# Patient Record
Sex: FEMALE | Race: WHITE | NOT HISPANIC OR LATINO | Employment: FULL TIME | URBAN - METROPOLITAN AREA
[De-identification: names, ages, dates, MRNs, and addresses within clinical notes are randomized per-mention and may not be internally consistent; named-entity substitution may affect disease eponyms.]

---

## 2017-02-28 ENCOUNTER — MYC REFILL (OUTPATIENT)
Dept: FAMILY MEDICINE | Facility: CLINIC | Age: 47
End: 2017-02-28

## 2017-02-28 DIAGNOSIS — G47.00 INSOMNIA: ICD-10-CM

## 2017-02-28 RX ORDER — TRAZODONE HYDROCHLORIDE 100 MG/1
200 TABLET ORAL AT BEDTIME
Qty: 180 TABLET | Refills: 1 | Status: SHIPPED | OUTPATIENT
Start: 2017-02-28 | End: 2017-08-14

## 2017-02-28 NOTE — TELEPHONE ENCOUNTER
[facility]  Original authorizing provider: LEDY Seaman CNPan Ezequiel would like a refill of the following medications:  traZODone (DESYREL) 100 MG tablet [LEDY Seaman CNP]    Preferred pharmacy: [address]    Comment:      Medication renewals requested in this message routed to other providers:  gabapentin (NEURONTIN) 300 MG capsule [LEDY Frias CNP]

## 2017-02-28 NOTE — TELEPHONE ENCOUNTER
Prescription approved per [facility] Refill Protocol.    Last filled 7/13/16 180 tabsx2 refills    Last seen in clinic9/16/16    Sapna Bowie RN

## 2017-04-11 ENCOUNTER — MYC REFILL (OUTPATIENT)
Dept: FAMILY MEDICINE | Facility: CLINIC | Age: 47
End: 2017-04-11

## 2017-04-11 DIAGNOSIS — F33.1 MAJOR DEPRESSIVE DISORDER, RECURRENT EPISODE, MODERATE (H): ICD-10-CM

## 2017-04-11 DIAGNOSIS — G25.81 RESTLESS LEG SYNDROME: ICD-10-CM

## 2017-04-11 NOTE — TELEPHONE ENCOUNTER
Gabapentin     Last Written Prescription Date: 2/28/17  Last Fill Quantity: 90,  # refills: 0  Last Office Visit with [facility] or [facility] prescribing provider: 9/16/16    Reviewed  and no concerns.  Last dispensed on 3/1/17    Jennifer Waldrop RN  [facility]

## 2017-04-11 NOTE — TELEPHONE ENCOUNTER
Venlafaxine  Last Written Prescription Date: 1/11/17  Last Fill Quantity: 90, # refills: 0  Last Office Visit with G primary care provider:  9/16/16       Last PHQ-9 score on record=   PHQ-9 SCORE 1/9/2017   Total Score MyChart 5 (Mild depression)   Total Score -         Routing refill request to provider for review/approval because:  PHQ 9 >4    Jennifer Waldrop RN  [facility]

## 2017-04-11 NOTE — TELEPHONE ENCOUNTER
[facility]  Original authorizing provider: LEDY Seaman CNPan Ezequiel would like a refill of the following medications:  venlafaxine (EFFEXOR-XR) 150 MG 24 hr capsule [LEDY Seaman CNP]    Preferred pharmacy: [address]    Comment:      Medication renewals requested in this message routed to other providers:  gabapentin (NEURONTIN) 300 MG capsule [LEDY Frias CNP]

## 2017-04-11 NOTE — TELEPHONE ENCOUNTER
[facility]  Original authorizing provider: LEDY Frias CNPan Ezequiel would like a refill of the following medications:  gabapentin (NEURONTIN) 300 MG capsule [LEDY Frias CNP]    Preferred pharmacy: [address]    Comment:      Medication renewals requested in this message routed to other providers:  venlafaxine (EFFEXOR-XR) 150 MG 24 hr capsule [LEDY Seaman CNP]

## 2017-04-12 RX ORDER — GABAPENTIN 300 MG/1
CAPSULE ORAL
Qty: 90 CAPSULE | Refills: 0 | Status: SHIPPED | OUTPATIENT
Start: 2017-04-12 | End: 2017-05-05

## 2017-04-12 RX ORDER — VENLAFAXINE HYDROCHLORIDE 150 MG/1
150 CAPSULE, EXTENDED RELEASE ORAL DAILY
Qty: 30 CAPSULE | Refills: 0 | Status: SHIPPED | OUTPATIENT
Start: 2017-04-12 | End: 2017-06-28

## 2017-04-25 ENCOUNTER — TELEPHONE (OUTPATIENT)
Dept: FAMILY MEDICINE | Facility: CLINIC | Age: 47
End: 2017-04-25

## 2017-04-25 NOTE — TELEPHONE ENCOUNTER
Panel Management Review      Patient has the following on her problem list: None      Composite cancer screening  Chart review shows that this patient is due/due soon for the following Pap Smear  Summary:    Patient is due/failing the following:   PAP    Action needed:   Patient needs office visit for pap smear and anxiety follow up.    Type of outreach:    Sent makexyzt message. and Sent letter.    Questions for provider review:    None                                                                                                                                    Tyler Silveira MA     Chart routed to none.

## 2017-04-25 NOTE — LETTER
[place] [id number] [place] [id number]  [phone]        April 25, 2017      Sharmin Nieves  [address]  [facility] [address]          Dear Sharmin,    In order to ensure we are providing the best quality care, we have reviewed your chart and see that you are due for:    1. Pap smear  2. Anxiety follow up    Please call the clinic at your earliest convenience to schedule an appointment.  If you have completed these please contact our office via phone or Laiyaoyaohart to update our records.  We would like to know the date (approximately month and year), the result, and ideally where the procedure was performed.    Thank you for trusting us with your health care.      Sincerely,    Care Team for TOMMY Clinton

## 2017-05-05 ENCOUNTER — OFFICE VISIT (OUTPATIENT)
Dept: FAMILY MEDICINE | Facility: CLINIC | Age: 47
End: 2017-05-05
Payer: COMMERCIAL

## 2017-05-05 VITALS
WEIGHT: 210.3 LBS | OXYGEN SATURATION: 96 % | TEMPERATURE: 98.1 F | SYSTOLIC BLOOD PRESSURE: 148 MMHG | DIASTOLIC BLOOD PRESSURE: 98 MMHG | BODY MASS INDEX: 30.11 KG/M2 | HEART RATE: 86 BPM | HEIGHT: 70 IN

## 2017-05-05 DIAGNOSIS — F33.1 MAJOR DEPRESSIVE DISORDER, RECURRENT EPISODE, MODERATE (H): ICD-10-CM

## 2017-05-05 DIAGNOSIS — G25.81 RESTLESS LEG SYNDROME: Primary | ICD-10-CM

## 2017-05-05 PROCEDURE — 99214 OFFICE O/P EST MOD 30 MIN: CPT | Performed by: NURSE PRACTITIONER

## 2017-05-05 RX ORDER — VENLAFAXINE HYDROCHLORIDE 75 MG/1
225 CAPSULE, EXTENDED RELEASE ORAL DAILY
Qty: 270 CAPSULE | Refills: 1 | Status: SHIPPED | OUTPATIENT
Start: 2017-05-05 | End: 2017-12-14

## 2017-05-05 RX ORDER — GABAPENTIN 600 MG/1
600 TABLET ORAL AT BEDTIME
Qty: 90 TABLET | Refills: 3 | Status: SHIPPED | OUTPATIENT
Start: 2017-05-05 | End: 2017-10-04

## 2017-05-05 RX ORDER — GABAPENTIN 300 MG/1
300 CAPSULE ORAL 2 TIMES DAILY PRN
Qty: 90 CAPSULE | Refills: 3 | Status: SHIPPED | OUTPATIENT
Start: 2017-05-05 | End: 2017-10-04

## 2017-05-05 ASSESSMENT — ANXIETY QUESTIONNAIRES
7. FEELING AFRAID AS IF SOMETHING AWFUL MIGHT HAPPEN: SEVERAL DAYS
GAD7 TOTAL SCORE: 8
3. WORRYING TOO MUCH ABOUT DIFFERENT THINGS: SEVERAL DAYS
6. BECOMING EASILY ANNOYED OR IRRITABLE: MORE THAN HALF THE DAYS
5. BEING SO RESTLESS THAT IT IS HARD TO SIT STILL: SEVERAL DAYS
2. NOT BEING ABLE TO STOP OR CONTROL WORRYING: SEVERAL DAYS
1. FEELING NERVOUS, ANXIOUS, OR ON EDGE: SEVERAL DAYS
IF YOU CHECKED OFF ANY PROBLEMS ON THIS QUESTIONNAIRE, HOW DIFFICULT HAVE THESE PROBLEMS MADE IT FOR YOU TO DO YOUR WORK, TAKE CARE OF THINGS AT HOME, OR GET ALONG WITH OTHER PEOPLE: SOMEWHAT DIFFICULT

## 2017-05-05 ASSESSMENT — PATIENT HEALTH QUESTIONNAIRE - PHQ9: 5. POOR APPETITE OR OVEREATING: SEVERAL DAYS

## 2017-05-05 NOTE — MR AVS SNAPSHOT
After Visit Summary   5/5/2017    Sharmin Nieves    MRN: [id number]           Patient Information     Date Of Birth          1970        Visit Information        Provider Department      5/5/2017 10:45 AM Randa Bill APRN CNP [facility]        Today's Diagnoses     Restless leg syndrome    -  1    Major depressive disorder, recurrent episode, moderate (H)          Care Instructions    Keep a journal of your leg symptoms  Increase effexor to 225 mg  Return for physical with pap        Follow-ups after your visit        Who to contact     If you have questions or need follow up information about today's clinic visit or your schedule please contact [facility] directly at [phone].  Normal or non-critical lab and imaging results will be communicated to you by StackBlazehart, letter or phone within 4 business days after the clinic has received the results. If you do not hear from us within 7 days, please contact the clinic through StackBlazehart or phone. If you have a critical or abnormal lab result, we will notify you by phone as soon as possible.  Submit refill requests through PillPack or call your pharmacy and they will forward the refill request to us. Please allow 3 business days for your refill to be completed.          Additional Information About Your Visit        MyChart Information     PillPack gives you secure access to your electronic health record. If you see a primary care provider, you can also send messages to your care team and make appointments. If you have questions, please call your primary care clinic.  If you do not have a primary care provider, please call [phone] and they will assist you.        Care EveryWhere ID     This is your Care EveryWhere ID. This could be used by other organizations to access your [place] medical records  [id number]        Your Vitals Were     Pulse Temperature Height Pulse Oximetry BMI (Body Mass Index)       86 98.1  " F (36.7  C) (Oral) 5' 10\" (1.778 m) 96% 30.17 kg/m2        Blood Pressure from Last 3 Encounters:   05/05/17 (!) 148/98   11/15/16 132/78   09/16/16 129/83    Weight from Last 3 Encounters:   05/05/17 210 lb 4.8 oz (95.4 kg)   11/15/16 201 lb 9.6 oz (91.4 kg)   09/16/16 193 lb 11.2 oz (87.9 kg)              We Performed the Following     DEPRESSION ACTION PLAN (DAP)          Today's Medication Changes          These changes are accurate as of: 5/5/17 11:31 AM.  If you have any questions, ask your nurse or doctor.               These medicines have changed or have updated prescriptions.        Dose/Directions    * gabapentin 600 MG tablet   Commonly known as:  NEURONTIN   This may have changed:  You were already taking a medication with the same name, and this prescription was added. Make sure you understand how and when to take each.   Used for:  Restless leg syndrome   Changed by:  Randa Bill APRN CNP        Dose:  600 mg   Take 1 tablet (600 mg) by mouth At Bedtime   Quantity:  90 tablet   Refills:  3       * gabapentin 300 MG capsule   Commonly known as:  NEURONTIN   This may have changed:    - how much to take  - how to take this  - when to take this  - reasons to take this  - additional instructions   Used for:  Restless leg syndrome   Changed by:  Randa Bill APRN CNP        Dose:  300 mg   Take 1 capsule (300 mg) by mouth 2 times daily as needed   Quantity:  90 capsule   Refills:  3       * venlafaxine 150 MG 24 hr capsule   Commonly known as:  EFFEXOR-XR   This may have changed:  Another medication with the same name was added. Make sure you understand how and when to take each.   Used for:  Major depressive disorder, recurrent episode, moderate (H)   Changed by:  Randa Bill APRN CNP        Dose:  150 mg   Take 1 capsule (150 mg) by mouth daily   Quantity:  30 capsule   Refills:  0       * venlafaxine 75 MG 24 hr capsule   Commonly known as:  EFFEXOR-XR   This may have changed:  You were " already taking a medication with the same name, and this prescription was added. Make sure you understand how and when to take each.   Used for:  Major depressive disorder, recurrent episode, moderate (H)   Changed by:  Randa Bill APRN CNP        Dose:  225 mg   Take 3 capsules (225 mg) by mouth daily   Quantity:  270 capsule   Refills:  1       * Notice:  This list has 4 medication(s) that are the same as other medications prescribed for you. Read the directions carefully, and ask your doctor or other care provider to review them with you.      Stop taking these medicines if you haven't already. Please contact your care team if you have questions.     magnesium citrate solution   Stopped by:  Randa Bill APRN CNP           polyethylene glycol Packet   Commonly known as:  MIRALAX   Stopped by:  Randa Bill APRN CNP           pramipexole 0.125 MG tablet   Commonly known as:  MIRAPEX   Stopped by:  Randa Bill APRN CNP                Where to get your medicines      These medications were sent to [address] [place] [address] [address] [address]    Hours:  TRANSPLANT PHONE NUMBER [phone] Phone:  [phone]     gabapentin 300 MG capsule    gabapentin 600 MG tablet    venlafaxine 75 MG 24 hr capsule                Primary Care Provider Office Phone # Fax #    LEDY Wu [phone]       [facility] [address] [facility] [address]  [facility] [address]        Thank you!     Thank you for choosing [facility]  for your care. Our goal is always to provide you with excellent care. Hearing back from our patients is one way we can continue to improve our services. Please take a few minutes to complete the written survey that you may receive in the mail after your visit with us. Thank you!             Your Updated Medication List - Protect others around you: Learn how to safely  use, store and throw away your medicines at www.disposemymeds.org.          This list is accurate as of: 5/5/17 11:31 AM.  Always use your most recent med list.                   Brand Name Dispense Instructions for use    Daily Multi vitamin  /Minerals Tabs     30    1 TABLET DAILY       diltiazem 2% in PLO cream (FV COMPOUNDED) 2% Gel     60 g    To anal opening three times daily.  Use a pea-sized amount.  Store at room temperature.       * gabapentin 600 MG tablet    NEURONTIN    90 tablet    Take 1 tablet (600 mg) by mouth At Bedtime       * gabapentin 300 MG capsule    NEURONTIN    90 capsule    Take 1 capsule (300 mg) by mouth 2 times daily as needed       IRON SUPPLEMENT PO      Take 27 mg by mouth       MAGNESIUM OXIDE PO      Take 250 mg by mouth       MIRENA (52 MG) 20 MCG/24HR IUD   Generic drug:  levonorgestrel     1 each    one placed in uterus       order for DME     1 Device    Equipment being ordered: walking cast       order for DME     1 Device    Equipment being ordered: women's large post op shoe       traZODone 100 MG tablet    DESYREL    180 tablet    Take 2 tablets (200 mg) by mouth At Bedtime       * venlafaxine 150 MG 24 hr capsule    EFFEXOR-XR    30 capsule    Take 1 capsule (150 mg) by mouth daily       * venlafaxine 75 MG 24 hr capsule    EFFEXOR-XR    270 capsule    Take 3 capsules (225 mg) by mouth daily       * Notice:  This list has 4 medication(s) that are the same as other medications prescribed for you. Read the directions carefully, and ask your doctor or other care provider to review them with you.

## 2017-05-05 NOTE — PROGRESS NOTES
"  SUBJECTIVE:                                                    Sharmin Nieves is a 46 year old female who presents to clinic today for the following health issues:    Restless legs - not so good - gabapentin is not working anymore.  Got compression socks for work.  Does have both nighttime and daytime achy symptoms.  No varicose veins.  Worse with sitting and better with pressure    Depression and Anxiety Follow-Up    Status since last visit: No change.  Would like to feel more rito.  Does not have interest in anything other than going to work and going home    Other associated symptoms:None    Complicating factors:     Significant life event: No     Current substance abuse: None.  Had alcohol in February, but sober since then.  Attending meeting and has good support network.    PHQ-9 SCORE 9/16/2016 1/9/2017 5/5/2017   Total Score - - -   Total Score MyChart - 5 (Mild depression) -   Total Score 9 - 7     DMITRIY-7 SCORE 6/30/2014 10/7/2015 5/5/2017   Total Score 7 - -   Total Score - 1 8        PHQ-9  English      PHQ-9   Any Language     GAD7       Amount of exercise or physical activity: None    Problems taking medications regularly: No    Medication side effects: none    Diet: regular (no restrictions)      Problem list and histories reviewed & adjusted, as indicated.  Additional history: as documented    Reviewed and updated as needed this visit by clinical staff  Tobacco  Allergies  Meds  Med Hx  Surg Hx  Fam Hx  Soc Hx      Reviewed and updated as needed this visit by Provider         ROS:  Constitutional, HEENT, cardiovascular, pulmonary, gi and gu systems are negative, except as otherwise noted.    OBJECTIVE:                                                    BP (!) 148/98  Pulse 86  Temp 98.1  F (36.7  C) (Oral)  Ht 5' 10\" (1.778 m)  Wt 210 lb 4.8 oz (95.4 kg)  SpO2 96%  BMI 30.17 kg/m2  Body mass index is 30.17 kg/(m^2).  GENERAL: healthy, alert and no distress  EYES: Eyes grossly normal to " inspection, PERRL and conjunctivae and sclerae normal  HENT: ear canals and TM's normal, nose and mouth without ulcers or lesions  NECK: no adenopathy, no asymmetry, masses, or scars and thyroid normal to palpation  RESP: lungs clear to auscultation - no rales, rhonchi or wheezes  CV: regular rate and rhythm, normal S1 S2, no S3 or S4, no murmur, click or rub, no peripheral edema and lower extremity peripheral pulses strong  ABDOMEN: soft, nontender, no hepatosplenomegaly, no masses and bowel sounds normal  MS: no gross musculoskeletal defects noted, no edema  SKIN: no suspicious lesions or rashes; no redness or lack of hair growth on lower extremities  NEURO: Normal strength and tone, mentation intact and speech normal; DTRs intact, light touch sensation intact  PSYCH: mentation appears normal, affect normal/bright    Diagnostic Test Results:  none      ASSESSMENT/PLAN:                                                    Depression; recurrent episode-- Moderate   Associated with the following complications:    None   Plan:  Medications:   SNRI  - increase      (G25.81) Restless leg syndrome  (primary encounter diagnosis)  Comment:   Plan: gabapentin (NEURONTIN) 600 MG tablet,         gabapentin (NEURONTIN) 300 MG capsule    Consider non-RLS diagnosis as patient does have non-classic symptoms and has not had success with mirapex and waning success with gabapentin.  Will try increased nighttime dose and follow-up at upcoming annual physical.  Consider timing of symptoms with starting effexor.    (F33.1) Major depressive disorder, recurrent episode, moderate (H)  Comment:   Plan: venlafaxine (EFFEXOR-XR) 75 MG 24 hr capsule        Increase to 225 mg      Patient Instructions   Keep a journal of your leg symptoms  Increase effexor to 225 mg  Return for physical with LEDY Lowery [facility]

## 2017-05-05 NOTE — PROGRESS NOTES
"Chief Complaint   Patient presents with     Depression     Anxiety       Initial BP (!) 148/98  Pulse 86  Temp 98.1  F (36.7  C) (Oral)  Ht 5' 10\" (1.778 m)  Wt 210 lb 4.8 oz (95.4 kg)  SpO2 96%  BMI 30.17 kg/m2 Estimated body mass index is 30.17 kg/(m^2) as calculated from the following:    Height as of this encounter: 5' 10\" (1.778 m).    Weight as of this encounter: 210 lb 4.8 oz (95.4 kg).  Medication Reconciliation: complete     Nimisha Zavala MA       "

## 2017-05-06 ASSESSMENT — ANXIETY QUESTIONNAIRES: GAD7 TOTAL SCORE: 8

## 2017-05-06 ASSESSMENT — PATIENT HEALTH QUESTIONNAIRE - PHQ9: SUM OF ALL RESPONSES TO PHQ QUESTIONS 1-9: 7

## 2017-06-28 ENCOUNTER — OFFICE VISIT (OUTPATIENT)
Dept: INTERNAL MEDICINE | Facility: CLINIC | Age: 47
End: 2017-06-28

## 2017-06-28 VITALS
RESPIRATION RATE: 16 BRPM | HEART RATE: 86 BPM | SYSTOLIC BLOOD PRESSURE: 124 MMHG | DIASTOLIC BLOOD PRESSURE: 83 MMHG | TEMPERATURE: 98.6 F

## 2017-06-28 DIAGNOSIS — H69.92 DYSFUNCTION OF EUSTACHIAN TUBE, LEFT: Primary | ICD-10-CM

## 2017-06-28 RX ORDER — PSEUDOEPHEDRINE HCL 120 MG/1
120 TABLET, FILM COATED, EXTENDED RELEASE ORAL EVERY 12 HOURS
Qty: 28 TABLET | Refills: 0 | Status: SHIPPED | OUTPATIENT
Start: 2017-06-28 | End: 2017-10-04

## 2017-06-28 RX ORDER — FLUTICASONE PROPIONATE 50 MCG
1 SPRAY, SUSPENSION (ML) NASAL DAILY
Qty: 1 BOTTLE | Refills: 1 | Status: SHIPPED | OUTPATIENT
Start: 2017-06-28 | End: 2017-10-04

## 2017-06-28 ASSESSMENT — PAIN SCALES - GENERAL: PAINLEVEL: NO PAIN (0)

## 2017-06-28 NOTE — PROGRESS NOTES
Sharmin Nieves is a 47 year old female who comes in for    CC: L ear plugged  HPI:  Ms. Nieves has had L ear plugging x2 weeks, now is starting to become sensitive/painful. Not plugged with wax. Starting to have vertigo intermittently with position change.  Denies fevers, nasal congestion or drainage, sore throat or cough. No seasonal allergies. Tried benadryl without improvement in symptoms.  Smokes ~0.5 ppd    Other issues discussed today:     Patient Active Problem List   Diagnosis     Herpes simplex virus (HSV) infection     Tobacco use disorder     IUD (intrauterine device) in place     Esophageal reflux     Ovarian cyst     Irregular menstrual cycle     Chronic low back pain     Right hip pain     CARDIOVASCULAR SCREENING; LDL GOAL LESS THAN 160     Moderate major depression (H)     Rectal pain     Health Care Home     Insomnia     Home Health Care     Generalized anxiety disorder     Dry skin     Restless leg syndrome       Current Outpatient Prescriptions   Medication Sig Dispense Refill     pseudoePHEDrine (SUDAFED 12 HOUR) 120 MG 12 hr tablet Take 1 tablet (120 mg) by mouth every 12 hours 28 tablet 0     fluticasone (FLONASE) 50 MCG/ACT spray Spray 1 spray into both nostrils daily 1 Bottle 1     gabapentin (NEURONTIN) 600 MG tablet Take 1 tablet (600 mg) by mouth At Bedtime 90 tablet 3     gabapentin (NEURONTIN) 300 MG capsule Take 1 capsule (300 mg) by mouth 2 times daily as needed 90 capsule 3     venlafaxine (EFFEXOR-XR) 75 MG 24 hr capsule Take 3 capsules (225 mg) by mouth daily 270 capsule 1     traZODone (DESYREL) 100 MG tablet Take 2 tablets (200 mg) by mouth At Bedtime 180 tablet 1     diltiazem 2% in PLO cream, FV COMPOUNDED, 2% GEL To anal opening three times daily.  Use a pea-sized amount.  Store at room temperature. 60 g 0     MAGNESIUM OXIDE PO Take 250 mg by mouth       levonorgestrel (MIRENA) 20 MCG/24HR IUD one placed in uterus 1 each 0     DAILY MULTI VITAMIN/MINERALS OR TABS 1 TABLET  DAILY 30 0     [DISCONTINUED] venlafaxine (EFFEXOR-XR) 150 MG 24 hr capsule Take 1 capsule (150 mg) by mouth daily 30 capsule 0         ALLERGIES: Darvocet [propoxyphene n-apap] and Nsaids    PAST MEDICAL HX:   Past Medical History:   Diagnosis Date     Acne      Anxiety state, unspecified     Anxiety     Chronic low back pain 5/11/2010     Depression     Dr. Gaytan     Diaphragmatic hernia without mention of obstruction or gangrene      Esophageal reflux     with associated esophageal spasm     ETOH abuse      Herpes simplex without mention of complication      Moderate major depression (H) 4/15/2011     Rectal pain 5/16/2011     Right hip pain 5/11/2010     Seasonal affective disorder (H)      Surveillance of previously prescribed intrauterine contraceptive device 10/03/05    mirena IUD       PAST SURGICAL HX:   Past Surgical History:   Procedure Laterality Date     C NONSPECIFIC PROCEDURE  1992    CRYO SURGERY for abnl paps     C STOMACH SURGERY PROCEDURE UNLISTED       SURGICAL HISTORY OF -   4/04    Lapr Nissen fundoplication     TONSILLECTOMY & ADENOIDECTOMY  1985       IMMUNIZATION HX:   Immunization History   Administered Date(s) Administered     DTAP (<7y) 10/05/2012     Influenza (IIV3) 11/13/2003, 11/20/2007, 11/19/2008, 11/18/2010, 10/01/2013     Mantoux 01/28/2008     TD (ADULT, 7+) 10/01/2002, 10/05/2012     Tdap (Adacel,Boostrix) 10/05/2012       SOCIAL HX:   Social History     Social History Narrative    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        Social Documentation:        Balanced Diet: YES    Calcium intake: more than 2 per day    Caffeine: 6 cups per day    Exercise:  type of activity 0    Sunscreen: Yes    Seatbelts:  Yes    Self Breast Exam:  No     Self Testicular Exam: No - n/a    Physical/Emotional/Sexual Abuse: No     Do you feel safe in your environment? Yes        Cholesterol screen up to date: No - Pt not fasting today.    CHOL      167   11/06/2002    HDL       42   11/06/2002    LDL        99   11/06/2002    TRIG      131   11/06/2002    CHOLHDLRATIO        4   11/06/2002        Eye Exam up to date: Yes    Dental Exam up to date: Yes    Pap smear up to date: Yes    Mammogram up to date: Does Not Apply    Dexa Scan up to date: Does Not Apply    Colonoscopy up to date: Does Not Apply    Immunizations up to date: Yes-Td 2002    Glucose screen if over 40:  No - n/a           ROS:   CONSTITUTIONAL: no fatigue, no unexpected change in weight  EYES: no acute vision problems or changes  ENT:see HPI  RESP: no significant cough, no shortness of breath  CV: no chest pain, no palpitations, no new or worsening peripheral edema    OBJECTIVE:  /83  Pulse 86  Temp 98.6  F (37  C)  Resp 16   Wt Readings from Last 1 Encounters:   05/05/17 95.4 kg (210 lb 4.8 oz)     Constitutional: no distress, comfortable, pleasant, well-groomed  Eyes: anicteric, conjunctiva pink, normal extra-ocular movements   Ears, Nose and Throat: tympanic membranes pearly gray and mildly distended with serous fluid, positive light reflex, EACs clear bilaterally, nose clear and free of lesions, throat clear, mucosa pink and moist.   Neck: supple with full range of motion, no thyromegaly, no lymphadenopathy  Cardiovascular: regular rate and rhythm, normal S1 and S2, no murmurs, rubs or gallops  Respiratory: clear to auscultation with good air movement bilaterally, faint expiratory wheeze in RLL, no crackles, non-labored  Psychological: appropriate mood, demonstrates intact judgment and logical thought process      ASSESSMENT/PLAN:    1. Dysfunction of eustachian tube, left  Recommended Sudafed to help promote drainage of eustachian tubes. May also try Flonase nasal spray. Stay well-hydrated. Reviewed Epley maneuver/half-somersault if vertigo worsens. Encouraged smoking cessation.  - pseudoePHEDrine (SUDAFED 12 HOUR) 120 MG 12 hr tablet; Take 1 tablet (120 mg) by mouth every 12 hours  Dispense: 28 tablet; Refill: 0  - fluticasone  (FLONASE) 50 MCG/ACT spray; Spray 1 spray into both nostrils daily  Dispense: 1 Bottle; Refill: 1    FOLLOW UP: If not improving or if worsening, or as needed for any changes or concerns  LEDY Lyons, CNP

## 2017-06-28 NOTE — MR AVS SNAPSHOT
After Visit Summary   6/28/2017    Sharmin Nieves    MRN: [id number]           Patient Information     Date Of Birth          1970        Visit Information        Provider Department      6/28/2017 7:00 AM Rhea Arboleda APRN [facility]        Today's Diagnoses     Dysfunction of eustachian tube, left    -  1      Care Instructions    Primary Care Center Medication Refill Request Information:  * Please contact your pharmacy regarding ANY request for medication refills.  ** [facility] Prescription Fax = [phone]  * Please allow 3 business days for routine medication refills.  * Please allow 5 business days for controlled substance medication refills.     Primary Care Center Test Result notification information:  *You will be notified with in 7-10 days of your appointment day regarding the results of your test.  If you are on MyChart you will be notified as soon as the provider has reviewed the results and signed off on them.    Primary Care Center [phone]     If your vertigo worsens, try this half-somersault maneuver:            Follow-ups after your visit        Who to contact     Please call your clinic at [phone] to:    Ask questions about your health    Make or cancel appointments    Discuss your medicines    Learn about your test results    Speak to your doctor   If you have compliments or concerns about an experience at your clinic, or if you wish to file a complaint, please contact [facility] Patient Relations at [phone] or email us at [email or web address]         Additional Information About Your Visit        MyChart Information     [employer] gives you secure access to your electronic health record. If you see a primary care provider, you can also send messages to your care team and make appointments. If you have questions, please call your primary care clinic.  If you do not have a primary care provider, please  call [phone] and they will assist you.      [employer] is an electronic gateway that provides easy, online access to your medical records. With [employer], you can request a clinic appointment, read your test results, renew a prescription or communicate with your care team.     To access your existing account, please contact your [facility] or call [phone] for assistance.        Care EveryWhere ID     This is your Care EveryWhere ID. This could be used by other organizations to access your [facility] medical records  [id number]        Your Vitals Were     Pulse Temperature Respirations             86 98.6  F (37  C) 16          Blood Pressure from Last 3 Encounters:   06/28/17 124/83   05/05/17 (!) 148/98   11/15/16 132/78    Weight from Last 3 Encounters:   05/05/17 95.4 kg (210 lb 4.8 oz)   11/15/16 91.4 kg (201 lb 9.6 oz)   09/16/16 87.9 kg (193 lb 11.2 oz)              Today, you had the following     No orders found for display         Today's Medication Changes          These changes are accurate as of: 6/28/17  7:09 AM.  If you have any questions, ask your nurse or doctor.               Start taking these medicines.        Dose/Directions    fluticasone 50 MCG/ACT spray   Commonly known as:  FLONASE   Used for:  Dysfunction of eustachian tube, left   Started by:  Rhea Arboleda APRN CNP        Dose:  1 spray   Spray 1 spray into both nostrils daily   Quantity:  1 Bottle   Refills:  1       pseudoePHEDrine 120 MG 12 hr tablet   Commonly known as:  SUDAFED 12 HOUR   Used for:  Dysfunction of eustachian tube, left   Started by:  Rhea Arboleda APRN CNP        Dose:  120 mg   Take 1 tablet (120 mg) by mouth every 12 hours   Quantity:  28 tablet   Refills:  0         These medicines have changed or have updated prescriptions.        Dose/Directions    venlafaxine 75 MG 24 hr capsule   Commonly known as:  EFFEXOR-XR   This may have changed:  Another medication with  the same name was removed. Continue taking this medication, and follow the directions you see here.   Used for:  Major depressive disorder, recurrent episode, moderate (H)   Changed by:  Randa Bill APRN CNP        Dose:  225 mg   Take 3 capsules (225 mg) by mouth daily   Quantity:  270 capsule   Refills:  1         Stop taking these medicines if you haven't already. Please contact your care team if you have questions.     IRON SUPPLEMENT PO   Stopped by:  Rhea Arboleda APRN CNP           order for DME   Stopped by:  Rhea Arboleda APRN CNP           order for DME   Stopped by:  Rhea Arboleda APRN CNP                Where to get your medicines      These medications were sent to [facility] [address] [facility] [address]  [address] [facility] [address] [place] [id number]    Hours:  TRANSPLANT PHONE NUMBER [phone] Phone:  [phone]     fluticasone 50 MCG/ACT spray         Some of these will need a paper prescription and others can be bought over the counter.  Ask your nurse if you have questions.     Bring a paper prescription for each of these medications     pseudoePHEDrine 120 MG 12 hr tablet                Primary Care Provider Office Phone # Fax #    LEDY Wu [phone]       [facility] [address] [facility] [address]  [facility] [address]        Equal Access to Services     JOCELYNE GOMEZ AH: Hadii geovanny rhodes hadasho Sosoleali, waaxda luqadaha, qaybta kaalmada adeegyada, gabe rainey . So [facility] [phone].    ATENCIÓN: Si habla español, tiene a mitchell disposición servicios gratuitos de asistencia lingüística. Llame al [phone].    We comply with applicable federal civil rights laws and [place] laws. We do not discriminate on the basis of race, color, national origin, age, disability sex, sexual orientation or gender identity.            Thank you!     Thank you for choosing [facility]  for your care. Our goal is always to provide you with excellent care. Hearing back from our patients is one way we can continue to improve our services. Please take a few minutes to complete the written survey that you may receive in the mail after your visit with us. Thank you!             Your Updated Medication List - Protect others around you: Learn how to safely use, store and throw away your medicines at www.disposemymeds.org.          This list is accurate as of: 6/28/17  7:09 AM.  Always use your most recent med list.                   Brand Name Dispense Instructions for use Diagnosis    Daily Multi vitamin  /Minerals Tabs     30    1 TABLET DAILY        diltiazem 2% in PLO cream (FV COMPOUNDED) 2% Gel     60 g    To anal opening three times daily.  Use a pea-sized amount.  Store at room temperature.    Anal pain, Anal fissure       fluticasone 50 MCG/ACT spray    FLONASE    1 Bottle    Spray 1 spray into both nostrils daily    Dysfunction of eustachian tube, left       * gabapentin 600 MG tablet    NEURONTIN    90 tablet    Take 1 tablet (600 mg) by mouth At Bedtime    Restless leg syndrome       * gabapentin 300 MG capsule    NEURONTIN    90 capsule    Take 1 capsule (300 mg) by mouth 2 times daily as needed    Restless leg syndrome       MAGNESIUM OXIDE PO      Take 250 mg by mouth        MIRENA (52 MG) 20 MCG/24HR IUD   Generic drug:  levonorgestrel     1 each    one placed in uterus        pseudoePHEDrine 120 MG 12 hr tablet    SUDAFED 12 HOUR    28 tablet    Take 1 tablet (120 mg) by mouth every 12 hours    Dysfunction of eustachian tube, left       traZODone 100 MG tablet    DESYREL    180 tablet    Take 2 tablets (200 mg) by mouth At Bedtime    Insomnia       venlafaxine 75 MG 24 hr capsule    EFFEXOR-XR    270 capsule    Take 3 capsules (225 mg) by mouth daily    Major depressive disorder, recurrent episode, moderate (H)       * Notice:  This list has 2 medication(s) that are  the same as other medications prescribed for you. Read the directions carefully, and ask your doctor or other care provider to review them with you.

## 2017-06-28 NOTE — PATIENT INSTRUCTIONS
[facility] Medication Refill Request Information:  * Please contact your pharmacy regarding ANY request for medication refills.  ** [facility] Prescription Fax = [phone]  * Please allow 3 business days for routine medication refills.  * Please allow 5 business days for controlled substance medication refills.     [facility] Test Result notification information:  *You will be notified with in 7-10 days of your appointment day regarding the results of your test.  If you are on MyChart you will be notified as soon as the provider has reviewed the results and signed off on them.    [facility] [phone]     If your vertigo worsens, try this half-somersault maneuver:

## 2017-06-28 NOTE — NURSING NOTE
Chief Complaint   Patient presents with     Ear Problem     Patient states her left ear has been plugged x 2 weeks     NAMRTAA REINOSO at 6:51 AM on 6/28/2017.

## 2017-07-15 ENCOUNTER — HEALTH MAINTENANCE LETTER (OUTPATIENT)
Age: 47
End: 2017-07-15

## 2017-08-14 ENCOUNTER — MYC REFILL (OUTPATIENT)
Dept: FAMILY MEDICINE | Facility: CLINIC | Age: 47
End: 2017-08-14

## 2017-08-14 DIAGNOSIS — G47.00 INSOMNIA: ICD-10-CM

## 2017-08-15 RX ORDER — TRAZODONE HYDROCHLORIDE 100 MG/1
200 TABLET ORAL AT BEDTIME
Qty: 180 TABLET | Refills: 1 | Status: SHIPPED | OUTPATIENT
Start: 2017-08-15 | End: 2018-01-24

## 2017-08-15 NOTE — TELEPHONE ENCOUNTER
Refill request for     TraZODone (DESYREL) 100 MG tablet     Called pharmacy, no refills on file.     Prescription approved per [facility] Refill Protocol.      Anna Jackson RN  [facility]

## 2017-10-04 ENCOUNTER — OFFICE VISIT (OUTPATIENT)
Dept: PODIATRY | Facility: CLINIC | Age: 47
End: 2017-10-04
Payer: COMMERCIAL

## 2017-10-04 ENCOUNTER — OFFICE VISIT (OUTPATIENT)
Dept: FAMILY MEDICINE | Facility: CLINIC | Age: 47
End: 2017-10-04
Payer: COMMERCIAL

## 2017-10-04 ENCOUNTER — OFFICE VISIT (OUTPATIENT)
Dept: PHARMACY | Facility: CLINIC | Age: 47
End: 2017-10-04
Payer: COMMERCIAL

## 2017-10-04 ENCOUNTER — TELEPHONE (OUTPATIENT)
Dept: FAMILY MEDICINE | Facility: CLINIC | Age: 47
End: 2017-10-04

## 2017-10-04 ENCOUNTER — RADIANT APPOINTMENT (OUTPATIENT)
Dept: GENERAL RADIOLOGY | Facility: CLINIC | Age: 47
End: 2017-10-04
Attending: PODIATRIST
Payer: COMMERCIAL

## 2017-10-04 VITALS
SYSTOLIC BLOOD PRESSURE: 146 MMHG | WEIGHT: 220.6 LBS | TEMPERATURE: 96.9 F | OXYGEN SATURATION: 99 % | HEART RATE: 76 BPM | DIASTOLIC BLOOD PRESSURE: 84 MMHG | BODY MASS INDEX: 31.65 KG/M2

## 2017-10-04 VITALS
HEIGHT: 70 IN | DIASTOLIC BLOOD PRESSURE: 82 MMHG | HEART RATE: 82 BPM | SYSTOLIC BLOOD PRESSURE: 143 MMHG | BODY MASS INDEX: 31.47 KG/M2 | WEIGHT: 219.8 LBS

## 2017-10-04 DIAGNOSIS — F32.1 MODERATE MAJOR DEPRESSION (H): ICD-10-CM

## 2017-10-04 DIAGNOSIS — M79.604 BILATERAL LEG PAIN: ICD-10-CM

## 2017-10-04 DIAGNOSIS — R52 PAIN: ICD-10-CM

## 2017-10-04 DIAGNOSIS — M72.2 PLANTAR FASCIITIS: ICD-10-CM

## 2017-10-04 DIAGNOSIS — G47.00 INSOMNIA, UNSPECIFIED TYPE: ICD-10-CM

## 2017-10-04 DIAGNOSIS — Z00.01 ENCOUNTER FOR ROUTINE ADULT MEDICAL EXAM WITH ABNORMAL FINDINGS: Primary | ICD-10-CM

## 2017-10-04 DIAGNOSIS — Z12.4 SCREENING FOR CERVICAL CANCER: ICD-10-CM

## 2017-10-04 DIAGNOSIS — E63.9 NUTRITIONAL DEFICIENCY: ICD-10-CM

## 2017-10-04 DIAGNOSIS — K62.89 ANAL PAIN: ICD-10-CM

## 2017-10-04 DIAGNOSIS — F17.200 TOBACCO USE DISORDER: ICD-10-CM

## 2017-10-04 DIAGNOSIS — K60.2 ANAL FISSURE: ICD-10-CM

## 2017-10-04 DIAGNOSIS — M79.605 BILATERAL LEG PAIN: ICD-10-CM

## 2017-10-04 DIAGNOSIS — R52 PAIN: Primary | ICD-10-CM

## 2017-10-04 DIAGNOSIS — G25.81 RESTLESS LEG SYNDROME: ICD-10-CM

## 2017-10-04 DIAGNOSIS — M20.5X1 HALLUX LIMITUS OF RIGHT FOOT: Primary | ICD-10-CM

## 2017-10-04 DIAGNOSIS — Z97.5 IUD (INTRAUTERINE DEVICE) IN PLACE: ICD-10-CM

## 2017-10-04 LAB
BASOPHILS # BLD AUTO: 0 10E9/L (ref 0–0.2)
BASOPHILS NFR BLD AUTO: 0.4 %
DIFFERENTIAL METHOD BLD: ABNORMAL
EOSINOPHIL # BLD AUTO: 0.3 10E9/L (ref 0–0.7)
EOSINOPHIL NFR BLD AUTO: 3.8 %
ERYTHROCYTE [DISTWIDTH] IN BLOOD BY AUTOMATED COUNT: 12.5 % (ref 10–15)
HCT VFR BLD AUTO: 40.3 % (ref 35–47)
HGB BLD-MCNC: 13.8 G/DL (ref 11.7–15.7)
LYMPHOCYTES # BLD AUTO: 2.4 10E9/L (ref 0.8–5.3)
LYMPHOCYTES NFR BLD AUTO: 30 %
MCH RBC QN AUTO: 34.4 PG (ref 26.5–33)
MCHC RBC AUTO-ENTMCNC: 34.2 G/DL (ref 31.5–36.5)
MCV RBC AUTO: 101 FL (ref 78–100)
MONOCYTES # BLD AUTO: 0.5 10E9/L (ref 0–1.3)
MONOCYTES NFR BLD AUTO: 6.5 %
NEUTROPHILS # BLD AUTO: 4.7 10E9/L (ref 1.6–8.3)
NEUTROPHILS NFR BLD AUTO: 59.3 %
PLATELET # BLD AUTO: 244 10E9/L (ref 150–450)
RBC # BLD AUTO: 4.01 10E12/L (ref 3.8–5.2)
WBC # BLD AUTO: 8 10E9/L (ref 4–11)

## 2017-10-04 PROCEDURE — G0145 SCR C/V CYTO,THINLAYER,RESCR: HCPCS | Performed by: NURSE PRACTITIONER

## 2017-10-04 PROCEDURE — 36415 COLL VENOUS BLD VENIPUNCTURE: CPT | Performed by: NURSE PRACTITIONER

## 2017-10-04 PROCEDURE — 99214 OFFICE O/P EST MOD 30 MIN: CPT | Performed by: PODIATRIST

## 2017-10-04 PROCEDURE — 83550 IRON BINDING TEST: CPT | Performed by: NURSE PRACTITIONER

## 2017-10-04 PROCEDURE — 99607 MTMS BY PHARM ADDL 15 MIN: CPT | Performed by: PHARMACIST

## 2017-10-04 PROCEDURE — 99396 PREV VISIT EST AGE 40-64: CPT | Performed by: NURSE PRACTITIONER

## 2017-10-04 PROCEDURE — 99214 OFFICE O/P EST MOD 30 MIN: CPT | Mod: 25 | Performed by: NURSE PRACTITIONER

## 2017-10-04 PROCEDURE — 83540 ASSAY OF IRON: CPT | Performed by: NURSE PRACTITIONER

## 2017-10-04 PROCEDURE — 85025 COMPLETE CBC W/AUTO DIFF WBC: CPT | Performed by: NURSE PRACTITIONER

## 2017-10-04 PROCEDURE — 87624 HPV HI-RISK TYP POOLED RSLT: CPT | Performed by: NURSE PRACTITIONER

## 2017-10-04 PROCEDURE — 73630 X-RAY EXAM OF FOOT: CPT | Mod: RT

## 2017-10-04 PROCEDURE — 99605 MTMS BY PHARM NP 15 MIN: CPT | Performed by: PHARMACIST

## 2017-10-04 RX ORDER — VARENICLINE TARTRATE 1 MG/1
1 TABLET, FILM COATED ORAL 2 TIMES DAILY
Qty: 56 TABLET | Refills: 2 | Status: SHIPPED | OUTPATIENT
Start: 2017-10-04 | End: 2018-11-28

## 2017-10-04 RX ORDER — CELECOXIB 100 MG/1
100 CAPSULE ORAL 2 TIMES DAILY PRN
Qty: 60 CAPSULE | Refills: 1 | Status: SHIPPED | OUTPATIENT
Start: 2017-10-04 | End: 2018-01-14

## 2017-10-04 RX ORDER — GABAPENTIN 600 MG/1
600 TABLET ORAL AT BEDTIME
Qty: 90 TABLET | Refills: 3 | Status: SHIPPED | OUTPATIENT
Start: 2017-10-04 | End: 2018-11-30

## 2017-10-04 ASSESSMENT — PATIENT HEALTH QUESTIONNAIRE - PHQ9
10. IF YOU CHECKED OFF ANY PROBLEMS, HOW DIFFICULT HAVE THESE PROBLEMS MADE IT FOR YOU TO DO YOUR WORK, TAKE CARE OF THINGS AT HOME, OR GET ALONG WITH OTHER PEOPLE: SOMEWHAT DIFFICULT
SUM OF ALL RESPONSES TO PHQ QUESTIONS 1-9: 8
SUM OF ALL RESPONSES TO PHQ QUESTIONS 1-9: 8

## 2017-10-04 NOTE — TELEPHONE ENCOUNTER
Reason for call:  Other   Patient called regarding (reason for call): prescription  Additional comments: Nguyen at the [facility] called with a question regarding the rx for Celebrex that was received. She stated that in the patient's chart she is listed as having an allergy to Nsaids, but it doesn't say what, if any, reaction is had or any documentation from the appointment today about whether or not she could take this medication or if it was discussed at all. She stated that they have never filled that prescription for her before.       Phone number to reach patient:  Other phone number:  [phone]    Best Time:  Any - If Nguyen is not available it is okay to speak with any pharmacist that is available.    Can we leave a detailed message on this number?  Not Applicable

## 2017-10-04 NOTE — LETTER
10/4/2017         RE: Sharmin Nieves  [address]  [place] [address]        Dear Colleague,    Thank you for referring your patient, Sharmin Nieves, to the [facility]. Please see a copy of my visit note below.    Weight management plan: Patient was referred to their PCP to discuss a diet and exercise plan.     KHOA Urena MA October 4, 2017 7:08 AM      PATIENT HISTORY:  Sharmin Nieves is a 47 year old female who presents to clinic for b/l foot pain.  B/l arch pain worse after rest.  Also with pain at R 1st MPJ with dorsiflexion.  6-8 month duration.  She has tried massage, different shoes, tylenol.  Not much help.  3-8/10 pain.      Review of Systems:  Patient denies fever, chills, rash, wound, numbness, weakness, heart burn, blood in stool, chest pain with activity, calf pain when walking, shortness of breath with activity, chronic cough, easy bleeding/bruising, swelling of ankles, excessive thirst, fatigue, depression, anxiety.  Patient admits to stiffness, limping.     PAST MEDICAL HISTORY:   Past Medical History:   Diagnosis Date     Acne      Anxiety state, unspecified     Anxiety     Chronic low back pain 5/11/2010     Depression     Dr. Gaytan     Diaphragmatic hernia without mention of obstruction or gangrene      Esophageal reflux     with associated esophageal spasm     ETOH abuse      Herpes simplex without mention of complication      Moderate major depression (H) 4/15/2011     Rectal pain 5/16/2011     Right hip pain 5/11/2010     Seasonal affective disorder (H)      Surveillance of previously prescribed intrauterine contraceptive device 10/03/05    mirena IUD        PAST SURGICAL HISTORY:   Past Surgical History:   Procedure Laterality Date     C NONSPECIFIC PROCEDURE  1992    CRYO SURGERY for abnl paps     C STOMACH SURGERY PROCEDURE UNLISTED       SURGICAL HISTORY OF -   4/04    Lapr Nissen fundoplication     TONSILLECTOMY & ADENOIDECTOMY  1985         MEDICATIONS:   Current Outpatient Prescriptions:      traZODone (DESYREL) 100 MG tablet, Take 2 tablets (200 mg) by mouth At Bedtime, Disp: 180 tablet, Rfl: 1     pseudoePHEDrine (SUDAFED 12 HOUR) 120 MG 12 hr tablet, Take 1 tablet (120 mg) by mouth every 12 hours, Disp: 28 tablet, Rfl: 0     fluticasone (FLONASE) 50 MCG/ACT spray, Spray 1 spray into both nostrils daily, Disp: 1 Bottle, Rfl: 1     gabapentin (NEURONTIN) 600 MG tablet, Take 1 tablet (600 mg) by mouth At Bedtime, Disp: 90 tablet, Rfl: 3     gabapentin (NEURONTIN) 300 MG capsule, Take 1 capsule (300 mg) by mouth 2 times daily as needed, Disp: 90 capsule, Rfl: 3     venlafaxine (EFFEXOR-XR) 75 MG 24 hr capsule, Take 3 capsules (225 mg) by mouth daily, Disp: 270 capsule, Rfl: 1     diltiazem 2% in PLO cream, FV COMPOUNDED, 2% GEL, To anal opening three times daily.  Use a pea-sized amount.  Store at room temperature., Disp: 60 g, Rfl: 0     MAGNESIUM OXIDE PO, Take 250 mg by mouth, Disp: , Rfl:      levonorgestrel (MIRENA) 20 MCG/24HR IUD, one placed in uterus, Disp: 1 each, Rfl: 0     DAILY MULTI VITAMIN/MINERALS OR TABS, 1 TABLET DAILY, Disp: 30, Rfl: 0     ALLERGIES:    Allergies   Allergen Reactions     Darvocet [Propoxyphene N-Apap] Nausea and Vomiting     Nsaids         SOCIAL HISTORY:   Social History     Social History     Marital status:      Spouse name: Esteban     Number of children: 3     Years of education: 13     Occupational History     Medical Assistant 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        Social Documentation:        Balanced Diet: YES    Calcium intake: more than 2 per day    Caffeine: 6 cups per day    Exercise:  type of activity 0    Sunscreen: Yes    Seatbelts:  Yes    Self Breast Exam:  No     Self Testicular Exam: No - n/a    Physical/Emotional/Sexual Abuse: No     Do you feel safe in your environment? Yes        Cholesterol screen up to date: No - Pt not fasting today.    CHOL      167   11/06/2002    HDL       42   11/06/2002    LDL       99   11/06/2002    TRIG      131   11/06/2002    CHOLHDLRATIO        4   11/06/2002        Eye Exam up to date: Yes    Dental Exam up to date: Yes    Pap smear up to date: Yes    Mammogram up to date: Does Not Apply    Dexa Scan up to date: Does Not Apply    Colonoscopy up to date: Does Not Apply    Immunizations up to date: Yes-Td 2002    Glucose screen if over 40:  No - n/a            FAMILY HISTORY:   Family History   Problem Relation Age of Onset     C.A.D. Maternal Grandmother      Hypertension Maternal Grandmother      Alcohol/Drug Maternal Grandmother      Depression Maternal Grandmother      CEREBROVASCULAR DISEASE Paternal Grandfather      Alcohol/Drug Paternal Grandfather      Breast Cancer Paternal Grandmother      Depression Paternal Grandmother      Alcohol/Drug Father      Depression Father      GASTROINTESTINAL DISEASE Father      GERD     Alcohol/Drug Maternal Grandfather      Depression Maternal Grandfather      Depression Mother      Obesity Mother      Anxiety Disorder Mother      DIABETES Mother      Breast Cancer Maternal Aunt      Alcohol/Drug Sister      Alcohol/Drug Brother      Alcohol/Drug Brother      Alcohol/Drug Brother      Depression Sister      Depression Brother      Depression Brother      Depression Brother      GASTROINTESTINAL DISEASE Brother      Hiatal hernia     Cancer - colorectal No family hx of      Prostate Cancer No family hx of         EXAM:Vitals: BP  "143/82 (BP Location: Left arm, Patient Position: Chair, Cuff Size: Adult Large)  Pulse 82  Ht 5' 10\" (1.778 m)  Wt 219 lb 12.8 oz (99.7 kg)  BMI 31.54 kg/m2  BMI= Body mass index is 31.54 kg/(m^2).    General appearance: Patient is alert and fully cooperative with history & exam.  No sign of distress is noted during the visit.     Psychiatric: Affect is pleasant & appropriate.  Patient appears motivated to improve health.     Respiratory: Breathing is regular & unlabored while sitting.     HEENT: Hearing is intact to spoken word.  Speech is clear.  No gross evidence of visual impairment that would impact ambulation.     Dermatologic: Skin is intact to both feet without significant lesions, rash or abrasion.  No paronychia or evidence of soft tissue infection is noted.     Vascular: DP & PT pulses are intact & regular bilaterally.  No significant edema or varicosities noted.  CFT and skin temperature are normal to both lower extremities.     Neurologic: Lower extremity sensation is intact to light touch.  No evidence of weakness or contracture in the lower extremities.  No evidence of neuropathy.     Musculoskeletal:  Pain to palpation of the b/l plantar fascial medial bands.  No heel pain.  R 1st MPJ ROM limited with palpable bone spurring, pain at end dorsiflexion. Patient is ambulatory without assistive device or brace.  No gross ankle deformity noted.  No foot or ankle joint effusion is noted.    XRs of right foot reviewed with pt.  1st MPJ spurring with narrowed joint space.     ASSESSMENT:   B/l plantar fasciitis  R hallux limitus     PLAN:  Reviewed patient's chart in epic.  Discussed condition and treatment options including pros and cons.    The potential causes and nature of plantar fasciitis were discussed with the patient.  We reviewed the natural history/prognosis of the condition and risks if left untreated.       We discussed possible causes of the condition as it relates to the patients specific " situation.      Conservative treatment options were reviewed:  appropriate shoes, avoidance of barefoot walking, inserts/orthoses, stretching, ice, massage, immobilization and NSAIDs.    Surgical and non-surgical treatment options for hallux limitus were discussed.  This includes my philosophy about different procedures including cheilectomy, osteotomy and fusion.  I explained how fusion is for late stage treatment of advanced arthritis.  There is no certainty that the non-fusion procedures will improve joint pain that is caused by arthritis.  I explained that hallux limitus is oftentimes surgically treated in a staged manner.  This means that additional surgery may be necessary over time.  Non-operative treatments like stiff soles, orthotics, injection and NSAIDs were discussed.  Shoes that provide extra room for the enlarged joint should be helpful.  I would anticipate somewhat short term benefit from joint injection.    Pt will try different shoes, icing, stretching, superfeet.    Handouts given.  F/u prn.         Mo Edgar DPM, FACFAS      Again, thank you for allowing me to participate in the care of your patient.        Sincerely,        Mo Edgar DPM

## 2017-10-04 NOTE — PATIENT INSTRUCTIONS
"Google \"plantar fasciitis taping\" video  Book - \"Every Woman's Guide to Foot Pain Relief\" by Rosalinda Rivero - available at the library    Preventive Health Recommendations  Female Ages 40 to 49    Yearly exam:     See your health care provider every year in order to  1. Review health changes.   2. Discuss preventive care.    3. Review your medicines if your doctor prescribed any.      Get a Pap test every three years (unless you have an abnormal result and your provider advises testing more often).      If you get Pap tests with HPV test, you only need to test every 5 years, unless you have an abnormal result. You do not need a Pap test if your uterus was removed (hysterectomy) and you have not had cancer.      You should be tested each year for STDs (sexually transmitted diseases), if you're at risk.       Ask your doctor if you should have a mammogram.      Have a colonoscopy (test for colon cancer) if someone in your family has had colon cancer or polyps before age 50.       Have a cholesterol test every 5 years.       Have a diabetes test (fasting glucose) after age 45. If you are at risk for diabetes, you should have this test every 3 years.    Shots: Get a flu shot each year. Get a tetanus shot every 10 years.     Nutrition:     Eat at least 5 servings of fruits and vegetables each day.    Eat whole-grain bread, whole-wheat pasta and brown rice instead of white grains and rice.    Talk to your provider about Calcium and Vitamin D.     Lifestyle    Exercise at least 150 minutes a week (an average of 30 minutes a day, 5 days a week). This will help you control your weight and prevent disease.    Limit alcohol to one drink per day.    No smoking.     Wear sunscreen to prevent skin cancer.    See your dentist every six months for an exam and cleaning.  "

## 2017-10-04 NOTE — TELEPHONE ENCOUNTER
I am no aware of the previous allergies or allergist recommendation.  Did patient ever try Celebrex after the allergist gave the ok on it? Where was that allergist?  I don't see the records for an allergist - I do for rheumatology.  Just want to see if I can read records that make that recommendation.  Thanks!  VANESSA Bill, TOMMY

## 2017-10-04 NOTE — PATIENT INSTRUCTIONS
PLANTAR FASCIITIS  Plantar fasciitis is often referred to as heel spurs or heel pain. Plantar fasciitis is a very common problem that affects people of all foot shapes, age, weight and activity level. Pain may be in the arch or on the weight-bearing surface of the heel. The pain may come on without injury or identifiable cause. Pain is generally present when first getting out of bed in the morning or up from a seated break.     CAUSES  The plantar fascia is a dense fibrous band of tissue that stretches across the bottom surface of the foot. The fascia helps support the foot muscles and arch. Plantar fasciitis is thought to be caused by mechanical strain or overload. Frequent walking without shoes or wearing unsupportive shoes is thought to cause structural overload and ultimately inflammation of the plantar fascia. Some people have heel spurs that can be seen on x-ray. The heel spur is actually a minor component of plantar fascitis and is largely ignored.       SELF TREATMENT   The easiest solution is to stop walking around your home without shoes. Plantar fasciitis is largely a shoe problem. Shoes are either not being worn often enough or your current shoes are inadequate for your weight, foot structure or activity level. The majority of shoes on the market today are not sufficient to resist development of plantar fasciitis or to promote healing. Assume that your current shoes are inadequate and will need to be replaced. Even high quality shoes wear out with 6 months to one year of frequent use. Weight loss is another option. Losing ten pounds in the next two months may be enough to resolve the problem. Ice applied to the area of pain two to three times per day for ten minutes each session can be very helpful. Warm foot soaks in epsom salts can also relieve pain. This should continue until the problem resolves. Achilles tendon stretching is essential. Stretch multiple times daily to promote healing and to prevent  recurrence in the future. Over all stretching of the body is helpful as well such as the calves, thighs and lower back. Normally when one area of the body is tight, other areas are too. Gentle Yoga can be good for this.     Over the counter topical anti inflammatories can be helpful such as biofreeze, bengay, salon pas, ect...  Oral ibuprofen or aleve is recommended as well to try to calm down inflammation.     Night splints can be helpful to gradually stretch the foot at night as a lot of pain is when you get up in the morning. Taking a towel or thera band and stretching the foot back multiple times before you get ou of bed can be beneficial as well.     MEDICAL TREATMENT  Medical treatments often include custom arch supports, cortisone injections, physical therapy, splints to be worn in bed, prescription medications and surgery. The home treatments listed above will be necessary regardless of these advanced medical treatments. Surgery is rarely needed but is very helpful in selected cases.     PROGNOSIS  Plantar fasciitis can last from one day to a lifetime. Some people get intermittent fascitis that is very short-lived. Others suffer daily for years. Excessive body weight, frequent bare foot walking, long hours on the feet, inadequate shoes, predisposing foot structures and excessive activity such as running are all potential issues that lead to chronic and/or recurring plantar fascitis. Having plantar fasciitis means that you are forever prone to this problem and will require modification of some of the above factors. Most people seek treatment within one to four months. Healing usually requires a similar one to four month time frame. Healing time is relative to the amount of effort spent treating the problem.   Plantar fasciitis is highly recurrent. Risk factors often continue, including return to bare foot walking, inadequate shoes, excessive body weight, excessive activities, etc. Your life style and foot  structure may predispose you to recurrent plantar fasciitis. A daily prevention regimen can be very helpful. Ongoing use of shoe inserts, careful attention to appropriate shoes, daily Achilles stretching, etc. may prevent recurrence. Prompt attention at the earliest warning signs of heel pain can resolve the problem in as short as a few days.     EXERCISES  Stair Exercise: Step on the stairs with the ball of your foot and hold your position for at least 15 seconds, then slowly step down with the heels of your foot. You can do this daily and as often as you want.   Picking the Towel: Sit comfortably and then pick the towel up with your toes. You can use any object other than a towel as long as the material can be soft and you can pick it up with your toes.  Rolling the Bottle: Use a small ball or frozen water bottle and then roll it around with your foot.   Flex the Toes: Sit comfortably and then flex your toes by pointing it towards the floor or towards your body. This will relax and flex your foot and exercise your plantar fascia, the calf, and the Achilles tendon. The inability of the foot to stretch often causes the bunching up of the plantar fascia area leading to the pain.  Calf/Achilles Stretching: Lay on you back and raise one foot, then point your toes towards the floor. See photo below:               Hold each stretch for 10 seconds. Stretch 10 times per set, three sets per day. Morning, afternoon and evening. If your heel pain is very severe in the morning, consider doing the first set of stretches before you get out of bed.      OVER THE COUNTER INSERT RECOMMENDATIONS  SuperFeet   Sofsole Fit Spenco   Power Step   Walk-Fit Arch Cradles     Most of these can be found at your local [employer], sporting [employer], or online.  **A good high quality over the counter insert should cost around $40-$50      [facility] LOCATIONS  [address]  [phone]   [address]  [address]  [phone] [place]  [address]  [phone]   [place]  [address]  [phone]   [place]  [address]  [phone] [place]  [address] [place] [address] [place]  [phone]   [place]  [address]  [phone]   [place]  [address]  [phone] [place]  [address]  [phone]           DEGENERATIVE ARTHRITIS OF THE BIG TOE JOINT   (hallux limitus/hallux rigidus)   Arthritis of the joint at the base of the big toe (metatarsophalangeal joint) has several causes. Usually it results from repetitive trauma to the joint, secondary to abnormal foot mechanics. Often it is hereditary. However, a one-time traumatic event can lead to arthritis. The condition doesn't improve with time, and even with treatment, can worsen. The cartilage wears out, joint surfaces are no longer smooth, bone rubs on bone, inflammation occurs with pain, and eventually bone spurs and loose fragments might develop.   The joint is often painful with activity, worse with flimsy shoes or walking barefoot, and it slowly progresses over time. A person might notice the toe \"locking up\" with walking. There often is an obvious, and irritating, bony bump on top of the foot. Shoes might be uncomfortable. In some people the pain is so bothersome that recreational activities sometimes even normal daily activities are difficult to perform.   The pain from this arthritis is likely a combination of joint jamming, cartilage loss and inflammation, and irritation from shoes rubbing on the bump. Sometimes other parts of the foot, leg, or back hurt from altering one's walk to compensate for the painful jOint.     Ways to help a person live with the discomfort include wearing a good, supportive shoe with a rigid, rocker-type bottom. An example is a hiking boot. A rigid sole minimizes bending of the joint, and therefore, joint motion and pain. Shoes with a high toe box allow for less rubbing on the " bump. Avoiding barefoot walking, sandals, flip-flops and slippers usually helps.   Sometimes an insert or orthotic provides symptom relief. This might make shoe fit more difficult. Pads over the bump and occasionally injections into the joint provide relief.   Surgery for this condition is aimed towards alleviating pain. It does not cure the arthritis nor does it guarantee better joint motion. Depending on the condition of the metatarsophalangeal joint, there are several surgiqal options:    1.  Cutting off the bony bump(s) and cleaning the joint    2.  Loosening the joint up by making cuts in the first metatarsal bone or the big toe bone and removing a small section of bone.    3.  Repositioning bone to minimize jamming of the joint.    4.  In severe cases, the joint is fused. By fusing the joint, it will never bend again. This resolves the pain, because it's the movement of a worn out jOint that causes pain. Oftentimes the operation involves a combination of these procedures and. requires the use of screws, pins, and/or a small surgical plate.     Healing after surgery requires about six weeks of protection. This allows the bone to heal. Maximum recovery takes about one year. The scar tissue and joint structures require this amount of time to finish the healing process. Expect stiffness, swelling and numbness during that time frame.   Surgery for arthritis of the metatarsophalangeal joint does involve side effects. Some side effects are predictable and others are less common but do occur. A scar will be visible and could be irritated by shoes. The shoe may rub on the screw or internal pin requiring surgical removal of these fixation devices. The screw and pin would likely be left in place for a full year. The first toe may remain stiff after surgery. The amount of stiffness is variable. Most people never regain normal motion of the first toe. This is due to scar tissue inherent to any surgery, in addition to the  cumUlative effects of arthritis. Sometimes the big toe drifts to one side or the other. Joint fusion is one option to correct an unstable, drifting toe. This procedure straightens the toe, however, no motion remains.   All surgical procedures involve risk of infection, numbness, pain, delayed bone healing, osteotomy (bone cut) dislocation, blood clots, continued foot pain, etc. Arthritic joint surgery is quite complex and should not be taken lightly.    Any skin incision can lead to infection. Deep infection might involve the bone and thus repeat surgery and six weeks of IV antibiotics. Scar tissue can cause nerve pain or numbness. his is generally temporary but can be permanent. We do not have treatments that cure nerve problems. Second toe pain could be related to altered mechanics and pressure transferred to the second toe. Delayed bone healing would lengthen the healing time. Some bones simply do not heal. This requires repeat surgery, electronic bone stimulation and/or extended protection. Smokers have an approximate 20% chance of poor bone healing. This is double that of a non-smoker. The bone cut may displace. This may need to be repaired with a second operation. Displacement can cause joint malalignment. Immobility after surgery can cause a blood clot in the legs and lungs. This could result in death.   Foot pain is complex. Most feet hurt for more than one reason. Operating on the arthritic   big toe joint will not necessarily create a pain free foot. Appropriate shoes, healthy body weight, avoidance of bare foot walking and moderation of activity will always be   necessary to enjoy foot comfort. Arthritis is incurable even with surgery.     Surgery for this type of arthritis is nevertheless quite successful. Most surgical patients are pleased with their foot following surgery. Many of the issues described above can be controlled by taking proper care of your foot during the healing process.   Cosmetic bump  surgery is discouraged for the reasons listed above. A bump and joint that is comfortable when wearing appropriate shoes should simply be treated with appropriate shoes.   Your surgeon would be happy to fully describe any of the above issues. You should pursue a full understanding of the operation, recovery process and any potential problems that could develop.         Body Mass Index (BMI)  Many things can cause foot and ankle problems. Foot structure, activity level, foot mechanics and injuries are common causes of pain.  One very important issue that often goes unmentioned is body weight. Extra weight can cause increased stress on muscles, ligaments, bones and tendons. Sometimes just a few extra pounds is all it takes to put one over her/his threshold. Without reducing that stress, it can be difficult to alleviate pain.   Some people are uncomfortable addressing this issue, but we feel it is important for you to think about it. As Foot [facility] Ankle specialists, our job is addressing the lower extremity problem and possible causes.   Regarding extra body weight, we encourage patients to discuss diet and weight management plans with their primary care doctors. It is this team approach that gives you the best opportunity for pain relief and getting you back on your feet.

## 2017-10-04 NOTE — PROGRESS NOTES
PATIENT HISTORY:  Sharmin Nieves is a 47 year old female who presents to clinic for b/l foot pain.  B/l arch pain worse after rest.  Also with pain at R 1st MPJ with dorsiflexion.  6-8 month duration.  She has tried massage, different shoes, tylenol.  Not much help.  3-8/10 pain.      Review of Systems:  Patient denies fever, chills, rash, wound, numbness, weakness, heart burn, blood in stool, chest pain with activity, calf pain when walking, shortness of breath with activity, chronic cough, easy bleeding/bruising, swelling of ankles, excessive thirst, fatigue, depression, anxiety.  Patient admits to stiffness, limping.     PAST MEDICAL HISTORY:   Past Medical History:   Diagnosis Date     Acne      Anxiety state, unspecified     Anxiety     Chronic low back pain 5/11/2010     Depression     Dr. Gaytan     Diaphragmatic hernia without mention of obstruction or gangrene      Esophageal reflux     with associated esophageal spasm     ETOH abuse      Herpes simplex without mention of complication      Moderate major depression (H) 4/15/2011     Rectal pain 5/16/2011     Right hip pain 5/11/2010     Seasonal affective disorder (H)      Surveillance of previously prescribed intrauterine contraceptive device 10/03/05    mirena IUD        PAST SURGICAL HISTORY:   Past Surgical History:   Procedure Laterality Date     C NONSPECIFIC PROCEDURE  1992    CRYO SURGERY for abnl paps     C STOMACH SURGERY PROCEDURE UNLISTED       SURGICAL HISTORY OF -   4/04    Lapr Nissen fundoplication     TONSILLECTOMY & ADENOIDECTOMY  1985        MEDICATIONS:   Current Outpatient Prescriptions:      traZODone (DESYREL) 100 MG tablet, Take 2 tablets (200 mg) by mouth At Bedtime, Disp: 180 tablet, Rfl: 1     pseudoePHEDrine (SUDAFED 12 HOUR) 120 MG 12 hr tablet, Take 1 tablet (120 mg) by mouth every 12 hours, Disp: 28 tablet, Rfl: 0     fluticasone (FLONASE) 50 MCG/ACT spray, Spray 1 spray into both nostrils daily, Disp: 1 Bottle, Rfl: 1      gabapentin (NEURONTIN) 600 MG tablet, Take 1 tablet (600 mg) by mouth At Bedtime, Disp: 90 tablet, Rfl: 3     gabapentin (NEURONTIN) 300 MG capsule, Take 1 capsule (300 mg) by mouth 2 times daily as needed, Disp: 90 capsule, Rfl: 3     venlafaxine (EFFEXOR-XR) 75 MG 24 hr capsule, Take 3 capsules (225 mg) by mouth daily, Disp: 270 capsule, Rfl: 1     diltiazem 2% in PLO cream, FV COMPOUNDED, 2% GEL, To anal opening three times daily.  Use a pea-sized amount.  Store at room temperature., Disp: 60 g, Rfl: 0     MAGNESIUM OXIDE PO, Take 250 mg by mouth, Disp: , Rfl:      levonorgestrel (MIRENA) 20 MCG/24HR IUD, one placed in uterus, Disp: 1 each, Rfl: 0     DAILY MULTI VITAMIN/MINERALS OR TABS, 1 TABLET DAILY, Disp: 30, Rfl: 0     ALLERGIES:    Allergies   Allergen Reactions     Darvocet [Propoxyphene N-Apap] Nausea and Vomiting     Nsaids         SOCIAL HISTORY:   Social History     Social History     Marital status:      Spouse name: Esteban     Number of children: 3     Years of education: 13     Occupational History     Medical Assistant [employer]     Social History Main Topics     Smoking status: Current Every Day Smoker     Packs/day: 1.00     Years: 15.00     Types: Cigarettes     Smokeless tobacco: Never Used     Alcohol use Yes      Comment: addict in recovery,strted drinking past 10 days Rum  1/3 of big Bottle     Drug use: No      Comment: addict in recovery     Sexual activity: Yes     Partners: Male     Birth control/ protection: IUD     Other Topics Concern     Not on file     Social History Narrative    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        Social Documentation:        Balanced Diet: YES    Calcium intake: more than 2 per day    Caffeine: 6 cups per day    Exercise:  type of activity 0    Sunscreen: Yes    Seatbelts:  Yes    Self Breast Exam:  No     Self Testicular Exam: No - n/a    Physical/Emotional/Sexual Abuse: No     Do you feel safe in your environment? Yes        Cholesterol  "screen up to date: No - Pt not fasting today.    CHOL      167   11/06/2002    HDL       42   11/06/2002    LDL       99   11/06/2002    TRIG      131   11/06/2002    CHOLHDLRATIO        4   11/06/2002        Eye Exam up to date: Yes    Dental Exam up to date: Yes    Pap smear up to date: Yes    Mammogram up to date: Does Not Apply    Dexa Scan up to date: Does Not Apply    Colonoscopy up to date: Does Not Apply    Immunizations up to date: Yes-Td 2002    Glucose screen if over 40:  No - n/a            FAMILY HISTORY:   Family History   Problem Relation Age of Onset     C.A.D. Maternal Grandmother      Hypertension Maternal Grandmother      Alcohol/Drug Maternal Grandmother      Depression Maternal Grandmother      CEREBROVASCULAR DISEASE Paternal Grandfather      Alcohol/Drug Paternal Grandfather      Breast Cancer Paternal Grandmother      Depression Paternal Grandmother      Alcohol/Drug Father      Depression Father      GASTROINTESTINAL DISEASE Father      GERD     Alcohol/Drug Maternal Grandfather      Depression Maternal Grandfather      Depression Mother      Obesity Mother      Anxiety Disorder Mother      DIABETES Mother      Breast Cancer Maternal Aunt      Alcohol/Drug Sister      Alcohol/Drug Brother      Alcohol/Drug Brother      Alcohol/Drug Brother      Depression Sister      Depression Brother      Depression Brother      Depression Brother      GASTROINTESTINAL DISEASE Brother      Hiatal hernia     Cancer - colorectal No family hx of      Prostate Cancer No family hx of         EXAM:Vitals: /82 (BP Location: Left arm, Patient Position: Chair, Cuff Size: Adult Large)  Pulse 82  Ht 5' 10\" (1.778 m)  Wt 219 lb 12.8 oz (99.7 kg)  BMI 31.54 kg/m2  BMI= Body mass index is 31.54 kg/(m^2).    General appearance: Patient is alert and fully cooperative with history & exam.  No sign of distress is noted during the visit.     Psychiatric: Affect is pleasant & appropriate.  Patient appears motivated " to improve health.     Respiratory: Breathing is regular & unlabored while sitting.     HEENT: Hearing is intact to spoken word.  Speech is clear.  No gross evidence of visual impairment that would impact ambulation.     Dermatologic: Skin is intact to both feet without significant lesions, rash or abrasion.  No paronychia or evidence of soft tissue infection is noted.     Vascular: DP & PT pulses are intact & regular bilaterally.  No significant edema or varicosities noted.  CFT and skin temperature are normal to both lower extremities.     Neurologic: Lower extremity sensation is intact to light touch.  No evidence of weakness or contracture in the lower extremities.  No evidence of neuropathy.     Musculoskeletal:  Pain to palpation of the b/l plantar fascial medial bands.  No heel pain.  R 1st MPJ ROM limited with palpable bone spurring, pain at end dorsiflexion. Patient is ambulatory without assistive device or brace.  No gross ankle deformity noted.  No foot or ankle joint effusion is noted.    XRs of right foot reviewed with pt.  1st MPJ spurring with narrowed joint space.     ASSESSMENT:   B/l plantar fasciitis  R hallux limitus     PLAN:  Reviewed patient's chart in epic.  Discussed condition and treatment options including pros and cons.    The potential causes and nature of plantar fasciitis were discussed with the patient.  We reviewed the natural history/prognosis of the condition and risks if left untreated.       We discussed possible causes of the condition as it relates to the patients specific situation.      Conservative treatment options were reviewed:  appropriate shoes, avoidance of barefoot walking, inserts/orthoses, stretching, ice, massage, immobilization and NSAIDs.    Surgical and non-surgical treatment options for hallux limitus were discussed.  This includes my philosophy about different procedures including cheilectomy, osteotomy and fusion.  I explained how fusion is for late stage  treatment of advanced arthritis.  There is no certainty that the non-fusion procedures will improve joint pain that is caused by arthritis.  I explained that hallux limitus is oftentimes surgically treated in a staged manner.  This means that additional surgery may be necessary over time.  Non-operative treatments like stiff soles, orthotics, injection and NSAIDs were discussed.  Shoes that provide extra room for the enlarged joint should be helpful.  I would anticipate somewhat short term benefit from joint injection.    Pt will try different shoes, icing, stretching, superfeet.    Handouts given.  F/u prn.         Mo Edgar DPM, FACFAS

## 2017-10-04 NOTE — NURSING NOTE
"Chief Complaint   Patient presents with     Foot Pain     B/L foot pain in arches     Toe Pain     L 1st toe joint pain        Initial /82 (BP Location: Left arm, Patient Position: Chair, Cuff Size: Adult Large)  Pulse 82  Ht 5' 10\" (1.778 m)  Wt 219 lb 12.8 oz (99.7 kg)  BMI 31.54 kg/m2 Estimated body mass index is 31.54 kg/(m^2) as calculated from the following:    Height as of this encounter: 5' 10\" (1.778 m).    Weight as of this encounter: 219 lb 12.8 oz (99.7 kg).  Medication Reconciliation: kris Urena MA October 4, 2017 7:08 AM  "

## 2017-10-04 NOTE — TELEPHONE ENCOUNTER
Writer gave a return call to patient to discuss allergy    Routing to provider - Landy - please review and advise as appropriate  1. Medication allergy clarification:  celecoxib (CELEBREX) 100 MG capsule    2. Patient states had allergic response to NSAIDS - Ibuprofen & Naproxen - symptoms of swelling in hands/feet, hives     3. States she saw an allergist and was advised celebrex would be an OK alternative    4. Are you OK with continuing with prescription at this time?  Do you want to add any of these details to patients allergy description?    Thank you,  Yamilex Villegas RN

## 2017-10-04 NOTE — MR AVS SNAPSHOT
After Visit Summary   10/4/2017    Sharmin Nieves    MRN: [id number]           Patient Information     Date Of Birth          1970        Visit Information        Provider Department      10/4/2017 12:00 PM Kristie Gutiérrez [facility]        Today's Diagnoses     Pain    -  1    Moderate major depression (H)        Insomnia, unspecified type        Nutritional deficiency           Follow-ups after your visit        Who to contact     If you have questions or need follow up information about today's clinic visit or your schedule please contact [facility] directly at [phone].  Normal or non-critical lab and imaging results will be communicated to you by [employer], letter or phone within 4 business days after the clinic has received the results. If you do not hear from us within 7 days, please contact the clinic through [employer] or phone. If you have a critical or abnormal lab result, we will notify you by phone as soon as possible.  Submit refill requests through [employer] or call your pharmacy and they will forward the refill request to us. Please allow 3 business days for your refill to be completed.          Additional Information About Your Visit        MyChart Information     [employer] gives you secure access to your electronic health record. If you see a primary care provider, you can also send messages to your care team and make appointments. If you have questions, please call your primary care clinic.  If you do not have a primary care provider, please call [phone] and they will assist you.        Care EveryWhere ID     This is your Care EveryWhere ID. This could be used by other organizations to access your [place] medical records  [id number]         Blood Pressure from Last 3 Encounters:   10/04/17 146/84   10/04/17 143/82   06/28/17 124/83    Weight from Last 3 Encounters:   10/04/17 220 lb 9.6 oz (100.1 kg)    10/04/17 219 lb 12.8 oz (99.7 kg)   05/05/17 210 lb 4.8 oz (95.4 kg)                 Today's Medication Changes          These changes are accurate as of: 10/4/17 11:59 PM.  If you have any questions, ask your nurse or doctor.               Start taking these medicines.        Dose/Directions    celecoxib 100 MG capsule   Commonly known as:  celeBREX   Used for:  Pain   Started by:  Kristie Gutiérrez        Dose:  100 mg   Take 1 capsule (100 mg) by mouth 2 times daily as needed for moderate pain   Quantity:  60 capsule   Refills:  1       * varenicline 0.5 MG X 11 & 1 MG X 42 tablet   Commonly known as:  CHANTIX STARTING MONTH PAK   Used for:  Tobacco use disorder   Started by:  Randa Bill APRN CNP        Take 0.5 mg tab daily for 3 days, then 0.5 mg tab twice daily for 4 days, then 1 mg twice daily.   Quantity:  53 tablet   Refills:  0       * varenicline 1 MG tablet   Commonly known as:  CHANTIX   Used for:  Tobacco use disorder   Started by:  Randa Bill APRN CNP        Dose:  1 mg   Take 1 tablet (1 mg) by mouth 2 times daily   Quantity:  56 tablet   Refills:  2       * Notice:  This list has 2 medication(s) that are the same as other medications prescribed for you. Read the directions carefully, and ask your doctor or other care provider to review them with you.         Where to get your medicines      These medications were sent to [address] [address]    Hours:  TRANSPLANT PHONE NUMBER [phone] Phone:  [phone]     celecoxib 100 MG capsule    gabapentin 600 MG tablet    varenicline 0.5 MG X 11 & 1 MG X 42 tablet    varenicline 1 MG tablet         Some of these will need a paper prescription and others can be bought over the counter.  Ask your nurse if you have questions.     Bring a paper prescription for each of these medications     diltiazem 2% in PLO cream (FV  COMPOUNDED) 2% Gel                Primary Care Provider Office Phone # Fax #    Randa Bill, [facility] [phone]       [facility] [address] [facility] [address]  [facility] [address]        Equal Access to Services     JOCELYNE GOMEZ : Hadii geovanny ku hadcherylo Soomaali, waaxda luqadaha, qaybta kaalmada adeegyada, waxay idiin hayaan adedaniel khkerash lashelli brown. So [facility] [phone].    ATENCIÓN: Si habla español, tiene a mitchell disposición servicios gratuitos de asistencia lingüística. Barberame al [phone].    We comply with applicable federal civil rights laws and [place] laws. We do not discriminate on the basis of race, color, national origin, age, disability, sex, sexual orientation, or gender identity.            Thank you!     Thank you for choosing [facility]  for your care. Our goal is always to provide you with excellent care. Hearing back from our patients is one way we can continue to improve our services. Please take a few minutes to complete the written survey that you may receive in the mail after your visit with us. Thank you!             Your Updated Medication List - Protect others around you: Learn how to safely use, store and throw away your medicines at www.disposemymeds.org.          This list is accurate as of: 10/4/17 11:59 PM.  Always use your most recent med list.                   Brand Name Dispense Instructions for use Diagnosis    celecoxib 100 MG capsule    celeBREX    60 capsule    Take 1 capsule (100 mg) by mouth 2 times daily as needed for moderate pain    Pain       Daily Multi vitamin  /Minerals Tabs     30    1 TABLET DAILY        diltiazem 2% in PLO cream (FV COMPOUNDED) 2% Gel     60 g    To anal opening three times daily.  Use a pea-sized amount.  Store at room temperature.    Anal pain, Anal fissure       gabapentin 600 MG tablet    NEURONTIN    90 tablet    Take 1 tablet (600 mg) by mouth At Bedtime    Restless leg syndrome       MAGNESIUM  OXIDE PO      Take 250 mg by mouth        MIRENA (52 MG) 20 MCG/24HR IUD   Generic drug:  levonorgestrel     1 each    one placed in uterus        traZODone 100 MG tablet    DESYREL    180 tablet    Take 2 tablets (200 mg) by mouth At Bedtime    Insomnia       * varenicline 0.5 MG X 11 & 1 MG X 42 tablet    CHANTIX STARTING MONTH REESE    53 tablet    Take 0.5 mg tab daily for 3 days, then 0.5 mg tab twice daily for 4 days, then 1 mg twice daily.    Tobacco use disorder       * varenicline 1 MG tablet    CHANTIX    56 tablet    Take 1 tablet (1 mg) by mouth 2 times daily    Tobacco use disorder       venlafaxine 75 MG 24 hr capsule    EFFEXOR-XR    270 capsule    Take 3 capsules (225 mg) by mouth daily    Major depressive disorder, recurrent episode, moderate (H)       * Notice:  This list has 2 medication(s) that are the same as other medications prescribed for you. Read the directions carefully, and ask your doctor or other care provider to review them with you.

## 2017-10-04 NOTE — NURSING NOTE
"Chief Complaint   Patient presents with     Physical       Initial /84  Pulse 76  Temp 96.9  F (36.1  C) (Oral)  Wt 220 lb 9.6 oz (100.1 kg)  SpO2 99%  BMI 31.65 kg/m2 Estimated body mass index is 31.65 kg/(m^2) as calculated from the following:    Height as of an earlier encounter on 10/4/17: 5' 10\" (1.778 m).    Weight as of this encounter: 220 lb 9.6 oz (100.1 kg).  Medication Reconciliation: complete     Tyler Silveira MA      "

## 2017-10-04 NOTE — PROGRESS NOTES
Weight management plan: Patient was referred to their PCP to discuss a diet and exercise plan.     KHOA Urena MA October 4, 2017 7:08 AM

## 2017-10-04 NOTE — MR AVS SNAPSHOT
After Visit Summary   10/4/2017    Sharmin Nieves    MRN: [id number]           Patient Information     Date Of Birth          1970        Visit Information        Provider Department      10/4/2017 7:00 AM Mo Edgar DPM [facility]        Today's Diagnoses     Hallux limitus of right foot    -  1    Plantar fasciitis          Care Instructions    PLANTAR FASCIITIS  Plantar fasciitis is often referred to as heel spurs or heel pain. Plantar fasciitis is a very common problem that affects people of all foot shapes, age, weight and activity level. Pain may be in the arch or on the weight-bearing surface of the heel. The pain may come on without injury or identifiable cause. Pain is generally present when first getting out of bed in the morning or up from a seated break.     CAUSES  The plantar fascia is a dense fibrous band of tissue that stretches across the bottom surface of the foot. The fascia helps support the foot muscles and arch. Plantar fasciitis is thought to be caused by mechanical strain or overload. Frequent walking without shoes or wearing unsupportive shoes is thought to cause structural overload and ultimately inflammation of the plantar fascia. Some people have heel spurs that can be seen on x-ray. The heel spur is actually a minor component of plantar fascitis and is largely ignored.       SELF TREATMENT   The easiest solution is to stop walking around your home without shoes. Plantar fasciitis is largely a shoe problem. Shoes are either not being worn often enough or your current shoes are inadequate for your weight, foot structure or activity level. The majority of shoes on the market today are not sufficient to resist development of plantar fasciitis or to promote healing. Assume that your current shoes are inadequate and will need to be replaced. Even high quality shoes wear out with 6 months to one year of frequent use. Weight loss is another option.  Losing ten pounds in the next two months may be enough to resolve the problem. Ice applied to the area of pain two to three times per day for ten minutes each session can be very helpful. Warm foot soaks in epsom salts can also relieve pain. This should continue until the problem resolves. Achilles tendon stretching is essential. Stretch multiple times daily to promote healing and to prevent recurrence in the future. Over all stretching of the body is helpful as well such as the calves, thighs and lower back. Normally when one area of the body is tight, other areas are too. Gentle Yoga can be good for this.     Over the counter topical anti inflammatories can be helpful such as biofreeze, bengay, salon pas, ect...  Oral ibuprofen or aleve is recommended as well to try to calm down inflammation.     Night splints can be helpful to gradually stretch the foot at night as a lot of pain is when you get up in the morning. Taking a towel or thera band and stretching the foot back multiple times before you get ou of bed can be beneficial as well.     MEDICAL TREATMENT  Medical treatments often include custom arch supports, cortisone injections, physical therapy, splints to be worn in bed, prescription medications and surgery. The home treatments listed above will be necessary regardless of these advanced medical treatments. Surgery is rarely needed but is very helpful in selected cases.     PROGNOSIS  Plantar fasciitis can last from one day to a lifetime. Some people get intermittent fascitis that is very short-lived. Others suffer daily for years. Excessive body weight, frequent bare foot walking, long hours on the feet, inadequate shoes, predisposing foot structures and excessive activity such as running are all potential issues that lead to chronic and/or recurring plantar fascitis. Having plantar fasciitis means that you are forever prone to this problem and will require modification of some of the above factors. Most  people seek treatment within one to four months. Healing usually requires a similar one to four month time frame. Healing time is relative to the amount of effort spent treating the problem.   Plantar fasciitis is highly recurrent. Risk factors often continue, including return to bare foot walking, inadequate shoes, excessive body weight, excessive activities, etc. Your life style and foot structure may predispose you to recurrent plantar fasciitis. A daily prevention regimen can be very helpful. Ongoing use of shoe inserts, careful attention to appropriate shoes, daily Achilles stretching, etc. may prevent recurrence. Prompt attention at the earliest warning signs of heel pain can resolve the problem in as short as a few days.     EXERCISES  Stair Exercise: Step on the stairs with the ball of your foot and hold your position for at least 15 seconds, then slowly step down with the heels of your foot. You can do this daily and as often as you want.   Picking the Towel: Sit comfortably and then pick the towel up with your toes. You can use any object other than a towel as long as the material can be soft and you can pick it up with your toes.  Rolling the Bottle: Use a small ball or frozen water bottle and then roll it around with your foot.   Flex the Toes: Sit comfortably and then flex your toes by pointing it towards the floor or towards your body. This will relax and flex your foot and exercise your plantar fascia, the calf, and the Achilles tendon. The inability of the foot to stretch often causes the bunching up of the plantar fascia area leading to the pain.  Calf/Achilles Stretching: Lay on you back and raise one foot, then point your toes towards the floor. See photo below:               Hold each stretch for 10 seconds. Stretch 10 times per set, three sets per day. Morning, afternoon and evening. If your heel pain is very severe in the morning, consider doing the first set of stretches before you get out of  "bed.      OVER THE COUNTER INSERT RECOMMENDATIONS  SuperFeet   Sofsole Fit Spenco   Power Step   Walk-Fit Arch Cradles     Most of these can be found at your local [employer], sporting [employer] stores, or online.  **A good high quality over the counter insert should cost around $40-$50      [employer] LOCATIONS  [place]  [address] [facility]  [phone]   [address]  [phone] [place]  [address] [facility]  [phone]   [place]  [address]  [phone]   [place]  [address]  [phone] [place]  [address] [place] [address] [place]  [phone]   [place]  [address]  [phone]   [place]  [address] [place] [address]  [phone] [place]  [address]  [phone]           DEGENERATIVE ARTHRITIS OF THE BIG TOE JOINT   (hallux limitus/hallux rigidus)   Arthritis of the joint at the base of the big toe (metatarsophalangeal joint) has several causes. Usually it results from repetitive trauma to the joint, secondary to abnormal foot mechanics. Often it is hereditary. However, a one-time traumatic event can lead to arthritis. The condition doesn't improve with time, and even with treatment, can worsen. The cartilage wears out, joint surfaces are no longer smooth, bone rubs on bone, inflammation occurs with pain, and eventually bone spurs and loose fragments might develop.   The joint is often painful with activity, worse with flimsy shoes or walking barefoot, and it slowly progresses over time. A person might notice the toe \"locking up\" with walking. There often is an obvious, and irritating, bony bump on top of the foot. Shoes might be uncomfortable. In some people the pain is so bothersome that recreational activities sometimes even normal daily activities are difficult to perform.   The pain from this arthritis is likely a combination of joint jamming, cartilage loss and inflammation, and irritation from shoes rubbing on the bump. Sometimes " other parts of the foot, leg, or back hurt from altering one's walk to compensate for the painful jOint.     Ways to help a person live with the discomfort include wearing a good, supportive shoe with a rigid, rocker-type bottom. An example is a hiking boot. A rigid sole minimizes bending of the joint, and therefore, joint motion and pain. Shoes with a high toe box allow for less rubbing on the bump. Avoiding barefoot walking, sandals, flip-flops and slippers usually helps.   Sometimes an insert or orthotic provides symptom relief. This might make shoe fit more difficult. Pads over the bump and occasionally injections into the joint provide relief.   Surgery for this condition is aimed towards alleviating pain. It does not cure the arthritis nor does it guarantee better joint motion. Depending on the condition of the metatarsophalangeal joint, there are several surgiqal options:    1.  Cutting off the bony bump(s) and cleaning the joint    2.  Loosening the joint up by making cuts in the first metatarsal bone or the big toe bone and removing a small section of bone.    3.  Repositioning bone to minimize jamming of the joint.    4.  In severe cases, the joint is fused. By fusing the joint, it will never bend again. This resolves the pain, because it's the movement of a worn out jOint that causes pain. Oftentimes the operation involves a combination of these procedures and. requires the use of screws, pins, and/or a small surgical plate.     Healing after surgery requires about six weeks of protection. This allows the bone to heal. Maximum recovery takes about one year. The scar tissue and joint structures require this amount of time to finish the healing process. Expect stiffness, swelling and numbness during that time frame.   Surgery for arthritis of the metatarsophalangeal joint does involve side effects. Some side effects are predictable and others are less common but do occur. A scar will be visible and could be  irritated by shoes. The shoe may rub on the screw or internal pin requiring surgical removal of these fixation devices. The screw and pin would likely be left in place for a full year. The first toe may remain stiff after surgery. The amount of stiffness is variable. Most people never regain normal motion of the first toe. This is due to scar tissue inherent to any surgery, in addition to the cumUlative effects of arthritis. Sometimes the big toe drifts to one side or the other. Joint fusion is one option to correct an unstable, drifting toe. This procedure straightens the toe, however, no motion remains.   All surgical procedures involve risk of infection, numbness, pain, delayed bone healing, osteotomy (bone cut) dislocation, blood clots, continued foot pain, etc. Arthritic joint surgery is quite complex and should not be taken lightly.    Any skin incision can lead to infection. Deep infection might involve the bone and thus repeat surgery and six weeks of IV antibiotics. Scar tissue can cause nerve pain or numbness. his is generally temporary but can be permanent. We do not have treatments that cure nerve problems. Second toe pain could be related to altered mechanics and pressure transferred to the second toe. Delayed bone healing would lengthen the healing time. Some bones simply do not heal. This requires repeat surgery, electronic bone stimulation and/or extended protection. Smokers have an approximate 20% chance of poor bone healing. This is double that of a non-smoker. The bone cut may displace. This may need to be repaired with a second operation. Displacement can cause joint malalignment. Immobility after surgery can cause a blood clot in the legs and lungs. This could result in death.   Foot pain is complex. Most feet hurt for more than one reason. Operating on the arthritic   big toe joint will not necessarily create a pain free foot. Appropriate shoes, healthy body weight, avoidance of bare foot  walking and moderation of activity will always be   necessary to enjoy foot comfort. Arthritis is incurable even with surgery.     Surgery for this type of arthritis is nevertheless quite successful. Most surgical patients are pleased with their foot following surgery. Many of the issues described above can be controlled by taking proper care of your foot during the healing process.   Cosmetic bump surgery is discouraged for the reasons listed above. A bump and joint that is comfortable when wearing appropriate shoes should simply be treated with appropriate shoes.   Your surgeon would be happy to fully describe any of the above issues. You should pursue a full understanding of the operation, recovery process and any potential problems that could develop.         Body Mass Index (BMI)  Many things can cause foot and ankle problems. Foot structure, activity level, foot mechanics and injuries are common causes of pain.  One very important issue that often goes unmentioned is body weight. Extra weight can cause increased stress on muscles, ligaments, bones and tendons. Sometimes just a few extra pounds is all it takes to put one over her/his threshold. Without reducing that stress, it can be difficult to alleviate pain.   Some people are uncomfortable addressing this issue, but we feel it is important for you to think about it. As Foot & Ankle specialists, our job is addressing the lower extremity problem and possible causes.   Regarding extra body weight, we encourage patients to discuss diet and weight management plans with their primary care doctors. It is this team approach that gives you the best opportunity for pain relief and getting you back on your feet.             Follow-ups after your visit        Follow-up notes from your care team     Return if symptoms worsen or fail to improve.      Your next 10 appointments already scheduled     Oct 04, 2017  9:15 AM KANG Lo Physical Adult with Randa Bill,  "APRN CNP   [facility]    [address] [place] [id number] [phone]              Who to contact     If you have questions or need follow up information about today's clinic visit or your schedule please contact [facility] directly at [phone].  Normal or non-critical lab and imaging results will be communicated to you by MyChart, letter or phone within 4 business days after the clinic has received the results. If you do not hear from us within 7 days, please contact the clinic through MyChart or phone. If you have a critical or abnormal lab result, we will notify you by phone as soon as possible.  Submit refill requests through [employer] or call your pharmacy and they will forward the refill request to us. Please allow 3 business days for your refill to be completed.          Additional Information About Your Visit        [employer] Information     [employer] gives you secure access to your electronic health record. If you see a primary care provider, you can also send messages to your care team and make appointments. If you have questions, please call your primary care clinic.  If you do not have a primary care provider, please call [phone] and they will assist you.        Care EveryWhere ID     This is your Care EveryWhere ID. This could be used by other organizations to access your [place] medical records  [id number]        Your Vitals Were     Pulse Height BMI (Body Mass Index)             82 5' 10\" (1.778 m) 31.54 kg/m2          Blood Pressure from Last 3 Encounters:   10/04/17 143/82   06/28/17 124/83   05/05/17 (!) 148/98    Weight from Last 3 Encounters:   10/04/17 219 lb 12.8 oz (99.7 kg)   05/05/17 210 lb 4.8 oz (95.4 kg)   11/15/16 201 lb 9.6 oz (91.4 kg)              We Performed the Following     XR Foot Right G/E 3 Views        Primary Care Provider Office Phone # Fax #    LEDY Wu CNP " [phone]       [facility] [address]  [facility] [address]        Equal Access to Services     JOCELYNE GOMEZ : Hadchanell geovanny rhodes annel Sojohn, waseanda luqadaha, qasherlyta kajenniferda patricia, gabe milan laTimothysamy brown. So [facility] [phone].    ATENCIÓN: Si habla español, tiene a mitchell disposición servicios gratuitos de asistencia lingüística. Llame al [phone].    We comply with applicable federal civil rights laws and [place] laws. We do not discriminate on the basis of race, color, national origin, age, disability, sex, sexual orientation, or gender identity.            Thank you!     Thank you for choosing [facility]  for your care. Our goal is always to provide you with excellent care. Hearing back from our patients is one way we can continue to improve our services. Please take a few minutes to complete the written survey that you may receive in the mail after your visit with us. Thank you!             Your Updated Medication List - Protect others around you: Learn how to safely use, store and throw away your medicines at www.disposemymeds.org.          This list is accurate as of: 10/4/17  7:36 AM.  Always use your most recent med list.                   Brand Name Dispense Instructions for use Diagnosis    Daily Multi vitamin  /Minerals Tabs     30    1 TABLET DAILY        diltiazem 2% in PLO cream (FV COMPOUNDED) 2% Gel     60 g    To anal opening three times daily.  Use a pea-sized amount.  Store at room temperature.    Anal pain, Anal fissure       fluticasone 50 MCG/ACT spray    FLONASE    1 Bottle    Spray 1 spray into both nostrils daily    Dysfunction of Eustachian tube, left       * gabapentin 600 MG tablet    NEURONTIN    90 tablet    Take 1 tablet (600 mg) by mouth At Bedtime    Restless leg syndrome       * gabapentin 300 MG capsule    NEURONTIN    90 capsule    Take 1 capsule (300 mg) by mouth 2 times daily as needed    Restless leg  syndrome       MAGNESIUM OXIDE PO      Take 250 mg by mouth        MIRENA (52 MG) 20 MCG/24HR IUD   Generic drug:  levonorgestrel     1 each    one placed in uterus        pseudoePHEDrine 120 MG 12 hr tablet    SUDAFED 12 HOUR    28 tablet    Take 1 tablet (120 mg) by mouth every 12 hours    Dysfunction of Eustachian tube, left       traZODone 100 MG tablet    DESYREL    180 tablet    Take 2 tablets (200 mg) by mouth At Bedtime    Insomnia       venlafaxine 75 MG 24 hr capsule    EFFEXOR-XR    270 capsule    Take 3 capsules (225 mg) by mouth daily    Major depressive disorder, recurrent episode, moderate (H)       * Notice:  This list has 2 medication(s) that are the same as other medications prescribed for you. Read the directions carefully, and ask your doctor or other care provider to review them with you.

## 2017-10-04 NOTE — PROGRESS NOTES
SUBJECTIVE/OBJECTIVE:                           Sharmin Nieves is a 47 year old female coming in for an initial visit for Medication Therapy Management.  She was referred to me from Mariya Haynes.     Chief Complaint: Constant leg pain that has worsened lately.    Allergies/ADRs: Reviewed in Epic  Tobacco: 0-1 pack per day - is interested in quittingTobacco Cessation Action Plan: Pt prescribed Chantix today at PCP appointment, will start that when she picks it up.   Alcohol: history of alcohol dependence and currently 4-6 beverages / week  PMH: Reviewed in Epic    Medication Adherence: no issues reported    Leg Pain: Currently taking gabapentin 600mg TID. Doesn't think this has helped at all. Also taking magnesium oxide and iron which also don't help. Has also tried all the OTC creams: icy hot, blue emu, etc.  Goes all the way up her legs, is at her thigh now. Feels achy and tingly. Feels slightly better with compression socks. Pt is wondering today if she could have a prescription for Celebrex. She has had an allergic reaction to ibuprofen in the past, however has tried CABRERA-2 inhibitors in the past and has not had a reaction.     Depression/Insomnia:  Current medications include: Venlafaxine XR 225mg once daily and trazodone 200mg nightly. Has been on many SSRI/SNRIs in the past, has also been on Wellbutrin. Pt reports that depression symptoms are unchanged.Depression sxs were unchanged with almost all of the medications. Is sleeping fine.   PHQ-9 SCORE 5/5/2017 10/4/2017 10/4/2017   Total Score - - -   Total Score MyChart - - 8 (Mild depression)   Total Score 7 8 9   Some encounter information is confidential and restricted. Go to Review Flowsheets activity to see all data.     Supplements: Currently taking a daily MVI and vitamins for leg pain as above. Denies side effects.     Current labs include:  BP Readings from Last 3 Encounters:   10/04/17 146/84   10/04/17 143/82   06/28/17 124/83     Today's Vitals: There  were no vitals taken for this visit.  Lab Results   Component Value Date    A1C 4.6 09/03/2015   .  Lab Results   Component Value Date    CHOL 154 09/03/2015     Lab Results   Component Value Date    TRIG 242 09/03/2015     Lab Results   Component Value Date    HDL 62 09/03/2015     Lab Results   Component Value Date    LDL 44 09/03/2015       Liver Function Studies -   Recent Labs   Lab Test  09/16/16   1034   PROTTOTAL  6.2*   ALBUMIN  3.7   BILITOTAL  0.3   ALKPHOS  44   AST  13   ALT  20       Lab Results   Component Value Date    UCRR 229 07/20/2010       Last Basic Metabolic Panel:  Lab Results   Component Value Date     09/16/2016      Lab Results   Component Value Date    POTASSIUM 4.2 09/16/2016     Lab Results   Component Value Date    CHLORIDE 109 09/16/2016     Lab Results   Component Value Date    BUN 15 09/16/2016     Lab Results   Component Value Date    CR 0.65 09/16/2016     GFR Estimate   Date Value Ref Range Status   09/16/2016 >90  Non  GFR Calc   >60 mL/min/1.7m2 Final   09/03/2015 >90  Non  GFR Calc   >60 mL/min/1.7m2 Final   01/13/2014 89 >60 mL/min/1.7m2 Final     TSH   Date Value Ref Range Status   09/03/2015 0.93 0.40 - 4.00 mU/L Final     Most Recent Immunizations   Administered Date(s) Administered     DTAP (<7y) 10/05/2012     Influenza (IIV3) 10/01/2013     Mantoux 01/28/2008     TD (ADULT, 7+) 10/05/2012     Tdap (Adacel,Boostrix) 10/05/2012       ASSESSMENT:                             Current medications were reviewed today.     Medication Adherence: no issues identified    Leg Pain: Needs improvement. Unclear where this leg pain originates from. Can check CBC and TIBC today to make sure that the issue doesn't stem from issues with low iron. Due to alcohol use history, could be an issue with depleted folic acid and thiamine, though usually pain is only when there is actively abnormal drinking. Would be reasonable to trial a month of folic  acid/thiamine to see if sxs improve. As pt has tried CABRERA-2 inhibitors in the past without issue, would be reasonable to try Celebrex for pain relief in a controlled environment (pt should be in a place where someone can call 911 in the event of an emergency).     Depression/Insomnia:  Needs improvement. Could consider doing a Genesight test to see if pt is an ultra rapid metabolizer of medications, as she has not noticed any change in depression symptoms with any of the medications that she has tried.    Supplements: Stable. Addition of supplements as above.     PLAN:                            1. Genesight test today.   2. Pt to start folic acid 400-600mcg daily and thiamine 100mg daily.   3. CBC and TIBC labs today.   4. Start celebrex 100mg BID, pt aware of risks with allergy to ibuprofen and has taken other CABRERA-2 inhibitors in the past.     I spent 60 minutes with this patient today. All changes were made via collaborative practice agreement with Randa Bill. A copy of the visit note was provided to the patient's primary care provider.    Will follow up when Genesight returns.    The patient was given a summary of these recommendations as an after visit summary.     Kristie Gutiérrez, PharmD  Medication Therapy Management Pharmacist  Pager: [phone]

## 2017-10-04 NOTE — PROGRESS NOTES
SUBJECTIVE:   CC: Sharmin Nieves is an 47 year old woman who presents for preventive health visit.   Answers for HPI/ROS submitted by the patient on 10/4/2017   Annual Exam:  Getting at least 3 servings of Calcium per day:: Yes  Bi-annual eye exam:: Yes  Dental care twice a year:: NO  Sleep apnea or symptoms of sleep apnea:: None  Diet:: Regular (no restrictions)  Frequency of exercise:: 2-3 days/week  Taking medications regularly:: Yes  Medication side effects:: None  Additional concerns today:: No  PHQ-2 Score: 3  Duration of exercise:: 15-30 minutes  If you checked off any problems, how difficult have these problems made it for you to do your work, take care of things at home, or get along with other people?: Somewhat difficult  PHQ9 TOTAL SCORE: 8      Today's PHQ-2 Score:   PHQ-2 ( 1999 [employer]) 10/4/2017 10/4/2017   Q1: Little interest or pleasure in doing things 2 2   Q2: Feeling down, depressed or hopeless 2 1   PHQ-2 Score 4 3   Q1: Little interest or pleasure in doing things More than half the days -   Q2: Feeling down, depressed or hopeless Several days -   PHQ-2 Score 3 -         Abuse: Current or Past(Physical, Sexual or Emotional)- No  Do you feel safe in your environment - Yes  Social History   Substance Use Topics     Smoking status: Current Every Day Smoker     Packs/day: 1.00     Years: 15.00     Types: Cigarettes     Smokeless tobacco: Never Used     Alcohol use Yes      Comment: 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    Reviewed orders with patient.  Reviewed health maintenance and updated orders accordingly - Yes    Patient under age 50, mutual decision reflected in health maintenance.        Pertinent mammograms are reviewed under the imaging tab.  History of abnormal Pap smear: NO - age 30- 65 PAP every 3 years recommended    Reviewed and updated as needed this visit by clinical  "staffTobacco  Allergies  Meds  Med Hx  Surg Hx  Fam Hx  Soc Hx        Reviewed and updated as needed this visit by Provider        Past Medical History:   Diagnosis Date     Acne      Anxiety state, unspecified     Anxiety     Chronic low back pain 5/11/2010     Depression     Dr. Gaytan     Diaphragmatic hernia without mention of obstruction or gangrene      Esophageal reflux     with associated esophageal spasm     ETOH abuse      Herpes simplex without mention of complication      Moderate major depression (H) 4/15/2011     Rectal pain 5/16/2011     Right hip pain 5/11/2010     Seasonal affective disorder (H)      Surveillance of previously prescribed intrauterine contraceptive device 10/03/05    mirena IUD      Past Surgical History:   Procedure Laterality Date     C NONSPECIFIC PROCEDURE  1992    CRYO SURGERY for abnl paps     C STOMACH SURGERY PROCEDURE UNLISTED       SURGICAL HISTORY OF -   4/04    Lapr Nissen fundoplication     TONSILLECTOMY & ADENOIDECTOMY  1985     Saw podiatry this AM - diagnosed bone spurs on R big toes and plantar fasciitis.  Will likely need surgery for bone spur    No super depressed, but also not super happy. \"Blah all the time.\" We had most recently increased effexor to max dose, but she is not sure it is helpful.  Pain is causing significant mental distress, but not planning to hurt self. Continues to be sober.    Pain in the legs is not getting better and is starting to really affect her life.  Taking gabapentin three times a day (experimenting from 600 mg QHS) and trying creams - nothing has been helpful.  Gabapentin efficacy lasted 1-2 months.    Pain now extending to upper thighs and is throughout the day rather than only at night.  No leg redness, swelling or rash.  Feet are cold and less hair growth.  Pain has been there over one year.  Compression socks and pressure don't make a big difference.  Active at work and walking 20 minutes two times a day to bus.  " Continues to smoke, but would be interested in Chantix and feels ready to quit.    ROS:  C: NEGATIVE for fever, chills; weight gain  I: NEGATIVE for worrisome rashes, moles or lesions  E: NEGATIVE for vision changes or irritation  ENT: NEGATIVE for ear, mouth and throat problems  R: NEGATIVE for significant cough or SOB  B: NEGATIVE for masses, tenderness or discharge  CV: NEGATIVE for chest pain, palpitations or peripheral edema  GI: NEGATIVE for nausea, abdominal pain, heartburn, or change in bowel habits  : NEGATIVE for unusual urinary or vaginal symptoms. Periods are absent 2/2 Mirena IUD inserted last year.  Not sexually active.  M: NEGATIVE for significant arthralgias or myalgia other than as noted above  N: NEGATIVE for weakness, dizziness  E: NEGATIVE for temperature intolerance, skin/hair changes  H: NEGATIVE for bleeding problems  P: NEGATIVE for changes in mood or affect other than as noted above    OBJECTIVE:   /84  Pulse 76  Temp 96.9  F (36.1  C) (Oral)  Wt 220 lb 9.6 oz (100.1 kg)  SpO2 99%  BMI 31.65 kg/m2  EXAM:  GENERAL: healthy, alert and no distress  EYES: Eyes grossly normal to inspection, PERRL and conjunctivae and sclerae normal  HENT: ear canals and TM's normal, nose and mouth without ulcers or lesions  NECK: no adenopathy, no asymmetry, masses, or scars and thyroid normal to palpation  RESP: lungs clear to auscultation - no rales, rhonchi or wheezes  BREAST: normal without masses, tenderness or nipple discharge and no palpable axillary masses or adenopathy  CV: regular rate and rhythm, normal S1 S2, no S3 or S4, no murmur, click or rub, no peripheral edema and peripheral pulses strong  ABDOMEN: soft, nontender, no hepatosplenomegaly, no masses and bowel sounds normal   (female): normal female external genitalia, normal urethral meatus, vaginal mucosa pink, moist, well rugated, and normal cervix/adnexa/uterus without masses or discharge  MS: no gross musculoskeletal defects  noted, no edema  SKIN: no suspicious lesions or rashes  NEURO: Normal strength and tone, mentation intact and speech normal  PSYCH: mentation appears normal, affect tearful    ASSESSMENT/PLAN:   (Z00.01) Encounter for routine adult medical exam with abnormal findings  (primary encounter diagnosis)  Comment:   Plan:     (Z12.4) Screening for cervical cancer  Comment:   Plan: Pap imaged thin layer screen with HPV -         recommended age 30 - 65, HPV High Risk Types         DNA Cervical            (K62.89) Anal pain  Comment:   Plan: diltiazem 2% in PLO cream, FV COMPOUNDED, 2%         GEL   Refilled - originally rx by colorectal.    (K60.2) Anal fissure  Comment:   Plan: diltiazem 2% in PLO cream, FV COMPOUNDED, 2%         GEL            (Z97.5) IUD (intrauterine device) in place  Comment:   Plan: Noted an observed on exam    (F32.1) Moderate major depression (H)  Comment:   Plan: MTM referral - consider leg pain as effexor side effect as well as consider other options as effexor hasn't been helpful.  Patient disclosed SI on PHQ-9, but denies in history and reports that pain is the distressing factor.  Discussed plan via [facility] with pharmacist and we will pursue [employer] testing for depression alternative.    (F17.200) Tobacco use disorder  Comment:   Plan: varenicline (CHANTIX STARTING MONTH REESE) 0.5 MG        X 11 & 1 MG X 42 tablet, varenicline (CHANTIX)         1 MG tablet   Close follow-up - discussed black box warnings, particularly SI. However, needs to quit with BP elevated and potential that leg pain is vascular    (M79.604,  M79.605) Bilateral leg pain  Comment:   Plan: MTM referral for effexor eval/change as well as possible alternatives for neuralgia - Lyrica?  Also consider arterial doppler if no change with medication changes    (G25.81) Restless leg syndrome  Comment:   Plan: gabapentin (NEURONTIN) 600 MG tablet        Renewed at current dose      COUNSELING:   Reviewed preventive health counseling, as  "reflected in patient instructions     reports that she has been smoking Cigarettes.  She has a 15.00 pack-year smoking history. She has never used smokeless tobacco.  Tobacco Cessation Action Plan: Pharmacotherapies : Chantix  Estimated body mass index is 31.65 kg/(m^2) as calculated from the following:    Height as of an earlier encounter on 10/4/17: 5' 10\" (1.778 m).    Weight as of this encounter: 220 lb 9.6 oz (100.1 kg).   Weight management plan: Discussed healthy diet and exercise guidelines and patient will follow up in 12 months in clinic to re-evaluate.    Counseling Resources:  ATP IV Guidelines  Pooled Cohorts Equation Calculator  Breast Cancer Risk Calculator  FRAX Risk Assessment  ICSI Preventive Guidelines  Dietary Guidelines for Americans, 2010  USDA's MyPlate  ASA Prophylaxis  Lung CA Screening    LEDY Seaman [facility]  "

## 2017-10-04 NOTE — MR AVS SNAPSHOT
"              After Visit Summary   10/4/2017    Sharmin Nieves    MRN: [id number]           Patient Information     Date Of Birth          1970        Visit Information        Provider Department      10/4/2017 9:15 AM Randa Bill APRN [facility]        Today's Diagnoses     Encounter for routine adult medical exam with abnormal findings    -  1    Routine general medical examination at a health care facility        Screening for cervical cancer        Anal pain        Anal fissure        IUD (intrauterine device) in place        Moderate major depression (H)        Tobacco use disorder        Bilateral leg pain        Restless leg syndrome          Care Instructions    Google \"plantar fasciitis taping\" video  Book - \"Every Woman's Guide to Foot Pain Relief\" by Rosalinda Rivero - available at the library    Preventive Health Recommendations  Female Ages 40 to 49    Yearly exam:     See your health care provider every year in order to  1. Review health changes.   2. Discuss preventive care.    3. Review your medicines if your doctor prescribed any.      Get a Pap test every three years (unless you have an abnormal result and your provider advises testing more often).      If you get Pap tests with HPV test, you only need to test every 5 years, unless you have an abnormal result. You do not need a Pap test if your uterus was removed (hysterectomy) and you have not had cancer.      You should be tested each year for STDs (sexually transmitted diseases), if you're at risk.       Ask your doctor if you should have a mammogram.      Have a colonoscopy (test for colon cancer) if someone in your family has had colon cancer or polyps before age 50.       Have a cholesterol test every 5 years.       Have a diabetes test (fasting glucose) after age 45. If you are at risk for diabetes, you should have this test every 3 years.    Shots: Get a flu shot each year. Get a tetanus shot every 10 years. "     Nutrition:     Eat at least 5 servings of fruits and vegetables each day.    Eat whole-grain bread, whole-wheat pasta and brown rice instead of white grains and rice.    Talk to your provider about Calcium and Vitamin D.     Lifestyle    Exercise at least 150 minutes a week (an average of 30 minutes a day, 5 days a week). This will help you control your weight and prevent disease.    Limit alcohol to one drink per day.    No smoking.     Wear sunscreen to prevent skin cancer.    See your dentist every six months for an exam and cleaning.          Follow-ups after your visit        Who to contact     If you have questions or need follow up information about today's clinic visit or your schedule please contact [facility] directly at [phone].  Normal or non-critical lab and imaging results will be communicated to you by [employer], letter or phone within 4 business days after the clinic has received the results. If you do not hear from us within 7 days, please contact the clinic through [employer] or phone. If you have a critical or abnormal lab result, we will notify you by phone as soon as possible.  Submit refill requests through [employer] or call your pharmacy and they will forward the refill request to us. Please allow 3 business days for your refill to be completed.          Additional Information About Your Visit        [employer] Information     [employer] gives you secure access to your electronic health record. If you see a primary care provider, you can also send messages to your care team and make appointments. If you have questions, please call your primary care clinic.  If you do not have a primary care provider, please call [phone] and they will assist you.        Care EveryWhere ID     This is your Care EveryWhere ID. This could be used by other organizations to access your [place] medical records  [id number]        Your Vitals Were     Pulse Temperature Pulse Oximetry BMI (Body Mass  Index)          76 96.9  F (36.1  C) (Oral) 99% 31.65 kg/m2         Blood Pressure from Last 3 Encounters:   10/04/17 146/84   10/04/17 143/82   06/28/17 124/83    Weight from Last 3 Encounters:   10/04/17 220 lb 9.6 oz (100.1 kg)   10/04/17 219 lb 12.8 oz (99.7 kg)   05/05/17 210 lb 4.8 oz (95.4 kg)              We Performed the Following     HPV High Risk Types DNA Cervical     Pap imaged thin layer screen with HPV - recommended age 30 - 65          Today's Medication Changes          These changes are accurate as of: 10/4/17 10:02 AM.  If you have any questions, ask your nurse or doctor.               Start taking these medicines.        Dose/Directions    * varenicline 0.5 MG X 11 & 1 MG X 42 tablet   Commonly known as:  CHANTIX STARTING MONTH PAK   Used for:  Tobacco use disorder   Started by:  Randa Bill APRN CNP        Take 0.5 mg tab daily for 3 days, then 0.5 mg tab twice daily for 4 days, then 1 mg twice daily.   Quantity:  53 tablet   Refills:  0       * varenicline 1 MG tablet   Commonly known as:  CHANTIX   Used for:  Tobacco use disorder   Started by:  Randa Bill APRN CNP        Dose:  1 mg   Take 1 tablet (1 mg) by mouth 2 times daily   Quantity:  56 tablet   Refills:  2       * Notice:  This list has 2 medication(s) that are the same as other medications prescribed for you. Read the directions carefully, and ask your doctor or other care provider to review them with you.         Where to get your medicines      These medications were sent to [address] [address]    Hours:  TRANSPLANT PHONE NUMBER [phone] Phone:  [phone]     gabapentin 600 MG tablet    varenicline 0.5 MG X 11 & 1 MG X 42 tablet    varenicline 1 MG tablet         Some of these will need a paper prescription and others can be bought over the counter.  Ask your nurse if you have questions.     Bring a  paper prescription for each of these medications     diltiazem 2% in PLO cream (FV COMPOUNDED) 2% Gel                Primary Care Provider Office Phone # Fax #    LEDY Wu [facility] [phone]       [facility] [address] [facility] [address]  [facility] [address]        Equal Access to Services     JOCELYNE GOMEZ : Hadii aad ku hadasho Soomaali, waaxda luqadaha, qaybta kaalmada adeegyada, waxay idiin hayaan adeeg kharash laanishan . So [facility] [phone].    ATENCIÓN: Si habla español, tiene a mitchell disposición servicios gratuitos de asistencia lingüística. Catracho al [phone].    We comply with applicable federal civil rights laws and [place] laws. We do not discriminate on the basis of race, color, national origin, age, disability, sex, sexual orientation, or gender identity.            Thank you!     Thank you for choosing [facility]  for your care. Our goal is always to provide you with excellent care. Hearing back from our patients is one way we can continue to improve our services. Please take a few minutes to complete the written survey that you may receive in the mail after your visit with us. Thank you!             Your Updated Medication List - Protect others around you: Learn how to safely use, store and throw away your medicines at www.disposemymeds.org.          This list is accurate as of: 10/4/17 10:02 AM.  Always use your most recent med list.                   Brand Name Dispense Instructions for use Diagnosis    Daily Multi vitamin  /Minerals Tabs     30    1 TABLET DAILY        diltiazem 2% in PLO cream (FV COMPOUNDED) 2% Gel     60 g    To anal opening three times daily.  Use a pea-sized amount.  Store at room temperature.    Anal pain, Anal fissure       gabapentin 600 MG tablet    NEURONTIN    90 tablet    Take 1 tablet (600 mg) by mouth At Bedtime    Restless leg syndrome       MAGNESIUM OXIDE PO      Take 250 mg by mouth        MIRENA (52 MG) 20 MCG/24HR IUD    Generic drug:  levonorgestrel     1 each    one placed in uterus        traZODone 100 MG tablet    DESYREL    180 tablet    Take 2 tablets (200 mg) by mouth At Bedtime    Insomnia       * varenicline 0.5 MG X 11 & 1 MG X 42 tablet    CHANTIX STARTING MONTH REESE    53 tablet    Take 0.5 mg tab daily for 3 days, then 0.5 mg tab twice daily for 4 days, then 1 mg twice daily.    Tobacco use disorder       * varenicline 1 MG tablet    CHANTIX    56 tablet    Take 1 tablet (1 mg) by mouth 2 times daily    Tobacco use disorder       venlafaxine 75 MG 24 hr capsule    EFFEXOR-XR    270 capsule    Take 3 capsules (225 mg) by mouth daily    Major depressive disorder, recurrent episode, moderate (H)       * Notice:  This list has 2 medication(s) that are the same as other medications prescribed for you. Read the directions carefully, and ask your doctor or other care provider to review them with you.

## 2017-10-05 LAB
IRON SATN MFR SERPL: 32 % (ref 15–46)
IRON SERPL-MCNC: 104 UG/DL (ref 35–180)
TIBC SERPL-MCNC: 320 UG/DL (ref 240–430)

## 2017-10-05 NOTE — TELEPHONE ENCOUNTER
Patient response via a [employer] message - please see alternate mychart encounter for future responses from patient    Yamilex Villegas RN

## 2017-10-05 NOTE — TELEPHONE ENCOUNTER
Writer called patient left non detailed message requesting return call to clinic and ask to speak with nurse    Yamilex Villegas RN

## 2017-10-06 ENCOUNTER — TELEPHONE (OUTPATIENT)
Dept: PHARMACY | Facility: OTHER | Age: 47
End: 2017-10-06

## 2017-10-06 NOTE — TELEPHONE ENCOUNTER
MTM referral from: [place] clinic visit (referral by provider)    MTM referral outreach attempt #1 on October 6, 2017 at 12:14 PM      Outcome: Left Message    Misa Price MTM Coordinator

## 2017-10-09 LAB
COPATH REPORT: NORMAL
PAP: NORMAL

## 2017-10-11 LAB
FINAL DIAGNOSIS: NORMAL
HPV HR 12 DNA CVX QL NAA+PROBE: NEGATIVE
HPV16 DNA SPEC QL NAA+PROBE: NEGATIVE
HPV18 DNA SPEC QL NAA+PROBE: NEGATIVE
SPECIMEN DESCRIPTION: NORMAL

## 2017-10-11 NOTE — PROGRESS NOTES
Sharmin,    No anemia, but iron is on the low end of normal.  You could certainly try taking some iron to see if that helps symptoms, but I would limit it to once a day.  If you have any questions, please feel free to contact the clinic.    TOMMY Clinton

## 2017-11-16 ENCOUNTER — MYC MEDICAL ADVICE (OUTPATIENT)
Dept: FAMILY MEDICINE | Facility: CLINIC | Age: 47
End: 2017-11-16

## 2017-11-16 DIAGNOSIS — R11.2 DRUG-INDUCED NAUSEA AND VOMITING: Primary | ICD-10-CM

## 2017-11-16 DIAGNOSIS — T50.905A DRUG-INDUCED NAUSEA AND VOMITING: Primary | ICD-10-CM

## 2017-11-16 RX ORDER — ONDANSETRON 4 MG/1
4 TABLET, FILM COATED ORAL EVERY 12 HOURS PRN
Qty: 30 TABLET | Refills: 1 | Status: SHIPPED | OUTPATIENT
Start: 2017-11-16 | End: 2018-11-29

## 2017-11-21 ENCOUNTER — MYC REFILL (OUTPATIENT)
Dept: FAMILY MEDICINE | Facility: CLINIC | Age: 47
End: 2017-11-21

## 2017-11-21 DIAGNOSIS — F17.200 TOBACCO USE DISORDER: ICD-10-CM

## 2017-11-21 NOTE — TELEPHONE ENCOUNTER
Message from [employer]:  Original authorizing provider: LEDY Seaman CNP would like a refill of the following medications:  varenicline (CHANTIX STARTING MONTH REESE) 0.5 MG X 11 & 1 MG X 42 tablet [LEDY Seaman CNP]    Preferred pharmacy: [facility] [place], [place] - [phone]    Comment:  Hi Mariya- Chantix is working well! Can I please get a refill? I'll use the [facility] on [address]. Thanks!! Kendy

## 2017-11-21 NOTE — TELEPHONE ENCOUNTER
Chantix   Last Written Prescription Date: 10/4/2017  Last Fill Quantity: 53 tabs # refills: 0  Last Office Visit with [facility], [facility] or [facility] prescribing provider: 10/4/2017       BP Readings from Last 3 Encounters:   10/04/17 146/84   10/04/17 143/82   06/28/17 124/83     Adjusted sig. Please review from previous. Sign if you agree. Pharmacy loaded.    Thanks! Marie Watkins RN

## 2017-12-06 ENCOUNTER — OFFICE VISIT (OUTPATIENT)
Dept: PODIATRY | Facility: CLINIC | Age: 47
End: 2017-12-06
Payer: COMMERCIAL

## 2017-12-06 VITALS
SYSTOLIC BLOOD PRESSURE: 150 MMHG | HEIGHT: 70 IN | DIASTOLIC BLOOD PRESSURE: 102 MMHG | TEMPERATURE: 99 F | HEART RATE: 72 BPM

## 2017-12-06 DIAGNOSIS — M20.5X1 HALLUX LIMITUS OF RIGHT FOOT: Primary | ICD-10-CM

## 2017-12-06 PROCEDURE — 99214 OFFICE O/P EST MOD 30 MIN: CPT | Performed by: PODIATRIST

## 2017-12-06 NOTE — LETTER
"    12/6/2017         RE: Sharmin Nieves  [address]  [place] [address]        Dear Colleague,    Thank you for referring your patient, Sharmin Nieves, to the [facility]. Please see a copy of my visit note below.    Weight management plan: Patient was referred to their PCP to discuss a diet and exercise plan.      PATIENT HISTORY:  Sharmin Nieves is a 47 year old female who presents to clinic for recheck of right 1st MPJ pain.  Present for 6-9 months.  Walk fit inserts have helped her plantar fasciitis pain.  Pt reports right dorsal joint pain with walking.  0-9/10.  She has tried different shoes.  Not helping.  Wondering about surgery.  Denies fevers, chills, injury.  She limps at times.  Smoker.  Works as a medical asst.  Nondiabetic. Denies related family hx.     EXAM:Vitals: BP (!) 150/102 (Cuff Size: Adult Large)  Pulse 72  Temp 99  F (37.2  C) (Oral)  Ht 5' 10\" (1.778 m)  BMI= There is no height or weight on file to calculate BMI.    General appearance: Patient is alert and fully cooperative with history & exam.  No sign of distress is noted during the visit.     Dermatologic: Skin is intact to right foot without significant lesions, rash or abrasion.  No paronychia or evidence of soft tissue infection is noted.      Vascular: DP & PT pulses are intact & regular  on the right. No significant edema or varicosities noted.  CFT and skin temperature are normal.      Neurologic: Lower extremity sensation is intact to light touch.  No evidence of weakness or contracture in the lower extremities.  No evidence of neuropathy.      Musculoskeletal:  R 1st MPJ ROM limited with palpable bone spurring, pain at end dorsiflexion. Patient is ambulatory without assistive device or brace.  No gross ankle deformity noted.  No foot or ankle joint effusion is noted.     XRs of right foot reviewed with pt.  1st MPJ spurring with narrowed joint space.      ASSESSMENT:   R hallux limitus      PLAN:  " Reviewed patient's chart in epic.  Discussed condition and treatment options including pros and cons.     Surgical and non-surgical treatment options for hallux limitus were discussed.  This includes my philosophy about different procedures including cheilectomy and fusion.  I explained how fusion is for late stage treatment of advanced arthritis.  There is no certainty that the non-fusion procedures will improve joint pain that is caused by arthritis.  I explained that hallux limitus is oftentimes surgically treated in a staged manner.  This means that additional surgery may be necessary over time.  Non-operative treatments like stiff soles, orthotics, injection and NSAIDs were discussed.  Shoes that provide extra room for the enlarged joint should be helpful.  I would anticipate somewhat short term benefit from joint injection.    Patient is aware that surgery is elective, can be avoided if desired.  The recovery process was discussed including impact to work, walking, shoes and daily activities.  I would anticipate up to 12 months for maximum recovery after surgery. Likely surgical procedures based on exam and xray findings include right foot 1st MPJ cheilectomy.    Pt considering surgery before the end of the year.  She will f/u prior.     Mo Edgar DPM, FACFAS      Consent:  R foot cheilectomy    Procedure(s):  1.  R foot cheilectomy    Diagnosis:  R hallux limitus    Equipment:  Sagittal saw, 138 blade    Position:  supine    Tourniquet:   ankle    Mini-C:  yes    Anesthesia:  MAC    Allergies:    Allergies   Allergen Reactions     Darvocet [Propoxyphene N-Apap] Nausea and Vomiting     Nsaids      NSAIDS - Ibuprofen & Naproxen - symptoms of swelling in hands/feet, hives        Antibiotics:  Ancef    Procedure time:  75m    Dispo:  home    Whelan catheter:  n/a    Crutch/walker training and fitting:  n/a     OR Clot prevention:  SCDs    Surgery location:  [facility]  preferred)              Again, thank you for allowing me to participate in the care of your patient.        Sincerely,        Mo Edgar DPM

## 2017-12-06 NOTE — MR AVS SNAPSHOT
"              After Visit Summary   12/6/2017    Sharmin Nieves    MRN: [id number]           Patient Information     Date Of Birth          1970        Visit Information        Provider Department      12/6/2017 8:45 AM Mo Edgar DPM [facility]        Today's Diagnoses     Hallux limitus of right foot    -  1      Care Instructions    Surgical planning.  If you have decided to have surgery, follow these few steps to get the procedure scheduled and to have the proper paperwork filled out.  If you are unsure about surgery, or would like to sit down and further discuss your issue and treatment options, please make a clinic appointment with Dr Edgar.    1.  Pick the date that you would like to have surgery.  Keep in mind that you will likely need at least 2 weeks off after the procedure for proper rest and healing.    2.  Call the surgery scheduling line at [phone] to get the procedure scheduled.    3.  Make an appointment to see Dr Edgar within 1 week of the date of surgery for your pre-operative consult.  When making the appointment, say \"I need to make a 40 minute surgical consult with Dr Edgar\".  It is recommended that you bring a spouse, family member or friend with you.  There will be lots of information presented.  It can be overwhelming, and it is better to have someone there to help sort out the details.    4.  Make an appointment to see your Primary Care Physician within 4 weeks of the date of surgery for your \"Pre-operative History and Physical\".  This is done to make sure you are medically healthy to undergo surgery.    If you have any post-operative questions regarding your procedure, call our triage RNs at [phone].        DEGENERATIVE ARTHRITIS OF THE BIG TOE JOINT (hallux limitus/hallux rigidus)   Arthritis of the joint at the base of the big toe (metatarsophalangeal joint) has several causes. Usually it results from repetitive trauma to the joint, " "secondary to abnormal foot mechanics. Often it is hereditary. However, a one-time traumatic event can lead to arthritis. The condition can worsen with time. The cartilage wears out, joint surfaces are no longer smooth, bone rubs on bone, inflammation occurs with pain, and eventually bone spurs and loose fragments might develop.   The joint is often painful with activity, worse with flimsy shoes or walking barefoot, and it slowly progresses over time. A person might notice the toe \"locking up\" with walking. There often is an obvious, and irritating, bony bump on top of the foot. Shoes might be uncomfortable. In some people the pain is so bothersome that recreational activities sometimes even normal daily activities are difficult to perform.   The pain from this arthritis is likely a combination of joint jamming, cartilage loss and inflammation, and irritation from shoes rubbing on the bump. Sometimes other parts of the foot, leg, or back hurt from altering one's walk to compensate for the painful joint.     Ways to help a person live with the discomfort include wearing a good, supportive shoe with a rigid, rocker-type bottom. An example is a hiking boot. A rigid sole minimizes bending of the joint, and therefore, joint motion and pain. Shoes with a high toe box allow for less rubbing on the bump. Avoiding barefoot walking, sandals, flip-flops and slippers usually helps.     Sometimes an insert or orthotic provides symptom relief. This might make shoe fit more difficult. Pads over the bump and occasionally injections into the joint provide relief.     Surgery for this condition is aimed towards alleviating pain. It does not cure the arthritis nor does it guarantee better joint motion. Depending on the condition of the metatarsophalangeal joint, there are several surgical options:    1.  Cutting off the bony bump(s) and cleaning the joint    2.  Loosening the joint up by making cuts in the first metatarsal bone or the " big toe bone and removing a small section of bone.    3.  Repositioning bone to minimize jamming of the joint.    4.  In severe cases, the joint is fused. By fusing the joint, it will never bend again. This resolves the pain, because it's the movement of a worn out joint that causes pain. Oftentimes the operation involves a combination of these procedures and requires the use of screws, pins, and/or a small surgical plate.     Healing after surgery requires about six weeks of protection. This allows the bone to heal. Maximum recovery takes about one year. The scar tissue and joint structures require this amount of time to finish the healing process. Expect stiffness, swelling and numbness during that time frame.   Surgery for arthritis of the metatarsophalangeal joint does involve side effects. Some side effects are predictable and others are less common but do occur. A scar will be visible and could be irritated by shoes. The shoe may rub on the screw or internal pin requiring surgical removal of these fixation devices. The screw and pin would likely be left in place for a full year. The first toe may remain stiff after surgery. The amount of stiffness is variable. Most people never regain normal motion of the first toe. This is due to scar tissue inherent to any surgery, in addition to the cumulative effects of arthritis. Sometimes the big toe drifts to one side or the other. Joint fusion is one option to correct an unstable, drifting toe.   All surgical procedures involve risk of infection, numbness, pain, delayed bone healing, osteotomy (bone cut) dislocation, blood clots, continued foot pain, etc. Arthritic joint surgery is quite complex and should not be taken lightly.    Any skin incision can lead to infection. Deep infection might involve the bone and thus repeat surgery and six weeks of IV antibiotics. Scar tissue can cause nerve pain or numbness. This is generally temporary but can be permanent. We do not  have treatments that cure nerve problems. Second toe pain could be related to altered mechanics and pressure transferred to the second toe. Delayed bone healing would lengthen the healing time. Some bones simply do not heal. This requires repeat surgery, electronic bone stimulation and/or extended protection. Smokers have an approximate 20% chance of poor bone healing. This is double that of a non-smoker. The bone cut may displace. This may need to be repaired with a second operation. Displacement can cause joint malalignment. Immobility after surgery can cause a blood clot in the legs and lungs. This could result in death.   Foot pain is complex. Most feet hurt for more than one reason. Operating on the arthritic   big toe joint will not necessarily create a pain free foot. Appropriate shoes, healthy body weight, avoidance of bare foot walking and moderation of activity will always be necessary to enjoy foot comfort. Arthritis is incurable even with surgery.     Surgery for this type of arthritis is nevertheless quite successful. Most surgical patients are pleased with their foot following surgery. Many of the issues described above can be controlled by taking proper care of your foot during the healing process.   Cosmetic bump surgery is discouraged for the reasons listed above. A bump and joint that is comfortable when wearing appropriate shoes should simply be treated with appropriate shoes.   Your surgeon would be happy to fully describe any of the above issues. You should pursue a full understanding of the operation, recovery process and any potential problems that could develop.                 Body Mass Index (BMI)  Many things can cause foot and ankle problems. Foot structure, activity level, foot mechanics and injuries are common causes of pain.  One very important issue that often goes unmentioned is body weight. Extra weight can cause increased stress on muscles, ligaments, bones and tendons. Sometimes  just a few extra pounds is all it takes to put one over her/his threshold. Without reducing that stress, it can be difficult to alleviate pain.   Some people are uncomfortable addressing this issue, but we feel it is important for you to think about it. As Foot & Ankle specialists, our job is addressing the lower extremity problem and possible causes.   Regarding extra body weight, we encourage patients to discuss diet and weight management plans with their primary care doctors. It is this team approach that gives you the best opportunity for pain relief and getting you back on your feet.     SMOKING CESSATION    What's in cigarette smoke? - Cigarette smoke contains over 4,000 chemicals. Nicotine is one of the main ingredients which is an insecticide/herbicide. It is poisonous to our nervous system, increases blood clotting risk, and decreases the body's defenses to fight off infection. Another chemical is Carbon Monoxide is an asphyxiating gas that permanently binds to blood cells and blocks the transport of oxygen. This leads to tissue death and decreases your metabolism. Tar is a chemical that coats your lungs and trachea which impairs new oxygen coming in and carbon dioxide getting out of your body.   How does smoking impact surgery? - Smoking is particularly hazardous with regards to surgery. Surgery puts stress on the body and a smoker's body is already under strain from these chemicals. Putting the two together, especially for an elective surgery, could be a recipe for disaster. Smoking before and after surgery increases your risk of heart problems, slow wound healing, delayed bone healing, blood clots, wound infection and anesthesia complications.   What are the benefits of quitting? - In 20 minutes your blood pressure will drop back down to normal. In 8 hours the carbon monoxide (a toxic gas) levels in your blood stream will drop by half, and oxygen levels will return to normal. In 48 hours your chance of  having a heart attack will have decreased. All nicotine will have left your body. Your sense of taste and smell will return to a normal level. In 72 hours your bronchial tubes will relax, and your energy levels will increase. In 2 weeks your circulation will increase, and it will continue to improve for the next 10 weeks.    Recommendations for elective surgery - Ideally, patients should quit smoking 8 weeks before and at least 2 weeks after elective surgery in order to avoid complications. Simply cutting back on the amount of cigarettes smoked per day does not offer any benefit or decrease the risk of poor wound healing, heart problems, and infection. Smokers should also start taking Vitamin C and B for two weeks before surgery and two weeks after surgery.    Ways to Stop Smokin. Nicotine patches, lozenges, or gum  2. Support Groups  3. Medications (see below)    List of Medications:  1. Varenicline Tartrate (CHANTIX)   2. Bupropion HCL (WELLBUTRIN, ZYBAN) - note: make sure Wellbutrin is for smoking cessation and not other issues   3. Nicotine Patch (NICODERM)   4. Nicotine Inhaler (NICOTROL)   5. Nicotine Gum Nicotine Polacrilex   6. Nicotine Lozenge: Nicotine Polacrilex (COMMIT)   * [place] offers a smoking support group as well!  Please visit: [email or web address]  If you are interested in these, ask about getting a prescription or talk to your primary care doctor about what may be the best way for you to quit.                 Follow-ups after your visit        Follow-up notes from your care team     Return if symptoms worsen or fail to improve.      Who to contact     If you have questions or need follow up information about today's clinic visit or your schedule please contact [facility] directly at [phone].  Normal or non-critical lab and imaging results will be communicated to you by MyChart, letter or phone within 4 business days after the clinic has received  "the results. If you do not hear from us within 7 days, please contact the clinic through [facility] or phone. If you have a critical or abnormal lab result, we will notify you by phone as soon as possible.  Submit refill requests through [employer] or call your pharmacy and they will forward the refill request to us. Please allow 3 business days for your refill to be completed.          Additional Information About Your Visit        [facility] Information     [employer] gives you secure access to your electronic health record. If you see a primary care provider, you can also send messages to your care team and make appointments. If you have questions, please call your primary care clinic.  If you do not have a primary care provider, please call [phone] and they will assist you.        Care EveryWhere ID     This is your Care EveryWhere ID. This could be used by other organizations to access your [facility] medical records  [id number]        Your Vitals Were     Pulse Temperature Height             72 99  F (37.2  C) (Oral) 5' 10\" (1.778 m)          Blood Pressure from Last 3 Encounters:   12/06/17 (!) 150/102   10/04/17 146/84   10/04/17 143/82    Weight from Last 3 Encounters:   10/04/17 220 lb 9.6 oz (100.1 kg)   10/04/17 219 lb 12.8 oz (99.7 kg)   05/05/17 210 lb 4.8 oz (95.4 kg)              Today, you had the following     No orders found for display       Primary Care Provider Office Phone # Fax #    Randa Bill, LEDY [facility] [phone]       [facility] [address] [facility] [address]  [facility] [address]        Equal Access to Services     KARTHIK GOMEZ : Hadii geovanny Santana, marvin nagel, qagabe swift. So [facility] [phone].    ATENCIÓN: Si habla español, tiene a mitchell disposición servicios gratuitos de asistencia lingüística. Catracho al [phone].    We comply with applicable federal civil rights laws and [place] laws. We do not " discriminate on the basis of race, color, national origin, age, disability, sex, sexual orientation, or gender identity.            Thank you!     Thank you for choosing [facility]  for your care. Our goal is always to provide you with excellent care. Hearing back from our patients is one way we can continue to improve our services. Please take a few minutes to complete the written survey that you may receive in the mail after your visit with us. Thank you!             Your Updated Medication List - Protect others around you: Learn how to safely use, store and throw away your medicines at www.disposemymeds.org.          This list is accurate as of: 12/6/17  9:14 AM.  Always use your most recent med list.                   Brand Name Dispense Instructions for use Diagnosis    celecoxib 100 MG capsule    celeBREX    60 capsule    Take 1 capsule (100 mg) by mouth 2 times daily as needed for moderate pain    Pain       Daily Multi vitamin  /Minerals Tabs     30    1 TABLET DAILY        diltiazem 2% in PLO cream (FV COMPOUNDED) 2% Gel     60 g    To anal opening three times daily.  Use a pea-sized amount.  Store at room temperature.    Anal pain, Anal fissure       gabapentin 600 MG tablet    NEURONTIN    90 tablet    Take 1 tablet (600 mg) by mouth At Bedtime    Restless leg syndrome       MAGNESIUM OXIDE PO      Take 250 mg by mouth        MIRENA (52 MG) 20 MCG/24HR IUD   Generic drug:  levonorgestrel     1 each    one placed in uterus        ondansetron 4 MG tablet    ZOFRAN    30 tablet    Take 1 tablet (4 mg) by mouth every 12 hours as needed for nausea    Drug-induced nausea and vomiting       traZODone 100 MG tablet    DESYREL    180 tablet    Take 2 tablets (200 mg) by mouth At Bedtime    Insomnia       * varenicline 1 MG tablet    CHANTIX    56 tablet    Take 1 tablet (1 mg) by mouth 2 times daily    Tobacco use disorder       * varenicline 0.5 MG X 11 & 1 MG X 42 tablet    CHANTIX STARTING  MONTH REESE    53 tablet    1 mg twice daily.    Tobacco use disorder       venlafaxine 75 MG 24 hr capsule    EFFEXOR-XR    270 capsule    Take 3 capsules (225 mg) by mouth daily    Major depressive disorder, recurrent episode, moderate (H)       * Notice:  This list has 2 medication(s) that are the same as other medications prescribed for you. Read the directions carefully, and ask your doctor or other care provider to review them with you.

## 2017-12-06 NOTE — PATIENT INSTRUCTIONS
"Surgical planning.  If you have decided to have surgery, follow these few steps to get the procedure scheduled and to have the proper paperwork filled out.  If you are unsure about surgery, or would like to sit down and further discuss your issue and treatment options, please make a clinic appointment with Dr Edgar.    1.  Pick the date that you would like to have surgery.  Keep in mind that you will likely need at least 2 weeks off after the procedure for proper rest and healing.    2.  Call the surgery scheduling line at [phone] to get the procedure scheduled.    3.  Make an appointment to see Dr Edgar within 1 week of the date of surgery for your pre-operative consult.  When making the appointment, say \"I need to make a 40 minute surgical consult with Dr Edgar\".  It is recommended that you bring a spouse, family member or friend with you.  There will be lots of information presented.  It can be overwhelming, and it is better to have someone there to help sort out the details.    4.  Make an appointment to see your Primary Care Physician within 4 weeks of the date of surgery for your \"Pre-operative History and Physical\".  This is done to make sure you are medically healthy to undergo surgery.    If you have any post-operative questions regarding your procedure, call our triage RNs at [phone].        DEGENERATIVE ARTHRITIS OF THE BIG TOE JOINT (hallux limitus/hallux rigidus)   Arthritis of the joint at the base of the big toe (metatarsophalangeal joint) has several causes. Usually it results from repetitive trauma to the joint, secondary to abnormal foot mechanics. Often it is hereditary. However, a one-time traumatic event can lead to arthritis. The condition can worsen with time. The cartilage wears out, joint surfaces are no longer smooth, bone rubs on bone, inflammation occurs with pain, and eventually bone spurs and loose fragments might develop.   The joint is often painful with activity, " "worse with flimsy shoes or walking barefoot, and it slowly progresses over time. A person might notice the toe \"locking up\" with walking. There often is an obvious, and irritating, bony bump on top of the foot. Shoes might be uncomfortable. In some people the pain is so bothersome that recreational activities sometimes even normal daily activities are difficult to perform.   The pain from this arthritis is likely a combination of joint jamming, cartilage loss and inflammation, and irritation from shoes rubbing on the bump. Sometimes other parts of the foot, leg, or back hurt from altering one's walk to compensate for the painful joint.     Ways to help a person live with the discomfort include wearing a good, supportive shoe with a rigid, rocker-type bottom. An example is a hiking boot. A rigid sole minimizes bending of the joint, and therefore, joint motion and pain. Shoes with a high toe box allow for less rubbing on the bump. Avoiding barefoot walking, sandals, flip-flops and slippers usually helps.     Sometimes an insert or orthotic provides symptom relief. This might make shoe fit more difficult. Pads over the bump and occasionally injections into the joint provide relief.     Surgery for this condition is aimed towards alleviating pain. It does not cure the arthritis nor does it guarantee better joint motion. Depending on the condition of the metatarsophalangeal joint, there are several surgical options:    1.  Cutting off the bony bump(s) and cleaning the joint    2.  Loosening the joint up by making cuts in the first metatarsal bone or the big toe bone and removing a small section of bone.    3.  Repositioning bone to minimize jamming of the joint.    4.  In severe cases, the joint is fused. By fusing the joint, it will never bend again. This resolves the pain, because it's the movement of a worn out joint that causes pain. Oftentimes the operation involves a combination of these procedures and requires the " use of screws, pins, and/or a small surgical plate.     Healing after surgery requires about six weeks of protection. This allows the bone to heal. Maximum recovery takes about one year. The scar tissue and joint structures require this amount of time to finish the healing process. Expect stiffness, swelling and numbness during that time frame.   Surgery for arthritis of the metatarsophalangeal joint does involve side effects. Some side effects are predictable and others are less common but do occur. A scar will be visible and could be irritated by shoes. The shoe may rub on the screw or internal pin requiring surgical removal of these fixation devices. The screw and pin would likely be left in place for a full year. The first toe may remain stiff after surgery. The amount of stiffness is variable. Most people never regain normal motion of the first toe. This is due to scar tissue inherent to any surgery, in addition to the cumulative effects of arthritis. Sometimes the big toe drifts to one side or the other. Joint fusion is one option to correct an unstable, drifting toe.   All surgical procedures involve risk of infection, numbness, pain, delayed bone healing, osteotomy (bone cut) dislocation, blood clots, continued foot pain, etc. Arthritic joint surgery is quite complex and should not be taken lightly.    Any skin incision can lead to infection. Deep infection might involve the bone and thus repeat surgery and six weeks of IV antibiotics. Scar tissue can cause nerve pain or numbness. This is generally temporary but can be permanent. We do not have treatments that cure nerve problems. Second toe pain could be related to altered mechanics and pressure transferred to the second toe. Delayed bone healing would lengthen the healing time. Some bones simply do not heal. This requires repeat surgery, electronic bone stimulation and/or extended protection. Smokers have an approximate 20% chance of poor bone healing.  This is double that of a non-smoker. The bone cut may displace. This may need to be repaired with a second operation. Displacement can cause joint malalignment. Immobility after surgery can cause a blood clot in the legs and lungs. This could result in death.   Foot pain is complex. Most feet hurt for more than one reason. Operating on the arthritic   big toe joint will not necessarily create a pain free foot. Appropriate shoes, healthy body weight, avoidance of bare foot walking and moderation of activity will always be necessary to enjoy foot comfort. Arthritis is incurable even with surgery.     Surgery for this type of arthritis is nevertheless quite successful. Most surgical patients are pleased with their foot following surgery. Many of the issues described above can be controlled by taking proper care of your foot during the healing process.   Cosmetic bump surgery is discouraged for the reasons listed above. A bump and joint that is comfortable when wearing appropriate shoes should simply be treated with appropriate shoes.   Your surgeon would be happy to fully describe any of the above issues. You should pursue a full understanding of the operation, recovery process and any potential problems that could develop.                 Body Mass Index (BMI)  Many things can cause foot and ankle problems. Foot structure, activity level, foot mechanics and injuries are common causes of pain.  One very important issue that often goes unmentioned is body weight. Extra weight can cause increased stress on muscles, ligaments, bones and tendons. Sometimes just a few extra pounds is all it takes to put one over her/his threshold. Without reducing that stress, it can be difficult to alleviate pain.   Some people are uncomfortable addressing this issue, but we feel it is important for you to think about it. As Foot & Ankle specialists, our job is addressing the lower extremity problem and possible causes.   Regarding extra  body weight, we encourage patients to discuss diet and weight management plans with their primary care doctors. It is this team approach that gives you the best opportunity for pain relief and getting you back on your feet.     SMOKING CESSATION    What's in cigarette smoke? - Cigarette smoke contains over 4,000 chemicals. Nicotine is one of the main ingredients which is an insecticide/herbicide. It is poisonous to our nervous system, increases blood clotting risk, and decreases the body's defenses to fight off infection. Another chemical is Carbon Monoxide is an asphyxiating gas that permanently binds to blood cells and blocks the transport of oxygen. This leads to tissue death and decreases your metabolism. Tar is a chemical that coats your lungs and trachea which impairs new oxygen coming in and carbon dioxide getting out of your body.   How does smoking impact surgery? - Smoking is particularly hazardous with regards to surgery. Surgery puts stress on the body and a smoker's body is already under strain from these chemicals. Putting the two together, especially for an elective surgery, could be a recipe for disaster. Smoking before and after surgery increases your risk of heart problems, slow wound healing, delayed bone healing, blood clots, wound infection and anesthesia complications.   What are the benefits of quitting? - In 20 minutes your blood pressure will drop back down to normal. In 8 hours the carbon monoxide (a toxic gas) levels in your blood stream will drop by half, and oxygen levels will return to normal. In 48 hours your chance of having a heart attack will have decreased. All nicotine will have left your body. Your sense of taste and smell will return to a normal level. In 72 hours your bronchial tubes will relax, and your energy levels will increase. In 2 weeks your circulation will increase, and it will continue to improve for the next 10 weeks.    Recommendations for elective surgery -  Ideally, patients should quit smoking 8 weeks before and at least 2 weeks after elective surgery in order to avoid complications. Simply cutting back on the amount of cigarettes smoked per day does not offer any benefit or decrease the risk of poor wound healing, heart problems, and infection. Smokers should also start taking Vitamin C and B for two weeks before surgery and two weeks after surgery.    Ways to Stop Smokin. Nicotine patches, lozenges, or gum  2. Support Groups  3. Medications (see below)    List of Medications:  1. Varenicline Tartrate (CHANTIX)   2. Bupropion HCL (WELLBUTRIN, ZYBAN)   note: make sure Wellbutrin is for smoking cessation and not other issues   3. Nicotine Patch (NICODERM)   4. Nicotine Inhaler (NICOTROL)   5. Nicotine Gum Nicotine Polacrilex   6. Nicotine Lozenge: Nicotine Polacrilex (COMMIT)   * [facility] offers a smoking support group as well!  Please visit: [email or web address]  If you are interested in these, ask about getting a prescription or talk to your primary care doctor about what may be the best way for you to quit.

## 2017-12-06 NOTE — PROGRESS NOTES
"PATIENT HISTORY:  Sharmin Nieves is a 47 year old female who presents to clinic for recheck of right 1st MPJ pain.  Present for 6-9 months.  Walk fit inserts have helped her plantar fasciitis pain.  Pt reports right dorsal joint pain with walking.  0-9/10.  She has tried different shoes.  Not helping.  Wondering about surgery.  Denies fevers, chills, injury.  She limps at times.  Smoker.  Works as a medical asst.  Nondiabetic. Denies related family hx.     EXAM:Vitals: BP (!) 150/102 (Cuff Size: Adult Large)  Pulse 72  Temp 99  F (37.2  C) (Oral)  Ht 5' 10\" (1.778 m)  BMI= There is no height or weight on file to calculate BMI.    General appearance: Patient is alert and fully cooperative with history & exam.  No sign of distress is noted during the visit.     Dermatologic: Skin is intact to right foot without significant lesions, rash or abrasion.  No paronychia or evidence of soft tissue infection is noted.      Vascular: DP & PT pulses are intact & regular on the right. No significant edema or varicosities noted.  CFT and skin temperature are normal.      Neurologic: Lower extremity sensation is intact to light touch.  No evidence of weakness or contracture in the lower extremities.  No evidence of neuropathy.      Musculoskeletal:  R 1st MPJ ROM limited with palpable bone spurring, pain at end dorsiflexion. Patient is ambulatory without assistive device or brace.  No gross ankle deformity noted.  No foot or ankle joint effusion is noted.     XRs of right foot reviewed with pt.  1st MPJ spurring with narrowed joint space.      ASSESSMENT:   R hallux limitus      PLAN:  Reviewed patient's chart in epic.  Discussed condition and treatment options including pros and cons.     Surgical and non-surgical treatment options for hallux limitus were discussed.  This includes my philosophy about different procedures including cheilectomy and fusion.  I explained how fusion is for late stage treatment of advanced arthritis. "  There is no certainty that the non-fusion procedures will improve joint pain that is caused by arthritis.  I explained that hallux limitus is oftentimes surgically treated in a staged manner.  This means that additional surgery may be necessary over time.  Non-operative treatments like stiff soles, orthotics, injection and NSAIDs were discussed.  Shoes that provide extra room for the enlarged joint should be helpful.  I would anticipate somewhat short term benefit from joint injection.    Patient is aware that surgery is elective, can be avoided if desired.  The recovery process was discussed including impact to work, walking, shoes and daily activities.  I would anticipate up to 12 months for maximum recovery after surgery. Likely surgical procedures based on exam and xray findings include right foot 1st MPJ cheilectomy.    Pt considering surgery before the end of the year.  She will f/u prior.     Mo Edgar DPM, FACFAS      Consent:  R foot cheilectomy    Procedure(s):  1.  R foot cheilectomy    Diagnosis:  R hallux limitus    Equipment:  Sagittal saw, 138 blade    Position:  supine    Tourniquet:   ankle    Mini-C:  yes    Anesthesia:  MAC    Allergies:    Allergies   Allergen Reactions     Darvocet [Propoxyphene N-Apap] Nausea and Vomiting     Nsaids      NSAIDS - Ibuprofen & Naproxen - symptoms of swelling in hands/feet, hives        Antibiotics:  Ancef    Procedure time:  75m    Dispo:  home    Whelan catheter:  n/a    Crutch/walker training and fitting:  n/a     OR Clot prevention:  SCDs    Surgery location:  [facility] or [facility] ([facility] preferred)

## 2017-12-12 ENCOUNTER — MYC MEDICAL ADVICE (OUTPATIENT)
Dept: PODIATRY | Facility: CLINIC | Age: 47
End: 2017-12-12

## 2017-12-13 ENCOUNTER — TELEPHONE (OUTPATIENT)
Dept: PODIATRY | Facility: CLINIC | Age: 47
End: 2017-12-13

## 2017-12-13 NOTE — TELEPHONE ENCOUNTER
Scheduled surgery for Right foot cheilectomy on 12/21/2018 with  @ [facility] @ 1:00.  Surgery education packet provided to patient.

## 2017-12-14 ENCOUNTER — MYC REFILL (OUTPATIENT)
Dept: FAMILY MEDICINE | Facility: CLINIC | Age: 47
End: 2017-12-14

## 2017-12-14 DIAGNOSIS — F33.1 MAJOR DEPRESSIVE DISORDER, RECURRENT EPISODE, MODERATE (H): ICD-10-CM

## 2017-12-15 NOTE — TELEPHONE ENCOUNTER
[employer] message sent to patient.     Anna Jackson, BSN RN  [facility]

## 2017-12-18 ENCOUNTER — OFFICE VISIT (OUTPATIENT)
Dept: INTERNAL MEDICINE | Facility: CLINIC | Age: 47
End: 2017-12-18
Payer: COMMERCIAL

## 2017-12-18 VITALS
DIASTOLIC BLOOD PRESSURE: 86 MMHG | BODY MASS INDEX: 32.87 KG/M2 | RESPIRATION RATE: 16 BRPM | SYSTOLIC BLOOD PRESSURE: 138 MMHG | WEIGHT: 229.1 LBS

## 2017-12-18 DIAGNOSIS — K21.9 GASTROESOPHAGEAL REFLUX DISEASE WITHOUT ESOPHAGITIS: ICD-10-CM

## 2017-12-18 DIAGNOSIS — F41.1 GENERALIZED ANXIETY DISORDER: ICD-10-CM

## 2017-12-18 DIAGNOSIS — F17.200 TOBACCO USE DISORDER: Primary | ICD-10-CM

## 2017-12-18 DIAGNOSIS — F32.1 MODERATE MAJOR DEPRESSION (H): ICD-10-CM

## 2017-12-18 ASSESSMENT — PAIN SCALES - GENERAL: PAINLEVEL: MILD PAIN (3)

## 2017-12-18 NOTE — NURSING NOTE
Chief Complaint   Patient presents with     Pre-Op Exam     Dr. Edgar on 12/21/2017 for a Right Foot Cheilectomy      NAMRATA REINOSO at 6:57 AM on 12/18/2017.

## 2017-12-18 NOTE — PROGRESS NOTES
HPI  47-year-old presents today for preoperative medical evaluation prior to removal of bone spur from her right great toe.  This is been symptomatic and aggravated by walking and weightbearing.  Nevertheless she still walks 30 minutes a day to the bus and back.  Her health is otherwise been good.  She has no exertional chest pain dyspnea dizziness or other complaints.  She can climb stairs walk a mile without difficulty.  She has had previous surgery and no history of anesthetic or bleeding complications.  There is no family history of anesthetic or bleeding complications.  She is smoking half a pack per day or less she previously had done well with Chantix and was encouraged to resume this and again stop smoking.  She has not been monitoring her blood pressure at all and she has not had any past history of high blood pressure.  Her diet is reasonable with several servings of fruits and vegetables daily.  She has had no problems on her current medications and has stopped the Celebrex prior to the planned surgery.    Past Medical History:   Diagnosis Date     Acne      Anxiety state, unspecified     Anxiety     Chronic low back pain 5/11/2010     Depression     Dr. Gaytan     Diaphragmatic hernia without mention of obstruction or gangrene      Esophageal reflux     with associated esophageal spasm     ETOH abuse      Herpes simplex without mention of complication      Moderate major depression (H) 4/15/2011     Rectal pain 5/16/2011     Right hip pain 5/11/2010     Seasonal affective disorder (H)      Surveillance of previously prescribed intrauterine contraceptive device 10/03/05    mirena IUD     Past Surgical History:   Procedure Laterality Date     C NONSPECIFIC PROCEDURE  1992    CRYO SURGERY for abnl paps     C STOMACH SURGERY PROCEDURE UNLISTED       SURGICAL HISTORY OF -   4/04    Lapr Nissen fundoplication     TONSILLECTOMY & ADENOIDECTOMY  1985     Family History   Problem Relation Age of Onset      MIKEL Maternal Grandmother      Hypertension Maternal Grandmother      Alcohol/Drug Maternal Grandmother      Depression Maternal Grandmother      CEREBROVASCULAR DISEASE Paternal Grandfather      Alcohol/Drug Paternal Grandfather      Breast Cancer Paternal Grandmother      Depression Paternal Grandmother      Alcohol/Drug Father      Depression Father      GASTROINTESTINAL DISEASE Father      GERD     Alcohol/Drug Maternal Grandfather      Depression Maternal Grandfather      Depression Mother      Obesity Mother      Anxiety Disorder Mother      DIABETES Mother      Breast Cancer Maternal Aunt      Alcohol/Drug Sister      Alcohol/Drug Brother      Alcohol/Drug Brother      Alcohol/Drug Brother      Depression Sister      Depression Brother      Depression Brother      Depression Brother      GASTROINTESTINAL DISEASE Brother      Hiatal hernia     Cancer - colorectal No family hx of      Prostate Cancer No family hx of      Social History     Social History     Marital status:      Spouse name: Esteban     Number of children: 3     Years of education: 13     Occupational History     Medical Assistant [employer]     Social History Main Topics     Smoking status: Current Every Day Smoker     Packs/day: 1.00     Years: 15.00     Types: Cigarettes     Smokeless tobacco: Never Used     Alcohol use Yes      Comment: 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        Social Documentation:        Balanced Diet: YES    Calcium intake: more than 2 per day    Caffeine: 6 cups per day    Exercise:  type of activity 0    Sunscreen: Yes    Seatbelts:  Yes    Self Breast Exam:  No     Self Testicular Exam: No - n/a    Physical/Emotional/Sexual Abuse: No      Do you feel safe in your environment? Yes        Cholesterol screen up to date: No - Pt not fasting today.    CHOL      167   11/06/2002    HDL       42   11/06/2002    LDL       99   11/06/2002    TRIG      131   11/06/2002    CHOLHDLRATIO        4   11/06/2002        Eye Exam up to date: Yes    Dental Exam up to date: Yes    Pap smear up to date: Yes    Mammogram up to date: Does Not Apply    Dexa Scan up to date: Does Not Apply    Colonoscopy up to date: Does Not Apply    Immunizations up to date: Yes-Td 2002    Glucose screen if over 40:  No - n/a         Answers for HPI/ROS submitted by the patient on 12/16/2017   General Symptoms: No  Skin Symptoms: No  HENT Symptoms: No  EYE SYMPTOMS: No  HEART SYMPTOMS: No  LUNG SYMPTOMS: No  INTESTINAL SYMPTOMS: No  URINARY SYMPTOMS: No  GYNECOLOGIC SYMPTOMS: No  BREAST SYMPTOMS: No  SKELETAL SYMPTOMS: No  BLOOD SYMPTOMS: No  NERVOUS SYSTEM SYMPTOMS: No  MENTAL HEALTH SYMPTOMS: No    Exam:  /86  Resp 16  Wt 103.9 kg (229 lb 1.6 oz)  BMI 32.87 kg/m2  229 lbs 1.6 oz  Physical Exam   The patient is alert, oriented with a clear sensorium.   Skin shows no lesions or rashes and good turgor.   Head is normocephalic and atraumatic.   Eyes show PERRLA with benign optic fundi.   Ears show normal TMs bilaterally.   Mouth shows clear oral mucosa.   Neck shows no nodes, thyromegaly or bruits.   Back is non tender.  Lungs are clear to percussion and auscultation.   Heart shows normal S1 and S2 without murmur or gallop.   Abdomen is obese, soft and nontender without masses or organomegaly.   Extremities show no edema and no evidence of active synovitis.   Neurologic examination shows cranial nerves II-XII intact. Motor shows 5/5 strength. Reflexes are symmetric.     Recent labs reviewed and show no need to recheck or repeat CBC    ASSESSMENT  1 symptomatic bone spur right great toe  2 borderline hypertension  3 history of depression and anxiety in remission  4 GERD status post  Nissen fundoplication  5 smoker    Plan  Patient is stable from a medical and a cardiovascular standpoint does not require any additional imaging or investigation prior to her planned low risk surgery.  She would be low risk for cardiovascular complications related to this procedure.  She was encouraged to resume the Chantix and quit smoking.  We did discuss nonpharmacologic blood pressure control measures including salt restriction increasing the plant content of the diet and exercise.  She will monitor her blood pressure several times a month.  Goal is to have <130/80.  She will follow-up if the blood pressure remains elevated.  She will hold her medications the morning of surgery and take them normally the day before.  She is already off the Celebrex.    This note was completed using Dragon voice recognition software.  Although reviewed after completion, some word and grammatical errors may occur.    Esteban Banda MD  General Internal Medicine  [facility]  [phone]

## 2017-12-18 NOTE — PATIENT INSTRUCTIONS
[facility]: [phone]     [facility] Medication Refill Request Information:  * Please contact your pharmacy regarding ANY request for medication refills.  ** [facility] Fax = [phone]  * Please allow 3 business days for routine medication refills.  * Please allow 5 business days for controlled substance medication refills.     [facility] Test Result notification information:  *You will be notified with in 7-10 days of your appointment day regarding the results of your test.  If you are on MyChart you will be notified as soon as the provider has reviewed the results and signed off on them.

## 2017-12-18 NOTE — MR AVS SNAPSHOT
After Visit Summary   12/18/2017    Sharmin Nieves    MRN: [id number]           Patient Information     Date Of Birth          1970        Visit Information        Provider Department      12/18/2017 7:00 AM Esteban Banda MD [facility]        Today's Diagnoses     Tobacco use disorder    -  1    Gastroesophageal reflux disease without esophagitis        Moderate major depression (H)        Generalized anxiety disorder          Care Instructions    Primary Care Center: [phone]     Primary Care Center Medication Refill Request Information:  * Please contact your pharmacy regarding ANY request for medication refills.  ** [facility] Prescription Fax = [phone]  * Please allow 3 business days for routine medication refills.  * Please allow 5 business days for controlled substance medication refills.     Primary Care Center Test Result notification information:  *You will be notified with in 7-10 days of your appointment day regarding the results of your test.  If you are on MyChart you will be notified as soon as the provider has reviewed the results and signed off on them.            Follow-ups after your visit        Your next 10 appointments already scheduled     Dec 20, 2017  7:00 AM CST   MyChart Podiatry Return with Mo Edgar DPM   [facility]    [address] [place] [id number] [phone]            Dec 21, 2017   Procedure with Mo Edgar DPM   [facility] (--)    [address]  [facility] [address]   [phone]            Jan 03, 2018  8:15 AM CST   Return Visit with Mo Edgar DPM   [facility]    [address] [place] [id number] [phone]              Who to contact     Please call your clinic at [phone] to:    Ask questions about your health    Make  or cancel appointments    Discuss your medicines    Learn about your test results    Speak to your doctor   If you have compliments or concerns about an experience at your clinic, or if you wish to file a complaint, please contact [facility] Patient Relations at [phone] or email us at [email or web address]         Additional Information About Your Visit        Njinihart Information     cdream networkt gives you secure access to your electronic health record. If you see a primary care provider, you can also send messages to your care team and make appointments. If you have questions, please call your primary care clinic.  If you do not have a primary care provider, please call [phone] and they will assist you.      [employer] is an electronic gateway that provides easy, online access to your medical records. With [employer], you can request a clinic appointment, read your test results, renew a prescription or communicate with your care team.     To access your existing account, please contact your [facility] or call [phone] for assistance.        Care EveryWhere ID     This is your Care EveryWhere ID. This could be used by other organizations to access your [place] medical records  [id number]        Your Vitals Were     Respirations BMI (Body Mass Index)                16 32.87 kg/m2           Blood Pressure from Last 3 Encounters:   12/18/17 138/86   12/06/17 (!) 150/102   10/04/17 146/84    Weight from Last 3 Encounters:   12/18/17 103.9 kg (229 lb 1.6 oz)   10/04/17 100.1 kg (220 lb 9.6 oz)   10/04/17 99.7 kg (219 lb 12.8 oz)              Today, you had the following     No orders found for display         Today's Medication Changes          These changes are accurate as of: 12/18/17  7:31 AM.  If you have any questions, ask your nurse or doctor.               These medicines have changed or have updated prescriptions.        Dose/Directions     varenicline 1 MG tablet   Commonly known as:  CHANTIX   This may have changed:  Another medication with the same name was removed. Continue taking this medication, and follow the directions you see here.   Used for:  Tobacco use disorder   Changed by:  Randa Bill APRN CNP        Dose:  1 mg   Take 1 tablet (1 mg) by mouth 2 times daily   Quantity:  56 tablet   Refills:  2                Primary Care Provider Office Phone # Fax #    LEDY Wu [phone]       [facility] [address] [facility] [address]  [facility] [address]        Equal Access to Services     [facility]: Hadii geovanny ku hadasho Soomaali, waaxda luqadaha, qaybta kaalmada adedanielyada, gabe rainey . So [facility] [phone].    ATENCIÓN: Si habla español, tiene a mitchell disposición servicios gratuitos de asistencia lingüística. [facility] [phone].    We comply with applicable federal civil rights laws and [place] laws. We do not discriminate on the basis of race, color, national origin, age, disability, sex, sexual orientation, or gender identity.            Thank you!     Thank you for choosing [facility]  for your care. Our goal is always to provide you with excellent care. Hearing back from our patients is one way we can continue to improve our services. Please take a few minutes to complete the written survey that you may receive in the mail after your visit with us. Thank you!             Your Updated Medication List - Protect others around you: Learn how to safely use, store and throw away your medicines at www.disposemymeds.org.          This list is accurate as of: 12/18/17  7:31 AM.  Always use your most recent med list.                   Brand Name Dispense Instructions for use Diagnosis    celecoxib 100 MG capsule    celeBREX    60 capsule    Take 1 capsule (100 mg) by mouth 2 times daily as needed for moderate pain    Pain       Daily Multi vitamin  /Minerals Tabs      30    1 TABLET DAILY        diltiazem 2% in PLO cream (FV COMPOUNDED) 2% Gel     60 g    To anal opening three times daily.  Use a pea-sized amount.  Store at room temperature.    Anal pain, Anal fissure       gabapentin 600 MG tablet    NEURONTIN    90 tablet    Take 1 tablet (600 mg) by mouth At Bedtime    Restless leg syndrome       MAGNESIUM OXIDE PO      Take 250 mg by mouth        MIRENA (52 MG) 20 MCG/24HR IUD   Generic drug:  levonorgestrel     1 each    one placed in uterus        ondansetron 4 MG tablet    ZOFRAN    30 tablet    Take 1 tablet (4 mg) by mouth every 12 hours as needed for nausea    Drug-induced nausea and vomiting       traZODone 100 MG tablet    DESYREL    180 tablet    Take 2 tablets (200 mg) by mouth At Bedtime    Insomnia       varenicline 1 MG tablet    CHANTIX    56 tablet    Take 1 tablet (1 mg) by mouth 2 times daily    Tobacco use disorder       venlafaxine 75 MG 24 hr capsule    EFFEXOR-XR    270 capsule    Take 3 capsules (225 mg) by mouth daily    Major depressive disorder, recurrent episode, moderate (H)

## 2017-12-18 NOTE — TELEPHONE ENCOUNTER
Routing to provider - Bill - please review and advise as appropriate  1. Patient declines Genesight Testing    Thank you,  Yamilex Villegas RN

## 2017-12-19 RX ORDER — VENLAFAXINE HYDROCHLORIDE 75 MG/1
225 CAPSULE, EXTENDED RELEASE ORAL DAILY
Qty: 270 CAPSULE | Refills: 1 | Status: SHIPPED | OUTPATIENT
Start: 2017-12-19 | End: 2018-03-12

## 2017-12-19 NOTE — H&P (VIEW-ONLY)
HPI  47-year-old presents today for preoperative medical evaluation prior to removal of bone spur from her right great toe.  This is been symptomatic and aggravated by walking and weightbearing.  Nevertheless she still walks 30 minutes a day to the bus and back.  Her health is otherwise been good.  She has no exertional chest pain dyspnea dizziness or other complaints.  She can climb stairs walk a mile without difficulty.  She has had previous surgery and no history of anesthetic or bleeding complications.  There is no family history of anesthetic or bleeding complications.  She is smoking half a pack per day or less she previously had done well with Chantix and was encouraged to resume this and again stop smoking.  She has not been monitoring her blood pressure at all and she has not had any past history of high blood pressure.  Her diet is reasonable with several servings of fruits and vegetables daily.  She has had no problems on her current medications and has stopped the Celebrex prior to the planned surgery.    Past Medical History:   Diagnosis Date     Acne      Anxiety state, unspecified     Anxiety     Chronic low back pain 5/11/2010     Depression     Dr. Gaytan     Diaphragmatic hernia without mention of obstruction or gangrene      Esophageal reflux     with associated esophageal spasm     ETOH abuse      Herpes simplex without mention of complication      Moderate major depression (H) 4/15/2011     Rectal pain 5/16/2011     Right hip pain 5/11/2010     Seasonal affective disorder (H)      Surveillance of previously prescribed intrauterine contraceptive device 10/03/05    mirena IUD     Past Surgical History:   Procedure Laterality Date     C NONSPECIFIC PROCEDURE  1992    CRYO SURGERY for abnl paps     C STOMACH SURGERY PROCEDURE UNLISTED       SURGICAL HISTORY OF -   4/04    Lapr Nissen fundoplication     TONSILLECTOMY & ADENOIDECTOMY  1985     Family History   Problem Relation Age of Onset      MIKEL Maternal Grandmother      Hypertension Maternal Grandmother      Alcohol/Drug Maternal Grandmother      Depression Maternal Grandmother      CEREBROVASCULAR DISEASE Paternal Grandfather      Alcohol/Drug Paternal Grandfather      Breast Cancer Paternal Grandmother      Depression Paternal Grandmother      Alcohol/Drug Father      Depression Father      GASTROINTESTINAL DISEASE Father      GERD     Alcohol/Drug Maternal Grandfather      Depression Maternal Grandfather      Depression Mother      Obesity Mother      Anxiety Disorder Mother      DIABETES Mother      Breast Cancer Maternal Aunt      Alcohol/Drug Sister      Alcohol/Drug Brother      Alcohol/Drug Brother      Alcohol/Drug Brother      Depression Sister      Depression Brother      Depression Brother      Depression Brother      GASTROINTESTINAL DISEASE Brother      Hiatal hernia     Cancer - colorectal No family hx of      Prostate Cancer No family hx of      Social History     Social History     Marital status:      Spouse name: Esteban     Number of children: 3     Years of education: 13     Occupational History     Medical Assistant 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        Social Documentation:        Balanced Diet: YES    Calcium intake: more than 2 per day    Caffeine: 6 cups per day    Exercise:  type of activity 0    Sunscreen: Yes    Seatbelts:  Yes    Self Breast Exam:  No     Self Testicular Exam: No - n/a    Physical/Emotional/Sexual Abuse: No      Do you feel safe in your environment? Yes        Cholesterol screen up to date: No - Pt not fasting today.    CHOL      167   11/06/2002    HDL       42   11/06/2002    LDL       99   11/06/2002    TRIG      131   11/06/2002    CHOLHDLRATIO        4   11/06/2002        Eye Exam up to date: Yes    Dental Exam up to date: Yes    Pap smear up to date: Yes    Mammogram up to date: Does Not Apply    Dexa Scan up to date: Does Not Apply    Colonoscopy up to date: Does Not Apply    Immunizations up to date: Yes-Td 2002    Glucose screen if over 40:  No - n/a         Answers for HPI/ROS submitted by the patient on 12/16/2017   General Symptoms: No  Skin Symptoms: No  HENT Symptoms: No  EYE SYMPTOMS: No  HEART SYMPTOMS: No  LUNG SYMPTOMS: No  INTESTINAL SYMPTOMS: No  URINARY SYMPTOMS: No  GYNECOLOGIC SYMPTOMS: No  BREAST SYMPTOMS: No  SKELETAL SYMPTOMS: No  BLOOD SYMPTOMS: No  NERVOUS SYSTEM SYMPTOMS: No  MENTAL HEALTH SYMPTOMS: No    Exam:  /86  Resp 16  Wt 103.9 kg (229 lb 1.6 oz)  BMI 32.87 kg/m2  229 lbs 1.6 oz  Physical Exam   The patient is alert, oriented with a clear sensorium.   Skin shows no lesions or rashes and good turgor.   Head is normocephalic and atraumatic.   Eyes show PERRLA with benign optic fundi.   Ears show normal TMs bilaterally.   Mouth shows clear oral mucosa.   Neck shows no nodes, thyromegaly or bruits.   Back is non tender.  Lungs are clear to percussion and auscultation.   Heart shows normal S1 and S2 without murmur or gallop.   Abdomen is obese, soft and nontender without masses or organomegaly.   Extremities show no edema and no evidence of active synovitis.   Neurologic examination shows cranial nerves II-XII intact. Motor shows 5/5 strength. Reflexes are symmetric.     Recent labs reviewed and show no need to recheck or repeat CBC    ASSESSMENT  1 symptomatic bone spur right great toe  2 borderline hypertension  3 history of depression and anxiety in remission  4 GERD status post  Nissen fundoplication  5 smoker    Plan  Patient is stable from a medical and a cardiovascular standpoint does not require any additional imaging or investigation prior to her planned low risk surgery.  She would be low risk for cardiovascular complications related to this procedure.  She was encouraged to resume the Chantix and quit smoking.  We did discuss nonpharmacologic blood pressure control measures including salt restriction increasing the plant content of the diet and exercise.  She will monitor her blood pressure several times a month.  Goal is to have <130/80.  She will follow-up if the blood pressure remains elevated.  She will hold her medications the morning of surgery and take them normally the day before.  She is already off the Celebrex.    This note was completed using Dragon voice recognition software.  Although reviewed after completion, some word and grammatical errors may occur.    Esteban Banda MD  General Internal Medicine  Primary Care Center  [phone]

## 2017-12-19 NOTE — TELEPHONE ENCOUNTER
Prescription approved per [facility] Refill Protocol.    Anna Jackson, BSN RN  [facility]

## 2017-12-20 ENCOUNTER — OFFICE VISIT (OUTPATIENT)
Dept: PODIATRY | Facility: CLINIC | Age: 47
End: 2017-12-20
Payer: COMMERCIAL

## 2017-12-20 VITALS
BODY MASS INDEX: 32.8 KG/M2 | SYSTOLIC BLOOD PRESSURE: 127 MMHG | HEIGHT: 70 IN | WEIGHT: 229.1 LBS | DIASTOLIC BLOOD PRESSURE: 76 MMHG

## 2017-12-20 DIAGNOSIS — M20.5X1 HALLUX LIMITUS OF RIGHT FOOT: Primary | ICD-10-CM

## 2017-12-20 PROCEDURE — 99214 OFFICE O/P EST MOD 30 MIN: CPT | Performed by: PODIATRIST

## 2017-12-20 NOTE — PROGRESS NOTES
"PATIENT HISTORY:  Sharmin Nieves is a 47 year old female who presents to clinic for right hallux limitus, surgery discussion.  She is scheduled for 12/21 for cheilectomy.  Pain present for 6-9 months.  Pt reports right dorsal 1st MP joint pain with walking.  0-9/10.  She has tried different shoes.  Not helping.  Denies fevers, chills, injury.  She limps at times.  Smoker.  Works as a medical asst.  Nondiabetic. Denies related family hx.     EXAM:Vitals: /76 (BP Location: Right arm, Patient Position: Chair, Cuff Size: Adult Large)  Ht 5' 10\" (1.778 m)  Wt 229 lb 1.6 oz (103.9 kg)  BMI 32.87 kg/m2  BMI= Body mass index is 32.87 kg/(m^2).    General appearance: Patient is alert and fully cooperative with history & exam.  No sign of distress is noted during the visit.     Dermatologic: Skin is intact to right foot without significant lesions, rash or abrasion.  No paronychia or evidence of soft tissue infection is noted.      Vascular: DP & PT pulses are intact & regular on the right. No significant edema or varicosities noted.  CFT and skin temperature are normal.      Neurologic: Lower extremity sensation is intact to light touch.  No evidence of weakness or contracture in the lower extremities.  No evidence of neuropathy.      Musculoskeletal:  R 1st MPJ ROM limited with palpable bone spurring, pain at end dorsiflexion. Patient is ambulatory without assistive device or brace.  No gross ankle deformity noted.  No foot or ankle joint effusion is noted.     XRs of right foot reviewed with pt.  1st MPJ spurring with narrowed joint space.      ASSESSMENT:   R hallux limitus      PLAN:  Reviewed patient's chart in epic.  Discussed condition and treatment options including pros and cons.     Surgical and non-surgical treatment options for hallux limitus were discussed.  This includes my philosophy about different procedures including cheilectomy and fusion.  I explained how fusion is for late stage treatment of " advanced arthritis.  There is no certainty that the non-fusion procedures will improve joint pain that is caused by arthritis.  I explained that hallux limitus is oftentimes surgically treated in a staged manner.  This means that additional surgery may be necessary over time.  Non-operative treatments like stiff soles, orthotics, injection and NSAIDs were discussed.  Pt has NSAID allergy but can tolerate celebrex.  Shoes that provide extra room for the enlarged joint should be helpful.  I would anticipate somewhat short term benefit from joint injection.    Risks and potential complications of treatment were discussed including side effects from joint injection, limited benefit from orthotics due to the enlarged joint and the progressive nature of this condition.  Risks from surgery include but are not limited to ongoing pain, permanent joint stiffness, need for repeat operations, infection, non-union, numbness, neuritis, blood clot, hardware complications, wound healing problems.      Patient is aware that surgery is elective, can be avoided if desired.  The recovery process was discussed including impact to work, walking, shoes and daily activities.  I would anticipate up to 12 months for maximum recovery after surgery. Likely surgical procedures based on exam and xray findings include right foot 1st MPJ cheilectomy.    Reviewed the following:    Procedure:  Right 1st MPJ cheilectomy  Pain medication:  Norco - discussed pt's past dependency on pain medication.  Pt states this was after a stomach surgery.  This was about 10 years ago.  Pt comfortable with taking narcotic pain medication post operatively, and understands this should be for a limited period.  She understands risks of dependency.  Encouraged transition to extra strength tylenol when able.  May also consider celebrex as an alternative.  Will plan to limit to 1 refill.  Also discussed plan with PCP.  DVT prophylaxis:  ROM  Ambulation: WBAT in post op  shoe  Follow up:  10-12 days post op       Mo Edgar, ARIANA, FACFAS

## 2017-12-20 NOTE — PROGRESS NOTES
Weight management plan: Patient was referred to their PCP to discuss a diet and exercise plan.     KHOA Urena MA December 20, 2017 7:03 AM

## 2017-12-20 NOTE — MR AVS SNAPSHOT
After Visit Summary   12/20/2017    Sharmin Nieves    MRN: [id number]           Patient Information     Date Of Birth          1970        Visit Information        Provider Department      12/20/2017 7:00 AM Mo Vaughn DPM [facility]        Today's Diagnoses     Hallux limitus of right foot    -  1      Care Instructions        DR. VAUGHN'S CLINIC LOCATIONS     MONDAY  [place] TUESDAY  [place]   [address] [facility]   [address]   [place] [address] [place] [address]   [phone]  [phone]  [phone]       WEDNESDAY  [place] SCHEDULE SURGERY: [phone]   [address] [place] [address] APPOINTMENTS: [phone]   [place] [address] BILLING QUESTIONS: [phone]   [phone]       SURGERY INFORMATION    Thank you for choosing [facility]. If you have any questions or concerns, please call the clinic. The following information will help guide you before and after your surgery.    1 to 3 weeks before surgery:    1.  See your Family Doctor or Primary Care Doctor for a History and Physical. If you do not, we may need to change the date of your surgery.   2.  Please see presurgical medications below regarding which medications need to be stopped before surgery and when.    If you are having Same Day Surgery:  1.  You will need a family member or friend to drive you home. If you do not have one the surgery will be cancelled/rescheduled.   2.  You will need a responsible adult to stay with you that night after the surgery. We will ask this person to listen to some instructions before you leave the hospital.    What to do the day before surgery:  1.  DO NOT EAT OR DRINK ANYTHING AFTER MIDNIGHT THE NIGHT BEFORE YOUR SURGERY.  IF YOU DO SO, YOUR SURGERY WILL BE CANCELLED.  2.  Do not drink alcohol or smoke.  3.  Do not take over the counter drugs.  4.  Some people need to have  blood tests at the hospital. If you need blood tests, you will be told in advance.  5.  Take medications as directed by your doctor. You may take these with a small sip of water.  6.  Do not chew gum, chew tobacco, or suck on hard candy the day of surgery.  7.  Bring your insurance cards, a list of your medicines and co-pays you might need.  Leave jewelry and other valuables at home.  8.  If you received papers at your doctor's office, bring these with you to the surgery.    Plan on arriving 1 hour prior to your surgery time. A nurse from the Surgery department should call you 1-2 days prior to the procedure to confirm final details, including arrival time and location.        PRE-SURGICAL MEDICATIONS    Certain prescription, over-the-counter, and herbal medications interfere with healing after an operation. The main concern relates to medications that increase bleeding at the surgical site. Excess blood under the skin results in poor wound healing, excess pain, increased scarring, and a higher risk for infection. Some medications slow the healing process of bone. Medications can also interfere with the anesthesia drugs that keep you asleep during an operation. It is important to ensure that these medications are out of your system prior to the operation. The list below details primary medications that are of concern.  Pay special attention to how long to avoid these medications before your operation. Please note that this list is not complete. You should ask your surgeon or pharmacist if you are uncertain. Any herbal supplement not listed should be discontinued at least one week prior to surgery.    Aspirin: Hold for one week prior to surgery and restart the day after surgery. This over-the-counter medication promotes bleeding.    Motrin/Ibuprofen/Aleve/Advil/NSAIDs:  Stop one week prior to surgery. These medications affect bleeding and may delay the healing of bone. Avoid taking these medications for six weeks  after bone surgeries. Other procedures may allow you to restart one day after surgery.    Coumadin / Plavix:  Your primary care provider will manage Coumadin in relation to surgery.  Coumadin may result in excessive bleeding and may be adjusted before and after surgery.    Enbrel:  Stop two weeks prior to surgery and restart two weeks after surgery.  This medication can effect soft tissue healing and increases the risk of infection.    Remicade:  Stop 8-12 weeks before surgery and restart two weeks after surgery.  This medication can affect soft tissue healing and increases the risk of infection.    Humira:  Stop 4 weeks before surgery and restart two weeks after surgery.  This medication can affect soft tissue healing and increases the risk of infection.    Methotrexate:  Stop one dose prior to surgery.  This medication will be restarted when the wound appears to be healing well.  Please ask your surgeon about restarting this medication when you are being seen in the office for wound checks.    Kava:  Stop at least one day prior to surgery and may restart one day after surgery.  Kava may increase the sedative effect of anesthetics that are given during the operation.  Kava can also increase bleeding at the surgical site.    Ephedra:  Stop at least one day prior to surgery and may restart one day after surgery.  Ephedra may increase the risk of heart attack and stroke.  This medication can also increase bleeding at the surgical site.    Jesus's Wort:  Stop at least five days before surgery and may restart one day after surgery.  Jesus's wort may diminish the effects of several drugs that are given during surgery.    Ginseng:  Stop at least one week prior to surgery and may restart one day after surgery.  Ginseng lowers blood sugar and may increase bleeding at the surgical site.    Ginkgo:  Stop 36 hours before surgery and may restart one day after surgery.  Ginkgo may increase bleeding at the surgical  site.    Garlic:  Stop at least one week prior to surgery and may restart one day after.  Garlic may increase bleeding at the surgery site.    Valerian:  Perform a slow and steady decrease in your daily dose over a period of 2-3 weeks before surgery to decrease the chance of withdrawal symptoms.  Valerian may increase the sedative effect of anesthetics given during the operation.    Echinacea:  There is no data on stopping echinacea prior to surgery.  This medication though can be associated with allergic reactions and suppression of your immune system.    Vitamin E, Omega-3, Flax, Fish Oil, Glucosamine and Chondroitin:  Stop 2 weeks prior to surgery and may restart one day after.  This herbal medication can increase risk of bleeding at the surgical site.       WASHING YOUR SKIN BEFORE SURGERY    It s important to prepare your skin for surgery to reduce the risk of surgical wound infection. Please take a shower the night before and the morning of your surgery.    Follow these steps during your showers:    The night before your surgery:  Shower as you normally do with liquid antiseptic bath soap or a NEW bar of soap.  Wash from your hip to your toes on the surgical leg.  Gently lather the area where you will have surgery for five minutes.  You may wash your hair with your usual shampoo.  Rinse off all soap.  Dry with a newly laundered towel.  Wash your surgical foot/ankle again with Hibiclens if provided.  Wear newly laundered night clothes and sleep in newly laundered bedding.    The morning of surgery:  Take another shower following the steps above, including the use of the Hibiclens.  Do not apply lotions, deodorant, creams, makeup, hair products, or nail polish.  Wear newly washed clothing.        SURGICAL DRESSING    Your surgical dressing is a sterile dressing and should be left in place until removed by medical staff. Keep the dressing dry by covering it with a plastic bag for showers, taking baths with the  surgical foot out of the tub, or by sponge bathing. The preference would be tub or sponge bathing.  Do not attempt a shower until at least 3 days post op.  Some bleeding on the dressing is not unusual. If you notice active or excessive bleeding, abnormal drainage, a foul odor, or a temperature over 100 degrees by mouth, call the clinic. Do not change the dressing by yourself.  If the dressing becomes wet or dirty, please call the clinic as you may need a new sterile dressing applied. You may start getting the foot wet after the stitches are removed.   Do not wear regular shoes with a surgical bandage and/or external pins in your foot. Wear loose fitting clothing that easily will slip over the bandage and/or pins. Do not cover your surgical foot with blankets as they may damage the dressing/pins. Also, remember that pets and small children are not aware of your surgery. Please keep them away from the bandage/pins.   If your surgeon places external pins in your foot, you must keep the foot dry until the pins are removed at 6-8 weeks after the surgery. Pins should be covered with a dressing for protection. You should examine the pins and your skin often. Check for any spreading redness or yellow drainage from the pin areas. Do not apply ointment around the pins. Do not push a loose pin back into your foot. Please call the clinic if the pin is spinning or moving in and out. If the pins are bumped or loosened they may need to be removed early. This may affect your surgical outcome.   Please call the clinic if you feel there is a problem with your pins and/or surgical bandage.      TIPS FOR SUCCESSFUL HEALING  To reiterate some previous information, how you care for the surgical site is critically important to achieve a successful result after surgery. Avoidance of injury, infection, excess swelling, scar tissue and stiffness are highly dependent on the care you provide over the next six weeks. Please do not hesitate to  call if you have questions or concerns.   Your foot requires significant rest and elevation. Sitting for long hours with your foot elevated, however, will create its own problems. Expect muscle aches, back pain, cramps, etc. Optimal posture, lumbar support, back exercises, ice and heat may all help with your new aches and pains. Do not apply a heating pad to your foot or leg as this can cause increase swelling and pain. Rather use ice on those areas.   Pain medications cause drowsiness. You may frequently sleep during the day and then have trouble sleeping at night. Over the counter sleep aids might be more effective than narcotic pain medication to achieve a reasonable night's sleep.  Pain medications and inactivity lead to constipation. Limiting use of narcotics will help minimize this problem. The pain medications will not completely alleviate your pain. The purpose of pain medication is to take the edge off and help you get through the first few days. You can substitute Extra Strength Tylenol if pain is mild. Please note that narcotic pain pills usually contain acetaminophen (the active ingredient in Tylenol) so be careful to avoid the maximum dose of acetaminophen. You should take measures to avoid constipation by drinking plenty of water, eating lots of fruits and vegetables and taking the recommended dose of Metamucil or a similar fiber supplement. These measures should be continued for as long as you require narcotic pain medications and are inactive.     Showering is a major challenge. Your incision requires about three days to become sealed from water. Your bandage should not get wet and should not be removed. Do not attempt showering for the first three days. A sponge bath is preferred. You may attempt to shower on the fourth day after the operation. Your foot should be covered with a bag, tape and rubber bands. Double bagging is preferred. Standing in the shower with a bag on your foot is quite  hazardous. A portable shower stool would be ideal. The bandage will need to be changed in the office if it becomes moistened. A moist bandage will not dry on its own. A moist dressing may lead to infection. Pins need continued protection. These pins are strong but do not resist the forces of bumping. Pay attention to the position and direction of the pins. Any change in pin alignment or positioning should prompt a call to the clinic. A loose pin will need to be removed. Never push a pin back into your foot.  Pins should be kept dry without ointment or moisture applied. The pins should be kept covered at all times. Protection from your clothing and blankets is required.   Stiffness will develop after any operation due to scarring. The scar tissue begins to form immediately after the surgery. Inactivity can cause excess stiffness and may lead to blood clots in your legs. Frequent range of motion exercises will help decrease stiffness, blood clots, scar tissue and adhesions. Please call if you are unsure about these recommendations.   Healing is slow but is an important step in your recovery. You are in control of the final result. Please use this time wisely. Please do not hesitate to call if you have questions, concerns or comments.      POTENTIAL COMPLICATIONS OF FOOT & ANKLE SURGERY    Undergoing a surgical procedure involves a certain amount of risk.  Risk of complications vary depending on the complexity of the surgery and how you take care of the surgical area during the healing process.  Complications can range from minor infection to death.  Some complications are temporary while others will be permanent.  Your surgeon weighs the risk of complications vs the potential benefit of undergoing surgery.  You need to consider your tolerance for unexpected problems as you decide whether to undergo surgery.    Foot and ankle surgery involves cutting skin, bone, ligaments, blood vessels and joints.  These structures  heal well but not without consequence.  Any break to the skin can lead to infection.  A deep infection involves bone or joint which can be devastating.  Deep infection can lead to amputation or could spread to other parts of your body.  Most infections are minor and easily treated with oral antibiotics.  Infections are often times from bacteria already present on your skin.  Proper care of the surgical site is an essential component of avoiding infection.  Do not get the bandage wet and take proper care of external pins to avoid these problems.     Joint stiffness is inherent to any foot or ankle surgery.  Joint surgery is a major component of reconstructive foot and ankle procedures.  The ligaments and tissues around the joint are cut, then later repaired.  Scar tissue limits joint mobility.  This can be permanent but generally improves over the course of one year.    Surgery involves dissection around nerves.  Visible nerves are moved out of the way while very small nerves are cut.  Scar tissue develops around nerves and can lead to nerve pain, numbness, or neuromas.  Nerve symptoms can be permanent.  This can lead to numbness or sometimes hypersensitivity to touch and problems wearing shoes.    Bones do not always heal after surgery.  Poor healing after a bone cut or joint fusion can lead to an extended period of casting or repeat surgery.  Electronic bone stimulators are sometimes used to stimulate poor healing of bone.  Nonunion is when joint fusion does not take.  This can occur as often as 10% of the time.  Smoking doubles your risk of poor bone healing to 20%.    Bone grafting is sometimes necessary during the original or subsequent surgery.  Bone is sometimes taken from other parts of your body or freeze dried bone from a bone bank or synthetic bone material might be used.    A scar is always present after foot and ankle surgery.  The scar will be visible and could be sensitive.   Some people develop  excessive scarring, which cannot be controlled by the surgeon.  Scars can be unsightly and can restrict joint mobility.    Blood clots can develop in the calf after surgery.  Foot and ankle surgery is a predisposing factor for blood clots.  The blood clot could break and travel to your lung, leading to a pulmonary embolism.  This condition can lead to death. Early warning signs could include calf swelling and pain, chest pain or shortness of breath.  This is an emergency that requires immediate attention by a medical doctor!  Clotting can occur without warning or symptoms.  Range of motion exercises, if allowed after your surgery, can help prevent clots.  High risk patients may need anticoagulation medication.    Surgery will not necessarily create a pain-free foot.  Even normal feet hurt.  Crooked toes, bunions, neuromas, flat feet and arthritis should all be considered permanent conditions.  Ankle pain commonly requires multiple surgeries over a lifetime.  Do not assume that having surgery will permanently fix your condition.  You may need permanent alteration in shoes and activities to accommodate your foot and ankle problem.    Careful attention to post-operative recommendations will dramatically reduce your risk of complications.  Proper dressing, wound care, elevation and rest will be essential to get the wound healed and minimize scarring.  Strict attention to activity restrictions, such as non-weight bearing, or partial weight bearing is essential.  Internal fixation devices may not resist the stress of walking.  Some select surgeries allow the patient to walk, however this should be very minimal.    Despite these concerns, foot and ankle surgery leads to a high level of patient satisfaction.  Your surgeon would not recommend surgery if he/she did not expect your foot to improve.  Talk to your surgeon about any of the above issues.    If you have questions or concerns after surgery, please call:  "Podiatry/Foot & Ankle Surgery Triage Team at the [facility] at [phone] (option 2 > option 3 for triage).   If it is after 4:30PM you can reach a [facility] Nurse Advisor at [phone].   They can then page the podiatrist on call, if needed. There is a [facility] podiatrist on call 24/7.      DEGENERATIVE ARTHRITIS OF THE BIG TOE JOINT (hallux limitus/hallux rigidus)   Arthritis of the joint at the base of the big toe (metatarsophalangeal joint) has several causes. Usually it results from repetitive trauma to the joint, secondary to abnormal foot mechanics. Often it is hereditary. However, a one-time traumatic event can lead to arthritis. The condition can worsen with time. The cartilage wears out, joint surfaces are no longer smooth, bone rubs on bone, inflammation occurs with pain, and eventually bone spurs and loose fragments might develop.   The joint is often painful with activity, worse with flimsy shoes or walking barefoot, and it slowly progresses over time. A person might notice the toe \"locking up\" with walking. There often is an obvious, and irritating, bony bump on top of the foot. Shoes might be uncomfortable. In some people the pain is so bothersome that recreational activities sometimes even normal daily activities are difficult to perform.   The pain from this arthritis is likely a combination of joint jamming, cartilage loss and inflammation, and irritation from shoes rubbing on the bump. Sometimes other parts of the foot, leg, or back hurt from altering one's walk to compensate for the painful joint.     Ways to help a person live with the discomfort include wearing a good, supportive shoe with a rigid, rocker-type bottom. An example is a hiking boot. A rigid sole minimizes bending of the joint, and therefore, joint motion and pain. Shoes with a high toe box allow for less rubbing on the bump. Avoiding barefoot walking, sandals, flip-flops and slippers usually helps. "     Sometimes an insert or orthotic provides symptom relief. This might make shoe fit more difficult. Pads over the bump and occasionally injections into the joint provide relief.     Surgery for this condition is aimed towards alleviating pain. It does not cure the arthritis nor does it guarantee better joint motion. Depending on the condition of the metatarsophalangeal joint, there are several surgical options:    1.  Cutting off the bony bump(s) and cleaning the joint    2.  Loosening the joint up by making cuts in the first metatarsal bone or the big toe bone and removing a small section of bone.    3.  Repositioning bone to minimize jamming of the joint.    4.  In severe cases, the joint is fused. By fusing the joint, it will never bend again. This resolves the pain, because it's the movement of a worn out joint that causes pain. Oftentimes the operation involves a combination of these procedures and requires the use of screws, pins, and/or a small surgical plate.     Healing after surgery requires about six weeks of protection. This allows the bone to heal. Maximum recovery takes about one year. The scar tissue and joint structures require this amount of time to finish the healing process. Expect stiffness, swelling and numbness during that time frame.   Surgery for arthritis of the metatarsophalangeal joint does involve side effects. Some side effects are predictable and others are less common but do occur. A scar will be visible and could be irritated by shoes. The shoe may rub on the screw or internal pin requiring surgical removal of these fixation devices. The screw and pin would likely be left in place for a full year. The first toe may remain stiff after surgery. The amount of stiffness is variable. Most people never regain normal motion of the first toe. This is due to scar tissue inherent to any surgery, in addition to the cumulative effects of arthritis. Sometimes the big toe drifts to one side or  the other. Joint fusion is one option to correct an unstable, drifting toe.   All surgical procedures involve risk of infection, numbness, pain, delayed bone healing, osteotomy (bone cut) dislocation, blood clots, continued foot pain, etc. Arthritic joint surgery is quite complex and should not be taken lightly.    Any skin incision can lead to infection. Deep infection might involve the bone and thus repeat surgery and six weeks of IV antibiotics. Scar tissue can cause nerve pain or numbness. This is generally temporary but can be permanent. We do not have treatments that cure nerve problems. Second toe pain could be related to altered mechanics and pressure transferred to the second toe. Delayed bone healing would lengthen the healing time. Some bones simply do not heal. This requires repeat surgery, electronic bone stimulation and/or extended protection. Smokers have an approximate 20% chance of poor bone healing. This is double that of a non-smoker. The bone cut may displace. This may need to be repaired with a second operation. Displacement can cause joint malalignment. Immobility after surgery can cause a blood clot in the legs and lungs. This could result in death.   Foot pain is complex. Most feet hurt for more than one reason. Operating on the arthritic   big toe joint will not necessarily create a pain free foot. Appropriate shoes, healthy body weight, avoidance of bare foot walking and moderation of activity will always be necessary to enjoy foot comfort. Arthritis is incurable even with surgery.     Surgery for this type of arthritis is nevertheless quite successful. Most surgical patients are pleased with their foot following surgery. Many of the issues described above can be controlled by taking proper care of your foot during the healing process.   Cosmetic bump surgery is discouraged for the reasons listed above. A bump and joint that is comfortable when wearing appropriate shoes should simply be  treated with appropriate shoes.   Your surgeon would be happy to fully describe any of the above issues. You should pursue a full understanding of the operation, recovery process and any potential problems that could develop.                 Follow-ups after your visit        Your next 10 appointments already scheduled     Dec 21, 2017   Procedure with Mo Edgar DPM   [facility] (--)    [address], [address]  [place] [address]   [phone]            Jan 03, 2018  8:15 AM CST   [facility]    [address] [facility] [place] [id number] [phone]              Who to contact     If you have questions or need follow up information about today's clinic visit or your schedule please contact [facility] directly at [phone].  Normal or non-critical lab and imaging results will be communicated to you by MyChart, letter or phone within 4 business days after the clinic has received the results. If you do not hear from us within 7 days, please contact the clinic through [employer] or phone. If you have a critical or abnormal lab result, we will notify you by phone as soon as possible.  Submit refill requests through [employer] or call your pharmacy and they will forward the refill request to us. Please allow 3 business days for your refill to be completed.          Additional Information About Your Visit        [employer] Information     [employer] gives you secure access to your electronic health record. If you see a primary care provider, you can also send messages to your care team and make appointments. If you have questions, please call your primary care clinic.  If you do not have a primary care provider, please call [phone] and they will assist you.        Care EveryWhere ID     This is your Care EveryWhere ID. This could be used by other organizations to  "access your [place] medical records  [id number]        Your Vitals Were     Height BMI (Body Mass Index)                5' 10\" (1.778 m) 32.87 kg/m2           Blood Pressure from Last 3 Encounters:   12/20/17 127/76   12/18/17 138/86   12/06/17 (!) 150/102    Weight from Last 3 Encounters:   12/20/17 229 lb 1.6 oz (103.9 kg)   12/18/17 229 lb 1.6 oz (103.9 kg)   10/04/17 220 lb 9.6 oz (100.1 kg)              Today, you had the following     No orders found for display       Primary Care Provider Office Phone # Fax #    Randa JOSEPH Landy, LEDY [facility] [phone]       [facility] [address] [facility] [address]  [facility] [address]        Equal Access to Services     JOCELYNE GOMEZ : Hadii geovanny ku hadasho Soomaali, waaxda luqadaha, qaybta kaalmada adeegyada, gabe grimes haysamy rainey . So [facility] [phone].    ATENCIÓN: Si habla español, tiene a mitchell disposición servicios gratuitos de asistencia lingüística. Catracho al [phone].    We comply with applicable federal civil rights laws and [place] laws. We do not discriminate on the basis of race, color, national origin, age, disability, sex, sexual orientation, or gender identity.            Thank you!     Thank you for choosing [facility]  for your care. Our goal is always to provide you with excellent care. Hearing back from our patients is one way we can continue to improve our services. Please take a few minutes to complete the written survey that you may receive in the mail after your visit with us. Thank you!             Your Updated Medication List - Protect others around you: Learn how to safely use, store and throw away your medicines at www.disposemymeds.org.          This list is accurate as of: 12/20/17  7:18 AM.  Always use your most recent med list.                   Brand Name Dispense Instructions for use Diagnosis    celecoxib 100 MG capsule    celeBREX    60 capsule    Take 1 capsule (100 mg) by mouth 2 times " daily as needed for moderate pain    Pain       Daily Multi vitamin  /Minerals Tabs     30    1 TABLET DAILY        diltiazem 2% in PLO cream (FV COMPOUNDED) 2% Gel     60 g    To anal opening three times daily.  Use a pea-sized amount.  Store at room temperature.    Anal pain, Anal fissure       gabapentin 600 MG tablet    NEURONTIN    90 tablet    Take 1 tablet (600 mg) by mouth At Bedtime    Restless leg syndrome       MAGNESIUM OXIDE PO      Take 250 mg by mouth        MIRENA (52 MG) 20 MCG/24HR IUD   Generic drug:  levonorgestrel     1 each    one placed in uterus        ondansetron 4 MG tablet    ZOFRAN    30 tablet    Take 1 tablet (4 mg) by mouth every 12 hours as needed for nausea    Drug-induced nausea and vomiting       traZODone 100 MG tablet    DESYREL    180 tablet    Take 2 tablets (200 mg) by mouth At Bedtime    Insomnia       varenicline 1 MG tablet    CHANTIX    56 tablet    Take 1 tablet (1 mg) by mouth 2 times daily    Tobacco use disorder       venlafaxine 75 MG 24 hr capsule    EFFEXOR-XR    270 capsule    Take 3 capsules (225 mg) by mouth daily    Major depressive disorder, recurrent episode, moderate (H)

## 2017-12-20 NOTE — PROGRESS NOTES
Pre-operative Questionnaire:    1) Do you smoke? Yes    2) Have you ever had a blood clot in your legs or lungs? No    3) Any family history of blood clots or bleeding disorders? No    4) Do you have an allergy or intolerance to any pain medication? Nsaids    5) Do you have a history of any dependency on pain medications? Yes, 2008    6) Does another doctor treat you for chronic pain or prescribe pain medication? No    7) Do you have help at home (family/friends) to assist after surgery? yes    8) Have you ever had complications after surgery? Yes     9) Do you have problems with balance? no    10) Any allergy to metal or jewelry? no    11) Any allergy to suture material? No    12) How much time off from work are you allowed or planning for? Hoping to go back on 1/3    13) Are you on oral birth control or hormone therapy? No- has IUD     KHOA Urena MA December 20, 2017 7:03 AM

## 2017-12-20 NOTE — NURSING NOTE
"Chief Complaint   Patient presents with     Consult     Surg consult DOS: 12/21/17 R foot cheilectomy        Initial /76 (BP Location: Right arm, Patient Position: Chair, Cuff Size: Adult Large)  Ht 5' 10\" (1.778 m)  Wt 229 lb 1.6 oz (103.9 kg)  BMI 32.87 kg/m2 Estimated body mass index is 32.87 kg/(m^2) as calculated from the following:    Height as of this encounter: 5' 10\" (1.778 m).    Weight as of this encounter: 229 lb 1.6 oz (103.9 kg).  Medication Reconciliation: kris Urena MA December 20, 2017 7:00 AM  "

## 2017-12-20 NOTE — PATIENT INSTRUCTIONS
DR[facility] LOCATIONS     MONDAY  [place] TUESDAY  [place]   [address] [facility]   [address]   [place] [address] [place] [id number]   [phone]  [phone]  [phone]       WEDNESDAY  [place] SCHEDULE SURGERY: [phone]   [address] APPOINTMENTS: [phone]   [place] [address] BILLING QUESTIONS: [phone]   [phone]       SURGERY INFORMATION    Thank you for choosing [facility]. If you have any questions or concerns, please call the clinic. The following information will help guide you before and after your surgery.    1 to 3 weeks before surgery:    1.  See your Family Doctor or Primary Care Doctor for a History and Physical. If you do not, we may need to change the date of your surgery.   2.  Please see presurgical medications below regarding which medications need to be stopped before surgery and when.    If you are having Same Day Surgery:  1.  You will need a family member or friend to drive you home. If you do not have one the surgery will be cancelled/rescheduled.   2.  You will need a responsible adult to stay with you that night after the surgery. We will ask this person to listen to some instructions before you leave the hospital.    What to do the day before surgery:  1.  DO NOT EAT OR DRINK ANYTHING AFTER MIDNIGHT THE NIGHT BEFORE YOUR SURGERY.  IF YOU DO SO, YOUR SURGERY WILL BE CANCELLED.  2.  Do not drink alcohol or smoke.  3.  Do not take over the counter drugs.  4.  Some people need to have blood tests at the hospital. If you need blood tests, you will be told in advance.  5.  Take medications as directed by your doctor. You may take these with a small sip of water.  6.  Do not chew gum, chew tobacco, or suck on hard candy the day of surgery.  7.  Bring your insurance cards, a list of your medicines and co-pays you might need.  Leave jewelry and other valuables  at home.  8.  If you received papers at your doctor's office, bring these with you to the surgery.    Plan on arriving 1 hour prior to your surgery time. A nurse from the Surgery department should call you 1-2 days prior to the procedure to confirm final details, including arrival time and location.        PRE-SURGICAL MEDICATIONS    Certain prescription, over-the-counter, and herbal medications interfere with healing after an operation. The main concern relates to medications that increase bleeding at the surgical site. Excess blood under the skin results in poor wound healing, excess pain, increased scarring, and a higher risk for infection. Some medications slow the healing process of bone. Medications can also interfere with the anesthesia drugs that keep you asleep during an operation. It is important to ensure that these medications are out of your system prior to the operation. The list below details primary medications that are of concern.  Pay special attention to how long to avoid these medications before your operation. Please note that this list is not complete. You should ask your surgeon or pharmacist if you are uncertain. Any herbal supplement not listed should be discontinued at least one week prior to surgery.    Aspirin: Hold for one week prior to surgery and restart the day after surgery. This over-the-counter medication promotes bleeding.    Motrin/Ibuprofen/Aleve/Advil/NSAIDs:  Stop one week prior to surgery. These medications affect bleeding and may delay the healing of bone. Avoid taking these medications for six weeks after bone surgeries. Other procedures may allow you to restart one day after surgery.    Coumadin / Plavix:  Your primary care provider will manage Coumadin in relation to surgery.  Coumadin may result in excessive bleeding and may be adjusted before and after surgery.    Enbrel:  Stop two weeks prior to surgery and restart two weeks after surgery.  This medication can effect  soft tissue healing and increases the risk of infection.    Remicade:  Stop 8-12 weeks before surgery and restart two weeks after surgery.  This medication can affect soft tissue healing and increases the risk of infection.    Humira:  Stop 4 weeks before surgery and restart two weeks after surgery.  This medication can affect soft tissue healing and increases the risk of infection.    Methotrexate:  Stop one dose prior to surgery.  This medication will be restarted when the wound appears to be healing well.  Please ask your surgeon about restarting this medication when you are being seen in the office for wound checks.    Kava:  Stop at least one day prior to surgery and may restart one day after surgery.  Kava may increase the sedative effect of anesthetics that are given during the operation.  Kava can also increase bleeding at the surgical site.    Ephedra:  Stop at least one day prior to surgery and may restart one day after surgery.  Ephedra may increase the risk of heart attack and stroke.  This medication can also increase bleeding at the surgical site.    Jesus's Wort:  Stop at least five days before surgery and may restart one day after surgery.  [place]'s wort may diminish the effects of several drugs that are given during surgery.    Ginseng:  Stop at least one week prior to surgery and may restart one day after surgery.  Ginseng lowers blood sugar and may increase bleeding at the surgical site.    Ginkgo:  Stop 36 hours before surgery and may restart one day after surgery.  Ginkgo may increase bleeding at the surgical site.    Garlic:  Stop at least one week prior to surgery and may restart one day after.  Garlic may increase bleeding at the surgery site.    Valerian:  Perform a slow and steady decrease in your daily dose over a period of 2-3 weeks before surgery to decrease the chance of withdrawal symptoms.  Valerian may increase the sedative effect of anesthetics given during the  operation.    Echinacea:  There is no data on stopping echinacea prior to surgery.  This medication though can be associated with allergic reactions and suppression of your immune system.    Vitamin E, Omega-3, Flax, Fish Oil, Glucosamine and Chondroitin:  Stop 2 weeks prior to surgery and may restart one day after.  This herbal medication can increase risk of bleeding at the surgical site.       WASHING YOUR SKIN BEFORE SURGERY    It s important to prepare your skin for surgery to reduce the risk of surgical wound infection. Please take a shower the night before and the morning of your surgery.    Follow these steps during your showers:    The night before your surgery:  Shower as you normally do with liquid antiseptic bath soap or a NEW bar of soap.  Wash from your hip to your toes on the surgical leg.  Gently lather the area where you will have surgery for five minutes.  You may wash your hair with your usual shampoo.  Rinse off all soap.  Dry with a newly laundered towel.  Wash your surgical foot/ankle again with Hibiclens if provided.  Wear newly laundered night clothes and sleep in newly laundered bedding.    The morning of surgery:  Take another shower following the steps above, including the use of the Hibiclens.  Do not apply lotions, deodorant, creams, makeup, hair products, or nail polish.  Wear newly washed clothing.        SURGICAL DRESSING    Your surgical dressing is a sterile dressing and should be left in place until removed by medical staff. Keep the dressing dry by covering it with a plastic bag for showers, taking baths with the surgical foot out of the tub, or by sponge bathing. The preference would be tub or sponge bathing.  Do not attempt a shower until at least 3 days post op.  Some bleeding on the dressing is not unusual. If you notice active or excessive bleeding, abnormal drainage, a foul odor, or a temperature over 100 degrees by mouth, call the clinic. Do not change the dressing by  yourself.  If the dressing becomes wet or dirty, please call the clinic as you may need a new sterile dressing applied. You may start getting the foot wet after the stitches are removed.   Do not wear regular shoes with a surgical bandage and/or external pins in your foot. Wear loose fitting clothing that easily will slip over the bandage and/or pins. Do not cover your surgical foot with blankets as they may damage the dressing/pins. Also, remember that pets and small children are not aware of your surgery. Please keep them away from the bandage/pins.   If your surgeon places external pins in your foot, you must keep the foot dry until the pins are removed at 6-8 weeks after the surgery. Pins should be covered with a dressing for protection. You should examine the pins and your skin often. Check for any spreading redness or yellow drainage from the pin areas. Do not apply ointment around the pins. Do not push a loose pin back into your foot. Please call the clinic if the pin is spinning or moving in and out. If the pins are bumped or loosened they may need to be removed early. This may affect your surgical outcome.   Please call the clinic if you feel there is a problem with your pins and/or surgical bandage.      TIPS FOR SUCCESSFUL HEALING  To reiterate some previous information, how you care for the surgical site is critically important to achieve a successful result after surgery. Avoidance of injury, infection, excess swelling, scar tissue and stiffness are highly dependent on the care you provide over the next six weeks. Please do not hesitate to call if you have questions or concerns.   Your foot requires significant rest and elevation. Sitting for long hours with your foot elevated, however, will create its own problems. Expect muscle aches, back pain, cramps, etc. Optimal posture, lumbar support, back exercises, ice and heat may all help with your new aches and pains. Do not apply a heating pad to your foot  or leg as this can cause increase swelling and pain. Rather use ice on those areas.   Pain medications cause drowsiness. You may frequently sleep during the day and then have trouble sleeping at night. Over the counter sleep aids might be more effective than narcotic pain medication to achieve a reasonable night's sleep.  Pain medications and inactivity lead to constipation. Limiting use of narcotics will help minimize this problem. The pain medications will not completely alleviate your pain. The purpose of pain medication is to take the edge off and help you get through the first few days. You can substitute Extra Strength Tylenol if pain is mild. Please note that narcotic pain pills usually contain acetaminophen (the active ingredient in Tylenol) so be careful to avoid the maximum dose of acetaminophen. You should take measures to avoid constipation by drinking plenty of water, eating lots of fruits and vegetables and taking the recommended dose of Metamucil or a similar fiber supplement. These measures should be continued for as long as you require narcotic pain medications and are inactive.     Showering is a major challenge. Your incision requires about three days to become sealed from water. Your bandage should not get wet and should not be removed. Do not attempt showering for the first three days. A sponge bath is preferred. You may attempt to shower on the fourth day after the operation. Your foot should be covered with a bag, tape and rubber bands. Double bagging is preferred. Standing in the shower with a bag on your foot is quite hazardous. A portable shower stool would be ideal. The bandage will need to be changed in the office if it becomes moistened. A moist bandage will not dry on its own. A moist dressing may lead to infection. Pins need continued protection. These pins are strong but do not resist the forces of bumping. Pay attention to the position and direction of the pins. Any change in pin  alignment or positioning should prompt a call to the clinic. A loose pin will need to be removed. Never push a pin back into your foot.  Pins should be kept dry without ointment or moisture applied. The pins should be kept covered at all times. Protection from your clothing and blankets is required.   Stiffness will develop after any operation due to scarring. The scar tissue begins to form immediately after the surgery. Inactivity can cause excess stiffness and may lead to blood clots in your legs. Frequent range of motion exercises will help decrease stiffness, blood clots, scar tissue and adhesions. Please call if you are unsure about these recommendations.   Healing is slow but is an important step in your recovery. You are in control of the final result. Please use this time wisely. Please do not hesitate to call if you have questions, concerns or comments.      POTENTIAL COMPLICATIONS OF FOOT & ANKLE SURGERY    Undergoing a surgical procedure involves a certain amount of risk.  Risk of complications vary depending on the complexity of the surgery and how you take care of the surgical area during the healing process.  Complications can range from minor infection to death.  Some complications are temporary while others will be permanent.  Your surgeon weighs the risk of complications vs the potential benefit of undergoing surgery.  You need to consider your tolerance for unexpected problems as you decide whether to undergo surgery.    Foot and ankle surgery involves cutting skin, bone, ligaments, blood vessels and joints.  These structures heal well but not without consequence.  Any break to the skin can lead to infection.  A deep infection involves bone or joint which can be devastating.  Deep infection can lead to amputation or could spread to other parts of your body.  Most infections are minor and easily treated with oral antibiotics.  Infections are often times from bacteria already present on your skin.   Proper care of the surgical site is an essential component of avoiding infection.  Do not get the bandage wet and take proper care of external pins to avoid these problems.     Joint stiffness is inherent to any foot or ankle surgery.  Joint surgery is a major component of reconstructive foot and ankle procedures.  The ligaments and tissues around the joint are cut, then later repaired.  Scar tissue limits joint mobility.  This can be permanent but generally improves over the course of one year.    Surgery involves dissection around nerves.  Visible nerves are moved out of the way while very small nerves are cut.  Scar tissue develops around nerves and can lead to nerve pain, numbness, or neuromas.  Nerve symptoms can be permanent.  This can lead to numbness or sometimes hypersensitivity to touch and problems wearing shoes.    Bones do not always heal after surgery.  Poor healing after a bone cut or joint fusion can lead to an extended period of casting or repeat surgery.  Electronic bone stimulators are sometimes used to stimulate poor healing of bone.  Nonunion is when joint fusion does not take.  This can occur as often as 10% of the time.  Smoking doubles your risk of poor bone healing to 20%.    Bone grafting is sometimes necessary during the original or subsequent surgery.  Bone is sometimes taken from other parts of your body or freeze dried bone from a bone bank or synthetic bone material might be used.    A scar is always present after foot and ankle surgery.  The scar will be visible and could be sensitive.   Some people develop excessive scarring, which cannot be controlled by the surgeon.  Scars can be unsightly and can restrict joint mobility.    Blood clots can develop in the calf after surgery.  Foot and ankle surgery is a predisposing factor for blood clots.  The blood clot could break and travel to your lung, leading to a pulmonary embolism.  This condition can lead to death. Early warning signs  could include calf swelling and pain, chest pain or shortness of breath.  This is an emergency that requires immediate attention by a medical doctor!  Clotting can occur without warning or symptoms.  Range of motion exercises, if allowed after your surgery, can help prevent clots.  High risk patients may need anticoagulation medication.    Surgery will not necessarily create a pain-free foot.  Even normal feet hurt.  Crooked toes, bunions, neuromas, flat feet and arthritis should all be considered permanent conditions.  Ankle pain commonly requires multiple surgeries over a lifetime.  Do not assume that having surgery will permanently fix your condition.  You may need permanent alteration in shoes and activities to accommodate your foot and ankle problem.    Careful attention to post-operative recommendations will dramatically reduce your risk of complications.  Proper dressing, wound care, elevation and rest will be essential to get the wound healed and minimize scarring.  Strict attention to activity restrictions, such as non-weight bearing, or partial weight bearing is essential.  Internal fixation devices may not resist the stress of walking.  Some select surgeries allow the patient to walk, however this should be very minimal.    Despite these concerns, foot and ankle surgery leads to a high level of patient satisfaction.  Your surgeon would not recommend surgery if he/she did not expect your foot to improve.  Talk to your surgeon about any of the above issues.    If you have questions or concerns after surgery, please call: Podiatry/Foot & Ankle Surgery Triage Team at the [facility] at [phone] (option 2 > option 3 for triage).   If it is after 4:30PM you can reach a [facility] Nurse Advisor at [phone].   They can then page the podiatrist on call, if needed. There is a [facility] podiatrist on call 24/7.      DEGENERATIVE ARTHRITIS OF THE BIG TOE JOINT (hallux limitus/hallux  "rigidus)   Arthritis of the joint at the base of the big toe (metatarsophalangeal joint) has several causes. Usually it results from repetitive trauma to the joint, secondary to abnormal foot mechanics. Often it is hereditary. However, a one-time traumatic event can lead to arthritis. The condition can worsen with time. The cartilage wears out, joint surfaces are no longer smooth, bone rubs on bone, inflammation occurs with pain, and eventually bone spurs and loose fragments might develop.   The joint is often painful with activity, worse with flimsy shoes or walking barefoot, and it slowly progresses over time. A person might notice the toe \"locking up\" with walking. There often is an obvious, and irritating, bony bump on top of the foot. Shoes might be uncomfortable. In some people the pain is so bothersome that recreational activities sometimes even normal daily activities are difficult to perform.   The pain from this arthritis is likely a combination of joint jamming, cartilage loss and inflammation, and irritation from shoes rubbing on the bump. Sometimes other parts of the foot, leg, or back hurt from altering one's walk to compensate for the painful joint.     Ways to help a person live with the discomfort include wearing a good, supportive shoe with a rigid, rocker-type bottom. An example is a hiking boot. A rigid sole minimizes bending of the joint, and therefore, joint motion and pain. Shoes with a high toe box allow for less rubbing on the bump. Avoiding barefoot walking, sandals, flip-flops and slippers usually helps.     Sometimes an insert or orthotic provides symptom relief. This might make shoe fit more difficult. Pads over the bump and occasionally injections into the joint provide relief.     Surgery for this condition is aimed towards alleviating pain. It does not cure the arthritis nor does it guarantee better joint motion. Depending on the condition of the metatarsophalangeal joint, there are " several surgical options:    1.  Cutting off the bony bump(s) and cleaning the joint    2.  Loosening the joint up by making cuts in the first metatarsal bone or the big toe bone and removing a small section of bone.    3.  Repositioning bone to minimize jamming of the joint.    4.  In severe cases, the joint is fused. By fusing the joint, it will never bend again. This resolves the pain, because it's the movement of a worn out joint that causes pain. Oftentimes the operation involves a combination of these procedures and requires the use of screws, pins, and/or a small surgical plate.     Healing after surgery requires about six weeks of protection. This allows the bone to heal. Maximum recovery takes about one year. The scar tissue and joint structures require this amount of time to finish the healing process. Expect stiffness, swelling and numbness during that time frame.   Surgery for arthritis of the metatarsophalangeal joint does involve side effects. Some side effects are predictable and others are less common but do occur. A scar will be visible and could be irritated by shoes. The shoe may rub on the screw or internal pin requiring surgical removal of these fixation devices. The screw and pin would likely be left in place for a full year. The first toe may remain stiff after surgery. The amount of stiffness is variable. Most people never regain normal motion of the first toe. This is due to scar tissue inherent to any surgery, in addition to the cumulative effects of arthritis. Sometimes the big toe drifts to one side or the other. Joint fusion is one option to correct an unstable, drifting toe.   All surgical procedures involve risk of infection, numbness, pain, delayed bone healing, osteotomy (bone cut) dislocation, blood clots, continued foot pain, etc. Arthritic joint surgery is quite complex and should not be taken lightly.    Any skin incision can lead to infection. Deep infection might involve the  bone and thus repeat surgery and six weeks of IV antibiotics. Scar tissue can cause nerve pain or numbness. This is generally temporary but can be permanent. We do not have treatments that cure nerve problems. Second toe pain could be related to altered mechanics and pressure transferred to the second toe. Delayed bone healing would lengthen the healing time. Some bones simply do not heal. This requires repeat surgery, electronic bone stimulation and/or extended protection. Smokers have an approximate 20% chance of poor bone healing. This is double that of a non-smoker. The bone cut may displace. This may need to be repaired with a second operation. Displacement can cause joint malalignment. Immobility after surgery can cause a blood clot in the legs and lungs. This could result in death.   Foot pain is complex. Most feet hurt for more than one reason. Operating on the arthritic   big toe joint will not necessarily create a pain free foot. Appropriate shoes, healthy body weight, avoidance of bare foot walking and moderation of activity will always be necessary to enjoy foot comfort. Arthritis is incurable even with surgery.     Surgery for this type of arthritis is nevertheless quite successful. Most surgical patients are pleased with their foot following surgery. Many of the issues described above can be controlled by taking proper care of your foot during the healing process.   Cosmetic bump surgery is discouraged for the reasons listed above. A bump and joint that is comfortable when wearing appropriate shoes should simply be treated with appropriate shoes.   Your surgeon would be happy to fully describe any of the above issues. You should pursue a full understanding of the operation, recovery process and any potential problems that could develop.

## 2017-12-20 NOTE — LETTER
"    12/20/2017         RE: Sharmin Nieves  [address]  [place] [address]        Dear Colleague,    Thank you for referring your patient, Sharmin Nieves, to the [facility]. Please see a copy of my visit note below.    Pre-operative Questionnaire:    1) Do you smoke? Yes    2) Have you ever had a blood clot in your legs or lungs? No    3) Any family history of blood clots or bleeding disorders? No    4) Do you have an allergy or intolerance to any pain medication? Nsaids    5) Do you have a history of any dependency on pain medications? Yes, 2008    6) Does another doctor treat you for chronic pain or prescribe pain medication? No    7) Do you have help at home (family/friends) to assist after surgery? yes    8) Have you ever had complications after surgery? Yes     9) Do you have problems with balance? no    10) Any allergy to metal or jewelry? no    11) Any allergy to suture material? No    12) How much time off from work are you allowed or planning for? Hoping to go back on 1/3    13) Are you on oral birth control or hormone therapy? No- has IUD     KHOA Urena MA December 20, 2017 7:03 AM        PATIENT HISTORY:  Sharmin Nieves is a 47 year old female who presents to clinic for right hallux limitus, surgery discussion.  She is scheduled for 12/21 for cheilectomy.  Pain present for 6-9 months.  Pt reports right dorsal 1st MP joint pain with walking.  0-9/10.  She has tried different shoes.  Not helping.  Denies fevers, chills, injury.  She limps at times.  Smoker.  Works as a medical asst.  Nondiabetic. Denies related family hx.     EXAM:Vitals: /76 (BP Location: Right arm, Patient Position: Chair, Cuff Size: Adult Large)  Ht 5' 10\" (1.778 m)  Wt 229 lb 1.6 oz (103.9 kg)  BMI 32.87 kg/m2  BMI= Body mass index is 32.87 kg/(m^2).    General appearance: Patient is alert and fully cooperative with history & exam.  No sign of distress is noted during the visit.     Dermatologic: " Skin is intact to right foot without significant lesions, rash or abrasion.  No paronychia or evidence of soft tissue infection is noted.      Vascular: DP & PT pulses are intact & regular on the right. No significant edema or varicosities noted.  CFT and skin temperature are normal.      Neurologic: Lower extremity sensation is intact to light touch.  No evidence of weakness or contracture in the lower extremities.  No evidence of neuropathy.      Musculoskeletal:  R 1st MPJ ROM limited with palpable bone spurring, pain at end dorsiflexion. Patient is ambulatory without assistive device or brace.  No gross ankle deformity noted.  No foot or ankle joint effusion is noted.     XRs of right foot reviewed with pt.  1st MPJ spurring with narrowed joint space.      ASSESSMENT:   R hallux limitus      PLAN:  Reviewed patient's chart in epic.  Discussed condition and treatment options including pros and cons.     Surgical and non-surgical treatment options for hallux limitus were discussed.  This includes my philosophy about different procedures including cheilectomy and fusion.  I explained how fusion is for late stage treatment of advanced arthritis.  There is no certainty that the non-fusion procedures will improve joint pain that is caused by arthritis.  I explained that hallux limitus is oftentimes surgically treated in a staged manner.  This means that additional surgery may be necessary over time.  Non-operative treatments like stiff soles, orthotics, injection and NSAIDs were discussed.   Pt has NSAID allergy but can tolerate celebrex.  Shoes that provide extra room for the enlarged joint should be helpful.  I would anticipate somewhat short term benefit from joint injection.    Risks and potential complications of treatment were discussed including side effects from joint injection, limited benefit from orthotics due to the enlarged joint and the progressive nature of this condition.  Risks from surgery include  but are not limited to ongoing pain, permanent joint stiffness, need for repeat operations, infection, non-union, numbness, neuritis, blood clot, hardware complications, wound healing problems.      Patient is aware that surgery is elective, can be avoided if desired.  The recovery process was discussed including impact to work, walking, shoes and daily activities.  I would anticipate up to 12 months for maximum recovery after surgery. Likely surgical procedures based on exam and xray findings include right foot 1st MPJ cheilectomy.    Reviewed the following:    Procedure:  Right 1st MPJ cheilectomy  Pain medication:  Norco - discussed pt's past dependency on pain medication.  This was after a stomach surgery.  This was about 10 years ago.  Pt comfortable with taking narcotic pain medication post operatively, and understands this should be for a limited period.  She understands risks of dependency.  Encouraged transition to extra strength tylenol when able.  May also consider celebrex as an alternative.  DVT prophylaxis:  ROM  Ambulation: WBAT in post op shoe  Follow up:  10-12 days post op       Mo Edgar DPM, FACFAS        Weight management plan: Patient was referred to their PCP to discuss a diet and exercise plan.     KHOA Urena MA December 20, 2017 7:03 AM      Again, thank you for allowing me to participate in the care of your patient.        Sincerely,        Mo Edgar DPM

## 2017-12-21 ENCOUNTER — HOSPITAL ENCOUNTER (OUTPATIENT)
Facility: CLINIC | Age: 47
Discharge: HOME | End: 2017-12-21
Attending: PODIATRIST | Admitting: PODIATRIST
Payer: COMMERCIAL

## 2017-12-21 ENCOUNTER — APPOINTMENT (OUTPATIENT)
Dept: GENERAL RADIOLOGY | Facility: CLINIC | Age: 47
End: 2017-12-21
Attending: PODIATRIST
Payer: COMMERCIAL

## 2017-12-21 ENCOUNTER — ANESTHESIA (OUTPATIENT)
Dept: SURGERY | Facility: CLINIC | Age: 47
End: 2017-12-21
Payer: COMMERCIAL

## 2017-12-21 ENCOUNTER — ANESTHESIA EVENT (OUTPATIENT)
Dept: SURGERY | Facility: CLINIC | Age: 47
End: 2017-12-21
Payer: COMMERCIAL

## 2017-12-21 VITALS
TEMPERATURE: 96.4 F | SYSTOLIC BLOOD PRESSURE: 141 MMHG | DIASTOLIC BLOOD PRESSURE: 87 MMHG | BODY MASS INDEX: 31.77 KG/M2 | WEIGHT: 221.9 LBS | RESPIRATION RATE: 16 BRPM | HEIGHT: 70 IN | OXYGEN SATURATION: 97 %

## 2017-12-21 DIAGNOSIS — Z98.890 POST-OPERATIVE STATE: Primary | ICD-10-CM

## 2017-12-21 LAB — HCG SERPL QL: NEGATIVE

## 2017-12-21 PROCEDURE — 37000008 ZZH ANESTHESIA TECHNICAL FEE, 1ST 30 MIN: Performed by: PODIATRIST

## 2017-12-21 PROCEDURE — 27110028 ZZH OR GENERAL SUPPLY NON-STERILE: Performed by: PODIATRIST

## 2017-12-21 PROCEDURE — 71000027 ZZH RECOVERY PHASE 2 EACH 15 MINS: Performed by: PODIATRIST

## 2017-12-21 PROCEDURE — 84703 CHORIONIC GONADOTROPIN ASSAY: CPT | Performed by: ANESTHESIOLOGY

## 2017-12-21 PROCEDURE — 71000012 ZZH RECOVERY PHASE 1 LEVEL 1 FIRST HR: Performed by: PODIATRIST

## 2017-12-21 PROCEDURE — 37000009 ZZH ANESTHESIA TECHNICAL FEE, EACH ADDTL 15 MIN: Performed by: PODIATRIST

## 2017-12-21 PROCEDURE — 25000128 H RX IP 250 OP 636: Performed by: NURSE ANESTHETIST, CERTIFIED REGISTERED

## 2017-12-21 PROCEDURE — 25000125 ZZHC RX 250: Performed by: NURSE ANESTHETIST, CERTIFIED REGISTERED

## 2017-12-21 PROCEDURE — 27210995 ZZH RX 272: Performed by: PODIATRIST

## 2017-12-21 PROCEDURE — 25000132 ZZH RX MED GY IP 250 OP 250 PS 637: Performed by: PODIATRIST

## 2017-12-21 PROCEDURE — 36415 COLL VENOUS BLD VENIPUNCTURE: CPT | Performed by: ANESTHESIOLOGY

## 2017-12-21 PROCEDURE — 40000170 ZZH STATISTIC PRE-PROCEDURE ASSESSMENT II: Performed by: PODIATRIST

## 2017-12-21 PROCEDURE — 28289 CORRJ HALUX RIGDUS W/O IMPLT: CPT | Mod: T5 | Performed by: PODIATRIST

## 2017-12-21 PROCEDURE — 25000125 ZZHC RX 250: Performed by: PODIATRIST

## 2017-12-21 PROCEDURE — 40000277 XR SURGERY CARM FLUORO LESS THAN 5 MIN W STILLS

## 2017-12-21 PROCEDURE — 36000058 ZZH SURGERY LEVEL 3 EA 15 ADDTL MIN: Performed by: PODIATRIST

## 2017-12-21 PROCEDURE — 36000056 ZZH SURGERY LEVEL 3 1ST 30 MIN: Performed by: PODIATRIST

## 2017-12-21 PROCEDURE — 27210794 ZZH OR GENERAL SUPPLY STERILE: Performed by: PODIATRIST

## 2017-12-21 PROCEDURE — 25000128 H RX IP 250 OP 636: Performed by: ANESTHESIOLOGY

## 2017-12-21 PROCEDURE — 71000013 ZZH RECOVERY PHASE 1 LEVEL 1 EA ADDTL HR: Performed by: PODIATRIST

## 2017-12-21 PROCEDURE — 25000128 H RX IP 250 OP 636: Performed by: PODIATRIST

## 2017-12-21 RX ORDER — FENTANYL CITRATE 50 UG/ML
25-100 INJECTION, SOLUTION INTRAMUSCULAR; INTRAVENOUS
Status: COMPLETED | OUTPATIENT
Start: 2017-12-21 | End: 2017-12-21

## 2017-12-21 RX ORDER — ONDANSETRON 4 MG/1
4 TABLET, ORALLY DISINTEGRATING ORAL EVERY 30 MIN PRN
Status: DISCONTINUED | OUTPATIENT
Start: 2017-12-21 | End: 2017-12-21 | Stop reason: HOSPADM

## 2017-12-21 RX ORDER — BUPIVACAINE HYDROCHLORIDE 5 MG/ML
INJECTION, SOLUTION PERINEURAL PRN
Status: DISCONTINUED | OUTPATIENT
Start: 2017-12-21 | End: 2017-12-21 | Stop reason: HOSPADM

## 2017-12-21 RX ORDER — PROPOFOL 10 MG/ML
INJECTION, EMULSION INTRAVENOUS CONTINUOUS PRN
Status: DISCONTINUED | OUTPATIENT
Start: 2017-12-21 | End: 2017-12-21

## 2017-12-21 RX ORDER — HYDROMORPHONE HYDROCHLORIDE 1 MG/ML
.3-.5 INJECTION, SOLUTION INTRAMUSCULAR; INTRAVENOUS; SUBCUTANEOUS EVERY 10 MIN PRN
Status: DISCONTINUED | OUTPATIENT
Start: 2017-12-21 | End: 2017-12-21 | Stop reason: HOSPADM

## 2017-12-21 RX ORDER — ACETAMINOPHEN 650 MG/1
650 SUPPOSITORY RECTAL EVERY 4 HOURS PRN
Status: DISCONTINUED | OUTPATIENT
Start: 2017-12-21 | End: 2017-12-21 | Stop reason: HOSPADM

## 2017-12-21 RX ORDER — KETOROLAC TROMETHAMINE 30 MG/ML
30 INJECTION, SOLUTION INTRAMUSCULAR; INTRAVENOUS EVERY 6 HOURS PRN
Status: DISCONTINUED | OUTPATIENT
Start: 2017-12-21 | End: 2017-12-21 | Stop reason: HOSPADM

## 2017-12-21 RX ORDER — HYDROCODONE BITARTRATE AND ACETAMINOPHEN 5; 325 MG/1; MG/1
1 TABLET ORAL
Status: COMPLETED | OUTPATIENT
Start: 2017-12-21 | End: 2017-12-21

## 2017-12-21 RX ORDER — PROPOFOL 10 MG/ML
INJECTION, EMULSION INTRAVENOUS PRN
Status: DISCONTINUED | OUTPATIENT
Start: 2017-12-21 | End: 2017-12-21

## 2017-12-21 RX ORDER — NALOXONE HYDROCHLORIDE 0.4 MG/ML
.1-.4 INJECTION, SOLUTION INTRAMUSCULAR; INTRAVENOUS; SUBCUTANEOUS
Status: DISCONTINUED | OUTPATIENT
Start: 2017-12-21 | End: 2017-12-21 | Stop reason: HOSPADM

## 2017-12-21 RX ORDER — FENTANYL CITRATE 50 UG/ML
25-50 INJECTION, SOLUTION INTRAMUSCULAR; INTRAVENOUS
Status: DISCONTINUED | OUTPATIENT
Start: 2017-12-21 | End: 2017-12-21 | Stop reason: HOSPADM

## 2017-12-21 RX ORDER — ONDANSETRON 2 MG/ML
4 INJECTION INTRAMUSCULAR; INTRAVENOUS EVERY 30 MIN PRN
Status: DISCONTINUED | OUTPATIENT
Start: 2017-12-21 | End: 2017-12-21 | Stop reason: HOSPADM

## 2017-12-21 RX ORDER — SODIUM CHLORIDE, SODIUM LACTATE, POTASSIUM CHLORIDE, CALCIUM CHLORIDE 600; 310; 30; 20 MG/100ML; MG/100ML; MG/100ML; MG/100ML
INJECTION, SOLUTION INTRAVENOUS CONTINUOUS PRN
Status: DISCONTINUED | OUTPATIENT
Start: 2017-12-21 | End: 2017-12-21

## 2017-12-21 RX ORDER — DIAZEPAM 10 MG/2ML
2.5 INJECTION, SOLUTION INTRAMUSCULAR; INTRAVENOUS
Status: DISCONTINUED | OUTPATIENT
Start: 2017-12-21 | End: 2017-12-21 | Stop reason: HOSPADM

## 2017-12-21 RX ORDER — SODIUM CHLORIDE, SODIUM LACTATE, POTASSIUM CHLORIDE, CALCIUM CHLORIDE 600; 310; 30; 20 MG/100ML; MG/100ML; MG/100ML; MG/100ML
INJECTION, SOLUTION INTRAVENOUS CONTINUOUS
Status: DISCONTINUED | OUTPATIENT
Start: 2017-12-21 | End: 2017-12-21 | Stop reason: HOSPADM

## 2017-12-21 RX ORDER — MAGNESIUM HYDROXIDE 1200 MG/15ML
LIQUID ORAL PRN
Status: DISCONTINUED | OUTPATIENT
Start: 2017-12-21 | End: 2017-12-21 | Stop reason: HOSPADM

## 2017-12-21 RX ORDER — HYDROXYZINE HYDROCHLORIDE 25 MG/1
25 TABLET, FILM COATED ORAL EVERY 6 HOURS PRN
Qty: 40 TABLET | Refills: 0 | Status: SHIPPED | OUTPATIENT
Start: 2017-12-21 | End: 2018-11-29

## 2017-12-21 RX ORDER — CEFAZOLIN SODIUM 2 G/100ML
2 INJECTION, SOLUTION INTRAVENOUS
Status: COMPLETED | OUTPATIENT
Start: 2017-12-21 | End: 2017-12-21

## 2017-12-21 RX ORDER — ONDANSETRON 2 MG/ML
INJECTION INTRAMUSCULAR; INTRAVENOUS PRN
Status: DISCONTINUED | OUTPATIENT
Start: 2017-12-21 | End: 2017-12-21

## 2017-12-21 RX ORDER — HYDROCODONE BITARTRATE AND ACETAMINOPHEN 5; 325 MG/1; MG/1
1-2 TABLET ORAL EVERY 6 HOURS PRN
Qty: 40 TABLET | Refills: 0 | Status: SHIPPED | OUTPATIENT
Start: 2017-12-21 | End: 2017-12-27

## 2017-12-21 RX ORDER — MEPERIDINE HYDROCHLORIDE 25 MG/ML
12.5 INJECTION INTRAMUSCULAR; INTRAVENOUS; SUBCUTANEOUS
Status: DISCONTINUED | OUTPATIENT
Start: 2017-12-21 | End: 2017-12-21 | Stop reason: HOSPADM

## 2017-12-21 RX ADMIN — SODIUM CHLORIDE, POTASSIUM CHLORIDE, SODIUM LACTATE AND CALCIUM CHLORIDE: 600; 310; 30; 20 INJECTION, SOLUTION INTRAVENOUS at 13:45

## 2017-12-21 RX ADMIN — FENTANYL CITRATE 25 MCG: 50 INJECTION, SOLUTION INTRAMUSCULAR; INTRAVENOUS at 14:15

## 2017-12-21 RX ADMIN — PROPOFOL 100 MCG/KG/MIN: 10 INJECTION, EMULSION INTRAVENOUS at 13:46

## 2017-12-21 RX ADMIN — DEXMEDETOMIDINE HYDROCHLORIDE 8 MCG: 100 INJECTION, SOLUTION INTRAVENOUS at 13:59

## 2017-12-21 RX ADMIN — PROPOFOL 50 MG: 10 INJECTION, EMULSION INTRAVENOUS at 13:50

## 2017-12-21 RX ADMIN — DEXMEDETOMIDINE HYDROCHLORIDE 4 MCG: 100 INJECTION, SOLUTION INTRAVENOUS at 14:30

## 2017-12-21 RX ADMIN — CEFAZOLIN SODIUM 2 G: 2 INJECTION, SOLUTION INTRAVENOUS at 13:46

## 2017-12-21 RX ADMIN — MIDAZOLAM 2 MG: 1 INJECTION INTRAMUSCULAR; INTRAVENOUS at 13:45

## 2017-12-21 RX ADMIN — Medication 0.5 MG: at 15:29

## 2017-12-21 RX ADMIN — DEXMEDETOMIDINE HYDROCHLORIDE 4 MCG: 100 INJECTION, SOLUTION INTRAVENOUS at 13:45

## 2017-12-21 RX ADMIN — Medication 0.5 MG: at 15:00

## 2017-12-21 RX ADMIN — HYDROCODONE BITARTRATE AND ACETAMINOPHEN 1 TABLET: 5; 325 TABLET ORAL at 16:10

## 2017-12-21 RX ADMIN — Medication 0.5 MG: at 15:48

## 2017-12-21 RX ADMIN — DEXMEDETOMIDINE HYDROCHLORIDE 8 MCG: 100 INJECTION, SOLUTION INTRAVENOUS at 14:11

## 2017-12-21 RX ADMIN — FENTANYL CITRATE 25 MCG: 50 INJECTION, SOLUTION INTRAMUSCULAR; INTRAVENOUS at 14:43

## 2017-12-21 RX ADMIN — DEXMEDETOMIDINE HYDROCHLORIDE 4 MCG: 100 INJECTION, SOLUTION INTRAVENOUS at 14:22

## 2017-12-21 RX ADMIN — DEXMEDETOMIDINE HYDROCHLORIDE 4 MCG: 100 INJECTION, SOLUTION INTRAVENOUS at 13:51

## 2017-12-21 RX ADMIN — PROPOFOL 50 MG: 10 INJECTION, EMULSION INTRAVENOUS at 13:47

## 2017-12-21 RX ADMIN — DEXMEDETOMIDINE HYDROCHLORIDE 4 MCG: 100 INJECTION, SOLUTION INTRAVENOUS at 13:56

## 2017-12-21 RX ADMIN — FENTANYL CITRATE 25 MCG: 50 INJECTION, SOLUTION INTRAMUSCULAR; INTRAVENOUS at 14:03

## 2017-12-21 RX ADMIN — MIDAZOLAM 2 MG: 1 INJECTION INTRAMUSCULAR; INTRAVENOUS at 13:59

## 2017-12-21 RX ADMIN — DEXMEDETOMIDINE HYDROCHLORIDE 4 MCG: 100 INJECTION, SOLUTION INTRAVENOUS at 14:40

## 2017-12-21 RX ADMIN — ONDANSETRON 4 MG: 2 INJECTION INTRAMUSCULAR; INTRAVENOUS at 14:30

## 2017-12-21 RX ADMIN — FENTANYL CITRATE 25 MCG: 50 INJECTION, SOLUTION INTRAMUSCULAR; INTRAVENOUS at 14:13

## 2017-12-21 ASSESSMENT — LIFESTYLE VARIABLES: TOBACCO_USE: 1

## 2017-12-21 NOTE — IP AVS SNAPSHOT
[facility] PreOP/Phase II    [address]    [place] [address]    Phone:  [phone]                                       After Visit Summary   12/21/2017    Sharmin Nieves    MRN: [id number]           After Visit Summary Signature Page     I have received my discharge instructions, and my questions have been answered. I have discussed any challenges I see with this plan with the nurse or doctor.    ..........................................................................................................................................  Patient/Patient Representative Signature      ..........................................................................................................................................  Patient Representative Print Name and Relationship to Patient    ..................................................               ................................................  Date                                            Time    ..........................................................................................................................................  Reviewed by Signature/Title    ...................................................              ..............................................  Date                                                            Time

## 2017-12-21 NOTE — ANESTHESIA CARE TRANSFER NOTE
Patient: Sharmin Nieves    Procedure(s):  RIGHT FOOT CHEILECTOMY - Wound Class: I-Clean    Diagnosis: RIGHT HALLUX LIMITUS  Diagnosis Additional Information: No value filed.    Anesthesia Type:   MAC     Note:  Airway :Face Mask  Patient transferred to:PACU  Comments: To pacu bed 3. Vss. Denies pain. Report given to RN assuming care of pt.      Vitals: (Last set prior to Anesthesia Care Transfer)    CRNA VITALS  12/21/2017 1419 - 12/21/2017 1456      12/21/2017             Ht Rate: 75    Resp Rate (set): 10                Electronically Signed By: LEDY March CRNA  December 21, 2017  2:56 PM

## 2017-12-21 NOTE — OR NURSING
PNDS met, po per I&O sheet. Pt dressed, up in recliner and transported to Phase 2. Darco shoe to right foot.

## 2017-12-21 NOTE — BRIEF OP NOTE
[facility] Brief Operative Note    Pre-operative diagnosis: RIGHT HALLUX LIMITUS   Post-operative diagnosis * No post-op diagnosis entered *  Same   Procedure: Procedure(s):  RIGHT FOOT CHEILECTOMY - Wound Class: I-Clean   Surgeon(s): Surgeon(s) and Role:     * Mo Edgar DPM - Primary   Estimated blood loss: 5mL    Specimens: * No specimens in log *   Findings: See op note

## 2017-12-21 NOTE — ANESTHESIA PREPROCEDURE EVALUATION
Anesthesia Evaluation     . Pt has had prior anesthetic.     No history of anesthetic complications          ROS/MED HX    ENT/Pulmonary:     (+)tobacco use, Current use , . .   (-) sleep apnea   Neurologic:       Cardiovascular:         METS/Exercise Tolerance:     Hematologic:         Musculoskeletal:         GI/Hepatic:     (+) GERD Asymptomatic on medication, Other GI/Hepatic diaphragmatic hernia      Renal/Genitourinary:         Endo:     (+) Obesity (BMI 33), .      Psychiatric:     (+) psychiatric history anxiety and depression      Infectious Disease:         Malignancy:         Other: Comment: EtOH abuse history, she says she has never had withdrawals and no drinks in 4 days                      Physical Exam  Normal systems: cardiovascular, pulmonary and dental    Airway   Mallampati: II  TM distance: >3 FB  Neck ROM: full    Dental     Cardiovascular       Pulmonary                     Anesthesia Plan      History & Physical Review  History and physical reviewed and following examination; no interval change.    ASA Status:  2 .    NPO Status:  > 8 hours    Plan for MAC Reason for MAC:  Deep or markedly invasive procedure (G8)  PONV prophylaxis:  Ondansetron (or other 5HT-3)       Postoperative Care  Postoperative pain management:  IV analgesics.      Consents  Anesthetic plan, risks, benefits and alternatives discussed with:  Patient..                          .

## 2017-12-21 NOTE — DISCHARGE INSTRUCTIONS
Same Day Surgery Discharge Instructions for  Sedation and General Anesthesia       It's not unusual to feel dizzy, light-headed or faint for up to 24 hours after surgery or while taking pain medication.  If you have these symptoms: sit for a few minutes before standing and have someone assist you when you get up to walk or use the bathroom.      You should rest and relax for the next 24 hours. We recommend you make arrangements to have an adult stay with you for at least 24 hours after your discharge.  Avoid hazardous and strenuous activity.      DO NOT DRIVE any vehicle or operate mechanical equipment for 24 hours following the end of your surgery.  Even though you may feel normal, your reactions may be affected by the medication you have received.      Do not drink alcoholic beverages for 24 hours following surgery.       Slowly progress to your regular diet as you feel able. It's not unusual to feel nauseated and/or vomit after receiving anesthesia.  If you develop these symptoms, drink clear liquids (apple juice, ginger ale, broth, 7-up, etc. ) until you feel better.  If your nausea and vomiting persists for 24 hours, please notify your surgeon.        All narcotic pain medications, along with inactivity and anesthesia, can cause constipation. Drinking plenty of liquids and increasing fiber intake will help.      For any questions of a medical nature, call your surgeon.      Do not make important decisions for 24 hours.      If you had general anesthesia, you may have a sore throat for a couple of days related to the breathing tube used during surgery.  You may use Cepacol lozenges to help with this discomfort.  If it worsens or if you develop a fever, contact your surgeon.         If you feel your pain is not well managed with the pain medications prescribed by your surgeon, please contact your surgeon's office to let them know so they can address your concerns.       Crutch Instructions      Because of your  surgery you will need crutches.  Make sure you tell your healthcare provider if crutches do not seem to work for you.  Using Crutches   Crutches are the most commonly used device for injuries to the lower part of the body because they allow the most mobility. All walking assistance devices require strength in your upper body but crutches require more coordination. If you are unable to use crutches then a cane or walker may be better.   Remember these rules when using crutches:   Always look straight ahead when you walk. Do not look down.   Hold the top padded part of the crutches into your chest near your armpits. Grasp the padded hand  and always support your weight with your arms and hands.   Do not lean on the crutches with your armpit as this could cause nerve damage.  Standing Up  Make sure you are in a stable chair. Move forward to the edge of the chair so that your  good  foot is flat on the floor. Put both crutches together and hold the handgrips in one hand. Stretch the injured leg out straight and then use the crutches with one hand and the chair armrest with the other hand to push yourself into a standing position onto your  good  leg. Once you are standing, wait until you are steady before placing a crutch in each hand. Until you become good at standing, have someone assist you in case you lose your balance. When standing still, lean slightly forward with the crutches ahead of you and about 3 feet apart. Unless you are told otherwise, you should keep weight off your injured leg.  Walking  To begin crutch walking, balance yourself on your  good  leg. Move both crutches forward about the length of one step and place them firmly on the floor in front of you about 3 feet apart. Support your weight on the padded hand rests while leaning forward. Press the top of the crutches against your chest wall and not into your armpits. Be sure to hold the injured leg up off of the floor. When ready, swing the  "\"good\" leg forward about one step length past the crutches while supporting your weight on the padded hand . Be careful not to go too far. Transfer your weight onto the \"good\" leg then bring both crutches forward about the length of one step. Repeating this motion will allow you to move fairly quickly without the use of your injured leg. Keep practicing until you become good at crutch walking.  Sitting Down  Make sure the chair you are about to sit on is stable. Walk in front of the chair such that you are facing away from it. Move back a little at a time until the back of your  good  leg is nearly touching the seat. Keeping your weight on the  good  leg, move both crutches to one hand holding onto the handgrips. Lean forward, bend your  good  knee, and move your injured leg out forward. Sit down slowly while using your free hand to reach for the chair s armrest for support. Place the crutches where you can easily get them. Never pivot, even on your  good  leg. This could cause you to fall or injure your  good  leg.  Navigating Stairs, Curbs & Door Steps  Only attempt this once you are very comfortable with regular crutch walking and only when someone is there to steady you should you begin to lose your balance. Hold on to a  railing with one hand and both crutches in the other hand or have someone carry the loose crutch for you. It does not matter which side the handrail is on. If there is no handrail, you can use both crutches. Using only crutches is the same as the railing method but maintaining your balance can be difficult. The crutch-only method is not recommended until you have become very good at crutch walking. Be sure to only take one step at a time. A simple rule to remember for crutches when navigating stairs or curbs: up with the  good  leg and down with the  bad .  Going Up Stairs or Curbs  Walk close to the first step with the crutches slightly behind you. Push down on the handrail and " "the crutch and step up with the \"good\" leg. You may have to make a short hop to do this if you are not allowed to place any weight on the injured leg. Lean forward and bring the injured leg and the crutch up beside the \"good\" leg. Repeat this until you have climbed up all of the steps. Remember, the \"good\" leg goes up first and the crutches move with the injured leg. The person helping you should stand behind and to your side that is away from the railing.  Going Down Stairs or Curbs  Walk to the edge of the first step. While standing and balancing on the  good  leg, place both crutches in one hand and then down on the step below. Be sure to support your weight by leaning on the crutches and handrail. Bring the injured leg forward, then the \"good\" leg down to the same step as the crutches. Remember crutches go down first with the injured leg. The person helping you should  front and to your side that is away from the railing.  Sitting Method for Navigating Stairs  A safer way to get up and down stairs is to sit down. To go up steps, sit down on the second or third step. Push with your  good  leg below while pushing up with both arms on the step above to move your bottom to the next step. When you get to the top of the stairs you may need to use the  scooting  method described later to get back up. To go down steps you first need to lower yourself to the floor near the top of the steps. Once seated, support yourself with your  good  leg on the second to next step below, and push with both arms on the step where you are sitting to move your bottom down to the next step. When you reach the bottom, pull yourself up to standing position using your crutches. It is helpful if someone can move your crutches and assist you in getting up and down. With practice it may be possible to manage on your own.  If You Start To Fall  If you start to fall and cannot get your balance, throw the crutches away from you. Try to " "fall onto your  good  side away from your injury and then break your fall with your arms. If uninjured, you can usually get back up by moving into a sitting position and scooting to a chair. Push with your arms and hands on the chair seat while pushing with the \"good\" leg to get up into the chair. You should not attempt to get back up if you are having significant pain. Call for assistance or dial 911 for an ambulance if necessary.  Safety Tips for Crutch Walking   At first you may want to wear a training belt (or a strong regular belt) so others can assist you.   Do not use crutches if you feel dizzy or drowsy.   Be careful on slick or wet surfaces like a kitchen or bathroom floor, snow, ice, or rainy conditions.   Temporarily remove pets, throw rugs or other items from your home that might trip you.   Do not hop around while holding onto furniture.   Wear sneakers or rubber soled shoes that will not easily slip or come off.   Be careful on ramps or slopes as they can be harder to walk up or down   Do not remove any parts from your crutches especially the padding or rubber traction footings. Replace padding or footings that become damaged immediately.   It is important to use your crutches correctly. If you feel any numbness or tingling in your arms, you are probably using the crutches incorrectly.  Helpful Hints   A bedside toilet or toilet riser may be helpful.   Always allow for extra time to get around. Children in school should be given permission to leave class early to avoid crowds when changing classes.   Elevate the injured leg when sitting.   Use a backpack to carry books or waist pouch to carry other items so that both hands are free.   Call your healthcare provider if you have any questions or concerns.            **If you have questions or concerns about your procedure, call   Dr. Edgar [phone]**      "

## 2017-12-21 NOTE — IP AVS SNAPSHOT
MRN:[id number]                      After Visit Summary   12/21/2017    Sharmin Nieves    MRN: [id number]           Thank you!     Thank you for choosing [facility] for your care. Our goal is always to provide you with excellent care. Hearing back from our patients is one way we can continue to improve our services. Please take a few minutes to complete the written survey that you may receive in the mail after you visit with us. Thank you!        Patient Information     Date Of Birth          1970        About your hospital stay     You were admitted on:  December 21, 2017 You last received care in the:  [facility] PreOP/Phase II    You were discharged on:  December 21, 2017       Who to Call     For medical emergencies, please call 911.  For non-urgent questions about your medical care, please call your primary care provider or clinic, [phone]  For questions related to your surgery, please call your surgery clinic        Attending Provider     Provider Specialty    Mo Edgar DPM Podiatry       Primary Care Provider Office Phone # Fax #    Randa JOSEPH LEDY [facility] [phone]      After Care Instructions     Discharge Instructions       Post-Operative Discharge Instructions  Mo Edgar DPM    Proper care after surgery is an important part of your surgical program.  In order to properly heal and have the best results,  it is very important to follow these instructions.    Go right home and keep your foot elevated (up) on the way     Use pillows to keep your feet 6 inches higher than the level of your hips    Cover an ice bag with a towel and put this behind your ankle/knee for 20 minutes out of every hour that you are awake for the first 3 days after surgery     Keep your bandages or your cast clean and dry.  DO NOT remove the bandages or look at your surgical wound.  A small amount of blood on the bandage is normal.    Wear your post op shoe or boot  if given.    Cover your bandage with a plastic bag and hang your leg outside the tub while you take a bath.  AVOID SHOWERS.  DO NOT GET THE BANDAGE OR THE CAST WET.    Take all medication as the doctor has ordered.  If you have an upset stomach, headache, rash, or other reactions that are not normal, stop taking and CALL YOUR DOCTOR!  You may call the clinic or the  [facility] at [phone] (toll-free)    You should get plenty of rest with your foot elevated.  Drink plenty of fluids and eat a regular  healthy diet.  Eating your meals faithfully with help prevent nausea from medications.    Do not drink alcohol while taking pain medicine and stop smoking.  Smoking will increase your chance of having problems like infection or problems with your bone and incision healing.               Walking instructions: Minimal weight bearing as tolerated in post op shoe    Follow up in the clinic at your scheduled appointment day and time    CALL THE OFFICE AT ONCE IF:  The bandages becomes soaked through with blood  The medicine does not help your pain or you have a reaction to the medicine  You bump or hurt the surgery site  You have a fever  You get your dressing or your cast wet  You develop calf pain, redness, swelling, or shortness of breath (these are signs of a possible blood clot)            Discharge Instructions       Perform leg raises, knee bends, ankle range of motion exercises 4-5 times/day.            Discharge Instructions       Follow up at the [facility] 1/3/2018 8:15 AM    If you have questions or concerns after surgery, please call: Podiatry/Foot & Ankle Surgery Triage Team at the [facility] at [phone] (option 2 > option 3 for triage).   If it is after 4:30PM you can reach a [facility] Nurse Advisor at [phone].   They can then page the podiatrist on call, if needed. There is a [facility] podiatrist on call 24/7.            Elevate affected extremity       23  of 24 hours/day.            Ice to affected area       Ice pack to affected area PRN (as needed).            No dressing change       until follow up clinic appointment.            Weight bearing status - As tolerated       In post op shoe                  Your next 10 appointments already scheduled     Jan 03, 2018  8:15 AM CST   [facility]    [address] [place] [id number] [phone]              Further instructions from your care team       Same Day Surgery Discharge Instructions for  Sedation and General Anesthesia       It's not unusual to feel dizzy, light-headed or faint for up to 24 hours after surgery or while taking pain medication.  If you have these symptoms: sit for a few minutes before standing and have someone assist you when you get up to walk or use the bathroom.      You should rest and relax for the next 24 hours. We recommend you make arrangements to have an adult stay with you for at least 24 hours after your discharge.  Avoid hazardous and strenuous activity.      DO NOT DRIVE any vehicle or operate mechanical equipment for 24 hours following the end of your surgery.  Even though you may feel normal, your reactions may be affected by the medication you have received.      Do not drink alcoholic beverages for 24 hours following surgery.       Slowly progress to your regular diet as you feel able. It's not unusual to feel nauseated and/or vomit after receiving anesthesia.  If you develop these symptoms, drink clear liquids (apple juice, ginger ale, broth, 7-up, etc. ) until you feel better.  If your nausea and vomiting persists for 24 hours, please notify your surgeon.        All narcotic pain medications, along with inactivity and anesthesia, can cause constipation. Drinking plenty of liquids and increasing fiber intake will help.      For any questions of a medical nature, call  your surgeon.      Do not make important decisions for 24 hours.      If you had general anesthesia, you may have a sore throat for a couple of days related to the breathing tube used during surgery.  You may use Cepacol lozenges to help with this discomfort.  If it worsens or if you develop a fever, contact your surgeon.         If you feel your pain is not well managed with the pain medications prescribed by your surgeon, please contact your surgeon's office to let them know so they can address your concerns.       Crutch Instructions      Because of your surgery you will need crutches.  Make sure you tell your healthcare provider if crutches do not seem to work for you.  Using Crutches   Crutches are the most commonly used device for injuries to the lower part of the body because they allow the most mobility. All walking assistance devices require strength in your upper body but crutches require more coordination. If you are unable to use crutches then a cane or walker may be better.   Remember these rules when using crutches:   Always look straight ahead when you walk. Do not look down.   Hold the top padded part of the crutches into your chest near your armpits. Grasp the padded hand  and always support your weight with your arms and hands.   Do not lean on the crutches with your armpit as this could cause nerve damage.  Standing Up  Make sure you are in a stable chair. Move forward to the edge of the chair so that your  good  foot is flat on the floor. Put both crutches together and hold the handgrips in one hand. Stretch the injured leg out straight and then use the crutches with one hand and the chair armrest with the other hand to push yourself into a standing position onto your  good  leg. Once you are standing, wait until you are steady before placing a crutch in each hand. Until you become good at standing, have someone assist you in case you lose your balance. When standing still, lean slightly  "forward with the crutches ahead of you and about 3 feet apart. Unless you are told otherwise, you should keep weight off your injured leg.  Walking  To begin crutch walking, balance yourself on your  good  leg. Move both crutches forward about the length of one step and place them firmly on the floor in front of you about 3 feet apart. Support your weight on the padded hand rests while leaning forward. Press the top of the crutches against your chest wall and not into your armpits. Be sure to hold the injured leg up off of the floor. When ready, swing the \"good\" leg forward about one step length past the crutches while supporting your weight on the padded hand . Be careful not to go too far. Transfer your weight onto the \"good\" leg then bring both crutches forward about the length of one step. Repeating this motion will allow you to move fairly quickly without the use of your injured leg. Keep practicing until you become good at crutch walking.  Sitting Down  Make sure the chair you are about to sit on is stable. Walk in front of the chair such that you are facing away from it. Move back a little at a time until the back of your  good  leg is nearly touching the seat. Keeping your weight on the  good  leg, move both crutches to one hand holding onto the handgrips. Lean forward, bend your  good  knee, and move your injured leg out forward. Sit down slowly while using your free hand to reach for the chair s armrest for support. Place the crutches where you can easily get them. Never pivot, even on your  good  leg. This could cause you to fall or injure your  good  leg.  Navigating Stairs, Curbs & Door Steps  Only attempt this once you are very comfortable with regular crutch walking and only when someone is there to steady you should you begin to lose your balance. Hold on to a  railing with one hand and both crutches in the other hand or have someone carry the loose crutch for you. It does not matter " "which side the handrail is on. If there is no handrail, you can use both crutches. Using only crutches is the same as the railing method but maintaining your balance can be difficult. The crutch-only method is not recommended until you have become very good at crutch walking. Be sure to only take one step at a time. A simple rule to remember for crutches when navigating stairs or curbs: up with the  good  leg and down with the  bad .  Going Up Stairs or Curbs  Walk close to the first step with the crutches slightly behind you. Push down on the handrail and the crutch and step up with the \"good\" leg. You may have to make a short hop to do this if you are not allowed to place any weight on the injured leg. Lean forward and bring the injured leg and the crutch up beside the \"good\" leg. Repeat this until you have climbed up all of the steps. Remember, the \"good\" leg goes up first and the crutches move with the injured leg. The person helping you should stand behind and to your side that is away from the railing.  Going Down Stairs or Curbs  Walk to the edge of the first step. While standing and balancing on the  good  leg, place both crutches in one hand and then down on the step below. Be sure to support your weight by leaning on the crutches and handrail. Bring the injured leg forward, then the \"good\" leg down to the same step as the crutches. Remember crutches go down first with the injured leg. The person helping you should  front and to your side that is away from the railing.  Sitting Method for Navigating Stairs  A safer way to get up and down stairs is to sit down. To go up steps, sit down on the second or third step. Push with your  good  leg below while pushing up with both arms on the step above to move your bottom to the next step. When you get to the top of the stairs you may need to use the  scooting  method described later to get back up. To go down steps you first need to lower yourself to the " "floor near the top of the steps. Once seated, support yourself with your  good  leg on the second to next step below, and push with both arms on the step where you are sitting to move your bottom down to the next step. When you reach the bottom, pull yourself up to standing position using your crutches. It is helpful if someone can move your crutches and assist you in getting up and down. With practice it may be possible to manage on your own.  If You Start To Fall  If you start to fall and cannot get your balance, throw the crutches away from you. Try to fall onto your  good  side away from your injury and then break your fall with your arms. If uninjured, you can usually get back up by moving into a sitting position and scooting to a chair. Push with your arms and hands on the chair seat while pushing with the \"good\" leg to get up into the chair. You should not attempt to get back up if you are having significant pain. Call for assistance or dial 911 for an ambulance if necessary.  Safety Tips for Crutch Walking   At first you may want to wear a training belt (or a strong regular belt) so others can assist you.   Do not use crutches if you feel dizzy or drowsy.   Be careful on slick or wet surfaces like a kitchen or bathroom floor, snow, ice, or rainy conditions.   Temporarily remove pets, throw rugs or other items from your home that might trip you.   Do not hop around while holding onto furniture.   Wear sneakers or rubber soled shoes that will not easily slip or come off.   Be careful on ramps or slopes as they can be harder to walk up or down   Do not remove any parts from your crutches especially the padding or rubber traction footings. Replace padding or footings that become damaged immediately.   It is important to use your crutches correctly. If you feel any numbness or tingling in your arms, you are probably using the crutches incorrectly.  Helpful Hints   A bedside toilet or toilet riser may be helpful. " "  Always allow for extra time to get around. Children in school should be given permission to leave class early to avoid crowds when changing classes.   Elevate the injured leg when sitting.   Use a backpack to carry books or waist pouch to carry other items so that both hands are free.   Call your healthcare provider if you have any questions or concerns.            **If you have questions or concerns about your procedure, call   Dr. Edgar [phone]**        Pending Results     Date and Time Order Name Status Description    12/21/2017 1521 XR Surgery HENRY Fluoro L/T 5 Min w Stills In process             Admission Information     Date & Time Provider Department Dept. Phone    12/21/2017 Mo Edgar, DPM [facility] PreOP/Phase [phone]      Your Vitals Were     Blood Pressure Temperature Respirations Height Weight Pulse Oximetry    128/75 96.4  F (35.8  C) 19 1.778 m (5' 10\") 100.7 kg (221 lb 14.4 oz) 96%    BMI (Body Mass Index)                   31.84 kg/m2           Strut Information     Strut gives you secure access to your electronic health record. If you see a primary care provider, you can also send messages to your care team and make appointments. If you have questions, please call your primary care clinic.  If you do not have a primary care provider, please call [phone] and they will assist you.        Care EveryWhere ID     This is your Care EveryWhere ID. This could be used by other organizations to access your [facility] medical records  [id number]        Equal Access to Services     JOCELYNE GOMEZ : Hadii geovanny up Sojohn, waaxda luqadaha, qaybta kaalmada patricia, gabe brown. So [facility] [phone].    ATENCIÓN: Si habla español, tiene a mitchell disposición servicios gratuitos de asistencia lingüística. Llame al [phone].    We comply with applicable federal civil rights laws and [place] laws. We do not discriminate on the basis " of race, color, national origin, age, disability, sex, sexual orientation, or gender identity.               Review of your medicines      START taking        Dose / Directions    HYDROcodone-acetaminophen 5-325 MG per tablet   Commonly known as:  NORCO   Used for:  Post-operative state   Notes to Patient:  One tab at 4:10pm        Dose:  1-2 tablet   Take 1-2 tablets by mouth every 6 hours as needed for other (Moderate to Severe Pain)   Quantity:  40 tablet   Refills:  0       hydrOXYzine 25 MG tablet   Commonly known as:  ATARAX   Used for:  Post-operative state        Dose:  25 mg   Take 1 tablet (25 mg) by mouth every 6 hours as needed for itching (and nausea)   Quantity:  40 tablet   Refills:  0         CONTINUE these medicines which have NOT CHANGED        Dose / Directions    celecoxib 100 MG capsule   Commonly known as:  celeBREX   Used for:  Pain        Dose:  100 mg   Take 1 capsule (100 mg) by mouth 2 times daily as needed for moderate pain   Quantity:  60 capsule   Refills:  1       Daily Multi vitamin  /Minerals Tabs        1 TABLET DAILY   Quantity:  30   Refills:  0       diltiazem 2% in PLO cream (FV COMPOUNDED) 2% Gel   Used for:  Anal pain, Anal fissure        To anal opening three times daily.  Use a pea-sized amount.  Store at room temperature.   Quantity:  60 g   Refills:  0       gabapentin 600 MG tablet   Commonly known as:  NEURONTIN   Used for:  Restless leg syndrome        Dose:  600 mg   Take 1 tablet (600 mg) by mouth At Bedtime   Quantity:  90 tablet   Refills:  3       MAGNESIUM OXIDE PO        Dose:  250 mg   Take 250 mg by mouth   Refills:  0       MIRENA (52 MG) 20 MCG/24HR IUD   Generic drug:  levonorgestrel        one placed in uterus   Quantity:  1 each   Refills:  0       ondansetron 4 MG tablet   Commonly known as:  ZOFRAN   Used for:  Drug-induced nausea and vomiting        Dose:  4 mg   Take 1 tablet (4 mg) by mouth every 12 hours as needed for nausea   Quantity:  30 tablet    Refills:  1       traZODone 100 MG tablet   Commonly known as:  DESYREL   Used for:  Insomnia        Dose:  200 mg   Take 2 tablets (200 mg) by mouth At Bedtime   Quantity:  180 tablet   Refills:  1       varenicline 1 MG tablet   Commonly known as:  CHANTIX   Used for:  Tobacco use disorder        Dose:  1 mg   Take 1 tablet (1 mg) by mouth 2 times daily   Quantity:  56 tablet   Refills:  2       venlafaxine 75 MG 24 hr capsule   Commonly known as:  EFFEXOR-XR   Used for:  Major depressive disorder, recurrent episode, moderate (H)        Dose:  225 mg   Take 3 capsules (225 mg) by mouth daily   Quantity:  270 capsule   Refills:  1            Where to get your medicines      These medications were sent to [facility] - [address]  [address], [place] [address]     Phone:  [phone]     hydrOXYzine 25 MG tablet         Some of these will need a paper prescription and others can be bought over the counter. Ask your nurse if you have questions.     Bring a paper prescription for each of these medications     HYDROcodone-acetaminophen 5-325 MG per tablet                Protect others around you: Learn how to safely use, store and throw away your medicines at www.disposemymeds.org.             Medication List: This is a list of all your medications and when to take them. Check marks below indicate your daily home schedule. Keep this list as a reference.      Medications           Morning Afternoon Evening Bedtime As Needed    celecoxib 100 MG capsule   Commonly known as:  celeBREX   Take 1 capsule (100 mg) by mouth 2 times daily as needed for moderate pain                                Daily Multi vitamin  /Minerals Tabs   1 TABLET DAILY                                diltiazem 2% in PLO cream (FV COMPOUNDED) 2% Gel   To anal opening three times daily.  Use a pea-sized amount.  Store at room temperature.                                gabapentin 600 MG tablet    Commonly known as:  NEURONTIN   Take 1 tablet (600 mg) by mouth At Bedtime                                HYDROcodone-acetaminophen 5-325 MG per tablet   Commonly known as:  NORCO   Take 1-2 tablets by mouth every 6 hours as needed for other (Moderate to Severe Pain)   Last time this was given:  1 tablet on 12/21/2017  4:10 PM   Notes to Patient:  One tab at 4:10pm                                hydrOXYzine 25 MG tablet   Commonly known as:  ATARAX   Take 1 tablet (25 mg) by mouth every 6 hours as needed for itching (and nausea)                                MAGNESIUM OXIDE PO   Take 250 mg by mouth                                MIRENA (52 MG) 20 MCG/24HR IUD   one placed in uterus   Generic drug:  levonorgestrel                                ondansetron 4 MG tablet   Commonly known as:  ZOFRAN   Take 1 tablet (4 mg) by mouth every 12 hours as needed for nausea                                traZODone 100 MG tablet   Commonly known as:  DESYREL   Take 2 tablets (200 mg) by mouth At Bedtime                                varenicline 1 MG tablet   Commonly known as:  CHANTIX   Take 1 tablet (1 mg) by mouth 2 times daily                                venlafaxine 75 MG 24 hr capsule   Commonly known as:  EFFEXOR-XR   Take 3 capsules (225 mg) by mouth daily

## 2017-12-21 NOTE — ANESTHESIA POSTPROCEDURE EVALUATION
Patient: Sharmin Nieves    Procedure(s):  RIGHT FOOT CHEILECTOMY - Wound Class: I-Clean    Diagnosis:RIGHT HALLUX LIMITUS  Diagnosis Additional Information: No value filed.    Anesthesia Type:  MAC    Note:  Anesthesia Post Evaluation    Patient location during evaluation: PACU  Patient participation: Able to fully participate in evaluation  Level of consciousness: awake  Pain management: adequate  Airway patency: patent  Cardiovascular status: acceptable  Respiratory status: acceptable  Hydration status: acceptable  PONV: controlled     Anesthetic complications: None          Last vitals:  Vitals:    12/21/17 1515 12/21/17 1530 12/21/17 1545   BP: 124/79 124/75 121/76   Resp: 12 16 21   Temp: 35.9  C (96.6  F) 35.9  C (96.6  F) 35.8  C (96.4  F)   SpO2: 96% 95% 97%         Electronically Signed By: Donn De Santiago MD  December 21, 2017  4:09 PM

## 2017-12-22 NOTE — OP NOTE
DATE OF PROCEDURE:  12/21/2017      STAFF SURGEON:  Mo Edgar DPM      PREOPERATIVE DIAGNOSIS:  Right foot hallux limitus.      POSTOPERATIVE DIAGNOSIS:  Right foot hallux limitus.      PROCEDURE:  Right first metatarsophalangeal joint cheilectomy.      ANESTHESIA:  MAC with local block.      HEMOSTASIS:  Pneumatic ankle tourniquet and electrocautery.      ESTIMATED BLOOD LOSS:  1  mL.      INDICATIONS:  Sharmin Nieves is a 47-year-old female who presented to my clinic regarding chronic right first metatarsophalangeal joint pain.  She feels she has exhausted conservative measures and would like surgical intervention.  X-ray examination reveals degenerative changes of the first metatarsophalangeal joint with dorsal joint spurring.  Cheilectomy was discussed as a reasonable surgical option and the patient agreed.  The patient understands arthritis may continue to progress in time and she may need a fusion in the future.  Risks versus benefits were discussed at length prior risks include but are not limited to infection, delayed wound healing, numbness, neuritis, pain, continuing arthritis, need for future surgery including fusion, blood clot.      DESCRIPTION OF PROCEDURE:  All questions were answered to the patient's satisfaction.  Consent form was signed and placed in chart.  The patient was brought into the operating room by the anesthesia team and placed supine on the table.  After IV sedation was given, a local block was obtained around the right forefoot.  Foot was prepped and draped in the usual sterile fashion.  Foot was exsanguinated and the pneumatic ankle tourniquet, which was well padded, was elevated.      Attention was directed to the dorsal aspect of the right first metatarsophalangeal joint, where a longitudinal incision was made.  This incision was deepened bluntly and sharply with care taken to retract and protect all vital neurovascular structures.  Once the level of the first MPJ was  reached, it was sharply incised.  A small loose bone fragment was noted within the joint and this was removed.  The joint was inspected at this time.  Some loss of articular cartilage near the dorsal aspect of the first metatarsal head.  Dorsal spurring was noted on the first metatarsal head and the base of the proximal phalanx.  Utilizing a sagittal saw, the spurring on the first metatarsal head was resected.  Spurring on the phalanx was resected with a rongeur.  The spur resection on the metatarsal effectively removed any areas devoid of cartilage.  Good range of motion was noted in the joint post-resection.  X-ray examination was brought in to confirm proper removal and everything appeared satisfactory.      The area was copiously lavaged with sterile saline.  Deep structures were reapproximated using absorbable sutures, skin was closed using nonabsorbable suture.  Sterile dressing was applied.  Tourniquet was released and a hyperemic response was noted to the right foot.      The patient was awakened from anesthesia and returned to the PACU with vital signs stable and vascular status intact.  She tolerated the procedure and anesthesia well.      PLAN:  She will follow up in clinic just under 2 weeks postop.         PADILLA VAUGHN DPM             D: 2017 15:06   T: 2017 01:41   MT: SHANON#[id number]      Name:     NAYA FERREIRA   MRN:      [id number]        Account:        [id number]   :      1970           Procedure Date: 2017      Document: [id number]

## 2018-01-03 ENCOUNTER — OFFICE VISIT (OUTPATIENT)
Dept: PODIATRY | Facility: CLINIC | Age: 48
End: 2018-01-03
Payer: COMMERCIAL

## 2018-01-03 VITALS
HEIGHT: 70 IN | WEIGHT: 221.9 LBS | BODY MASS INDEX: 31.77 KG/M2 | SYSTOLIC BLOOD PRESSURE: 138 MMHG | DIASTOLIC BLOOD PRESSURE: 88 MMHG

## 2018-01-03 DIAGNOSIS — Z98.890 POST-OPERATIVE STATE: Primary | ICD-10-CM

## 2018-01-03 PROCEDURE — 99024 POSTOP FOLLOW-UP VISIT: CPT | Performed by: PODIATRIST

## 2018-01-03 NOTE — LETTER
1/3/2018         RE: Sharmin Nieves  [address]  [place] [address]        Dear Colleague,    Thank you for referring your patient, Sharmin Nieves, to the [facility]. Please see a copy of my visit note below.    Weight management plan: Patient was referred to their PCP to discuss a diet and exercise plan.     KHOA Urena MA January 3, 2018 7:05 AM      SUBJECTIVE:  Sharmin is here today for initial follow up of her right foot cheilectomy. Pain is rated as 1/10.  She changed the dressing a couple times because it became loose.  No other concerns..  Review of Systems: Denies post op injury, fevers, calf pain, chest pain, shortness of breath.  OBJECTIVE:  Exam: No apparent distress, relaxed comfortable. Vitals are reviewed from the nursing note.   Vascular: Pulses are palpable on the right. There is adequate capillary fill time to the digits.  Neuro: Gross touch is intact to the digits. There is minimal to no appreciable numbness around the surgical incision per the patient.  Dermatologic examination: The surgical incision is well-healed and fine. Sutures are intact and are removed without complication. There is no surrounding redness, drainage, nor other signs of infection. There is normal postoperative edema and ecchymosis.  MS: No gross deformity.  Radiographs:  n/a  ASSESSMENT:   1. 13 days s/p right foot cheilectomy  PLAN:   Sutures were removed today. Steristrips applied.  The patient can get their foot wet starting tomorrow. They should wait to submerge the foot for another week. They should transition to regular shoes as tolerated.  Wear boot primarily for the next week however. They can continue with their Ace wrap for edema control. They should ice and elevate as needed. They will follow up as needed.  Mo Edgar DPM, FACFAS        Again, thank you for allowing me to participate in the care of your patient.        Sincerely,        Mo Edgar DPM

## 2018-01-03 NOTE — MR AVS SNAPSHOT
After Visit Summary   1/3/2018    Sharmin Nieves    MRN: [id number]           Patient Information     Date Of Birth          1970        Visit Information        Provider Department      1/3/2018 7:15 AM Mo Edgar DPM [facility]        Care Instructions    You can get your foot/ankle wet starting tomorrow.   No soaking of the foot/ankle for a week.  Continue icing, elevation, ACE wrap as needed.  Continue in the boot/shoe as instructed in clinic.  Transition to regular supportive shoe as tolerated.    SMOKING CESSATION  What's in cigarette smoke? - Cigarette smoke contains over 4,000 chemicals. Nicotine is one of the main ingredients which is an insecticide/herbicide. It is poisonous to our nervous system, increases blood clotting risk, and decreases the body's defenses to fight off infection. Another chemical is Carbon Monoxide is an asphyxiating gas that permanently binds to blood cells and blocks the transport of oxygen. This leads to tissue death and decreases your metabolism. Tar is a chemical that coats your lungs and trachea which impairs new oxygen coming in and carbon dioxide getting out of your body.   How does smoking impact surgery? - Smoking is particularly hazardous with regards to surgery. Surgery puts stress on the body and a smoker's body is already under strain from these chemicals. Putting the two together, especially for an elective surgery, could be a recipe for disaster. Smoking before and after surgery increases your risk of heart problems, slow wound healing, delayed bone healing, blood clots, wound infection and anesthesia complications.   What are the benefits of quitting? - In 20 minutes your blood pressure will drop back down to normal. In 8 hours the carbon monoxide (a toxic gas) levels in your blood stream will drop by half, and oxygen levels will return to normal. In 48 hours your chance of having a heart attack will have decreased. All  nicotine will have left your body. Your sense of taste and smell will return to a normal level. In 72 hours your bronchial tubes will relax, and your energy levels will increase. In 2 weeks your circulation will increase, and it will continue to improve for the next 10 weeks.    Recommendations for elective surgery - Ideally, patients should quit smoking 8 weeks before and at least 2 weeks after elective surgery in order to avoid complications. Simply cutting back on the amount of cigarettes smoked per day does not offer any benefit or decrease the risk of poor wound healing, heart problems, and infection. Smokers should also start taking Vitamin C and B for two weeks before surgery and two weeks after surgery.    Ways to Stop Smokin. Nicotine patches, lozenges, or gum  2. Support Groups  3. Medications (see below)    List of Medications:  1. Varenicline Tartrate (CHANTIX)   2. Bupropion HCL (WELLBUTRIN, ZYBAN) - note: make sure Wellbutrin is for smoking cessation and not other issues   3. Nicotine Patch (NICODERM)   4. Nicotine Inhaler (NICOTROL)   5. Nicotine Gum Nicotine Polacrilex   6. Nicotine Lozenge: Nicotine Polacrilex (COMMIT)   * [place] offers a smoking support group as well!  Please visit: [email or web address]  If you are interested in these, ask about getting a prescription or talk to your primary care doctor about what may be the best way for you to quit.     \          Follow-ups after your visit        Follow-up notes from your care team     Return if symptoms worsen or fail to improve.      Who to contact     If you have questions or need follow up information about today's clinic visit or your schedule please contact [facility] directly at [phone].  Normal or non-critical lab and imaging results will be communicated to you by MyChart, letter or phone within 4 business days after the clinic has received the results. If you do not hear from us within 7  "days, please contact the clinic through [employer] or phone. If you have a critical or abnormal lab result, we will notify you by phone as soon as possible.  Submit refill requests through [employer] or call your pharmacy and they will forward the refill request to us. Please allow 3 business days for your refill to be completed.          Additional Information About Your Visit        [employer] Information     [employer] gives you secure access to your electronic health record. If you see a primary care provider, you can also send messages to your care team and make appointments. If you have questions, please call your primary care clinic.  If you do not have a primary care provider, please call [phone] and they will assist you.        Care EveryWhere ID     This is your Care EveryWhere ID. This could be used by other organizations to access your [facility] medical records  [id number]        Your Vitals Were     Height BMI (Body Mass Index)                5' 10\" (1.778 m) 31.84 kg/m2           Blood Pressure from Last 3 Encounters:   01/03/18 138/88   12/21/17 141/87   12/20/17 127/76    Weight from Last 3 Encounters:   01/03/18 221 lb 14.4 oz (100.7 kg)   12/21/17 221 lb 14.4 oz (100.7 kg)   12/20/17 229 lb 1.6 oz (103.9 kg)              Today, you had the following     No orders found for display       Primary Care Provider Office Phone # Fax #    Randa JOSEPH LEDY [facility] [phone]       [facility] [address] [facility] [address]  [facility] [address]        Equal Access to Services     JOCELYNE GOMEZ : Hadii aad ku hadasho Soomaali, waaxda luqadaha, qaybta kaalmada adeegyada, gabe rainey . So [facility] [phone].    ATENCIÓN: Si habla español, tiene a mitchell disposición servicios gratuitos de asistencia lingüística. Llame al [phone].    We comply with applicable federal civil rights laws and [place] laws. We do not discriminate on the basis of race, color, national origin, age, " disability, sex, sexual orientation, or gender identity.            Thank you!     Thank you for choosing [facility]  for your care. Our goal is always to provide you with excellent care. Hearing back from our patients is one way we can continue to improve our services. Please take a few minutes to complete the written survey that you may receive in the mail after your visit with us. Thank you!             Your Updated Medication List - Protect others around you: Learn how to safely use, store and throw away your medicines at www.disposemymeds.org.          This list is accurate as of: 1/3/18  7:22 AM.  Always use your most recent med list.                   Brand Name Dispense Instructions for use Diagnosis    celecoxib 100 MG capsule    celeBREX    60 capsule    Take 1 capsule (100 mg) by mouth 2 times daily as needed for moderate pain    Pain       Daily Multi vitamin  /Minerals Tabs     30    1 TABLET DAILY        diltiazem 2% in PLO cream (FV COMPOUNDED) 2% Gel     60 g    To anal opening three times daily.  Use a pea-sized amount.  Store at room temperature.    Anal pain, Anal fissure       gabapentin 600 MG tablet    NEURONTIN    90 tablet    Take 1 tablet (600 mg) by mouth At Bedtime    Restless leg syndrome       HYDROcodone-acetaminophen 5-325 MG per tablet    NORCO    20 tablet    Take 1-2 tablets by mouth every 8 hours as needed for other (Moderate to Severe Pain)    Post-operative state       hydrOXYzine 25 MG tablet    ATARAX    40 tablet    Take 1 tablet (25 mg) by mouth every 6 hours as needed for itching (and nausea)    Post-operative state       MAGNESIUM OXIDE PO      Take 250 mg by mouth        MIRENA (52 MG) 20 MCG/24HR IUD   Generic drug:  levonorgestrel     1 each    one placed in uterus        ondansetron 4 MG tablet    ZOFRAN    30 tablet    Take 1 tablet (4 mg) by mouth every 12 hours as needed for nausea    Drug-induced nausea and vomiting       traZODone 100 MG tablet     DESYREL    180 tablet    Take 2 tablets (200 mg) by mouth At Bedtime    Insomnia       varenicline 1 MG tablet    CHANTIX    56 tablet    Take 1 tablet (1 mg) by mouth 2 times daily    Tobacco use disorder       venlafaxine 75 MG 24 hr capsule    EFFEXOR-XR    270 capsule    Take 3 capsules (225 mg) by mouth daily    Major depressive disorder, recurrent episode, moderate (H)

## 2018-01-03 NOTE — PROGRESS NOTES
SUBJECTIVE:  Sharmin is here today for initial follow up of her right foot cheilectomy. Pain is rated as 1/10.  She changed the dressing a couple times because it became loose.  No other concerns..  Review of Systems: Denies post op injury, fevers, calf pain, chest pain, shortness of breath.  OBJECTIVE:  Exam: No apparent distress, relaxed comfortable. Vitals are reviewed from the nursing note.   Vascular: Pulses are palpable on the right. There is adequate capillary fill time to the digits.  Neuro: Gross touch is intact to the digits. There is minimal to no appreciable numbness around the surgical incision per the patient.  Dermatologic examination: The surgical incision is well-healed and fine. Sutures are intact and are removed without complication. There is no surrounding redness, drainage, nor other signs of infection. There is normal postoperative edema and ecchymosis.  MS: No gross deformity.  Radiographs:  n/a  ASSESSMENT:   1. 13 days s/p right foot cheilectomy  PLAN:   Sutures were removed today. Steristrips applied.  The patient can get their foot wet starting tomorrow. They should wait to submerge the foot for another week. They should transition to regular shoes as tolerated.  Wear boot primarily for the next week however. They can continue with their Ace wrap for edema control. They should ice and elevate as needed. They will follow up as needed.  Mo Edgar DPM, FACFAS

## 2018-01-03 NOTE — PATIENT INSTRUCTIONS
You can get your foot/ankle wet starting tomorrow.   No soaking of the foot/ankle for a week.  Continue icing, elevation, ACE wrap as needed.  Continue in the boot/shoe as instructed in clinic.  Transition to regular supportive shoe as tolerated.    SMOKING CESSATION  What's in cigarette smoke? - Cigarette smoke contains over 4,000 chemicals. Nicotine is one of the main ingredients which is an insecticide/herbicide. It is poisonous to our nervous system, increases blood clotting risk, and decreases the body's defenses to fight off infection. Another chemical is Carbon Monoxide is an asphyxiating gas that permanently binds to blood cells and blocks the transport of oxygen. This leads to tissue death and decreases your metabolism. Tar is a chemical that coats your lungs and trachea which impairs new oxygen coming in and carbon dioxide getting out of your body.   How does smoking impact surgery? - Smoking is particularly hazardous with regards to surgery. Surgery puts stress on the body and a smoker's body is already under strain from these chemicals. Putting the two together, especially for an elective surgery, could be a recipe for disaster. Smoking before and after surgery increases your risk of heart problems, slow wound healing, delayed bone healing, blood clots, wound infection and anesthesia complications.   What are the benefits of quitting? - In 20 minutes your blood pressure will drop back down to normal. In 8 hours the carbon monoxide (a toxic gas) levels in your blood stream will drop by half, and oxygen levels will return to normal. In 48 hours your chance of having a heart attack will have decreased. All nicotine will have left your body. Your sense of taste and smell will return to a normal level. In 72 hours your bronchial tubes will relax, and your energy levels will increase. In 2 weeks your circulation will increase, and it will continue to improve for the next 10 weeks.    Recommendations for  elective surgery - Ideally, patients should quit smoking 8 weeks before and at least 2 weeks after elective surgery in order to avoid complications. Simply cutting back on the amount of cigarettes smoked per day does not offer any benefit or decrease the risk of poor wound healing, heart problems, and infection. Smokers should also start taking Vitamin C and B for two weeks before surgery and two weeks after surgery.    Ways to Stop Smokin. Nicotine patches, lozenges, or gum  2. Support Groups  3. Medications (see below)    List of Medications:  1. Varenicline Tartrate (CHANTIX)   2. Bupropion HCL (WELLBUTRIN, ZYBAN)   note: make sure Wellbutrin is for smoking cessation and not other issues   3. Nicotine Patch (NICODERM)   4. Nicotine Inhaler (NICOTROL)   5. Nicotine Gum Nicotine Polacrilex   6. Nicotine Lozenge: Nicotine Polacrilex (COMMIT)   * [place] offers a smoking support group as well!  Please visit: [email or web address]  If you are interested in these, ask about getting a prescription or talk to your primary care doctor about what may be the best way for you to quit.     \

## 2018-01-03 NOTE — NURSING NOTE
"Chief Complaint   Patient presents with     Surgical Followup     PO DOS: 12/21/17 R foot cheilectomy        Initial /88 (BP Location: Left arm, Patient Position: Chair, Cuff Size: Adult Large)  Ht 5' 10\" (1.778 m)  Wt 221 lb 14.4 oz (100.7 kg)  BMI 31.84 kg/m2 Estimated body mass index is 31.84 kg/(m^2) as calculated from the following:    Height as of this encounter: 5' 10\" (1.778 m).    Weight as of this encounter: 221 lb 14.4 oz (100.7 kg).  Medication Reconciliation: kris Urena MA January 3, 2018 7:04 AM  "

## 2018-01-03 NOTE — PROGRESS NOTES
Weight management plan: Patient was referred to their PCP to discuss a diet and exercise plan.     KHOA Urena MA January 3, 2018 7:05 AM

## 2018-01-14 ENCOUNTER — MYC REFILL (OUTPATIENT)
Dept: FAMILY MEDICINE | Facility: CLINIC | Age: 48
End: 2018-01-14

## 2018-01-14 DIAGNOSIS — R52 PAIN: ICD-10-CM

## 2018-01-15 NOTE — TELEPHONE ENCOUNTER
"Requested Prescriptions   Pending Prescriptions Disp Refills     celecoxib (CELEBREX) 100 MG capsule  Last Written Prescription Date:  10/4/17  Last Fill Quantity: 60,  # refills: 1   Last Office Visit with [facility], ИВАН or [facility] prescribing provider:  10/4/17   Future Office Visit:      60 capsule 1     Sig: Take 1 capsule (100 mg) by mouth 2 times daily as needed for moderate pain    NSAID Medications Failed    1/15/2018  8:45 AM       Failed - Normal ALT on file in past 12 months    Recent Labs   Lab Test  09/16/16   1034   ALT  20            Failed - Normal AST on file in past 12 months    Recent Labs   Lab Test  09/16/16   1034   AST  13            Failed - Normal serum creatinine on file in past 12 months    Recent Labs   Lab Test  09/16/16   1034   CR  0.65            Failed - No positive pregnancy test in past 12 months       Passed - Blood pressure under 140/90    BP Readings from Last 3 Encounters:   01/03/18 138/88   12/21/17 141/87   12/20/17 127/76                Passed - Recent or future visit with authorizing provider's specialty    Patient had office visit in the last year or has a visit in the next 30 days with authorizing provider.  See \"Patient Info\" tab in inbasket, or \"Choose Columns\" in Meds & Orders section of the refill encounter.              Passed - Patient is age 6-64 years       Passed - Normal CBC on file in past 12 months    Recent Labs   Lab Test  10/04/17   1249   WBC  8.0   RBC  4.01   HGB  13.8   HCT  40.3   PLT  244            Passed - No active pregnancy on record          "

## 2018-01-15 NOTE — TELEPHONE ENCOUNTER
Message from [employer]:  Original authorizing provider: LEDY Seaman CNP would like a refill of the following medications:  celecoxib (CELEBREX) 100 MG capsule [LEDY Seaman CNP]    Preferred pharmacy: [address]    Comment:

## 2018-01-16 NOTE — TELEPHONE ENCOUNTER
Mariya--    Patient is requesting refill of Celebrex 100 mg capsules.    Anna Jackson RN  [facility]

## 2018-01-17 RX ORDER — CELECOXIB 100 MG/1
100 CAPSULE ORAL 2 TIMES DAILY PRN
Qty: 60 CAPSULE | Refills: 1 | Status: SHIPPED | OUTPATIENT
Start: 2018-01-17 | End: 2018-04-02

## 2018-01-24 ENCOUNTER — MYC REFILL (OUTPATIENT)
Dept: FAMILY MEDICINE | Facility: CLINIC | Age: 48
End: 2018-01-24

## 2018-01-24 DIAGNOSIS — G47.00 INSOMNIA: ICD-10-CM

## 2018-01-25 NOTE — TELEPHONE ENCOUNTER
Message from MyChart:  Original authorizing provider: LEDY Seaman CNP would like a refill of the following medications:  traZODone (DESYREL) 100 MG tablet [LEDY Seaman CNP]    Preferred pharmacy: [address]    Comment:

## 2018-01-25 NOTE — TELEPHONE ENCOUNTER
"Requested Prescriptions   Pending Prescriptions Disp Refills     traZODone (DESYREL) 100 MG tablet 180 tablet 1    Last Written Prescription Date:  8/15/17  Last Fill Quantity: 180,  # refills: 1   Last Office Visit with G, P or [facility] prescribing provider:  10/4/17   Future Office Visit:      Sig: Take 2 tablets (200 mg) by mouth At Bedtime    Serotonin Modulators Failed    1/24/2018  8:04 PM       Failed - No positive pregnancy test in past 12 months       Passed - Recent or future visit with authorizing provider's specialty    Patient had office visit in the last year or has a visit in the next 30 days with authorizing provider.  See \"Patient Info\" tab in inbasket, or \"Choose Columns\" in Meds & Orders section of the refill encounter.            Passed - Patient is age 18 or older       Passed - No active pregnancy on record          "

## 2018-01-26 RX ORDER — TRAZODONE HYDROCHLORIDE 100 MG/1
200 TABLET ORAL AT BEDTIME
Qty: 180 TABLET | Refills: 1 | Status: SHIPPED | OUTPATIENT
Start: 2018-01-26 | End: 2018-07-03

## 2018-01-26 NOTE — TELEPHONE ENCOUNTER
Prescription approved per [facility] [place] Refill Protocol.    Anna Jackson, BSN RN  [facility]

## 2018-03-12 DIAGNOSIS — F33.1 MAJOR DEPRESSIVE DISORDER, RECURRENT EPISODE, MODERATE (H): ICD-10-CM

## 2018-03-12 NOTE — TELEPHONE ENCOUNTER
"Requested Prescriptions   Pending Prescriptions Disp Refills     venlafaxine (EFFEXOR-XR) 75 MG 24 hr capsule 270 capsule 1    Last Written Prescription Date:  12/19/17  Last Fill Quantity: 270,  # refills: 1   Last office visit: 10/4/2017 with prescribing provider:  10/4/17   Future Office Visit:     Sig: Take 3 capsules (225 mg) by mouth daily    Serotonin-Norepinephrine Reuptake Inhibitors  Failed    3/12/2018  2:08 PM       Failed - PHQ-9 score of less than 5 in past 6 months    The PHQ-9 criteria is meant to fail. It requires a PHQ-9 score review         Failed - Normal serum creatinine on file in past 12 months    Recent Labs   Lab Test  09/16/16   1034   CR  0.65            Passed - Blood pressure under 140/90 in past 12 months    BP Readings from Last 3 Encounters:   01/03/18 138/88   12/21/17 141/87   12/20/17 127/76                Passed - Patient is age 18 or older       Passed - No active pregnancy on record       Passed - No positive pregnancy test in past 12 months       Passed - Recent (6 mo) or future (30 days) visit within the authorizing provider's specialty    Patient had office visit in the last 6 months or has a visit in the next 30 days with authorizing provider or within the authorizing provider's specialty.  See \"Patient Info\" tab in inbasket, or \"Choose Columns\" in Meds & Orders section of the refill encounter.              "

## 2018-03-13 ENCOUNTER — TELEPHONE (OUTPATIENT)
Dept: FAMILY MEDICINE | Facility: CLINIC | Age: 48
End: 2018-03-13

## 2018-03-13 NOTE — TELEPHONE ENCOUNTER
Mariya,    Patient is requesting a refill of venlafaxine (effexor-XR) 75 mg 24 hour tablets.     Routing to provider because failed protocol-- last CR over a year ago.    Creatinine   Date Value Ref Range Status   09/16/2016 0.65 0.52 - 1.04 mg/dL Final     Please review/sign or advise.     Yu Malone RN

## 2018-03-14 RX ORDER — VENLAFAXINE HYDROCHLORIDE 75 MG/1
225 CAPSULE, EXTENDED RELEASE ORAL DAILY
Qty: 270 CAPSULE | Refills: 1 | Status: SHIPPED | OUTPATIENT
Start: 2018-03-14 | End: 2018-09-08

## 2018-03-14 NOTE — TELEPHONE ENCOUNTER
Left VM for pt to return call to clinic    Sapna Bowie RN   [facility]

## 2018-03-15 NOTE — TELEPHONE ENCOUNTER
PHQ-9 SCORE 10/4/2017 10/4/2017 3/14/2018   Total Score - - -   Total Score MyChart - 8 (Mild depression) 4 (Minimal depression)   Total Score 8 9 4   Some encounter information is confidential and restricted. Go to Review Flowsheets activity to see all data.     Yu Malone RN

## 2018-04-10 DIAGNOSIS — R52 PAIN: ICD-10-CM

## 2018-04-10 DIAGNOSIS — Z51.81 ENCOUNTER FOR THERAPEUTIC DRUG MONITORING: ICD-10-CM

## 2018-04-10 LAB
ALBUMIN SERPL-MCNC: 4.2 G/DL (ref 3.4–5)
ALP SERPL-CCNC: 60 U/L (ref 40–150)
ALT SERPL W P-5'-P-CCNC: 16 U/L (ref 0–50)
ANION GAP SERPL CALCULATED.3IONS-SCNC: 7 MMOL/L (ref 3–14)
AST SERPL W P-5'-P-CCNC: 13 U/L (ref 0–45)
BILIRUB SERPL-MCNC: 0.5 MG/DL (ref 0.2–1.3)
BUN SERPL-MCNC: 13 MG/DL (ref 7–30)
CALCIUM SERPL-MCNC: 9 MG/DL (ref 8.5–10.1)
CHLORIDE SERPL-SCNC: 103 MMOL/L (ref 94–109)
CO2 SERPL-SCNC: 25 MMOL/L (ref 20–32)
CREAT SERPL-MCNC: 0.79 MG/DL (ref 0.52–1.04)
GFR SERPL CREATININE-BSD FRML MDRD: 78 ML/MIN/1.7M2
GLUCOSE SERPL-MCNC: 122 MG/DL (ref 70–99)
POTASSIUM SERPL-SCNC: 4.2 MMOL/L (ref 3.4–5.3)
PROT SERPL-MCNC: 7.5 G/DL (ref 6.8–8.8)
SODIUM SERPL-SCNC: 136 MMOL/L (ref 133–144)

## 2018-05-16 NOTE — PROGRESS NOTES
Sharmin,    Your labs were all normal.  If you have any questions, please feel free to contact the clinic.    TOMMY Clinton

## 2018-07-03 DIAGNOSIS — G47.00 INSOMNIA: ICD-10-CM

## 2018-07-03 RX ORDER — TRAZODONE HYDROCHLORIDE 100 MG/1
200 TABLET ORAL AT BEDTIME
Qty: 180 TABLET | Refills: 0 | Status: SHIPPED | OUTPATIENT
Start: 2018-07-03 | End: 2018-10-01

## 2018-07-03 NOTE — TELEPHONE ENCOUNTER
Prescription approved per [facility] Refill Protocol.    Sapna Bowie RN   [facility]

## 2018-07-03 NOTE — TELEPHONE ENCOUNTER
"Requested Prescriptions   Pending Prescriptions Disp Refills     traZODone (DESYREL) 100 MG tablet 180 tablet 1    Last Written Prescription Date:  1/26/18  Last Fill Quantity: 180,  # refills: 1   Last office visit: 10/4/2017 with prescribing provider:  10/4/17   Future Office Visit:     Sig: Take 2 tablets (200 mg) by mouth At Bedtime    Serotonin Modulators Passed    7/3/2018 10:46 AM       Passed - Recent (12 mo) or future (30 days) visit within the authorizing provider's specialty    Patient had office visit in the last 12 months or has a visit in the next 30 days with authorizing provider or within the authorizing provider's specialty.  See \"Patient Info\" tab in inbasket, or \"Choose Columns\" in Meds & Orders section of the refill encounter.           Passed - Patient is age 18 or older       Passed - No active pregnancy on record       Passed - No positive pregnancy test in past 12 months          "

## 2018-07-22 ENCOUNTER — MYC REFILL (OUTPATIENT)
Dept: FAMILY MEDICINE | Facility: CLINIC | Age: 48
End: 2018-07-22

## 2018-07-22 DIAGNOSIS — R52 PAIN: ICD-10-CM

## 2018-07-22 DIAGNOSIS — Z51.81 ENCOUNTER FOR THERAPEUTIC DRUG MONITORING: ICD-10-CM

## 2018-07-24 RX ORDER — CELECOXIB 100 MG/1
100 CAPSULE ORAL 2 TIMES DAILY PRN
Qty: 60 CAPSULE | Refills: 1 | Status: SHIPPED | OUTPATIENT
Start: 2018-07-24 | End: 2018-10-25

## 2018-07-24 NOTE — TELEPHONE ENCOUNTER
Requested Prescriptions   Pending Prescriptions Disp Refills     celecoxib (CELEBREX) 100 MG capsule 60 capsule 1     Sig: Take 1 capsule (100 mg) by mouth 2 times daily as needed for moderate pain    There is no refill protocol information for this order   Last Written Prescription Date:  4/4/18  Last Fill Quantity: 60,  # refills: 1   Last office visit: 10/4/2017 with prescribing provider:     Future Office Visit:

## 2018-07-24 NOTE — TELEPHONE ENCOUNTER
Message from efectivoxhart:  Original authorizing provider: LEDY Seaman CNP would like a refill of the following medications:  celecoxib (CELEBREX) 100 MG capsule [LEDY Seaman CNP]    Preferred pharmacy: [address]    Comment:

## 2018-08-01 ENCOUNTER — RADIANT APPOINTMENT (OUTPATIENT)
Dept: GENERAL RADIOLOGY | Facility: CLINIC | Age: 48
End: 2018-08-01
Attending: PODIATRIST
Payer: COMMERCIAL

## 2018-08-01 ENCOUNTER — OFFICE VISIT (OUTPATIENT)
Dept: PODIATRY | Facility: CLINIC | Age: 48
End: 2018-08-01
Payer: COMMERCIAL

## 2018-08-01 VITALS
HEIGHT: 70 IN | DIASTOLIC BLOOD PRESSURE: 98 MMHG | BODY MASS INDEX: 30.06 KG/M2 | WEIGHT: 210 LBS | SYSTOLIC BLOOD PRESSURE: 146 MMHG

## 2018-08-01 DIAGNOSIS — M20.5X1 HALLUX LIMITUS OF RIGHT FOOT: Primary | ICD-10-CM

## 2018-08-01 PROCEDURE — 99214 OFFICE O/P EST MOD 30 MIN: CPT | Performed by: PODIATRIST

## 2018-08-01 PROCEDURE — 73630 X-RAY EXAM OF FOOT: CPT | Mod: RT

## 2018-08-01 RX ORDER — METHYLPREDNISOLONE 4 MG
TABLET, DOSE PACK ORAL
Qty: 21 TABLET | Refills: 0 | Status: SHIPPED | OUTPATIENT
Start: 2018-08-01 | End: 2018-11-29

## 2018-08-01 ASSESSMENT — PAIN SCALES - GENERAL: PAINLEVEL: MODERATE PAIN (4)

## 2018-08-01 NOTE — PATIENT INSTRUCTIONS
"  Thank you for choosing [facility]!    Follow up as needed     DR. VAUGHN'S CLINIC LOCATIONS     MONDAY  [place] TUESDAY & FRIDAY AM  [place]   [address]   [address]   [place] [address] [place] [address]   [phone]  [phone]  [phone]       [facility]NESDAY  [facility] [phone]   [address] [place] [address] APPOINTMENTS: [phone]   [place]ao [address] BILLING QUESTIONS: [phone]   [phone]     \  DEGENERATIVE ARTHRITIS OF THE BIG TOE JOINT (hallux limitus/hallux rigidus)   Arthritis of the joint at the base of the big toe (metatarsophalangeal joint) has several causes. Usually it results from repetitive trauma to the joint, secondary to abnormal foot mechanics. Often it is hereditary. However, a one-time traumatic event can lead to arthritis. The condition can worsen with time. The cartilage wears out, joint surfaces are no longer smooth, bone rubs on bone, inflammation occurs with pain, and eventually bone spurs and loose fragments might develop.   The joint is often painful with activity, worse with flimsy shoes or walking barefoot, and it slowly progresses over time. A person might notice the toe \"locking up\" with walking. There often is an obvious, and irritating, bony bump on top of the foot. Shoes might be uncomfortable. In some people the pain is so bothersome that recreational activities sometimes even normal daily activities are difficult to perform.   The pain from this arthritis is likely a combination of joint jamming, cartilage loss and inflammation, and irritation from shoes rubbing on the bump. Sometimes other parts of the foot, leg, or back hurt from altering one's walk to compensate for the painful joint.     Ways to help a person live with the discomfort include wearing a good, supportive shoe with a rigid, rocker-type bottom. An example is a hiking boot. A " rigid sole minimizes bending of the joint, and therefore, joint motion and pain. Shoes with a high toe box allow for less rubbing on the bump. Avoiding barefoot walking, sandals, flip-flops and slippers usually helps.     Sometimes an insert or orthotic provides symptom relief. This might make shoe fit more difficult. Pads over the bump and occasionally injections into the joint provide relief.     Surgery for this condition is aimed towards alleviating pain. It does not cure the arthritis nor does it guarantee better joint motion. Depending on the condition of the metatarsophalangeal joint, there are several surgical options:    1.  Cutting off the bony bump(s) and cleaning the joint    2.  Loosening the joint up by making cuts in the first metatarsal bone or the big toe bone and removing a small section of bone.    3.  Repositioning bone to minimize jamming of the joint.    4.  In severe cases, the joint is fused. By fusing the joint, it will never bend again. This resolves the pain, because it's the movement of a worn out joint that causes pain. Oftentimes the operation involves a combination of these procedures and requires the use of screws, pins, and/or a small surgical plate.     Healing after surgery requires about six weeks of protection. This allows the bone to heal. Maximum recovery takes about one year. The scar tissue and joint structures require this amount of time to finish the healing process. Expect stiffness, swelling and numbness during that time frame.   Surgery for arthritis of the metatarsophalangeal joint does involve side effects. Some side effects are predictable and others are less common but do occur. A scar will be visible and could be irritated by shoes. The shoe may rub on the screw or internal pin requiring surgical removal of these fixation devices. The screw and pin would likely be left in place for a full year. The first toe may remain stiff after surgery. The amount of stiffness is  variable. Most people never regain normal motion of the first toe. This is due to scar tissue inherent to any surgery, in addition to the cumulative effects of arthritis. Sometimes the big toe drifts to one side or the other. Joint fusion is one option to correct an unstable, drifting toe.   All surgical procedures involve risk of infection, numbness, pain, delayed bone healing, osteotomy (bone cut) dislocation, blood clots, continued foot pain, etc. Arthritic joint surgery is quite complex and should not be taken lightly.    Any skin incision can lead to infection. Deep infection might involve the bone and thus repeat surgery and six weeks of IV antibiotics. Scar tissue can cause nerve pain or numbness. This is generally temporary but can be permanent. We do not have treatments that cure nerve problems. Second toe pain could be related to altered mechanics and pressure transferred to the second toe. Delayed bone healing would lengthen the healing time. Some bones simply do not heal. This requires repeat surgery, electronic bone stimulation and/or extended protection. Smokers have an approximate 20% chance of poor bone healing. This is double that of a non-smoker. The bone cut may displace. This may need to be repaired with a second operation. Displacement can cause joint malalignment. Immobility after surgery can cause a blood clot in the legs and lungs. This could result in death.   Foot pain is complex. Most feet hurt for more than one reason. Operating on the arthritic   big toe joint will not necessarily create a pain free foot. Appropriate shoes, healthy body weight, avoidance of bare foot walking and moderation of activity will always be necessary to enjoy foot comfort. Arthritis is incurable even with surgery.     Surgery for this type of arthritis is nevertheless quite successful. Most surgical patients are pleased with their foot following surgery. Many of the issues described above can be controlled by  taking proper care of your foot during the healing process.   Cosmetic bump surgery is discouraged for the reasons listed above. A bump and joint that is comfortable when wearing appropriate shoes should simply be treated with appropriate shoes.   Your surgeon would be happy to fully describe any of the above issues. You should pursue a full understanding of the operation, recovery process and any potential problems that could develop.         Patient to follow up with Primary Care provider regarding elevated blood pressure.      SMOKING CESSATION  What's in cigarette smoke? - Cigarette smoke contains over 4,000 chemicals. Nicotine is one of the main ingredients which is an insecticide/herbicide. It is poisonous to our nervous system, increases blood clotting risk, and decreases the body's defenses to fight off infection. Another chemical is Carbon Monoxide is an asphyxiating gas that permanently binds to blood cells and blocks the transport of oxygen. This leads to tissue death and decreases your metabolism. Tar is a chemical that coats your lungs and trachea which impairs new oxygen coming in and carbon dioxide getting out of your body.   How does smoking impact surgery? - Smoking is particularly hazardous with regards to surgery. Surgery puts stress on the body and a smoker's body is already under strain from these chemicals. Putting the two together, especially for an elective surgery, could be a recipe for disaster. Smoking before and after surgery increases your risk of heart problems, slow wound healing, delayed bone healing, blood clots, wound infection and anesthesia complications.   What are the benefits of quitting? - In 20 minutes your blood pressure will drop back down to normal. In 8 hours the carbon monoxide (a toxic gas) levels in your blood stream will drop by half, and oxygen levels will return to normal. In 48 hours your chance of having a heart attack will have decreased. All nicotine will have  left your body. Your sense of taste and smell will return to a normal level. In 72 hours your bronchial tubes will relax, and your energy levels will increase. In 2 weeks your circulation will increase, and it will continue to improve for the next 10 weeks.    Recommendations for elective surgery - Ideally, patients should quit smoking 8 weeks before and at least 2 weeks after elective surgery in order to avoid complications. Simply cutting back on the amount of cigarettes smoked per day does not offer any benefit or decrease the risk of poor wound healing, heart problems, and infection. Smokers should also start taking Vitamin C and B for two weeks before surgery and two weeks after surgery.    Ways to Stop Smokin. Nicotine patches, lozenges, or gum  2. Support Groups  3. Medications (see below)    List of Medications:  1. Varenicline Tartrate (CHANTIX)   2. Bupropion HCL (WELLBUTRIN, ZYBAN)   note: make sure Wellbutrin is for smoking cessation and not other issues   3. Nicotine Patch (NICODERM)   4. Nicotine Inhaler (NICOTROL)   5. Nicotine Gum Nicotine Polacrilex   6. Nicotine Lozenge: Nicotine Polacrilex (COMMIT)   * [place] offers a smoking support group as well!  Please visit: [email or web address]  If you are interested in these, ask about getting a prescription or talk to your primary care doctor about what may be the best way for you to quit.           Body Mass Index (BMI)  Many things can cause foot and ankle problems. Foot structure, activity level, foot mechanics and injuries are common causes of pain.  One very important issue that often goes unmentioned is body weight. Extra weight can cause increased stress on muscles, ligaments, bones and tendons. Sometimes just a few extra pounds is all it takes to put one over her/his threshold. Without reducing that stress, it can be difficult to alleviate pain.   Some people are uncomfortable addressing this issue, but we feel it is  important for you to think about it. As Foot & Ankle specialists, our job is addressing the lower extremity problem and possible causes.   Regarding extra body weight, we encourage patients to discuss diet and weight management plans with their primary care doctors. It is this team approach that gives you the best opportunity for pain relief and getting you back on your feet.       Patient to follow up with Primary Care provider regarding elevated blood pressure.

## 2018-08-01 NOTE — PROGRESS NOTES
"PATIENT HISTORY:  Sharmin Nieves is a 48 year old female who presents to clinic for right 1st MPJ pain, increasing in the past month.  Pt had cheilectomy 12/21/17.  4-8/10 pain.  Worse with activity, better with rest.  No injury, fevers.  No numbness.  Smoker.  Works at [employer].  Denies related family hx.     EXAM:Vitals: BP (!) 146/98  Ht 5' 10\" (1.778 m)  Wt 210 lb (95.3 kg)  BMI 30.13 kg/m2  BMI= Body mass index is 30.13 kg/(m^2).    General appearance: Patient is alert and fully cooperative with history & exam.  No sign of distress is noted during the visit.     Dermatologic: Skin is intact to R foot.  No paronychia or evidence of soft tissue infection is noted.     Vascular: DP & PT pulses are intact & regular on the R.  Mild R 1st MPJ edema.  CFT and skin temperature are normal.     Neurologic: Lower extremity sensation is intact to light touch.  No evidence of weakness or contracture in the lower extremities.  No evidence of neuropathy.     Musculoskeletal: mild limitation of ROM at 1st MPJ on the right.  Patient is ambulatory without assistive device or brace.  No gross ankle deformity noted.  No foot or ankle joint effusion is noted.    XRs of R foot reviewed with pt.  Some mild reformation of dorsal 1st met spurring.  Small calcification over dorsal joint.     ASSESSMENT: R hallux limitus     PLAN:  Reviewed patient's chart in epic.  Discussed condition and treatment options including pros and cons.    Surgical and non-surgical treatment options for hallux limitus were discussed.  This is a progressive condition.  There is no certainty that the non-fusion procedures will improve joint pain that is caused by arthritis.  I explained that hallux limitus is oftentimes surgically treated in a staged manner.  This means that additional surgery may be necessary over time.  Non-operative treatments like stiff soles, orthotics were discussed.  Shoes that provide extra room for the enlarged joint should be " helpful.  I would anticipate somewhat short term benefit from joint injection.    Fusion may be needed in the future.  Advised smoking cessation prior.    Pt is wearing poorly supportive flexible shoes.  Advised stiffer soled shoes, superfeet inserts.     Medrol prescribed.    Pt agrees with plan.  F/u prn.    Mo Edgar DPM, FACFAS    Weight management plan: Patient was referred to their PCP to discuss a diet and exercise plan.     Patient to follow up with Primary Care provider regarding elevated blood pressure.

## 2018-08-01 NOTE — MR AVS SNAPSHOT
"              After Visit Summary   8/1/2018    Sharmin Nieves    MRN: [id number]           Patient Information     Date Of Birth          1970        Visit Information        Provider Department      8/1/2018 7:00 AM Mo Vaughn DPM [facility]        Today's Diagnoses     Hallux limitus of right foot    -  1      Care Instructions      Thank you for choosing [facility]!    Follow up as needed     DR. VAUGHN'S CLINIC LOCATIONS     MONDAY  [place] TUESDAY & FRIDAY AM  [place]   [address] [facility]   [address]   [place] [address] [place] [address]   [phone]  [phone]  [phone]       WEDNESDAY  [place] SCHEDULE SURGERY: [phone]   [address] [place] [address] APPOINTMENTS: [phone]   [place] [address] BILLING QUESTIONS: [phone]   [phone]     \  DEGENERATIVE ARTHRITIS OF THE BIG TOE JOINT (hallux limitus/hallux rigidus)   Arthritis of the joint at the base of the big toe (metatarsophalangeal joint) has several causes. Usually it results from repetitive trauma to the joint, secondary to abnormal foot mechanics. Often it is hereditary. However, a one-time traumatic event can lead to arthritis. The condition can worsen with time. The cartilage wears out, joint surfaces are no longer smooth, bone rubs on bone, inflammation occurs with pain, and eventually bone spurs and loose fragments might develop.   The joint is often painful with activity, worse with flimsy shoes or walking barefoot, and it slowly progresses over time. A person might notice the toe \"locking up\" with walking. There often is an obvious, and irritating, bony bump on top of the foot. Shoes might be uncomfortable. In some people the pain is so bothersome that recreational activities sometimes even normal daily activities are difficult to perform.   The pain from this arthritis is likely a combination of " joint jamming, cartilage loss and inflammation, and irritation from shoes rubbing on the bump. Sometimes other parts of the foot, leg, or back hurt from altering one's walk to compensate for the painful joint.     Ways to help a person live with the discomfort include wearing a good, supportive shoe with a rigid, rocker-type bottom. An example is a hiking boot. A rigid sole minimizes bending of the joint, and therefore, joint motion and pain. Shoes with a high toe box allow for less rubbing on the bump. Avoiding barefoot walking, sandals, flip-flops and slippers usually helps.     Sometimes an insert or orthotic provides symptom relief. This might make shoe fit more difficult. Pads over the bump and occasionally injections into the joint provide relief.     Surgery for this condition is aimed towards alleviating pain. It does not cure the arthritis nor does it guarantee better joint motion. Depending on the condition of the metatarsophalangeal joint, there are several surgical options:    1.  Cutting off the bony bump(s) and cleaning the joint    2.  Loosening the joint up by making cuts in the first metatarsal bone or the big toe bone and removing a small section of bone.    3.  Repositioning bone to minimize jamming of the joint.    4.  In severe cases, the joint is fused. By fusing the joint, it will never bend again. This resolves the pain, because it's the movement of a worn out joint that causes pain. Oftentimes the operation involves a combination of these procedures and requires the use of screws, pins, and/or a small surgical plate.     Healing after surgery requires about six weeks of protection. This allows the bone to heal. Maximum recovery takes about one year. The scar tissue and joint structures require this amount of time to finish the healing process. Expect stiffness, swelling and numbness during that time frame.   Surgery for arthritis of the metatarsophalangeal joint does involve side effects.  Some side effects are predictable and others are less common but do occur. A scar will be visible and could be irritated by shoes. The shoe may rub on the screw or internal pin requiring surgical removal of these fixation devices. The screw and pin would likely be left in place for a full year. The first toe may remain stiff after surgery. The amount of stiffness is variable. Most people never regain normal motion of the first toe. This is due to scar tissue inherent to any surgery, in addition to the cumulative effects of arthritis. Sometimes the big toe drifts to one side or the other. Joint fusion is one option to correct an unstable, drifting toe.   All surgical procedures involve risk of infection, numbness, pain, delayed bone healing, osteotomy (bone cut) dislocation, blood clots, continued foot pain, etc. Arthritic joint surgery is quite complex and should not be taken lightly.    Any skin incision can lead to infection. Deep infection might involve the bone and thus repeat surgery and six weeks of IV antibiotics. Scar tissue can cause nerve pain or numbness. This is generally temporary but can be permanent. We do not have treatments that cure nerve problems. Second toe pain could be related to altered mechanics and pressure transferred to the second toe. Delayed bone healing would lengthen the healing time. Some bones simply do not heal. This requires repeat surgery, electronic bone stimulation and/or extended protection. Smokers have an approximate 20% chance of poor bone healing. This is double that of a non-smoker. The bone cut may displace. This may need to be repaired with a second operation. Displacement can cause joint malalignment. Immobility after surgery can cause a blood clot in the legs and lungs. This could result in death.   Foot pain is complex. Most feet hurt for more than one reason. Operating on the arthritic   big toe joint will not necessarily create a pain free foot. Appropriate shoes,  healthy body weight, avoidance of bare foot walking and moderation of activity will always be necessary to enjoy foot comfort. Arthritis is incurable even with surgery.     Surgery for this type of arthritis is nevertheless quite successful. Most surgical patients are pleased with their foot following surgery. Many of the issues described above can be controlled by taking proper care of your foot during the healing process.   Cosmetic bump surgery is discouraged for the reasons listed above. A bump and joint that is comfortable when wearing appropriate shoes should simply be treated with appropriate shoes.   Your surgeon would be happy to fully describe any of the above issues. You should pursue a full understanding of the operation, recovery process and any potential problems that could develop.         Patient to follow up with Primary Care provider regarding elevated blood pressure.      SMOKING CESSATION  What's in cigarette smoke? - Cigarette smoke contains over 4,000 chemicals. Nicotine is one of the main ingredients which is an insecticide/herbicide. It is poisonous to our nervous system, increases blood clotting risk, and decreases the body's defenses to fight off infection. Another chemical is Carbon Monoxide is an asphyxiating gas that permanently binds to blood cells and blocks the transport of oxygen. This leads to tissue death and decreases your metabolism. Tar is a chemical that coats your lungs and trachea which impairs new oxygen coming in and carbon dioxide getting out of your body.   How does smoking impact surgery? - Smoking is particularly hazardous with regards to surgery. Surgery puts stress on the body and a smoker's body is already under strain from these chemicals. Putting the two together, especially for an elective surgery, could be a recipe for disaster. Smoking before and after surgery increases your risk of heart problems, slow wound healing, delayed bone healing, blood clots, wound  infection and anesthesia complications.   What are the benefits of quitting? - In 20 minutes your blood pressure will drop back down to normal. In 8 hours the carbon monoxide (a toxic gas) levels in your blood stream will drop by half, and oxygen levels will return to normal. In 48 hours your chance of having a heart attack will have decreased. All nicotine will have left your body. Your sense of taste and smell will return to a normal level. In 72 hours your bronchial tubes will relax, and your energy levels will increase. In 2 weeks your circulation will increase, and it will continue to improve for the next 10 weeks.    Recommendations for elective surgery - Ideally, patients should quit smoking 8 weeks before and at least 2 weeks after elective surgery in order to avoid complications. Simply cutting back on the amount of cigarettes smoked per day does not offer any benefit or decrease the risk of poor wound healing, heart problems, and infection. Smokers should also start taking Vitamin C and B for two weeks before surgery and two weeks after surgery.    Ways to Stop Smokin. Nicotine patches, lozenges, or gum  2. Support Groups  3. Medications (see below)    List of Medications:  1. Varenicline Tartrate (CHANTIX)   2. Bupropion HCL (WELLBUTRIN, ZYBAN) - note: make sure Wellbutrin is for smoking cessation and not other issues   3. Nicotine Patch (NICODERM)   4. Nicotine Inhaler (NICOTROL)   5. Nicotine Gum Nicotine Polacrilex   6. Nicotine Lozenge: Nicotine Polacrilex (COMMIT)   * [facility] offers a smoking support group as well!  Please visit: [email or web address]  If you are interested in these, ask about getting a prescription or talk to your primary care doctor about what may be the best way for you to quit.           Body Mass Index (BMI)  Many things can cause foot and ankle problems. Foot structure, activity level, foot mechanics and injuries are common causes of pain.  One very  important issue that often goes unmentioned is body weight. Extra weight can cause increased stress on muscles, ligaments, bones and tendons. Sometimes just a few extra pounds is all it takes to put one over her/his threshold. Without reducing that stress, it can be difficult to alleviate pain.   Some people are uncomfortable addressing this issue, but we feel it is important for you to think about it. As Foot & Ankle specialists, our job is addressing the lower extremity problem and possible causes.   Regarding extra body weight, we encourage patients to discuss diet and weight management plans with their primary care doctors. It is this team approach that gives you the best opportunity for pain relief and getting you back on your feet.       Patient to follow up with Primary Care provider regarding elevated blood pressure.            Follow-ups after your visit        Follow-up notes from your care team     Return if symptoms worsen or fail to improve.      Who to contact     If you have questions or need follow up information about today's clinic visit or your schedule please contact [facility] directly at [phone].  Normal or non-critical lab and imaging results will be communicated to you by Admaximhart, letter or phone within 4 business days after the clinic has received the results. If you do not hear from us within 7 days, please contact the clinic through Trackyt or phone. If you have a critical or abnormal lab result, we will notify you by phone as soon as possible.  Submit refill requests through [employer] or call your pharmacy and they will forward the refill request to us. Please allow 3 business days for your refill to be completed.          Additional Information About Your Visit        AdmaximharChideo Information     [employer] gives you secure access to your electronic health record. If you see a primary care provider, you can also send messages to your care team and make appointments. If you  "have questions, please call your primary care clinic.  If you do not have a primary care provider, please call [phone] and they will assist you.        Care EveryWhere ID     This is your Care EveryWhere ID. This could be used by other organizations to access your [facility] medical records  [id number]        Your Vitals Were     Height BMI (Body Mass Index)                5' 10\" (1.778 m) 30.13 kg/m2           Blood Pressure from Last 3 Encounters:   08/01/18 (!) 146/98   01/03/18 138/88   12/21/17 141/87    Weight from Last 3 Encounters:   08/01/18 210 lb (95.3 kg)   01/03/18 221 lb 14.4 oz (100.7 kg)   12/21/17 221 lb 14.4 oz (100.7 kg)              We Performed the Following     XR Foot Right G/E 3 Views          Today's Medication Changes          These changes are accurate as of 8/1/18  7:34 AM.  If you have any questions, ask your nurse or doctor.               Start taking these medicines.        Dose/Directions    methylPREDNISolone 4 MG tablet   Commonly known as:  MEDROL DOSEPAK   Used for:  Hallux limitus of right foot   Started by:  Mo Edgar DPM        Follow package instructions   Quantity:  21 tablet   Refills:  0            Where to get your medicines      These medications were sent to [facility] [address]  [address] [place] [address]    Hours:  TRANSPLANT PHONE NUMBER [phone] Phone:  [phone]     methylPREDNISolone 4 MG tablet                Primary Care Provider Office Phone # Fax #    Randa Bill, APRN [facility] [phone]       [address]  [facility] [address]        Equal Access to Services     JOCELYNE GOMEZ AH: Osmin Santana, marvin nagel, mikki gomez, gabe brown. [facility] [phone].    ATENCIÓN: Si habla español, tiene a mitchell disposición servicios gratuitos de asistencia lingüística. Llame al " [phone].    We comply with applicable federal civil rights laws and [place] laws. We do not discriminate on the basis of race, color, national origin, age, disability, sex, sexual orientation, or gender identity.            Thank you!     Thank you for choosing [facility]  for your care. Our goal is always to provide you with excellent care. Hearing back from our patients is one way we can continue to improve our services. Please take a few minutes to complete the written survey that you may receive in the mail after your visit with us. Thank you!             Your Updated Medication List - Protect others around you: Learn how to safely use, store and throw away your medicines at www.disposemymeds.org.          This list is accurate as of 8/1/18  7:34 AM.  Always use your most recent med list.                   Brand Name Dispense Instructions for use Diagnosis    celecoxib 100 MG capsule    celeBREX    60 capsule    Take 1 capsule (100 mg) by mouth 2 times daily as needed for moderate pain    Pain, Encounter for therapeutic drug monitoring       Daily Multi vitamin  /Minerals Tabs     30    1 TABLET DAILY        diltiazem 2% in PLO cream (FV COMPOUNDED) 2% Gel     60 g    To anal opening three times daily.  Use a pea-sized amount.  Store at room temperature.    Anal pain, Anal fissure       gabapentin 600 MG tablet    NEURONTIN    90 tablet    Take 1 tablet (600 mg) by mouth At Bedtime    Restless leg syndrome       HYDROcodone-acetaminophen 5-325 MG per tablet    NORCO    20 tablet    Take 1-2 tablets by mouth every 8 hours as needed for other (Moderate to Severe Pain)    Post-operative state       hydrOXYzine 25 MG tablet    ATARAX    40 tablet    Take 1 tablet (25 mg) by mouth every 6 hours as needed for itching (and nausea)    Post-operative state       MAGNESIUM OXIDE PO      Take 250 mg by mouth        methylPREDNISolone 4 MG tablet    MEDROL DOSEPAK    21 tablet    Follow package  instructions    Hallux limitus of right foot       MIRENA (52 MG) 20 MCG/24HR IUD   Generic drug:  levonorgestrel     1 each    one placed in uterus        ondansetron 4 MG tablet    ZOFRAN    30 tablet    Take 1 tablet (4 mg) by mouth every 12 hours as needed for nausea    Drug-induced nausea and vomiting       traZODone 100 MG tablet    DESYREL    180 tablet    Take 2 tablets (200 mg) by mouth At Bedtime    Insomnia       varenicline 1 MG tablet    CHANTIX    56 tablet    Take 1 tablet (1 mg) by mouth 2 times daily    Tobacco use disorder       venlafaxine 75 MG 24 hr capsule    EFFEXOR-XR    270 capsule    Take 3 capsules (225 mg) by mouth daily    Major depressive disorder, recurrent episode, moderate (H)

## 2018-08-01 NOTE — LETTER
"    8/1/2018         RE: Sharmin Nieves  [address]  [place] [address]        Dear Colleague,    Thank you for referring your patient, Sharmin Nieves, to the [facility]. Please see a copy of my visit note below.    PATIENT HISTORY:  Sharmin Nieves is a 48 year old female who presents to clinic for right 1st MPJ pain, increasing in the past month.  Pt had cheilectomy 12/21/17.  4-8/10 pain.  Worse with activity, better with rest.  No injury, fevers.  No numbness.  Smoker.  Works at [employer]  Denies related family hx.     EXAM:Vitals: BP (!) 146/98  Ht 5' 10\" (1.778 m)  Wt 210 lb (95.3 kg)  BMI 30.13 kg/m2  BMI= Body mass index is 30.13 kg/(m^2).    General appearance: Patient is alert and fully cooperative with history & exam.  No sign of distress is noted during the visit.     Dermatologic: Skin is intact to R foot.  No paronychia or evidence of soft tissue infection is noted.     Vascular: DP & PT pulses are intact & regular on the R.  Mild R 1st MPJ edema.  CFT and skin temperature are normal.     Neurologic: Lower extremity sensation is intact to light touch.  No evidence of weakness or contracture in the lower extremities.  No evidence of neuropathy.     Musculoskeletal: mild limitation of ROM at 1st MPJ on the right.  Patient is ambulatory without assistive device or brace.  No gross ankle deformity noted.  No foot or ankle joint effusion is noted.    XRs of R foot reviewed with pt.  Some mild reformation of dorsal 1st met spurring.  Small calcification over dorsal joint.     ASSESSMENT: R hallux limitus     PLAN:  Reviewed patient's chart in epic.  Discussed condition and treatment options including pros and cons.    Surgical and non-surgical treatment options for hallux limitus were discussed.  This is a progressive condition.  There is no certainty that the non-fusion procedures will improve joint pain that is caused by arthritis.  I explained that hallux limitus is " oftentimes surgically treated in a staged manner.  This means that additional surgery may be necessary over time.  Non-operative treatments like stiff soles, orthotics were discussed.  Shoes that provide extra room for the enlarged joint should be helpful.  I would anticipate somewhat short term benefit from joint injection.    Fusion may be needed in the future.  Advised smoking cessation prior.    Pt is wearing poorly supportive flexible shoes.  Advised stiffer soled shoes, superfeet inserts.     Medrol prescribed.    Pt agrees with plan.  F/u prn.    Mo Edgar DPM, FACFAS    Weight management plan: Patient was referred to their PCP to discuss a diet and exercise plan.     Patient to follow up with Primary Care provider regarding elevated blood pressure.          Again, thank you for allowing me to participate in the care of your patient.        Sincerely,        Mo Edgar DPM

## 2018-08-06 ENCOUNTER — MYC MEDICAL ADVICE (OUTPATIENT)
Dept: PODIATRY | Facility: CLINIC | Age: 48
End: 2018-08-06

## 2018-08-14 ENCOUNTER — OFFICE VISIT (OUTPATIENT)
Dept: INTERNAL MEDICINE | Facility: CLINIC | Age: 48
End: 2018-08-14
Payer: COMMERCIAL

## 2018-08-14 ENCOUNTER — TELEPHONE (OUTPATIENT)
Dept: INTERNAL MEDICINE | Facility: CLINIC | Age: 48
End: 2018-08-14

## 2018-08-14 VITALS
OXYGEN SATURATION: 97 % | DIASTOLIC BLOOD PRESSURE: 73 MMHG | SYSTOLIC BLOOD PRESSURE: 158 MMHG | RESPIRATION RATE: 20 BRPM | TEMPERATURE: 98.7 F | HEART RATE: 108 BPM

## 2018-08-14 DIAGNOSIS — R05.9 COUGH: ICD-10-CM

## 2018-08-14 DIAGNOSIS — Z71.6 ENCOUNTER FOR SMOKING CESSATION COUNSELING: ICD-10-CM

## 2018-08-14 DIAGNOSIS — J20.9 ACUTE BRONCHITIS, UNSPECIFIED ORGANISM: ICD-10-CM

## 2018-08-14 DIAGNOSIS — J20.9 ACUTE BRONCHITIS, UNSPECIFIED ORGANISM: Primary | ICD-10-CM

## 2018-08-14 RX ORDER — MONTELUKAST SODIUM 10 MG/1
10 TABLET ORAL AT BEDTIME
Qty: 30 TABLET | Refills: 0 | Status: SHIPPED | OUTPATIENT
Start: 2018-08-14 | End: 2018-11-29

## 2018-08-14 RX ORDER — ALBUTEROL SULFATE 90 UG/1
2 AEROSOL, METERED RESPIRATORY (INHALATION) EVERY 6 HOURS PRN
Qty: 1 INHALER | Refills: 0 | Status: SHIPPED | OUTPATIENT
Start: 2018-08-14 | End: 2018-08-14

## 2018-08-14 RX ORDER — MONTELUKAST SODIUM 10 MG/1
10 TABLET ORAL AT BEDTIME
Qty: 30 TABLET | Refills: 0 | Status: SHIPPED | OUTPATIENT
Start: 2018-08-14 | End: 2018-08-14

## 2018-08-14 RX ORDER — BENZONATATE 200 MG/1
200 CAPSULE ORAL 3 TIMES DAILY PRN
Qty: 21 CAPSULE | Refills: 0 | Status: SHIPPED | OUTPATIENT
Start: 2018-08-14 | End: 2018-08-14

## 2018-08-14 RX ORDER — ALBUTEROL SULFATE 90 UG/1
2 AEROSOL, METERED RESPIRATORY (INHALATION) EVERY 6 HOURS PRN
Qty: 1 INHALER | Refills: 0 | Status: SHIPPED | OUTPATIENT
Start: 2018-08-14 | End: 2018-11-29

## 2018-08-14 RX ORDER — BENZONATATE 200 MG/1
200 CAPSULE ORAL 3 TIMES DAILY PRN
Qty: 21 CAPSULE | Refills: 0 | Status: SHIPPED | OUTPATIENT
Start: 2018-08-14 | End: 2018-11-29

## 2018-08-14 ASSESSMENT — PAIN SCALES - GENERAL: PAINLEVEL: MILD PAIN (2)

## 2018-08-14 NOTE — PROGRESS NOTES
PRIMARY CARE CENTER       SUBJECTIVE:  Sharmin Nieves is a 48 year old female with a PMHx of anxiety, depression, ongoing tobacco use and GERD who comes in for evaluation of a cough for the past 4 days. She awoke last Thursday with coughing that progressively worsened over the next couple of days although it has improved over the last 48 hours. Cough is occasionally productive although mostly dry. She is unsure of the sputum color. She is most uncomfortable at night due to the coughing and would like something to help her sleep. Apart from cough she denies fevers, chills or sweats. She denies sore throat or rhinorrhea. She has been unable to smoke much over the past couple of days (~2 cigarettes per day) and would like some nicotine replacement as well. She was smoking ~1/2 ppd prior to recent illness. Had been using chantix to try to stop smoking but stopped due to side effects. She plans on restarting as she would like to have surgery on her foot but surgeon would like her to stop smoking before surgery.     Medications and allergies reviewed by me today.     ROS:   Constitutional, HEENT, cardiovascular, pulmonary, gi and gu systems are negative, except as otherwise noted.    OBJECTIVE:    /73  Pulse 108  Temp 98.7  F (37.1  C) (Oral)  Resp 20  SpO2 97%  Breastfeeding? No   Wt Readings from Last 1 Encounters:   08/01/18 95.3 kg (210 lb)       GENERAL APPEARANCE: healthy, alert, mildly uncomfortable but non toxic appearing      EYES: EOMI, PERRL. No scleral icterus or erythema.      HENT: ear canals and TM's normal, nose mildly erythematous without rhinorrhea. Oropharynx without erythema or exudate.      NECK: no adenopathy,     RESP: Lung sounds course bilaterally with occasional inspiratory wheezing. No rales. No signs of focal consolidation.      CV: borderline tachycardic,  rhythm normal, S1 S2, no S3 or S4 and no murmur     ABDOMEN:  soft, nontender, no HSM or masses and bowel  sounds normal     MS: extremities normal- no gross deformities noted     SKIN: no suspicious lesions or rashes     PSYCH: mentation appears normal. and affect normal/bright     LYMPHATICS: No cervical adenopathy     ASSESSMENT/PLAN:   Sharmin Nieves is a 48 year old female with a PMHx of anxiety, depression, ongoing tobacco use and GERD who comes in for evaluation of a cough for the past 4 days    Sharmin was seen today for cough.    Diagnoses and all orders for this visit:    Acute bronchitis, unspecified organism  No history of fevers/chills, minimal sputum production and no signs of focal consolidation on lung exam. History is most consistent with a viral URI and acute bronchitis. She has been improving over the last 2 days but would like treatment for cough to help with sleep. No indication for antibiotics, chest x-ray or further infectious work-up at this time. Will prescribe albuterol inhaler, montelukast and Tessalon perles for cough. She would also like nicotine replacement gum for cravings. We discussed continuing to cut down on tobacco use given she has been unable to smoke much since last Thursday. She has no PFTs in our system. I asked her to return to clinic in 5-7 days if her symptoms do not improve or sooner if she develops fever or worsening cough.   -     albuterol (PROAIR HFA/PROVENTIL HFA/VENTOLIN HFA) 108 (90 Base) MCG/ACT inhaler; Inhale 2 puffs into the lungs every 6 hours as needed for shortness of breath / dyspnea or wheezing  -     nicotine polacrilex (NICORETTE) 2 MG gum; Place 1 each (2 mg) inside cheek as needed for smoking cessation    Cough  -     montelukast (SINGULAIR) 10 MG tablet; Take 1 tablet (10 mg) by mouth At Bedtime  -     benzonatate (TESSALON) 200 MG capsule; Take 1 capsule (200 mg) by mouth 3 times daily as needed for cough    Encounter for smoking cessation counseling  -     nicotine polacrilex (NICORETTE) 2 MG gum; Place 1 each (2 mg) inside cheek as needed for smoking  cessation      Pt should return to clinic for f/u with me in 5-7 days if symptoms do not improve.     Jonathan Trujillo MD  Aug 14, 2018    Pt was discussed with Dr. Banda.   While the patient was in clinic, I reviewed the pertinent medical history and results.  I discussed the current findings on physical examination, as well as the patient s diagnosis and treatment plan with the resident and agree with the information as documented with the following exceptions: none.  Esteban Banda

## 2018-08-14 NOTE — PATIENT INSTRUCTIONS
Primary Care Center Phone Number [phone]  Primary Care Center Medication Refill Request Information:  * Please contact your pharmacy regarding ANY request for medication refills.  ** [facility] Prescription Fax = [phone]  * Please allow 3 business days for routine medication refills.  * Please allow 5 business days for controlled substance medication refills.     Primary Care Center Test Result notification information:  *You will be notified with in 7-10 days of your appointment day regarding the results of your test.  If you are on MyChart you will be notified as soon as the provider has reviewed the results and signed off on them.                 [facility]         Internal Medicine Resident                   Continuity Clinic    Who We Are    Resident Continuity Clinic is a part of the [facility] Primary Care [facility].  Resident physicians see patients independently and establish a relationship with them over the course of their three-year residency program.  As with the Primary Care Clinic, our Resident Continuity Clinic models a group practice.  If your doctor is not available, you will be able to see another resident physician.  At the end of a resident s training, patients will be transitioned to a new resident physician for ongoing care.     We treat patients with a wide array of medical needs from routine physicals, to acute illnesses, to diabetes and blood pressure management, to complex medical illness.  What is a Resident Physician?    Resident physicians hold medical degrees and are doctors. They are training to become specialists in Internal Medicine. They work under the supervision of board-certified faculty physicians.  Expectations for Your Care    We strive to provide accessible, quality care at all times.    In order to provide this care, it is best to see your primary care resident doctor consistently rather switching between providers.  In the event you do see another physician, you  should schedule a follow-up visit with your usual primary care doctor.    If you are transitioning your care from another clinic, it is helpful to have your records available for your doctor to review.    We do not prescribe controlled substances, such as ADD medications or narcotic pain medications, on your first visit.  We will review your health records and concerns prior to devising a treatment plan with you in order to provide the best care.      Clinic Services     Extended clinic hours; patient  to help navigate your visit;  parking; laboratory and imaging services with evening and weekend hours    Multiple medical and surgical specialties in one building    Complementary services, including Nutrition, Integrative Medicine, Pharmacy consultations, Mental and Behavioral Health, Sports Medicine and Physical Therapy    Thank You    We would like to thank you for choosing the [facility] Internal Medicine Resident Continuity Clinic for your primary care. You are making a priceless contribution to the training of the next generation of health care practitioners.     Contact us at [phone] for appointments or questions.    Resident Clinic Hours are Tuesdays and Thursdays, 7:30am-5:00pm    Residents  Samira Goodman MD   (Female )   Gabriela Ramos MD   (Female)   Oj Paniagua MD  (Male)   Mo Lewis MD  (Male)   Anish Queen MD   (Female)   Jonathan Trujillo MD  (Male)    Ari Shipman MD  (Male)   Donn Healy MD  (Male)   Mo Lynn MD (Male)   Chidi Vines MD  (Male)   Sophie Jacobs MD (Female)    Alexia King MD (Female)   Stephen Bright MD  (Male)   Sosa Gonzalez MD(Female)   Suly Díaz MD  (Female)    Supervising Physicians   MD Jenny Eduardo MD Briar Duffy, MD James Langland, MD Mary Logeais, MD Tanya Melnik, MD Charles Moldow, MD Heather Thompson Buum, MD Kathleen Watson, MD

## 2018-08-14 NOTE — MR AVS SNAPSHOT
After Visit Summary   8/14/2018    Sharmin Nieves    MRN: [id number]           Patient Information     Date Of Birth          1970        Visit Information        Provider Department      8/14/2018 12:45 PM Jonathan Trujillo MD [facility]        Today's Diagnoses     Acute bronchitis, unspecified organism    -  1    Cough        Encounter for smoking cessation counseling          Care Instructions    Primary Care Center Phone Number [phone]  Primary Care Center Medication Refill Request Information:  * Please contact your pharmacy regarding ANY request for medication refills.  ** [facility] Prescription Fax = [phone]  * Please allow 3 business days for routine medication refills.  * Please allow 5 business days for controlled substance medication refills.     Primary Care Center Test Result notification information:  *You will be notified with in 7-10 days of your appointment day regarding the results of your test.  If you are on MyChart you will be notified as soon as the provider has reviewed the results and signed off on them.                 [facility]         Internal Medicine Resident                   Continuity Clinic    Who We Are    Resident Continuity Clinic is a part of the [facility] Primary Care Clinic.  Resident physicians see patients independently and establish a relationship with them over the course of their three-year residency program.  As with the Primary Care Clinic, our Resident Continuity Clinic models a group practice.  If your doctor is not available, you will be able to see another resident physician.  At the end of a resident s training, patients will be transitioned to a new resident physician for ongoing care.     We treat patients with a wide array of medical needs from routine physicals, to acute illnesses, to diabetes and blood pressure management, to complex medical illness.  What is a Resident Physician?    Resident  physicians hold medical degrees and are doctors. They are training to become specialists in Internal Medicine. They work under the supervision of board-certified faculty physicians.  Expectations for Your Care    We strive to provide accessible, quality care at all times.    In order to provide this care, it is best to see your primary care resident doctor consistently rather switching between providers.  In the event you do see another physician, you should schedule a follow-up visit with your usual primary care doctor.    If you are transitioning your care from another clinic, it is helpful to have your records available for your doctor to review.    We do not prescribe controlled substances, such as ADD medications or narcotic pain medications, on your first visit.  We will review your health records and concerns prior to devising a treatment plan with you in order to provide the best care.      Clinic Services     Extended clinic hours; patient  to help navigate your visit;  parking; laboratory and imaging services with evening and weekend hours    Multiple medical and surgical specialties in one building    Complementary services, including Nutrition, Integrative Medicine, Pharmacy consultations, Mental and Behavioral Health, Sports Medicine and Physical Therapy    Thank You    We would like to thank you for choosing the [facility] Internal Medicine Resident Continuity Clinic for your primary care. You are making a priceless contribution to the training of the next generation of health care practitioners.     Contact us at [phone] for appointments or questions.    Resident Clinic Hours are Tuesdays and Thursdays, 7:30am-5:00pm    Residents  Samira Goodman MD   (Female )   Gabriela Ramos MD   (Female)   Oj Paniagua MD  (Male)   Mo Lewis MD  (Male)   Anish Queen MD   (Female)   Jonathan Trujillo MD  (Male)    Ari Shipman MD  (Male)   Donn Healy,  MD  (Male)   Mo Lynn MD (Male)   Chidi Vines MD  (Male)   Sophie Jacobs MD (Female)    Alexia King MD (Female)   Stephen Bright MD  (Male)   Sosa Gonzalez MD(Female)   Suly Díaz MD  (Female)    Supervising Physicians   Naz Mir, MD Jenny Edgar, MD Kee Corea, MD Esteban Banda, MD Roseann Kwon, MD Jojo Flores, MD Анна Alexander, MD Marisol Asif MD                    Follow-ups after your visit        Follow-up notes from your care team     Return if symptoms worsen or fail to improve.      Who to contact     Please call your clinic at [phone] to:    Ask questions about your health    Make or cancel appointments    Discuss your medicines    Learn about your test results    Speak to your doctor            Additional Information About Your Visit        [employer] Information     [employer] gives you secure access to your electronic health record. If you see a primary care provider, you can also send messages to your care team and make appointments. If you have questions, please call your primary care clinic.  If you do not have a primary care provider, please call [phone] and they will assist you.      [employer] is an electronic gateway that provides easy, online access to your medical records. With [employer], you can request a clinic appointment, read your test results, renew a prescription or communicate with your care team.     To access your existing account, please contact your [facility] or call [phone] for assistance.        Care EveryWhere ID     This is your Care EveryWhere ID. This could be used by other organizations to access your [place] medical records  [id number]        Your Vitals Were     Pulse Temperature Respirations Pulse Oximetry Breastfeeding?       108 98.7  F (37.1  C) (Oral) 20 97% No        Blood Pressure from Last 3 Encounters:   08/14/18 158/73    08/01/18 (!) 146/98   01/03/18 138/88    Weight from Last 3 Encounters:   08/01/18 95.3 kg (210 lb)   01/03/18 100.7 kg (221 lb 14.4 oz)   12/21/17 100.7 kg (221 lb 14.4 oz)              Today, you had the following     No orders found for display         Today's Medication Changes          These changes are accurate as of 8/14/18 11:59 PM.  If you have any questions, ask your nurse or doctor.               Start taking these medicines.        Dose/Directions    albuterol 108 (90 Base) MCG/ACT inhaler   Commonly known as:  PROAIR HFA/PROVENTIL HFA/VENTOLIN HFA   Used for:  Acute bronchitis, unspecified organism   Started by:  Ana Jasso RN        Dose:  2 puff   Inhale 2 puffs into the lungs every 6 hours as needed for shortness of breath / dyspnea or wheezing   Quantity:  1 Inhaler   Refills:  0       benzonatate 200 MG capsule   Commonly known as:  TESSALON   Used for:  Cough   Started by:  Ana Jasso RN        Dose:  200 mg   Take 1 capsule (200 mg) by mouth 3 times daily as needed for cough   Quantity:  21 capsule   Refills:  0       montelukast 10 MG tablet   Commonly known as:  SINGULAIR   Used for:  Cough   Started by:  Ana Jasso RN        Dose:  10 mg   Take 1 tablet (10 mg) by mouth At Bedtime   Quantity:  30 tablet   Refills:  0       nicotine polacrilex 2 MG gum   Commonly known as:  NICORETTE   Used for:  Encounter for smoking cessation counseling, Acute bronchitis, unspecified organism   Started by:  Ana Jasso RN        Dose:  2 mg   Place 1 each (2 mg) inside cheek as needed for smoking cessation   Quantity:  30 tablet   Refills:  1            Where to get your medicines      These medications were sent to [facility], [address], [facility] [address]     Phone:  [phone]     albuterol 108 (90 Base) MCG/ACT inhaler    benzonatate 200 MG capsule    montelukast 10 MG tablet    nicotine  polacrilex 2 MG gum                Primary Care Provider Office Phone # Fax #    Randa Bill, APRN [facility] [phone]       [address] [address]        Equal Access to Services     JOCELYNE GOMEZ : Hadchanell rhodes hadcherylo Soomaali, waaxda luqadaha, qaybta kaalmada adeegyada, gabe jayashreein hayaan elissadaniel milan brigid . So [facility] [phone].    ATENCIÓN: Si habla español, tiene a mitchell disposición servicios gratuitos de asistencia lingüística. Llame al [phone].    We comply with applicable federal civil rights laws and [place] laws. We do not discriminate on the basis of race, color, national origin, age, disability, sex, sexual orientation, or gender identity.            Thank you!     Thank you for choosing [facility]  for your care. Our goal is always to provide you with excellent care. Hearing back from our patients is one way we can continue to improve our services. Please take a few minutes to complete the written survey that you may receive in the mail after your visit with us. Thank you!             Your Updated Medication List - Protect others around you: Learn how to safely use, store and throw away your medicines at www.disposemymeds.org.          This list is accurate as of 8/14/18 11:59 PM.  Always use your most recent med list.                   Brand Name Dispense Instructions for use Diagnosis    albuterol 108 (90 Base) MCG/ACT inhaler    PROAIR HFA/PROVENTIL HFA/VENTOLIN HFA    1 Inhaler    Inhale 2 puffs into the lungs every 6 hours as needed for shortness of breath / dyspnea or wheezing    Acute bronchitis, unspecified organism       benzonatate 200 MG capsule    TESSALON    21 capsule    Take 1 capsule (200 mg) by mouth 3 times daily as needed for cough    Cough       celecoxib 100 MG capsule    celeBREX    60 capsule    Take 1 capsule (100 mg) by mouth 2 times daily as needed for moderate pain    Pain, Encounter for therapeutic drug monitoring        Daily Multi vitamin  /Minerals Tabs     30    1 TABLET DAILY        diltiazem 2% in PLO cream (FV COMPOUNDED) 2% Gel     60 g    To anal opening three times daily.  Use a pea-sized amount.  Store at room temperature.    Anal pain, Anal fissure       gabapentin 600 MG tablet    NEURONTIN    90 tablet    Take 1 tablet (600 mg) by mouth At Bedtime    Restless leg syndrome       HYDROcodone-acetaminophen 5-325 MG per tablet    NORCO    20 tablet    Take 1-2 tablets by mouth every 8 hours as needed for other (Moderate to Severe Pain)    Post-operative state       hydrOXYzine 25 MG tablet    ATARAX    40 tablet    Take 1 tablet (25 mg) by mouth every 6 hours as needed for itching (and nausea)    Post-operative state       MAGNESIUM OXIDE PO      Take 250 mg by mouth        methylPREDNISolone 4 MG tablet    MEDROL DOSEPAK    21 tablet    Follow package instructions    Hallux limitus of right foot       MIRENA (52 MG) 20 MCG/24HR IUD   Generic drug:  levonorgestrel     1 each    one placed in uterus        montelukast 10 MG tablet    SINGULAIR    30 tablet    Take 1 tablet (10 mg) by mouth At Bedtime    Cough       nicotine polacrilex 2 MG gum    NICORETTE    30 tablet    Place 1 each (2 mg) inside cheek as needed for smoking cessation    Encounter for smoking cessation counseling, Acute bronchitis, unspecified organism       ondansetron 4 MG tablet    ZOFRAN    30 tablet    Take 1 tablet (4 mg) by mouth every 12 hours as needed for nausea    Drug-induced nausea and vomiting       traZODone 100 MG tablet    DESYREL    180 tablet    Take 2 tablets (200 mg) by mouth At Bedtime    Insomnia       varenicline 1 MG tablet    CHANTIX    56 tablet    Take 1 tablet (1 mg) by mouth 2 times daily    Tobacco use disorder       venlafaxine 75 MG 24 hr capsule    EFFEXOR-XR    270 capsule    Take 3 capsules (225 mg) by mouth daily    Major depressive disorder, recurrent episode, moderate (H)

## 2018-08-14 NOTE — NURSING NOTE
Chief Complaint   Patient presents with     Cough     pt is here to discuss cough since Thursday 8/9       Samira Lazaro CMA at 12:33 PM on 8/14/2018

## 2018-09-08 ENCOUNTER — MYC REFILL (OUTPATIENT)
Dept: FAMILY MEDICINE | Facility: CLINIC | Age: 48
End: 2018-09-08

## 2018-09-08 DIAGNOSIS — F33.1 MAJOR DEPRESSIVE DISORDER, RECURRENT EPISODE, MODERATE (H): ICD-10-CM

## 2018-09-10 NOTE — TELEPHONE ENCOUNTER
"Requested Prescriptions   Pending Prescriptions Disp Refills     venlafaxine (EFFEXOR-XR) 75 MG 24 hr capsule 270 capsule 1    Last Written Prescription Date:  03/14/2018  Last Fill Quantity: 270,  # refills: 1   Last office visit: 10/4/2017 with prescribing provider:   10/04/2017  Future Office Visit:   Next 5 appointments (look out 90 days)     Sep 21, 2018  8:30 AM CDT   Wally Betancourt with LEDY Wu CNP   [facility] ([facility])    [address] [place] [address] [phone]                  Sig: Take 3 capsules (225 mg) by mouth daily    Serotonin-Norepinephrine Reuptake Inhibitors  Failed    9/10/2018  9:11 AM       Failed - Blood pressure under 140/90 in past 12 months    BP Readings from Last 3 Encounters:   08/14/18 158/73   08/01/18 (!) 146/98   01/03/18 138/88                Failed - PHQ-9 score of less than 5 in past 6 months    Please review last PHQ-9 score.          Passed - Patient is age 18 or older       Passed - No active pregnancy on record       Passed - Normal serum creatinine on file in past 12 months    Recent Labs   Lab Test  04/10/18   0919   CR  0.79            Passed - No positive pregnancy test in past 12 months       Passed - Recent (6 mo) or future (30 days) visit within the authorizing provider's specialty    Patient had office visit in the last 6 months or has a visit in the next 30 days with authorizing provider or within the authorizing provider's specialty.  See \"Patient Info\" tab in inbasket, or \"Choose Columns\" in Meds & Orders section of the refill encounter.              "

## 2018-09-10 NOTE — TELEPHONE ENCOUNTER
Message from MyChart:  Original authorizing provider: LEDY Seaman CNP would like a refill of the following medications:  venlafaxine (EFFEXOR-XR) 75 MG 24 hr capsule [LEDY Seaman CNP]    Preferred pharmacy: [address] 5-447    Comment:

## 2018-09-11 RX ORDER — VENLAFAXINE HYDROCHLORIDE 75 MG/1
225 CAPSULE, EXTENDED RELEASE ORAL DAILY
Qty: 90 CAPSULE | Refills: 0 | Status: SHIPPED | OUTPATIENT
Start: 2018-09-11 | End: 2018-10-10

## 2018-09-11 NOTE — TELEPHONE ENCOUNTER
PHQ-9 complete + Question 9 - suicidal ideation - previously advised these thoughts had stopped    PHQ-9 ([employer]) 10/4/2017 3/14/2018 9/11/2018   9.  Suicidal or self-harm thoughts Several days Not at all Several days     PHQ-9 score:    PHQ-9 SCORE 9/11/2018   Total Score -   Total Score MyChart 9 (Mild depression)   Total Score 9   Some encounter information is confidential and restricted. Go to Review Flowsheets activity to see all data.       RN Triage - patient should be screened for SI and safety - should follow up in office visit     Writer called patient left non detailed message requesting return call to clinic and ask to speak with nurse - ask for a return call today 9/11/2018 and encouraged office visit follow up    Sent mychart as well

## 2018-09-11 NOTE — TELEPHONE ENCOUNTER
PHQ-9 SCORE 10/4/2017 10/4/2017 3/14/2018   Total Score - - -   Total Score MyChart - 8 (Mild depression) 4 (Minimal depression)   Total Score 8 9 4   Some encounter information is confidential and restricted. Go to Review Flowsheets activity to see all data.     Routing refill request to provider for review/approval because:  BP out of range     Sent PHQ-9 via [employer]    Routing to [facility] MA - please call patient to update PHQ-9

## 2018-09-12 NOTE — TELEPHONE ENCOUNTER
Routing to provider - [facility] - please review mychart and advise as appropriate

## 2018-09-12 NOTE — TELEPHONE ENCOUNTER
Looks like patient is scheduled for 9/21.  I have appts this week still if she can come this week.  TOMMY Clinton

## 2018-09-20 ENCOUNTER — MYC MEDICAL ADVICE (OUTPATIENT)
Dept: FAMILY MEDICINE | Facility: CLINIC | Age: 48
End: 2018-09-20

## 2018-10-01 DIAGNOSIS — G47.00 INSOMNIA: Primary | ICD-10-CM

## 2018-10-01 NOTE — TELEPHONE ENCOUNTER
"Requested Prescriptions   Pending Prescriptions Disp Refills     traZODone (DESYREL) 100 MG tablet 180 tablet 0    Last Written Prescription Date:  07/03/2018  Last Fill Quantity: 180,  # refills: 0   Last office visit: 10/4/2017 with prescribing provider:  10/04/2017   Future Office Visit:   Sig: Take 2 tablets (200 mg) by mouth At Bedtime    Serotonin Modulators Passed    10/1/2018  2:04 PM       Passed - Recent (12 mo) or future (30 days) visit within the authorizing provider's specialty    Patient had office visit in the last 12 months or has a visit in the next 30 days with authorizing provider or within the authorizing provider's specialty.  See \"Patient Info\" tab in inbasket, or \"Choose Columns\" in Meds & Orders section of the refill encounter.           Passed - Patient is age 18 or older       Passed - No active pregnancy on record       Passed - No positive pregnancy test in past 12 months        "

## 2018-10-03 RX ORDER — TRAZODONE HYDROCHLORIDE 100 MG/1
200 TABLET ORAL AT BEDTIME
Qty: 180 TABLET | Refills: 0 | Status: SHIPPED | OUTPATIENT
Start: 2018-10-03 | End: 2018-11-30

## 2018-10-03 NOTE — TELEPHONE ENCOUNTER
Medication Refill Request    Medication(s) requested:  traZODone (DESYREL) 100 MG tablet    Last Office Visit: 10/4/18  Next Office Visit: none scheduled at this time   Labs: up to date     Rx approved and refilled per [facility] refill protocol.    Nimisha Araya RN  10/03/18  3:19 PM

## 2018-10-10 ENCOUNTER — MYC MEDICAL ADVICE (OUTPATIENT)
Dept: FAMILY MEDICINE | Facility: CLINIC | Age: 48
End: 2018-10-10

## 2018-10-10 DIAGNOSIS — F33.1 MAJOR DEPRESSIVE DISORDER, RECURRENT EPISODE, MODERATE (H): ICD-10-CM

## 2018-10-10 RX ORDER — VENLAFAXINE HYDROCHLORIDE 75 MG/1
225 CAPSULE, EXTENDED RELEASE ORAL DAILY
Qty: 90 CAPSULE | Refills: 2 | Status: SHIPPED | OUTPATIENT
Start: 2018-10-10 | End: 2019-01-18

## 2018-10-10 NOTE — TELEPHONE ENCOUNTER
Mariya,    Please see [employer] message.    Pt saw you on 09/21/18 for anxiety OV. Last rx sent 09/11 for 30 day supply.    Cued pharmacy/med if okay.    Routing because last PHQ-9 on 09/11/18 was 9.    Yu Malone RN  [facility]

## 2018-10-12 ENCOUNTER — MYC MEDICAL ADVICE (OUTPATIENT)
Dept: PODIATRY | Facility: CLINIC | Age: 48
End: 2018-10-12

## 2018-10-25 ENCOUNTER — MYC REFILL (OUTPATIENT)
Dept: FAMILY MEDICINE | Facility: CLINIC | Age: 48
End: 2018-10-25

## 2018-10-25 DIAGNOSIS — Z51.81 ENCOUNTER FOR THERAPEUTIC DRUG MONITORING: ICD-10-CM

## 2018-10-25 DIAGNOSIS — R52 PAIN: ICD-10-CM

## 2018-10-26 RX ORDER — CELECOXIB 100 MG/1
100 CAPSULE ORAL 2 TIMES DAILY PRN
Qty: 60 CAPSULE | Refills: 0 | Status: SHIPPED | OUTPATIENT
Start: 2018-10-26 | End: 2018-11-30

## 2018-10-26 NOTE — TELEPHONE ENCOUNTER
"Requested Prescriptions   Pending Prescriptions Disp Refills     celecoxib (CELEBREX) 100 MG capsule 60 capsule 1    Last Written Prescription Date:  7/24/18  Last Fill Quantity: 60,  # refills: 1   Last office visit: 10/4/2017 with prescribing provider:  9/21/18   Future Office Visit:   Sig: Take 1 capsule (100 mg) by mouth 2 times daily as needed for moderate pain    NSAID Medications Failed    10/26/2018  9:08 AM       Failed - Blood pressure under 140/90 in past 12 months    BP Readings from Last 3 Encounters:   08/14/18 158/73   08/01/18 (!) 146/98   01/03/18 138/88                Failed - Recent (12 mo) or future (30 days) visit within the authorizing provider's specialty    Patient had office visit in the last 12 months or has a visit in the next 30 days with authorizing provider or within the authorizing provider's specialty.  See \"Patient Info\" tab in inbasket, or \"Choose Columns\" in Meds & Orders section of the refill encounter.             Failed - Normal CBC on file in past 12 months    Recent Labs   Lab Test  10/04/17   1249   WBC  8.0   RBC  4.01   HGB  13.8   HCT  40.3   PLT  244                Passed - Normal ALT on file in past 12 months    Recent Labs   Lab Test  04/10/18   0919   ALT  16            Passed - Normal AST on file in past 12 months    Recent Labs   Lab Test  04/10/18   0919   AST  13            Passed - Patient is age 6-64 years       Passed - No active pregnancy on record       Passed - Normal serum creatinine on file in past 12 months    Recent Labs   Lab Test  04/10/18   0919   CR  0.79            Passed - No positive pregnancy test in past 12 months        "

## 2018-10-26 NOTE — TELEPHONE ENCOUNTER
LOV: 10/4/2017    BP Readings from Last 3 Encounters:   08/14/18 158/73   08/01/18 (!) 146/98   01/03/18 138/88     Last CBC: 10/4/2017    Routing refill request to provider for review/approval because:  Labs not current:   CBC  OFFICE VISIT     ALLERGY TO NSAIDS    Thanks! Marie Watkins RN

## 2018-10-26 NOTE — TELEPHONE ENCOUNTER
Message from [employer]:  Original authorizing provider: LEDY Seaman CNP would like a refill of the following medications:  celecoxib (CELEBREX) 100 MG capsule [LEDY Seaman CNP]    Preferred pharmacy: [address] 2[address]    Comment:

## 2018-11-27 NOTE — TELEPHONE ENCOUNTER
FUTURE VISIT INFORMATION      FUTURE VISIT INFORMATION:    Date: 11/28/18    Time: 0700    Location: [facility]TH[facility]  REFERRAL INFORMATION:    Referring provider:  N/A    Referring providers clinic:  N/A    Reason for visit/diagnosis  R FOOT PAIN     RECORDS REQUESTED FROM:       Clinic name Comments Records Status Imaging Status                                         RECORDS IN CHART

## 2018-11-28 ENCOUNTER — OFFICE VISIT (OUTPATIENT)
Dept: ORTHOPEDICS | Facility: CLINIC | Age: 48
End: 2018-11-28
Payer: COMMERCIAL

## 2018-11-28 ENCOUNTER — PRE VISIT (OUTPATIENT)
Dept: ORTHOPEDICS | Facility: CLINIC | Age: 48
End: 2018-11-28

## 2018-11-28 DIAGNOSIS — M79.674 GREAT TOE PAIN, RIGHT: ICD-10-CM

## 2018-11-28 DIAGNOSIS — M20.5X1 HALLUX LIMITUS OF RIGHT FOOT: Primary | ICD-10-CM

## 2018-11-28 NOTE — LETTER
11/28/2018       RE: Sharmin Nieves  [address]  [place] [address]     Dear Colleague,    Thank you for referring your patient, Sharmin Nieves, to the [facility]. Please see a copy of my visit note below.    Chief Complaint:   Chief Complaint   Patient presents with     Consult     Pain, right foot. Patient would like a 2nd opinion. Patient had surgery on her right oot about a year ago and is now being advised to do another surgery.        Subjective: Sharmin is a 48 year old female who presents to the clinic today for evaluation of right first MPJ pain. Here for a second opinion. Her surgeon is Dr. Edgar whom she addressed these same concerns with on 8/1/18. He discussed both surgical and non-surgical treatment options at that time. Xrays were performed, see results below.    Onset:  Years, but has gradually worsened post right 1st MPJ cheilectomy on 12/21/17  Location: Right first MPJ. Recently, new area of pain at plantar and medial 1st met head  Quality:  Aching, crunching, throbbing, numbness, shooting  Course:  Begins at joint and can radiate distally to end of toe.   Aggravating/alleviating factors: Worsens with activity like standing or walking. Cannot perform desired level of exercise without pain. 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 Rest helps. OTC shoe insoles do improve the pain. Wearing flexible tennis shoes most of the time.     ROS: Admits small wounds forming over surgical incision site intermittently. Admits edema to R first MPJ. Denies rashes, redness, toenail concerns, left foot pain, arch or ankle pain.    Past Medical History:   Diagnosis Date     Acne      Anxiety state, unspecified     Anxiety     Chronic low back pain 5/11/2010     Depression     Dr. Gaytan     Diaphragmatic hernia without mention of obstruction or gangrene      Esophageal reflux     with associated esophageal spasm     ETOH abuse       Herpes simplex without mention of complication      Moderate major depression (H) 4/15/2011     Rectal pain 5/16/2011     Right hip pain 5/11/2010     Seasonal affective disorder (H)      Surveillance of previously prescribed intrauterine contraceptive device 10/03/05    mirena IUD     Past Surgical History:   Procedure Laterality Date     C NONSPECIFIC PROCEDURE  1992    CRYO SURGERY for abnl paps     C STOMACH SURGERY PROCEDURE UNLISTED      Nissen     CHEILECTOMY Right 12/21/2017    Procedure: CHEILECTOMY;  RIGHT FOOT CHEILECTOMY;  Surgeon: Mo Edgar DPM;  Location: [facility] OR     SURGICAL HISTORY OF -   4/04    Lapr Nissen fundoplication     TONSILLECTOMY & ADENOIDECTOMY  1985     Family History   Problem Relation Age of Onset     C.A.D. Maternal Grandmother      Hypertension Maternal Grandmother      Alcohol/Drug Maternal Grandmother      Depression Maternal Grandmother      Cerebrovascular Disease Paternal Grandfather      Alcohol/Drug Paternal Grandfather      Breast Cancer Paternal Grandmother      Depression Paternal Grandmother      Alcohol/Drug Father      Depression Father      GASTROINTESTINAL DISEASE Father      GERD     Alcohol/Drug Maternal Grandfather      Depression Maternal Grandfather      Depression Mother      Obesity Mother      Anxiety Disorder Mother      Diabetes Mother      Breast Cancer Maternal Aunt      Alcohol/Drug Sister      Alcohol/Drug Brother      Alcohol/Drug Brother      Alcohol/Drug Brother      Depression Sister      Depression Brother      Depression Brother      Depression Brother      GASTROINTESTINAL DISEASE Brother      Hiatal hernia     Cancer - colorectal No family hx of      Prostate Cancer No family hx of      Social History   Substance Use Topics     Smoking status: Current Every Day Smoker     Packs/day: 0.75     Years: 15.00     Types: Cigarettes     Smokeless tobacco: Never Used     Alcohol use Yes      Comment: addict in recovery,strted drinking past 10  days Rum  1/3 of big Bottle     Allergies   Allergen Reactions     Darvocet [Propoxyphene N-Apap] Nausea and Vomiting     Nsaids      NSAIDS - Ibuprofen & Naproxen - symptoms of swelling in hands/feet, hives      Current Outpatient Rx   Medication Sig Dispense Refill     albuterol (PROAIR HFA/PROVENTIL HFA/VENTOLIN HFA) 108 (90 Base) MCG/ACT inhaler Inhale 2 puffs into the lungs every 6 hours as needed for shortness of breath / dyspnea or wheezing 1 Inhaler 0     benzonatate (TESSALON) 200 MG capsule Take 1 capsule (200 mg) by mouth 3 times daily as needed for cough 21 capsule 0     celecoxib (CELEBREX) 100 MG capsule Take 1 capsule (100 mg) by mouth 2 times daily as needed for moderate pain 60 capsule 0     DAILY MULTI VITAMIN/MINERALS OR TABS 1 TABLET DAILY 30 0     diltiazem 2% in PLO cream, FV COMPOUNDED, 2% GEL To anal opening three times daily.  Use a pea-sized amount.  Store at room temperature. 60 g 0     gabapentin (NEURONTIN) 600 MG tablet Take 1 tablet (600 mg) by mouth At Bedtime 90 tablet 3     levonorgestrel (MIRENA) 20 MCG/24HR IUD one placed in uterus 1 each 0     MAGNESIUM OXIDE PO Take 250 mg by mouth       montelukast (SINGULAIR) 10 MG tablet Take 1 tablet (10 mg) by mouth At Bedtime 30 tablet 0     traZODone (DESYREL) 100 MG tablet Take 2 tablets (200 mg) by mouth At Bedtime 180 tablet 0     venlafaxine (EFFEXOR-XR) 75 MG 24 hr capsule Take 3 capsules (225 mg) by mouth daily 90 capsule 2     HYDROcodone-acetaminophen (NORCO) 5-325 MG per tablet Take 1-2 tablets by mouth every 8 hours as needed for other (Moderate to Severe Pain) (Patient not taking: Reported on 11/28/2018) 20 tablet 0     hydrOXYzine (ATARAX) 25 MG tablet Take 1 tablet (25 mg) by mouth every 6 hours as needed for itching (and nausea) (Patient not taking: Reported on 11/28/2018) 40 tablet 0     methylPREDNISolone (MEDROL DOSEPAK) 4 MG tablet Follow package instructions (Patient not taking: Reported on 11/28/2018) 21 tablet 0      nicotine polacrilex (NICORETTE) 2 MG gum Place 1 each (2 mg) inside cheek as needed for smoking cessation (Patient not taking: Reported on 11/28/2018) 30 tablet 1     ondansetron (ZOFRAN) 4 MG tablet Take 1 tablet (4 mg) by mouth every 12 hours as needed for nausea (Patient not taking: Reported on 11/28/2018) 30 tablet 1       Objective:   There were no vitals taken for this visit.    General: Patient is well groomed, appears stated age, is alert, cooperative and in no acute distress.  Vascular: DP and PT pulses are 2/4 bilaterally. LE capillary refill time is <3 seconds. Normal pedal hair. No LE edema, though there is mild edema to right first MPJ.  MSK: No pain with active or passive ROM of the ankle, MTJ, or 1st ray bilaterally. Pain with active and passive ROM of the right hallux, plantar flexion produces the most significant pain. There is also tenderness to palpation of right plantar first met head and over sesamoids. Unable to distract the right first MPJ. Dorsiflexion of right hallux limited to approximately 45 degrees with pain.  Neurologic: Gross sensation intact to bilateral LE. Admits sensation of numbness with palpation of all regions of right hallux.  Skin: LE skin color, texture and turgor are normal. Post-operative scar present to dorsal right foot overlying first ray. Most proximal portion has breakdown of the epithelium with scant serous drainage. No erythema or calor. No purulent drainage or odor. Toenails are normal bilaterally.      Previous Laboratory Studies:   Lab Results   Component Value Date    A1C 4.6 09/03/2015     Assessment:   - Hallux limitus right first MPJ causing pain  - S/p right foot cheilectomy 12/21/17    Plan:   - Pt seen and evaluated. Diagnosis and treatment options were discussed, including conservative and surgical options for hallux limitus causing pain. At this time, we will proceed with conservative treatment options. My hope is that we can decrease inflammation and  pain to a level that is tolerable so that surgery can be avoided. Would recommend surgical correction only if we have an extended period of time with no relief and a level of pain that severely restricts activity.  - Molded for custom orthotics with rigid an's extension to Right orthotic.   - Ordered steroid injection into right first MPJ under ultrasound guidance.   - Pt to return to clinic in 2 months, send message or call with concerns.       Again, thank you for allowing me to participate in the care of your patient.      Sincerely,    Krista Rodgers PA-C

## 2018-11-28 NOTE — NURSING NOTE
Reason For Visit:   Chief Complaint   Patient presents with     Consult     Pain, right foot. Patient would like a 2nd opinion. Patient had surgery on her right oot about a year ago and is now being advised to do another surgery.        Pain Assessment  Patient Currently in Pain: Yes  Primary Pain Location: Foot  Pain Orientation: Right  Pain Descriptors: Throbbing  Aggravating Factors:  (Pressure, end of the day)              Current Outpatient Prescriptions   Medication Sig Dispense Refill     albuterol (PROAIR HFA/PROVENTIL HFA/VENTOLIN HFA) 108 (90 Base) MCG/ACT inhaler Inhale 2 puffs into the lungs every 6 hours as needed for shortness of breath / dyspnea or wheezing 1 Inhaler 0     benzonatate (TESSALON) 200 MG capsule Take 1 capsule (200 mg) by mouth 3 times daily as needed for cough 21 capsule 0     celecoxib (CELEBREX) 100 MG capsule Take 1 capsule (100 mg) by mouth 2 times daily as needed for moderate pain 60 capsule 0     DAILY MULTI VITAMIN/MINERALS OR TABS 1 TABLET DAILY 30 0     diltiazem 2% in PLO cream, FV COMPOUNDED, 2% GEL To anal opening three times daily.  Use a pea-sized amount.  Store at room temperature. 60 g 0     gabapentin (NEURONTIN) 600 MG tablet Take 1 tablet (600 mg) by mouth At Bedtime 90 tablet 3     levonorgestrel (MIRENA) 20 MCG/24HR IUD one placed in uterus 1 each 0     MAGNESIUM OXIDE PO Take 250 mg by mouth       montelukast (SINGULAIR) 10 MG tablet Take 1 tablet (10 mg) by mouth At Bedtime 30 tablet 0     traZODone (DESYREL) 100 MG tablet Take 2 tablets (200 mg) by mouth At Bedtime 180 tablet 0     venlafaxine (EFFEXOR-XR) 75 MG 24 hr capsule Take 3 capsules (225 mg) by mouth daily 90 capsule 2     HYDROcodone-acetaminophen (NORCO) 5-325 MG per tablet Take 1-2 tablets by mouth every 8 hours as needed for other (Moderate to Severe Pain) (Patient not taking: Reported on 11/28/2018) 20 tablet 0     hydrOXYzine (ATARAX) 25 MG tablet Take 1 tablet (25 mg) by mouth every 6 hours as  needed for itching (and nausea) (Patient not taking: Reported on 11/28/2018) 40 tablet 0     methylPREDNISolone (MEDROL DOSEPAK) 4 MG tablet Follow package instructions (Patient not taking: Reported on 11/28/2018) 21 tablet 0     nicotine polacrilex (NICORETTE) 2 MG gum Place 1 each (2 mg) inside cheek as needed for smoking cessation (Patient not taking: Reported on 11/28/2018) 30 tablet 1     ondansetron (ZOFRAN) 4 MG tablet Take 1 tablet (4 mg) by mouth every 12 hours as needed for nausea (Patient not taking: Reported on 11/28/2018) 30 tablet 1          Allergies   Allergen Reactions     Darvocet [Propoxyphene N-Apap] Nausea and Vomiting     Nsaids      NSAIDS - Ibuprofen & Naproxen - symptoms of swelling in hands/feet, hives

## 2018-11-28 NOTE — PROGRESS NOTES
Chief Complaint:   Chief Complaint   Patient presents with     Consult     Pain, right foot. Patient would like a 2nd opinion. Patient had surgery on her right oot about a year ago and is now being advised to do another surgery.        Subjective: Sharmin is a 48 year old female who presents to the clinic today for evaluation of right first MPJ pain. Here for a second opinion. Her surgeon is Dr. Edgar whom she addressed these same concerns with on 8/1/18. He discussed both surgical and non-surgical treatment options at that time. Xrays were performed, see results below.    Onset:  Years, but has gradually worsened post right 1st MPJ cheilectomy on 12/21/17  Location: Right first MPJ. Recently, new area of pain at plantar and medial 1st met head  Quality:  Aching, crunching, throbbing, numbness, shooting  Course:  Begins at joint and can radiate distally to end of toe.   Aggravating/alleviating factors: Worsens with activity like standing or walking. Cannot perform desired level of exercise without pain. [place] [facility] [place] lot. Rest helps. OTC shoe insoles do improve the pain. Wearing flexible tennis shoes most of the time.     ROS: Admits small wounds forming over surgical incision site intermittently. Admits edema to R first MPJ. Denies rashes, redness, toenail concerns, left foot pain, arch or ankle pain.    Past Medical History:   Diagnosis Date     Acne      Anxiety state, unspecified     Anxiety     Chronic low back pain 5/11/2010     Depression     Dr. Gaytan     Diaphragmatic hernia without mention of obstruction or gangrene      Esophageal reflux     with associated esophageal spasm     ETOH abuse      Herpes simplex without mention of complication      Moderate major depression (H) 4/15/2011     Rectal pain 5/16/2011     Right hip pain 5/11/2010     Seasonal affective disorder (H)      Surveillance of previously prescribed intrauterine contraceptive device 10/03/05    mirena IUD     Past  Surgical History:   Procedure Laterality Date     C NONSPECIFIC PROCEDURE  1992    CRYO SURGERY for abnl paps     C STOMACH SURGERY PROCEDURE UNLISTED      Nissen     CHEILECTOMY Right 12/21/2017    Procedure: CHEILECTOMY;  RIGHT FOOT CHEILECTOMY;  Surgeon: Mo Edgar DPM;  Location: [facility] OR     SURGICAL HISTORY OF -   4/04    Lapr Nissen fundoplication     TONSILLECTOMY & ADENOIDECTOMY  1985     Family History   Problem Relation Age of Onset     C.A.D. Maternal Grandmother      Hypertension Maternal Grandmother      Alcohol/Drug Maternal Grandmother      Depression Maternal Grandmother      Cerebrovascular Disease Paternal Grandfather      Alcohol/Drug Paternal Grandfather      Breast Cancer Paternal Grandmother      Depression Paternal Grandmother      Alcohol/Drug Father      Depression Father      GASTROINTESTINAL DISEASE Father      GERD     Alcohol/Drug Maternal Grandfather      Depression Maternal Grandfather      Depression Mother      Obesity Mother      Anxiety Disorder Mother      Diabetes Mother      Breast Cancer Maternal Aunt      Alcohol/Drug Sister      Alcohol/Drug Brother      Alcohol/Drug Brother      Alcohol/Drug Brother      Depression Sister      Depression Brother      Depression Brother      Depression Brother      GASTROINTESTINAL DISEASE Brother      Hiatal hernia     Cancer - colorectal No family hx of      Prostate Cancer No family hx of      Social History   Substance Use Topics     Smoking status: Current Every Day Smoker     Packs/day: 0.75     Years: 15.00     Types: Cigarettes     Smokeless tobacco: Never Used     Alcohol use Yes      Comment: addict in recovery,strted drinking past 10 days Rum  1/3 of big Bottle     Allergies   Allergen Reactions     Darvocet [Propoxyphene N-Apap] Nausea and Vomiting     Nsaids      NSAIDS - Ibuprofen & Naproxen - symptoms of swelling in hands/feet, hives      Current Outpatient Rx   Medication Sig Dispense Refill     albuterol (PROAIR  HFA/PROVENTIL HFA/VENTOLIN HFA) 108 (90 Base) MCG/ACT inhaler Inhale 2 puffs into the lungs every 6 hours as needed for shortness of breath / dyspnea or wheezing 1 Inhaler 0     benzonatate (TESSALON) 200 MG capsule Take 1 capsule (200 mg) by mouth 3 times daily as needed for cough 21 capsule 0     celecoxib (CELEBREX) 100 MG capsule Take 1 capsule (100 mg) by mouth 2 times daily as needed for moderate pain 60 capsule 0     DAILY MULTI VITAMIN/MINERALS OR TABS 1 TABLET DAILY 30 0     diltiazem 2% in PLO cream, FV COMPOUNDED, 2% GEL To anal opening three times daily.  Use a pea-sized amount.  Store at room temperature. 60 g 0     gabapentin (NEURONTIN) 600 MG tablet Take 1 tablet (600 mg) by mouth At Bedtime 90 tablet 3     levonorgestrel (MIRENA) 20 MCG/24HR IUD one placed in uterus 1 each 0     MAGNESIUM OXIDE PO Take 250 mg by mouth       montelukast (SINGULAIR) 10 MG tablet Take 1 tablet (10 mg) by mouth At Bedtime 30 tablet 0     traZODone (DESYREL) 100 MG tablet Take 2 tablets (200 mg) by mouth At Bedtime 180 tablet 0     venlafaxine (EFFEXOR-XR) 75 MG 24 hr capsule Take 3 capsules (225 mg) by mouth daily 90 capsule 2     HYDROcodone-acetaminophen (NORCO) 5-325 MG per tablet Take 1-2 tablets by mouth every 8 hours as needed for other (Moderate to Severe Pain) (Patient not taking: Reported on 11/28/2018) 20 tablet 0     hydrOXYzine (ATARAX) 25 MG tablet Take 1 tablet (25 mg) by mouth every 6 hours as needed for itching (and nausea) (Patient not taking: Reported on 11/28/2018) 40 tablet 0     methylPREDNISolone (MEDROL DOSEPAK) 4 MG tablet Follow package instructions (Patient not taking: Reported on 11/28/2018) 21 tablet 0     nicotine polacrilex (NICORETTE) 2 MG gum Place 1 each (2 mg) inside cheek as needed for smoking cessation (Patient not taking: Reported on 11/28/2018) 30 tablet 1     ondansetron (ZOFRAN) 4 MG tablet Take 1 tablet (4 mg) by mouth every 12 hours as needed for nausea (Patient not taking:  Reported on 11/28/2018) 30 tablet 1       Objective:   There were no vitals taken for this visit.    General: Patient is well groomed, appears stated age, is alert, cooperative and in no acute distress.  Vascular: DP and PT pulses are 2/4 bilaterally. LE capillary refill time is <3 seconds. Normal pedal hair. No LE edema, though there is mild edema to right first MPJ.  MSK: No pain with active or passive ROM of the ankle, MTJ, or 1st ray bilaterally. Pain with active and passive ROM of the right hallux, plantar flexion produces the most significant pain. There is also tenderness to palpation of right plantar first met head and over sesamoids. Unable to distract the right first MPJ. Dorsiflexion of right hallux limited to approximately 45 degrees with pain.  Neurologic: Gross sensation intact to bilateral LE. Admits sensation of numbness with palpation of all regions of right hallux.  Skin: LE skin color, texture and turgor are normal. Post-operative scar present to dorsal right foot overlying first ray. Most proximal portion has breakdown of the epithelium with scant serous drainage. No erythema or calor. No purulent drainage or odor. Toenails are normal bilaterally.      Previous Laboratory Studies:   Lab Results   Component Value Date    A1C 4.6 09/03/2015     Assessment:   - Hallux limitus right first MPJ causing pain  - S/p right foot cheilectomy 12/21/17    Plan:   - Pt seen and evaluated. Diagnosis and treatment options were discussed, including conservative and surgical options for hallux limitus causing pain. At this time, we will proceed with conservative treatment options. My hope is that we can decrease inflammation and pain to a level that is tolerable so that surgery can be avoided. Would recommend surgical correction only if we have an extended period of time with no relief and a level of pain that severely restricts activity.  - Molded for custom orthotics with rigid an's extension to Right  orthotic.   - Ordered steroid injection into right first MPJ under ultrasound guidance.   - Pt to return to clinic in 2 months, send message or call with concerns.

## 2018-11-28 NOTE — MR AVS SNAPSHOT
After Visit Summary   11/28/2018    Sharmin Nieves    MRN: [id number]           Patient Information     Date Of Birth          1970        Visit Information        Provider Department      11/28/2018 7:00 AM Krista Rodgers PA-C [facility]        Today's Diagnoses     Hallux limitus of right foot    -  1    Great toe pain, right           Follow-ups after your visit        Additional Services     ORTHOTICS REFERRAL       **This referral order prints off in the [facility]  (Orthotics & Prosthetics) Central Scheduling Office**    The [facility] Central Scheduling Staff will contact the patient to schedule appointments.     Central Scheduling Contact Information: [phone] ([place])    Orthotics: Bilateral Shoe/s    Please be aware that coverage of these services is subject to the terms and limitations of your health insurance plan.  Call member services at your health plan with any benefit or coverage questions.      Please bring the following to your appointment:    >>   Any x-rays, CTs or MRIs which have been performed.  Contact the facility where they were done to arrange for  prior to your scheduled appointment.    >>   List of current medications   >>   This referral request   >>   Any documents/labs given to you for this referral                  Your next 10 appointments already scheduled     Nov 30, 2018  8:00 AM CST   Wally Betancourt with LEDY Wu CNP   [facility] ([facility])    [address] [place] [address] [phone]            Jan 08, 2019  8:00 AM CST   XR JOINT INJECTION ASPIRATION SMALL RT with UCXR3,  IMAGING NURSE, JUNG MSK RAD   [facility] ([facility])    [address]  1st Floor  [facility] [address] [phone]           How do I prepare for my exam? (Food and drink instructions) Stop drinking 1 hour  before the exam.  How do I prepare for my exam? (Other instructions) You may take your medicines as usual, except for blood thinners (Coumadin, Plavix, Ticlid, Persantine, Aggrenox, Pletal, Effient, Brilliant). Talk to your doctor if you take these.  What should I wear: Wear comfortable clothes.  How long does the exam take: The entire exam takes about one hour. If you are having a wrist exam, you may have two shots up to two hours apart. You may leave the hospital between the shots.If your doctor has ordered a CT or MRI scan of this joint, this will take another 30 to 45 minutes.  What should I bring: Please bring a list of your current medicines to your exam. Include vitamins, minerals and over-the-counter medicines.  Do I need a :  No  is needed.  What do I need to tell my doctor: Tell your doctor if: * You have ever had an allergic reaction to X-ray dye (contrast fluid). * If there s any chance you are pregnant.  What should I do after the exam: Try to rest for the next 12 hours or so. You can go back to normal activities the day after your exam.  What is this test: An arthrogram is an X-ray exam of a joint. We will take a set of X-ray pictures. We will then inject dye (contrast fluid) into the area around the joint. After that, we will take another set of X-ray pictures. The dye helps your doctor see the joint better on the X-rays. With a joint injection, we will also inject a steroid into your joint after we inject the dye.  Who should I call with questions: If you have any questions, please call the Imaging Department where you will have your exam. Directions, parking instructions, and other information is available on our website, [email or web address].              Future tests that were ordered for you today     Open Future Orders        Priority Expected Expires Ordered    XR Joint Injection Small Right Routine 11/28/2018 11/28/2019 11/28/2018    US Guided Needle Placement Routine  11/28/2019  11/28/2018            Who to contact     Please call your clinic at [phone] to:    Ask questions about your health    Make or cancel appointments    Discuss your medicines    Learn about your test results    Speak to your doctor            Additional Information About Your Visit        [email or web address] Information     [email or web address] gives you secure access to your electronic health record. If you see a primary care provider, you can also send messages to your care team and make appointments. If you have questions, please call your primary care clinic.  If you do not have a primary care provider, please call [phone] and they will assist you.      [email or web address] is an electronic gateway that provides easy, online access to your medical records. With [email or web address], you can request a clinic appointment, read your test results, renew a prescription or communicate with your care team.     To access your existing account, please contact your [facility] or call [phone] for assistance.        Care EveryWhere ID     This is your Care EveryWhere ID. This could be used by other organizations to access your [place] medical records  [id number]         Blood Pressure from Last 3 Encounters:   08/14/18 158/73   08/01/18 (!) 146/98   01/03/18 138/88    Weight from Last 3 Encounters:   08/01/18 210 lb   01/03/18 221 lb 14.4 oz   12/21/17 221 lb 14.4 oz              We Performed the Following     ORTHOTICS REFERRAL        Primary Care Provider Office Phone # Fax #    LEDY Wu [facility] [phone]       [address]  [facility] [address]        Equal Access to Services     JOCELYNE GOMEZ AH: Hadii aad ku hadasho Soomaali, waaxda luqadaha, qaybta kaalmada adeegyada, gabe pederson'samy . So [facility] [phone].    ATENCIÓN: Si habla español, tiene a mitchell disposición servicios gratuitos de asistencia lingüística. Llame al [phone].    We comply with applicable federal  civil rights laws and [place] laws. We do not discriminate on the basis of race, color, national origin, age, disability, sex, sexual orientation, or gender identity.            Thank you!     Thank you for choosing [facility]  for your care. Our goal is always to provide you with excellent care. Hearing back from our patients is one way we can continue to improve our services. Please take a few minutes to complete the written survey that you may receive in the mail after your visit with us. Thank you!             Your Updated Medication List - Protect others around you: Learn how to safely use, store and throw away your medicines at www.disposemymeds.org.          This list is accurate as of 11/28/18 11:59 PM.  Always use your most recent med list.                   Brand Name Dispense Instructions for use Diagnosis    albuterol 108 (90 Base) MCG/ACT inhaler    PROAIR HFA/PROVENTIL HFA/VENTOLIN HFA    1 Inhaler    Inhale 2 puffs into the lungs every 6 hours as needed for shortness of breath / dyspnea or wheezing    Acute bronchitis, unspecified organism       benzonatate 200 MG capsule    TESSALON    21 capsule    Take 1 capsule (200 mg) by mouth 3 times daily as needed for cough    Cough       celecoxib 100 MG capsule    celeBREX    60 capsule    Take 1 capsule (100 mg) by mouth 2 times daily as needed for moderate pain    Pain, Encounter for therapeutic drug monitoring       Daily Multi vitamin  /Minerals Tabs     30    1 TABLET DAILY        diltiazem 2% in PLO cream (FV COMPOUNDED) 2% Gel     60 g    To anal opening three times daily.  Use a pea-sized amount.  Store at room temperature.    Anal pain, Anal fissure       gabapentin 600 MG tablet    NEURONTIN    90 tablet    Take 1 tablet (600 mg) by mouth At Bedtime    Restless leg syndrome       HYDROcodone-acetaminophen 5-325 MG tablet    NORCO    20 tablet    Take 1-2 tablets by mouth every 8 hours as needed for other (Moderate to Severe Pain)     Post-operative state       hydrOXYzine 25 MG tablet    ATARAX    40 tablet    Take 1 tablet (25 mg) by mouth every 6 hours as needed for itching (and nausea)    Post-operative state       MAGNESIUM OXIDE PO      Take 250 mg by mouth        methylPREDNISolone 4 MG tablet therapy pack    MEDROL DOSEPAK    21 tablet    Follow package instructions    Hallux limitus of right foot       MIRENA (52 MG) 20 MCG/24HR IUD   Generic drug:  levonorgestrel     1 each    one placed in uterus        montelukast 10 MG tablet    SINGULAIR    30 tablet    Take 1 tablet (10 mg) by mouth At Bedtime    Cough       nicotine 2 MG gum    NICORETTE    30 tablet    Place 1 each (2 mg) inside cheek as needed for smoking cessation    Encounter for smoking cessation counseling, Acute bronchitis, unspecified organism       ondansetron 4 MG tablet    ZOFRAN    30 tablet    Take 1 tablet (4 mg) by mouth every 12 hours as needed for nausea    Drug-induced nausea and vomiting       traZODone 100 MG tablet    DESYREL    180 tablet    Take 2 tablets (200 mg) by mouth At Bedtime    Insomnia       venlafaxine 75 MG 24 hr capsule    EFFEXOR-XR    90 capsule    Take 3 capsules (225 mg) by mouth daily    Major depressive disorder, recurrent episode, moderate (H)

## 2018-11-29 NOTE — PROGRESS NOTES
"  SUBJECTIVE:   Sharmin Nieves is a 48 year old female who presents to clinic today for the following health issues:    Depression and Anxiety Follow-Up    Status since last visit: No change    Other associated symptoms:None    Complicating factors:     Significant life event: No     Current substance abuse: None    Patient reports that she is she is not feeling happy, she is also worried about her son. \" He is 19 years old sit at home all day and only play vide games; if I try to have a conversation with him it ends up in a big argument. He has depression and other health issues and non compliant with his medications. \"  Appetite  is good, sleep  6 hours per day. Does not exercise stated that she has a busy  schedule and has responsibilities at home. Finds it hard balance life and all the  Challenges. \" I also have a work training in [place] and afraid of flighting and need some medication. \"    Patient stated that her heart is racing and feels like their is a slight pause and palpitations; this is going on over six months .She is monitoring her blood pressure at work and SBP is in the 150's on several occassions. Denies headaches, blurred vision or other vision changes, no dizziness, or lightheadedness, no edema.          PHQ 10/4/2017 3/14/2018 9/11/2018   PHQ-9 Total Score 9 4 9   Q9: Suicide Ideation Several days Not at all Several days     DMITRIY-7 SCORE 6/30/2014 10/7/2015 5/5/2017   Total Score 7 - -   Total Score - 1 8     In the past two weeks have you had thoughts of suicide or self-harm?  No.    Do you have concerns about your personal safety or the safety of others?   No  PHQ-9  English  PHQ-9   Any Language  DMITRIY-7  Suicide Assessment Five-step Evaluation and Treatment (SAFE-T)    Chronic Pain Follow-Up - celebrex and gabapentin       Type / Location of Pain: both legs, right foot   Analgesia/pain control:       Recent changes:  same      Overall control: Inadequate pain control  Activity level/function:  "     Daily activities:  Can do most things most days, with some rest    Work:  Able to work full time without limitations  Adverse effects:  No  Adherance    Taking medication as directed?  Yes    Participating in other treatments: no   Risk Factors:    Sleep:  Fair    Mood/anxiety:  Same     Recent family or social stressors:  none noted    Other aggravating factors: prolonged standing  PHQ-9 SCORE 10/4/2017 3/14/2018 9/11/2018   PHQ-9 Total Score - - -   PHQ-9 Total Score MyChart 8 (Mild depression) 4 (Minimal depression) 9 (Mild depression)   PHQ-9 Total Score 9 4 9     DMITRIY-7 SCORE 6/30/2014 10/7/2015 5/5/2017   Total Score 7 - -   Total Score - 1 8     Encounter-Level CSA:     There are no encounter-level csa.          Amount of exercise or physical activity: None    Problems taking medications regularly: No    Medication side effects: none    Diet: regular (no restrictions)    6 mo irregular heartbeat 1-2 times a day, lasting 5 min  Palpated by co worker  Provoked sometimes by getting up  Can be relieved by a deep cough      Problem list and histories reviewed & adjusted, as indicated.  Additional history: as documented    Reviewed and updated as needed this visit by clinical staff       Reviewed and updated as needed this visit by Provider    ROS:  Constitutional, HEENT, cardiovascular, pulmonary, GI, , musculoskeletal, neuro, skin, endocrine and psych systems are negative, except as otherwise noted.    OBJECTIVE:     BP (!) 154/92 (BP Location: Left arm, Patient Position: Sitting, Cuff Size: Adult Large)  Pulse 98  Temp 97.9  F (36.6  C) (Oral)  Wt 206 lb 12.8 oz (93.8 kg)  SpO2 98%  BMI 29.67 kg/m2  Body mass index is 29.67 kg/(m^2).  GENERAL: healthy, alert and no distress  EYES: Eyes grossly normal to inspection, PERRL and conjunctivae and sclerae normal  HENT: ear canals and TM's normal, nose and mouth without ulcers or lesions  NECK: no adenopathy, no asymmetry, masses, or scars and thyroid normal  to palpation  RESP: lungs clear to auscultation - no rales, rhonchi or wheezes  BREAST: normal without masses, tenderness or nipple discharge and no palpable axillary masses or adenopathy  CV: regular rate and rhythm, normal S1 S2, no S3 or S4, no murmur, click or rub, no peripheral edema and peripheral pulses strong  ABDOMEN: soft, nontender, no hepatosplenomegaly, no masses and bowel sounds normal  MS: no gross musculoskeletal defects noted, no edema  SKIN: no suspicious lesions or rashes  NEURO: Normal strength and tone, mentation intact and speech normal  PSYCH: mentation appears anxious and sad, affect flat      ASSESSMENT/PLAN:       (F32.1) Moderate major depression (H)  (primary encounter diagnosis)  Comment: Patient may benefit form seen a therapist   Plan: MENTAL HEALTH REFERRAL  - Adult; Outpatient         Treatment; Individual/Couples/Family/Group         Therapy/Health Psychology; [facility]: [facility] [phone]; We will         contact you to schedule the appointment or         please call with any questions           (F41.1) Generalized anxiety disorder  Plan: MENTAL HEALTH REFERRAL  - Adult; Outpatient         Treatment; Individual/Couples/Family/Group         Therapy/Health Psychology; [facility]: [facility] [phone]; We will         contact you to schedule the appointment or         please call with any questions, TSH with free         T4 reflex          (I10) Benign essential hypertension  Comment: Based on BP readings today and patient monitoring of blood pressure will start medication therapy.   Plan: start lisinopril (PRINIVIL/ZESTRIL) 10 MG tablet,         Comprehensive metabolic panel, Albumin Random         Urine Quantitative with Creat Ratio, Lipid         panel reflex to direct LDL Fasting, MED THERAPY        MANAGE REFERRAL, TSH with free T4 reflex           (G25.81) Restless leg syndrome  Plan: gabapentin (NEURONTIN) 600 MG tablet           (F51.01) Primary insomnia  Plan: traZODone (DESYREL) 100 MG tablet         (R52) Pain  Plan: celecoxib (CELEBREX) 100 MG capsule        (Z51.81) Encounter for therapeutic drug monitor  Plan: celecoxib (CELEBREX) 100 MG capsule        (I49.9) Irregular heart beat  Plan: EKG 12-lead complete w/read - Clinics, TSH with        free T4 reflex, Zio Patch Holter            (F41.8) Situational anxiety  Plan: LORazepam (ATIVAN) 1 MG tablet             Patient Instructions   Start taking medication for hypertension  Return in one month for recheck  Can check at work to monitor response    Consider therapy  One option might be [facility] Ortiz in [place]. [phone]  Takes [facility]          Present and preformed physical exam with student nurse-family practice. The patient was evaluated and managed, by Joanne Gaytan RN, student NP   RN, SNP in collaboration with LEDY Olivo, ALEJO supervising clinical preceptor.    Documentation written by LEDY Olivo CNP    [facility]

## 2018-11-30 ENCOUNTER — OFFICE VISIT (OUTPATIENT)
Dept: FAMILY MEDICINE | Facility: CLINIC | Age: 48
End: 2018-11-30
Payer: COMMERCIAL

## 2018-11-30 VITALS
TEMPERATURE: 97.9 F | BODY MASS INDEX: 29.67 KG/M2 | SYSTOLIC BLOOD PRESSURE: 154 MMHG | HEART RATE: 98 BPM | DIASTOLIC BLOOD PRESSURE: 92 MMHG | OXYGEN SATURATION: 98 % | WEIGHT: 206.8 LBS

## 2018-11-30 DIAGNOSIS — F41.8 SITUATIONAL ANXIETY: ICD-10-CM

## 2018-11-30 DIAGNOSIS — F51.01 PRIMARY INSOMNIA: ICD-10-CM

## 2018-11-30 DIAGNOSIS — F32.1 MODERATE MAJOR DEPRESSION (H): Primary | ICD-10-CM

## 2018-11-30 DIAGNOSIS — F41.1 GENERALIZED ANXIETY DISORDER: ICD-10-CM

## 2018-11-30 DIAGNOSIS — Z51.81 ENCOUNTER FOR THERAPEUTIC DRUG MONITORING: ICD-10-CM

## 2018-11-30 DIAGNOSIS — I10 BENIGN ESSENTIAL HYPERTENSION: ICD-10-CM

## 2018-11-30 DIAGNOSIS — R52 PAIN: ICD-10-CM

## 2018-11-30 DIAGNOSIS — I49.9 IRREGULAR HEART BEAT: ICD-10-CM

## 2018-11-30 DIAGNOSIS — G25.81 RESTLESS LEG SYNDROME: ICD-10-CM

## 2018-11-30 LAB
ALBUMIN SERPL-MCNC: 4.1 G/DL (ref 3.4–5)
ALP SERPL-CCNC: 54 U/L (ref 40–150)
ALT SERPL W P-5'-P-CCNC: 18 U/L (ref 0–50)
ANION GAP SERPL CALCULATED.3IONS-SCNC: 6 MMOL/L (ref 3–14)
AST SERPL W P-5'-P-CCNC: 24 U/L (ref 0–45)
BILIRUB SERPL-MCNC: 0.4 MG/DL (ref 0.2–1.3)
BUN SERPL-MCNC: 10 MG/DL (ref 7–30)
CALCIUM SERPL-MCNC: 8.5 MG/DL (ref 8.5–10.1)
CHLORIDE SERPL-SCNC: 102 MMOL/L (ref 94–109)
CHOLEST SERPL-MCNC: 164 MG/DL
CO2 SERPL-SCNC: 27 MMOL/L (ref 20–32)
CREAT SERPL-MCNC: 0.73 MG/DL (ref 0.52–1.04)
CREAT UR-MCNC: 74 MG/DL
GFR SERPL CREATININE-BSD FRML MDRD: 85 ML/MIN/1.7M2
GLUCOSE SERPL-MCNC: 92 MG/DL (ref 70–99)
HDLC SERPL-MCNC: 70 MG/DL
LDLC SERPL CALC-MCNC: 57 MG/DL
MICROALBUMIN UR-MCNC: <5 MG/L
MICROALBUMIN/CREAT UR: NORMAL MG/G CR (ref 0–25)
NONHDLC SERPL-MCNC: 94 MG/DL
POTASSIUM SERPL-SCNC: 4 MMOL/L (ref 3.4–5.3)
PROT SERPL-MCNC: 7.2 G/DL (ref 6.8–8.8)
SODIUM SERPL-SCNC: 135 MMOL/L (ref 133–144)
TRIGL SERPL-MCNC: 185 MG/DL
TSH SERPL DL<=0.005 MIU/L-ACNC: 0.96 MU/L (ref 0.4–4)

## 2018-11-30 PROCEDURE — 80061 LIPID PANEL: CPT | Performed by: NURSE PRACTITIONER

## 2018-11-30 PROCEDURE — 93000 ELECTROCARDIOGRAM COMPLETE: CPT | Performed by: NURSE PRACTITIONER

## 2018-11-30 PROCEDURE — 84443 ASSAY THYROID STIM HORMONE: CPT | Performed by: NURSE PRACTITIONER

## 2018-11-30 PROCEDURE — 36415 COLL VENOUS BLD VENIPUNCTURE: CPT | Performed by: NURSE PRACTITIONER

## 2018-11-30 PROCEDURE — 99214 OFFICE O/P EST MOD 30 MIN: CPT | Performed by: NURSE PRACTITIONER

## 2018-11-30 PROCEDURE — 82043 UR ALBUMIN QUANTITATIVE: CPT | Performed by: NURSE PRACTITIONER

## 2018-11-30 PROCEDURE — 80053 COMPREHEN METABOLIC PANEL: CPT | Performed by: NURSE PRACTITIONER

## 2018-11-30 RX ORDER — LORAZEPAM 1 MG/1
0.5-1 TABLET ORAL EVERY 8 HOURS PRN
Qty: 4 TABLET | Refills: 0 | Status: SHIPPED | OUTPATIENT
Start: 2018-11-30 | End: 2019-08-16

## 2018-11-30 RX ORDER — TRAZODONE HYDROCHLORIDE 100 MG/1
200 TABLET ORAL AT BEDTIME
Qty: 180 TABLET | Refills: 3 | Status: SHIPPED | OUTPATIENT
Start: 2018-11-30 | End: 2019-12-05

## 2018-11-30 RX ORDER — GABAPENTIN 600 MG/1
600 TABLET ORAL AT BEDTIME
Qty: 90 TABLET | Refills: 3 | Status: SHIPPED | OUTPATIENT
Start: 2018-11-30 | End: 2019-04-19

## 2018-11-30 RX ORDER — LISINOPRIL 10 MG/1
10 TABLET ORAL DAILY
Qty: 90 TABLET | Refills: 0 | Status: SHIPPED | OUTPATIENT
Start: 2018-11-30 | End: 2019-02-22

## 2018-11-30 RX ORDER — CELECOXIB 100 MG/1
100 CAPSULE ORAL 2 TIMES DAILY PRN
Qty: 60 CAPSULE | Refills: 0 | Status: SHIPPED | OUTPATIENT
Start: 2018-11-30 | End: 2019-01-04

## 2018-11-30 ASSESSMENT — ANXIETY QUESTIONNAIRES
3. WORRYING TOO MUCH ABOUT DIFFERENT THINGS: SEVERAL DAYS
6. BECOMING EASILY ANNOYED OR IRRITABLE: SEVERAL DAYS
2. NOT BEING ABLE TO STOP OR CONTROL WORRYING: SEVERAL DAYS
1. FEELING NERVOUS, ANXIOUS, OR ON EDGE: SEVERAL DAYS
GAD7 TOTAL SCORE: 6
7. FEELING AFRAID AS IF SOMETHING AWFUL MIGHT HAPPEN: SEVERAL DAYS
5. BEING SO RESTLESS THAT IT IS HARD TO SIT STILL: NOT AT ALL

## 2018-11-30 ASSESSMENT — PATIENT HEALTH QUESTIONNAIRE - PHQ9
SUM OF ALL RESPONSES TO PHQ QUESTIONS 1-9: 6
5. POOR APPETITE OR OVEREATING: SEVERAL DAYS

## 2018-11-30 NOTE — PATIENT INSTRUCTIONS
Start taking medication for hypertension  Return in one month for recheck  Can check at work to monitor response    Consider therapy  One option might be [facility] Ortiz in [place]. [phone]  Takes [facility]

## 2018-11-30 NOTE — MR AVS SNAPSHOT
After Visit Summary   11/30/2018    Sharmin Nieves    MRN: [id number]           Patient Information     Date Of Birth          1970        Visit Information        Provider Department      11/30/2018 8:00 AM Randa Bill APRN [facility]        Today's Diagnoses     Moderate major depression (H)    -  1    Generalized anxiety disorder        Benign essential hypertension        Restless leg syndrome        Primary insomnia        Pain        Encounter for therapeutic drug monitoring        Irregular heart beat        Situational anxiety          Care Instructions    Start taking medication for hypertension  Return in one month for recheck  Can check at work to monitor response    Consider therapy  One option might be Dimas Ortiz in [place]. [phone]  Takes [facility]              Follow-ups after your visit        Additional Services     MED THERAPY MANAGE REFERRAL       Your provider has referred you to: **[facility] Scheduling (numerous locations) [phone]   [email or web address]  G: [facility] [phone]   [email or web address]    Reason for Referral: new diagnosis of hypertension - consideration of medications that could be causing or contributing for hypertension    The [facility] department will contact you to schedule an appointment.  You may also schedule the appointment by calling [phone].  For [facility] [place] patients, please call [phone] to confirm/schedule your appointment on the next business day.    This service is designed to help you get the most from your medications.  A specially trained Pharmacist will work closely with you and your providers to solve any questions, concerns, issues or problems related to your medications.    Please bring  all of your prescription and non-prescription medications (such as vitamins, over-the-counter medications, and herbals) or a detailed medication list to your appointment.    If you have a glucose meter or other home monitoring information, please also bring this to your appointment (i.e. blood glucose log, blood pressure log, pain log, etc.).            MENTAL HEALTH REFERRAL  - Adult; Outpatient Treatment; Individual/Couples/Family/Group Therapy/Health Psychology; [facility] [phone]; We will contact you to schedule the appointment or please call with any questions       All scheduling is subject to the client's specific insurance plan & benefits, provider/location availability, and provider clinical specialities.  Please arrive 15 minutes early for your first appointment and bring your completed paperwork.    Please be aware that coverage of these services is subject to the terms and limitations of your health insurance plan.  Call member services at your health plan with any benefit or coverage questions.                            Follow-up notes from your care team     Return in about 1 month (around 12/30/2018) for BP Recheck.      Your next 10 appointments already scheduled     Dec 11, 2018 10:00 AM CST   XR JOINT INJECTION ASPIRATION SMALL RT with UCXR3,  IMAGING NURSE,  [facility]   [facility] ([facility])    [address] [address] [phone]           How do I prepare for my exam? (Food and drink instructions) Stop drinking 1 hour before the exam.  How do I prepare for my exam? (Other instructions) You may take your medicines as usual, except for blood thinners (Coumadin, Plavix, Ticlid, Persantine, Aggrenox, Pletal, Effient, Brilliant). Talk to your doctor if you take these.  What should I wear: Wear comfortable clothes.  How long does the exam take: The entire exam takes about one hour. If you  are having a wrist exam, you may have two shots up to two hours apart. You may leave the hospital between the shots.If your doctor has ordered a CT or MRI scan of this joint, this will take another 30 to 45 minutes.  What should I bring: Please bring a list of your current medicines to your exam. Include vitamins, minerals and over-the-counter medicines.  Do I need a :  No  is needed.  What do I need to tell my doctor: Tell your doctor if: * You have ever had an allergic reaction to X-ray dye (contrast fluid). * If there s any chance you are pregnant.  What should I do after the exam: Try to rest for the next 12 hours or so. You can go back to normal activities the day after your exam.  What is this test: An arthrogram is an X-ray exam of a joint. We will take a set of X-ray pictures. We will then inject dye (contrast fluid) into the area around the joint. After that, we will take another set of X-ray pictures. The dye helps your doctor see the joint better on the X-rays. With a joint injection, we will also inject a steroid into your joint after we inject the dye.  Who should I call with questions: If you have any questions, please call the Imaging Department where you will have your exam. Directions, parking instructions, and other information is available on our website, [email or web address].              Future tests that were ordered for you today     Open Future Orders        Priority Expected Expires Ordered    Zio Patch Holter Routine  1/14/2019 11/30/2018            Who to contact     If you have questions or need follow up information about today's clinic visit or your schedule please contact [facility] directly at [phone].  Normal or non-critical lab and imaging results will be communicated to you by MyChart, letter or phone within 4 business days after the clinic has received the results. If you do not hear from us within 7 days, please contact the clinic through [employer]  or phone. If you have a critical or abnormal lab result, we will notify you by phone as soon as possible.  Submit refill requests through [employer] or call your pharmacy and they will forward the refill request to us. Please allow 3 business days for your refill to be completed.          Additional Information About Your Visit        [employer] Information     [employer] gives you secure access to your electronic health record. If you see a primary care provider, you can also send messages to your care team and make appointments. If you have questions, please call your primary care clinic.  If you do not have a primary care provider, please call [phone] and they will assist you.        Care EveryWhere ID     This is your Care EveryWhere ID. This could be used by other organizations to access your [facility] medical records  [id number]        Your Vitals Were     Pulse Temperature Pulse Oximetry BMI (Body Mass Index)          98 97.9  F (36.6  C) (Oral) 98% 29.67 kg/m2         Blood Pressure from Last 3 Encounters:   11/30/18 (!) 154/92   08/14/18 158/73   08/01/18 (!) 146/98    Weight from Last 3 Encounters:   11/30/18 206 lb 12.8 oz (93.8 kg)   08/01/18 210 lb (95.3 kg)   01/03/18 221 lb 14.4 oz (100.7 kg)              We Performed the Following     Albumin Random Urine Quantitative with Creat Ratio     Comprehensive metabolic panel     EKG 12-lead complete w/read - Clinics     Lipid panel reflex to direct LDL Fasting     MED THERAPY MANAGE REFERRAL     MENTAL HEALTH REFERRAL  - Adult; Outpatient Treatment; Individual/Couples/Family/Group Therapy/Health Psychology; G: [facility] [phone]; We will contact you to schedule the appointment or please call with any questions     TSH with free T4 reflex          Today's Medication Changes          These changes are accurate as of 11/30/18  8:56 AM.  If you have any questions, ask your nurse or doctor.               Start taking these medicines.         Dose/Directions    lisinopril 10 MG tablet   Commonly known as:  PRINIVIL/ZESTRIL   Used for:  Benign essential hypertension   Started by:  Randa Bill APRN CNP        Dose:  10 mg   Take 1 tablet (10 mg) by mouth daily Return when refills are due   Quantity:  90 tablet   Refills:  0       LORazepam 1 MG tablet   Commonly known as:  ATIVAN   Used for:  Situational anxiety   Started by:  Randa Bill APRN CNP        Dose:  0.5-1 mg   Take 0.5-1 tablets (0.5-1 mg) by mouth every 8 hours as needed for anxiety Take 30 minutes prior to departure.  Do not operate a vehicle after taking this medication   Quantity:  4 tablet   Refills:  0         Stop taking these medicines if you haven't already. Please contact your care team if you have questions.     albuterol 108 (90 Base) MCG/ACT inhaler   Commonly known as:  PROAIR HFA/PROVENTIL HFA/VENTOLIN HFA   Stopped by:  Randa Bill APRN CNP           benzonatate 200 MG capsule   Commonly known as:  TESSALON   Stopped by:  Randa Bill APRN CNP           HYDROcodone-acetaminophen 5-325 MG tablet   Commonly known as:  NORCO   Stopped by:  Randa Bill APRN CNP           hydrOXYzine 25 MG tablet   Commonly known as:  ATARAX   Stopped by:  Randa Bill APRN CNP           methylPREDNISolone 4 MG tablet therapy pack   Commonly known as:  MEDROL DOSEPAK   Stopped by:  Randa Bill APRN CNP           montelukast 10 MG tablet   Commonly known as:  SINGULAIR   Stopped by:  Randa Bill APRN CNP           nicotine 2 MG gum   Commonly known as:  NICORETTE   Stopped by:  Randa Bill APRN CNP           ondansetron 4 MG tablet   Commonly known as:  ZOFRAN   Stopped by:  Randa Bill APRN CNP                Where to get your medicines      These medications were sent to [facility] [place], [place] - [address] [facility]-Pending sale to [facility]  [address] [facility] [address] [address]    Hours:  TRANSPLANT PHONE NUMBER [phone]  Phone:  [phone]     celecoxib 100 MG capsule    gabapentin 600 MG tablet    lisinopril 10 MG tablet    traZODone 100 MG tablet         Some of these will need a paper prescription and others can be bought over the counter.  Ask your nurse if you have questions.     Bring a paper prescription for each of these medications     LORazepam 1 MG tablet                Primary Care Provider Office Phone # Fax #    Randa Bill, APRN [facility] [phone]       [address] [facility] [[email or web address]] [address]  [facility] [address]        Equal Access to Services     [facility] Hospital[facility]US[facility] : Hadii aad ku hadasho Soomaali, waaxda luqadaha, qaybta kaalmada adeegyada, waxfernando grimes haysamy rainey . So [facility] [place] [phone].    ATENCIÓN: Si habla español, tiene a mitchell disposición servicios gratuitos de asistencia lingüística. [facility] [phone].    We comply with applicable federal civil rights laws and [place] laws. We do not discriminate on the basis of race, color, national origin, age, disability, sex, sexual orientation, or gender identity.            Thank you!     Thank you for choosing [facility]  for your care. Our goal is always to provide you with excellent care. Hearing back from our patients is one way we can continue to improve our services. Please take a few minutes to complete the written survey that you may receive in the mail after your visit with us. Thank you!             Your Updated Medication List - Protect others around you: Learn how to safely use, store and throw away your medicines at www.disposemymeds.org.          This list is accurate as of 11/30/18  8:56 AM.  Always use your most recent med list.                   Brand Name Dispense Instructions for use Diagnosis    celecoxib 100 MG capsule    celeBREX    60 capsule    Take 1 capsule (100 mg) by mouth 2 times daily as needed for moderate pain    Pain, Encounter for therapeutic drug monitoring       Daily Multi vitamin  /Minerals Tabs     30     1 TABLET DAILY        diltiazem 2% in PLO cream (FV COMPOUNDED) 2% Gel     60 g    To anal opening three times daily.  Use a pea-sized amount.  Store at room temperature.    Anal pain, Anal fissure       gabapentin 600 MG tablet    NEURONTIN    90 tablet    Take 1 tablet (600 mg) by mouth At Bedtime    Restless leg syndrome       lisinopril 10 MG tablet    PRINIVIL/ZESTRIL    90 tablet    Take 1 tablet (10 mg) by mouth daily Return when refills are due    Benign essential hypertension       LORazepam 1 MG tablet    ATIVAN    4 tablet    Take 0.5-1 tablets (0.5-1 mg) by mouth every 8 hours as needed for anxiety Take 30 minutes prior to departure.  Do not operate a vehicle after taking this medication    Situational anxiety       MAGNESIUM OXIDE PO      Take 250 mg by mouth        MIRENA (52 MG) 20 MCG/24HR IUD   Generic drug:  levonorgestrel     1 each    one placed in uterus        traZODone 100 MG tablet    DESYREL    180 tablet    Take 2 tablets (200 mg) by mouth At Bedtime    Primary insomnia       venlafaxine 75 MG 24 hr capsule    EFFEXOR-XR    90 capsule    Take 3 capsules (225 mg) by mouth daily    Major depressive disorder, recurrent episode, moderate (H)

## 2018-12-01 ASSESSMENT — ANXIETY QUESTIONNAIRES: GAD7 TOTAL SCORE: 6

## 2018-12-03 ENCOUNTER — TELEPHONE (OUTPATIENT)
Dept: PHARMACY | Facility: CLINIC | Age: 48
End: 2018-12-03

## 2018-12-03 NOTE — TELEPHONE ENCOUNTER
MTM referral from: [facility] visit (referral by provider)    MTM referral outreach attempt #2 on December 3, 2018 at 4:29 PM      Outcome: Patient not reachable after several attempts, will route to MTM Pharmacist/Provider as an FYI. Thank you for the referral.    Misa Price, MTM Coordinator      MTM Pharmacist at clinic where patient receives primary care-yes  Referred by a specialist-no, MTM at speciality clinic-NA  Patient covered for MTM-yes    Blocked MTM provider schedule-no

## 2018-12-05 NOTE — PROGRESS NOTES
Sharmin,    Labs look great.  The pharmacist  sent me a note saying that she couldn't reach you.  Would you be able to call to schedule with Pat Moraes?  If you have any questions, please feel free to contact the clinic.    TOMMY lCinton

## 2018-12-11 ENCOUNTER — ANCILLARY PROCEDURE (OUTPATIENT)
Dept: GENERAL RADIOLOGY | Facility: CLINIC | Age: 48
End: 2018-12-11
Attending: PHYSICIAN ASSISTANT
Payer: COMMERCIAL

## 2018-12-11 DIAGNOSIS — M20.5X1 HALLUX LIMITUS OF RIGHT FOOT: ICD-10-CM

## 2018-12-11 DIAGNOSIS — M79.674 GREAT TOE PAIN, RIGHT: ICD-10-CM

## 2018-12-11 RX ORDER — IOPAMIDOL 408 MG/ML
20 INJECTION, SOLUTION INTRATHECAL ONCE
Status: COMPLETED | OUTPATIENT
Start: 2018-12-11 | End: 2018-12-11

## 2018-12-11 RX ORDER — TRIAMCINOLONE ACETONIDE 40 MG/ML
40 INJECTION, SUSPENSION INTRA-ARTICULAR; INTRAMUSCULAR ONCE
Status: COMPLETED | OUTPATIENT
Start: 2018-12-11 | End: 2018-12-11

## 2018-12-11 RX ORDER — LIDOCAINE HYDROCHLORIDE 10 MG/ML
30 INJECTION, SOLUTION EPIDURAL; INFILTRATION; INTRACAUDAL; PERINEURAL ONCE
Status: COMPLETED | OUTPATIENT
Start: 2018-12-11 | End: 2018-12-11

## 2018-12-11 RX ORDER — BUPIVACAINE HYDROCHLORIDE 2.5 MG/ML
10 INJECTION, SOLUTION EPIDURAL; INFILTRATION; INTRACAUDAL ONCE
Status: COMPLETED | OUTPATIENT
Start: 2018-12-11 | End: 2018-12-11

## 2018-12-11 RX ADMIN — LIDOCAINE HYDROCHLORIDE 5 ML: 10 INJECTION, SOLUTION EPIDURAL; INFILTRATION; INTRACAUDAL; PERINEURAL at 10:17

## 2018-12-11 RX ADMIN — BUPIVACAINE HYDROCHLORIDE 10 MG: 2.5 INJECTION, SOLUTION EPIDURAL; INFILTRATION; INTRACAUDAL at 10:17

## 2018-12-11 RX ADMIN — TRIAMCINOLONE ACETONIDE 40 MG: 40 INJECTION, SUSPENSION INTRA-ARTICULAR; INTRAMUSCULAR at 10:17

## 2018-12-11 RX ADMIN — IOPAMIDOL 10 ML: 408 INJECTION, SOLUTION INTRATHECAL at 10:17

## 2018-12-19 ENCOUNTER — MYC MEDICAL ADVICE (OUTPATIENT)
Dept: FAMILY MEDICINE | Facility: CLINIC | Age: 48
End: 2018-12-19

## 2018-12-19 DIAGNOSIS — T88.7XXA MEDICATION SIDE EFFECTS: ICD-10-CM

## 2018-12-19 DIAGNOSIS — I10 BENIGN ESSENTIAL HYPERTENSION: Primary | ICD-10-CM

## 2018-12-19 RX ORDER — LOSARTAN POTASSIUM 50 MG/1
50 TABLET ORAL DAILY
Qty: 90 TABLET | Refills: 0 | Status: SHIPPED | OUTPATIENT
Start: 2018-12-19 | End: 2019-02-22

## 2018-12-19 NOTE — TELEPHONE ENCOUNTER
Mariya,    Please see patient's MyChart message regarding lisinopril    Pharmacy cued    Please advise    Thank You!  Jasmin Menon, RN  Triage Nurse

## 2019-01-02 ENCOUNTER — OFFICE VISIT (OUTPATIENT)
Dept: ORTHOPEDICS | Facility: CLINIC | Age: 49
End: 2019-01-02
Payer: COMMERCIAL

## 2019-01-02 VITALS — OXYGEN SATURATION: 98 % | WEIGHT: 206 LBS | BODY MASS INDEX: 29.49 KG/M2 | HEIGHT: 70 IN

## 2019-01-02 DIAGNOSIS — M25.521 RIGHT ELBOW PAIN: Primary | ICD-10-CM

## 2019-01-02 ASSESSMENT — MIFFLIN-ST. JEOR: SCORE: 1654.66

## 2019-01-02 NOTE — PROGRESS NOTES
SPORTS & ORTHOPEDIC WALK-IN VISIT 1/2/2019    Primary Care Physician: Dr. Bill  12/29  slipped on ice and landed on R side, striking back of elbow. Was seen at [facility]. XR taken. Placed in sugar tong splint, given sling , and Norco. Has been slighltly uncomfortable in splint. Reports to finishing off script of Norco. Mostly TTP on olecranon, but does have some mild TTP on radial head. Denies pain with pronation and supination.    Reason for visit:     What part of your body is injured / painful?  right elbow    What caused the injury /pain? Fall    How long ago did your injury occur or pain begin? several days ago    What are your most bothersome symptoms? Pain    How would you characterize your symptom?  throbing    What makes your symptoms better? Rest and Wrap or brace    What makes your symptoms worse? Movement    Have you been previously seen for this problem? Yes,     Medical History:    Any recent changes to your medical history? No    Any new medication prescribed since last visit? No    Have you had surgery on this body part before? No    Social History:    Occupation: [employer]     Handedness: Right    Exercise: 2-3 days/week    Review of Systems:    Do you have fever, chills, weight loss? No    Do you have any vision problems? No    Do you have any chest pain or edema? No    Do you have any shortness of breath or wheezing?  No    Do you have stomach problems? No    Do you have any numbness or focal weakness? Yes, R hand     Do you have diabetes? No    Do you have problems with bleeding or clotting? No    Do you have an rashes or other skin lesions? No

## 2019-01-02 NOTE — LETTER
2019       RE: Sharmin Nieves  [address]  [place] [address]     Dear Colleague,    Thank you for referring your patient, Sharmin Nieves, to the [facility] SPORTS AND ORTHOPAEDIC WALK IN CLINIC at [facility]. Please see a copy of my visit note below.    [facility]  Orthopedics  DimitrisKarrie Montesinos, DO  2019     Name: Sharmin Nieves  MRN: [id number]  Age: 48 year old  : 1970  Referring provider: No ref. provider found     Chief Complaint: Pain of the Right Elbow     Date of Injury: 18    History of Present Illness:   Sharmin Nieves is a 48 year old, right handed female who presents today for evaluation of right arm pain sustained on 18 after slipping on the ice and falling on the backside of her right elbow. She was initally seen in Urgent Care on 18, at which time, x-rays revealed questionable subtle lucency on radial head and she was placed in a sugar tong sling. Today, she endorses throbbing pain localized on the posterior aspect of the right elbow. Swelling overlying olecranon, bruising. Pain is alleviated with rest and when wearing a wrap or brace. Pain is exacerbated with range of motion. The splint that she is currently wearing feels uncomfortable to her.    Review of Systems:   A 10-point review of systems was obtained and is negative except for as noted in the HPI.     Medications:      celecoxib (CELEBREX) 100 MG capsule, Take 1 capsule (100 mg) by mouth 2 times daily as needed for moderate pain, Disp: 60 capsule, Rfl: 0     DAILY MULTI VITAMIN/MINERALS OR TABS, 1 TABLET DAILY, Disp: 30, Rfl: 0     diltiazem 2% in PLO cream, FV COMPOUNDED, 2% GEL, To anal opening three times daily.  Use a pea-sized amount.  Store at room temperature., Disp: 60 g, Rfl: 0     gabapentin (NEURONTIN) 600 MG tablet, Take 1 tablet (600 mg) by mouth At Bedtime, Disp: 90 tablet, Rfl: 3     levonorgestrel (MIRENA) 20 MCG/24HR IUD, one placed in uterus, Disp:  1 each, Rfl: 0     levonorgestrel (MIRENA) 20 MCG/24HR IUD, 1 each (20 mcg) by Intrauterine route once for 1 dose, Disp: 1 each, Rfl: 0     lisinopril (PRINIVIL/ZESTRIL) 10 MG tablet, Take 1 tablet (10 mg) by mouth daily Return when refills are due, Disp: 90 tablet, Rfl: 0     LORazepam (ATIVAN) 1 MG tablet, Take 0.5-1 tablets (0.5-1 mg) by mouth every 8 hours as needed for anxiety Take 30 minutes prior to departure.  Do not operate a vehicle after taking this medication, Disp: 4 tablet, Rfl: 0     losartan (COZAAR) 50 MG tablet, Take 1 tablet (50 mg) by mouth daily, Disp: 90 tablet, Rfl: 0     MAGNESIUM OXIDE PO, Take 250 mg by mouth, Disp: , Rfl:      traZODone (DESYREL) 100 MG tablet, Take 2 tablets (200 mg) by mouth At Bedtime, Disp: 180 tablet, Rfl: 3     venlafaxine (EFFEXOR-XR) 75 MG 24 hr capsule, Take 3 capsules (225 mg) by mouth daily, Disp: 90 capsule, Rfl: 2    Allergies:  Darvocet  Nsaids    Past Medical History:  Acne  Anxiety state, unspecified  Benign essential hypertension  Chronic low back pain  Depression  Diaphragmatic hernia without mention of obstruction or gangrene  Esophageal reflux  ETOH abuse  Herpes simplex without mention of complication  Moderate major depression (H)  Rectal pain  Right hip pain  Seasonal affective disorder (H)  Surveillance of previously prescribed intrauterine contraceptive device    Past Surgical History:  C Nonspecific Procedure  C Stomach Surgery Procedure Unlisted  Cheilectomy  Surgical Hisotry of Lapr Nissen fundoplication  Tonsillectomy & Adenoidectomy    Social History:  Works as medical assistant at [employer]. She admits to tobacco use and admits to alcohol use.     Family History:  Positive: C.A.D (maternal grandmother), Hypertension, Alcohol/Drug (maternal grandmother, paternal grandfather, father, sister, brothers), Depression (maternal grandmother, paternal grandmother, father, maternal grandfather, mother, sister, brother), Cerebrovascular  "Disease (paternal grandfather), Breast Cancer (paternal grandmother), Gastrointestinal Disease (father, brother), Obesity (mother), Anxiety Disorder (mother), Diabetes (mother), Breast Cancer (maternal aunt),     Physical Examination:  Height 1.778 m (5' 10\"), weight 93.4 kg (206 lb), not currently breastfeeding.     General  - normal appearance, in no obvious distress  CV  - normal radial pulse  Pulm  - normal respiratory pattern, non-labored  Musculoskeletal - right elbow  - inspection: moderate swelling of the right olecranon, ecchymosis at the distal aspect of humerus posteriorly extending into the olecranon region.    - palpation: tender to palpation at the olecranon and minimal tenderness at the radial capitellar joint.  - ROM:  145 deg flexion   30 deg extension   90 deg pronation   90 deg supination  - strength: 5/5  strength, 5/5 flexion, extension, pronation, supination  - special tests:  (-) varus  (-) valgus  (-) Tinel's  Neuro  - no sensory or motor deficit, grossly normal coordination, normal muscle tone  Skin  - no ecchymosis, erythema, warmth, or induration, no obvious rash  Psych  - interactive, appropriate, normal mood and affect    Imaging:   Radiographs of the right elbow - 3 views (12/29/18)  No displaced fracture is identified. There is an elbow joint effusion and on one view there is a subtle lucency in the radial head. Findings are suspicious for a nondisplaced radial head fracture and follow-up radiograph in 10-14 days may be helpful if clinically indicated. No other potential fractures are identified. No dislocation.    Assessment:   48 year old, right handed female present with right elbow pain and swelling after fall directly onto her right elbow roughly 4 days ago. Initial radiographs concerned for possible nondisplaced radial head fracture however upon independent review I do not see any concerning findings. We will treat as nondisplaced radial head fracture however, and repeat " x-rays in one week.    Plan:   - Sling for comfort and emphasized early range of motion especially starting after one week of date of injury as allowable.   - No loading to elbow, heavy lifting, restrictions discussed at length  - Tubigrip sleeve for elbow swelling.  - Ice, Celebrex, and rest.   - Follow up in one week for repeat x-rays    Santiago MOHR DO, have reviewed the above note and agree with the scribe's notation as written.     Scribe Disclosure:   IHelio, am serving as a scribe to document services personally performed by Santiago Montesinos DO at this visit, based upon the provider's statements to me. All documentation has been reviewed by the aforementioned provider prior to being entered into the official medical record.            SPORTS & ORTHOPEDIC WALK-IN VISIT 1/2/2019    Primary Care Physician: Dr. Bill  12/29  slipped on ice and landed on R side, striking back of elbow. Was seen at [facility]. XR taken. Placed in sugar tong splint, given sling , and Norco. Has been slighltly uncomfortable in splint. Reports to finishing off script of Norco. Mostly TTP on olecranon, but does have some mild TTP on radial head. Denies pain with pronation and supination.    Reason for visit:     What part of your body is injured / painful?  right elbow    What caused the injury /pain? Fall    How long ago did your injury occur or pain begin? several days ago    What are your most bothersome symptoms? Pain    How would you characterize your symptom?  throbing    What makes your symptoms better? Rest and Wrap or brace    What makes your symptoms worse? Movement    Have you been previously seen for this problem? Yes,     Medical History:    Any recent changes to your medical history? No    Any new medication prescribed since last visit? No    Have you had surgery on this body part before? 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 No    Do you have any vision problems? No    Do you have any chest pain or edema? No    Do you have any shortness of breath or wheezing?  No    Do you have stomach problems? No    Do you have any numbness or focal weakness? Yes, R hand     Do you have diabetes? No    Do you have problems with bleeding or clotting? No    Do you have an rashes or other skin lesions? No         Again, thank you for allowing me to participate in the care of your patient.      Sincerely,    Santiago Mnotesinos, DO

## 2019-01-02 NOTE — PROGRESS NOTES
[facility]  Orthopedics  Santiago Montesinos,   2019     Name: Sharmin Nieves  MRN: [id number]  Age: 48 year old  : 1970  Referring provider: No ref. provider found     Chief Complaint: Pain of the Right Elbow     Date of Injury: 18    History of Present Illness:   Sharmin Nieves is a 48 year old, right handed female who presents today for evaluation of right arm pain sustained on 18 after slipping on the ice and falling on the backside of her right elbow. She was initally seen in Urgent Care on 18, at which time, x-rays revealed questionable subtle lucency on radial head and she was placed in a sugar tong sling. Today, she endorses throbbing pain localized on the posterior aspect of the right elbow. Swelling overlying olecranon, bruising. Pain is alleviated with rest and when wearing a wrap or brace. Pain is exacerbated with range of motion. The splint that she is currently wearing feels uncomfortable to her.    Review of Systems:   A 10-point review of systems was obtained and is negative except for as noted in the HPI.     Medications:      celecoxib (CELEBREX) 100 MG capsule, Take 1 capsule (100 mg) by mouth 2 times daily as needed for moderate pain, Disp: 60 capsule, Rfl: 0     DAILY MULTI VITAMIN/MINERALS OR TABS, 1 TABLET DAILY, Disp: 30, Rfl: 0     diltiazem 2% in PLO cream, FV COMPOUNDED, 2% GEL, To anal opening three times daily.  Use a pea-sized amount.  Store at room temperature., Disp: 60 g, Rfl: 0     gabapentin (NEURONTIN) 600 MG tablet, Take 1 tablet (600 mg) by mouth At Bedtime, Disp: 90 tablet, Rfl: 3     levonorgestrel (MIRENA) 20 MCG/24HR IUD, one placed in uterus, Disp: 1 each, Rfl: 0     levonorgestrel (MIRENA) 20 MCG/24HR IUD, 1 each (20 mcg) by Intrauterine route once for 1 dose, Disp: 1 each, Rfl: 0     lisinopril (PRINIVIL/ZESTRIL) 10 MG tablet, Take 1 tablet (10 mg) by mouth daily Return when refills are due, Disp: 90 tablet, Rfl: 0     LORazepam (ATIVAN) 1 MG  "tablet, Take 0.5-1 tablets (0.5-1 mg) by mouth every 8 hours as needed for anxiety Take 30 minutes prior to departure.  Do not operate a vehicle after taking this medication, Disp: 4 tablet, Rfl: 0     losartan (COZAAR) 50 MG tablet, Take 1 tablet (50 mg) by mouth daily, Disp: 90 tablet, Rfl: 0     MAGNESIUM OXIDE PO, Take 250 mg by mouth, Disp: , Rfl:      traZODone (DESYREL) 100 MG tablet, Take 2 tablets (200 mg) by mouth At Bedtime, Disp: 180 tablet, Rfl: 3     venlafaxine (EFFEXOR-XR) 75 MG 24 hr capsule, Take 3 capsules (225 mg) by mouth daily, Disp: 90 capsule, Rfl: 2    Allergies:  Darvocet  Nsaids    Past Medical History:  Acne  Anxiety state, unspecified  Benign essential hypertension  Chronic low back pain  Depression  Diaphragmatic hernia without mention of obstruction or gangrene  Esophageal reflux  ETOH abuse  Herpes simplex without mention of complication  Moderate major depression (H)  Rectal pain  Right hip pain  Seasonal affective disorder (H)  Surveillance of previously prescribed intrauterine contraceptive device    Past Surgical History:  C Nonspecific Procedure  C Stomach Surgery Procedure Unlisted  Cheilectomy  Surgical Hisotry of Lapr Nissen fundoplication  Tonsillectomy & Adenoidectomy    Social History:  Works as medical assistant at [employer]. She admits to tobacco use and admits to alcohol use.     Family History:  Positive: C.A.D (maternal grandmother), Hypertension, Alcohol/Drug (maternal grandmother, paternal grandfather, father, sister, brothers), Depression (maternal grandmother, paternal grandmother, father, maternal grandfather, mother, sister, brother), Cerebrovascular Disease (paternal grandfather), Breast Cancer (paternal grandmother), Gastrointestinal Disease (father, brother), Obesity (mother), Anxiety Disorder (mother), Diabetes (mother), Breast Cancer (maternal aunt),     Physical Examination:  Height 1.778 m (5' 10\"), weight 93.4 kg (206 lb), not currently " breastfeeding.     General  - normal appearance, in no obvious distress  CV  - normal radial pulse  Pulm  - normal respiratory pattern, non-labored  Musculoskeletal - right elbow  - inspection: moderate swelling of the right olecranon, ecchymosis at the distal aspect of humerus posteriorly extending into the olecranon region.    - palpation: tender to palpation at the olecranon and minimal tenderness at the radial capitellar joint.  - ROM:  145 deg flexion   30 deg extension   90 deg pronation   90 deg supination  - strength: 5/5  strength, 5/5 flexion, extension, pronation, supination  - special tests:  (-) varus  (-) valgus  (-) Tinel's  Neuro  - no sensory or motor deficit, grossly normal coordination, normal muscle tone  Skin  - no ecchymosis, erythema, warmth, or induration, no obvious rash  Psych  - interactive, appropriate, normal mood and affect    Imaging:   Radiographs of the right elbow - 3 views (12/29/18)  No displaced fracture is identified. There is an elbow joint effusion and on one view there is a subtle lucency in the radial head. Findings are suspicious for a nondisplaced radial head fracture and follow-up radiograph in 10-14 days may be helpful if clinically indicated. No other potential fractures are identified. No dislocation.    Assessment:   48 year old, right handed female present with right elbow pain and swelling after fall directly onto her right elbow roughly 4 days ago. Initial radiographs concerned for possible nondisplaced radial head fracture however upon independent review I do not see any concerning findings. We will treat as nondisplaced radial head fracture however, and repeat x-rays in one week.    Plan:   - Sling for comfort and emphasized early range of motion especially starting after one week of date of injury as allowable.   - No loading to elbow, heavy lifting, restrictions discussed at length  - Tubigrip sleeve for elbow swelling.  - Ice, Celebrex, and rest.   -  Follow up in one week for repeat x-rays    I, Santiago Montesinos DO, have reviewed the above note and agree with the scribe's notation as written.     Scribe Disclosure:   I, Helio Roberto, am serving as a scribe to document services personally performed by Santiago Montesinos DO at this visit, based upon the provider's statements to me. All documentation has been reviewed by the aforementioned provider prior to being entered into the official medical record.

## 2019-01-04 DIAGNOSIS — Z51.81 ENCOUNTER FOR THERAPEUTIC DRUG MONITORING: ICD-10-CM

## 2019-01-04 DIAGNOSIS — R52 PAIN: ICD-10-CM

## 2019-01-04 RX ORDER — CELECOXIB 100 MG/1
100 CAPSULE ORAL 2 TIMES DAILY PRN
Qty: 60 CAPSULE | Refills: 0 | Status: SHIPPED | OUTPATIENT
Start: 2019-01-04 | End: 2019-02-12

## 2019-01-04 NOTE — LETTER
[address] [address]  Phone: [phone]  Fax: [phone]      01/09/19    Sharmin Nieves  [address]  [place] [facility] [address]      Sharmin,      We are reaching out to you regarding a recent medication refill request that we received for celecoxib (CELEBREX) 100 MG capsule. Your provider, Mariya Bill, requested that we contact you with a message that she sent in a refill for you on 01/04/19, but she does need you to come into the clinic so we can have a recent normal blood pressure documented in your medical record. She stated that this can be a nurse only appointment. Please give our clinic a call to schedule at [phone]        Sincerely,      [facility]

## 2019-01-04 NOTE — TELEPHONE ENCOUNTER
Mariya,     Please review/sign or advise for refill of Celebrex 100 mg capsule.     Routing because BP at LOV 11/30/18 was elevated.     Called patient. LVM to call clinic. Per chart review, pt to f/u in 1 month after LOV for HTN.     Yu Malone RN  [facility]

## 2019-01-04 NOTE — TELEPHONE ENCOUNTER
"Requested Prescriptions   Pending Prescriptions Disp Refills     celecoxib (CELEBREX) 100 MG capsule 60 capsule 0    Last Written Prescription Date:  11/30/2018  Last Fill Quantity: 60,  # refills: 0   Last office visit: 11/30/2018 with prescribing provider:  11/30/2018   Future Office Visit:   Sig: Take 1 capsule (100 mg) by mouth 2 times daily as needed for moderate pain    NSAID Medications Failed - 1/4/2019  9:18 AM       Failed - Blood pressure under 140/90 in past 12 months    BP Readings from Last 3 Encounters:   11/30/18 (!) 154/92   08/14/18 158/73   08/01/18 (!) 146/98                Failed - Normal CBC on file in past 12 months    Recent Labs   Lab Test 10/04/17  1249   WBC 8.0   RBC 4.01   HGB 13.8   HCT 40.3                   Passed - Normal ALT on file in past 12 months    Recent Labs   Lab Test 11/30/18  0918   ALT 18            Passed - Normal AST on file in past 12 months    Recent Labs   Lab Test 11/30/18 0918   AST 24            Passed - Recent (12 mo) or future (30 days) visit within the authorizing provider's specialty    Patient had office visit in the last 12 months or has a visit in the next 30 days with authorizing provider or within the authorizing provider's specialty.  See \"Patient Info\" tab in inbasket, or \"Choose Columns\" in Meds & Orders section of the refill encounter.             Passed - Patient is age 6-64 years       Passed - No active pregnancy on record       Passed - Normal serum creatinine on file in past 12 months    Recent Labs   Lab Test 11/30/18 0918   CR 0.73            Passed - No positive pregnancy test in past 12 months        "

## 2019-01-04 NOTE — TELEPHONE ENCOUNTER
I am ok with the refill, but I do need Sharmin to come in to have a documented normal blood pressure.  It can be a MA visit.  TOMMY Clinton

## 2019-01-09 DIAGNOSIS — M25.521 RIGHT ELBOW PAIN: Primary | ICD-10-CM

## 2019-01-09 NOTE — TELEPHONE ENCOUNTER
Called patient. LVM to call clinic. Letter mailed to patient with PCP message below.    Yu Malone RN  [facility]

## 2019-01-17 DIAGNOSIS — F33.1 MAJOR DEPRESSIVE DISORDER, RECURRENT EPISODE, MODERATE (H): ICD-10-CM

## 2019-01-17 NOTE — TELEPHONE ENCOUNTER
"Requested Prescriptions   Pending Prescriptions Disp Refills     venlafaxine (EFFEXOR-XR) 75 MG 24 hr capsule [Pharmacy Med Name: VENLAFAXINE HCL ER 75MG CP24] 90 capsule 0    Last Written Prescription Date:  10/10/2018  Last Fill Quantity: 90,  # refills: 2   Last office visit: 11/30/2018 with prescribing provider:  11/30/2018   Future Office Visit:   Sig: TAKE THREE CAPSULES BY MOUTH EVERY DAY    Serotonin-Norepinephrine Reuptake Inhibitors  Failed - 1/17/2019 11:29 AM       Failed - Blood pressure under 140/90 in past 12 months    BP Readings from Last 3 Encounters:   11/30/18 (!) 154/92   08/14/18 158/73   08/01/18 (!) 146/98                Failed - PHQ-9 score of less than 5 in past 6 months    Please review last PHQ-9 score.          Passed - Medication is active on med list       Passed - Patient is age 18 or older       Passed - No active pregnancy on record       Passed - Normal serum creatinine on file in past 12 months    Recent Labs   Lab Test 11/30/18  0918   CR 0.73            Passed - No positive pregnancy test in past 12 months       Passed - Recent (6 mo) or future (30 days) visit within the authorizing provider's specialty    Patient had office visit in the last 6 months or has a visit in the next 30 days with authorizing provider or within the authorizing provider's specialty.  See \"Patient Info\" tab in inbasket, or \"Choose Columns\" in Meds & Orders section of the refill encounter.            "

## 2019-01-18 RX ORDER — VENLAFAXINE HYDROCHLORIDE 75 MG/1
CAPSULE, EXTENDED RELEASE ORAL
Qty: 90 CAPSULE | Refills: 0 | Status: SHIPPED | OUTPATIENT
Start: 2019-01-18 | End: 2019-02-22

## 2019-01-18 NOTE — TELEPHONE ENCOUNTER
Patient had to switch blood pressure medications mid December and she is due to return to the clinic for BP check.  Please help her get scheduled.  Refilling 30 days effexor.  TOMMY Clinton

## 2019-01-18 NOTE — TELEPHONE ENCOUNTER
Mariya    Please review/sign or advise for refill request of: venlafaxine (EFFEXOR-XR) 75 MG 24 hr capsule     Routing refill request to provider for review/approval because:  Blood pressure uncontrolled, last PHQ-9: 6    Thank You!  Jasmin Menon, RN  Triage Nurse

## 2019-02-13 ENCOUNTER — MYC MEDICAL ADVICE (OUTPATIENT)
Dept: FAMILY MEDICINE | Facility: CLINIC | Age: 49
End: 2019-02-13

## 2019-02-22 ENCOUNTER — OFFICE VISIT (OUTPATIENT)
Dept: FAMILY MEDICINE | Facility: CLINIC | Age: 49
End: 2019-02-22
Payer: COMMERCIAL

## 2019-02-22 VITALS
OXYGEN SATURATION: 98 % | BODY MASS INDEX: 27.98 KG/M2 | DIASTOLIC BLOOD PRESSURE: 88 MMHG | WEIGHT: 195 LBS | SYSTOLIC BLOOD PRESSURE: 136 MMHG | TEMPERATURE: 98.4 F | HEART RATE: 97 BPM

## 2019-02-22 DIAGNOSIS — R10.2 PELVIC PAIN IN FEMALE: ICD-10-CM

## 2019-02-22 DIAGNOSIS — Z51.81 ENCOUNTER FOR THERAPEUTIC DRUG MONITORING: ICD-10-CM

## 2019-02-22 DIAGNOSIS — I10 BENIGN ESSENTIAL HYPERTENSION: Primary | ICD-10-CM

## 2019-02-22 DIAGNOSIS — R52 PAIN: ICD-10-CM

## 2019-02-22 DIAGNOSIS — G25.81 RESTLESS LEG SYNDROME: ICD-10-CM

## 2019-02-22 DIAGNOSIS — F10.90 HEAVY ALCOHOL CONSUMPTION: ICD-10-CM

## 2019-02-22 DIAGNOSIS — F32.1 MODERATE MAJOR DEPRESSION (H): ICD-10-CM

## 2019-02-22 LAB
ERYTHROCYTE [DISTWIDTH] IN BLOOD BY AUTOMATED COUNT: 12.9 % (ref 10–15)
HCT VFR BLD AUTO: 40.6 % (ref 35–47)
HGB BLD-MCNC: 14 G/DL (ref 11.7–15.7)
MCH RBC QN AUTO: 34.2 PG (ref 26.5–33)
MCHC RBC AUTO-ENTMCNC: 34.5 G/DL (ref 31.5–36.5)
MCV RBC AUTO: 99 FL (ref 78–100)
PLATELET # BLD AUTO: 253 10E9/L (ref 150–450)
RBC # BLD AUTO: 4.09 10E12/L (ref 3.8–5.2)
WBC # BLD AUTO: 5.7 10E9/L (ref 4–11)

## 2019-02-22 PROCEDURE — 85027 COMPLETE CBC AUTOMATED: CPT | Performed by: NURSE PRACTITIONER

## 2019-02-22 PROCEDURE — 36415 COLL VENOUS BLD VENIPUNCTURE: CPT | Performed by: NURSE PRACTITIONER

## 2019-02-22 PROCEDURE — 99214 OFFICE O/P EST MOD 30 MIN: CPT | Performed by: NURSE PRACTITIONER

## 2019-02-22 RX ORDER — CELECOXIB 100 MG/1
100 CAPSULE ORAL 2 TIMES DAILY PRN
Qty: 180 CAPSULE | Refills: 3 | Status: SHIPPED | OUTPATIENT
Start: 2019-02-22 | End: 2020-05-15

## 2019-02-22 RX ORDER — LOSARTAN POTASSIUM 100 MG/1
100 TABLET ORAL DAILY
Qty: 90 TABLET | Refills: 1 | Status: SHIPPED | OUTPATIENT
Start: 2019-02-22 | End: 2019-08-16

## 2019-02-22 NOTE — PATIENT INSTRUCTIONS
Schedule pelvic ultrasound    Increase losartan - looking for blood pressures around 120/80    Get form or letter info to me for  pet

## 2019-02-22 NOTE — PROGRESS NOTES
"  SUBJECTIVE:   Sharmin Nieves is a 48 year old female who presents to clinic today for the following health issues:      Hypertension Follow-up      Outpatient blood pressures are being checked at work.  Results are around 140/90 to 136/86  .    Low Salt Diet: not monitoring salt    Lisinopril had caused cough.  Tolerating losartan without problem    Smoking - not ready to quit right now    Depression Followup    Status since last visit: Stable     See PHQ-9 for current symptoms.  Other associated symptoms: None    Complicating factors:   Significant life event:  Yes-  Oldest son having problems and moved out to live with father, younger two go to father's on the weekend, lonely on the weekend and she is wondering about getting a  pet    Current substance abuse:  None  Anxiety or Panic symptoms:  No    PHQ 3/14/2018 9/11/2018 11/30/2018   PHQ-9 Total Score 4 9 6   Q9: Suicide Ideation Not at all Several days Not at all     In the past two weeks have you had thoughts of suicide or self-harm?  No.    Do you have concerns about your personal safety or the safety of others?   No  PHQ-9  English  PHQ-9   Any Language  Suicide Assessment Five-step Evaluation and Treatment (SAFE-T)    Amount of exercise or physical activity: None    Problems taking medications regularly: No    Medication side effects: none    Diet: regular (no restrictions)    \"Weird pelvic pain\"  Mirena IUD inserted 2015   Experiencing new heavy aching pain  No menstrual period  Can feel strings.  No sharp.    Celebrex - is the medication that helps the most  Gabapentin hasn't helped RLS much or pain much - not taking    Alcohol - endorses heavy drinking when her son moved out recently and regularly heavy on the weekends, less but still some on weekdays.  Takes bus to work.  Would potentially be interested in medication to curb cravings.    Problem list and histories reviewed & adjusted, as indicated.  Additional history: as " documented    Reviewed and updated as needed this visit by clinical staff  Tobacco  Allergies  Meds  Med Hx  Surg Hx  Fam Hx  Soc Hx      Reviewed and updated as needed this visit by Provider         ROS:  Constitutional, HEENT, cardiovascular, pulmonary, gi and gu systems are negative, except as otherwise noted.    OBJECTIVE:     /88   Pulse 97   Temp 98.4  F (36.9  C) (Oral)   Wt 88.5 kg (195 lb)   SpO2 98%   BMI 27.98 kg/m    Body mass index is 27.98 kg/m .  GENERAL: healthy, alert and no distress  EYES: Eyes grossly normal to inspection, PERRL and conjunctivae and sclerae normal  HENT: ear canals and TM's normal, nose and mouth without ulcers or lesions  NECK: no adenopathy, no asymmetry, masses, or scars and thyroid normal to palpation  RESP: lungs clear to auscultation - no rales, rhonchi or wheezes  CV: regular rate and rhythm, normal S1 S2, no S3 or S4, no murmur, click or rub, no peripheral edema and peripheral pulses strong  ABDOMEN: soft, nontender, no hepatosplenomegaly, no masses and bowel sounds normal  MS: no gross musculoskeletal defects noted, no edema  SKIN: no suspicious lesions or rashes  NEURO: Normal strength and tone, mentation intact and speech normal  PSYCH: mentation appears normal, affect normal/bright, sad when talking about kids    Diagnostic Test Results:  Results for orders placed or performed in visit on 02/22/19 (from the past 24 hour(s))   CBC with platelets   Result Value Ref Range    WBC 5.7 4.0 - 11.0 10e9/L    RBC Count 4.09 3.8 - 5.2 10e12/L    Hemoglobin 14.0 11.7 - 15.7 g/dL    Hematocrit 40.6 35.0 - 47.0 %    MCV 99 78 - 100 fl    MCH 34.2 (H) 26.5 - 33.0 pg    MCHC 34.5 31.5 - 36.5 g/dL    RDW 12.9 10.0 - 15.0 %    Platelet Count 253 150 - 450 10e9/L       ASSESSMENT/PLAN:     Hypertension; under 140/90, but could be improved   Associated with the following complications:    Smoking, alcohol use   Plan:  Medications:     ACE/ARB - increase    Depression;  recurrent episode-- Moderate   Associated with the following complications:    None   Plan:  No changes in the patient's current treatment plan      Tobacco Cessation:   reports that she has been smoking cigarettes.  She has a 11.25 pack-year smoking history. she has never used smokeless tobacco.  Tobacco Cessation Action Plan: Information offered: Patient not interested at this time  Would be interested in Chantix possibly in future    (I10) Benign essential hypertension  (primary encounter diagnosis)  Comment:   Plan: losartan (COZAAR) 100 MG tablet        Increase to 100 mg, discussed more aggressive management with <130/85 goal.  Continue to check at work.  Discussed BP would be lower if smoking cessation and no alcohol.    (G25.81) Restless leg syndrome  Comment:   Plan: Not taking gabapentin as it was not helpful    (F32.1) Moderate major depression (H)  Comment:   Plan: Stable, but lonely and would benefit from  pet    (R10.2) Pelvic pain in female  Comment:   Plan: US Pelvic Complete w Transvaginal            (R52) Pain  Comment:   Plan: celecoxib (CELEBREX) 100 MG capsule        CBC today    (Z51.81) Encounter for therapeutic drug monitoring  Comment:   Plan: celecoxib (CELEBREX) 100 MG capsule, CBC with         platelets            (Z78.9) Heavy alcohol consumption  Comment:   Plan: Would be willing to write for naltrexone.  Last LFTs in Nov 2018 normal.  Discussed two week sobriety before starting the medication    See Patient Instructions    LEDY Seaman [facility]

## 2019-03-21 ENCOUNTER — DOCUMENTATION ONLY (OUTPATIENT)
Dept: CARE COORDINATION | Facility: CLINIC | Age: 49
End: 2019-03-21

## 2019-04-11 ENCOUNTER — TRANSFERRED RECORDS (OUTPATIENT)
Dept: HEALTH INFORMATION MANAGEMENT | Facility: CLINIC | Age: 49
End: 2019-04-11

## 2019-04-16 ENCOUNTER — MYC MEDICAL ADVICE (OUTPATIENT)
Dept: FAMILY MEDICINE | Facility: CLINIC | Age: 49
End: 2019-04-16

## 2019-04-17 NOTE — TELEPHONE ENCOUNTER
Mariya,  Please see patient's MyChart reply.    Writer scheduled patient for 04/19/2019 at 3:15    Please advise    Thank You!  Jasmin Menon, FADIA  Triage Nurse

## 2019-04-17 NOTE — TELEPHONE ENCOUNTER
Mariya,  Please see patient's MyChart reply. Writer notes provider has 3:15 acute opening    Please advise    Jasmin Menon RN  Triage Nurse

## 2019-04-19 ENCOUNTER — OFFICE VISIT (OUTPATIENT)
Dept: FAMILY MEDICINE | Facility: CLINIC | Age: 49
End: 2019-04-19
Payer: COMMERCIAL

## 2019-04-19 VITALS
HEIGHT: 70 IN | OXYGEN SATURATION: 97 % | WEIGHT: 191 LBS | HEART RATE: 108 BPM | BODY MASS INDEX: 27.35 KG/M2 | DIASTOLIC BLOOD PRESSURE: 78 MMHG | SYSTOLIC BLOOD PRESSURE: 130 MMHG | TEMPERATURE: 98.3 F

## 2019-04-19 DIAGNOSIS — F32.1 MODERATE MAJOR DEPRESSION (H): ICD-10-CM

## 2019-04-19 DIAGNOSIS — F41.1 GENERALIZED ANXIETY DISORDER: ICD-10-CM

## 2019-04-19 DIAGNOSIS — F10.10 ALCOHOL ABUSE: Primary | ICD-10-CM

## 2019-04-19 LAB
ALBUMIN SERPL-MCNC: 4.1 G/DL (ref 3.4–5)
ALP SERPL-CCNC: 56 U/L (ref 40–150)
ALT SERPL W P-5'-P-CCNC: 25 U/L (ref 0–50)
AST SERPL W P-5'-P-CCNC: 19 U/L (ref 0–45)
BILIRUB DIRECT SERPL-MCNC: <0.1 MG/DL (ref 0–0.2)
BILIRUB SERPL-MCNC: 0.2 MG/DL (ref 0.2–1.3)
PROT SERPL-MCNC: 7.4 G/DL (ref 6.8–8.8)

## 2019-04-19 PROCEDURE — 36415 COLL VENOUS BLD VENIPUNCTURE: CPT | Performed by: NURSE PRACTITIONER

## 2019-04-19 PROCEDURE — 80076 HEPATIC FUNCTION PANEL: CPT | Performed by: NURSE PRACTITIONER

## 2019-04-19 PROCEDURE — 99214 OFFICE O/P EST MOD 30 MIN: CPT | Performed by: NURSE PRACTITIONER

## 2019-04-19 RX ORDER — NALTREXONE HYDROCHLORIDE 50 MG/1
50 TABLET, FILM COATED ORAL DAILY
Qty: 90 TABLET | Refills: 0 | Status: SHIPPED | OUTPATIENT
Start: 2019-04-19 | End: 2019-08-16

## 2019-04-19 RX ORDER — DISULFIRAM 250 MG/1
250 TABLET ORAL DAILY
Qty: 30 TABLET | Refills: 0 | Status: SHIPPED | OUTPATIENT
Start: 2019-04-19 | End: 2019-08-16

## 2019-04-19 ASSESSMENT — MIFFLIN-ST. JEOR: SCORE: 1586.62

## 2019-04-19 NOTE — PATIENT INSTRUCTIONS
Start antabuse today  LFTs today- await normal results to start the naltrexone  Return in three months or sooner (e-visit) if you want to increase    Let me know if blood pressures are less than 110/60 OR if are having dizziness or lightness, especially with position changes

## 2019-04-19 NOTE — PROGRESS NOTES
SUBJECTIVE:   Sharmin Nieves is a 48 year old female who presents to clinic today for the following   health issues:    Alcohol abuse      Duration: last Thursday - no alcohol since then    Description (location/character/radiation): was admitted last Thursday to [facility] for detox. Very stressful situation and went a little over board. Feeling much better today.     Intensity:  moderate, severe    Accompanying signs and symptoms: no shaky, just sweating     History (similar episodes/previous evaluation): Yes, in the past     Precipitating or alleviating factors: None    Therapies tried and outcome: detox center in the hospital     Appt at [facility] on Monday and then another person at another [facility] location the following Monday    Hasn't taken any medication to curb alcohol cravings previously    Staretd B complex after getting thiamine IP       Depression and Anxiety Follow-Up    Status since last visit: No change    Other associated symptoms:None    Complicating factors:     Significant life event: Yes-       Current substance abuse: Alcohol but went to detox and is feeling much better today.     PHQ 3/14/2018 9/11/2018 11/30/2018   PHQ-9 Total Score 4 9 6   Q9: Thoughts of better off dead/self-harm past 2 weeks Not at all Several days Not at all     DMITRIY-7 SCORE 10/7/2015 5/5/2017 11/30/2018   Total Score - - -   Total Score 1 8 6     In the past two weeks have you had thoughts of suicide or self-harm?  No.    Do you have concerns about your personal safety or the safety of others?   No  PHQ-9  English  PHQ-9   Any Language  DMITRIY-7  Suicide Assessment Five-step Evaluation and Treatment (SAFE-T)    Additional history: as documented    Reviewed  and updated as needed this visit by clinical staff  Tobacco  Allergies  Meds  Med Hx  Surg Hx  Fam Hx  Soc Hx        Reviewed and updated as needed this visit by Provider         ROS:  Constitutional, HEENT, cardiovascular, pulmonary, gi and gu systems are negative,  "except as otherwise noted.    OBJECTIVE:     /78   Pulse 108   Temp 98.3  F (36.8  C) (Oral)   Ht 1.778 m (5' 10\")   Wt 86.6 kg (191 lb)   SpO2 97%   BMI 27.41 kg/m    Body mass index is 27.41 kg/m .  GENERAL: healthy, alert and no distress  NECK: no adenopathy, no asymmetry, masses, or scars and thyroid normal to palpation  RESP: lungs clear to auscultation - no rales, rhonchi or wheezes  CV: regular rate and rhythm, normal S1 S2, no S3 or S4, no murmur, click or rub, no peripheral edema and peripheral pulses strong  ABDOMEN: soft, nontender, no hepatosplenomegaly, no masses and bowel sounds normal  PSYCH: mentation appears normal, affect normal/bright    Diagnostic Test Results:  Results for orders placed or performed in visit on 04/19/19   Hepatic panel   Result Value Ref Range    Bilirubin Direct <0.1 0.0 - 0.2 mg/dL    Bilirubin Total 0.2 0.2 - 1.3 mg/dL    Albumin 4.1 3.4 - 5.0 g/dL    Protein Total 7.4 6.8 - 8.8 g/dL    Alkaline Phosphatase 56 40 - 150 U/L    ALT 25 0 - 50 U/L    AST 19 0 - 45 U/L         ASSESSMENT/PLAN:       (F10.10) Alcohol abuse  (primary encounter diagnosis)  Comment:   Plan: Hepatic panel, disulfiram (ANTABUSE) 250 MG         tablet, naltrexone (DEPADE/REVIA) 50 MG tablet            (F32.1) Moderate major depression (H)  Comment:   Plan: stable    (F41.1) Generalized anxiety disorder  Comment:   Plan: stable    Patient Instructions   Start antabuse today  LFTs today- await normal results to start the naltrexone  Return in three months or sooner (e-visit) if you want to increase    Let me know if blood pressures are less than 110/60 OR if are having dizziness or lightness, especially with position changes      LEDY Seaman [facility]        "

## 2019-04-24 NOTE — RESULT ENCOUNTER NOTE
Sharmin,    Your labs were all normal.  If you didn't know this yet and haven't started the naltrexone, please feel free to start now.  Can you let me know?  If you have any questions, please feel free to contact the clinic.    TOMMY Clinton

## 2019-05-15 ENCOUNTER — MYC REFILL (OUTPATIENT)
Dept: FAMILY MEDICINE | Facility: CLINIC | Age: 49
End: 2019-05-15

## 2019-05-15 DIAGNOSIS — F33.1 MAJOR DEPRESSIVE DISORDER, RECURRENT EPISODE, MODERATE (H): ICD-10-CM

## 2019-05-15 RX ORDER — VENLAFAXINE HYDROCHLORIDE 75 MG/1
225 CAPSULE, EXTENDED RELEASE ORAL DAILY
Qty: 90 CAPSULE | Refills: 0 | Status: SHIPPED | OUTPATIENT
Start: 2019-05-15 | End: 2019-06-18

## 2019-05-15 NOTE — TELEPHONE ENCOUNTER
"Requested Prescriptions   Pending Prescriptions Disp Refills     venlafaxine (EFFEXOR-XR) 75 MG 24 hr capsule 90 capsule 2     Sig: Take 3 capsules (225 mg) by mouth daily       Serotonin-Norepinephrine Reuptake Inhibitors  Failed - 5/15/2019  5:09 AM        Failed - PHQ-9 score of less than 5 in past 6 months     Please review last PHQ-9 score.     PHQ-9 SCORE 3/14/2018 9/11/2018 11/30/2018   PHQ-9 Total Score - - -   PHQ-9 Total Score MyChart 4 (Minimal depression) 9 (Mild depression) -   PHQ-9 Total Score 4 9 6   Some encounter information is confidential and restricted. Go to Review Flowsheets activity to see all data.               Passed - Blood pressure under 140/90 in past 12 months     BP Readings from Last 3 Encounters:   04/19/19 130/78   02/22/19 136/88   11/30/18 (!) 154/92                 Passed - Medication is active on med list        Passed - Patient is age 18 or older        Passed - No active pregnancy on record        Passed - Normal serum creatinine on file in past 12 months     Recent Labs   Lab Test 11/30/18  0918   CR 0.73             Passed - No positive pregnancy test in past 12 months        Passed - Recent (6 mo) or future (30 days) visit within the authorizing provider's specialty     Patient had office visit in the last 6 months or has a visit in the next 30 days with authorizing provider or within the authorizing provider's specialty.  See \"Patient Info\" tab in inbasket, or \"Choose Columns\" in Meds & Orders section of the refill encounter.            Medication is being filled for 1 time refill only due to:  needs PHQ9 update     Signed Prescriptions:                        Disp   Refills    venlafaxine (EFFEXOR-XR) 75 MG 24 hr capsu*90 cap*0        Sig: Take 3 capsules (225 mg) by mouth daily  Authorizing Provider: TRISTAN HYLTON  Ordering User: SUNG PALUMBO    Request was via [employer] so sent [employer] message regarding need for assessment update - sent questionnaire      Sung " Bakari LOAIZA

## 2019-05-16 ENCOUNTER — MYC MEDICAL ADVICE (OUTPATIENT)
Dept: FAMILY MEDICINE | Facility: CLINIC | Age: 49
End: 2019-05-16

## 2019-06-17 ENCOUNTER — MYC REFILL (OUTPATIENT)
Dept: FAMILY MEDICINE | Facility: CLINIC | Age: 49
End: 2019-06-17

## 2019-06-17 DIAGNOSIS — F33.1 MAJOR DEPRESSIVE DISORDER, RECURRENT EPISODE, MODERATE (H): ICD-10-CM

## 2019-06-17 RX ORDER — VENLAFAXINE HYDROCHLORIDE 75 MG/1
225 CAPSULE, EXTENDED RELEASE ORAL DAILY
Qty: 90 CAPSULE | Refills: 0 | OUTPATIENT
Start: 2019-06-17

## 2019-06-17 NOTE — TELEPHONE ENCOUNTER
"Requested Prescriptions   Pending Prescriptions Disp Refills     venlafaxine (EFFEXOR-XR) 75 MG 24 hr capsule 90 capsule 0     Sig: Take 3 capsules (225 mg) by mouth daily       Serotonin-Norepinephrine Reuptake Inhibitors  Failed - 6/17/2019  9:20 AM        Failed - PHQ-9 score of less than 5 in past 6 months     Please review last PHQ-9 score.           Passed - Blood pressure under 140/90 in past 12 months     BP Readings from Last 3 Encounters:   04/19/19 130/78   02/22/19 136/88   11/30/18 (!) 154/92                 Passed - Medication is active on med list        Passed - Patient is age 18 or older        Passed - No active pregnancy on record        Passed - Normal serum creatinine on file in past 12 months     Recent Labs   Lab Test 11/30/18  0918   CR 0.73             Passed - No positive pregnancy test in past 12 months        Passed - Recent (6 mo) or future (30 days) visit within the authorizing provider's specialty     Patient had office visit in the last 6 months or has a visit in the next 30 days with authorizing provider or within the authorizing provider's specialty.  See \"Patient Info\" tab in inbasket, or \"Choose Columns\" in Meds & Orders section of the refill encounter.              "

## 2019-06-17 NOTE — TELEPHONE ENCOUNTER
Writer notes Rx sent 05/15/2019 #90/0 refills.    Medication refused with note to pharmacy    Jasmin Menon, RN  Triage Nurse

## 2019-06-18 ENCOUNTER — MYC REFILL (OUTPATIENT)
Dept: FAMILY MEDICINE | Facility: CLINIC | Age: 49
End: 2019-06-18

## 2019-06-18 DIAGNOSIS — F33.1 MAJOR DEPRESSIVE DISORDER, RECURRENT EPISODE, MODERATE (H): ICD-10-CM

## 2019-06-18 RX ORDER — VENLAFAXINE HYDROCHLORIDE 75 MG/1
225 CAPSULE, EXTENDED RELEASE ORAL DAILY
Qty: 90 CAPSULE | Refills: 0 | Status: SHIPPED | OUTPATIENT
Start: 2019-06-18 | End: 2019-07-16

## 2019-06-18 NOTE — TELEPHONE ENCOUNTER
"Requested Prescriptions   Pending Prescriptions Disp Refills     venlafaxine (EFFEXOR-XR) 75 MG 24 hr capsule 90 capsule 0     Sig: Take 3 capsules (225 mg) by mouth daily       Serotonin-Norepinephrine Reuptake Inhibitors  Failed - 6/18/2019  8:50 AM        Failed - PHQ-9 score of less than 5 in past 6 months     Please review last PHQ-9 score.           Passed - Blood pressure under 140/90 in past 12 months     BP Readings from Last 3 Encounters:   04/19/19 130/78   02/22/19 136/88   11/30/18 (!) 154/92                 Passed - Medication is active on med list        Passed - Patient is age 18 or older        Passed - No active pregnancy on record        Passed - Normal serum creatinine on file in past 12 months     Recent Labs   Lab Test 11/30/18  0918   CR 0.73             Passed - No positive pregnancy test in past 12 months        Passed - Recent (6 mo) or future (30 days) visit within the authorizing provider's specialty     Patient had office visit in the last 6 months or has a visit in the next 30 days with authorizing provider or within the authorizing provider's specialty.  See \"Patient Info\" tab in inbasket, or \"Choose Columns\" in Meds & Orders section of the refill encounter.            "

## 2019-06-18 NOTE — TELEPHONE ENCOUNTER
Prescription approved per [facility] Refill Protocol.  LOV: 04/19/2019  (F32.1) Moderate major depression (H)  Comment:   Plan: stable     (F41.1) Generalized anxiety disorder  Comment:   Plan: stable     MyChart message sent to patient informing her she is due for follow up in July    Thank You!  Jasmin Menon, RN  Triage Nurse

## 2019-07-16 ENCOUNTER — MYC REFILL (OUTPATIENT)
Dept: FAMILY MEDICINE | Facility: CLINIC | Age: 49
End: 2019-07-16

## 2019-07-16 DIAGNOSIS — F33.1 MAJOR DEPRESSIVE DISORDER, RECURRENT EPISODE, MODERATE (H): ICD-10-CM

## 2019-07-16 RX ORDER — VENLAFAXINE HYDROCHLORIDE 75 MG/1
225 CAPSULE, EXTENDED RELEASE ORAL DAILY
Qty: 90 CAPSULE | Refills: 0 | Status: SHIPPED | OUTPATIENT
Start: 2019-07-16 | End: 2019-08-16

## 2019-07-16 NOTE — TELEPHONE ENCOUNTER
"Requested Prescriptions   Pending Prescriptions Disp Refills     venlafaxine (EFFEXOR-XR) 75 MG 24 hr capsule 90 capsule 0     Sig: Take 3 capsules (225 mg) by mouth daily       Serotonin-Norepinephrine Reuptake Inhibitors  Failed - 7/16/2019  9:10 AM        Failed - PHQ-9 score of less than 5 in past 6 months     Please review last PHQ-9 score.     PHQ-9 SCORE 9/11/2018 11/30/2018 5/15/2019   PHQ-9 Total Score - - -   PHQ-9 Total Score MyChart 9 (Mild depression) - 6 (Mild depression)   PHQ-9 Total Score 9 6 6   Some encounter information is confidential and restricted. Go to Review Flowsheets activity to see all data.               Passed - Blood pressure under 140/90 in past 12 months     BP Readings from Last 3 Encounters:   04/19/19 130/78   02/22/19 136/88   11/30/18 (!) 154/92                 Passed - Medication is active on med list        Passed - Patient is age 18 or older        Passed - No active pregnancy on record        Passed - Normal serum creatinine on file in past 12 months     Recent Labs   Lab Test 11/30/18  0918   CR 0.73             Passed - No positive pregnancy test in past 12 months        Passed - Recent (6 mo) or future (30 days) visit within the authorizing provider's specialty     Patient had office visit in the last 6 months or has a visit in the next 30 days with authorizing provider or within the authorizing provider's specialty.  See \"Patient Info\" tab in inbasket, or \"Choose Columns\" in Meds & Orders section of the refill encounter.            Medication is being filled for 1 time refill only due to:  Patient needs to be seen because per note review due for f/u office visit and PHQ9>5 requires office visit.     Signed Prescriptions:                        Disp   Refills    venlafaxine (EFFEXOR-XR) 75 MG 24 hr capsu*90 cap*0        Sig: Take 3 capsules (225 mg) by mouth daily  Authorizing Provider: TRISTAN HYLTON  Ordering User: SUNG PALUMBO      Sent justina with refill " alex Villegas RN

## 2019-08-09 ENCOUNTER — ANCILLARY PROCEDURE (OUTPATIENT)
Dept: GENERAL RADIOLOGY | Facility: CLINIC | Age: 49
End: 2019-08-09
Attending: FAMILY MEDICINE
Payer: COMMERCIAL

## 2019-08-09 ENCOUNTER — OFFICE VISIT (OUTPATIENT)
Dept: ORTHOPEDICS | Facility: CLINIC | Age: 49
End: 2019-08-09
Payer: COMMERCIAL

## 2019-08-09 VITALS — WEIGHT: 191 LBS | HEIGHT: 70 IN | RESPIRATION RATE: 16 BRPM | BODY MASS INDEX: 27.35 KG/M2

## 2019-08-09 DIAGNOSIS — S99.922A INJURY OF TOE ON LEFT FOOT, INITIAL ENCOUNTER: ICD-10-CM

## 2019-08-09 DIAGNOSIS — S99.922A INJURY OF TOE ON LEFT FOOT, INITIAL ENCOUNTER: Primary | ICD-10-CM

## 2019-08-09 ASSESSMENT — MIFFLIN-ST. JEOR: SCORE: 1581.62

## 2019-08-09 NOTE — LETTER
"8/9/2019       RE: Sharmin Quirozirez  [address]  [place] [address]     Dear Colleague,    Thank you for referring your patient, hSarmin Nieves, to the [facility] W[facility] CLIN[facility] at [facility]. Please see a copy of my visit note below.          SPORTS & ORTHOPEDIC WALK-IN VISIT 8/9/2019    Primary Care Physician: Dr. Bill     Reason for visit:     What part of your body is injured / painful?  left ring toe     What caused the injury /pain? Stumbled over cat and kicked chair last night.    How long ago did your injury occur or pain begin? yesterday    What are your most bothersome symptoms? Pain and Swelling, bruising     How would you characterize your symptom?  Aching, constant     What makes your symptoms better? Ice and Ibuprofen and celebrex- didn't help     What makes your symptoms worse? Walking    Have you been previously seen for this problem? No    Medical History:    Any recent changes to your medical history? No    Any new medication prescribed since last visit? No    Have you had surgery on this body part before? 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 No    Do you have any vision problems? No    Do you have any chest pain or edema? No    Do you have any shortness of breath or wheezing?  No    Do you have stomach problems? No    Do you have any numbness or focal weakness? No    Do you have diabetes? No    Do you have problems with bleeding or clotting? No    Do you have an rashes or other skin lesions? No       O/  Resp 16   Ht 1.778 m (5' 10\")   Wt 86.6 kg (191 lb)   BMI 27.41 kg/m     Appears well and in no distress  LEFT foot with bruising and swelling over the 4th toe, distal to the MTP  Skin intact  Can walk with only slight discomfort if she avoid the 4th toe    X-rays, toe: Deformity of proximal " phalanx from past fracture noted. No new fractures seen.     A/ LEFT 4th toe contusion with history of proximal phalanx fracture    P/  Await official radiology interpretation  Rodriguez taped the 4th to the 3rd toe  Advised about shoe wear, e.g. Clogs that may be more comfortable.  Return if not improving    --Arturo Haile MD

## 2019-08-09 NOTE — PROGRESS NOTES
"      SPORTS & ORTHOPEDIC WALK-IN VISIT 8/9/2019    Primary Care Physician: Dr. Bill     Reason for visit:     What part of your body is injured / painful?  left ring toe     What caused the injury /pain? Stumbled over cat and kicked chair last night.    How long ago did your injury occur or pain begin? yesterday    What are your most bothersome symptoms? Pain and Swelling, bruising     How would you characterize your symptom?  Aching, constant     What makes your symptoms better? Ice and Ibuprofen and celebrex- didn't help     What makes your symptoms worse? Walking    Have you been previously seen for this problem? No    Medical History:    Any recent changes to your medical history? No    Any new medication prescribed since last visit? No    Have you had surgery on this body part before? 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 No    Do you have any vision problems? No    Do you have any chest pain or edema? No    Do you have any shortness of breath or wheezing?  No    Do you have stomach problems? No    Do you have any numbness or focal weakness? No    Do you have diabetes? No    Do you have problems with bleeding or clotting? No    Do you have an rashes or other skin lesions? No       O/  Resp 16   Ht 1.778 m (5' 10\")   Wt 86.6 kg (191 lb)   BMI 27.41 kg/m    Appears well and in no distress  LEFT foot with bruising and swelling over the 4th toe, distal to the MTP  Skin intact  Can walk with only slight discomfort if she avoid the 4th toe    X-rays, toe: Deformity of proximal phalanx from past fracture noted. No new fractures seen.     A/ LEFT 4th toe contusion with history of proximal phalanx fracture    P/  Await official radiology interpretation  Rodriguez taped the 4th to the 3rd toe  Advised about shoe wear, e.g. Clogs that may be more comfortable.  Return if not " improving    --Arturo Haile MD

## 2019-08-16 ENCOUNTER — OFFICE VISIT (OUTPATIENT)
Dept: FAMILY MEDICINE | Facility: CLINIC | Age: 49
End: 2019-08-16
Payer: COMMERCIAL

## 2019-08-16 VITALS
HEART RATE: 90 BPM | SYSTOLIC BLOOD PRESSURE: 132 MMHG | OXYGEN SATURATION: 97 % | DIASTOLIC BLOOD PRESSURE: 82 MMHG | TEMPERATURE: 98.1 F | BODY MASS INDEX: 28.22 KG/M2 | WEIGHT: 196.7 LBS

## 2019-08-16 DIAGNOSIS — F33.1 MAJOR DEPRESSIVE DISORDER, RECURRENT EPISODE, MODERATE (H): ICD-10-CM

## 2019-08-16 DIAGNOSIS — F10.10 ALCOHOL ABUSE: ICD-10-CM

## 2019-08-16 DIAGNOSIS — I10 BENIGN ESSENTIAL HYPERTENSION: ICD-10-CM

## 2019-08-16 DIAGNOSIS — R52 PAIN: ICD-10-CM

## 2019-08-16 DIAGNOSIS — F51.01 PRIMARY INSOMNIA: ICD-10-CM

## 2019-08-16 DIAGNOSIS — Z51.81 ENCOUNTER FOR THERAPEUTIC DRUG MONITORING: ICD-10-CM

## 2019-08-16 PROCEDURE — 99214 OFFICE O/P EST MOD 30 MIN: CPT | Performed by: NURSE PRACTITIONER

## 2019-08-16 RX ORDER — NALTREXONE HYDROCHLORIDE 50 MG/1
50 TABLET, FILM COATED ORAL DAILY
Qty: 90 TABLET | Refills: 0 | Status: CANCELLED | OUTPATIENT
Start: 2019-08-16

## 2019-08-16 RX ORDER — VENLAFAXINE HYDROCHLORIDE 75 MG/1
150 CAPSULE, EXTENDED RELEASE ORAL DAILY
Qty: 180 CAPSULE | Refills: 0 | Status: SHIPPED | OUTPATIENT
Start: 2019-08-16 | End: 2019-12-19

## 2019-08-16 RX ORDER — CELECOXIB 100 MG/1
100 CAPSULE ORAL 2 TIMES DAILY PRN
Qty: 180 CAPSULE | Refills: 3 | Status: CANCELLED | OUTPATIENT
Start: 2019-08-16

## 2019-08-16 RX ORDER — DISULFIRAM 250 MG/1
250 TABLET ORAL DAILY
Qty: 30 TABLET | Refills: 0 | Status: CANCELLED | OUTPATIENT
Start: 2019-08-16

## 2019-08-16 RX ORDER — LOSARTAN POTASSIUM 100 MG/1
100 TABLET ORAL DAILY
Qty: 90 TABLET | Refills: 3 | Status: SHIPPED | OUTPATIENT
Start: 2019-08-16 | End: 2020-08-25

## 2019-08-16 RX ORDER — TRAZODONE HYDROCHLORIDE 100 MG/1
200 TABLET ORAL AT BEDTIME
Qty: 180 TABLET | Refills: 3 | Status: CANCELLED | OUTPATIENT
Start: 2019-08-16

## 2019-08-16 NOTE — PATIENT INSTRUCTIONS
Effexor taper - I'd recommend two weeks at least at each dose.  If you start to have withdraw effects let me know.  Once you get to going lower than 75 mg, we'll need to do 37.5 mg XR so let me know when you want those    Come fasting to physical in 4 months

## 2019-08-16 NOTE — PROGRESS NOTES
Subjective     Sharmin Nieves is a 49 year old female who presents to clinic today for the following health issues:    HPI     Naltrexone didn't help with cravings  Didn't take antabuse  Drinking same amount.  Feels it is under control - not getting drunk.  Doesn't like talking bacydney tit.    Daughter is 14 years and son is 17 year old.  Son has a job as a host.  Daughter goes between dad and her home.  Older son living with his father which is less stressful.  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    Planning to go see podiatry - walking differently due to toe pain which is now causing knee pain and hip pain.  Wondering if 100 mg BID is maximum celebrex dose.    IUD 2015    MMR booster in 2014    Hypertension Follow-up      Do you check your blood pressure regularly outside of the clinic? No     Are you following a low salt diet? No    Are your blood pressures ever more than 140 on the top number (systolic) OR more   than 90 on the bottom number (diastolic), for example 140/90? Yes     Depression and Anxiety Follow-Up    How are you doing with your depression since your last visit? No change    How are you doing with your anxiety since your last visit?  No change    Tapering off effexor - has been on for 15 years and wants to see what she feels like off the medication.  Currently taking two capsules a day for the past two weeks and not having withdraw symptoms    Are you having other symptoms that might be associated with depression or anxiety? No    Have you had a significant life event? No     Do you have any concerns with your use of alcohol or other drugs? No    Social History     Tobacco Use     Smoking status: Current Every Day Smoker     Packs/day: 0.75     Years: 15.00     Pack years: 11.25     Types: Cigarettes     Smokeless tobacco: Never Used   Substance Use Topics     Alcohol use: Yes     Comment: addict in recovery,strted drinking past 10 days Rum  1/3 of big Bottle     Drug use:  No     Comment: addict in recovery     PHQ 9/11/2018 11/30/2018 5/15/2019   PHQ-9 Total Score 9 6 6   Q9: Thoughts of better off dead/self-harm past 2 weeks Several days Not at all Not at all     DMITRIY-7 SCORE 10/7/2015 5/5/2017 11/30/2018   Total Score - - -   Total Score 1 8 6       Suicide Assessment Five-step Evaluation and Treatment (SAFE-T)      How many servings of fruits and vegetables do you eat daily?  2-3    On average, how many sweetened beverages do you drink each day (soda, juice, sweet tea, etc)?   1    How many days per week do you miss taking your medication? 0      Reviewed and updated as needed this visit by Provider         Review of Systems   ROS COMP: Constitutional, HEENT, cardiovascular, pulmonary, gi and gu systems are negative, except as otherwise noted.      Objective    /82   Pulse 90   Temp 98.1  F (36.7  C) (Oral)   Wt 89.2 kg (196 lb 11.2 oz)   SpO2 97%   BMI 28.22 kg/m    Body mass index is 28.22 kg/m .  Physical Exam   GENERAL: healthy, alert and no distress  EYES: Eyes grossly normal to inspection, PERRL and conjunctivae and sclerae normal  HENT: ear canals and TM's normal, nose and mouth without ulcers or lesions  NECK: no adenopathy, no asymmetry, masses, or scars and thyroid normal to palpation  RESP: lungs clear to auscultation - no rales, rhonchi or wheezes  CV: regular rate and rhythm, normal S1 S2, no S3 or S4, no murmur, click or rub, no peripheral edema and peripheral pulses strong  ABDOMEN: soft, nontender, no hepatosplenomegaly, no masses and bowel sounds normal  MS: no gross musculoskeletal defects noted, no edema  SKIN: no suspicious lesions or rashes  NEURO: Normal strength and tone, mentation intact and speech normal  PSYCH: mentation appears normal, affect normal/bright    Diagnostic Test Results:  Labs reviewed in Epic  none         Assessment & Plan     (R52) Pain  Comment:   Plan: Can try additional celebrex dose, but may not have analgesic effect beyond  100 mg BID    (Z51.81) Encounter for therapeutic drug monitoring  Comment:   Plan: No labs needed today - CBC due Jan 2020    (F10.10) Alcohol abuse  Comment:   Plan: No needs currently.  Drinking and naltrexone didn't help with cravings.  Not getting drunk and drinking not threatening work or relationships right now.    (I10) Benign essential hypertension  Comment:   Plan: losartan (COZAAR) 100 MG tablet        Continue.  Take out of clinic pressure - would rather see pressures near 120/80    (F33.1) Major depressive disorder, recurrent episode, moderate (H)  Comment:   Plan: venlafaxine (EFFEXOR-XR) 75 MG 24 hr capsule        Discussed taper.  Reasonable to try to taper off.  Discussed two month timeline to evaluate if worse mental health symptoms after off medication    (F51.01) Primary insomnia  Comment:   Plan: No refills needed today           Patient Instructions   Effexor taper - I'd recommend two weeks at least at each dose.  If you start to have withdraw effects let me know.  Once you get to going lower than 75 mg, we'll need to do 37.5 mg XR so let me know when you want those    Come fasting to physical in 4 months      Return in about 6 months (around 2/16/2020) for BP Recheck, Physical Exam.  4-6 months    LEDY Seaman [facility]

## 2019-08-21 ENCOUNTER — MYC MEDICAL ADVICE (OUTPATIENT)
Dept: FAMILY MEDICINE | Facility: CLINIC | Age: 49
End: 2019-08-21

## 2019-08-21 DIAGNOSIS — F33.1 MAJOR DEPRESSIVE DISORDER, RECURRENT EPISODE, MODERATE (H): Primary | ICD-10-CM

## 2019-08-21 RX ORDER — VENLAFAXINE HYDROCHLORIDE 37.5 MG/1
37.5 CAPSULE, EXTENDED RELEASE ORAL DAILY
Qty: 90 CAPSULE | Refills: 0 | Status: SHIPPED | OUTPATIENT
Start: 2019-08-21 | End: 2020-06-17

## 2019-08-21 RX ORDER — VENLAFAXINE HYDROCHLORIDE 75 MG/1
75 CAPSULE, EXTENDED RELEASE ORAL DAILY
Status: CANCELLED | OUTPATIENT
Start: 2019-08-21

## 2019-08-21 NOTE — TELEPHONE ENCOUNTER
Mariya,     Please see mychart message from patient. Please advise.     Yu Malone RN  [facility]

## 2019-11-03 ENCOUNTER — HEALTH MAINTENANCE LETTER (OUTPATIENT)
Age: 49
End: 2019-11-03

## 2019-11-18 ENCOUNTER — DOCUMENTATION ONLY (OUTPATIENT)
Dept: CARE COORDINATION | Facility: CLINIC | Age: 49
End: 2019-11-18

## 2019-12-05 ENCOUNTER — MYC REFILL (OUTPATIENT)
Dept: FAMILY MEDICINE | Facility: CLINIC | Age: 49
End: 2019-12-05

## 2019-12-05 DIAGNOSIS — F51.01 PRIMARY INSOMNIA: ICD-10-CM

## 2019-12-06 RX ORDER — TRAZODONE HYDROCHLORIDE 100 MG/1
200 TABLET ORAL AT BEDTIME
Qty: 180 TABLET | Refills: 3 | Status: SHIPPED | OUTPATIENT
Start: 2019-12-06 | End: 2020-11-18

## 2019-12-06 NOTE — TELEPHONE ENCOUNTER
"Requested Prescriptions   Pending Prescriptions Disp Refills     traZODone (DESYREL) 100 MG tablet 180 tablet 3     Sig: Take 2 tablets (200 mg) by mouth At Bedtime   Last Written Prescription Date:  11/30/18  Last Fill Quantity: 180,  # refills: 3   Last office visit: No previous visit found with prescribing provider:     Future Office Visit:        Serotonin Modulators Passed - 12/5/2019  8:41 PM        Passed - Recent (12 mo) or future (30 days) visit within the authorizing provider's specialty     Patient has had an office visit with the authorizing provider or a provider within the authorizing providers department within the previous 12 mos or has a future within next 30 days. See \"Patient Info\" tab in inbasket, or \"Choose Columns\" in Meds & Orders section of the refill encounter.              Passed - Medication is active on med list        Passed - Patient is age 18 or older        Passed - No active pregnancy on record        Passed - No positive pregnancy test in past 12 months        Prescription approved per [facility] Refill Protocol.  Lauren [facility] RN on 12/6/2019 at 8:50 AM    "

## 2019-12-10 ENCOUNTER — TELEPHONE (OUTPATIENT)
Dept: FAMILY MEDICINE | Facility: CLINIC | Age: 49
End: 2019-12-10

## 2019-12-10 NOTE — TELEPHONE ENCOUNTER
Filled out basic information and placed onto [employer] desk to complete, sign, date, and fax back to [phone].  Tyler Silveira MA

## 2019-12-10 NOTE — TELEPHONE ENCOUNTER
Reason for Call:  Form, our goal is to have forms completed with 72 hours, however, some forms may require a visit or additional information.    Type of letter, form or note:  FMLA    Who is the form from?: Patient    Where did the form come from: form was faxed in    What clinic location was the form placed at?: [facility]    Where the form was placed: Given to MA/RN    What number is listed as a contact on the form?:        Additional comments:     Call taken on 12/10/2019 at 1:13 PM by Arvin Vail

## 2019-12-11 ENCOUNTER — MYC MEDICAL ADVICE (OUTPATIENT)
Dept: FAMILY MEDICINE | Facility: CLINIC | Age: 49
End: 2019-12-11

## 2019-12-12 NOTE — TELEPHONE ENCOUNTER
Started to complete and we have no previous FMLA on file.  What condition is the FMLA for?  Can you ask patient the questions on the form and I can sign when I return next week?  TOMMY Clinton

## 2019-12-12 NOTE — TELEPHONE ENCOUNTER
Left vm for patient to return call to clinic. I have FMLA forms on my desk handed to me from Mariya. Need to get more information/ask patient questions on paperwork as to why she is requesting to be off work and for how long.    Angélica Carreon RN   [facility]

## 2019-12-13 NOTE — TELEPHONE ENCOUNTER
SOHAIL Meraz. I put paperwork at your desk.    Spoke with patient. She has appointment scheduled on 12/19 with Mariya to go over [facility] paperwork.    Angélica Carreon RN   [facility]
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
## 2019-12-19 ENCOUNTER — OFFICE VISIT (OUTPATIENT)
Dept: FAMILY MEDICINE | Facility: CLINIC | Age: 49
End: 2019-12-19
Payer: COMMERCIAL

## 2019-12-19 VITALS
RESPIRATION RATE: 14 BRPM | TEMPERATURE: 98.4 F | DIASTOLIC BLOOD PRESSURE: 86 MMHG | HEART RATE: 93 BPM | SYSTOLIC BLOOD PRESSURE: 122 MMHG | OXYGEN SATURATION: 99 %

## 2019-12-19 DIAGNOSIS — F41.1 GENERALIZED ANXIETY DISORDER: Primary | ICD-10-CM

## 2019-12-19 DIAGNOSIS — F32.1 MODERATE MAJOR DEPRESSION (H): ICD-10-CM

## 2019-12-19 PROCEDURE — 99213 OFFICE O/P EST LOW 20 MIN: CPT | Performed by: NURSE PRACTITIONER

## 2019-12-19 ASSESSMENT — PATIENT HEALTH QUESTIONNAIRE - PHQ9: SUM OF ALL RESPONSES TO PHQ QUESTIONS 1-9: 16

## 2019-12-19 NOTE — PATIENT INSTRUCTIONS
IUD due to be change July 2020    If things are still high stress when it comes time to discontinue effexor, consider extending for another month  Plan to come back between 2-3 months after completely discontinuing    Look into therapy options with new preferred one insurance  Bolivar Silveira is near here      [facility] - [phone]  Aarti Brown or Patience Oh at [place];   Funmi Galaviz, Josh Bess or Sruthi Ellington at [place];   Ihsan Shaw [facility]      Outside of [facility]  Latesha Stahl - grief  Jessica Sharpbryanna - trauma, EMDR  [phone]    [facility] for Health and Wellness  Nela Coffey (?taking new patients?)  [phone]    [facility] in [place] - birth trauma, body work  [phone]    Francoise Ortiz [facility] and [place] on [address]  Take Preferred One  Typically a wait to get in  [phone]    Yessica Gipson  [phone]  [address]   [place], [place], [address]    Neal Reddy  [facility]    Marychuy Peterson  [facility]    Tyler Sherwood - LGBT  [place] [facility] on [address] in [place]    Kenna Spence  [address]  [place] [address]  Phone: [phone]  [email or web address]

## 2020-01-20 ENCOUNTER — PRE VISIT (OUTPATIENT)
Dept: ORTHOPEDICS | Facility: CLINIC | Age: 50
End: 2020-01-20

## 2020-01-20 ENCOUNTER — ANCILLARY PROCEDURE (OUTPATIENT)
Dept: GENERAL RADIOLOGY | Facility: CLINIC | Age: 50
End: 2020-01-20
Attending: PODIATRIST
Payer: COMMERCIAL

## 2020-01-20 ENCOUNTER — OFFICE VISIT (OUTPATIENT)
Dept: ORTHOPEDICS | Facility: CLINIC | Age: 50
End: 2020-01-20
Payer: COMMERCIAL

## 2020-01-20 DIAGNOSIS — M79.674 GREAT TOE PAIN, RIGHT: ICD-10-CM

## 2020-01-20 DIAGNOSIS — M20.5X1 HALLUX LIMITUS OF RIGHT FOOT: Primary | ICD-10-CM

## 2020-01-20 DIAGNOSIS — M20.5X1 HALLUX LIMITUS OF RIGHT FOOT: ICD-10-CM

## 2020-01-20 RX ORDER — METHYLPREDNISOLONE 4 MG
TABLET, DOSE PACK ORAL
Qty: 21 TABLET | Refills: 0 | Status: SHIPPED | OUTPATIENT
Start: 2020-01-20 | End: 2020-06-17

## 2020-01-20 NOTE — LETTER
1/20/2020       RE: Sharmin Nieves  [address]  [place] [address]     Dear Colleague,    Thank you for referring your patient, Sharmin Nieves, to the [facility] at [facility]. Please see a copy of my visit note below.    Chief Complaint   Patient presents with     Consult     Pain, right foot. Patient stated that she has seen Krista in the past. Patient stated that she has a history of surgery, Right Foot Cheilectomy - Right with Dr. Mo Edgar             Allergies   Allergen Reactions     Darvocet [Propoxyphene N-Apap] Nausea and Vomiting     Lisinopril Cough     cough     Nsaids      NSAIDS - Ibuprofen & Naproxen - symptoms of swelling in hands/feet, hives          Subjective: Sharmin is a 49 year old female who presents to the clinic today for a follow up of right hallux limitus. Saw Krista Rodgers for 2nd opinion and was molded for orthotics and had a steroid injection. This lasted for a couple of months. Relates that she does have a pair of orthotics that she uses. These are not that helpful. Relates that she wears Nike shoes in the am and switches to clogs in the afternoon for pain control. She does take Celebrex.      Objective  Data Unavailable Data Unavailable Data Unavailable Data Unavailable Data Unavailable 0 lbs 0 oz  Decreased ROM to the right 1st MTPJ with pain in dorsiflexion. Pain noted with distraction of the joint. Edema noted about the right 1st MTPJ. Cicatrix to the right 1st MTPJ. MMT 5/5 without pain.     right foot xrays indicated in 3 weightbearing views.    degenerative changes noted tot he 1st MTPJ that have progressed since the last radiographs. Some bony exostoses noted to the dorsal and lateral 1st MTPJ. Decrease in the joint space of the joint, especially laterally.      Assessment: Right hallux limitus. She has tried multiple conservative treatments and continues to have pain.     Plan:   - Pt seen and  evaluated  - XRs taken and discussed with her.   - Recommend that she speak to Dr. Guillen about arthrodesis.  - Rx for Medrol dose pack if needed.   - Pt to return to clinic PRN.      Again, thank you for allowing me to participate in the care of your patient.      Sincerely,    Brian Gibson DPM

## 2020-01-20 NOTE — NURSING NOTE
Reason For Visit:   Chief Complaint   Patient presents with     Consult     Pain, right foot. Patient stated that she has seen Krista in the past. Patient stated that she has a history of surgery, Right Foot Cheilectomy - Right with Dr. Mo Edgar        Pain Assessment  Patient Currently in Pain: Yes  Primary Pain Location: Foot(Right )  Pain Descriptors: Constant  Aggravating Factors: Walking, Standing        Allergies   Allergen Reactions     Darvocet [Propoxyphene N-Apap] Nausea and Vomiting     Lisinopril Cough     cough     Nsaids      NSAIDS - Ibuprofen & Naproxen - symptoms of swelling in hands/feet, hives            Janee Pennington LPN

## 2020-01-20 NOTE — TELEPHONE ENCOUNTER
RECORDS RECEIVED FROM: Right foot pain. saw Krista Rodgers in the past   DATE RECEIVED: Jan 20, 2020   NOTES STATUS DETAILS   OFFICE NOTE from referring provider Internal  Krista Rodgers PA-C           01/20/20   8:18 AM   Pre-visit complete  Rachana Freeman, CMA

## 2020-05-13 DIAGNOSIS — Z51.81 ENCOUNTER FOR THERAPEUTIC DRUG MONITORING: ICD-10-CM

## 2020-05-13 DIAGNOSIS — R52 PAIN: ICD-10-CM

## 2020-05-13 NOTE — TELEPHONE ENCOUNTER
"Requested Prescriptions   Pending Prescriptions Disp Refills     celecoxib (CELEBREX) 100 MG capsule 180 capsule 3     Sig: Take 1 capsule (100 mg) by mouth 2 times daily as needed for moderate pain       NSAID Medications Failed - 5/13/2020  9:51 AM        Failed - Normal ALT on file in past 12 months     Recent Labs   Lab Test 04/19/19  1606   ALT 25             Failed - Normal AST on file in past 12 months     Recent Labs   Lab Test 04/19/19  1606   AST 19             Failed - Normal CBC on file in past 12 months     Recent Labs   Lab Test 02/22/19  0905   WBC 5.7   RBC 4.09   HGB 14.0   HCT 40.6                    Failed - Normal serum creatinine on file in past 12 months     Recent Labs   Lab Test 11/30/18  0918   CR 0.73       Ok to refill medication if creatinine is low          Passed - Blood pressure under 140/90 in past 12 months     BP Readings from Last 3 Encounters:   12/19/19 122/86   09/27/19 (!) 164/94   08/16/19 132/82                 Passed - Recent (12 mo) or future (30 days) visit within the authorizing provider's specialty     Patient has had an office visit with the authorizing provider or a provider within the authorizing providers department within the previous 12 mos or has a future within next 30 days. See \"Patient Info\" tab in inbasket, or \"Choose Columns\" in Meds & Orders section of the refill encounter.              Passed - Patient is age 6-64 years        Passed - Medication is active on med list        Passed - No active pregnancy on record        Passed - No positive pregnancy test in past 12 months           Last Written Prescription Date:  02/22/2019  Last Fill Quantity: 180,  # refills: 3   Last office visit: 12/19/2019  with prescribing provider:  Mariya Bill   Future Office Visit:            "

## 2020-05-15 RX ORDER — CELECOXIB 100 MG/1
100 CAPSULE ORAL 2 TIMES DAILY PRN
Qty: 180 CAPSULE | Refills: 0 | Status: SHIPPED | OUTPATIENT
Start: 2020-05-15 | End: 2020-08-25

## 2020-06-17 ENCOUNTER — VIRTUAL VISIT (OUTPATIENT)
Dept: FAMILY MEDICINE | Facility: CLINIC | Age: 50
End: 2020-06-17
Payer: COMMERCIAL

## 2020-06-17 DIAGNOSIS — Z51.81 ENCOUNTER FOR THERAPEUTIC DRUG MONITORING: ICD-10-CM

## 2020-06-17 DIAGNOSIS — F41.1 GENERALIZED ANXIETY DISORDER: Primary | ICD-10-CM

## 2020-06-17 DIAGNOSIS — M25.551 RIGHT HIP PAIN: ICD-10-CM

## 2020-06-17 DIAGNOSIS — M54.50 CHRONIC BILATERAL LOW BACK PAIN WITHOUT SCIATICA: ICD-10-CM

## 2020-06-17 DIAGNOSIS — F32.1 MODERATE MAJOR DEPRESSION (H): ICD-10-CM

## 2020-06-17 DIAGNOSIS — Z71.6 ENCOUNTER FOR SMOKING CESSATION COUNSELING: ICD-10-CM

## 2020-06-17 DIAGNOSIS — G89.29 CHRONIC BILATERAL LOW BACK PAIN WITHOUT SCIATICA: ICD-10-CM

## 2020-06-17 DIAGNOSIS — I10 BENIGN ESSENTIAL HYPERTENSION: ICD-10-CM

## 2020-06-17 PROCEDURE — 99214 OFFICE O/P EST MOD 30 MIN: CPT | Mod: 95 | Performed by: NURSE PRACTITIONER

## 2020-06-17 ASSESSMENT — PATIENT HEALTH QUESTIONNAIRE - PHQ9
SUM OF ALL RESPONSES TO PHQ QUESTIONS 1-9: 19
5. POOR APPETITE OR OVEREATING: NEARLY EVERY DAY

## 2020-06-17 ASSESSMENT — ANXIETY QUESTIONNAIRES
7. FEELING AFRAID AS IF SOMETHING AWFUL MIGHT HAPPEN: NOT AT ALL
6. BECOMING EASILY ANNOYED OR IRRITABLE: NEARLY EVERY DAY
1. FEELING NERVOUS, ANXIOUS, OR ON EDGE: MORE THAN HALF THE DAYS
3. WORRYING TOO MUCH ABOUT DIFFERENT THINGS: MORE THAN HALF THE DAYS
5. BEING SO RESTLESS THAT IT IS HARD TO SIT STILL: NEARLY EVERY DAY
2. NOT BEING ABLE TO STOP OR CONTROL WORRYING: MORE THAN HALF THE DAYS
GAD7 TOTAL SCORE: 15

## 2020-06-17 NOTE — PATIENT INSTRUCTIONS
Ask midwifery group if they are doing a seven or five year schedule for Mirena change-out    Work with Francia Paris or Pat Moraes - pharmacists at the psychiatry clinic  In particular, wondering about a mood stabilizer    Have blood pressure taken and entered into your chart at work

## 2020-06-17 NOTE — PROGRESS NOTES
"Sharmin Nieves is a 49 year old female who is being evaluated via a billable video visit.      The patient has been notified of following:     \"This video visit will be conducted via a call between you and your physician/provider. We have found that certain health care needs can be provided without the need for an in-person physical exam.  This service lets us provide the care you need with a video conversation.  If a prescription is necessary we can send it directly to your pharmacy.  If lab work is needed we can place an order for that and you can then stop by our lab to have the test done at a later time.    Video visits are billed at different rates depending on your insurance coverage.  Please reach out to your insurance provider with any questions.    If during the course of the call the physician/provider feels a video visit is not appropriate, you will not be charged for this service.\"    Patient has given verbal consent for Video visit? Yes    Will anyone else be joining your video visit? No     Will enter through [employer].     Subjective     Sharmin Nieves is a 49 year old female who presents today via video visit for the following health issues:    [phone]      HPI  Depression and Anxiety Follow-Up    How are you doing with your depression since your last visit? Worsened     How are you doing with your anxiety since your last visit?  Worsened     Are you having other symptoms that might be associated with depression or anxiety? Having a hard time concentrating.     Have you had a significant life event? No     Do you have any concerns with your use of alcohol or other drugs? No    Stopped effexor around Jan 2020.  Felt no difference going off the effexor - \"felt like regular self\" but now really feeling depressed, anxious, unfocused, eating a lot and gained 20 lbs.  Unsure if it entirely situational.  Anxiety feels more impactful - can't stop pacing.  Has moments of being sad.  But does feel both " "anxiety and depression are significant.  Having increased irritability and anger.  Had episode of impulse buying with her stimulus check.  Feels \"one minute high and another minute low.\" No risky behavior.  Denies nightmares, flashbacks, etc.    Working from home - doing pre-visits and video check-ins.  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  Has days in the clinic and days at home.  Has been getting rides to work from friends.  Son, 18 years, graduated this year and daughter is 15 years.  Son plans to join the army - she is ok with this - was a medic in the army.    Mirena due this month (?)  Drinking \"my usual and \"nothing overboard\" - \"only thing I can use to unwind\"  Thinks that if her mood was better, she would drink less.    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 Go to Review [employer]she[employer] activity to see all data.     DMITRIY-7 SCORE 6/30/2014 5/5/2017 11/30/2018   Total Score 7 - -   Total Score - 8 6   Some encounter information is confidential and restricted. Go to Review [employer]sheets activity to see all data.     Last PHQ-9 6/17/2020   1.  Little interest or pleasure in doing things 2   2.  Feeling down, depressed, or hopeless 3   3.  Trouble falling or staying asleep, or sleeping too much 3   4.  Feeling tired or having little energy 3   5.  Poor appetite or overeating 3   6.  Feeling bad about yourself 0   7.  Trouble concentrating 3   8.  Moving slowly or " "restless 2   Q9: Thoughts of better off dead/self-harm past 2 weeks 0   PHQ-9 Total Score 19   Difficulty at work, home, or with people -   Some encounter information is confidential and restricted. Go to Review Flowsheets activity to see all data.       Suicide Assessment Five-step Evaluation and Treatment (SAFE-T)      How many servings of fruits and vegetables do you eat daily?  2-3    On average, how many sweetened beverages do you drink each day (Examples: soda, juice, sweet tea, etc.  Do NOT count diet or artificially sweetened beverages)?   0    How many days per week do you exercise enough to make your heart beat faster? 3 or less    How many minutes a day do you exercise enough to make your heart beat faster? 10 - 19  How many days per week do you miss taking your medication? 3    What makes it hard for you to take your medications?  was out of refills for BP medcation is picking this up soon, stopped the effexor.     I have reviewed and updated the patient's Past Medical History, Social History, Family History and Medication List    Video Start Time: 4:12 PM    Reviewed and updated as needed this visit by Provider       Review of Systems   Constitutional, HEENT, cardiovascular, pulmonary, gi and gu systems are negative, except as otherwise noted.  BP Readings from Last 6 Encounters:   12/19/19 122/86   09/27/19 (!) 164/94   08/16/19 132/82   04/19/19 130/78   02/22/19 136/88   11/30/18 (!) 154/92             Objective    There were no vitals taken for this visit.  Estimated body mass index is 28.22 kg/m  as calculated from the following:    Height as of 8/9/19: 1.778 m (5' 10\").    Weight as of 8/16/19: 89.2 kg (196 lb 11.2 oz).  Physical Exam     GENERAL: Healthy, alert and no distress  EYES: Eyes grossly normal to inspection.  No discharge or erythema, or obvious scleral/conjunctival abnormalities.  RESP: No audible wheeze, cough, or visible cyanosis.  No visible retractions or increased work of " breathing.    SKIN: Visible skin clear. No significant rash, abnormal pigmentation or lesions.  NEURO: Cranial nerves grossly intact.  Mentation and speech appropriate for age.  PSYCH: Mentation appears normal, affect normal/bright, judgement and insight intact, normal speech and appearance well-groomed.      Diagnostic Test Results:  Labs reviewed in Epic  none         Assessment & Plan     (F41.1) Generalized anxiety disorder  (primary encounter diagnosis)  Comment:   Plan: MED THERAPY MANAGE REFERRAL, **TSH with free T4        reflex FUTURE anytime            (F32.1) Moderate major depression (H)  Comment:   Plan: MED THERAPY MANAGE REFERRAL, **TSH with free T4        reflex FUTURE anytime            (M54.5,  G89.29) Chronic bilateral low back pain without sciatica  Comment:   Plan: **CBC with platelets FUTURE anytime            (M25.551) Right hip pain  Comment:   Plan: **CBC with platelets FUTURE anytime            (I10) Benign essential hypertension  Comment:   Plan: Albumin Random Urine Quantitative with Creat         Ratio, **Comprehensive metabolic panel FUTURE         anytime, Lipid panel reflex to direct LDL         Fasting            (Z51.81) Encounter for therapeutic drug monitoring  Comment:   Plan: **CBC with platelets FUTURE anytime               Tobacco Cessation:   reports that she has been smoking cigarettes. She has a 11.25 pack-year smoking history. She has never used smokeless tobacco.  Tobacco Cessation Action Plan: Pharmacotherapies : other Nicotine replacement        Patient Instructions   Ask midwifery group if they are doing a seven or five year schedule for Mirena change-out    Work with Francia Paris or Pat Moraes - pharmacists at the psychiatry clinic  In particular, wondering about a mood stabilizer      Return in about 2 months (around 8/17/2020).    LEDY Seaman [facility]      Video-Visit Details    Type of service:  Video Visit    Video End  Time:4:42 PM    Originating Location (pt. Location): Home    [place]  [facility] [place] used for Video Visit: Charlie    No follow-ups on file.       LEDY Seaman CNP

## 2020-06-18 ASSESSMENT — ANXIETY QUESTIONNAIRES: GAD7 TOTAL SCORE: 15

## 2020-06-23 ENCOUNTER — ALLIED HEALTH/NURSE VISIT (OUTPATIENT)
Dept: PHARMACY | Facility: CLINIC | Age: 50
End: 2020-06-23
Payer: COMMERCIAL

## 2020-06-23 DIAGNOSIS — F32.9 MDD (MAJOR DEPRESSIVE DISORDER): Primary | ICD-10-CM

## 2020-06-23 DIAGNOSIS — I10 HTN (HYPERTENSION): ICD-10-CM

## 2020-06-23 DIAGNOSIS — R52 PAIN: ICD-10-CM

## 2020-06-23 DIAGNOSIS — Z30.9 CONTRACEPTIVE MANAGEMENT: ICD-10-CM

## 2020-06-23 DIAGNOSIS — F41.1 GAD (GENERALIZED ANXIETY DISORDER): ICD-10-CM

## 2020-06-23 PROCEDURE — 99607 MTMS BY PHARM ADDL 15 MIN: CPT | Performed by: PHARMACIST

## 2020-06-23 PROCEDURE — 99605 MTMS BY PHARM NP 15 MIN: CPT | Performed by: PHARMACIST

## 2020-06-23 RX ORDER — NICOTINE POLACRILEX 4 MG/1
20 GUM, CHEWING ORAL DAILY
COMMUNITY
End: 2020-12-01

## 2020-06-23 NOTE — PROGRESS NOTES
MTM ENCOUNTER  SUBJECTIVE/OBJECTIVE:                           Sharmin Nieves is a 49 year old female called for an initial visit. She was referred to me from PCP - LEDY Medrano CNP.      Patient consented to a telehealth visit: yes  Telemedicine Visit Details  Type of service:  Telephone visit  Start Time: 11:05 AM  End Time: 11:50 AM  Originating Location (pt. Location): Home  Distant Location (provider location):  [facility]  Mode of Communication:  Telephone    Reason for Referral: extensive medication trials for depression and anxiety - considering mood stabilizer like lamictal    Allergies/ADRs: Reviewed in EHR  Tobacco:  reports that she has been smoking cigarettes. She has a 11.25 pack-year smoking history. She has never used smokeless tobacco.Tobacco Cessation Action Plan: Pharmacotherapies : has nicotine gum  Alcohol:Does have h/o  alcohol use disorder, prescribed Antabuse in the past    PMH: Reviewed in EHR    Medication Adherence/Access: no issues reported    MDD, DMITRIY:   Current therapy:   Ativan 1mg PRN anxiety - uses only before dental procedures and flying  Trazodone 200mg HS for sleep    Sharmin is a 49-year-old female seen by primary care provider, LEDY Medrano CNP for depression and anxiety management.  She was last seen by Mariya on 6/17/2020 at which time Sharmin reported worsening depression and anxiety. She reports symptoms including irritability, mood lability, difficulty with focus and concentration, increased appetite (reported 20 pound weight gain), and one episode of impulse buying with her stimulus check. Today she reports her sleep and energy is relatively unchanged. She continues to use trazodone. Energy level varies from day to day. She denies h/o maggie/hypomania symptoms. Mariya iBll mentioned possibly starting Lamictal for mood liability, irritability.     Sharmin is not currently on antidepressant therapy.  She was most recently on Effexor  until Feburary 2020 when she discontinued it due to limited perceived benefit.  She does feel like depression and anxiety worsened in March 2020.  However, several situational stressors occurred during this time including: COVID-19 and working from home.  Sharmin reports she has tried many antidepressants with limited benefit and is therefore interested in trying a different medication at this time.  She would like to start Lamictal, but would be open to retrying an antidepressant in the future if Lamictal is not fully effective.      PHQ 5/15/2019 12/19/2019 6/17/2020   PHQ-9 Total Score 6 16 19   Q9: Thoughts of better off dead/self-harm past 2 weeks Not at all Not at all Not at all     Past psychiatric history:   Sharmin reports depression and anxiety first appeared in 2002 while she was pregnant with her son.  She did see a private practice psychiatrist 10+ years ago.  Past psychiatric diagnoses include MDD and DMITRIY.    Past medication trials:   Zoloft 50mg (2004) - not effective  Wellbutrin SR 200mg BID (2041-9460 and 2708-9430) - not effective  Celexa 50mg (2005 and 6283-5121) - not effective  Cymbalta 120mg daily (3628-3486 ) - not effective  Effexor 225mg daily (7977-0438 and 6880-4767) - somewhat helpful  Risperidone 3mg HS (3699-3159)  Seroquel 100mg HS (2008)  propanol PRN anxiety      Hypertension: Current medications include losartan 100mg daily.  Patient reports no current medication side effects.    BP Readings from Last 3 Encounters:   12/19/19 122/86   09/27/19 (!) 164/94   08/16/19 132/82         Back Pain:   Current therapy:   Celebrex 100mg BID PRN (taking BID consistently). She finds this at least somewhat helpful for pain. No side effects.     Contraception:   Current therapy: Mirena IUD      Today's Vitals: There were no vitals taken for this visit. -Phone visit      ASSESSMENT:                              Medication Adherence: good    MDD, DMITRIY: Needs improvement. Worsening depression and anxiety  since discontinuing Effexor. Symptoms likely also effected by situational stressors. Pt has noted limited benefit from antidepressants in the past and would like to pursue Lamictal therapy. Provided education on indication, efficacy and side effects of Lamictal. Discussed that Lamictal is typically used as adjuvant therapy as opposed to monotherapy for depression and pt may require addition of antidepressant therapy in the future. Pt expressed understanding and would like to start with Lamictal monotherapy.    Hypertension: Stable. Patient is meeting BP goal of < 140/90mmHg.     Back Pain: Stable    Contraception: Stable. No interaction with Mirena IUD and Lamictal.       PLAN:                            1. Message sent to PCP for approval to start Lamictal. If approved would start at 25mg daily x2 weeks, 50mg daily x2 weeks then increase to 100mg daily.   6/26/20 Addendum: Mariya Bill agreed with starting Lamictal. Writer sent Rx to [facility] pharmacy in [place] and sent "[employer] message to pt.     2. Counseled on side effects including risk of rash/SJS and importance of daily med compliance and following titration schedule.     I spent 45 minutes with this patient today. I offer these suggestions for consideration by Mariya Bill. A copy of the visit note was provided to the patient's referring provider.    Pt will follow up with PCP in 4-6 weeks. MTM follow-up to be scheduled at future date pending symptoms/tolerability.    A summary of these recommendations is available via "[employer].     Francia Paris, PharmD, BCPP  Medication Therapy Management Pharmacist  [facility]

## 2020-06-23 NOTE — Clinical Note
Dae Meraz-  Thanks for referring pt. She would like to start Lamictal - ok if I send in the prescription? We discussed she will likely need to restart an antidepressant in the future as well.     Thanks,     Francia

## 2020-06-23 NOTE — PATIENT INSTRUCTIONS
Recommendations from today's MTM visit:                                                      1. Message sent to PCP for approval to start Lamictal. If approved would start at 25mg daily x2 weeks, 50mg daily x2 weeks then increase to 100mg daily.   2. Reviewed Lamictal side effects including risk of rash/SJS and importance of daily med compliance and following titration schedule.       It was great to speak with you today.  I value your experience and would be very thankful for your time with providing feedback on our clinic survey. You may receive a survey via email or text message in the next few days.         To schedule another MTM appointment, please call the clinic directly or you may call the MTM scheduling line at [phone] or toll-free at [phone].     My Clinical Pharmacist's contact information:                                                      It was a pleasure talking with you today!  Please feel free to contact me with any questions or concerns you have.      Francia Paris, PharmD, BCPP  Medication Therapy Management Pharmacist  [facility]

## 2020-06-25 ENCOUNTER — MYC MEDICAL ADVICE (OUTPATIENT)
Dept: FAMILY MEDICINE | Facility: CLINIC | Age: 50
End: 2020-06-25

## 2020-06-25 NOTE — TELEPHONE ENCOUNTER
Mariya,    Please see my chart message from patient. Looks like Francia said she sent you a message about patient starting Lamictal.    Thanks  Angélica Carreon RN   [facility] [place]

## 2020-06-26 RX ORDER — LAMOTRIGINE 25 MG/1
TABLET ORAL
Qty: 60 TABLET | Refills: 0 | Status: SHIPPED | OUTPATIENT
Start: 2020-06-26 | End: 2020-08-25

## 2020-06-26 NOTE — TELEPHONE ENCOUNTER
Received approval from Mariya Bill to start Lamictal today. Rx sent to [facility] in [place] and replied to pt via [employer].     Francia Paris, PharmD, BCPP  Medication Therapy Management Pharmacist  [facility]

## 2020-07-08 ENCOUNTER — VIRTUAL VISIT (OUTPATIENT)
Dept: FAMILY MEDICINE | Facility: CLINIC | Age: 50
End: 2020-07-08
Payer: COMMERCIAL

## 2020-07-08 ENCOUNTER — MYC MEDICAL ADVICE (OUTPATIENT)
Dept: FAMILY MEDICINE | Facility: CLINIC | Age: 50
End: 2020-07-08

## 2020-07-08 DIAGNOSIS — F41.1 GAD (GENERALIZED ANXIETY DISORDER): Primary | ICD-10-CM

## 2020-07-08 DIAGNOSIS — F41.8 SITUATIONAL ANXIETY: ICD-10-CM

## 2020-07-08 PROCEDURE — 99214 OFFICE O/P EST MOD 30 MIN: CPT | Performed by: NURSE PRACTITIONER

## 2020-07-08 RX ORDER — VENLAFAXINE HYDROCHLORIDE 75 MG/1
TABLET, EXTENDED RELEASE ORAL
Qty: 60 TABLET | Refills: 1 | Status: SHIPPED | OUTPATIENT
Start: 2020-07-08 | End: 2020-08-25

## 2020-07-08 RX ORDER — LORAZEPAM 1 MG/1
.5-1 TABLET ORAL EVERY 6 HOURS PRN
Qty: 5 TABLET | Refills: 0 | Status: SHIPPED | OUTPATIENT
Start: 2020-07-08 | End: 2020-07-28

## 2020-07-08 NOTE — PROGRESS NOTES
"Subjective     Sharmin Nieves is a 50 year old female who presents to clinic today for the following health issues:    HPI   Anxiety Follow-Up    How are you doing with your anxiety since your last visit? Worsened ; felt worried that she would not be able to go into work today due to panic symptoms. Took a hydroxyzine but it made her feel really tired.     Are you having other symptoms that might be associated with anxiety? No    Have you had a significant life event? No     Are you feeling depressed? Yes     Do you have any concerns with your use of alcohol or other drugs? No    Social History     Tobacco Use     Smoking status: Current Every Day Smoker     Packs/day: 0.75     Years: 15.00     Pack years: 11.25     Types: Cigarettes     Smokeless tobacco: Never Used   Substance Use Topics     Alcohol use: Yes     Comment: addict in recovery, current drinking     Drug use: No     Comment: addict in recovery     DMITRIY-7 SCORE 5/5/2017 11/30/2018 6/17/2020   Total Score - - -   Total Score 8 6 15   Some encounter information is confidential and restricted. Go to Review [employer] activity to see all data.     PHQ 5/15/2019 12/19/2019 6/17/2020   PHQ-9 Total Score 6 16 19   Q9: Thoughts of better off dead/self-harm past 2 weeks Not at all Not at all Not at all   Some encounter information is confidential and restricted. Go to Review [employer] activity to see all data.           How many servings of fruits and vegetables do you eat daily?  4 or more    On average, how many sweetened beverages do you drink each day (Examples: soda, juice, sweet tea, etc.  Do NOT count diet or artificially sweetened beverages)?   1    How many days per week do you exercise enough to make your heart beat faster? 0        How many minutes a day do you exercise enough to make your heart beat faster? 0    How many days per week do you miss taking your medication? 0    Has been drinking \"the usual\" amount, doesn't impair her ability to go to " work right now, but doesn't feel good about her drinking.     Patient Active Problem List   Diagnosis     Herpes simplex virus (HSV) infection     Tobacco use disorder     IUD (intrauterine device) in place     Esophageal reflux     Ovarian cyst     Irregular menstrual cycle     Chronic low back pain     Right hip pain     CARDIOVASCULAR SCREENING; LDL GOAL LESS THAN 160     Moderate major depression (H)     Rectal pain     Health Care Home     Insomnia     Home Health Care     Generalized anxiety disorder     Dry skin     Restless leg syndrome     Benign essential hypertension     Situational anxiety     Irregular heart beat     Encounter for therapeutic drug monitoring     Alcohol abuse     Past Surgical History:   Procedure Laterality Date     C NONSPECIFIC PROCEDURE  1992    CRYO SURGERY for abnl paps     C STOMACH SURGERY PROCEDURE UNLISTED      Nissen     CHEILECTOMY Right 12/21/2017    Procedure: CHEILECTOMY;  RIGHT FOOT CHEILECTOMY;  Surgeon: Mo Edgar DPM;  Location:  OR     SURGICAL HISTORY OF -   4/04    Lapr Nissen fundoplication     TONSILLECTOMY & ADENOIDECTOMY  1985       Social History     Tobacco Use     Smoking status: Current Every Day Smoker     Packs/day: 0.75     Years: 15.00     Pack years: 11.25     Types: Cigarettes     Smokeless tobacco: Never Used   Substance Use Topics     Alcohol use: Yes     Comment: 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 Maternal Grandmother      Hypertension Maternal Grandmother      Alcohol/Drug Maternal Grandmother      Depression Maternal Grandmother      Cerebrovascular Disease Paternal Grandfather      Alcohol/Drug Paternal Grandfather      Breast Cancer Paternal Grandmother      Depression Paternal Grandmother      Alcohol/Drug Father      Depression Father      Gastrointestinal Disease Father         GERD     Alcohol/Drug Maternal Grandfather      Depression Maternal Grandfather       "Depression Mother      Obesity Mother      Anxiety Disorder Mother      Diabetes Mother      Breast Cancer Maternal Aunt      Alcohol/Drug Sister      Alcohol/Drug Brother      Alcohol/Drug Brother      Alcohol/Drug Brother      Depression Sister      Depression Brother      Depression Brother      Depression Brother      Gastrointestinal Disease Brother         Hiatal hernia     Cancer - colorectal No family hx of      Prostate Cancer No family hx of            -------------------------------------  Reviewed and updated as needed this visit by Provider         Review of Systems   Constitutional, HEENT, cardiovascular, pulmonary, gi and gu systems are negative, except as otherwise noted.      Objective    There were no vitals taken for this visit.  There is no height or weight on file to calculate BMI.  Physical Exam   GENERAL: healthy, alert and no distress  RESP: normal respiratory effor  PSYCH: mentation appears normal, affect normal/bright    Diagnostic Test Results:  Labs reviewed in Epic  No results found for this or any previous visit (from the past 24 hour(s)).        Assessment & Plan       ICD-10-CM    1. DMITRIY (generalized anxiety disorder)  F41.1 venlafaxine (EFFEXOR-ER) 75 MG 24 hr tablet   2. Situational anxiety  F41.8 LORazepam (ATIVAN) 1 MG tablet      Follow up with Mariya in 2 weeks to check in- discussed alcohol use briefly Restart Effexor today  BMI:   Estimated body mass index is 28.22 kg/m  as calculated from the following:    Height as of 8/9/19: 1.778 m (5' 10\").    Weight as of 8/16/19: 89.2 kg (196 lb 11.2 oz).           See Patient Instructions    No follow-ups on file.    LEDY Frias [facility]      "

## 2020-07-08 NOTE — TELEPHONE ENCOUNTER
Spoke with patient and she is safe and no thoughts of self harm, but has been having increased panic attacks and would like to discuss going back on effexor and possibly staying on lamictal or adding another antidepressant. Mariya does not have any openings until next Tuesday for virtual visit, so scheduled patient appt with Maci Allen today at 5:45.    Angélica Carreon RN   [facility]

## 2020-07-28 ENCOUNTER — VIRTUAL VISIT (OUTPATIENT)
Dept: FAMILY MEDICINE | Facility: CLINIC | Age: 50
End: 2020-07-28
Payer: COMMERCIAL

## 2020-07-28 DIAGNOSIS — F41.1 GENERALIZED ANXIETY DISORDER: Primary | ICD-10-CM

## 2020-07-28 DIAGNOSIS — F41.8 SITUATIONAL ANXIETY: ICD-10-CM

## 2020-07-28 PROCEDURE — 99214 OFFICE O/P EST MOD 30 MIN: CPT | Mod: 95 | Performed by: NURSE PRACTITIONER

## 2020-07-28 RX ORDER — LAMOTRIGINE 100 MG/1
100 TABLET ORAL DAILY
Qty: 30 TABLET | Refills: 0 | Status: SHIPPED | OUTPATIENT
Start: 2020-07-28 | End: 2020-08-25

## 2020-07-28 RX ORDER — PROPRANOLOL HYDROCHLORIDE 20 MG/1
20 TABLET ORAL 3 TIMES DAILY PRN
Qty: 90 TABLET | Refills: 0 | Status: SHIPPED | OUTPATIENT
Start: 2020-07-28 | End: 2020-12-06

## 2020-07-28 RX ORDER — LORAZEPAM 1 MG/1
1 TABLET ORAL EVERY 6 HOURS PRN
Qty: 5 TABLET | Refills: 0 | Status: SHIPPED | OUTPATIENT
Start: 2020-07-28 | End: 2020-09-15

## 2020-07-28 NOTE — PROGRESS NOTES
"Sharmin Nieves is a 50 year old female who is being evaluated via a billable telephone visit.      The patient has been notified of following:     \"This telephone visit will be conducted via a call between you and your physician/provider. We have found that certain health care needs can be provided without the need for a physical exam.  This service lets us provide the care you need with a short phone conversation.  If a prescription is necessary we can send it directly to your pharmacy.  If lab work is needed we can place an order for that and you can then stop by our lab to have the test done at a later time.    Telephone visits are billed at different rates depending on your insurance coverage. During this emergency period, for some insurers they may be billed the same as an in-person visit.  Please reach out to your insurance provider with any questions.    If during the course of the call the physician/provider feels a telephone visit is not appropriate, you will not be charged for this service.\"    Patient has given verbal consent for Telephone visit?  Yes    What phone number would you like to be contacted at? [phone]    How would you like to obtain your AVS? Bharathhart    Subjective     Sharmin Nieves is a 50 year old female who presents via phone visit today for the following health issues:    HPI    Anxiety Follow-Up    Virtual visit with Maci Allen 7/8/20 for significant worsening of anxiety and panic attacks- restarted effexor xr 75 mg and a small amount of xanax.  Has used about one xanax per week    Still taking lamictal and at 50 mg - slight improvement    How are you doing with your anxiety since your last visit? Improved - slightly    Are you having other symptoms that might be associated with anxiety? Yes:  panic attacks first thing in the morning - feels like she cannot breathe, pacing    Have you had a significant life event? OTHER: pandemic     Are you feeling depressed? No    Do you have any " concerns with your use of alcohol or other drugs? 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 0    I have reviewed and updated the patient's Past Medical History, Social History, Family History and Medication List      Reviewed and updated as needed this visit by Provider  Tobacco  Allergies  Med Hx  Surg Hx  Fam Hx  Soc Hx        Review of Systems   Constitutional, HEENT, cardiovascular, pulmonary, gi and gu systems are negative, except as otherwise noted.       Objective   Reported vitals:  There were no vitals taken for this visit.   healthy, alert and no distress  PSYCH: Alert and oriented times 3; coherent speech, normal   rate and volume, able to articulate logical thoughts, able   to abstract reason, no tangential thoughts, no hallucinations   or delusions  Her affect is anxious  RESP: No cough, no audible wheezing, able to talk in full sentences  Remainder of exam unable to be completed due to telephone visits    Diagnostic Test Results:  Labs reviewed in Epic  none         Assessment/Plan:    1. Situational anxiety  Try propranolol first and reserve ativan   - LORazepam (ATIVAN) 1 MG tablet; Take 1 tablet (1 mg) by mouth every 6 hours as needed for anxiety  Dispense: 5 tablet; Refill: 0  - propranolol (INDERAL) 20 MG tablet; Take 1 tablet (20 mg) by mouth 3 times daily as  needed (anxiety)  Dispense: 90 tablet; Refill: 0    2. Generalized anxiety disorder  Increase lamitcal to 100 mg (has been two weeks at 50 mg)  Follow-up with MTM in two weeks  - lamoTRIgine (LAMICTAL) 100 MG tablet; Take 1 tablet (100 mg) by mouth daily  Dispense: 30 tablet; Refill: 0    Return in about 2 weeks (around 8/11/2020) for mental health check visit with Pharmacist Francia.    End:  8:08 PM   Start:  7:51 PM     Phone call duration:  17 minutes    LEDY Seaman CNP

## 2020-07-29 NOTE — PATIENT INSTRUCTIONS
Try propranolol first and reserve ativan for once a week average    Increase lamitcal to 100 mg (has been two weeks at 50 mg)    Follow-up with Francia in two weeks

## 2020-08-12 DIAGNOSIS — F41.1 GAD (GENERALIZED ANXIETY DISORDER): ICD-10-CM

## 2020-08-12 DIAGNOSIS — F32.9 MDD (MAJOR DEPRESSIVE DISORDER): ICD-10-CM

## 2020-08-12 RX ORDER — LAMOTRIGINE 25 MG/1
TABLET ORAL
Qty: 60 TABLET | Refills: 0 | Status: CANCELLED | OUTPATIENT
Start: 2020-08-12

## 2020-08-17 NOTE — TELEPHONE ENCOUNTER
Routing refill request to provider for review/approval because:    Per LOV Plan on 7/28/20:    2. Generalized anxiety disorder  Increase lamitcal to 100 mg (has been two weeks at 50 mg)  Follow-up with MTM in two weeks  - lamoTRIgine (LAMICTAL) 100 MG tablet; Take 1 tablet (100 mg) by mouth daily  Dispense: 30 tablet; Refill: 0     Return in about 2 weeks (around 8/11/2020) for mental health check visit with Pharmacist Francia.      Angélica Carreon, FADIA   [facility]

## 2020-08-18 DIAGNOSIS — Z51.81 ENCOUNTER FOR THERAPEUTIC DRUG MONITORING: ICD-10-CM

## 2020-08-18 DIAGNOSIS — R52 PAIN: ICD-10-CM

## 2020-08-18 RX ORDER — CELECOXIB 100 MG/1
100 CAPSULE ORAL 2 TIMES DAILY PRN
Qty: 180 CAPSULE | Refills: 0 | Status: CANCELLED | OUTPATIENT
Start: 2020-08-18

## 2020-08-19 DIAGNOSIS — Z53.9 ERRONEOUS ENCOUNTER--DISREGARD: Primary | ICD-10-CM

## 2020-08-24 NOTE — TELEPHONE ENCOUNTER
"Requested Prescriptions   Pending Prescriptions Disp Refills     celecoxib (CELEBREX) 100 MG capsule 180 capsule 0     Sig: Take 1 capsule (100 mg) by mouth 2 times daily as needed for moderate pain       NSAID Medications Failed - 8/21/2020  4:23 PM        Failed - Normal ALT on file in past 12 months     Recent Labs   Lab Test 04/19/19  1606   ALT 25             Failed - Normal AST on file in past 12 months     Recent Labs   Lab Test 04/19/19  1606   AST 19             Failed - Normal CBC on file in past 12 months     Recent Labs   Lab Test 02/22/19  0905   WBC 5.7   RBC 4.09   HGB 14.0   HCT 40.6                    Failed - Normal serum creatinine on file in past 12 months     Recent Labs   Lab Test 11/30/18  0918   CR 0.73       Ok to refill medication if creatinine is low          Passed - Blood pressure under 140/90 in past 12 months     BP Readings from Last 3 Encounters:   12/19/19 122/86   09/27/19 (!) 164/94   08/16/19 132/82                 Passed - Recent (12 mo) or future (30 days) visit within the authorizing provider's specialty     Patient has had an office visit with the authorizing provider or a provider within the authorizing providers department within the previous 12 mos or has a future within next 30 days. See \"Patient Info\" tab in inbasket, or \"Choose Columns\" in Meds & Orders section of the refill encounter.              Passed - Patient is age 6-64 years        Passed - Medication is active on med list        Passed - No active pregnancy on record        Passed - No positive pregnancy test in past 12 months         Routing refill request to provider for review/approval because:  Labs not current:  ALT, AST, CRE, CBC  Labs already ordered as future.  Angélica Carreon RN   [facility]       "

## 2020-08-25 ENCOUNTER — VIRTUAL VISIT (OUTPATIENT)
Dept: FAMILY MEDICINE | Facility: CLINIC | Age: 50
End: 2020-08-25
Payer: COMMERCIAL

## 2020-08-25 DIAGNOSIS — F41.1 GENERALIZED ANXIETY DISORDER: ICD-10-CM

## 2020-08-25 DIAGNOSIS — R52 PAIN: ICD-10-CM

## 2020-08-25 DIAGNOSIS — K22.4 ESOPHAGEAL SPASM: Primary | ICD-10-CM

## 2020-08-25 DIAGNOSIS — I10 BENIGN ESSENTIAL HYPERTENSION: ICD-10-CM

## 2020-08-25 DIAGNOSIS — Z51.81 ENCOUNTER FOR THERAPEUTIC DRUG MONITORING: ICD-10-CM

## 2020-08-25 PROCEDURE — 99214 OFFICE O/P EST MOD 30 MIN: CPT | Mod: TEL | Performed by: NURSE PRACTITIONER

## 2020-08-25 RX ORDER — LAMOTRIGINE 100 MG/1
100 TABLET ORAL DAILY
Qty: 90 TABLET | Refills: 1 | Status: SHIPPED | OUTPATIENT
Start: 2020-08-25 | End: 2021-02-19

## 2020-08-25 RX ORDER — OMEPRAZOLE 40 MG/1
40 CAPSULE, DELAYED RELEASE ORAL DAILY
Qty: 60 CAPSULE | Refills: 0 | Status: SHIPPED | OUTPATIENT
Start: 2020-08-25 | End: 2020-10-27

## 2020-08-25 RX ORDER — VENLAFAXINE HYDROCHLORIDE 150 MG/1
150 CAPSULE, EXTENDED RELEASE ORAL DAILY
Qty: 90 CAPSULE | Refills: 1 | Status: SHIPPED | OUTPATIENT
Start: 2020-08-25 | End: 2021-02-19

## 2020-08-25 RX ORDER — CELECOXIB 100 MG/1
100 CAPSULE ORAL 2 TIMES DAILY PRN
Qty: 180 CAPSULE | Refills: 0 | Status: SHIPPED | OUTPATIENT
Start: 2020-08-25 | End: 2021-10-13

## 2020-08-25 RX ORDER — LOSARTAN POTASSIUM 100 MG/1
100 TABLET ORAL DAILY
Qty: 90 TABLET | Refills: 3 | Status: SHIPPED | OUTPATIENT
Start: 2020-08-25 | End: 2020-12-01

## 2020-08-25 NOTE — PROGRESS NOTES
"Sharmin Nieves is a 50 year old female who is being evaluated via a billable telephone visit.      The patient has been notified of following:     \"This telephone visit will be conducted via a call between you and your physician/provider. We have found that certain health care needs can be provided without the need for a physical exam.  This service lets us provide the care you need with a short phone conversation.  If a prescription is necessary we can send it directly to your pharmacy.  If lab work is needed we can place an order for that and you can then stop by our lab to have the test done at a later time.    Telephone visits are billed at different rates depending on your insurance coverage. During this emergency period, for some insurers they may be billed the same as an in-person visit.  Please reach out to your insurance provider with any questions.    If during the course of the call the physician/provider feels a telephone visit is not appropriate, you will not be charged for this service.\"    Patient has given verbal consent for Telephone visit?  Yes    What phone number would you like to be contacted at? [phone]    How would you like to obtain your AVS? MyChart  End:  5:02 PM   Start:  4:47 PM     Phone call duration: 15 minutes    LEDY Seaman CNP    Sharmin Nieves is a 50 year old female who presents today via video visit for the following health issues:    HPI    Anxiety Follow-Up    How are you doing with your anxiety since your last visit? Improved     Are you having other symptoms that might be associated with anxiety? No    Have you had a significant life event? No     Are you feeling depressed? Improving better effexor     Do you have any concerns with your use of alcohol or other drugs? No  Last change was increasing Lamictal to 100 mg and Effexor 150 mg  Has not needed an ativan more than once since last visit.  Tried propranolol one time, but hasn't needed it otherwise so not much " of a trial  Things are improving greatly.  Not having overwhelming panic attacks  No side effects and no rashes    Hypertension  Hasn't checked outside of the clinic  Will take at the clinic tomorrow and send via tabulatet    Foot pain - planning to have joint fused, taking celebrex for foot pain while waiting for opening in podiatry    Esophageal spasms - taking omeprazole 20 mg daily, hx of nissin fundlopication and dx esophageal spasm.  Omeprazole usually takes care of this.    Still smoking and drinking habits haven't changed.  Not having concerns.     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 Go to Review FlowsXipLink activity to see all data.     PHQ 5/15/2019 12/19/2019 6/17/2020   PHQ-9 Total Score 6 16 19   Q9: Thoughts of better off dead/self-harm past 2 weeks Not at all Not at all Not at all   Some encounter information is confidential and restricted. Go to Review FlowsXipLink activity to see all data.         How many servings of fruits and vegetables do you eat daily?  2-3    On average, how many sweetened beverages do you drink each day (Examples: soda, juice, sweet tea, etc.  Do NOT count diet or artificially sweetened beverages)?   2    How many days per week do you exercise enough to make your heart beat faster? 0    How many days per week do you miss taking your medication? 0    I have reviewed and updated the patient's Past Medical History, Social History, Family History and Medication List      Review of Systems   Constitutional, HEENT, cardiovascular, pulmonary, gi and  gu systems are negative, except as otherwise noted.      Objective       Vitals:  No vitals were obtained today due to virtual visit.    Physical Exam     GENERAL: Healthy, alert and no distress  EYES: Eyes grossly normal to inspection.  No discharge or erythema, or obvious scleral/conjunctival abnormalities.  RESP: No audible wheeze, cough, or visible cyanosis.  No visible retractions or increased work of breathing.    SKIN: Visible skin clear. No significant rash, abnormal pigmentation or lesions.  NEURO: Cranial nerves grossly intact.  Mentation and speech appropriate for age.  PSYCH: Mentation appears normal, affect normal/bright, judgement and insight intact, normal speech and appearance well-groomed.      Pending diagnostics        Assessment & Plan     (K22.4) Esophageal spasm  (primary encounter diagnosis)  Comment:   Plan: omeprazole (PRILOSEC) 40 MG DR capsule        Increase dose for two months - if no better or if return of symptoms when decreasing, then see GI    (F41.1) Generalized anxiety disorder  Comment:   Plan: lamoTRIgine (LAMICTAL) 100 MG tablet,         venlafaxine (EFFEXOR-XR) 150 MG 24 hr capsule        Improved, no known med side effects    (I10) Benign essential hypertension  Comment:   Plan: losartan (COZAAR) 100 MG tablet        Needs a clinic check, particularly with increased effexor dose - can do at work    (R52) Pain  Comment:   Plan: celecoxib (CELEBREX) 100 MG capsule        Refills - awaiting foot surgery    (Z51.81) Encounter for therapeutic drug monitoring  Comment:   Plan: celecoxib (CELEBREX) 100 MG capsule                   Patient Instructions   Blood pressure and labs when you at work      Return in about 1 week (around 9/1/2020) for 6 month check in - BP and mental health.    LEDY Seaman [facility]

## 2020-09-14 ENCOUNTER — MYC MEDICAL ADVICE (OUTPATIENT)
Dept: FAMILY MEDICINE | Facility: CLINIC | Age: 50
End: 2020-09-14

## 2020-09-14 DIAGNOSIS — F41.8 SITUATIONAL ANXIETY: ICD-10-CM

## 2020-09-15 DIAGNOSIS — Z13.6 CARDIOVASCULAR SCREENING; LDL GOAL LESS THAN 160: Primary | ICD-10-CM

## 2020-09-15 DIAGNOSIS — I10 BENIGN ESSENTIAL HYPERTENSION: ICD-10-CM

## 2020-09-15 RX ORDER — LORAZEPAM 1 MG/1
1 TABLET ORAL EVERY 6 HOURS PRN
Qty: 5 TABLET | Refills: 0 | Status: SHIPPED | OUTPATIENT
Start: 2020-09-15 | End: 2021-09-10

## 2020-09-15 NOTE — TELEPHONE ENCOUNTER
Mariya    See pt MyChart message  Pt had a v visit on 8/25/2020    Med cued    Sapna Bowie RN   [facility]

## 2020-09-16 RX ORDER — LOSARTAN POTASSIUM 100 MG/1
100 TABLET ORAL DAILY
Qty: 90 TABLET | Refills: 0 | Status: SHIPPED | OUTPATIENT
Start: 2020-09-16 | End: 2021-02-19

## 2020-09-16 NOTE — TELEPHONE ENCOUNTER
Routing refill request to provider for review/approval because:  Labs not current:  Creatinine, potassium  Labs already ordered    Angélica Carreon RN   [facility]

## 2020-10-07 DIAGNOSIS — M20.5X1 HALLUX LIMITUS OF RIGHT FOOT: Primary | ICD-10-CM

## 2020-10-27 ENCOUNTER — MYC REFILL (OUTPATIENT)
Dept: FAMILY MEDICINE | Facility: CLINIC | Age: 50
End: 2020-10-27

## 2020-10-27 DIAGNOSIS — K22.4 ESOPHAGEAL SPASM: ICD-10-CM

## 2020-10-28 RX ORDER — OMEPRAZOLE 40 MG/1
40 CAPSULE, DELAYED RELEASE ORAL DAILY
Qty: 60 CAPSULE | Refills: 0 | Status: SHIPPED | OUTPATIENT
Start: 2020-10-28 | End: 2021-02-19

## 2020-10-28 NOTE — TELEPHONE ENCOUNTER
"Requested Prescriptions   Pending Prescriptions Disp Refills     omeprazole (PRILOSEC) 40 MG DR capsule 60 capsule 0     Sig: Take 1 capsule (40 mg) by mouth daily       PPI Protocol Passed - 10/27/2020 10:08 AM        Passed - Not on Clopidogrel (unless Pantoprazole ordered)        Passed - No diagnosis of osteoporosis on record        Passed - Recent (12 mo) or future (30 days) visit within the authorizing provider's specialty     Patient has had an office visit with the authorizing provider or a provider within the authorizing providers department within the previous 12 mos or has a future within next 30 days. See \"Patient Info\" tab in inbasket, or \"Choose Columns\" in Meds & Orders section of the refill encounter.              Passed - Medication is active on med list        Passed - Patient is age 18 or older        Passed - No active pregnacy on record        Passed - No positive pregnancy test in past 12 months           Last office visit 8/25    (K22.4) Esophageal spasm  (primary encounter diagnosis)  Comment:   Plan: omeprazole (PRILOSEC) 40 MG DR capsule        Increase dose for two months - if no better or if return of symptoms when decreasing, then see GI      Routing refill request to provider for review/approval because:  Do you want to advise additional refills - clarified via mychart - encouraged in person office visit for f/u on HTN        "

## 2020-10-28 NOTE — TELEPHONE ENCOUNTER
DIAGNOSIS: Hallux limitus of right foot [M20.5X1]  Great toe pain, right. Dr. Gibson referring ok per Francia   APPOINTMENT DATE: 11.10.20   NOTES STATUS DETAILS   OFFICE NOTE from referring provider Internal 1.20.20 [facility]   OFFICE NOTE from other specialist Internal 11.28.18 [facility]  8.1.18 JAKE Edgar [facility]  1.3.18 M [facility]  More in Epic if needed   DISCHARGE SUMMARY from hospital N/A    DISCHARGE REPORT from the ER N/A    OPERATIVE REPORT Internal 12.21.17 JAKE Edgar [facility]   MEDICATION LIST Internal    EMG (for Spine) N/A    IMPLANT RECORD/STICKER N/A    LABS     CBC/DIFF Internal 10.4.17   CULTURES N/A    INJECTIONS DONE IN RADIOLOGY Internal 12.11.18    MRI N/A    CT SCAN N/A    XRAYS (IMAGES & REPORTS) Internal 1.20.20 R foot  8.1.18 R foot  10.4.   TUMOR     PATHOLOGY  Slides & report N/A

## 2020-11-10 ENCOUNTER — OFFICE VISIT (OUTPATIENT)
Dept: ORTHOPEDICS | Facility: CLINIC | Age: 50
End: 2020-11-10
Attending: PODIATRIST
Payer: COMMERCIAL

## 2020-11-10 ENCOUNTER — ANCILLARY PROCEDURE (OUTPATIENT)
Dept: GENERAL RADIOLOGY | Facility: CLINIC | Age: 50
End: 2020-11-10
Attending: ORTHOPAEDIC SURGERY
Payer: COMMERCIAL

## 2020-11-10 ENCOUNTER — PRE VISIT (OUTPATIENT)
Dept: ORTHOPEDICS | Facility: CLINIC | Age: 50
End: 2020-11-10

## 2020-11-10 VITALS — BODY MASS INDEX: 31.87 KG/M2 | WEIGHT: 215.2 LBS | HEIGHT: 69 IN

## 2020-11-10 DIAGNOSIS — M20.5X1 HALLUX LIMITUS OF RIGHT FOOT: ICD-10-CM

## 2020-11-10 DIAGNOSIS — M25.571 PAIN IN JOINT, ANKLE AND FOOT, RIGHT: Primary | ICD-10-CM

## 2020-11-10 PROCEDURE — 99203 OFFICE O/P NEW LOW 30 MIN: CPT | Performed by: ORTHOPAEDIC SURGERY

## 2020-11-10 PROCEDURE — 73630 X-RAY EXAM OF FOOT: CPT | Mod: RT | Performed by: RADIOLOGY

## 2020-11-10 ASSESSMENT — MIFFLIN-ST. JEOR: SCORE: 1672.01

## 2020-11-10 NOTE — NURSING NOTE
"Reason For Visit:   Chief Complaint   Patient presents with     Consult     Hallux limitis right        Ht 1.755 m (5' 9.09\")   Wt 97.6 kg (215 lb 3.2 oz)   BMI 31.69 kg/m      Pain Assessment  Patient Currently in Pain: Yes  0-10 Pain Scale: 5  Primary Pain Location: Foot    Francia Thornton ATC    "

## 2020-11-10 NOTE — LETTER
11/10/2020         RE: Sharmin Nieves  [address]  [place] [address]        Dear Colleague,    Thank you for referring your patient, Sharmin Nieves, to the [facility] [place]. Please see a copy of my visit note below.    CHIEF COMPLAINT:  Right foot pain.      HISTORY OF PRESENT ILLNESS:  Ms. Nieves is a 50-year-old female who presents today for evaluation of her right foot.  The patient reports to have a long history of pain and discomfort for a long period of time.  The patient reports to have had a cheilectomy approximately 2-3 years ago, and since then, she reports to have had problems constantly.  Reports to have pain on a daily basis.  Reports to struggle on a daily basis as well.  She denies to have any regular physical activities secondary to pain along the right foot.      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      She has tried some injections in the past without any significant success as well as the use of some insert.        Presents today for discussion of treatment options.      PAST MEDICAL HISTORY:  Significant for alcohol abuse, anxiety, depression, gastric reflux, tobacco, among others.      DRUG ALLERGIES:  Multiple and reviewed.      CURRENT MEDICATIONS:  Please refer to encounter form.      PHYSICAL EXAMINATION:  On today's visit, she presents as a pleasant female in no apparent distress with a height of 5 feet 9 inches and a weight of 215 pounds.  Denies to have any constitutional symptoms.      On today's visit, she presents with full range of motion of the right ankle, hindfoot and midfoot joints.  CMS intact.  Skin intact.  Presents with a first MTP joint with a gross motion of approximately 20 degrees of combined plantar and dorsiflexion.  There is some diffuse swelling.  There is some pain dorsally.  Alignment is excellent.      Otherwise, forefoot exam is unremarkable.      IMAGING:   Plain x-rays of the right foot were reviewed today which were significant for showing bone-on-bone changes for the first MTP joint without any residual cartilage.  Alignment is excellent.  Presented with good constitution of her bones as well.  There are no acute findings.      ASSESSMENT:  Right first MTP joint arthritis.      PLAN:  I discussed with the patient that at this point I recommend her to undergo a right first MTP joint arthrodesis.  I discussed with her the most likely postoperative course and complications which include but are not limited to infection, bleeding, nerve damage, residual pain, nonunion and stiffness.      In the meantime, she has no activity restrictions.  All questions were answered.  The patient will follow up accordingly.  We will schedule surgery at the best of her convenience, hopefully before the lockdown for the second surge of the pandemic.      On today's visit, she was prescribed Voltaren gel to control the symptoms until the surgical date.      TT 30 minutes, CT 20 minutes.       Conor Guillen MD

## 2020-11-10 NOTE — PROGRESS NOTES
CHIEF COMPLAINT:  Right foot pain.      HISTORY OF PRESENT ILLNESS:  Ms. Nieves is a 50-year-old female who presents today for evaluation of her right foot.  The patient reports to have a long history of pain and discomfort for a long period of time.  The patient reports to have had a cheilectomy approximately 2-3 years ago, and since then, she reports to have had problems constantly.  Reports to have pain on a daily basis.  Reports to struggle on a daily basis as well.  She denies to have any regular physical activities secondary to pain along the right foot.      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      She has tried some injections in the past without any significant success as well as the use of some insert.        Presents today for discussion of treatment options.      PAST MEDICAL HISTORY:  Significant for alcohol abuse, anxiety, depression, gastric reflux, tobacco, among others.      DRUG ALLERGIES:  Multiple and reviewed.      CURRENT MEDICATIONS:  Please refer to encounter form.      PHYSICAL EXAMINATION:  On today's visit, she presents as a pleasant female in no apparent distress with a height of 5 feet 9 inches and a weight of 215 pounds.  Denies to have any constitutional symptoms.      On today's visit, she presents with full range of motion of the right ankle, hindfoot and midfoot joints.  CMS intact.  Skin intact.  Presents with a first MTP joint with a gross motion of approximately 20 degrees of combined plantar and dorsiflexion.  There is some diffuse swelling.  There is some pain dorsally.  Alignment is excellent.      Otherwise, forefoot exam is unremarkable.      IMAGING:  Plain x-rays of the right foot were reviewed today which were significant for showing bone-on-bone changes for the first MTP joint without any residual cartilage.  Alignment is excellent.  Presented with good constitution of her bones as well.  There are no acute findings.       ASSESSMENT:  Right first MTP joint arthritis.      PLAN:  I discussed with the patient that at this point I recommend her to undergo a right first MTP joint arthrodesis.  I discussed with her the most likely postoperative course and complications which include but are not limited to infection, bleeding, nerve damage, residual pain, nonunion and stiffness.      In the meantime, she has no activity restrictions.  All questions were answered.  The patient will follow up accordingly.  We will schedule surgery at the best of her convenience, hopefully before the lockdown for the second surge of the pandemic.      On today's visit, she was prescribed Voltaren gel to control the symptoms until the surgical date.      TT 30 minutes, CT 20 minutes.

## 2020-11-10 NOTE — NURSING NOTE
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 No  Comments:      Patient demonstrates understanding of the following:  Reason for the appointment, diagnosis and treatment plan: Yes  Knowledge of proper use of medications and conditions for which they are ordered (with special attention to potential side effects or drug interactions): Yes  Which situations necessitate calling provider and whom to contact: Yes       Teaching Concerns Addressed:   Comments:      Proper use and care of ortho boot (medical equip, care aids, etc.): Yes  Nutritional needs and diet plan: Yes  Pain management techniques: Yes  Wound Care: Yes  How and/when to access community resources: NA     Instructional Materials Used/Given: preop pkt, TRIA pkt, antiseptic soap     Time spent with patient: 15 minutes.

## 2020-11-16 ENCOUNTER — HEALTH MAINTENANCE LETTER (OUTPATIENT)
Age: 50
End: 2020-11-16

## 2020-11-18 ENCOUNTER — MYC REFILL (OUTPATIENT)
Dept: FAMILY MEDICINE | Facility: CLINIC | Age: 50
End: 2020-11-18

## 2020-11-18 ENCOUNTER — ALLIED HEALTH/NURSE VISIT (OUTPATIENT)
Dept: INTERNAL MEDICINE | Facility: CLINIC | Age: 50
End: 2020-11-18
Payer: COMMERCIAL

## 2020-11-18 ENCOUNTER — MYC MEDICAL ADVICE (OUTPATIENT)
Dept: FAMILY MEDICINE | Facility: CLINIC | Age: 50
End: 2020-11-18

## 2020-11-18 VITALS — HEART RATE: 73 BPM | SYSTOLIC BLOOD PRESSURE: 143 MMHG | DIASTOLIC BLOOD PRESSURE: 91 MMHG

## 2020-11-18 DIAGNOSIS — F51.01 PRIMARY INSOMNIA: ICD-10-CM

## 2020-11-18 DIAGNOSIS — I10 BENIGN ESSENTIAL HYPERTENSION: Primary | ICD-10-CM

## 2020-11-18 PROCEDURE — 99207 PR NO CHARGE NURSE ONLY: CPT

## 2020-11-18 NOTE — PROGRESS NOTES
Chief Complaint   Patient presents with     Blood Pressure Check       Sharmin Nieves comes into clinic today at the request of Randa Bill CNP for AHA 3 BP readings on patient's right upper arm with a regular adult size cuff.    1st BP: 134/91  2nd BP: 147/94  3rd BP: 146/87    AVERAGE AHA 3 BP readin/91  Pulse: 73 bpm    This service provided today was under the direct supervision of Santiago Cabello MD, who was available if needed.       Jarek Barksdale CMA at 10:19 AM on 2020

## 2020-11-19 RX ORDER — TRAZODONE HYDROCHLORIDE 100 MG/1
200 TABLET ORAL AT BEDTIME
Qty: 180 TABLET | Refills: 3 | Status: SHIPPED | OUTPATIENT
Start: 2020-11-19 | End: 2021-10-05

## 2020-11-19 NOTE — TELEPHONE ENCOUNTER
"Requested Prescriptions   Pending Prescriptions Disp Refills     traZODone (DESYREL) 100 MG tablet 180 tablet 3     Sig: Take 2 tablets (200 mg) by mouth At Bedtime       Serotonin Modulators Passed - 11/18/2020  7:16 PM        Passed - Recent (12 mo) or future (30 days) visit within the authorizing provider's specialty     Patient has had an office visit with the authorizing provider or a provider within the authorizing providers department within the previous 12 mos or has a future within next 30 days. See \"Patient Info\" tab in inbasket, or \"Choose Columns\" in Meds & Orders section of the refill encounter.              Passed - Medication is active on med list        Passed - Patient is age 18 or older        Passed - No active pregnancy on record        Passed - No positive pregnancy test in past 12 months         Prescription approved per [facility] Refill Protocol.    Angélica Carreon RN   [facility]   "

## 2020-11-20 ENCOUNTER — VIRTUAL VISIT (OUTPATIENT)
Dept: FAMILY MEDICINE | Facility: CLINIC | Age: 50
End: 2020-11-20
Payer: COMMERCIAL

## 2020-11-20 DIAGNOSIS — I10 BENIGN ESSENTIAL HYPERTENSION: Primary | ICD-10-CM

## 2020-11-20 DIAGNOSIS — F32.1 MODERATE MAJOR DEPRESSION (H): ICD-10-CM

## 2020-11-20 DIAGNOSIS — F41.1 GENERALIZED ANXIETY DISORDER: ICD-10-CM

## 2020-11-20 PROCEDURE — 99214 OFFICE O/P EST MOD 30 MIN: CPT | Mod: 95 | Performed by: NURSE PRACTITIONER

## 2020-11-20 NOTE — PATIENT INSTRUCTIONS
Stop celebrex and recheck blood pressure in two weeks  Work with pharmacist to review entire medication list  If we need to, I would add nifedipine or amlodipine    Get PPSV23 (pneumococcal shot) - should be able to get when you do your blood pressure    Another option is a community testing site through [place] [facility] - [place]://[place]/for[place]/if-sick/testing-locations/community-testing.jsp.  They have both nasal swab and saliva testing which have similar accuracy (commercial rapid tests are only about 50% accurate and a waste of your time and money).  You can schedule these tests and you can also walk up AND order a saliva test to be delivered to your home.  My experience with this saliva testing walk up was that the line appeared very, very long, but took only about 30 minutes.  You will register at [email or web address] and can do this ahead of time or in line.  They report results in 24-48 hours (our were 18 hours).    The at-home test arrives at your home about 1 day after you request it at [email or web address]://[email or web address] and is generally resulted 24-48 hours after you drop it in the mail.

## 2020-11-20 NOTE — PROGRESS NOTES
"Sharmin Nieves is a 50 year old female who is being evaluated via a billable video visit.      The patient has been notified of following:     \"This video visit will be conducted via a call between you and your physician/provider. We have found that certain health care needs can be provided without the need for an in-person physical exam.  This service lets us provide the care you need with a video conversation.  If a prescription is necessary we can send it directly to your pharmacy.  If lab work is needed we can place an order for that and you can then stop by our lab to have the test done at a later time.    Video visits are billed at different rates depending on your insurance coverage.  Please reach out to your insurance provider with any questions.    If during the course of the call the physician/provider feels a video visit is not appropriate, you will not be charged for this service.\"    Patient has given verbal consent for Video visit? Yes  How would you like to obtain your AVS? MyChart  If you are dropped from the video visit, the video invite should be resent to: Text to cell phone: [phone]  Will anyone else be joining your video visit? No    Subjective     Sharmin Nieves is a 50 year old female who presents today via video visit for the following health issues:    HPI     Hypertension Follow-up      Do you check your blood pressure regularly outside of the clinic? Yes     Are you following a low salt diet? No    Are your blood pressures ever more than 140 on the top number (systolic) OR more   than 90 on the bottom number (diastolic), for example 140/90? Yes    Taking celebrex everyday.  Takes for back, but also mostly her foot.  Podiatry recommended surgery and voltaren gel which she has been using and seems to help.  Work has been too busy to be able to have time off for surgery and healing.      Lisinopril caused cough    Was doing ok with mental health.  Oldest son needed to move back in " which has been stressful, but is temporary.  Otherwise stress associated with covid.    BP Readings from Last 6 Encounters:   11/18/20 (!) 143/91   12/19/19 122/86   09/27/19 (!) 164/94   08/16/19 132/82   04/19/19 130/78   02/22/19 136/88         How many servings of fruits and vegetables do you eat daily?  2-3    On average, how many sweetened beverages do you drink each day (Examples: soda, juice, sweet tea, etc.  Do NOT count diet or artificially sweetened beverages)?   1    How many days per week do you exercise enough to make your heart beat faster? 3 or less    How many minutes a day do you exercise enough to make your heart beat faster? 9 or less    How many days per week do you miss taking your medication? 0    Video Start Time: 1:37 PM    Flu shot at work Oct 2020    I have reviewed and updated the patient's Past Medical History, Social History, Family History and Medication List    Review of Systems   Constitutional, HEENT, cardiovascular, pulmonary, gi and gu systems are negative, except as otherwise noted.      Objective           Vitals:  No vitals were obtained today due to virtual visit.    Physical Exam     GENERAL: Healthy, alert and no distress  EYES: Eyes grossly normal to inspection.  No discharge or erythema, or obvious scleral/conjunctival abnormalities.  RESP: No audible wheeze, cough, or visible cyanosis.  No visible retractions or increased work of breathing.    SKIN: Visible skin clear. No significant rash, abnormal pigmentation or lesions.  NEURO: Cranial nerves grossly intact.  Mentation and speech appropriate for age.  PSYCH: Mentation appears normal, affect normal/bright, judgement and insight intact, normal speech and appearance well-groomed.            Assessment & Plan     (I10) Benign essential hypertension  (primary encounter diagnosis)  Comment:   Plan: MED THERAPY MANAGE REFERRAL        Concern for possibility that celebrex is exacerbating blood pressure and is a risk factor for  thrombotic event.  Blood pressures not controlled, but not wildly high either.  I would like to trial off the celebrex first rather than add another HTN medication.  Referred to MTM also to evaluate entire med list for any causes.  Patient feels ok about discontinuing and using voltaren for her foot.  Surgery not likely anytime soon.  Recheck BPs in two weeks and let me know results.  Also get PPSV 23 at the clinic BP check.  Stop smoking as able.    (F41.1) Generalized anxiety disorder  Comment:   Plan: Increased stress, but stable    (F32.1) Moderate major depression (H)  Comment:   Plan:             Patient Instructions   Stop celebrex and recheck blood pressure in two weeks  Work with pharmacist to review entire medication list  If we need to, I would add nifedipine or amlodipine    Get PPSV23 (pneumococcal shot)    Another option is a community testing site through [facility] - [place]://[place]/if-[place]ick/testing-locations/community-testing.jsp.  They have both nasal swab and saliva testing which have similar accuracy (commercial rapid tests are only about 50% accurate and a waste of your time and money).  You can schedule these tests and you can also walk up AND order a saliva test to be delivered to your home.  My experience with this saliva testing walk up was that the line appeared very, very long, but took only about 30 minutes.  You will register at [email or web address] and can do this ahead of time or in line.  They report results in 24-48 hours (our were 18 hours).    The at-home test arrives at your home about 1 day after you request it at https://learn.[employer]/jbzng-wq-xeezzbzjj/ and is generally resulted 24-48 hours after you drop it in the mail.          Return in about 2 weeks (around 12/4/2020) for BP Recheck.    LEDY Seaman [facility] [place]      Video-Visit Details    Type of service:  Video  Visit    Video End Time:1:55 PM    Originating Location (pt. Location): Home    Distant Location (provider location):  [facility] [place]     Platform used for Video Visit: Charlie

## 2020-12-01 ENCOUNTER — VIRTUAL VISIT (OUTPATIENT)
Dept: PHARMACY | Facility: CLINIC | Age: 50
End: 2020-12-01
Payer: COMMERCIAL

## 2020-12-01 DIAGNOSIS — G89.29 CHRONIC BILATERAL LOW BACK PAIN WITHOUT SCIATICA: ICD-10-CM

## 2020-12-01 DIAGNOSIS — F41.8 SITUATIONAL ANXIETY: ICD-10-CM

## 2020-12-01 DIAGNOSIS — I10 BENIGN ESSENTIAL HYPERTENSION: Primary | ICD-10-CM

## 2020-12-01 DIAGNOSIS — F33.9 EPISODE OF RECURRENT MAJOR DEPRESSIVE DISORDER, UNSPECIFIED DEPRESSION EPISODE SEVERITY (H): ICD-10-CM

## 2020-12-01 DIAGNOSIS — F17.200 TOBACCO USE DISORDER: ICD-10-CM

## 2020-12-01 DIAGNOSIS — M54.50 CHRONIC BILATERAL LOW BACK PAIN WITHOUT SCIATICA: ICD-10-CM

## 2020-12-01 PROCEDURE — 99607 MTMS BY PHARM ADDL 15 MIN: CPT | Mod: TEL | Performed by: PHARMACIST

## 2020-12-01 PROCEDURE — 99606 MTMS BY PHARM EST 15 MIN: CPT | Mod: TEL | Performed by: PHARMACIST

## 2020-12-01 NOTE — Clinical Note
FYI- I put the information about nsaids and BP in my assessment, not much concrete about celecoxib and risk is pretty low but she's doing great without it for now.

## 2020-12-01 NOTE — PROGRESS NOTES
"Sharmin Nieves is a 50 year old female who is being evaluated via a billable telephone visit.      The patient has been notified of following:     \"This telephone visit will be conducted via a call between you and your physician/provider. We have found that certain health care needs can be provided without the need for a physical exam.  This service lets us provide the care you need with a short phone conversation.  If a prescription is necessary we can send it directly to your pharmacy.  If lab work is needed we can place an order for that and you can then stop by our lab to have the test done at a later time.    Telephone visits are billed at different rates depending on your insurance coverage. During this emergency period, for some insurers they may be billed the same as an in-person visit.  Please reach out to your insurance provider with any questions.    If during the course of the call the physician/provider feels a telephone visit is not appropriate, you will not be charged for this service.\"    Patient has given verbal consent for Telephone visit?  Yes    What phone number would you like to be contacted at? [phone]     How would you like to obtain your AVS? Danielt    MT ENCOUNTER  SUBJECTIVE/OBJECTIVE:                           Sharmin Nieves is a 50 year old female called for an initial visit. She was referred to me from Randa Bill.  She had met with psychiatry MTM 6/2020.    Reason for visit: hypertension.    Allergies/ADRs: Reviewed in chart  Tobacco: She reports that she has been smoking cigarettes. She has a 11.25 pack-year smoking history. She has never used smokeless tobacco. 1/2 ppd.   Tobacco Cessation Action Plan:   Information offered: Patient not interested at this time   Alcohol: 10 or more beverages /week - 3 drinks per day.  Caffeine: 1 cups/day of coffee  Activity: used to walk daily for exercise, not lately.  Past Medical History: Reviewed in chart    Medication " "Adherence/Access: no issues reported  Patient uses pill box(es).    Back and foot pain: currently taking PRN acetaminophen and using diclofenac gel on her foot. Holding celecoxib since visit with PCP on 11/20.  Foot pain has been well controlled with diclofenac.  Back pain has been manageable, doesn't feel a need to restart scheduled celecoxib at this time.     Hypertension: Current medications include losartan 100mg daily.  Patient has blood pressure monitored by clinic near work at [facility], last BP entered was from this clinic.  Patient reports no current medication side effects.    BP Readings from Last 3 Encounters:   11/18/20 (!) 143/91   12/19/19 122/86   09/27/19 (!) 164/94     Depression/anxiety:  Current medications include: Venlafaxine 150mg once daily and lamotrigine 100mg daily, trazodone 200mg daily.  She has propranolol 20mg TID PRN and lorazepam 1mg PRN but rarely uses either one.   Pt reports that depression symptoms continue to be problematic. Current depression symptoms include: lack on interest, lack of motivation, isolating. \"no pleasure in life\". Panic attacks every 1-2 weeks, which has diminished since restarting antidepressants. Patient reports the following stressors: Covid-19 pandemic and working from home. Therapies tried and response: see MTM note dated 6/23/20.  PHQ-9 SCORE 5/15/2019 12/19/2019 6/17/2020   PHQ-9 Total Score - - -   PHQ-9 Total Score MyChart 6 (Mild depression) - -   PHQ-9 Total Score 6 16 19   Some encounter information is confidential and restricted. Go to Review Flowsheets activity to see all data.   No current therapist.    Smoking Cessation:  How much does patient smoke:  1/2 ppd  Tried quitting in the past: Yes.    What worked/didn t work in the past: NRT, has nicotine gum. Tried varenicline which was helpful but made her nauseous.    Not interested in quitting smoking at this time.      Today's Vitals: There were no vitals taken for this visit.      ASSESSMENT:          "                   Medication Adherence: No issues identified    Back and foot pain: stable off celecoxib. Will continue acetaminophen and topical diclofenac.   Use of PRN celecoxib or scheduled doses 200mg/day likely has a very small or no effect on her blood pressure control.   NSAIDs generally implicated in worsening hypertension alone or by reducing efficacy of ACE-inhibitor are ibuprofen, indomethacin and piroxicam.   Studies have shown an unclear association between elevated blood pressure and use of CABRERA-2 inhibitors.  Note NSAIDs are not thought to inhibit the effect of CCB, which could be explored as an alternative or added antihypertensive for her.     Hypertension: Patient is not meeting blood pressure goal of < 140/90mmHg. Patient would benefit from the following changes - start home blood pressure monitoring.    Depression/anxiety: She may benefit from increasing frequency of propranolol, will trial x2 weeks. Possible additional benefit from further increasing dose of lamotrigine, no dose change today and will monitor. She did not find additional benefit from pushing dose of venlafaxine up to higher doses for norepinephine effect, also concern for increasing BP at higher doses - will hold at current dose.    Smoking cessation: discussed options, recommend Chantix at lower dose to reduce nausea when she is ready to try quitting again.       PLAN:                            1. Purchase otc BP cuff and check readings at home daily x2 weeks  2. Schedule propranolol 20mg daily in PM x7 days, then increase to twice a day if ineffective  3. Consider reduction in alcohol to 1 drink/day or less    I spent 30 minutes with this patient today. All changes were made via collaborative practice agreement with Randa Bill. A copy of the visit note was provided to the patient's primary care provider.    Will follow up in 2-4 weeks via [employer].    The patient was sent via [employer] a summary of these recommendations.      Jaky Nelson, PharmD, [facility]     Patient consented to a telehealth visit: yes  Telemedicine Visit Details  Type of service:  Telephone visit  Start Time: 3:08 PM  End Time: 3:35  Originating Location (patient location): Home  Distant Location (provider location):  [facility] PRIMARY [facility]ARE [facility]  Mode of Communication:  Telephone

## 2020-12-03 NOTE — PATIENT INSTRUCTIONS
Recommendations from today's MTM visit:                                                    MTM (medication therapy management) is a service provided by a clinical pharmacist designed to help you get the most of out of your medicines.   Today we reviewed what your medicines are for, how to know if they are working, that your medicines are safe and how to make your medicine regimen as easy as possible.     1. It would be very helpful to see how your blood pressure are running at home - please purchase a blood pressure cuff and check your readings at home for a couple weeks. I'd love to take a look at your readings to see how well the losartan is working.     2. For anxiety, try taking propranolol on a more regular basis. Take once a day at night for a week, then if you don't notice anything or it could work better, add a dose in the morning and take 20mg in the morning and 20mg at night.    3. I took a look at studies about celecoxib and blood pressure. I think it's great if you can take it less frequently, but using celecoxib at the dose you have is not likely to contribute to higher blood pressure. I can however increase the risk of your kidneys becoming upset from taking losartan and celecoxib together. I'd like you to try continuing to use acetaminophen and diclofenac gel for pain management, using celecoxib sparingly for pain flares.     4. Recommendations for alcohol for women are no more than 1 drink per day, consider cutting back on alcohol.     It was great to speak with you today.  I value your experience and would be very thankful for your time with providing feedback on our clinic survey. You may receive a survey via email or text message in the next few days.     Next MTM visit: I will reach out in 2 week on MyChart to check on your mood and blood pressures.    To schedule another MTM appointment, please call the clinic directly or you may call the MTM scheduling line at [phone] or toll-free at  [phone].     My Clinical Pharmacist's contact information:                                                      It was a pleasure talking with you today!  Please feel free to contact me with any questions or concerns you have.      Jaky Nelson, PharmD, [facility]   Medication Management Pharmacist   [facility]  [phone]

## 2020-12-04 DIAGNOSIS — F41.8 SITUATIONAL ANXIETY: ICD-10-CM

## 2020-12-06 RX ORDER — PROPRANOLOL HYDROCHLORIDE 20 MG/1
20 TABLET ORAL 3 TIMES DAILY PRN
Qty: 90 TABLET | Refills: 0 | Status: SHIPPED | OUTPATIENT
Start: 2020-12-06 | End: 2021-01-13

## 2020-12-06 NOTE — TELEPHONE ENCOUNTER
"Requested Prescriptions   Pending Prescriptions Disp Refills     propranolol (INDERAL) 20 MG tablet 90 tablet 0     Sig: Take 1 tablet (20 mg) by mouth 3 times daily as needed (anxiety)       Beta-Blockers Protocol Failed - 12/6/2020  1:48 PM        Failed - Blood pressure under 140/90 in past 12 months     BP Readings from Last 3 Encounters:   11/18/20 (!) 143/91   12/19/19 122/86   09/27/19 (!) 164/94                 Passed - Patient is age 6 or older        Passed - Recent (12 mo) or future (30 days) visit within the authorizing provider's specialty     Patient has had an office visit with the authorizing provider or a provider within the authorizing providers department within the previous 12 mos or has a future within next 30 days. See \"Patient Info\" tab in inbasket, or \"Choose Columns\" in Meds & Orders section of the refill encounter.              Passed - Medication is active on med list           Routing refill request to provider for review/approval because:  BP        "

## 2020-12-09 ENCOUNTER — ALLIED HEALTH/NURSE VISIT (OUTPATIENT)
Dept: INTERNAL MEDICINE | Facility: CLINIC | Age: 50
End: 2020-12-09
Payer: COMMERCIAL

## 2020-12-09 VITALS — SYSTOLIC BLOOD PRESSURE: 142 MMHG | DIASTOLIC BLOOD PRESSURE: 92 MMHG

## 2020-12-09 DIAGNOSIS — Z23 NEED FOR VACCINATION: Primary | ICD-10-CM

## 2020-12-09 PROCEDURE — 90732 PPSV23 VACC 2 YRS+ SUBQ/IM: CPT

## 2020-12-09 PROCEDURE — 99207 PR NO CHARGE INJECTABLE MED/DRUG: CPT

## 2020-12-09 PROCEDURE — 90471 IMMUNIZATION ADMIN: CPT

## 2020-12-09 NOTE — PROGRESS NOTES
Sharmin Nieves comes into clinic today at the request of Dr. Bill Ordering Provider for Med Injection only pneumo 23 vaccine . And BP check.    The triple AHA protocol was used with readings of 143/90, 137/92, 146/94, and an average of 142/92.    This service provided today was under the supervising provider of the day Dr. Cabello, who was available if needed.    Preethi Reeder, EMT

## 2020-12-09 NOTE — NURSING NOTE
Chief Complaint   Patient presents with     Imm/Inj     Pt here for Pneumo 23 and BP check per PCP Dr. Bill     Sharmin Nieves comes into clinic today at the request of Dr. Bill and Dr. Cabello. Ordering Provider for Pneumo 23 vaccination and BP check    Pt received Pneumo 23 vaccination and tolerated well.      This service provided today was under the supervising provider of the day Dr. Cabello, who was available if needed.    Kimberly H. Nissen

## 2020-12-11 ENCOUNTER — E-VISIT (OUTPATIENT)
Dept: PEDIATRICS | Facility: CLINIC | Age: 50
End: 2020-12-11
Payer: COMMERCIAL

## 2020-12-11 DIAGNOSIS — Z20.822 SUSPECTED COVID-19 VIRUS INFECTION: Primary | ICD-10-CM

## 2020-12-11 PROCEDURE — 99421 OL DIG E/M SVC 5-10 MIN: CPT | Performed by: PHYSICIAN ASSISTANT

## 2020-12-11 NOTE — PATIENT INSTRUCTIONS
Dear Sharmin Nieves,    Your symptoms show that you may have coronavirus (COVID-19). This illness can cause fever, cough and trouble breathing. Many people get a mild case and get better on their own. Some people can get very sick.    Will I be tested for COVID-19?  We would like to test you for Covid-19 virus. I have placed orders for this test.     For all employees or close contacts (except [employer] and [employer] - see below), go to your [facility] home page and scroll down to the section that says  You have an appointment that needs to be scheduled  and click the large green button that says  Schedule Now  and follow the steps to find the next available opening.     If you are unable to complete these steps or if you cannot find any available times, please call [phone] to schedule employee testing.       [employer] employees or close contacts, please call [phone].   [place] ([employer]) employees or close contacts call [phone].    When it's time for your COVID test:  Stay at least 6 feet away from others. (If someone will drive you to your test, stay in the backseat, as far away from the  as you can.)  Cover your mouth and nose with a mask, tissue or washcloth.  Go straight to the testing site. Don't make any stops on the way there or back.    Starting now:     Do not go to work.   o If you receive a negative COVID-19 test result and were NOT exposed to someone with a known positive COVID-19 test, you can return to work once you're free of fever for 24 hours without fever-reducing medication and your symptoms are improving or resolved.  o If you receive a positive COVID-19 test result, you must be cleared by Employee Occupational Health and Safety to return to work.   o If you were exposed to someone who has tested positive for COVID-19, you can return to work 14 days after your last contact with the positive individual, provided you do not have symptoms at all during that time. In some cases,  "your manager may ask you to come back sooner than 14 days.     During this time, don't leave the house except for testing or medical care.  o Stay in your own room, even for meals. Use your own bathroom if you can.  o Stay away from others in your home. No hugging, kissing or shaking hands. No visitors.  o Don't go to work, school or anywhere else.    Clean \"high touch\" surfaces often (doorknobs, counters, handles, etc.). Use a household cleaning spray or wipes. You'll find a full list of  on the EPA website: www.epa.gov/pesticide-registration/list-n-disinfectants-use-against-sars-cov-2.    Cover your mouth and nose with a mask, tissue or washcloth to avoid spreading germs.    Wash your hands and face often. Use soap and water.    People in these groups are at risk for severe illness due to COVID-19:  o People 65 years and older  o People who live in a nursing home or long-term care facility  o People with chronic disease (lung, heart, cancer, diabetes, kidney, liver, immunologic)  o People who have a weakened immune system, including those who:  - Are in cancer treatment  - Take medicine that weakens the immune system, such as corticosteroids  - Had a bone marrow or organ transplant  - Have an immune deficiency  - Have poorly controlled HIV or AIDS  - Are obese (body mass index of 40 or higher)  - Smoke regularly      Caregivers should wear gloves while washing dishes, handling laundry and cleaning bedrooms and bathrooms.    Use caution when washing and drying laundry: Don't shake dirty laundry, and use the warmest water setting that you can.    For more tips, go to www.cdc.gov/coronavirus/2019-ncov/downloads/10Things.pdf.    Sign up for [employer]works. We know it's scary to hear that you might have COVID-19. We want to track your symptoms to make sure you're okay over the next 2 weeks. Please look for an email from [employer]works--this is a free, online program that we'll use to keep in touch. To sign up, " follow the link in the email you will receive. Learn more at [email or web address]    How can I take care of myself?    Get lots of rest. Drink extra fluids (unless a doctor has told you not to)    Take Tylenol (acetaminophen) for fever or pain. If you have liver or kidney problems, ask your family doctor if it's okay to take Tylenol.  Adults can take either:    650 mg (two 325 mg pills) every 4 to 6 hours, or     1,000 mg (two 500 mg pills) every 8 hours as needed.    Note: Don't take more than 3,000 mg in one day. Acetaminophen is found in many medicines (both prescribed and over-the-counter medicines). Read all labels to be sure you don't take too much.  For children, check the Tylenol bottle for the right dose. The dose is based on the child's age or weight.    If you have other health problems (like cancer, heart failure, an organ transplant or severe kidney disease): Call your specialty clinic if you don't feel better in the next 2 days.    Know when to call 911. Emergency warning signs include:  Trouble breathing or shortness of breath  Pain or pressure in the chest that doesn't go away  Feeling confused like you haven't felt before, or not being able to wake up  Bluish-colored lips or face    Where can I get more information?    [facility] - About COVID-19: [email or web address]  CDC - What to Do If You're Sick: www.cdc.gov/coronavirus/2019-ncov/about/steps-when-sick.html

## 2020-12-12 DIAGNOSIS — Z20.822 SUSPECTED COVID-19 VIRUS INFECTION: ICD-10-CM

## 2020-12-12 PROCEDURE — U0003 INFECTIOUS AGENT DETECTION BY NUCLEIC ACID (DNA OR RNA); SEVERE ACUTE RESPIRATORY SYNDROME CORONAVIRUS 2 (SARS-COV-2) (CORONAVIRUS DISEASE [COVID-19]), AMPLIFIED PROBE TECHNIQUE, MAKING USE OF HIGH THROUGHPUT TECHNOLOGIES AS DESCRIBED BY CMS-2020-01-R: HCPCS | Performed by: PHYSICIAN ASSISTANT

## 2020-12-13 LAB
SARS-COV-2 RNA SPEC QL NAA+PROBE: NOT DETECTED
SPECIMEN SOURCE: NORMAL

## 2020-12-14 ENCOUNTER — E-VISIT (OUTPATIENT)
Dept: URGENT CARE | Facility: URGENT CARE | Age: 50
End: 2020-12-14
Payer: COMMERCIAL

## 2020-12-14 DIAGNOSIS — J02.9 SORE THROAT: ICD-10-CM

## 2020-12-14 DIAGNOSIS — Z20.822 SUSPECTED COVID-19 VIRUS INFECTION: ICD-10-CM

## 2020-12-14 PROCEDURE — 99421 OL DIG E/M SVC 5-10 MIN: CPT | Performed by: PHYSICIAN ASSISTANT

## 2020-12-14 NOTE — PATIENT INSTRUCTIONS
Dear Sharmin Nieves,    Your symptoms show that you may have coronavirus (COVID-19). This illness can cause fever, cough and trouble breathing. Many people get a mild case and get better on their own. Some people can get very sick.    Because you also reported sore throat I would like to also test you for Strep Throat to determine if we need to treat you for that as well.    What should I do?  We would like to test you for Covid-19 virus and Strep Throat. I have placed orders for these tests.     For all employees or close contacts (except [employer] and [employer] - see below), go to your [employer] home page and scroll down to the section that says  You have an appointment that needs to be scheduled  and click the large green button that says  Schedule Now  and follow the steps to find the next available opening. PLEASE Mention when asked what you are scheduling for that you need BOTH Covid and Strep tests.   If you are unable to complete these steps or if you cannot find any available times, please call [phone] to schedule employee testing.       [employer] employees or close contacts, please call [phone].   [place] ([employer]) employees or close contacts call [phone].      When it's time for your COVID and Strep test:  Stay at least 6 feet away from others. (If someone will drive you to your test, stay in the backseat, as far away from the  as you can.)  Cover your mouth and nose with a mask, tissue or washcloth.  Go straight to the testing site. Don't make any stops on the way there or back.    Starting now:     Do not go to work.   o If you receive a negative COVID-19 test result and were NOT exposed to someone with a known positive COVID-19 test, you can return to work once you're free of fever for 24 hours without fever-reducing medication and your symptoms are improving or resolved.  o If you receive a positive COVID-19 test result, you must be cleared by Employee Occupational Health and  "Safety to return to work.   o If you were exposed to someone who has tested positive for COVID-19, you can return to work 14 days after your last contact with the positive individual, provided you do not have symptoms at all during that time. In some cases, your manager may ask you to come back sooner than 14 days.     During this time, don't leave the house except for testing or medical care.  o Stay in your own room, even for meals. Use your own bathroom if you can.  o Stay away from others in your home. No hugging, kissing or shaking hands. No visitors.  o Don't go to work, school or anywhere else.    Clean \"high touch\" surfaces often (doorknobs, counters, handles, etc.). Use a household cleaning spray or wipes. You'll find a full list of  on the EPA website: www.epa.gov/pesticide-registration/list-n-disinfectants-use-against-sars-cov-2.    Cover your mouth and nose with a mask, tissue or washcloth to avoid spreading germs.    Wash your hands and face often. Use soap and water.    People in these groups are at risk for severe illness due to COVID-19:  o People 65 years and older  o People who live in a nursing home or long-term care facility  o People with chronic disease (lung, heart, cancer, diabetes, kidney, liver, immunologic)  o People who have a weakened immune system, including those who:  - Are in cancer treatment  - Take medicine that weakens the immune system, such as corticosteroids  - Had a bone marrow or organ transplant  - Have an immune deficiency  - Have poorly controlled HIV or AIDS  - Are obese (body mass index of 40 or higher)  - Smoke regularly      Caregivers should wear gloves while washing dishes, handling laundry and cleaning bedrooms and bathrooms.    Use caution when washing and drying laundry: Don't shake dirty laundry, and use the warmest water setting that you can.    For more tips, go to www.cdc.gov/coronavirus/2019-ncov/downloads/10Things.pdf.    Sign up for [employer]. We " know it's scary to hear that you might have COVID-19. We want to track your symptoms to make sure you're okay over the next 2 weeks. Please look for an email from [employer]--this is a free, online program that we'll use to keep in touch. To sign up, follow the link in the email you will receive. Learn more at [email or web address]    How can I take care of myself?    Get lots of rest. Drink extra fluids (unless a doctor has told you not to)    Take Tylenol (acetaminophen) for fever or pain. If you have liver or kidney problems, ask your family doctor if it's okay to take Tylenol.  Adults can take either:    650 mg (two 325 mg pills) every 4 to 6 hours, or     1,000 mg (two 500 mg pills) every 8 hours as needed.    Note: Don't take more than 3,000 mg in one day. Acetaminophen is found in many medicines (both prescribed and over-the-counter medicines). Read all labels to be sure you don't take too much.  For children, check the Tylenol bottle for the right dose. The dose is based on the child's age or weight.    If you have other health problems (like cancer, heart failure, an organ transplant or severe kidney disease): Call your specialty clinic if you don't feel better in the next 2 days.    Know when to call 911. Emergency warning signs include:  Trouble breathing or shortness of breath  Pain or pressure in the chest that doesn't go away  Feeling confused like you haven't felt before, or not being able to wake up  Bluish-colored lips or face  Where can I get more information?    [facility] - About COVID-19: [email or web address]    CDC - What to Do If You're Sick: www.cdc.gov/coronavirus/2019-ncov/about/steps-when-sick.html

## 2020-12-15 DIAGNOSIS — Z20.822 SUSPECTED COVID-19 VIRUS INFECTION: ICD-10-CM

## 2020-12-15 LAB
DEPRECATED S PYO AG THROAT QL EIA: NEGATIVE
SARS-COV-2 RNA SPEC QL NAA+PROBE: NORMAL
SPECIMEN SOURCE: NORMAL
STREP GROUP A PCR: NOT DETECTED

## 2020-12-15 PROCEDURE — 87651 STREP A DNA AMP PROBE: CPT | Performed by: PHYSICIAN ASSISTANT

## 2020-12-15 PROCEDURE — U0003 INFECTIOUS AGENT DETECTION BY NUCLEIC ACID (DNA OR RNA); SEVERE ACUTE RESPIRATORY SYNDROME CORONAVIRUS 2 (SARS-COV-2) (CORONAVIRUS DISEASE [COVID-19]), AMPLIFIED PROBE TECHNIQUE, MAKING USE OF HIGH THROUGHPUT TECHNOLOGIES AS DESCRIBED BY CMS-2020-01-R: HCPCS | Performed by: PHYSICIAN ASSISTANT

## 2020-12-15 PROCEDURE — 99N1174 PR STATISTIC STREP A RAPID: Performed by: PHYSICIAN ASSISTANT

## 2020-12-16 LAB
LABORATORY COMMENT REPORT: NORMAL
SARS-COV-2 RNA SPEC QL NAA+PROBE: NEGATIVE
SPECIMEN SOURCE: NORMAL

## 2020-12-17 ENCOUNTER — MYC MEDICAL ADVICE (OUTPATIENT)
Dept: FAMILY MEDICINE | Facility: CLINIC | Age: 50
End: 2020-12-17

## 2020-12-17 DIAGNOSIS — Z20.822 SUSPECTED COVID-19 VIRUS INFECTION: Primary | ICD-10-CM

## 2020-12-17 NOTE — TELEPHONE ENCOUNTER
Mariya    See pt [facility] message    Lab cued    Sapna Bowie, RN   [facility]

## 2021-01-04 DIAGNOSIS — Z20.822 SUSPECTED COVID-19 VIRUS INFECTION: ICD-10-CM

## 2021-01-04 PROCEDURE — 36415 COLL VENOUS BLD VENIPUNCTURE: CPT | Performed by: PATHOLOGY

## 2021-01-04 PROCEDURE — 86769 SARS-COV-2 COVID-19 ANTIBODY: CPT | Performed by: PATHOLOGY

## 2021-01-05 LAB
COVID-19 SPIKE RBD ABY TITER: NORMAL
COVID-19 SPIKE RBD ABY: NEGATIVE

## 2021-01-11 DIAGNOSIS — F41.8 SITUATIONAL ANXIETY: ICD-10-CM

## 2021-01-13 RX ORDER — PROPRANOLOL HYDROCHLORIDE 20 MG/1
TABLET ORAL
Qty: 90 TABLET | Refills: 0 | Status: SHIPPED | OUTPATIENT
Start: 2021-01-13 | End: 2021-02-19

## 2021-01-13 NOTE — TELEPHONE ENCOUNTER
"Requested Prescriptions   Pending Prescriptions Disp Refills     propranolol (INDERAL) 20 MG tablet [Pharmacy Med Name: PROPRANOLOL 20MG TABLETS] 90 tablet 0     Sig: TAKE 1 TABLET(20 MG) BY MOUTH THREE TIMES DAILY AS NEEDED FOR ANXIETY       Beta-Blockers Protocol Failed - 1/11/2021  3:52 AM        Failed - Blood pressure under 140/90 in past 12 months     BP Readings from Last 3 Encounters:   12/09/20 (!) 142/92   11/18/20 (!) 143/91   12/19/19 122/86                 Passed - Patient is age 6 or older        Passed - Recent (12 mo) or future (30 days) visit within the authorizing provider's specialty     Patient has had an office visit with the authorizing provider or a provider within the authorizing providers department within the previous 12 mos or has a future within next 30 days. See \"Patient Info\" tab in inbasket, or \"Choose Columns\" in Meds & Orders section of the refill encounter.              Passed - Medication is active on med list           Routing refill request to provider for review/approval because:  BP        "

## 2021-02-07 ENCOUNTER — HEALTH MAINTENANCE LETTER (OUTPATIENT)
Age: 51
End: 2021-02-07

## 2021-02-16 ENCOUNTER — TRANSFERRED RECORDS (OUTPATIENT)
Dept: HEALTH INFORMATION MANAGEMENT | Facility: CLINIC | Age: 51
End: 2021-02-16

## 2021-02-16 LAB
CREAT SERPL-MCNC: 0.76 MG/DL (ref 0.57–1.11)
GFR SERPL CREATININE-BSD FRML MDRD: >60 ML/MIN/1.73M2
GLUCOSE SERPL-MCNC: 98 MG/DL (ref 65–100)
POTASSIUM SERPL-SCNC: 3.3 MMOL/L (ref 3.5–5)

## 2021-02-19 ENCOUNTER — TELEPHONE (OUTPATIENT)
Dept: ADDICTION MEDICINE | Facility: CLINIC | Age: 51
End: 2021-02-19

## 2021-02-19 ENCOUNTER — VIRTUAL VISIT (OUTPATIENT)
Dept: FAMILY MEDICINE | Facility: CLINIC | Age: 51
End: 2021-02-19
Payer: COMMERCIAL

## 2021-02-19 ENCOUNTER — MYC MEDICAL ADVICE (OUTPATIENT)
Dept: FAMILY MEDICINE | Facility: CLINIC | Age: 51
End: 2021-02-19

## 2021-02-19 DIAGNOSIS — K22.4 ESOPHAGEAL SPASM: ICD-10-CM

## 2021-02-19 DIAGNOSIS — F41.1 GENERALIZED ANXIETY DISORDER: ICD-10-CM

## 2021-02-19 DIAGNOSIS — F10.288 ALCOHOL DEPENDENCE WITH OTHER ALCOHOL-INDUCED DISORDER (H): Primary | ICD-10-CM

## 2021-02-19 DIAGNOSIS — F32.1 MODERATE MAJOR DEPRESSION (H): ICD-10-CM

## 2021-02-19 DIAGNOSIS — F41.8 SITUATIONAL ANXIETY: ICD-10-CM

## 2021-02-19 DIAGNOSIS — I10 BENIGN ESSENTIAL HYPERTENSION: ICD-10-CM

## 2021-02-19 PROCEDURE — 99495 TRANSJ CARE MGMT MOD F2F 14D: CPT | Mod: GT | Performed by: NURSE PRACTITIONER

## 2021-02-19 RX ORDER — FOLIC ACID 1 MG/1
1 TABLET ORAL DAILY
Qty: 90 TABLET | Refills: 1 | Status: SHIPPED | OUTPATIENT
Start: 2021-02-19 | End: 2022-02-02

## 2021-02-19 RX ORDER — LOSARTAN POTASSIUM 100 MG/1
100 TABLET ORAL DAILY
Qty: 90 TABLET | Refills: 1 | Status: SHIPPED | OUTPATIENT
Start: 2021-02-19 | End: 2021-08-20

## 2021-02-19 RX ORDER — LAMOTRIGINE 100 MG/1
100 TABLET ORAL DAILY
Qty: 90 TABLET | Refills: 1 | Status: SHIPPED | OUTPATIENT
Start: 2021-02-19 | End: 2021-04-23

## 2021-02-19 RX ORDER — VENLAFAXINE HYDROCHLORIDE 150 MG/1
150 CAPSULE, EXTENDED RELEASE ORAL DAILY
Qty: 90 CAPSULE | Refills: 1 | Status: SHIPPED | OUTPATIENT
Start: 2021-02-19 | End: 2021-09-23

## 2021-02-19 RX ORDER — LANOLIN ALCOHOL/MO/W.PET/CERES
100 CREAM (GRAM) TOPICAL DAILY
Qty: 90 TABLET | Refills: 1 | Status: SHIPPED | OUTPATIENT
Start: 2021-02-19 | End: 2021-09-10

## 2021-02-19 RX ORDER — PROPRANOLOL HYDROCHLORIDE 20 MG/1
20 TABLET ORAL 3 TIMES DAILY
Qty: 270 TABLET | Refills: 1 | Status: SHIPPED | OUTPATIENT
Start: 2021-02-19 | End: 2021-08-20

## 2021-02-19 RX ORDER — OMEPRAZOLE 40 MG/1
40 CAPSULE, DELAYED RELEASE ORAL DAILY
Qty: 90 CAPSULE | Refills: 1 | Status: SHIPPED | OUTPATIENT
Start: 2021-02-19 | End: 2021-09-03

## 2021-02-19 NOTE — TELEPHONE ENCOUNTER
Terri with Mariya [place] for appointment today    See [employer] message to pt    Sapna Bowie RN   [facility]

## 2021-02-19 NOTE — PROGRESS NOTES
Sharmin is a 50 year old who is being evaluated via a billable video visit.      How would you like to obtain your AVS? MyChart  If the video visit is dropped, the invitation should be resent by: Text to cell phone: [phone]  Will anyone else be joining your video visit? No    Video Start Time: 4:23 PM    Assessment & Plan     Alcohol dependence with other alcohol-induced disorder (H)  - MENTAL HEALTH REFERRAL  - Adult; Addiction Medicine Provider; Addiction Medicine Evaluation & Treatment; Addiction Medicine Consultation, Evaluation & Treatment [phone]; Alcohol; Medication Assisted Treatment: Alcohol; We will contact you t...  - folic acid (FOLVITE) 1 MG tablet; Take 1 tablet (1 mg) by mouth daily  - thiamine (B-1) 100 MG tablet; Take 1 tablet (100 mg) by mouth daily    Benign essential hypertension  - losartan (COZAAR) 100 MG tablet; Take 1 tablet (100 mg) by mouth daily  - Home Blood Pressure Monitor Order for DME - ONLY FOR DME    Moderate major depression (H)  Worsened stress    Generalized anxiety disorder  Worsened stress  - venlafaxine (EFFEXOR-XR) 150 MG 24 hr capsule; Take 1 capsule (150 mg) by mouth daily  - lamoTRIgine (LAMICTAL) 100 MG tablet; Take 1 tablet (100 mg) by mouth daily    Situational anxiety  - ok to take up to three times a day  - concern about ativan currently in early sobriety  - propranolol (INDERAL) 20 MG tablet; Take 1 tablet (20 mg) by mouth 3 times daily    Esophageal spasm  - omeprazole (PRILOSEC) 40 MG DR capsule; Take 1 capsule (40 mg) by mouth daily      33 minutes spent on the date of the encounter doing chart review, history and exam, documentation and further activities as noted above      Patient Instructions   Schedule with addiction medicine  Can use the antabuse - wash hands instead of hand   Continue B1 and folic acid      Return in about 1 week (around 2/26/2021) for addiction med.    LEDY Seaman [facility]  MAGNOLIA Finney is a 50 year old who presents for the following health issues  accompanied by her self:    [facility] [place]       Hospital Follow-up Visit:    Hospital/Nursing Home/IP Rehab Facility: [facility]  Date of Admission: 2/16/21  Date of Discharge: 2/18/21  Reason(s) for Admission: alcohol abuse      Was your hospitalization related to COVID-19? No   Problems taking medications regularly:  None  Medication changes since discharge: Yes-Added Folic acid and B1 vitamin but pt has not started them yet  Problems adhering to non-medication therapy:  None    Summary of hospitalization:  CareEverywhere information obtained and reviewed  Diagnostic Tests/Treatments reviewed.  Follow up needed: treatment or specialist  Other Healthcare Providers Involved in Patient s Care:         Specialist appointment - addiction medicine and treatment program  Update since discharge: stable. Post Discharge Medication Reconciliation: discharge medications reconciled and changed, per note/orders. Started folic acid 1 mb and Vit B1 100 mg daily  Plan of care communicated with patient          Last weekend daughter had suicide attempt and that is when patient started drinking more and knew she needed help so went to the hospital.  Had two drinks last night (four shots rum) and felt really guilty.  Had bad withdrawal in the hospital and feels like she is having some now - hot and cold alternating, muscles feel tight.  Would like antabuse - has some already.    Isn't currently seeing a therapist.  Propranolol - taking daily in morning.  Doesn't seem to be helping as much anymore.    Got both covid shots - 2nd was about 10 days ago    Review of Systems   Constitutional, HEENT, cardiovascular, pulmonary, gi and gu systems are negative, except as otherwise noted.      Objective           Vitals:  No vitals were obtained today due to virtual visit.    Physical Exam   GENERAL: Healthy, alert and no distress  EYES: Eyes grossly normal to  inspection.  No discharge or erythema, or obvious scleral/conjunctival abnormalities.  RESP: No audible wheeze, cough, or visible cyanosis.  No visible retractions or increased work of breathing.    SKIN: Visible skin clear. No significant rash, abnormal pigmentation or lesions.  NEURO: Cranial nerves grossly intact.  Mentation and speech appropriate for age.  PSYCH: Mentation appears normal, affect normal/bright, judgement and insight intact, normal speech and appearance well-groomed.      Video-Visit Details    Type of service:  Video Visit    Video End Time:4:46 PM      Originating Location (pt. Location): Home    Distant Location (provider location):  [facility]     Platform used for Video Visit: FanM[employer]

## 2021-02-19 NOTE — Clinical Note
Are there any medications that are good for alcohol withdraw symptoms post detox?  She is feeling hot and cold flashes and muscle spasms.  Any ideas would be great.  Thank you!

## 2021-02-19 NOTE — Clinical Note
Not sure which code to use - a transitional care or the time stamp of 33 min.  And not sure if I have the accurate transitional care.  Thanks!  TOMMY Clinton

## 2021-02-19 NOTE — PATIENT INSTRUCTIONS
Schedule with addiction medicine  Can use the antabuse - wash hands instead of hand   Continue B1 and folic acid

## 2021-02-19 NOTE — TELEPHONE ENCOUNTER
"Please schedule appointment for patient with   Addiction Medicine Provider     For alcohol use disorder    Appt type: patient preference      Our scheduling staff will offer the following information:   Please invite our patient to call [place]'s assessment line - [phone]. They can ask for a \"substance use assessment\" or \"rule 25\". This will allow them to discuss possible psychosocial treatment options including individual therapy or any group options including outpatient, intensive outpatient, or residential (aka inpatient) treatment.     Sterling Simons MD    "

## 2021-02-22 ENCOUNTER — TELEPHONE (OUTPATIENT)
Dept: FAMILY MEDICINE | Facility: CLINIC | Age: 51
End: 2021-02-22

## 2021-02-22 NOTE — TELEPHONE ENCOUNTER
Left vm for pt to return call to clinic, also sent a [employer] message to the pt  See provider message    Sapna Bowie RN   [facility]

## 2021-02-22 NOTE — TELEPHONE ENCOUNTER
Please call Sharmin.  I wasn't able to speak with Dr. Kristyn stevens Friday, but I have heard back from him.  When Sharmin and I spoke on Friday she was experiencing withdraw effects.  If she still is, he made some recommendations.  But it may be moot since that was a couple days ago.  How has she been feeling over Saturday and Sunday?  Was she able to take the antabuse?  Has she been able to not drink?  I can make a plan to check in with her if she wants on Tor W evening or Thur-Fri day.  TOMMY Clinton

## 2021-02-23 ENCOUNTER — PATIENT OUTREACH (OUTPATIENT)
Dept: CARE COORDINATION | Facility: CLINIC | Age: 51
End: 2021-02-23

## 2021-02-23 NOTE — PROGRESS NOTES
Clinical Product Navigator RN reviewed chart; patient on payer product coverage.  Review results: Not met any any referral criteria at this time.  Will monitor for future needs    Aspen Akins RN/Clinical Product Navigator

## 2021-04-14 ENCOUNTER — MYC MEDICAL ADVICE (OUTPATIENT)
Dept: FAMILY MEDICINE | Facility: CLINIC | Age: 51
End: 2021-04-14

## 2021-04-14 NOTE — TELEPHONE ENCOUNTER
Mariya    See pt MyChart message    Do you want her to schedule tomorrow you have an oper clinic time    Sapna Bowie RN   [facility]

## 2021-04-15 ENCOUNTER — VIRTUAL VISIT (OUTPATIENT)
Dept: FAMILY MEDICINE | Facility: CLINIC | Age: 51
End: 2021-04-15
Payer: COMMERCIAL

## 2021-04-15 DIAGNOSIS — F17.200 TOBACCO USE DISORDER: ICD-10-CM

## 2021-04-15 DIAGNOSIS — M79.674 PAIN OF RIGHT GREAT TOE: Primary | ICD-10-CM

## 2021-04-15 PROCEDURE — 99214 OFFICE O/P EST MOD 30 MIN: CPT | Mod: 95 | Performed by: NURSE PRACTITIONER

## 2021-04-15 RX ORDER — HYDROCODONE BITARTRATE AND ACETAMINOPHEN 5; 325 MG/1; MG/1
1 TABLET ORAL EVERY 6 HOURS PRN
Qty: 10 TABLET | Refills: 0 | Status: SHIPPED | OUTPATIENT
Start: 2021-04-15 | End: 2021-07-27

## 2021-04-15 NOTE — PROGRESS NOTES
Sharmin is a 50 year old who is being evaluated via a billable telephone visit.      What phone number would you like to be contacted at? [phone]   How would you like to obtain your AVS? MyChart    Assessment & Plan     Pain of right great toe  - strongly encourage decrease in drinking, especially if taking norco and also ahead of surgery.  Patient will think about addiction medicine clinic services  - Uric acid; Future  - HYDROcodone-acetaminophen (NORCO) 5-325 MG tablet; Take 1 tablet by mouth every 6 hours as needed for severe pain    Tobacco use disorder  - varenicline (CHANTIX REESE) 0.5 MG X 11 & 1 MG X 42 tablet; Take 0.5 mg tab daily for 3 days, THEN 0.5 mg tab twice daily for 4 days, THEN 1 mg twice daily.        Return in about 1 day (around 4/16/2021) for mychart update tomorrow.    LEDY Seaman CNP  [facility]    Subjective   Sharmin is a 50 year old who presents for the following health issues  accompanied by her self:    HPI     Musculoskeletal problem/pain-Foot pain-Right  Onset/Duration: Ongoing over 1-2 years -will be scheduling surgery to fuse great toe on right foot; abrupt worsening Monday  Description  Location: foot - right  Joint Swelling: YES - typical for when pain is worse  Redness: YES - typical  Pain: YES 10 /10 with putting weight on foot but sitting its a 4-5/10  Warmth: no  Intensity:  moderate, severe  Progression of Symptoms:  worsening and constant- also has a crunching noise when walking on great toe  Accompanying signs and symptoms:   Fevers/chills: no  Numbness/tingling/weakness: no  History  Trauma to the area: no  Recent illness:  no  Previous similar problem: YES- Prior to first surgery- Had a bone spur shaved off of great toe   Previous evaluation:  YES- Ortho- and will be setting up surgery   Precipitating or alleviating factors:  Aggravating factors include: walking and climbing stairs  Therapies tried and outcome: rest/inactivity and Has  walking boot, is taking Celebrex with little relief, voltaren worked well at the start, but not anymore, gabapentin hasn't helped in the past  Appt with surgeon Tuesday  Would like to have chantix to stop smoking before and after surgery    Has been drinking about a pint a day in the evening   Afraid of disulfram due to alcohol based hand cleanser at work    Review of Systems         Objective           Vitals:  No vitals were obtained today due to virtual visit.    Physical Exam   healthy, alert and no distress  PSYCH: Alert and oriented times 3; coherent speech, normal   rate and volume, able to articulate logical thoughts, able   to abstract reason, no tangential thoughts, no hallucinations   or delusions  Her affect is normal and pleasant  RESP: No cough, no audible wheezing, able to talk in full sentences  Remainder of exam unable to be completed due to telephone visits    Labs pending        Phone call duration: 12 minutes first call  Second call:  11 minutes

## 2021-04-16 ENCOUNTER — MYC MEDICAL ADVICE (OUTPATIENT)
Dept: FAMILY MEDICINE | Facility: CLINIC | Age: 51
End: 2021-04-16

## 2021-04-18 ENCOUNTER — TRANSFERRED RECORDS (OUTPATIENT)
Dept: HEALTH INFORMATION MANAGEMENT | Facility: CLINIC | Age: 51
End: 2021-04-18

## 2021-04-19 ENCOUNTER — MYC MEDICAL ADVICE (OUTPATIENT)
Dept: FAMILY MEDICINE | Facility: CLINIC | Age: 51
End: 2021-04-19

## 2021-04-19 ENCOUNTER — TRANSFERRED RECORDS (OUTPATIENT)
Dept: HEALTH INFORMATION MANAGEMENT | Facility: CLINIC | Age: 51
End: 2021-04-19

## 2021-04-19 NOTE — TELEPHONE ENCOUNTER
Mariya,    See patients my chart message    Thanks  Angélica Carreon, RN   [facility]

## 2021-04-20 ENCOUNTER — MEDICAL CORRESPONDENCE (OUTPATIENT)
Dept: HEALTH INFORMATION MANAGEMENT | Facility: CLINIC | Age: 51
End: 2021-04-20

## 2021-04-20 ENCOUNTER — MYC MEDICAL ADVICE (OUTPATIENT)
Dept: FAMILY MEDICINE | Facility: CLINIC | Age: 51
End: 2021-04-20

## 2021-04-20 NOTE — TELEPHONE ENCOUNTER
Patient has a current referral to addiction medicine.  I routed a message to Dr. Simons last night and he has a 4:20 opening on Friday that he can use for patient.  I do not know who schedules this, but please help get this coordinated.  Patient is not aware of that appt availability.  If she is out of detox and needs physical, addiction or mental health support before Friday, please find out what I can help support and let me know.  VANESSA Bill, TOMMY

## 2021-04-20 NOTE — TELEPHONE ENCOUNTER
Routing to provider - Landy - please review and advise as appropriate    See herbhart - patient reporting inpatient status due to alcohol use - requesting addiction medicine referral

## 2021-04-20 NOTE — TELEPHONE ENCOUNTER
Left msg for Pt to call back to schedule appt with Dr. iSmons.   See notes below from KENNETH Elizondo RN   [facility]

## 2021-04-21 ENCOUNTER — MYC MEDICAL ADVICE (OUTPATIENT)
Dept: FAMILY MEDICINE | Facility: CLINIC | Age: 51
End: 2021-04-21

## 2021-04-21 DIAGNOSIS — F10.10 ALCOHOL ABUSE: ICD-10-CM

## 2021-04-21 DIAGNOSIS — F10.21 ALCOHOL DEPENDENCE IN EARLY FULL REMISSION (H): ICD-10-CM

## 2021-04-21 DIAGNOSIS — F41.1 GENERALIZED ANXIETY DISORDER: Primary | ICD-10-CM

## 2021-04-21 NOTE — TELEPHONE ENCOUNTER
Reception,    Please call patient to schedule with Dr. Simons on Friday. See notes below.    Angélica Carreon, RN   [facility]   Randa Bill APRN CNP  [facility] All Nurse Pool Yesterday (10:47 AM)     Please see Dr. Simons's message and contact patient to get her on his schedule for Friday 420 appt.  If she is out of detox and wants support between now and Friday, please ask what type of support would be most helpful....pbysical symptoms, cravings,  mental health, etc   Thank you,   Mariya    Message text       Sterling Simons MD Simmons, Keri M, APRN CNP Yesterday (7:51 AM)     I could see her Friday, especially since this is more of a focused appt to work on acute needs before surgery AND she is already working with detox.     I will block my last spot of the day at 4:20p. It would be best to have her in person, but I would be willing to see her virtually if she isn't able to make it.    Message text       Randa Bill APRN CNP Hubbell, Alexander, MD 2 days ago     Dae Doyle - what kind of access do you have this week?  I really want to get this patient in with you or [facility] to transition from detox to longer term sobriety support, but I know you guys are short with Naomi leaving and Bhavya out.  She also needs surgery relatively soon.  I'm a little hit or miss online for the next two days, but I will be on Thurs and Fri.  Thanks so much!  TOMMY Clinton    Routing comment

## 2021-04-21 NOTE — TELEPHONE ENCOUNTER
Mariya    See pt MyChart message, you have nothing available on your schedule this week    Sapna Bowie RN   [facility]

## 2021-04-21 NOTE — TELEPHONE ENCOUNTER
Future Appointments   Date Time Provider Department Center   4/22/2021  1:00 PM Sandeep Pereira, [facility]   4/29/2021  9:20 AM Sterling Simons MD [facility]

## 2021-04-22 ENCOUNTER — HOSPITAL ENCOUNTER (OUTPATIENT)
Dept: BEHAVIORAL HEALTH | Facility: CLINIC | Age: 51
Discharge: HOME | End: 2021-04-22
Attending: FAMILY MEDICINE | Admitting: FAMILY MEDICINE
Payer: COMMERCIAL

## 2021-04-22 VITALS — WEIGHT: 210 LBS | BODY MASS INDEX: 30.06 KG/M2 | HEIGHT: 70 IN

## 2021-04-22 PROCEDURE — H0001 ALCOHOL AND/OR DRUG ASSESS: HCPCS | Mod: 95

## 2021-04-22 RX ORDER — DISULFIRAM 250 MG/1
250 TABLET ORAL DAILY
COMMUNITY
End: 2021-04-27

## 2021-04-22 RX ORDER — HYDROXYZINE HYDROCHLORIDE 25 MG/1
25 TABLET, FILM COATED ORAL EVERY 4 HOURS PRN
Qty: 120 TABLET | Refills: 0 | Status: SHIPPED | OUTPATIENT
Start: 2021-04-22 | End: 2021-09-10

## 2021-04-22 ASSESSMENT — COLUMBIA-SUICIDE SEVERITY RATING SCALE - C-SSRS
2. HAVE YOU ACTUALLY HAD ANY THOUGHTS OF KILLING YOURSELF LIFETIME?: NO
2. HAVE YOU ACTUALLY HAD ANY THOUGHTS OF KILLING YOURSELF?: NO
1. IN THE PAST MONTH, HAVE YOU WISHED YOU WERE DEAD OR WISHED YOU COULD GO TO SLEEP AND NOT WAKE UP?: NO
1. IN THE PAST MONTH, HAVE YOU WISHED YOU WERE DEAD OR WISHED YOU COULD GO TO SLEEP AND NOT WAKE UP?: NO

## 2021-04-22 ASSESSMENT — MIFFLIN-ST. JEOR: SCORE: 1662.8

## 2021-04-22 ASSESSMENT — PAIN SCALES - GENERAL: PAINLEVEL: MODERATE PAIN (4)
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
## 2021-04-22 NOTE — TELEPHONE ENCOUNTER
Discharged from detox yesterday and discharged with vistaril 25 Q4h PRN.  and also had gabapentin at home.  Took the antabuse yesterday and today.  Started Chantix.  Today at 1 pm has a CD assessment and appt with.  Attended virtual AA meeting last night and 5 minutes ago.  Downloaded and trying the Calm ludmila. Yesterday when waking up felt good for the first time in a long time.  When she had to go into inpatient had to cancel ortho appt so will try rescheduling this. TOMMY Clintno

## 2021-04-22 NOTE — TELEPHONE ENCOUNTER
Reason for Call:  Other appointment    Detailed comments: Patient called back to confirm appt with Dr. Simons tomorrow 4/23rd at 4:20pm. No further assistance.         Call taken on 4/22/2021 at 10:05 AM by See Gerald

## 2021-04-22 NOTE — TELEPHONE ENCOUNTER
Reason for Call:  Other appointment and call back    Detailed comments: Called patient and left a vm for her to call [phone] to set the appt per Dr. Simons's request.    Phone Number Patient can be reached at: Home number on file [phone] (home)    Best Time: Any    Can we leave a detailed message on this number? YES    Call taken on 4/22/2021 at 9:48 AM by See Gerald

## 2021-04-22 NOTE — TELEPHONE ENCOUNTER
Please call patient and offer her appt tomorrow afternoon at 4:20pm - ok for virtual. This slot was blocked per PCP's request, as the patient has been in detox this week.    Sterling Simons MD

## 2021-04-23 ENCOUNTER — VIRTUAL VISIT (OUTPATIENT)
Dept: ADDICTION MEDICINE | Facility: CLINIC | Age: 51
End: 2021-04-23
Payer: COMMERCIAL

## 2021-04-23 ENCOUNTER — PATIENT OUTREACH (OUTPATIENT)
Dept: CARE COORDINATION | Facility: CLINIC | Age: 51
End: 2021-04-23

## 2021-04-23 DIAGNOSIS — F10.20 ALCOHOL USE DISORDER, SEVERE, DEPENDENCE (H): Primary | ICD-10-CM

## 2021-04-23 DIAGNOSIS — F41.1 GENERALIZED ANXIETY DISORDER: ICD-10-CM

## 2021-04-23 PROCEDURE — 99204 OFFICE O/P NEW MOD 45 MIN: CPT | Mod: 95 | Performed by: FAMILY MEDICINE

## 2021-04-23 RX ORDER — HYDROCODONE BITARTRATE AND ACETAMINOPHEN 5; 325 MG/1; MG/1
1 TABLET ORAL DAILY PRN
COMMUNITY
End: 2021-04-27

## 2021-04-23 RX ORDER — LAMOTRIGINE 200 MG/1
200 TABLET ORAL DAILY
Qty: 30 TABLET | Refills: 1 | Status: SHIPPED | OUTPATIENT
Start: 2021-04-23 | End: 2021-06-26

## 2021-04-23 RX ORDER — GABAPENTIN 600 MG/1
600 TABLET ORAL 3 TIMES DAILY
Start: 2021-04-23 | End: 2021-05-19

## 2021-04-23 RX ORDER — DISULFIRAM 250 MG/1
250 TABLET ORAL DAILY
Qty: 30 TABLET | Refills: 1 | Status: SHIPPED | OUTPATIENT
Start: 2021-04-23 | End: 2021-06-26

## 2021-04-23 NOTE — PROGRESS NOTES
Sharmin is a 50 year old who is being evaluated via a billable telephone visit.      What phone number would you like to be contacted at? [phone]   How would you like to obtain your AVS? Wally IZAGUIRRE                                                      Sharmin Nieves is a 50 year old female who presents for  initial visit for addiction consultation and management referred by PCP due to concerns for Alcohol Use Disorder (AUD)    Visit performed Virtual, via telephone    HPI:   Sharmin Nieves is a 50 year old female with history of alcohol use who presents for further evaluation of possible substance use disorder and management options.    Attended detox this week, Monday-Wed. Motivated to stop drinking. Admitted to the hospital for withdrawals after attempting to wean on her own at home. Suffered hallucinations, visual and auditory.    Taking vistaril for anxiety; pacing around the house. High levels of cravings despite taking antabuse. Forcing herself to take this so she can avoid alcohol; very motivated so she can get surgery soon on her foot. 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    History of drinking a pint per night during the week, and 3/4 L on weekends.       Substance Use History:   ROUTE OF SUBSTANCE ADMINISTRATION - oral alcohol ingestion    LONGEST PERIOD OF SOBRIETY - nearly 1 year just over 10 years ago   - Significant protective factors - minimal family support    PREVIOUS DETOX/TREATMENT PROGRAMS - Attended detox last week, required valium d/t hallucinations. Has attended detox 4-5 times in the past. Has attended treatment 3x - two of them just over 10 years ago, and another after a DWI 5 years ago. Initial treatment at  was helpful, stayed abstinent for nearly a year    HISTORY OF OVERDOSE - none    PREVIOUS MEDICATION ASSISTED TREATMENT - Currently taking antabuse without any craving relief.  Was prescribed naltrexone, took it for 3 days when she was attempting to stop drinking.    CURRENT RECOVERY ACTIVITIES - yesterday attended 5 virtual AA meetings! Downloaded the Calm ludmila, practicing meditation and breathing    Other Substances:    ALCOHOL- as per HPI. Starting using more heavily around age 37  MARIJUANA- last week; has a vape pen, uses this most nights for sleep  PRESCRIPTION STIMULANTS- none  COCAINE/CRACK- none  METH/AMPHETAMINES- none  OPIATES- history of using these for acute pain around surgeries, but no use outside of her prescritions  BENZODIAZEPINES- PRN use for fear of flying  KRATOM- none  HALLUCINOGENS - none  OTHER - Stays away from casinos    NICOTINE- smoke 1/2ppd   Desire to quit - yes          Previous attempts to quit - yes, has an Rx for chantix           Infectious disease screening UTD?  Yes   - HIV test date: 2009   - HepC test date: 2010      Additional Pertinent History (Updated in Epic as appropriate)  PAST PSYCHIATRIC HISTORY - Reports history of depression and anxiety, pre-dating alcohol use.     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 n/a     Medical History:    Patient Active Problem List    Diagnosis Date Noted     Alcohol abuse 04/19/2019     Priority: Medium     Encounter for therapeutic drug monitoring 02/22/2019     Priority: Medium     Benign essential hypertension 11/30/2018     Priority: Medium     Situational anxiety 11/30/2018     Priority: Medium     Irregular heart beat 11/30/2018     Priority: Medium     Restless leg syndrome 09/16/2016     Priority: Medium     Dry skin 04/15/2013     Priority: Medium     Generalized anxiety  disorder 03/18/2013     Priority: Medium     Diagnosis updated by automated process. Provider to review and confirm.       Home Health Care 02/08/2012     Priority: Medium     EMERGENCY CARE PLAN  April 15, 2013: No current Care Coordination follow up planned. Please refer if Care Coordination services are needed.    Presenting Problem Signs and Symptoms Treatment Plan   Questions or concerns   during clinic hours   I will call my clinic directly:  [place] [address]  [phone].   Questions or concerns outside clinic hours   I will call the 24 hour nurse line at   [phone] or [facility].   Need to schedule an appointment   I will call the 24 hour scheduling team at [phone] or my clinic directly at [phone].    Same day treatment     I will call my clinic first, nurse line if after hours, urgent care and express care if needed.   Clinic care coordination services (regular clinic hours)     I will call a clinic care coordinator directly:     Azar Howard RN  Mon, Tues, Fri - [phone]  Wed, Thurs - [phone]    Jeny Sellers :    [phone]    Or call my clinic at [phone] and ask to speak with care coordination.   Crisis Services: Behavioral or Mental Health  [facility] 24 Hour Phone Line  [phone]    [facility] 24 Hour Crisis Services  [phone]    [facility] (Behavioral Health Providers) Network [phone]    [facility]   [phone]       Emergency treatment -- Immediately    [facility]All 911            Insomnia 09/20/2011     Priority: Medium     Health Care Home 06/07/2011     Priority: Medium     X  DX V65.8 REPLACED WITH [id number] HEALTH CARE HOME (04/08/2013)       Rectal pain 05/16/2011     Priority: Medium     Moderate major depression (H) 04/15/2011     Priority: Medium     Previous med trials  Prozac (fluoxetine) YES  Zoloft (sertraline) YES  Celexa (citalopram) YES  Lexapro (escitalopram) YES  Paxil  (paroxetine) no  Wellbutrin (bupropion) YES  Effexor (venlafaxine) YES  Others:  YES- Cymbalta         CARDIOVASCULAR SCREENING; LDL GOAL LESS THAN 160 10/31/2010     Priority: Medium     Chronic low back pain 05/11/2010     Priority: Medium     Right hip pain 05/11/2010     Priority: Medium     Ovarian cyst 12/05/2006     Priority: Medium     Problem list name updated by automated process. Provider to review       Irregular menstrual cycle 12/05/2006     Priority: Medium     Esophageal reflux 12/14/2005     Priority: Medium     with associated esophageal spasm       IUD (intrauterine device) in place 10/03/2005     Priority: Medium     7/10/15 Mirena IUD replaced after 6 1/2 years.  Placed today without difficulty.       Tobacco use disorder 06/08/2005     Priority: Medium     Herpes simplex virus (HSV) infection 06/17/2004     Priority: Medium     Problem list name updated by automated process. Provider to review         Past Medical History:   Diagnosis Date     Acne      Anxiety      Anxiety state, unspecified     Anxiety     Benign essential hypertension 11/30/2018     Chronic low back pain 5/11/2010     Depression     Dr. Gaytan     Diaphragmatic hernia without mention of obstruction or gangrene      Esophageal reflux     with associated esophageal spasm     ETOH abuse      Foot pain      Herpes simplex without mention of complication      Moderate major depression (H) 4/15/2011     Rectal pain 5/16/2011     Right hip pain 5/11/2010     Seasonal affective disorder (H)      Surveillance of previously prescribed intrauterine contraceptive device 10/03/05    mirena IUD       Past Surgical History:   Procedure Laterality Date     C STOMACH SURGERY PROCEDURE UNLISTED      Nissen     CHEILECTOMY Right 12/21/2017    Procedure: CHEILECTOMY;  RIGHT FOOT CHEILECTOMY;  Surgeon: Mo Edgar DPM;  Location:  OR     SURGICAL HISTORY OF -   4/04    Lapr Nissen fundoplication     TONSILLECTOMY & ADENOIDECTOMY   1985     ZZC NONSPECIFIC PROCEDURE  1992    CRYO SURGERY for abnl paps         Family History   Problem Relation Age of Onset     C.A.D. Maternal Grandmother      Hypertension Maternal Grandmother      Alcohol/Drug Maternal Grandmother      Depression Maternal Grandmother      Cerebrovascular Disease Paternal Grandfather      Alcohol/Drug Paternal Grandfather      Breast Cancer Paternal Grandmother      Depression Paternal Grandmother      Alcohol/Drug Father      Depression Father      Gastrointestinal Disease Father         GERD     Alcohol/Drug Maternal Grandfather      Depression Maternal Grandfather      Depression Mother      Obesity Mother      Anxiety Disorder Mother      Diabetes Mother      Depression Sister      Alcoholism Brother      Depression Brother      Alcoholism Brother      Depression Brother      Alcoholism Brother      Depression Brother      Gastrointestinal Disease Brother         severe gerd     Autism Spectrum Disorder Son      Substance Abuse Son      Schizophrenia Son      Substance Abuse Son      Breast Cancer Maternal Aunt      Cancer - colorectal No family hx of      Prostate Cancer No family hx of          Current Outpatient Medications   Medication Sig Dispense Refill     celecoxib (CELEBREX) 100 MG capsule Take 1 capsule (100 mg) by mouth 2 times daily as needed for moderate pain 180 capsule 0     cholecalciferol (VITAMIN D3) 125 mcg (5000 units) capsule Take 1 capsule by mouth daily       diclofenac (VOLTAREN) 1 % topical gel Apply topically 4 times daily       Digital Therapy (RESET FOR IOS OR ANDROID MONA) MISC 1 each daily 1 each 0     disulfiram (ANTABUSE) 250 MG tablet Take 1 tablet (250 mg) by mouth daily 30 tablet 1     folic acid (FOLVITE) 1 MG tablet Take 1 tablet (1 mg) by mouth daily 90 tablet 1     gabapentin (NEURONTIN) 600 MG tablet Take 1 tablet (600 mg) by mouth 3 times daily       hydrOXYzine (ATARAX) 25 MG tablet Take 1 tablet (25 mg) by mouth every 4 hours as  needed for anxiety 120 tablet 0     lamoTRIgine (LAMICTAL) 200 MG tablet Take 1 tablet (200 mg) by mouth daily 30 tablet 1     LORazepam (ATIVAN) 1 MG tablet Take 1 tablet (1 mg) by mouth every 6 hours as needed for anxiety 5 tablet 0     losartan (COZAAR) 100 MG tablet Take 1 tablet (100 mg) by mouth daily 90 tablet 1     omeprazole (PRILOSEC) 40 MG DR capsule Take 1 capsule (40 mg) by mouth daily 90 capsule 1     propranolol (INDERAL) 20 MG tablet Take 1 tablet (20 mg) by mouth 3 times daily 270 tablet 1     thiamine (B-1) 100 MG tablet Take 1 tablet (100 mg) by mouth daily 90 tablet 1     traZODone (DESYREL) 100 MG tablet Take 2 tablets (200 mg) by mouth At Bedtime 180 tablet 3     venlafaxine (EFFEXOR-XR) 150 MG 24 hr capsule Take 1 capsule (150 mg) by mouth daily 90 capsule 1     HYDROcodone-acetaminophen (NORCO) 5-325 MG tablet Take 1 tablet by mouth daily as needed for severe pain 21 tablet 0     levonorgestrel (MIRENA) 20 MCG/24HR IUD 1 each (20 mcg) by Intrauterine route once for 1 dose 1 each 0     ondansetron (ZOFRAN) 4 MG tablet Take 1 tablet (4 mg) by mouth every 8 hours as needed for nausea 60 tablet 0     varenicline (CHANTIX REESE) 0.5 MG X 11 & 1 MG X 42 tablet Take 0.5 mg tab daily for 3 days, THEN 0.5 mg tab twice daily for 4 days, THEN 1 mg twice daily. 53 tablet 0         Allergies   Allergen Reactions     Lisinopril Cough     cough     Nsaids      NSAIDS - Ibuprofen & Naproxen - symptoms of swelling in hands/feet, hives                OBJECTIVE                                                      EXAM    There were no vitals taken for this visit.    GENERAL: healthy, alert and no distress  RESP: No respiratory distress  MENTAL STATUS EXAM  Appearance/Behavior: No appearant distress  Speech: Normal  Mood/Affect: anxiety  Insight: Adequate       LAB  No results found for any visits on 04/23/21.      [facility] Board of Pharmacy Data Base Reviewed;    Consistent with patient reports and Epic  records.           A/P                                                      ASSESSMENT/PLAN:  Sharmin was seen today for telephone and addiction problem.    Diagnoses and all orders for this visit:    Alcohol use disorder, severe, dependence (H)  -     gabapentin (NEURONTIN) 600 MG tablet; Take 1 tablet (600 mg) by mouth 3 times daily  -     disulfiram (ANTABUSE) 250 MG tablet; Take 1 tablet (250 mg) by mouth daily  -     Digital Therapy (RESET FOR IOS OR ANDROID MONA) MISC; 1 each daily    Generalized anxiety disorder  -     lamoTRIgine (LAMICTAL) 200 MG tablet; Take 1 tablet (200 mg) by mouth daily      - Discussed medications for craving and promoting cessation  - Advised she increase gabapentin dose to augment possible craving relief  - Encouraged use of antabuse, as she has felt this accountability to be helpful. Advised she enlist her son to help keep her accountable  - Has tried naltrexone in the past with inadequate response though was not motivated to quit at the time; will try this again if not seeing adequate craving relief  - Titrate lamictal to augment anxiety relief      ENCOUNTER FOR LONG TERM USE OF HIGH RISK MEDICATION   High Risk Drug Monitoring?  YES   Drug being monitored: antabuse, gabapentin   Reason for drug: Alcohol Use Disorder   What is being monitored?: Dosage, Cravings, Triggers and side effects       Counseled the patient on the importance of having a recovery program in addition to medication to manage recovery.  Components include avoiding isolating, having willingness to change, avoiding triggers and managing cravings. Encouraged having some type of sober network and practicing honesty with trusted support person(s). Encouraged other services such as counseling, 12 step or other self-help organizations.            RTC   Future Appointments   Date Time Provider Department Center   5/7/2021  4:00 PM Sterling Simons MD [facility]   6/23/2021  9:00 AM Nurse, [facility]    7/20/2021 11:40 AM Conor Guillen MD [facility]               Sterling Simons MD  [facility]  [phone]

## 2021-04-23 NOTE — PROGRESS NOTES
Clinical Product Navigator RN reviewed chart; patient on payer product coverage.  Review results: patient identified on recently discharged hospital report.   Patient has since followed up with PCP and initiated care with Addiction Medicine.     No new referral or outreach at this time.     Aspen Akins RN/Clinical Product Navigator

## 2021-04-27 ENCOUNTER — VIRTUAL VISIT (OUTPATIENT)
Dept: FAMILY MEDICINE | Facility: CLINIC | Age: 51
End: 2021-04-27
Payer: COMMERCIAL

## 2021-04-27 ENCOUNTER — TELEPHONE (OUTPATIENT)
Dept: ORTHOPEDICS | Facility: CLINIC | Age: 51
End: 2021-04-27

## 2021-04-27 ENCOUNTER — VIRTUAL VISIT (OUTPATIENT)
Dept: ORTHOPEDICS | Facility: CLINIC | Age: 51
End: 2021-04-27
Payer: COMMERCIAL

## 2021-04-27 DIAGNOSIS — M79.674 PAIN OF RIGHT GREAT TOE: ICD-10-CM

## 2021-04-27 DIAGNOSIS — M25.571 PAIN IN JOINT, ANKLE AND FOOT, RIGHT: Primary | ICD-10-CM

## 2021-04-27 DIAGNOSIS — F17.200 TOBACCO USE DISORDER: Primary | ICD-10-CM

## 2021-04-27 DIAGNOSIS — F10.10 ALCOHOL ABUSE: ICD-10-CM

## 2021-04-27 PROCEDURE — 99214 OFFICE O/P EST MOD 30 MIN: CPT | Mod: 95 | Performed by: NURSE PRACTITIONER

## 2021-04-27 PROCEDURE — 99212 OFFICE O/P EST SF 10 MIN: CPT | Mod: 95 | Performed by: ORTHOPAEDIC SURGERY

## 2021-04-27 RX ORDER — HYDROCODONE BITARTRATE AND ACETAMINOPHEN 5; 325 MG/1; MG/1
1 TABLET ORAL DAILY PRN
Qty: 21 TABLET | Refills: 0 | Status: SHIPPED | OUTPATIENT
Start: 2021-04-27 | End: 2021-05-03

## 2021-04-27 RX ORDER — ONDANSETRON 4 MG/1
4 TABLET, FILM COATED ORAL EVERY 8 HOURS PRN
Qty: 60 TABLET | Refills: 0 | Status: SHIPPED | OUTPATIENT
Start: 2021-04-27 | End: 2021-09-10

## 2021-04-27 NOTE — TELEPHONE ENCOUNTER
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  Pt starting Chantix tomorrow to be smoke-free for minimal 3 weeks prior to surgery, [place] [facility] [place].     Person(s) involved in teaching:   Patient     Motivation Level:  Asks Questions: Yes  Eager to Learn: Yes  Cooperative: Yes  Receptive (willing/able to accept information): Yes  Any cultural factors/Church beliefs that may influence understanding or compliance? No  Comments:      Patient demonstrates understanding of the following:  Reason for the appointment, diagnosis and treatment plan: Yes  Knowledge of proper use of medications and conditions for which they are ordered (with special attention to potential side effects or drug interactions): Yes  Which situations necessitate calling provider and whom to contact: Yes       Teaching Concerns Addressed:   Comments:      Proper use and care of ortho boot (medical equip, care aids, etc.): Yes  Nutritional needs and diet plan: Yes  Pain management techniques: Yes  Wound Care: Yes  How and/when to access community resources: Yes     Instructional Materials Used/Given: preop pkt prepared for pickup tomorrow when getting the boot     Time spent with patient: 15 minutes.

## 2021-04-27 NOTE — PROGRESS NOTES
Kendy is a 50 year old who is being evaluated via a billable telephone visit.      What phone number would you like to be contacted at? [phone]   How would you like to obtain your AVS? Wally    End:  3:57 PM   Start:  3:32 PM     Assessment & Plan     Tobacco use disorder    - ondansetron (ZOFRAN) 4 MG tablet; Take 1 tablet (4 mg) by mouth every 8 hours as needed for nausea  - varenicline (CHANTIX REESE) 0.5 MG X 11 & 1 MG X 42 tablet; Take 0.5 mg tab daily for 3 days, THEN 0.5 mg tab twice daily for 4 days, THEN 1 mg twice daily.    Alcohol abuse  Doing really well.  Met with addiction medicine, attending virtual AA, taking medications.  Very motivated to maintain sobriety.    Pain of right great toe  - refill of norco for nightly until surgery  - HYDROcodone-acetaminophen (NORCO) 5-325 MG tablet; Take 1 tablet by mouth daily as needed for severe pain    30 minutes spent on the date of the encounter doing chart review, history and exam, documentation and further activities per the note         Patient Instructions   Ok zofran daily       Return in about 3 weeks (around 5/18/2021) for Routine Visit and pro-op.    LEDY Seaman [facility]    Juana Larry is a 50 year old who presents for the following health issues  accompanied by her self:    Pfizer covid vaccine shots  #1 - 1/15/21  #2 - 2/3/21    HPI     Pt wanted to discuss medications with you. She needs to restart a smoking cessation medication due to a procedure she will be having done. She has tried Chantix in the past but she states that nausea is a big side effect for her when taking the Chantix. She would like to try taking some Zofran with the Chantix and see if that will help.  Also would like to discuss her alcohol intake/options/discussion with you.    Had a really good visit with Dr. Simons last Friday and felt she connected with him.  Hasn't had alcohol in nine days.  Doing a lot of zoom AA meetings (at  least one in [place]!). Increased lamictal, continued gabapentin as three times a day scheduled with propranolol and hydroxyzine as needed.  Ends up taking hydroxyzine three times a day. Taking antabuse everyday which does prevent drinking.  Used to have urgent loose stools which have resolved since stopping drinking.  Fell asleep with Calm ludmila meditation and no trazodone.    Had appt with Dr. Guillen in orthopedics today and he wants her to be smoke free for three weeks so looking at mid May for surgery.  Taking hydrocodone-acetaminophen at night only.   Chantix worked previously, but caused nausea.  She took it for about a month.  Recalls nausea for the first couple weeks    Review of Systems     ROS:5 point ROS including CONST, HEENT, Respiratory, CV, and GI other than that noted in the HPI,  is negative         Objective           Vitals:  No vitals were obtained today due to virtual visit.    Physical Exam   healthy, alert and no distress  PSYCH: Alert and oriented times 3; coherent speech, normal   rate and volume, able to articulate logical thoughts, able   to abstract reason, no tangential thoughts, no hallucinations   or delusions  Her affect is normal and pleasant  RESP: No cough, no audible wheezing, able to talk in full sentences  Remainder of exam unable to be completed due to telephone visits          Phone call duration: 25 minutes

## 2021-04-27 NOTE — LETTER
4/27/2021         RE: Sharmin Nieves  [address]  [place] [address]        Dear Colleague,    Thank you for referring your patient, Sharmin Nieves, to the [facility] [place]. Please see a copy of my visit note below.    CHIEF COMPLAINT:  Right first MTP degenerative arthritis.    Mrs. Nieves was contacted via phone secondary to pandemic.  She has authorized an encounter.    Recently, the patient has been admitted for her substance abuse.  She reports herself to be interested in undergoing a first MTP joint arthrodesis.    Discussion with her the difficulty and complications from undergoing such an intervention, which include but not limited to infection, bleeding, nerve damage, residual pain, nonunion and stiffness.    All questions were answered preoperatively discussion.  The patient will return at her convenience which again will consist of right first MTP joint arthrodesis.    We also discussed with her the importance of being nicotine free prior to surgery.  She will do her best to be nicotine free for 3 weeks prior to surgery.  Currently, she has a prescription for Chantix.    All questions were answered.  The patient will plan on stopping by the orthopedic clinic to get a Cam walker prior to surgery as she believes that it will be significantly helpful for her to manage her workload prior to the surgical date.    All questions were answered.  TT 10 minutes, 15 minutes with Mrs. Nieves.    Conor Guillen MD

## 2021-04-27 NOTE — PROGRESS NOTES
CHIEF COMPLAINT:  Right first MTP degenerative arthritis.    Mrs. Nieves was contacted via phone secondary to pandemic.  She has authorized an encounter.    Recently, the patient has been admitted for her substance abuse.  She reports herself to be interested in undergoing a first MTP joint arthrodesis.    Discussion with her the difficulty and complications from undergoing such an intervention, which include but not limited to infection, bleeding, nerve damage, residual pain, nonunion and stiffness.    All questions were answered preoperatively discussion.  The patient will return at her convenience which again will consist of right first MTP joint arthrodesis.    We also discussed with her the importance of being nicotine free prior to surgery.  She will do her best to be nicotine free for 3 weeks prior to surgery.  Currently, she has a prescription for Chantix.    All questions were answered.  The patient will plan on stopping by the orthopedic clinic to get a Cam walker prior to surgery as she believes that it will be significantly helpful for her to manage her workload prior to the surgical date.    All questions were answered.  TT 10 minutes, 15 minutes with Mrs. Nieves.

## 2021-04-28 ENCOUNTER — PREP FOR PROCEDURE (OUTPATIENT)
Dept: ORTHOPEDICS | Facility: CLINIC | Age: 51
End: 2021-04-28

## 2021-04-28 DIAGNOSIS — M19.079: Primary | ICD-10-CM

## 2021-04-29 DIAGNOSIS — M25.571 PAIN IN JOINT, ANKLE AND FOOT, RIGHT: Primary | ICD-10-CM

## 2021-05-03 ENCOUNTER — MYC MEDICAL ADVICE (OUTPATIENT)
Dept: FAMILY MEDICINE | Facility: CLINIC | Age: 51
End: 2021-05-03

## 2021-05-03 DIAGNOSIS — M79.674 PAIN OF RIGHT GREAT TOE: ICD-10-CM

## 2021-05-03 PROBLEM — M19.079: Status: ACTIVE | Noted: 2021-05-03

## 2021-05-03 NOTE — TELEPHONE ENCOUNTER
Mariya    See pt MyChart message    Med cued    Sapna Bowie RN   [facility]

## 2021-05-04 ENCOUNTER — MYC REFILL (OUTPATIENT)
Dept: FAMILY MEDICINE | Facility: CLINIC | Age: 51
End: 2021-05-04

## 2021-05-04 DIAGNOSIS — M79.674 PAIN OF RIGHT GREAT TOE: ICD-10-CM

## 2021-05-04 RX ORDER — HYDROCODONE BITARTRATE AND ACETAMINOPHEN 5; 325 MG/1; MG/1
1 TABLET ORAL DAILY PRN
Qty: 21 TABLET | Refills: 0 | OUTPATIENT
Start: 2021-05-04

## 2021-05-04 RX ORDER — HYDROCODONE BITARTRATE AND ACETAMINOPHEN 5; 325 MG/1; MG/1
1 TABLET ORAL DAILY PRN
Qty: 7 TABLET | Refills: 0 | Status: SHIPPED | OUTPATIENT
Start: 2021-05-04 | End: 2021-05-11

## 2021-05-04 NOTE — TELEPHONE ENCOUNTER
Prescription refill requested  Pending Prescriptions:                       Disp   Refills    HYDROcodone-acetaminophen (NORCO) 5-325 M*21 tab*0            Sig: Take 1 tablet by mouth daily as needed for severe           pain    Last office visit: 4/27/21   Last refill: 4/27/21 #21 (7 day supply due to MME) see 5/3/21 mychart note from Pt regarding limits.     Routing refill request to provider for review/approval because:  Drug not on the [facility] refill protocol       Eve Elizondo RN   [facility]

## 2021-05-07 ENCOUNTER — VIRTUAL VISIT (OUTPATIENT)
Dept: ADDICTION MEDICINE | Facility: CLINIC | Age: 51
End: 2021-05-07
Payer: COMMERCIAL

## 2021-05-07 DIAGNOSIS — F41.1 GENERALIZED ANXIETY DISORDER: ICD-10-CM

## 2021-05-07 DIAGNOSIS — F10.20 ALCOHOL USE DISORDER, SEVERE, DEPENDENCE (H): Primary | ICD-10-CM

## 2021-05-07 PROCEDURE — 99214 OFFICE O/P EST MOD 30 MIN: CPT | Mod: 95 | Performed by: FAMILY MEDICINE

## 2021-05-07 RX ORDER — GABAPENTIN 300 MG/1
300 CAPSULE ORAL 3 TIMES DAILY
Qty: 90 CAPSULE | Refills: 1 | Status: SHIPPED | OUTPATIENT
Start: 2021-05-07 | End: 2021-07-14

## 2021-05-07 NOTE — PROGRESS NOTES
SUBJECTIVE                                                    SUBSTANCE USE DISORDER FOLLOW UP:    Sharmin Nieves is a 50 year old female who presents to clinic today for follow up of Alcohol Use Disorder (AUD).    Visit performed Virtual, via telephone    Time on phone call: 15 minutes          TODAY'S VISIT  HPI May 7, 2021  - Stopped gabapentin; struggled with withdrawals  - Cravings intense; having mouth-watering at times, and randomly thoughts popping in her head  - Antabuse keeping her abstinent  - Open to trying naltrexone for a little while before surgery; surgery scheduled for June 9. 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 n/a        Brief AUBREE History   ROUTE OF SUBSTANCE ADMINISTRATION - oral alcohol ingestion     LONGEST PERIOD OF SOBRIETY - nearly 1 year just over 10 years ago              - Significant protective factors - minimal family support     PREVIOUS DETOX/TREATMENT PROGRAMS - Attended detox last week, required valium d/t hallucinations. Has attended detox 4-5 times in the past. Has attended treatment 3x - two of them just over 10 years ago, and another after a DWI 5 years ago. Initial treatment at  was helpful, stayed abstinent for nearly a year     HISTORY OF OVERDOSE - none     PREVIOUS MEDICATION ASSISTED TREATMENT - Currently taking antabuse without any craving relief. Was prescribed naltrexone, took it for 3 days when she was attempting to stop drinking.     CURRENT RECOVERY ACTIVITIES - yesterday attended 5 virtual AA meetings! Downloaded the Calm ludmila, practicing meditation  and breathing     Other Substances:     ALCOHOL- as per HPI. Starting using more heavily around age 37  MARIJUANA- last week; has a vape pen, uses this most nights for sleep  PRESCRIPTION STIMULANTS- none  COCAINE/CRACK- none  METH/AMPHETAMINES- none  OPIATES- history of using these for acute pain around surgeries, but no use outside of her prescritions  BENZODIAZEPINES- PRN use for fear of flying  KRATOM- none  HALLUCINOGENS - none  OTHER - Stays away from casinos     NICOTINE- smoke 1/2ppd              Desire to quit - yes                     Previous attempts to quit - yes, has an Rx for chantix             Infectious disease screening UTD?  Yes              - HIV test date: 2009              - HepC test date: 2010    PAST PSYCHIATRIC HISTORY - Reports history of depression and anxiety, pre-dating alcohol use.       A/P                                                    ASSESSMENT/PLAN    Diagnoses and all orders for this visit:  Alcohol use disorder, severe, dependence (H)  -     gabapentin (NEURONTIN) 300 MG capsule; Take 1 capsule (300 mg) by mouth 3 times daily  Generalized anxiety disorder  -     gabapentin (NEURONTIN) 300 MG capsule; Take 1 capsule (300 mg) by mouth 3 times daily      Orders Placed This Encounter   Medications     gabapentin (NEURONTIN) 300 MG capsule     Sig: Take 1 capsule (300 mg) by mouth 3 times daily     Dispense:  90 capsule     Refill:  1       - Encouraged her to continue antabuse, though cravings persist  - Adding gabapentin again for craving relief and possible anti-anxiety effect  - Encouraged her to continue with AA and RESET ludmila  - Will explore naltrexone after surgery completed for her foot          RTC:  Future Appointments   Date Time Provider Department Center   5/28/2021  1:00 PM Randa Bill APRN CNP [facility]   6/1/2021  4:00 PM Sterling Simons MD [facility]   6/23/2021  9:00 AM Nurse, [facility]   7/20/2021 11:20 AM [id number]    7/20/2021 11:40 AM Con[facility] Guillen [facility]         ENCOUNTER FOR LONG TERM USE OF HIGH RISK MEDICATION   High Risk Drug Monitoring?  YES   Drug being monitored: gabapentin   Reason for drug: Alcohol Use Disorder (AUD)   What is being monitored?: Dosage, Cravings, Trigger, side effects, and continued abstinence.    Counseled the patient on the importance of having a recovery program in addition to medication to manage recovery.  Components include avoiding isolating, having willingness to change, avoiding triggers and managing cravings. Encouraged having some type of sober network and practicing honesty with trusted support person(s). Encouraged other services such as counseling, 12 step or other self-help organizations.          [facility] Board of Pharmacy Data Base Reviewed;   Consistent with patient reports and Epic records.            OBJECTIVE                                                    PHYSICAL EXAM:  There were no vitals taken for this visit.    GENERAL: healthy, alert and no distress  EYES: Eyes grossly normal to inspection, PERRL and conjunctivae and sclerae normal  RESP: No respiratory distress  MENTAL STATUS EXAM  Appearance/Behavior: No appearant distress  Speech: Normal  Mood/Affect: anxiety  Insight: Adequate    LAB  No results found for any visits on 05/07/21.   LFTs completed 4/18/21, reviewed in Wally, wnl.      HISTORY                                                    Problem list reviewed & adjusted, as indicated.  Patient Active Problem List   Diagnosis     Herpes simplex virus (HSV) infection     Tobacco use disorder     IUD (intrauterine device) in place     Esophageal reflux     Ovarian cyst     Irregular menstrual cycle     Chronic low back pain     Right hip pain     CARDIOVASCULAR SCREENING; LDL GOAL LESS THAN 160     Moderate major depression (H)     Rectal pain     Health Care Home     Insomnia     Home Health Care     Generalized anxiety disorder     Dry  skin     Restless leg syndrome     Benign essential hypertension     Situational anxiety     Irregular heart beat     Encounter for therapeutic drug monitoring     Alcohol abuse     Degenerative joint disease of foot         MEDICATION LIST (prior to visit)  celecoxib (CELEBREX) 100 MG capsule, Take 1 capsule (100 mg) by mouth 2 times daily as needed for moderate pain  cholecalciferol (VITAMIN D3) 125 mcg (5000 units) capsule, Take 1 capsule by mouth daily  diclofenac (VOLTAREN) 1 % topical gel, Apply topically 4 times daily  Digital Therapy (RESET FOR IOS OR ANDROID MONA) MISC, 1 each daily  disulfiram (ANTABUSE) 250 MG tablet, Take 1 tablet (250 mg) by mouth daily  folic acid (FOLVITE) 1 MG tablet, Take 1 tablet (1 mg) by mouth daily  gabapentin (NEURONTIN) 600 MG tablet, Take 1 tablet (600 mg) by mouth 3 times daily  hydrOXYzine (ATARAX) 25 MG tablet, Take 1 tablet (25 mg) by mouth every 4 hours as needed for anxiety  lamoTRIgine (LAMICTAL) 200 MG tablet, Take 1 tablet (200 mg) by mouth daily  levonorgestrel (MIRENA) 20 MCG/24HR IUD, 1 each (20 mcg) by Intrauterine route once for 1 dose  LORazepam (ATIVAN) 1 MG tablet, Take 1 tablet (1 mg) by mouth every 6 hours as needed for anxiety  losartan (COZAAR) 100 MG tablet, Take 1 tablet (100 mg) by mouth daily  omeprazole (PRILOSEC) 40 MG DR capsule, Take 1 capsule (40 mg) by mouth daily  ondansetron (ZOFRAN) 4 MG tablet, Take 1 tablet (4 mg) by mouth every 8 hours as needed for nausea  propranolol (INDERAL) 20 MG tablet, Take 1 tablet (20 mg) by mouth 3 times daily  thiamine (B-1) 100 MG tablet, Take 1 tablet (100 mg) by mouth daily  traZODone (DESYREL) 100 MG tablet, Take 2 tablets (200 mg) by mouth At Bedtime  varenicline (CHANTIX REESE) 0.5 MG X 11 & 1 MG X 42 tablet, Take 0.5 mg tab daily for 3 days, THEN 0.5 mg tab twice daily for 4 days, THEN 1 mg twice daily.  venlafaxine (EFFEXOR-XR) 150 MG 24 hr capsule, Take 1 capsule (150 mg) by mouth daily    No current  facility-administered medications on file prior to visit.       Allergies   Allergen Reactions     Lisinopril Cough     cough     Nsaids      NSAIDS - Ibuprofen & Naproxen - symptoms of swelling in hands/feet, hives            Sterling Simons MD  [facility]  [phone]

## 2021-05-07 NOTE — PROGRESS NOTES
Kendy is a 50 year old who is being evaluated via a billable telephone visit.      What phone number would you like to be contacted at? [phone]  How would you like to obtain your AVS? MyChart

## 2021-05-11 ENCOUNTER — MYC REFILL (OUTPATIENT)
Dept: FAMILY MEDICINE | Facility: CLINIC | Age: 51
End: 2021-05-11

## 2021-05-11 DIAGNOSIS — M79.674 PAIN OF RIGHT GREAT TOE: ICD-10-CM

## 2021-05-11 NOTE — TELEPHONE ENCOUNTER
Mariya,    Requested Prescriptions   Pending Prescriptions Disp Refills     HYDROcodone-acetaminophen (NORCO) 5-325 MG tablet 7 tablet 0     Sig: Take 1 tablet by mouth daily as needed for severe pain       There is no refill protocol information for this order        Future visit: 5/28/21    Routing refill request to provider for review/approval because:  Drug not on the [facility] refill protocol     Payal Acosta RN  [facility]

## 2021-05-12 RX ORDER — HYDROCODONE BITARTRATE AND ACETAMINOPHEN 5; 325 MG/1; MG/1
1 TABLET ORAL DAILY PRN
Qty: 7 TABLET | Refills: 0 | Status: SHIPPED | OUTPATIENT
Start: 2021-05-12 | End: 2021-05-17

## 2021-05-17 ENCOUNTER — MYC MEDICAL ADVICE (OUTPATIENT)
Dept: FAMILY MEDICINE | Facility: CLINIC | Age: 51
End: 2021-05-17

## 2021-05-17 ENCOUNTER — MYC REFILL (OUTPATIENT)
Dept: FAMILY MEDICINE | Facility: CLINIC | Age: 51
End: 2021-05-17

## 2021-05-17 DIAGNOSIS — M79.674 PAIN OF RIGHT GREAT TOE: ICD-10-CM

## 2021-05-17 RX ORDER — HYDROCODONE BITARTRATE AND ACETAMINOPHEN 5; 325 MG/1; MG/1
1 TABLET ORAL DAILY PRN
Qty: 7 TABLET | Refills: 0 | Status: SHIPPED | OUTPATIENT
Start: 2021-05-17 | End: 2021-05-24

## 2021-05-17 NOTE — TELEPHONE ENCOUNTER
Routing refill request to provider for review/approval because:  Drug not on the [facility] refill protocol     I sent in a refill request for the Vicodin.  I was told you weren't going to be in until Thursday, so hopefully your nurse can forward to another doctor to approve?  (unless you see this first)   Thanks for everything!     Kendy Bowie RN   [facility]

## 2021-05-17 NOTE — TELEPHONE ENCOUNTER
Mariya Stewart pt MyChart message    Refill request was sent to provider pool today    Sapna Bowie RN   [facility]

## 2021-05-19 RX ORDER — GABAPENTIN 600 MG/1
600 TABLET ORAL 3 TIMES DAILY
Qty: 90 TABLET | Refills: 1 | Status: SHIPPED | OUTPATIENT
Start: 2021-05-19 | End: 2021-06-18 | Stop reason: DRUGHIGH

## 2021-05-24 ENCOUNTER — MYC MEDICAL ADVICE (OUTPATIENT)
Dept: FAMILY MEDICINE | Facility: CLINIC | Age: 51
End: 2021-05-24

## 2021-05-24 DIAGNOSIS — M79.674 PAIN OF RIGHT GREAT TOE: ICD-10-CM

## 2021-05-24 DIAGNOSIS — Z11.59 ENCOUNTER FOR SCREENING FOR OTHER VIRAL DISEASES: ICD-10-CM

## 2021-05-24 RX ORDER — HYDROCODONE BITARTRATE AND ACETAMINOPHEN 5; 325 MG/1; MG/1
1 TABLET ORAL DAILY PRN
Qty: 7 TABLET | Refills: 0 | Status: SHIPPED | OUTPATIENT
Start: 2021-05-24 | End: 2021-05-28

## 2021-05-24 NOTE — TELEPHONE ENCOUNTER
Requested Prescriptions   Pending Prescriptions Disp Refills     HYDROcodone-acetaminophen (NORCO) 5-325 MG tablet 7 tablet 0     Sig: Take 1 tablet by mouth daily as needed for severe pain       There is no refill protocol information for this order        Routing refill request to provider for review/approval because:  Drug not on the [facility] refill protocol - patient requesting covering provider to review as PCP out of clinic today

## 2021-05-25 ENCOUNTER — MYC REFILL (OUTPATIENT)
Dept: FAMILY MEDICINE | Facility: CLINIC | Age: 51
End: 2021-05-25

## 2021-05-25 DIAGNOSIS — F17.200 TOBACCO USE DISORDER: ICD-10-CM

## 2021-05-25 RX ORDER — VARENICLINE TARTRATE 1 MG/1
1 TABLET, FILM COATED ORAL 2 TIMES DAILY
Qty: 180 TABLET | Refills: 0 | Status: SHIPPED | OUTPATIENT
Start: 2021-05-25 | End: 2021-09-10

## 2021-05-25 NOTE — TELEPHONE ENCOUNTER
"Mariya,    Requested Prescriptions   Pending Prescriptions Disp Refills     varenicline (CHANTIX) 1 MG tablet 180 tablet 0     Sig: Take 1 tablet (1 mg) by mouth 2 times daily       Partial Cholinergic Nicotinic Agonist Agents Failed - 5/25/2021  1:58 PM        Failed - Blood pressure under 140/90 in past 12 months     BP Readings from Last 3 Encounters:   12/09/20 (!) 142/92   11/18/20 (!) 143/91   12/19/19 122/86                 Passed - Recent (12 mo) or future (30 days) visit within the authorizing provider's specialty     Patient has had an office visit with the authorizing provider or a provider within the authorizing providers department within the previous 12 mos or has a future within next 30 days. See \"Patient Info\" tab in inbasket, or \"Choose Columns\" in Meds & Orders section of the refill encounter.              Passed - Medication is active on med list        Passed - Patient is 18 years of age or older        Passed - Patient is not pregnant        Passed - No positive pregnancy test on file in past 12 months           Routing refill request to provider for review/approval because:  Elevated BP  New script needed for maintenance dose - just finished starter pack    Payal Acosta RN  [facility]                  "

## 2021-05-28 ENCOUNTER — OFFICE VISIT (OUTPATIENT)
Dept: FAMILY MEDICINE | Facility: CLINIC | Age: 51
End: 2021-05-28
Payer: COMMERCIAL

## 2021-05-28 VITALS
HEART RATE: 71 BPM | DIASTOLIC BLOOD PRESSURE: 64 MMHG | RESPIRATION RATE: 18 BRPM | WEIGHT: 214 LBS | OXYGEN SATURATION: 97 % | BODY MASS INDEX: 31.7 KG/M2 | HEIGHT: 69 IN | TEMPERATURE: 97.9 F | SYSTOLIC BLOOD PRESSURE: 118 MMHG

## 2021-05-28 DIAGNOSIS — R41.3 MEMORY CHANGES: ICD-10-CM

## 2021-05-28 DIAGNOSIS — I10 BENIGN ESSENTIAL HYPERTENSION: ICD-10-CM

## 2021-05-28 DIAGNOSIS — Z01.818 PREOP GENERAL PHYSICAL EXAM: ICD-10-CM

## 2021-05-28 DIAGNOSIS — Z01.818 PREOP GENERAL PHYSICAL EXAM: Primary | ICD-10-CM

## 2021-05-28 DIAGNOSIS — M79.674 PAIN OF RIGHT GREAT TOE: ICD-10-CM

## 2021-05-28 DIAGNOSIS — M19.071 PRIMARY OSTEOARTHRITIS OF RIGHT FOOT: ICD-10-CM

## 2021-05-28 LAB
ANION GAP SERPL CALCULATED.3IONS-SCNC: 8 MMOL/L (ref 3–14)
BUN SERPL-MCNC: 20 MG/DL (ref 7–30)
CALCIUM SERPL-MCNC: 8.9 MG/DL (ref 8.5–10.1)
CHLORIDE SERPL-SCNC: 104 MMOL/L (ref 94–109)
CO2 SERPL-SCNC: 26 MMOL/L (ref 20–32)
CREAT SERPL-MCNC: 0.74 MG/DL (ref 0.52–1.04)
ERYTHROCYTE [DISTWIDTH] IN BLOOD BY AUTOMATED COUNT: 11.9 % (ref 10–15)
GFR SERPL CREATININE-BSD FRML MDRD: >90 ML/MIN/{1.73_M2}
GLUCOSE SERPL-MCNC: 89 MG/DL (ref 70–99)
HCT VFR BLD AUTO: 38 % (ref 35–47)
HGB BLD-MCNC: 13.2 G/DL (ref 11.7–15.7)
MCH RBC QN AUTO: 33.2 PG (ref 26.5–33)
MCHC RBC AUTO-ENTMCNC: 34.7 G/DL (ref 31.5–36.5)
MCV RBC AUTO: 96 FL (ref 78–100)
PLATELET # BLD AUTO: 251 10E9/L (ref 150–450)
POTASSIUM SERPL-SCNC: 4.2 MMOL/L (ref 3.4–5.3)
RBC # BLD AUTO: 3.97 10E12/L (ref 3.8–5.2)
SODIUM SERPL-SCNC: 138 MMOL/L (ref 133–144)
WBC # BLD AUTO: 6.9 10E9/L (ref 4–11)

## 2021-05-28 PROCEDURE — 80048 BASIC METABOLIC PNL TOTAL CA: CPT | Performed by: NURSE PRACTITIONER

## 2021-05-28 PROCEDURE — 99215 OFFICE O/P EST HI 40 MIN: CPT | Performed by: NURSE PRACTITIONER

## 2021-05-28 PROCEDURE — 85027 COMPLETE CBC AUTOMATED: CPT | Performed by: NURSE PRACTITIONER

## 2021-05-28 PROCEDURE — 36415 COLL VENOUS BLD VENIPUNCTURE: CPT | Performed by: NURSE PRACTITIONER

## 2021-05-28 RX ORDER — HYDROCODONE BITARTRATE AND ACETAMINOPHEN 5; 325 MG/1; MG/1
1 TABLET ORAL DAILY PRN
Qty: 7 TABLET | Refills: 0 | Status: SHIPPED | OUTPATIENT
Start: 2021-05-28 | End: 2021-06-04

## 2021-05-28 ASSESSMENT — MIFFLIN-ST. JEOR: SCORE: 1666.5

## 2021-05-28 NOTE — PATIENT INSTRUCTIONS
Try gabapentin 300 mg three times a day tomorrow, but if there is any threat to sobriety, go back to 600 mg    Preparing for Your Surgery  Getting started  A nurse will call you to review your health history and instructions. They will give you an arrival time based on your scheduled surgery time.  Please be ready to share the following:    Your doctor's clinic name and phone number    Your medical, surgical and anesthesia history    A list of allergies and sensitivities    A list of medicines, including herbal treatments and over-the-counter drugs    Whether the patient has a legal guardian (ask how to send us the papers in advance)  If you have a child who's having surgery, please ask for a copy of Preparing for Your Child's Surgery.    Preparing for surgery    Within 30 days of surgery: Have a pre-op exam (sometimes called an H&P, or History and Physical). This can be done at a clinic or pre-operative center.  ? If you're having a , you may not need this exam. Talk to your care team    At your pre-op exam, talk to your care team about all medicines you take. If you need to stop any medicines before surgery, ask when to start taking them again.  ? We do this for your safety. Many medicines can make you bleed too much during surgery. Some change how well surgery (anesthesia) drugs work.    Call your insurance company to let them know you're having surgery. (If you don't have insurance, call [phone].)    Call your clinic if there's any change in your health. This includes signs of a cold or flu (sore throat, runny nose, cough, rash, fever). It also includes a scrape or scratch near the surgery site.    If you have questions on the day of surgery, call your hospital or surgery center.  Eating and drinking guidelines  For your safety: Unless your surgeon tells you otherwise, follow the guidelines below.    Eat and drink as usual until 8 hours before surgery. After that, no food or milk.    Drink clear  liquids until 2 hours before surgery. These are liquids you can see through, like water, Gatorade and Propel Water. You may also have black coffee and tea (no cream or milk).    Nothing by mouth within 2 hours of surgery. This includes gum, candy and breath mints.    If you drink, stop drinking alcohol the night before surgery.    If your care team tells you to take medicine on the morning of surgery, it's okay to take it with a sip of water.  Preventing infection    Shower or bathe the night before and morning of your surgery. Follow the instructions your clinic gave you. (If no instructions, use regular soap.)    Don't shave or clip hair near your surgery site. We'll remove the hair if needed.    Don't smoke or vape the morning of surgery. You may chew nicotine gum up to 2 hours before surgery. A nicotine patch is okay.  ? Note: Some surgeries require you to completely quit smoking and nicotine. Check with your surgeon.    Your care team will make every effort to keep you safe from infection. We will:  ? Clean our hands often with soap and water (or an alcohol-based hand rub).  ? Clean the skin at your surgery site with a special soap that kills germs.  ? Give you a special gown to keep you warm. (Cold raises the risk of infection.)  ? Wear special hair covers, masks, gowns and gloves during surgery.  ? Give antibiotic medicine, if prescribed. Not all surgeries need antibiotics.  What to bring on the day of surgery    Photo ID and insurance card    Copy of your health care directive, if you have one    Glasses and hearing aides (bring cases)  ? You can't wear contacts during surgery    Inhaler and eye drops, if you use them (tell us about these when you arrive)    CPAP machine or breathing device, if you use them    A few personal items, if spending the night    If you have . . .  ? A pacemaker or ICD (cardiac defibrillator): Bring the ID card.  ? An implanted stimulator: Bring the remote control.  ? A legal  guardian: Bring a copy of the certified (court-stamped) guardianship papers.  Please remove any jewelry, including body piercings. Leave jewelry and other valuables at home.  If you're going home the day of surgery  Important: If you don't follow the rules below, we must cancel your surgery.     Arrange for someone to drive you home after surgery. You may not drive, take a taxi or take public transportation by yourself (unless you'll have local anesthesia only).    Arrange for a responsible adult to stay with you overnight. If you don't, we may keep you in the hospital overnight, and you may need to pay the costs yourself.  Questions?   If you have any questions for your care team, list them here: _________________________________________________________________________________________________________________________________________________________________________________________________________________________________________________________________________________________________________________________  For informational purposes only. Not to replace the advice of your health care provider. Copyright   2003, 2019 [facility]. All rights reserved. Clinically reviewed by Shanika Larson MD. [employer] [id number] - REV 4/20.    Preparing for Your Surgery  Getting started  A nurse will call you to review your health history and instructions. They will give you an arrival time based on your scheduled surgery time.  Please be ready to share the following:    Your doctor's clinic name and phone number    Your medical, surgical and anesthesia history    A list of allergies and sensitivities    A list of medicines, including herbal treatments and over-the-counter drugs    Whether the patient has a legal guardian (ask how to send us the papers in advance)  If you have a child who's having surgery, please ask for a copy of Preparing for Your Child's Surgery.    Preparing for surgery    Within 30 days of surgery: Have a  pre-op exam (sometimes called an H&P, or History and Physical). This can be done at a clinic or pre-operative center.  ? If you're having a , you may not need this exam. Talk to your care team    At your pre-op exam, talk to your care team about all medicines you take. If you need to stop any medicines before surgery, ask when to start taking them again.  ? We do this for your safety. Many medicines can make you bleed too much during surgery. Some change how well surgery (anesthesia) drugs work.    Call your insurance company to let them know you're having surgery. (If you don't have insurance, call [phone].)    Call your clinic if there's any change in your health. This includes signs of a cold or flu (sore throat, runny nose, cough, rash, fever). It also includes a scrape or scratch near the surgery site.    If you have questions on the day of surgery, call your hospital or surgery center.  Eating and drinking guidelines  For your safety: Unless your surgeon tells you otherwise, follow the guidelines below.    Eat and drink as usual until 8 hours before surgery. After that, no food or milk.    Drink clear liquids until 2 hours before surgery. These are liquids you can see through, like water, Gatorade and Propel Water. You may also have black coffee and tea (no cream or milk).    Nothing by mouth within 2 hours of surgery. This includes gum, candy and breath mints.    If you drink, stop drinking alcohol the night before surgery.    If your care team tells you to take medicine on the morning of surgery, it's okay to take it with a sip of water.  Preventing infection    Shower or bathe the night before and morning of your surgery. Follow the instructions your clinic gave you. (If no instructions, use regular soap.)    Don't shave or clip hair near your surgery site. We'll remove the hair if needed.    Don't smoke or vape the morning of surgery. You may chew nicotine gum up to 2 hours before surgery. A  nicotine patch is okay.  ? Note: Some surgeries require you to completely quit smoking and nicotine. Check with your surgeon.    Your care team will make every effort to keep you safe from infection. We will:  ? Clean our hands often with soap and water (or an alcohol-based hand rub).  ? Clean the skin at your surgery site with a special soap that kills germs.  ? Give you a special gown to keep you warm. (Cold raises the risk of infection.)  ? Wear special hair covers, masks, gowns and gloves during surgery.  ? Give antibiotic medicine, if prescribed. Not all surgeries need antibiotics.  What to bring on the day of surgery    Photo ID and insurance card    Copy of your health care directive, if you have one    Glasses and hearing aides (bring cases)  ? You can't wear contacts during surgery    Inhaler and eye drops, if you use them (tell us about these when you arrive)    CPAP machine or breathing device, if you use them    A few personal items, if spending the night    If you have . . .  ? A pacemaker or ICD (cardiac defibrillator): Bring the ID card.  ? An implanted stimulator: Bring the remote control.  ? A legal guardian: Bring a copy of the certified (court-stamped) guardianship papers.  Please remove any jewelry, including body piercings. Leave jewelry and other valuables at home.  If you're going home the day of surgery  Important: If you don't follow the rules below, we must cancel your surgery.     Arrange for someone to drive you home after surgery. You may not drive, take a taxi or take public transportation by yourself (unless you'll have local anesthesia only).    Arrange for a responsible adult to stay with you overnight. If you don't, we may keep you in the hospital overnight, and you may need to pay the costs yourself.  Questions?   If you have any questions for your care team, list them here:  _________________________________________________________________________________________________________________________________________________________________________________________________________________________________________________________________________________________________________________________  For informational purposes only. Not to replace the advice of your health care provider. Copyright   2019 [facility]. All rights reserved. Clinically reviewed by Shanika Larson MD. [employer] [id number] - REV .    Preparing for Your Surgery  Getting started  A nurse will call you to review your health history and instructions. They will give you an arrival time based on your scheduled surgery time.  Please be ready to share the following:    Your doctor's clinic name and phone number    Your medical, surgical and anesthesia history    A list of allergies and sensitivities    A list of medicines, including herbal treatments and over-the-counter drugs    Whether the patient has a legal guardian (ask how to send us the papers in advance)  If you have a child who's having surgery, please ask for a copy of Preparing for Your Child's Surgery.    Preparing for surgery    Within 30 days of surgery: Have a pre-op exam (sometimes called an H&P, or History and Physical). This can be done at a clinic or pre-operative center.  ? If you're having a , you may not need this exam. Talk to your care team    At your pre-op exam, talk to your care team about all medicines you take. If you need to stop any medicines before surgery, ask when to start taking them again.  ? We do this for your safety. Many medicines can make you bleed too much during surgery. Some change how well surgery (anesthesia) drugs work.    Call your insurance company to let them know you're having surgery. (If you don't have insurance, call [phone].)    Call your clinic if there's any change in your health. This includes  signs of a cold or flu (sore throat, runny nose, cough, rash, fever). It also includes a scrape or scratch near the surgery site.    If you have questions on the day of surgery, call your hospital or surgery center.  Eating and drinking guidelines  For your safety: Unless your surgeon tells you otherwise, follow the guidelines below.    Eat and drink as usual until 8 hours before surgery. After that, no food or milk.    Drink clear liquids until 2 hours before surgery. These are liquids you can see through, like water, Gatorade and Propel Water. You may also have black coffee and tea (no cream or milk).    Nothing by mouth within 2 hours of surgery. This includes gum, candy and breath mints.    If you drink, stop drinking alcohol the night before surgery.    If your care team tells you to take medicine on the morning of surgery, it's okay to take it with a sip of water.  Preventing infection    Shower or bathe the night before and morning of your surgery. Follow the instructions your clinic gave you. (If no instructions, use regular soap.)    Don't shave or clip hair near your surgery site. We'll remove the hair if needed.    Don't smoke or vape the morning of surgery. You may chew nicotine gum up to 2 hours before surgery. A nicotine patch is okay.  ? Note: Some surgeries require you to completely quit smoking and nicotine. Check with your surgeon.    Your care team will make every effort to keep you safe from infection. We will:  ? Clean our hands often with soap and water (or an alcohol-based hand rub).  ? Clean the skin at your surgery site with a special soap that kills germs.  ? Give you a special gown to keep you warm. (Cold raises the risk of infection.)  ? Wear special hair covers, masks, gowns and gloves during surgery.  ? Give antibiotic medicine, if prescribed. Not all surgeries need antibiotics.  What to bring on the day of surgery    Photo ID and insurance card    Copy of your health care directive, if  you have one    Glasses and hearing aides (bring cases)  ? You can't wear contacts during surgery    Inhaler and eye drops, if you use them (tell us about these when you arrive)    CPAP machine or breathing device, if you use them    A few personal items, if spending the night    If you have . . .  ? A pacemaker or ICD (cardiac defibrillator): Bring the ID card.  ? An implanted stimulator: Bring the remote control.  ? A legal guardian: Bring a copy of the certified (court-stamped) guardianship papers.  Please remove any jewelry, including body piercings. Leave jewelry and other valuables at home.  If you're going home the day of surgery  Important: If you don't follow the rules below, we must cancel your surgery.     Arrange for someone to drive you home after surgery. You may not drive, take a taxi or take public transportation by yourself (unless you'll have local anesthesia only).    Arrange for a responsible adult to stay with you overnight. If you don't, we may keep you in the hospital overnight, and you may need to pay the costs yourself.  Questions?   If you have any questions for your care team, list them here: _________________________________________________________________________________________________________________________________________________________________________________________________________________________________________________________________________________________________________________________  For informational purposes only. Not to replace the advice of your health care provider. Copyright   2003, 2019 [facility]. All rights reserved. Clinically reviewed by Shanika Larson MD. [employer] [id number] - REV 4/20.

## 2021-05-28 NOTE — PROGRESS NOTES
[facility]  [address]  [address]  [facility] [address]  Phone: [phone]  Fax: [phone]  Primary Provider: Randa Hylton  Pre-op Performing Provider: RANDA HYLTON      PREOPERATIVE EVALUATION:  Today's date: 5/28/2021    Sharmin Nieves is a 50 year old female who presents for a preoperative evaluation.    Surgical Information:  Surgery/Procedure: Degenerative joint disease of foot   Surgery Location: [facility] [place]   Surgeon: Conor Guillen   Surgery Date: 6/9/21  Time of Surgery: unknown until surgery date   Where patient plans to recover: At home with family  Fax number for surgical facility: unknown    Type of Anesthesia Anticipated: Local with MAC    Assessment & Plan     The proposed surgical procedure is considered INTERMEDIATE risk.    Preop general physical exam    - **Basic metabolic panel FUTURE anytime; Future  - **CBC with platelets FUTURE anytime; Future    Primary osteoarthritis of right foot    - **Basic metabolic panel FUTURE anytime; Future  - **CBC with platelets FUTURE anytime; Future    Benign essential hypertension  Well controlled - can consider decreasing losartan after surgery    Memory changes  Consider gabapentin as this is recently experienced and gabapentin is new.  She isn't sure if it is doing much for her right now so it would be reasonable to take 300 mg BID instead of 600 mg BID, but if feeling more cravings or withdraw-type effects return to the 600 mg until post-op and can start on naltrexone    Pain of right great toe  Refill will be due on Monday which is clinic holiday so refilling early.  Anticipate that this should be last refill.  - HYDROcodone-acetaminophen (NORCO) 5-325 MG tablet; Take 1 tablet by mouth daily as needed for severe pain    Possible Sleep Apnea: unknown   STOP-Bang Total Score 5/28/2021   Total Score 2   Risk Stratification 0 - 2: Low Risk for KORI       Risks and  Recommendations:  The patient has the following additional risks and recommendations for perioperative complications:  Social and Substance:   - Early sobriety (5 weeks!) from alcohol  - Small daily dose Norco since 4/27 to cover orthopedic pain until surgery  Patient is one week without cigarettes (!)    Medication Instructions:  Patient is to take all scheduled medications on the day of surgery EXCEPT for modifications listed below:   - ACE/ARB: May be continued on the day of surgery.    - Beta Blockers: Continue taking on the day of surgery.   - Opioids: Continue without modification. Ok morning of surgery if needed.    -  gabapentin: Continue without modification other than as noted in patient instructions.   - celecoxib (Celebrex): HOLD 3 days before surgery. May continue without modification for management of severe pain.    - SSRIs, SNRIs, TCAs, Antipsychotics: Continue without modification.     RECOMMENDATION:  APPROVAL GIVEN to proceed with proposed procedure, without further diagnostic evaluation.    Review of external notes as documented above     43 minutes spent on the date of the encounter doing chart review, history and exam, documentation and further activities per the note        Subjective     HPI related to upcoming procedure: R great toe fusion. No cartilage left and it now bone rubbing bone. Going to make it so it does not bend and cause pain.     Health Care Directive:  Patient does not have a Health Care Directive or Living Will: Discussed advance care planning with patient; however, patient declined at this time.    Preoperative Review of :   reviewed - controlled substances reflected in medication list.      Status of Chronic Conditions:  DEPRESSION - Patient has a long history of Depression of moderate severity requiring medication for control with recent symptoms being stable.    HYPERTENSION - Patient has longstanding history of HTN , currently denies any symptoms referable to  elevated blood pressure. Specifically denies chest pain, palpitations, dyspnea, orthopnea, PND or peripheral edema. Blood pressure readings have been in normal range. Current medication regimen is as listed below. Patient denies any side effects of medication.       Review of Systems  CONSTITUTIONAL: NEGATIVE for fever, chills, change in weight  INTEGUMENTARY/SKIN: NEGATIVE for worrisome rashes, moles or lesions  EYES: NEGATIVE for vision changes or irritation  ENT/MOUTH: NEGATIVE for ear, mouth and throat problems  RESP: NEGATIVE for significant cough or SOB  BREAST: NEGATIVE for masses, tenderness or discharge  CV: NEGATIVE for chest pain, palpitations or peripheral edema  GI: NEGATIVE for nausea, abdominal pain, heartburn, or change in bowel habits  : NEGATIVE for frequency, dysuria, or hematuria  MUSCULOSKELETAL: NEGATIVE for significant arthralgias or myalgia  NEURO: NEGATIVE for weakness, dizziness or paresthesias  ENDOCRINE: NEGATIVE for temperature intolerance, skin/hair changes  HEME: NEGATIVE for bleeding problems  PSYCHIATRIC: NEGATIVE for changes in mood or affect    Night sweats- profusely when drinking- been going on for a couple years  Sober for 5 weeks- residual from withdrawals? Still sweating although it has gotten better since quitting drinking  Has IUD so does not get period so unclear if other perimenopausal symptoms.   Change in bowel habits- stools are now formed since quitting drinking.     Patient Active Problem List    Diagnosis Date Noted     Degenerative joint disease of foot 05/03/2021     Priority: Medium     Added automatically from request for surgery [id number]       Alcohol abuse 04/19/2019     Priority: Medium     Encounter for therapeutic drug monitoring 02/22/2019     Priority: Medium     Benign essential hypertension 11/30/2018     Priority: Medium     Situational anxiety 11/30/2018     Priority: Medium     Irregular heart beat 11/30/2018     Priority: Medium     Restless leg  syndrome 09/16/2016     Priority: Medium     Dry skin 04/15/2013     Priority: Medium     Generalized anxiety disorder 03/18/2013     Priority: Medium     Diagnosis updated by automated process. Provider to review and confirm.       Home Health Care 02/08/2012     Priority: Medium     EMERGENCY CARE PLAN  April 15, 2013: No current Care Coordination follow up planned. Please refer if Care Coordination services are needed.    Presenting Problem Signs and Symptoms Treatment Plan   Questions or concerns   during clinic hours   I will call my clinic directly:  [address] [address]  [phone].   Questions or concerns outside clinic hours   I will call the 24 hour nurse line at   [phone] or [facility].   Need to schedule an appointment   I will call the 24 hour scheduling team at [phone] or my clinic directly at [phone].    Same day treatment     I will call my clinic first, nurse line if after hours, urgent care and express care if needed.   Clinic care coordination services (regular clinic hours)     I will call a clinic care coordinator directly:     Azar Howard RN  Mon, Tues, Fri - [phone]  Wed, Thurs - [phone]    Jeny Sellers :    [phone]    Or call my clinic at [phone] and ask to speak with care coordination.   Crisis Services: Behavioral or Mental Health  [facility] 24 Hour Phone Line  [phone]    [facility] 24 Hour Crisis Services  [phone]    [facility] (Behavioral Health Providers) Network [phone]    Located within [facility]   [phone]       Emergency treatment -- Immediately    CAll 911            Insomnia 09/20/2011     Priority: Medium     Health Care Home 06/07/2011     Priority: Medium     X  DX V65.8 REPLACED WITH [id number] C[facility] HOME (04/08/2013)       Rectal pain 05/16/2011     Priority: Medium     Moderate major depression (H) 04/15/2011     Priority: Medium     Previous med  trials  Prozac (fluoxetine) YES  Zoloft (sertraline) YES  Celexa (citalopram) YES  Lexapro (escitalopram) YES  Paxil (paroxetine) no  Wellbutrin (bupropion) YES  Effexor (venlafaxine) YES  Others:  YES- Cymbalta         CARDIOVASCULAR SCREENING; LDL GOAL LESS THAN 160 10/31/2010     Priority: Medium     Chronic low back pain 05/11/2010     Priority: Medium     Right hip pain 05/11/2010     Priority: Medium     Ovarian cyst 12/05/2006     Priority: Medium     Problem list name updated by automated process. Provider to review       Irregular menstrual cycle 12/05/2006     Priority: Medium     Esophageal reflux 12/14/2005     Priority: Medium     with associated esophageal spasm       IUD (intrauterine device) in place 10/03/2005     Priority: Medium     7/10/15 Mirena IUD replaced after 6 1/2 years.  Placed today without difficulty.       Tobacco use disorder 06/08/2005     Priority: Medium     Herpes simplex virus (HSV) infection 06/17/2004     Priority: Medium     Problem list name updated by automated process. Provider to review        Past Medical History:   Diagnosis Date     Acne      Anxiety      Anxiety state, unspecified     Anxiety     Benign essential hypertension 11/30/2018     Chronic low back pain 5/11/2010     Degenerative joint disease of foot 5/3/2021     Depression     Dr. Gaytan     Diaphragmatic hernia without mention of obstruction or gangrene      Esophageal reflux     with associated esophageal spasm     ETOH abuse      Foot pain      Herpes simplex without mention of complication      Moderate major depression (H) 4/15/2011     Rectal pain 5/16/2011     Right hip pain 5/11/2010     Seasonal affective disorder (H)      Surveillance of previously prescribed intrauterine contraceptive device 10/03/05    mirena IUD     Past Surgical History:   Procedure Laterality Date     C STOMACH SURGERY PROCEDURE UNLISTED      Nissen     CHEILECTOMY Right 12/21/2017    Procedure: CHEILECTOMY;  RIGHT FOOT  CHEILECTOMY;  Surgeon: Mo Edgar DPM;  Location:  OR     SURGICAL HISTORY OF -   4/04    Lapr Nissen fundoplication     TONSILLECTOMY & ADENOIDECTOMY  1985     [facility] NONSPECIFIC PROCEDURE  1992    CRYO SURGERY for abnl paps     Current Outpatient Medications   Medication Sig Dispense Refill     celecoxib (CELEBREX) 100 MG capsule Take 1 capsule (100 mg) by mouth 2 times daily as needed for moderate pain 180 capsule 0     cholecalciferol (VITAMIN D3) 125 mcg (5000 units) capsule Take 1 capsule by mouth daily       diclofenac (VOLTAREN) 1 % topical gel Apply topically 4 times daily       Digital Therapy (RESET FOR IOS OR ANDROID MONA) MISC 1 each daily 1 each 0     disulfiram (ANTABUSE) 250 MG tablet Take 1 tablet (250 mg) by mouth daily 30 tablet 1     folic acid (FOLVITE) 1 MG tablet Take 1 tablet (1 mg) by mouth daily 90 tablet 1     gabapentin (NEURONTIN) 300 MG capsule Take 1 capsule (300 mg) by mouth 3 times daily 90 capsule 1     gabapentin (NEURONTIN) 600 MG tablet Take 1 tablet (600 mg) by mouth 3 times daily 90 tablet 1     HYDROcodone-acetaminophen (NORCO) 5-325 MG tablet Take 1 tablet by mouth daily as needed for severe pain 7 tablet 0     hydrOXYzine (ATARAX) 25 MG tablet Take 1 tablet (25 mg) by mouth every 4 hours as needed for anxiety 120 tablet 0     lamoTRIgine (LAMICTAL) 200 MG tablet Take 1 tablet (200 mg) by mouth daily 30 tablet 1     LORazepam (ATIVAN) 1 MG tablet Take 1 tablet (1 mg) by mouth every 6 hours as needed for anxiety 5 tablet 0     losartan (COZAAR) 100 MG tablet Take 1 tablet (100 mg) by mouth daily 90 tablet 1     omeprazole (PRILOSEC) 40 MG DR capsule Take 1 capsule (40 mg) by mouth daily 90 capsule 1     ondansetron (ZOFRAN) 4 MG tablet Take 1 tablet (4 mg) by mouth every 8 hours as needed for nausea 60 tablet 0     thiamine (B-1) 100 MG tablet Take 1 tablet (100 mg) by mouth daily 90 tablet 1     traZODone (DESYREL) 100 MG tablet Take 2 tablets (200 mg) by mouth At  "Bedtime 180 tablet 3     varenicline (CHANTIX REESE) 0.5 MG X 11 & 1 MG X 42 tablet Take 0.5 mg tab daily for 3 days, THEN 0.5 mg tab twice daily for 4 days, THEN 1 mg twice daily. 53 tablet 0     varenicline (CHANTIX) 1 MG tablet Take 1 tablet (1 mg) by mouth 2 times daily 180 tablet 0     venlafaxine (EFFEXOR-XR) 150 MG 24 hr capsule Take 1 capsule (150 mg) by mouth daily 90 capsule 1     levonorgestrel (MIRENA) 20 MCG/24HR IUD 1 each (20 mcg) by Intrauterine route once for 1 dose 1 each 0     propranolol (INDERAL) 20 MG tablet Take 1 tablet (20 mg) by mouth 3 times daily 270 tablet 1       Allergies   Allergen Reactions     Lisinopril Cough     cough     Nsaids      NSAIDS - Ibuprofen & Naproxen - symptoms of swelling in hands/feet, hives         Social History     Tobacco Use     Smoking status: Current Every Day Smoker     Packs/day: 0.50     Years: 15.00     Pack years: 7.50     Types: Cigarettes     Smokeless tobacco: Never Used   Substance Use Topics     Alcohol use: Yes     Comment: addict in recovery, current drinking     History   Drug Use No     Comment: addict in recovery         Objective     /64   Pulse 71   Temp 97.9  F (36.6  C) (Temporal)   Resp 18   Ht 1.755 m (5' 9.09\")   Wt 97.1 kg (214 lb)   SpO2 97%   Breastfeeding No   BMI 31.52 kg/m      Physical Exam    GENERAL APPEARANCE: healthy, alert and no distress     EYES: EOMI, PERRL     HENT: ear canals and TM's normal and nose and mouth without ulcers or lesions     NECK: no adenopathy, no asymmetry, masses, or scars and thyroid normal to palpation     RESP: lungs clear to auscultation - no rales, rhonchi or wheezes     CV: regular rates and rhythm, normal S1 S2, no S3 or S4 and no murmur, click or rub     ABDOMEN:  soft, nontender, no HSM or masses and bowel sounds normal     MS: extremities normal- no gross deformities noted, no evidence of inflammation in joints, FROM in all extremities.     SKIN: no suspicious lesions or rashes     " NEURO: Normal strength and tone, sensory exam grossly normal, mentation intact and speech normal     PSYCH: mentation appears normal. and affect normal/bright     LYMPHATICS: No cervical adenopathy    Recent Labs   Lab Test 02/16/21   POTASSIUM 3.3*   CR 0.76        Diagnostics:  Results for orders placed or performed in visit on 05/28/21   **CBC with platelets FUTURE anytime     Status: Abnormal   Result Value Ref Range    WBC 6.9 4.0 - 11.0 10e9/L    RBC Count 3.97 3.8 - 5.2 10e12/L    Hemoglobin 13.2 11.7 - 15.7 g/dL    Hematocrit 38.0 35.0 - 47.0 %    MCV 96 78 - 100 fl    MCH 33.2 (H) 26.5 - 33.0 pg    MCHC 34.7 31.5 - 36.5 g/dL    RDW 11.9 10.0 - 15.0 %    Platelet Count 251 150 - 450 10e9/L    Remainder pending   No EKG required, no history of coronary heart disease, significant arrhythmia, peripheral arterial disease or other structural heart disease.    Revised Cardiac Risk Index (RCRI):  The patient has the following serious cardiovascular risks for perioperative complications:   - No serious cardiac risks = 0 points     RCRI Interpretation: 1 point: Class II (low risk - 0.9% complication rate)           Signed Electronically by: LEDY Seaman CNP  Copy of this evaluation report is provided to requesting physician.

## 2021-05-28 NOTE — H&P (VIEW-ONLY)
[facility]  [address]  [address]  [facility] [address]  Phone: [phone]  Fax: [phone]  Primary Provider: Randa Hylton  Pre-op Performing Provider: RANDA HYLTON      PREOPERATIVE EVALUATION:  Today's date: 5/28/2021    Sharmin Nieves is a 50 year old female who presents for a preoperative evaluation.    Surgical Information:  Surgery/Procedure: Degenerative joint disease of foot   Surgery Location: [facility]   Surgeon: Conor Guillen   Surgery Date: 6/9/21  Time of Surgery: unknown until surgery date   Where patient plans to recover: At home with family  Fax number for surgical facility: unknown    Type of Anesthesia Anticipated: Local with MAC    Assessment & Plan     The proposed surgical procedure is considered INTERMEDIATE risk.    Preop general physical exam    - **Basic metabolic panel FUTURE anytime; Future  - **CBC with platelets FUTURE anytime; Future    Primary osteoarthritis of right foot    - **Basic metabolic panel FUTURE anytime; Future  - **CBC with platelets FUTURE anytime; Future    Benign essential hypertension  Well controlled - can consider decreasing losartan after surgery    Memory changes  Consider gabapentin as this is recently experienced and gabapentin is new.  She isn't sure if it is doing much for her right now so it would be reasonable to take 300 mg BID instead of 600 mg BID, but if feeling more cravings or withdraw-type effects return to the 600 mg until post-op and can start on naltrexone    Pain of right great toe  Refill will be due on Monday which is clinic holiday so refilling early.  Anticipate that this should be last refill.  - HYDROcodone-acetaminophen (NORCO) 5-325 MG tablet; Take 1 tablet by mouth daily as needed for severe pain    Possible Sleep Apnea: unknown   STOP-Bang Total Score 5/28/2021   Total Score 2   Risk Stratification 0 - 2: Low Risk for KORI       Risks and  Recommendations:  The patient has the following additional risks and recommendations for perioperative complications:  Social and Substance:   - Early sobriety (5 weeks!) from alcohol  - Small daily dose Norco since 4/27 to cover orthopedic pain until surgery  Patient is one week without cigarettes (!)    Medication Instructions:  Patient is to take all scheduled medications on the day of surgery EXCEPT for modifications listed below:   - ACE/ARB: May be continued on the day of surgery.    - Beta Blockers: Continue taking on the day of surgery.   - Opioids: Continue without modification. Ok morning of surgery if needed.    -  gabapentin: Continue without modification other than as noted in patient instructions.   - celecoxib (Celebrex): HOLD 3 days before surgery. May continue without modification for management of severe pain.    - SSRIs, SNRIs, TCAs, Antipsychotics: Continue without modification.     RECOMMENDATION:  APPROVAL GIVEN to proceed with proposed procedure, without further diagnostic evaluation.    Review of external notes as documented above     43 minutes spent on the date of the encounter doing chart review, history and exam, documentation and further activities per the note        Subjective     HPI related to upcoming procedure: R great toe fusion. No cartilage left and it now bone rubbing bone. Going to make it so it does not bend and cause pain.     Health Care Directive:  Patient does not have a Health Care Directive or Living Will: Discussed advance care planning with patient; however, patient declined at this time.    Preoperative Review of :   reviewed - controlled substances reflected in medication list.      Status of Chronic Conditions:  DEPRESSION - Patient has a long history of Depression of moderate severity requiring medication for control with recent symptoms being stable.    HYPERTENSION - Patient has longstanding history of HTN , currently denies any symptoms referable to  elevated blood pressure. Specifically denies chest pain, palpitations, dyspnea, orthopnea, PND or peripheral edema. Blood pressure readings have been in normal range. Current medication regimen is as listed below. Patient denies any side effects of medication.       Review of Systems  CONSTITUTIONAL: NEGATIVE for fever, chills, change in weight  INTEGUMENTARY/SKIN: NEGATIVE for worrisome rashes, moles or lesions  EYES: NEGATIVE for vision changes or irritation  ENT/MOUTH: NEGATIVE for ear, mouth and throat problems  RESP: NEGATIVE for significant cough or SOB  BREAST: NEGATIVE for masses, tenderness or discharge  CV: NEGATIVE for chest pain, palpitations or peripheral edema  GI: NEGATIVE for nausea, abdominal pain, heartburn, or change in bowel habits  : NEGATIVE for frequency, dysuria, or hematuria  MUSCULOSKELETAL: NEGATIVE for significant arthralgias or myalgia  NEURO: NEGATIVE for weakness, dizziness or paresthesias  ENDOCRINE: NEGATIVE for temperature intolerance, skin/hair changes  HEME: NEGATIVE for bleeding problems  PSYCHIATRIC: NEGATIVE for changes in mood or affect    Night sweats- profusely when drinking- been going on for a couple years  Sober for 5 weeks- residual from withdrawals? Still sweating although it has gotten better since quitting drinking  Has IUD so does not get period so unclear if other perimenopausal symptoms.   Change in bowel habits- stools are now formed since quitting drinking.     Patient Active Problem List    Diagnosis Date Noted     Degenerative joint disease of foot 05/03/2021     Priority: Medium     Added automatically from request for surgery [id number]       Alcohol abuse 04/19/2019     Priority: Medium     Encounter for therapeutic drug monitoring 02/22/2019     Priority: Medium     Benign essential hypertension 11/30/2018     Priority: Medium     Situational anxiety 11/30/2018     Priority: Medium     Irregular heart beat 11/30/2018     Priority: Medium     Restless leg  syndrome 09/16/2016     Priority: Medium     Dry skin 04/15/2013     Priority: Medium     Generalized anxiety disorder 03/18/2013     Priority: Medium     Diagnosis updated by automated process. Provider to review and confirm.       Home Health Care 02/08/2012     Priority: Medium     EMERGENCY CARE PLAN  April 15, 2013: No current Care Coordination follow up planned. Please refer if Care Coordination services are needed.    Presenting Problem Signs and Symptoms Treatment Plan   Questions or concerns   during clinic hours   I will call my clinic directly:  [address] [address]  [phone].   Questions or concerns outside clinic hours   I will call the 24 hour nurse line at   [phone] or [facility].   Need to schedule an appointment   I will call the 24 hour scheduling team at [phone] or my clinic directly at [phone].    Same day treatment     I will call my clinic first, nurse line if after hours, urgent care and express care if needed.   Clinic care coordination services (regular clinic hours)     I will call a clinic care coordinator directly:     Azar Howard RN  Mon, Tues, Fri - [phone]  Wed, Thurs - [phone]    Jeny Sellers :    [phone]    Or call my clinic at [phone] and ask to speak with care coordination.   Crisis Services: Behavioral or Mental Health  [facility] 24 Hour Phone Line  [phone]    [facility] 24 Hour Crisis Services  [phone]    [facility] [phone]    [facility]   [phone]       Emergency treatment -- Immediately    [facility] 911            Insomnia 09/20/2011     Priority: Medium     Health Care Home 06/07/2011     Priority: Medium     X  DX V65.8 REPLACED WITH 21053 HEALTH CARE HOME (04/08/2013)       Rectal pain 05/16/2011     Priority: Medium     Moderate major depression (H) 04/15/2011     Priority: Medium     Previous med  trials  Prozac (fluoxetine) YES  Zoloft (sertraline) YES  Celexa (citalopram) YES  Lexapro (escitalopram) YES  Paxil (paroxetine) no  Wellbutrin (bupropion) YES  Effexor (venlafaxine) YES  Others:  YES- Cymbalta         CARDIOVASCULAR SCREENING; LDL GOAL LESS THAN 160 10/31/2010     Priority: Medium     Chronic low back pain 05/11/2010     Priority: Medium     Right hip pain 05/11/2010     Priority: Medium     Ovarian cyst 12/05/2006     Priority: Medium     Problem list name updated by automated process. Provider to review       Irregular menstrual cycle 12/05/2006     Priority: Medium     Esophageal reflux 12/14/2005     Priority: Medium     with associated esophageal spasm       IUD (intrauterine device) in place 10/03/2005     Priority: Medium     7/10/15 Mirena IUD replaced after 6 1/2 years.  Placed today without difficulty.       Tobacco use disorder 06/08/2005     Priority: Medium     Herpes simplex virus (HSV) infection 06/17/2004     Priority: Medium     Problem list name updated by automated process. Provider to review        Past Medical History:   Diagnosis Date     Acne      Anxiety      Anxiety state, unspecified     Anxiety     Benign essential hypertension 11/30/2018     Chronic low back pain 5/11/2010     Degenerative joint disease of foot 5/3/2021     Depression     Dr. Gaytan     Diaphragmatic hernia without mention of obstruction or gangrene      Esophageal reflux     with associated esophageal spasm     ETOH abuse      Foot pain      Herpes simplex without mention of complication      Moderate major depression (H) 4/15/2011     Rectal pain 5/16/2011     Right hip pain 5/11/2010     Seasonal affective disorder (H)      Surveillance of previously prescribed intrauterine contraceptive device 10/03/05    mirena IUD     Past Surgical History:   Procedure Laterality Date     C STOMACH SURGERY PROCEDURE UNLISTED      Nissen     CHEILECTOMY Right 12/21/2017    Procedure: CHEILECTOMY;  RIGHT FOOT  CHEILECTOMY;  Surgeon: Mo Edgar DPM;  Location:  OR     SURGICAL HISTORY OF -   4/04    Lapr Nissen fundoplication     TONSILLECTOMY & ADENOIDECTOMY  1985     [facility] NONSPECIFIC PROCEDURE  1992    CRYO SURGERY for abnl paps     Current Outpatient Medications   Medication Sig Dispense Refill     celecoxib (CELEBREX) 100 MG capsule Take 1 capsule (100 mg) by mouth 2 times daily as needed for moderate pain 180 capsule 0     cholecalciferol (VITAMIN D3) 125 mcg (5000 units) capsule Take 1 capsule by mouth daily       diclofenac (VOLTAREN) 1 % topical gel Apply topically 4 times daily       Digital Therapy (RESET FOR IOS OR ANDROID MONA) MISC 1 each daily 1 each 0     disulfiram (ANTABUSE) 250 MG tablet Take 1 tablet (250 mg) by mouth daily 30 tablet 1     folic acid (FOLVITE) 1 MG tablet Take 1 tablet (1 mg) by mouth daily 90 tablet 1     gabapentin (NEURONTIN) 300 MG capsule Take 1 capsule (300 mg) by mouth 3 times daily 90 capsule 1     gabapentin (NEURONTIN) 600 MG tablet Take 1 tablet (600 mg) by mouth 3 times daily 90 tablet 1     HYDROcodone-acetaminophen (NORCO) 5-325 MG tablet Take 1 tablet by mouth daily as needed for severe pain 7 tablet 0     hydrOXYzine (ATARAX) 25 MG tablet Take 1 tablet (25 mg) by mouth every 4 hours as needed for anxiety 120 tablet 0     lamoTRIgine (LAMICTAL) 200 MG tablet Take 1 tablet (200 mg) by mouth daily 30 tablet 1     LORazepam (ATIVAN) 1 MG tablet Take 1 tablet (1 mg) by mouth every 6 hours as needed for anxiety 5 tablet 0     losartan (COZAAR) 100 MG tablet Take 1 tablet (100 mg) by mouth daily 90 tablet 1     omeprazole (PRILOSEC) 40 MG DR capsule Take 1 capsule (40 mg) by mouth daily 90 capsule 1     ondansetron (ZOFRAN) 4 MG tablet Take 1 tablet (4 mg) by mouth every 8 hours as needed for nausea 60 tablet 0     thiamine (B-1) 100 MG tablet Take 1 tablet (100 mg) by mouth daily 90 tablet 1     traZODone (DESYREL) 100 MG tablet Take 2 tablets (200 mg) by mouth At  "Bedtime 180 tablet 3     varenicline (CHANTIX REESE) 0.5 MG X 11 & 1 MG X 42 tablet Take 0.5 mg tab daily for 3 days, THEN 0.5 mg tab twice daily for 4 days, THEN 1 mg twice daily. 53 tablet 0     varenicline (CHANTIX) 1 MG tablet Take 1 tablet (1 mg) by mouth 2 times daily 180 tablet 0     venlafaxine (EFFEXOR-XR) 150 MG 24 hr capsule Take 1 capsule (150 mg) by mouth daily 90 capsule 1     levonorgestrel (MIRENA) 20 MCG/24HR IUD 1 each (20 mcg) by Intrauterine route once for 1 dose 1 each 0     propranolol (INDERAL) 20 MG tablet Take 1 tablet (20 mg) by mouth 3 times daily 270 tablet 1       Allergies   Allergen Reactions     Lisinopril Cough     cough     Nsaids      NSAIDS - Ibuprofen & Naproxen - symptoms of swelling in hands/feet, hives         Social History     Tobacco Use     Smoking status: Current Every Day Smoker     Packs/day: 0.50     Years: 15.00     Pack years: 7.50     Types: Cigarettes     Smokeless tobacco: Never Used   Substance Use Topics     Alcohol use: Yes     Comment: addict in recovery, current drinking     History   Drug Use No     Comment: addict in recovery         Objective     /64   Pulse 71   Temp 97.9  F (36.6  C) (Temporal)   Resp 18   Ht 1.755 m (5' 9.09\")   Wt 97.1 kg (214 lb)   SpO2 97%   Breastfeeding No   BMI 31.52 kg/m      Physical Exam    GENERAL APPEARANCE: healthy, alert and no distress     EYES: EOMI, PERRL     HENT: ear canals and TM's normal and nose and mouth without ulcers or lesions     NECK: no adenopathy, no asymmetry, masses, or scars and thyroid normal to palpation     RESP: lungs clear to auscultation - no rales, rhonchi or wheezes     CV: regular rates and rhythm, normal S1 S2, no S3 or S4 and no murmur, click or rub     ABDOMEN:  soft, nontender, no HSM or masses and bowel sounds normal     MS: extremities normal- no gross deformities noted, no evidence of inflammation in joints, FROM in all extremities.     SKIN: no suspicious lesions or rashes     " NEURO: Normal strength and tone, sensory exam grossly normal, mentation intact and speech normal     PSYCH: mentation appears normal. and affect normal/bright     LYMPHATICS: No cervical adenopathy    Recent Labs   Lab Test 02/16/21   POTASSIUM 3.3*   CR 0.76        Diagnostics:  Results for orders placed or performed in visit on 05/28/21   **CBC with platelets FUTURE anytime     Status: Abnormal   Result Value Ref Range    WBC 6.9 4.0 - 11.0 10e9/L    RBC Count 3.97 3.8 - 5.2 10e12/L    Hemoglobin 13.2 11.7 - 15.7 g/dL    Hematocrit 38.0 35.0 - 47.0 %    MCV 96 78 - 100 fl    MCH 33.2 (H) 26.5 - 33.0 pg    MCHC 34.7 31.5 - 36.5 g/dL    RDW 11.9 10.0 - 15.0 %    Platelet Count 251 150 - 450 10e9/L    Remainder pending   No EKG required, no history of coronary heart disease, significant arrhythmia, peripheral arterial disease or other structural heart disease.    Revised Cardiac Risk Index (RCRI):  The patient has the following serious cardiovascular risks for perioperative complications:   - No serious cardiac risks = 0 points     RCRI Interpretation: 1 point: Class II (low risk - 0.9% complication rate)           Signed Electronically by: LEDY Seaman CNP  Copy of this evaluation report is provided to requesting physician.

## 2021-06-01 ENCOUNTER — VIRTUAL VISIT (OUTPATIENT)
Dept: ADDICTION MEDICINE | Facility: CLINIC | Age: 51
End: 2021-06-01
Payer: COMMERCIAL

## 2021-06-01 DIAGNOSIS — F41.1 GENERALIZED ANXIETY DISORDER: ICD-10-CM

## 2021-06-01 DIAGNOSIS — F10.20 ALCOHOL USE DISORDER, SEVERE, DEPENDENCE (H): Primary | ICD-10-CM

## 2021-06-01 PROCEDURE — 99215 OFFICE O/P EST HI 40 MIN: CPT | Mod: 95 | Performed by: FAMILY MEDICINE

## 2021-06-01 NOTE — RESULT ENCOUNTER NOTE
Kendy,    Labs look good!!  If you have any questions, please feel free to contact the clinic.    TOMMY Clinton

## 2021-06-01 NOTE — PROGRESS NOTES
Kendy is a 50 year old who is being evaluated via a billable telephone visit.    Pt is good to have student join    What phone number would you like to be contacted at? [phone]  How would you like to obtain your AVS? MyChart

## 2021-06-01 NOTE — PROGRESS NOTES
SUBJECTIVE                                                    SUBSTANCE USE DISORDER FOLLOW UP:    Sharmin Nieves is a 50 year old female who presents to clinic today for follow up of Alcohol Use Disorder (AUD).    Visit performed Virtual, via telephone    Time on phone call: 25 minutes      Recent HPI Details  HPI May 7, 2021  - Stopped gabapentin; struggled with withdrawals  - Cravings intense; having mouth-watering at times, and randomly thoughts popping in her head  - Antabuse keeping her abstinent  - Open to trying naltrexone for a little while before surgery; surgery scheduled for June 9. Unfortunately is taking hydrocodone and is not able to take both    TODAY'S VISIT  HPI Jun 1, 2021  - Remains abstinent  - Quit smoking 2 weeks ago; was told she needs to quit before surgery  - Concerned about short term memory loss and wondering if gabapentin is making this worse  - She has had worsening memory issues with increased dose 300mg -> 600mg  - Completed pre-op last Friday; was told to potentially reduce her dose but hasn't made any significant changes yet  - Foot surgery next Wednesday  - Somewhat agitated and depressed over this past weekend  - Doing lessons with her [employer] ludmila, which are helpful. Logging into remote AA meetings roughly twice weekly  - Supported by her 17yo daughter, who is very motivating  - Has not continued meditating. 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 n/a         Brief AUBREE History   ROUTE OF SUBSTANCE ADMINISTRATION - oral alcohol ingestion     LONGEST PERIOD OF SOBRIETY - nearly 1 year just over 10 years ago              - Significant protective factors - minimal family support     PREVIOUS DETOX/TREATMENT PROGRAMS - Attended detox last week, required valium d/t hallucinations. Has attended detox 4-5 times in the past. Has attended treatment 3x - two of them just over 10 years ago, and another after a DWI 5 years ago. Initial treatment at  was helpful, stayed abstinent for nearly a year     HISTORY OF OVERDOSE - none     PREVIOUS MEDICATION ASSISTED TREATMENT - Currently taking antabuse without any craving relief. Was prescribed naltrexone, took it for 3 days when she was attempting to stop drinking.     CURRENT RECOVERY ACTIVITIES - yesterday attended 5 virtual AA meetings! Downloaded the [employer] ludmila, practicing meditation and breathing     Other Substances:     ALCOHOL- as per HPI. Starting using more heavily around age 37  MARIJUANA- last week; has a vape pen, uses this most nights for sleep  PRESCRIPTION STIMULANTS- none  COCAINE/CRACK- none  METH/AMPHETAMINES- none  OPIATES- history of using these for acute pain around surgeries, but no use outside of her prescritions  BENZODIAZEPINES- PRN use for fear of flying  KRATOM- none  HALLUCINOGENS - none  OTHER - Stays away from casinos     NICOTINE- smoke 1/2ppd              Desire to quit - yes                     Previous attempts to quit - yes, has an Rx for chantix             Infectious disease screening UTD?  Yes              - HIV test date: 2009              - HepC test date: 2010    PAST PSYCHIATRIC HISTORY - Reports history of depression and anxiety, pre-dating alcohol use.       A/P                                                    ASSESSMENT/PLAN    Diagnoses and all orders for this visit:  Alcohol use disorder, severe, dependence (H)  Generalized anxiety disorder      No orders of the defined types were  placed in this encounter.      - Encouraged her to continue antabuse, though cravings persist  - Discussed perioperative mgmt of antabuse at length. Recommendations include a discussion of possible interaction with IV medications that contain an alcohol base. Safest to discontinue 7-14 days prior to surgery to avoid enzyme blockade and nausea/vomiting from interaction with antabuse and surgical medications    - Called ortho surgeon Dr. Guillen, who had no strong preference for continuing or discontinuing    - He plans to connect me with anesthesia the day before surgery to ensure medications that have an alcohol base are avoided during surgery    - Discussed with my colleague Dr. Salazar, who is an anesthesiologist, and she indicated there is no need to use medications with an alcohol base, and that antabuse is generally safe perioperatively.  - Called patient back after this investigation. We discussed the risk of adverse effects, which should be low given her surgery will be performed under MAC with local anesthesia. Discussed the risk of stopping antabuse; patient strongly believes she will return to use if she stops antabuse.  - We agreed that the benefits of continuing antabuse outweigh the risks, including the risk of stopping prior to surgery  - Dr. Guillen discussed possibly postponing the surgery, however this will delay the start of naltrexone, which both the patient and I hope will serve to prevent cravings in the long run  - Will reduce dose of gabapentin: 300mg in AM and hold afternoon dose, but can continue 600mg at night. Having cognitive side-effects, though is also seeing benefit with craving relief, so we hope to balance these concerns  - Encouraged her to continue with AA and RES[employer] ludmila  - Will explore naltrexone after surgery completed for her foot          RTC:  2-3 weeks      ENCOUNTER FOR LONG TERM USE OF HIGH RISK MEDICATION   High Risk Drug Monitoring?  YES   Drug being monitored: gabapentin,  antabuse   Reason for drug: Alcohol Use Disorder (AUD)   What is being monitored?: Dosage, Cravings, Trigger, side effects, and continued abstinence.    Counseled the patient on the importance of having a recovery program in addition to medication to manage recovery.  Components include avoiding isolating, having willingness to change, avoiding triggers and managing cravings. Encouraged having some type of sober network and practicing honesty with trusted support person(s). Encouraged other services such as counseling, 12 step or other self-help organizations.          [facility] Board of Pharmacy Data Base Reviewed;   Consistent with patient reports and Epic records.            OBJECTIVE                                                    PHYSICAL EXAM:  There were no vitals taken for this visit.    GENERAL: healthy, alert and no distress  RESP: No respiratory distress  MENTAL STATUS EXAM  Appearance/Behavior: No appearant distress  Speech: Normal  Mood/Affect: anxiety  Insight: Adequate    LAB  No results found for any visits on 06/01/21.   LFTs completed 4/18/21, reviewed in [facility].      HISTORY                                                    Problem list reviewed & adjusted, as indicated.  Patient Active Problem List   Diagnosis     Herpes simplex virus (HSV) infection     Tobacco use disorder     IUD (intrauterine device) in place     Esophageal reflux     Ovarian cyst     Irregular menstrual cycle     Chronic low back pain     Right hip pain     CARDIOVASCULAR SCREENING; LDL GOAL LESS THAN 160     Moderate major depression (H)     Rectal pain     Health Care Home     Insomnia     Home Health Care     Generalized anxiety disorder     Dry skin     Restless leg syndrome     Benign essential hypertension     Situational anxiety     Irregular heart beat     Encounter for therapeutic drug monitoring     Alcohol abuse     Degenerative joint disease of foot         MEDICATION LIST (prior to  visit)  celecoxib (CELEBREX) 100 MG capsule, Take 1 capsule (100 mg) by mouth 2 times daily as needed for moderate pain  cholecalciferol (VITAMIN D3) 125 mcg (5000 units) capsule, Take 1 capsule by mouth daily  diclofenac (VOLTAREN) 1 % topical gel, Apply topically 4 times daily  Digital Therapy (RESET FOR IOS OR ANDROID MONA) MISC, 1 each daily  disulfiram (ANTABUSE) 250 MG tablet, Take 1 tablet (250 mg) by mouth daily  folic acid (FOLVITE) 1 MG tablet, Take 1 tablet (1 mg) by mouth daily  gabapentin (NEURONTIN) 300 MG capsule, Take 1 capsule (300 mg) by mouth 3 times daily  gabapentin (NEURONTIN) 600 MG tablet, Take 1 tablet (600 mg) by mouth 3 times daily  HYDROcodone-acetaminophen (NORCO) 5-325 MG tablet, Take 1 tablet by mouth daily as needed for severe pain  hydrOXYzine (ATARAX) 25 MG tablet, Take 1 tablet (25 mg) by mouth every 4 hours as needed for anxiety  lamoTRIgine (LAMICTAL) 200 MG tablet, Take 1 tablet (200 mg) by mouth daily  levonorgestrel (MIRENA) 20 MCG/24HR IUD, 1 each (20 mcg) by Intrauterine route once for 1 dose  LORazepam (ATIVAN) 1 MG tablet, Take 1 tablet (1 mg) by mouth every 6 hours as needed for anxiety  losartan (COZAAR) 100 MG tablet, Take 1 tablet (100 mg) by mouth daily  omeprazole (PRILOSEC) 40 MG DR capsule, Take 1 capsule (40 mg) by mouth daily  ondansetron (ZOFRAN) 4 MG tablet, Take 1 tablet (4 mg) by mouth every 8 hours as needed for nausea  propranolol (INDERAL) 20 MG tablet, Take 1 tablet (20 mg) by mouth 3 times daily  thiamine (B-1) 100 MG tablet, Take 1 tablet (100 mg) by mouth daily  traZODone (DESYREL) 100 MG tablet, Take 2 tablets (200 mg) by mouth At Bedtime  varenicline (CHANTIX) 1 MG tablet, Take 1 tablet (1 mg) by mouth 2 times daily  venlafaxine (EFFEXOR-XR) 150 MG 24 hr capsule, Take 1 capsule (150 mg) by mouth daily    No current facility-administered medications on file prior to visit.       Allergies   Allergen Reactions     Lisinopril Cough     cough     Nsaids       NSAIDS - Ibuprofen & Naproxen - symptoms of swelling in hands/feet, hives            Sterling Simons MD  [facility]  [phone]

## 2021-06-04 ENCOUNTER — MYC REFILL (OUTPATIENT)
Dept: FAMILY MEDICINE | Facility: CLINIC | Age: 51
End: 2021-06-04

## 2021-06-04 DIAGNOSIS — M79.674 PAIN OF RIGHT GREAT TOE: ICD-10-CM

## 2021-06-04 RX ORDER — HYDROCODONE BITARTRATE AND ACETAMINOPHEN 5; 325 MG/1; MG/1
1 TABLET ORAL DAILY PRN
Qty: 7 TABLET | Refills: 0 | Status: SHIPPED | OUTPATIENT
Start: 2021-06-04 | End: 2021-06-09

## 2021-06-07 DIAGNOSIS — Z11.59 ENCOUNTER FOR SCREENING FOR OTHER VIRAL DISEASES: ICD-10-CM

## 2021-06-07 LAB
SARS-COV-2 RNA RESP QL NAA+PROBE: NORMAL
SPECIMEN SOURCE: NORMAL

## 2021-06-07 PROCEDURE — 99000 SPECIMEN HANDLING OFFICE-LAB: CPT | Performed by: PATHOLOGY

## 2021-06-07 PROCEDURE — U0005 INFEC AGEN DETEC AMPLI PROBE: HCPCS | Mod: 90 | Performed by: PATHOLOGY

## 2021-06-07 PROCEDURE — U0003 INFECTIOUS AGENT DETECTION BY NUCLEIC ACID (DNA OR RNA); SEVERE ACUTE RESPIRATORY SYNDROME CORONAVIRUS 2 (SARS-COV-2) (CORONAVIRUS DISEASE [COVID-19]), AMPLIFIED PROBE TECHNIQUE, MAKING USE OF HIGH THROUGHPUT TECHNOLOGIES AS DESCRIBED BY CMS-2020-01-R: HCPCS | Mod: 90 | Performed by: PATHOLOGY

## 2021-06-08 ENCOUNTER — ANESTHESIA EVENT (OUTPATIENT)
Dept: SURGERY | Facility: AMBULATORY SURGERY CENTER | Age: 51
End: 2021-06-08
Payer: COMMERCIAL

## 2021-06-08 LAB
LABORATORY COMMENT REPORT: NORMAL
SARS-COV-2 RNA RESP QL NAA+PROBE: NEGATIVE
SPECIMEN SOURCE: NORMAL

## 2021-06-08 RX ORDER — NALOXONE HYDROCHLORIDE 0.4 MG/ML
0.2 INJECTION, SOLUTION INTRAMUSCULAR; INTRAVENOUS; SUBCUTANEOUS
Status: DISCONTINUED | OUTPATIENT
Start: 2021-06-08 | End: 2021-06-10 | Stop reason: HOSPADM

## 2021-06-08 RX ORDER — NALOXONE HYDROCHLORIDE 0.4 MG/ML
0.4 INJECTION, SOLUTION INTRAMUSCULAR; INTRAVENOUS; SUBCUTANEOUS
Status: DISCONTINUED | OUTPATIENT
Start: 2021-06-08 | End: 2021-06-10 | Stop reason: HOSPADM

## 2021-06-09 ENCOUNTER — ANESTHESIA (OUTPATIENT)
Dept: SURGERY | Facility: AMBULATORY SURGERY CENTER | Age: 51
End: 2021-06-09
Payer: COMMERCIAL

## 2021-06-09 ENCOUNTER — ANCILLARY PROCEDURE (OUTPATIENT)
Dept: RADIOLOGY | Facility: AMBULATORY SURGERY CENTER | Age: 51
End: 2021-06-09
Attending: ORTHOPAEDIC SURGERY
Payer: COMMERCIAL

## 2021-06-09 ENCOUNTER — HOSPITAL ENCOUNTER (OUTPATIENT)
Facility: AMBULATORY SURGERY CENTER | Age: 51
End: 2021-06-09
Attending: ORTHOPAEDIC SURGERY
Payer: COMMERCIAL

## 2021-06-09 VITALS
WEIGHT: 215 LBS | RESPIRATION RATE: 14 BRPM | OXYGEN SATURATION: 99 % | HEIGHT: 70 IN | DIASTOLIC BLOOD PRESSURE: 65 MMHG | BODY MASS INDEX: 30.78 KG/M2 | HEART RATE: 72 BPM | TEMPERATURE: 97.4 F | SYSTOLIC BLOOD PRESSURE: 107 MMHG

## 2021-06-09 DIAGNOSIS — M19.079: ICD-10-CM

## 2021-06-09 DIAGNOSIS — R52 PAIN: ICD-10-CM

## 2021-06-09 DIAGNOSIS — M79.671 RIGHT FOOT PAIN: Primary | ICD-10-CM

## 2021-06-09 LAB
HCG UR QL: NEGATIVE
INTERNAL QC OK POCT: YES

## 2021-06-09 PROCEDURE — 28750 FUSION OF BIG TOE JOINT: CPT | Mod: RT

## 2021-06-09 DEVICE — IMP SCR ZIM CANC HEX 4.0X30X14MM PT: Type: IMPLANTABLE DEVICE | Site: ANKLE | Status: FUNCTIONAL

## 2021-06-09 RX ORDER — DEXAMETHASONE SODIUM PHOSPHATE 4 MG/ML
INJECTION, SOLUTION INTRA-ARTICULAR; INTRALESIONAL; INTRAMUSCULAR; INTRAVENOUS; SOFT TISSUE PRN
Status: DISCONTINUED | OUTPATIENT
Start: 2021-06-09 | End: 2021-06-09

## 2021-06-09 RX ORDER — ONDANSETRON 4 MG/1
4 TABLET, ORALLY DISINTEGRATING ORAL EVERY 30 MIN PRN
Status: DISCONTINUED | OUTPATIENT
Start: 2021-06-09 | End: 2021-06-10 | Stop reason: HOSPADM

## 2021-06-09 RX ORDER — HYDROMORPHONE HYDROCHLORIDE 1 MG/ML
.3-.5 INJECTION, SOLUTION INTRAMUSCULAR; INTRAVENOUS; SUBCUTANEOUS EVERY 10 MIN PRN
Status: DISCONTINUED | OUTPATIENT
Start: 2021-06-09 | End: 2021-06-10 | Stop reason: HOSPADM

## 2021-06-09 RX ORDER — NALOXONE HYDROCHLORIDE 0.4 MG/ML
0.2 INJECTION, SOLUTION INTRAMUSCULAR; INTRAVENOUS; SUBCUTANEOUS
Status: DISCONTINUED | OUTPATIENT
Start: 2021-06-09 | End: 2021-06-10 | Stop reason: HOSPADM

## 2021-06-09 RX ORDER — FENTANYL CITRATE 50 UG/ML
25-50 INJECTION, SOLUTION INTRAMUSCULAR; INTRAVENOUS
Status: DISCONTINUED | OUTPATIENT
Start: 2021-06-09 | End: 2021-06-09 | Stop reason: HOSPADM

## 2021-06-09 RX ORDER — LIDOCAINE HYDROCHLORIDE 20 MG/ML
INJECTION, SOLUTION INFILTRATION; PERINEURAL PRN
Status: DISCONTINUED | OUTPATIENT
Start: 2021-06-09 | End: 2021-06-09

## 2021-06-09 RX ORDER — ONDANSETRON 2 MG/ML
INJECTION INTRAMUSCULAR; INTRAVENOUS PRN
Status: DISCONTINUED | OUTPATIENT
Start: 2021-06-09 | End: 2021-06-09

## 2021-06-09 RX ORDER — SODIUM CHLORIDE, SODIUM LACTATE, POTASSIUM CHLORIDE, CALCIUM CHLORIDE 600; 310; 30; 20 MG/100ML; MG/100ML; MG/100ML; MG/100ML
INJECTION, SOLUTION INTRAVENOUS CONTINUOUS PRN
Status: DISCONTINUED | OUTPATIENT
Start: 2021-06-09 | End: 2021-06-09

## 2021-06-09 RX ORDER — FLUMAZENIL 0.1 MG/ML
0.2 INJECTION, SOLUTION INTRAVENOUS
Status: DISCONTINUED | OUTPATIENT
Start: 2021-06-09 | End: 2021-06-09 | Stop reason: HOSPADM

## 2021-06-09 RX ORDER — BUPIVACAINE HYDROCHLORIDE 2.5 MG/ML
INJECTION, SOLUTION EPIDURAL; INFILTRATION; INTRACAUDAL
Status: COMPLETED | OUTPATIENT
Start: 2021-06-09 | End: 2021-06-09

## 2021-06-09 RX ORDER — ACETAMINOPHEN 325 MG/1
650 TABLET ORAL
Status: DISCONTINUED | OUTPATIENT
Start: 2021-06-09 | End: 2021-06-10 | Stop reason: HOSPADM

## 2021-06-09 RX ORDER — OXYCODONE HYDROCHLORIDE 5 MG/1
10 TABLET ORAL
Status: DISCONTINUED | OUTPATIENT
Start: 2021-06-09 | End: 2021-06-10 | Stop reason: HOSPADM

## 2021-06-09 RX ORDER — BUPIVACAINE HYDROCHLORIDE 2.5 MG/ML
INJECTION, SOLUTION EPIDURAL; INFILTRATION; INTRACAUDAL
Status: DISCONTINUED | OUTPATIENT
Start: 2021-06-09 | End: 2021-06-09

## 2021-06-09 RX ORDER — MEPERIDINE HYDROCHLORIDE 25 MG/ML
12.5 INJECTION INTRAMUSCULAR; INTRAVENOUS; SUBCUTANEOUS
Status: DISCONTINUED | OUTPATIENT
Start: 2021-06-09 | End: 2021-06-10 | Stop reason: HOSPADM

## 2021-06-09 RX ORDER — CEFAZOLIN SODIUM 2 G/50ML
2 SOLUTION INTRAVENOUS SEE ADMIN INSTRUCTIONS
Status: DISCONTINUED | OUTPATIENT
Start: 2021-06-09 | End: 2021-06-09 | Stop reason: HOSPADM

## 2021-06-09 RX ORDER — CEFAZOLIN SODIUM 2 G/50ML
2 SOLUTION INTRAVENOUS
Status: COMPLETED | OUTPATIENT
Start: 2021-06-09 | End: 2021-06-09

## 2021-06-09 RX ORDER — SODIUM CHLORIDE, SODIUM LACTATE, POTASSIUM CHLORIDE, CALCIUM CHLORIDE 600; 310; 30; 20 MG/100ML; MG/100ML; MG/100ML; MG/100ML
INJECTION, SOLUTION INTRAVENOUS CONTINUOUS
Status: DISCONTINUED | OUTPATIENT
Start: 2021-06-09 | End: 2021-06-10 | Stop reason: HOSPADM

## 2021-06-09 RX ORDER — BUPIVACAINE HYDROCHLORIDE 5 MG/ML
INJECTION, SOLUTION EPIDURAL; INTRACAUDAL
Status: DISCONTINUED | OUTPATIENT
Start: 2021-06-09 | End: 2021-06-09

## 2021-06-09 RX ORDER — ACETAMINOPHEN 325 MG/1
975 TABLET ORAL ONCE
Status: COMPLETED | OUTPATIENT
Start: 2021-06-09 | End: 2021-06-09

## 2021-06-09 RX ORDER — NALOXONE HYDROCHLORIDE 0.4 MG/ML
0.4 INJECTION, SOLUTION INTRAMUSCULAR; INTRAVENOUS; SUBCUTANEOUS
Status: DISCONTINUED | OUTPATIENT
Start: 2021-06-09 | End: 2021-06-10 | Stop reason: HOSPADM

## 2021-06-09 RX ORDER — GABAPENTIN 300 MG/1
300 CAPSULE ORAL ONCE
Status: COMPLETED | OUTPATIENT
Start: 2021-06-09 | End: 2021-06-09

## 2021-06-09 RX ORDER — BUPIVACAINE HYDROCHLORIDE 5 MG/ML
INJECTION, SOLUTION EPIDURAL; INTRACAUDAL
Status: COMPLETED | OUTPATIENT
Start: 2021-06-09 | End: 2021-06-09

## 2021-06-09 RX ORDER — ONDANSETRON 2 MG/ML
4 INJECTION INTRAMUSCULAR; INTRAVENOUS EVERY 30 MIN PRN
Status: DISCONTINUED | OUTPATIENT
Start: 2021-06-09 | End: 2021-06-10 | Stop reason: HOSPADM

## 2021-06-09 RX ORDER — OXYCODONE HYDROCHLORIDE 5 MG/1
5-10 TABLET ORAL EVERY 4 HOURS PRN
Qty: 30 TABLET | Refills: 0 | Status: SHIPPED | OUTPATIENT
Start: 2021-06-09 | End: 2021-06-11

## 2021-06-09 RX ORDER — PROPOFOL 10 MG/ML
INJECTION, EMULSION INTRAVENOUS PRN
Status: DISCONTINUED | OUTPATIENT
Start: 2021-06-09 | End: 2021-06-09

## 2021-06-09 RX ORDER — PROPOFOL 10 MG/ML
INJECTION, EMULSION INTRAVENOUS CONTINUOUS PRN
Status: DISCONTINUED | OUTPATIENT
Start: 2021-06-09 | End: 2021-06-09

## 2021-06-09 RX ORDER — OXYCODONE HYDROCHLORIDE 5 MG/1
5 TABLET ORAL EVERY 4 HOURS PRN
Status: DISCONTINUED | OUTPATIENT
Start: 2021-06-09 | End: 2021-06-10 | Stop reason: HOSPADM

## 2021-06-09 RX ADMIN — ACETAMINOPHEN 975 MG: 325 TABLET ORAL at 08:06

## 2021-06-09 RX ADMIN — FENTANYL CITRATE 25 MCG: 50 INJECTION, SOLUTION INTRAMUSCULAR; INTRAVENOUS at 10:19

## 2021-06-09 RX ADMIN — BUPIVACAINE HYDROCHLORIDE 10 ML: 5 INJECTION, SOLUTION EPIDURAL; INTRACAUDAL at 08:20

## 2021-06-09 RX ADMIN — FENTANYL CITRATE 25 MCG: 50 INJECTION, SOLUTION INTRAMUSCULAR; INTRAVENOUS at 10:03

## 2021-06-09 RX ADMIN — DEXAMETHASONE SODIUM PHOSPHATE 4 MG: 4 INJECTION, SOLUTION INTRA-ARTICULAR; INTRALESIONAL; INTRAMUSCULAR; INTRAVENOUS; SOFT TISSUE at 09:24

## 2021-06-09 RX ADMIN — LIDOCAINE HYDROCHLORIDE 100 MG: 20 INJECTION, SOLUTION INFILTRATION; PERINEURAL at 09:24

## 2021-06-09 RX ADMIN — OXYCODONE HYDROCHLORIDE 5 MG: 5 TABLET ORAL at 10:21

## 2021-06-09 RX ADMIN — PROPOFOL 150 MCG/KG/MIN: 10 INJECTION, EMULSION INTRAVENOUS at 09:24

## 2021-06-09 RX ADMIN — HYDROMORPHONE HYDROCHLORIDE 0.3 MG: 1 INJECTION, SOLUTION INTRAMUSCULAR; INTRAVENOUS; SUBCUTANEOUS at 10:28

## 2021-06-09 RX ADMIN — ONDANSETRON 4 MG: 2 INJECTION INTRAMUSCULAR; INTRAVENOUS at 09:24

## 2021-06-09 RX ADMIN — PROPOFOL 200 MG: 10 INJECTION, EMULSION INTRAVENOUS at 09:24

## 2021-06-09 RX ADMIN — FENTANYL CITRATE 25 MCG: 50 INJECTION, SOLUTION INTRAMUSCULAR; INTRAVENOUS at 10:13

## 2021-06-09 RX ADMIN — CEFAZOLIN SODIUM 2 G: 2 SOLUTION INTRAVENOUS at 09:19

## 2021-06-09 RX ADMIN — GABAPENTIN 300 MG: 300 CAPSULE ORAL at 08:06

## 2021-06-09 RX ADMIN — SODIUM CHLORIDE, SODIUM LACTATE, POTASSIUM CHLORIDE, CALCIUM CHLORIDE: 600; 310; 30; 20 INJECTION, SOLUTION INTRAVENOUS at 09:19

## 2021-06-09 RX ADMIN — FENTANYL CITRATE 50 MCG: 50 INJECTION, SOLUTION INTRAMUSCULAR; INTRAVENOUS at 09:28

## 2021-06-09 RX ADMIN — FENTANYL CITRATE 25 MCG: 50 INJECTION, SOLUTION INTRAMUSCULAR; INTRAVENOUS at 10:08

## 2021-06-09 RX ADMIN — BUPIVACAINE HYDROCHLORIDE 10 ML: 2.5 INJECTION, SOLUTION EPIDURAL; INFILTRATION; INTRACAUDAL at 08:20

## 2021-06-09 ASSESSMENT — LIFESTYLE VARIABLES: TOBACCO_USE: 1

## 2021-06-09 ASSESSMENT — MIFFLIN-ST. JEOR: SCORE: 1677.54

## 2021-06-09 NOTE — ANESTHESIA CARE TRANSFER NOTE
Patient: Sharmin Nieves    Procedure(s):  Right 1st metatarsophalangeal joint fuison    Diagnosis: Degenerative joint disease of foot [M19.079]  Diagnosis Additional Information: No value filed.    Anesthesia Type:   General     Note:      Level of Consciousness: awake  Oxygen Supplementation: room air    Independent Airway: airway patency satisfactory and stable  Dentition: dentition unchanged  Vital Signs Stable: post-procedure vital signs reviewed and stable  Report to RN Given: handoff report given  Patient transferred to: PACU    Handoff Report: Identifed the Patient, Identified the Reponsible Provider, Reviewed the pertinent medical history, Discussed the surgical course, Reviewed Intra-OP anesthesia mangement and issues during anesthesia, Set expectations for post-procedure period and Allowed opportunity for questions and acknowledgement of understanding      Vitals: (Last set prior to Anesthesia Care Transfer)  CRNA VITALS  6/9/2021 0923 - 6/9/2021 0956      6/9/2021             Pulse:  84    SpO2:  98 %    Resp Rate (observed):  12        Electronically Signed By: LEDY Middleton CRNA  June 9, 2021  9:56 AM

## 2021-06-09 NOTE — BRIEF OP NOTE
[facility] Brief Operative Note    Pre-operative diagnosis: Degenerative joint disease of foot [M19.079]   Post-operative diagnosis 1st MTP joint arthritis   Procedure: Procedure(s):  Right 1st metatarsophalangeal joint fuison   Surgeon(s): Surgeon(s) and Role:     * Conor Guillen MD - Primary     * Nela Templeton PA-C - Assisting   Estimated blood loss: 10 ml   Specimens: none   Findings: See dictated operative note     Plan:  Same Day surgery discharge to home once criteria met.  CAM boot to remain on right  lower extremity and WBAT with the CAM boot on at all times.  Oxycodone and Vistaril for pain.  No dressing change on own.  Leave dressing on until 2 weeks follow up appointment.  F/U in clinic in 2 weeks    I was asked by Dr. Guillen to assist with surgery. I positioned and prepped the patient. I retracted soft tissue.   I suctioned fluids when needed. I provided traction for dissection. I helped to ligate blood vessels. I helped Dr. Guillen identify and protect important structures. The procedure was medically necessary for an assistant because Dr. Guillen needed the operative exposure and assistance that I provided. This allowed him to safely and efficiently operate. It was also important that I help ligate blood vessels to maintain hemostasis and reduce the bleeding risk. I helped with the closure of the operative incisions as well as helping with the boot/cast/splint.  The assistance that I provided reduced operative time which meant less general anesthetic for the patient. No qualified residents were available to assist.    Nela Templeton PA-C

## 2021-06-09 NOTE — OR NURSING
Right adductor canal and right popliteal nerve block with exparel admninistered. VSS throughout. Pt. Sammy. Well. 2 mg versed administered, see MAR.

## 2021-06-09 NOTE — DISCHARGE INSTRUCTIONS
"Information about liposomal bupivacaine (Exparel)    What is Liposomal Bupivacaine?    Liposomal Bupivacaine is a numbing medication that can help you manage your pain after surgery.  This medication is similar to \"novacaine,\" which is often used by the dentist.  Liposomal bupivacaine is released slowly and can help control pain for up to 72 hours.    What is the purpose of Liposomal Bupivacaine?    To manage your pain after surgery    To help you sleep better, take deep breaths, walk more comfortable, and feel up to visiting with others    How is the procedure done?    Liposomal bupivacaine is a medication given by an injection.    It is usually given right before your surgery.  If this is the case, you will be awake or sedated, but you should experience minimal pain during the procedure.    For some people, the injection may be given at the very end of your surgery.  It all depends on the type of surgery and your situation.    The procedure usually takes about 5-15 minutes.  An ultrasound machine will help the anesthesiologist insert it in the right place or the surgeon will inject it under direct vision.     A needle is used to place the numbing medication under your skin.  It provides pain relief by numbing the tissue in the area where your surgeon will make the incision.    What can I expect?    You may experience numbness, tingling, or a feeling of heaviness around the area that was injected.    If you experience any of the follow symptoms IMMEDIATELY CALL THE REGIONAL ANESTHESIA PAIN SERVICE:    Numbness or tingling occurs in areas other than around the injection site    Blurry vision    Ringing in your ears    A metallic taste in your mouth    PAGE: Dial [phone].  When prompted, enter the following 4-digit ID number:  [id number].  You will be prompted to enter your phone number; and then enter the # sign.  The clinician on call will call you back.    OR    CALL: Dial [phone].  Let the hospital  " know that you are having a problem with a nerve block and that you would like to speak to the regional anesthesia pain service right away.    You should not receive any other type of numbing medication within 4 days after receiving liposomal bupivacaine unless your anesthesiologist approves.    Post Operative Instructions: Regional Anesthetic for Lower Extremity with Liposomal Bupivacaine  General Information:   Regional anesthesia is when local anesthetic or  numbing  medication is injected around the nerves to anesthetize or  numb  the area supplied by that set of nerves. It is a type of analgesia used to control pain and decreases the need for narcotics following surgery.    Types of Regional Blocks:  Femoral: A block injected into the groin area of the operative leg of a patient having thigh or knee surgery.  Adductor Canal: A block injected into the mid thigh of the operative leg of a patient having knee or ankle surgery.  Popliteal or Distal Sciatic: A block injected into the back of the knee of the operative leg of patients having foot or ankle surgery.   Ankle:  An anesthetic medication is injected into the ankle of the operative leg of a patient having foot or toe surgery.     Procedure:   The type of anesthesia your doctor used to numb your leg will usually not wear off for 24-48 hours, but may last as long as 72 hours. You should be careful during that period, since it is possible to injure your leg without being aware of the injury. While your leg is numb you should:    Use crutches (minimal weight bearing until your motor and strength is completely back to normal)    Avoid striking or bumping your leg    Avoid extreme hot or cold    Discomfort:  You will have a tingling and prickly sensation in your leg as the feeling begins to return; you can also expect some discomfort. The amount of discomfort is unpredictable, but if you have more pain than can be controlled with pain medication, you should notify  "your physician.     Pain Medicine:   Begin taking your oral pain pills before bedtime and during the night to avoid a sudden onset of pain as part of the block wears off. Do not engage in drinking, driving, or hazardous occupations while taking pain medication.     Safety Tips for Using Crutches    Crutch Fit:    Assume good standing posture with shoulders relaxed and crutch tips 6-8 inches out from the side of the foot.    The underarm pad should fall 2-3 fingers width below the armpit.    The handgrip is positioned level with the wrist to allow 30  flexion at the elbow.    Safety Tips:    Bear weight on your hands, not on your armpits.    Do not add extra padding to the underarm pad. This will, in effect, lengthen the crutches and increase risk of nerve injury.    Wear flat, properly fitting shoes. Do not walk in stocking feet, high heels or slippers.    Household hazards:  --Throw rugs should be removed from floors.  --Stairs should be cleared of obstacles.  --Use extra caution on slippery, highly polished, littered or uneven floor surfaces.  --Check for electric cords.    Check crutch tips for excessive wear and keep wing nuts tight.    While walking, look forward with  head up  and  eyes open.  Take equal length steps.    Use BOTH crutches.    Stairs Sequence:    UP: \"Good\" leg first, followed by  bad  leg, then crutches.    DOWN: Crutches, followed by  bad  leg, \"good\" leg.     Walking with Crutches:    Move both crutches forward at the same time.    Non-Weight Bearing (NWB):  Hold the involved leg up and swing through the crutches with the involved leg. The involved leg does not touch the floor.    Toe Touch Weight Bearing (TTWB): Move the involved leg forward. Rest it lightly on the floor for balance only. Step through the crutches with the uninvolved leg.    Partial Weight Bearing (PWB): Move the involved leg forward. Step down the weight of the leg only.  Step through the crutches with the uninvolved " "leg.  Weight Bearing As Tolerated (WBAT): Move the involved leg forward. Put as much pressure through the involved leg as you can tolerate comfortably. Then step through the crutches with the uninvolved leg.[facility]  Home Care Following Anesthesia  For 24 hours after surgery:  1. Get plenty of rest.  A responsible adult must stay with you for at least 24 hours after you leave the surgery center.  2. Do not drive or use heavy equipment.  If you have weakness or tingling, don't drive or use heavy equipment until this feeling goes away.   3. Do not drink alcohol.   4. Avoid strenuous or risky activities.  Ask for help when climbing stairs.  5. You may feel lightheaded.  IF so, sit for a few minutes before standing.  Have someone help you get up.   6. If you have nausea (feel sick to your stomach): Drink only clear liquids such as apple juice, ginger ale, broth or 7-Up.  Rest may also help.  Be sure to drink enough fluids.  Move to a regular diet as you feel able.   7. You may have a slight fever.  Call the doctor if your fever is over 100 F (37.7 C) (taken under the tongue) or lasts longer than 24 hours.  8. You may have a dry mouth, a sore throat, muscle aches or trouble sleeping. These should go away after 24 hours.  9. Do not make important or legal decisions.   10. It is recommended to avoid smoking.   If you use hormonal birth control (such as the pill, patch, ring or implants):  You will need a second form of birth control for 7 days (condoms, a diaphragm or contraceptive foam).  While in the surgery center, you received a medicine called Sugammadex.  Hormonal birth control (such as the pill, patch, ring or implants) will not work as well for a week after taking this medicine.  Today you received a Marcaine or bupivacaine block to numb the nerves near your surgery site.  This is a block using local anesthetic or \"numbing\" medication injected around the nerves to anesthetize " "or \"numb\" the area supplied by those nerves.  This block is injected into the muscle layer near your surgical site.  The medication may numb the location where you had surgery for 6-18 hours, but may last up to 24 hours.  If your surgical site is an arm or leg you should be careful with your affected limb, since it is possible to injure your limb without being aware of it due to the numbing.  Until full feeling returns, you should guard against bumping or hitting your limb, and avoid extreme hot or cold temperatures on the skin.  As the block wears off, the feeling will return as a tingling or prickly sensation near your surgical site.  You will experience more discomfort from your incision as the feeling returns.  You may want to take a pain pill (a narcotic or Tylenol if this was prescribed by your surgeon) when you start to experience mild pain before the pain beccomes more severe.  If your pain medications do not control your pain you should notifiy your surgeon.    Tips for taking pain medications  To get the best pain relief possible, remember these points:    Take pain medications as directed, before pain becomes severe.    Pain medication can upset your stomach: taking it with food may help.    Constipation is a common side effect of pain medication. Drink plenty of  fluids.    Eat foods high in fiber. Take a stool softener if recommended by your doctor or pharmacist.    Do not drink alcohol, drive or operate machinery while taking pain medications.    Ask about other ways to control pain, such as with heat, ice or relaxation.    Tylenol/Acetaminophen Consumption  To help encourage the safe use of acetaminophen, the makers of TYLENOL  have lowered the maximum daily dose for single-ingredient Extra Strength TYLENOL  (acetaminophen) products sold in the [place] from 8 pills per day (4,000 mg) to 6 pills per day (3,000 mg). The dosing interval has also changed from 2 pills every 4-6 hours to 2 pills every 6 " hours.    If you feel your pain relief is insufficient, you may take Tylenol/Acetaminophen in addition to your narcotic pain medication.     Be careful not to exceed 3,000 mg of Tylenol/Acetaminophen in a 24 hour period from all sources.    If you are taking extra strength Tylenol/acetaminophen (500 mg), the maximum dose is 6 tablets in 24 hours.    If you are taking regular strength acetaminophen (325 mg), the maximum dose is 9 tablets in 24 hours.    Call a doctor for any of the followin. Signs of infection (fever, growing tenderness at the surgery site, a large amount of drainage or bleeding, severe pain, foul-smelling drainage, redness, swelling).  2. It has been over 8 to 10 hours since surgery and you are still not able to urinate (pass water).  3. Headache for over 24 hours.  4. Numbness, tingling or weakness the day after surgery (if you had spinal anesthesia).  5. Signs of Covid-19 infection (temperature over 100 degrees, shortness of breath, cough, loss of taste/smell, generalized body aches, persistent headache, chills, sore throat, nausea/vomiting/diarrhea)  Your doctor is:       Dr. Conor Guillen, Orthopaedics: [phone]               Or dial [phone] and ask for the resident on call for:  Orthopaedics  For emergency care, call the:  [facility] Emergency Department: [phone] (TTY for hearing impaired: [phone])

## 2021-06-09 NOTE — ANESTHESIA PROCEDURE NOTES
Adductor canal Procedure Note  Pre-Procedure   Staff -        Anesthesiologist:  Joseph Chirinos MD       Resident/Fellow: Kelly Carranza MD       Performed By: with residents       Procedure performed by resident/fellow/CRNA in presence of a teaching physician.         Location: pre-op       Procedure Start/Stop Times: 6/9/2021 8:20 AM       Pre-Anesthestic Checklist: patient identified, IV checked, site marked, risks and benefits discussed, informed consent, monitors and equipment checked, pre-op evaluation, at physician/surgeon's request and post-op pain management  Timeout:       Correct Patient: Yes        Correct Procedure: Yes        Correct Site: Yes        Correct Position: Yes        Correct Laterality: Yes        Site Marked: Yes  Procedure Documentation  Procedure: Adductor canal       Diagnosis: POST OPERATIVE PAIN       Laterality: right       Patient Position: supine       Patient Prep/Sterile Barriers: sterile gloves, mask       Skin prep: Chloraprep       Needle Type: short bevel       Needle Gauge: 21.        Needle Length (millimeters): 110        Ultrasound guided       1. Ultrasound was used to identify targeted nerve, plexus, vascular marker, or fascial plane and place a needle adjacent to it in real-time.       2. Ultrasound was used to visualize the spread of anesthetic in close proximity to the above referenced structure.       3. A permanent image is entered into the patient's record.    Assessment/Narrative         The placement was negative for: blood aspirated, painful injection and site bleeding       Paresthesias: No.     Bolus given via needle..        Secured via.        Insertion/Infusion Method: Single Shot       Complications: none       Injection made incrementally with aspirations every 5 mL.    Medication(s) Administered   Bupivacaine 0.25% PF (Infiltration), 10 mL  Bupivacaine liposome (Exparel) 1.3% LA inj susp (Infiltration), 5 mL  Medication Administration  Time: 6/9/2021 8:20 AM    Comments:  Adductor canal block done using 133mg Liposomal Bupivacaine. Risk and benefits discussed with patient.

## 2021-06-09 NOTE — ANESTHESIA PROCEDURE NOTES
Popliteal Procedure Note  Pre-Procedure   Staff -        Anesthesiologist:  Joseph Chirinos MD       Resident/Fellow: Kelly Carranza MD       Performed By: with residents       Procedure performed by resident/fellow/CRNA in presence of a teaching physician.         Location: pre-op       Procedure Start/Stop Times: 6/9/2021 8:20 AM       Pre-Anesthestic Checklist: patient identified, IV checked, site marked, risks and benefits discussed, informed consent, monitors and equipment checked, pre-op evaluation, at physician/surgeon's request and post-op pain management  Timeout:       Correct Patient: Yes        Correct Procedure: Yes        Correct Site: Yes        Correct Position: Yes        Correct Laterality: Yes        Site Marked: Yes  Procedure Documentation  Procedure: Popliteal       Diagnosis: POST OPERATIVE PAIN       Laterality: right       Patient Position: supine       Patient Prep/Sterile Barriers: sterile gloves, mask       Skin prep: Chloraprep       Needle Type: short bevel       Needle Gauge: 21.        Needle Length (millimeters): 100        Ultrasound guided       1. Ultrasound was used to identify targeted nerve, plexus, vascular marker, or fascial plane and place a needle adjacent to it in real-time.       2. Ultrasound was used to visualize the spread of anesthetic in close proximity to the above referenced structure.       3. A permanent image is entered into the patient's record.    Assessment/Narrative         The placement was negative for: blood aspirated, painful injection and site bleeding       Paresthesias: No.     Bolus given via needle..        Secured via.        Insertion/Infusion Method: Single Shot       Complications: none       Injection made incrementally with aspirations every 5 mL.    Medication(s) Administered   Bupivacaine 0.5% PF (Infiltration), 10 mL  Bupivacaine liposome (Exparel) 1.3% LA inj susp (Infiltration), 15 mL  Medication Administration Time:  6/9/2021 8:20 AM    Comments:  Popliteal block done using 133mg Liposomal Bupivacaine. Risk and benefits discussed with patient.

## 2021-06-09 NOTE — ANESTHESIA POSTPROCEDURE EVALUATION
Patient: Sharmin Nieves    Procedure(s):  Right 1st metatarsophalangeal joint fuison    Diagnosis:Degenerative joint disease of foot [M19.079]  Diagnosis Additional Information: No value filed.    Anesthesia Type:  General    Note:  Disposition: Outpatient   Postop Pain Control: Uneventful            Sign Out: Well controlled pain   PONV: No   Neuro/Psych: Uneventful            Sign Out: Acceptable/Baseline neuro status   Airway/Respiratory: Uneventful            Sign Out: Acceptable/Baseline resp. status   CV/Hemodynamics: Uneventful            Sign Out: Acceptable CV status; No obvious hypovolemia; No obvious fluid overload   Other NRE: NONE   DID A NON-ROUTINE EVENT OCCUR? No           Last vitals:  Vitals:    06/09/21 1030 06/09/21 1040 06/09/21 1053   BP: 109/69 114/48 107/65   Pulse: 72     Resp: 11 12 14   Temp: 36.1  C (97  F)  36.3  C (97.4  F)   SpO2: 97% 98% 99%       Last vitals prior to Anesthesia Care Transfer:  CRNA VITALS  6/9/2021 0923 - 6/9/2021 1023      6/9/2021             Pulse:  84    SpO2:  98 %    Resp Rate (observed):  12          Electronically Signed By: Kelly Garcia MD  June 9, 2021  11:49 AM

## 2021-06-09 NOTE — OP NOTE
Procedure Date: 06/09/2021 06/09/2021 PREOPERATIVE DIAGNOSIS:  Right first MTP joint arthritis.    POSTOPERATIVE DIAGNOSIS:  Right first MTP joint arthritis.    PROCEDURE:  Right first MTP joint arthrodesis.    SURGEON:  Conor Guillen MD.    ASSISTANT:  Nela Ceballos PA-C. Tukcer's assistance was required in order to provide assistance with positioning, the surgery itself, holding retractors, closure of the wound and application of immobilization devices.  At the time of the surgery, there was no available help from an orthopedic trainee.    COMPLICATIONS:  None.    DRAINS:  None.    ESTIMATED BLOOD LOSS:  Less than 20 mL.    ANESTHESIA:  General endotracheal.    INDICATIONS FOR PROCEDURE: Please refer to clinic note for further details, discussions, indications of Mrs. Nieves case.    DESCRIPTION OF PROCEDURE:  On 06/09/2021, patient was taken to surgery.  Preoperative antibiotics were administered to the patient prior to arrival to the OR.    After successful induction of general endotracheal anesthesia, she was placed supine on the operating table.  The right lower extremity was prepped and draped in sterile fashion.  After exsanguination by gravity, tourniquet cuff was inflated to 300 mmHg on the proximal third of the right thigh.    The pause for the cause was performed according to our institution's policy which confirmed laterality of the procedure.    An incision was made on the dorsal medial aspect of the first MTP joint.  Subcutaneous tissues were dissected.  We proceeded with identification of the joint, which presented a large amount of arthritic changes with barely any cartilage left.  The remaining cartilage was resected to bleeding subchondral bone was exposed with a 2.5 mm drill bit and eventually the joint was reduced.  Provisional fixation was accomplished with a wire.  We confirmed to have his physiologic alignment of the great toe.  I placed in the foot against a flat  surface.    Eventually fixation was found.  It was converted to a 4.0 mm screw x2 from proximal and dorsal to distal and plantar with excellent purchase.    We confirmed the fluoroscopic examination and 3 views of the right foot to have excellent location of the hardware as well as reduction of the joint.  These images were sent to PACS for definitive documentation.    Tourniquet was deflated.  Satisfactory hemostasis was accomplished.  Wound was closed in layers.  Sterile dressings were applied.  The patient was placed in a short Cam walker and transferred in stable condition to PACU.    PLAN:  The patient will remain weightbearing as tolerated with use of the Cam walker at all times except for hygiene for the first 6 weeks from surgery.  At the 2-week appointment sutures will be removed if indicated and she will proceed with ambulation with the use of the Cam walker at all times except for hygiene.  The patient will not pursue any physical therapy until the 6-week appointment.    At 6 weeks from surgery the patient will be reevaluated in 3 views of the right foot were obtained, and based on those findings, further recommendations will be given to the patient.    Conor Guillen MD        D: 2021   T: 2021   MT: anselmo    Name:     NAYA FERREIRAKarrie  MRN:      [id number]        Account:        [id number]   :      1970           Procedure Date: 2021     Document: [id number]

## 2021-06-09 NOTE — ANESTHESIA PREPROCEDURE EVALUATION
Anesthesia Pre-Procedure Evaluation    Patient: Sharmin Nieves   MRN: [id number] : 1970        Preoperative Diagnosis: Degenerative joint disease of foot [M19.079]   Procedure : Procedure(s):  Right 1st metatarsophalangeal joint fuison     Past Medical History:   Diagnosis Date     Acne      Anxiety      Anxiety state, unspecified     Anxiety     Benign essential hypertension 2018     Chronic low back pain 2010     Degenerative joint disease of foot 5/3/2021     Depression     Dr. Gaytan     Diaphragmatic hernia without mention of obstruction or gangrene      Esophageal reflux     with associated esophageal spasm     ETOH abuse      Foot pain      Herpes simplex without mention of complication      Moderate major depression (H) 4/15/2011     Rectal pain 2011     Right hip pain 2010     Seasonal affective disorder (H)      Surveillance of previously prescribed intrauterine contraceptive device 10/03/05    mirena IUD      Past Surgical History:   Procedure Laterality Date     C STOMACH SURGERY PROCEDURE UNLISTED      Nissen     CHEILECTOMY Right 2017    Procedure: CHEILECTOMY;  RIGHT FOOT CHEILECTOMY;  Surgeon: Mo Edgar DPM;  Location:  OR     SURGICAL HISTORY OF -       Lapr Nissen fundoplication     TONSILLECTOMY & ADENOIDECTOMY       ZZC NONSPECIFIC PROCEDURE      CRYO SURGERY for abnl paps      Allergies   Allergen Reactions     Lisinopril Cough     cough     Nsaids      NSAIDS - Ibuprofen & Naproxen - symptoms of swelling in hands/feet, hives       Social History     Tobacco Use     Smoking status: Former Smoker     Packs/day: 0.50     Years: 15.00     Pack years: 7.50     Types: Cigarettes     Quit date: 2021     Years since quittin.0     Smokeless tobacco: Never Used   Substance Use Topics     Alcohol use: Yes     Comment: addict in recovery, current drinking      Wt Readings from Last 1 Encounters:   21 97.5 kg (215 lb)         Anesthesia Evaluation   Pt has not had prior anesthetic         ROS/MED HX  ENT/Pulmonary:     (+) tobacco use,  (-) sleep apnea   Neurologic: Comment: Memory loss, likely side effect of medication/  - neg neurologic ROS     Cardiovascular:     (+) hypertension-----    METS/Exercise Tolerance:     Hematologic:  - neg hematologic  ROS     Musculoskeletal:  - neg musculoskeletal ROS     GI/Hepatic:     (+) GERD, Asymptomatic on medication,     Renal/Genitourinary:  - neg Renal ROS     Endo:  - neg endo ROS     Psychiatric/Substance Use: Comment: Alcohol abuse in remission  Stopped gabapentin due to memory loss  Started on Naltrexone.  - neg psychiatric ROS     Infectious Disease:  - neg infectious disease ROS     Malignancy:  - neg malignancy ROS     Other:            Physical Exam    Airway  airway exam normal      Mallampati: II   TM distance: > 3 FB   Neck ROM: full   Mouth opening: > 3 cm    Respiratory Devices and Support         Dental  no notable dental history         Cardiovascular   cardiovascular exam normal          Pulmonary   pulmonary exam normal                OUTSIDE LABS:  CBC:   Lab Results   Component Value Date    WBC 6.9 05/28/2021    WBC 5.7 02/22/2019    HGB 13.2 05/28/2021    HGB 14.0 02/22/2019    HCT 38.0 05/28/2021    HCT 40.6 02/22/2019     05/28/2021     02/22/2019     BMP:   Lab Results   Component Value Date     05/28/2021     11/30/2018    POTASSIUM 4.2 05/28/2021    POTASSIUM 3.3 (A) 02/16/2021    CHLORIDE 104 05/28/2021    CHLORIDE 102 11/30/2018    CO2 26 05/28/2021    CO2 27 11/30/2018    BUN 20 05/28/2021    BUN 10 11/30/2018    CR 0.74 05/28/2021    CR 0.76 02/16/2021    GLC 89 05/28/2021    GLC 98 02/16/2021     COAGS:   Lab Results   Component Value Date    PTT 30 06/28/2008    INR 1.02 06/28/2008     POC:   Lab Results   Component Value Date    HCG Negative 04/10/2009    HCGS Negative 12/21/2017     HEPATIC:   Lab Results   Component Value Date     ALBUMIN 4.1 04/19/2019    PROTTOTAL 7.4 04/19/2019    ALT 25 04/19/2019    AST 19 04/19/2019    GGT 30 04/11/2009    ALKPHOS 56 04/19/2019    BILITOTAL 0.2 04/19/2019     OTHER:   Lab Results   Component Value Date    A1C 4.6 09/03/2015    GONZALO 8.9 05/28/2021    PHOS 4.2 06/28/2008    MAG 1.9 08/14/2008    LIPASE 29 03/01/2004    AMYLASE 34 03/01/2004    TSH 0.96 11/30/2018    T4 0.96 07/20/2010    CRP <5.0 07/20/2010    SED 10 07/20/2010       Anesthesia Plan    ASA Status:  3      Anesthesia Type: General.     - Airway: LMA   Induction: Propofol.   Maintenance: Balanced.        Consents    Anesthesia Plan(s) and associated risks, benefits, and realistic alternatives discussed. Questions answered and patient/representative(s) expressed understanding.     - Discussed with:  Patient      - Extended Intubation/Ventilatory Support Discussed: No.      - Patient is DNR/DNI Status: No    Use of blood products discussed: No .     Postoperative Care    Pain management: Peripheral nerve block (Single Shot).   PONV prophylaxis: Ondansetron (or other 5HT-3), Dexamethasone or Solumedrol     Comments:    Discussed with Patient Off-Label use of Liposomal Bupivacaine (Exparel) for Nerve Block.    Relevant risks & benefits were discussed with patient.    All questions were answered and there was agreement to proceed.    Patient signed Off-Label Use of Exparel Consent Form.    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 policies.               Kelly Garcia MD

## 2021-06-16 PROCEDURE — 81025 URINE PREGNANCY TEST: CPT | Performed by: ANESTHESIOLOGY

## 2021-06-18 ENCOUNTER — VIRTUAL VISIT (OUTPATIENT)
Dept: ADDICTION MEDICINE | Facility: CLINIC | Age: 51
End: 2021-06-18
Payer: COMMERCIAL

## 2021-06-18 DIAGNOSIS — F10.20 ALCOHOL USE DISORDER, SEVERE, DEPENDENCE (H): Primary | ICD-10-CM

## 2021-06-18 DIAGNOSIS — F41.1 GENERALIZED ANXIETY DISORDER: ICD-10-CM

## 2021-06-18 PROCEDURE — 99214 OFFICE O/P EST MOD 30 MIN: CPT | Mod: 95 | Performed by: FAMILY MEDICINE

## 2021-06-18 RX ORDER — NALTREXONE HYDROCHLORIDE 50 MG/1
50 TABLET, FILM COATED ORAL DAILY
Qty: 30 TABLET | Refills: 1 | Status: SHIPPED | OUTPATIENT
Start: 2021-06-18 | End: 2021-07-14

## 2021-06-18 NOTE — PROGRESS NOTES
SUBJECTIVE                                                    SUBSTANCE USE DISORDER FOLLOW UP:    Sharmin Nieves is a 50 year old female who presents to clinic today for follow up of Alcohol Use Disorder (AUD).    Visit performed Virtual, via telephone    Time on phone call: 15 minutes      Recent HPI Details  HPI May 7, 2021  - Stopped gabapentin; struggled with withdrawals  - Cravings intense; having mouth-watering at times, and randomly thoughts popping in her head  - Antabuse keeping her abstinent  - Open to trying naltrexone for a little while before surgery; surgery scheduled for June 9. Unfortunately is taking hydrocodone and is not able to take both  HPI Jun 1, 2021  - Remains abstinent  - Quit smoking 2 weeks ago; was told she needs to quit before surgery  - Concerned about short term memory loss and wondering if gabapentin is making this worse  - She has had worsening memory issues with increased dose 300mg -> 600mg  - Completed pre-op last Friday; was told to potentially reduce her dose but hasn't made any significant changes yet  - Foot surgery next Wednesday  - Somewhat agitated and depressed over this past weekend  - Doing lessons with her RESET ludmila, which are helpful. Logging into remote AA meetings roughly twice weekly  - Supported by her 17yo daughter, who is very motivating  - Has not continued meditating. Unable to exercise much with her foot pains; looking into some yoga exercises for people with lower extremity issues  - Wondering if she should stop antabuse sometime before the surgery      TODAY'S VISIT  HPI Jun 18, 2021  - Foot surgery was successful, no concerns in general  - Was hoping to get back to work on Monday, but foot continues to hurt, so she will take another week off  - Cravings worsening; dreams have been intense for both alcohol and cigarettes. 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 n/a        Brief AUBREE History   ROUTE OF SUBSTANCE ADMINISTRATION - oral alcohol ingestion     LONGEST PERIOD OF SOBRIETY - nearly 1 year just over 10 years ago              - Significant protective factors - minimal family support     PREVIOUS DETOX/TREATMENT PROGRAMS - Attended detox last week, required valium d/t hallucinations. Has attended detox 4-5 times in the past. Has attended treatment 3x - two of them just over 10 years ago, and another after a DWI 5 years ago. Initial treatment at  was helpful, stayed abstinent for nearly a year     HISTORY OF OVERDOSE - none     PREVIOUS MEDICATION ASSISTED TREATMENT - Currently taking antabuse without any craving relief. Was prescribed naltrexone, took it for 3 days when she was attempting to stop drinking.     CURRENT RECOVERY ACTIVITIES - yesterday attended 5 virtual AA meetings! Downloaded the [employer] ludmila, practicing meditation and breathing     Other Substances:     ALCOHOL- as per HPI. Starting using more heavily around age 37  MARIJUANA- last week; has a vape pen, uses this most nights for sleep  PRESCRIPTION STIMULANTS- none  COCAINE/CRACK- none  METH/AMPHETAMINES- none  OPIATES- history of using these for acute pain around surgeries, but no use outside of her prescritions  BENZODIAZEPINES- PRN use for fear of flying  KRATOM- none  HALLUCINOGENS - none  OTHER - Stays away from casinos     NICOTINE- smoke 1/2ppd              Desire to quit - yes                     Previous attempts to quit - yes, has an Rx for chantix             Infectious disease screening  UTD?  Yes              - HIV test date: 2009              - HepC test date: 2010    PAST PSYCHIATRIC HISTORY - Reports history of depression and anxiety, pre-dating alcohol use.       A/P                                                    ASSESSMENT/PLAN    Diagnoses and all orders for this visit:  Alcohol use disorder, severe, dependence (H)  -     naltrexone (DEPADE/REVIA) 50 MG tablet; Take 1 tablet (50 mg) by mouth daily  Generalized anxiety disorder      Orders Placed This Encounter   Medications     naltrexone (DEPADE/REVIA) 50 MG tablet     Sig: Take 1 tablet (50 mg) by mouth daily     Dispense:  30 tablet     Refill:  1       - Encouraged her to continue antabuse, though cravings persist  - Adding naltrexone now that she is no longer taking opioids. Will follow-up in 2 weeks to assess efficacy.  - Advised of side-effects of naltrexone including upset stomach, nausea, headache, complete opioid blockade making opioid doses ineffective at typical doses and potentially causing precipitated withdrawal.  - Continue gabapentin at 300mg TID, but can reduce/eliminate if she notices effectiveness from naltrexone  - Encouraged her to continue with AA and RESET ludmila  - Provided resource for treatment; helped coordinate her recent Rule 25 completion with those providers (I sent email)            RTC:  2 weeks      ENCOUNTER FOR LONG TERM USE OF HIGH RISK MEDICATION   High Risk Drug Monitoring?  YES   Drug being monitored: gabapentin, antabuse, naltrexone   Reason for drug: Alcohol Use Disorder (AUD)   What is being monitored?: Dosage, Cravings, Trigger, side effects, and continued abstinence.    Counseled the patient on the importance of having a recovery program in addition to medication to manage recovery.  Components include avoiding isolating, having willingness to change, avoiding triggers and managing cravings. Encouraged having some type of sober network and practicing honesty with trusted support person(s).  Encouraged other services such as counseling, 12 step or other self-help organizations.          [facility] Board of Pharmacy Data Base Reviewed;   Consistent with patient reports and Epic records.            OBJECTIVE                                                    PHYSICAL EXAM:  There were no vitals taken for this visit.    GENERAL: healthy, alert and no distress  RESP: No respiratory distress  MENTAL STATUS EXAM  Appearance/Behavior: No appearant distress  Speech: Normal  Mood/Affect: anxiety, depression  Insight: Adequate    LAB  No results found for any visits on 06/18/21.   LFTs completed 4/18/21, reviewed in [facility] Lo.      HISTORY                                                    Problem list reviewed & adjusted, as indicated.  Patient Active Problem List   Diagnosis     Herpes simplex virus (HSV) infection     Tobacco use disorder     IUD (intrauterine device) in place     Esophageal reflux     Ovarian cyst     Irregular menstrual cycle     Chronic low back pain     Right hip pain     CARDIOVASCULAR SCREENING; LDL GOAL LESS THAN 160     Moderate major depression (H)     Rectal pain     Health Care Home     Insomnia     Home Health Care     Generalized anxiety disorder     Dry skin     Restless leg syndrome     Benign essential hypertension     Situational anxiety     Irregular heart beat     Encounter for therapeutic drug monitoring     Alcohol abuse     Degenerative joint disease of foot         MEDICATION LIST (prior to visit)  celecoxib (CELEBREX) 100 MG capsule, Take 1 capsule (100 mg) by mouth 2 times daily as needed for moderate pain  cholecalciferol (VITAMIN D3) 125 mcg (5000 units) capsule, Take 1 capsule by mouth daily  diclofenac (VOLTAREN) 1 % topical gel, Apply topically 4 times daily  Digital Therapy (RESET FOR IOS OR ANDROID MONA) MISC, 1 each daily  disulfiram (ANTABUSE) 250 MG tablet, Take 1 tablet (250 mg) by mouth daily  folic acid (FOLVITE) 1 MG tablet, Take 1 tablet (1 mg) by  mouth daily  gabapentin (NEURONTIN) 300 MG capsule, Take 1 capsule (300 mg) by mouth 3 times daily  hydrOXYzine (ATARAX) 25 MG tablet, Take 1 tablet (25 mg) by mouth every 4 hours as needed for anxiety  lamoTRIgine (LAMICTAL) 200 MG tablet, Take 1 tablet (200 mg) by mouth daily  levonorgestrel (MIRENA) 20 MCG/24HR IUD, 1 each (20 mcg) by Intrauterine route once for 1 dose  LORazepam (ATIVAN) 1 MG tablet, Take 1 tablet (1 mg) by mouth every 6 hours as needed for anxiety  losartan (COZAAR) 100 MG tablet, Take 1 tablet (100 mg) by mouth daily  omeprazole (PRILOSEC) 40 MG DR capsule, Take 1 capsule (40 mg) by mouth daily  ondansetron (ZOFRAN) 4 MG tablet, Take 1 tablet (4 mg) by mouth every 8 hours as needed for nausea  propranolol (INDERAL) 20 MG tablet, Take 1 tablet (20 mg) by mouth 3 times daily  thiamine (B-1) 100 MG tablet, Take 1 tablet (100 mg) by mouth daily  traZODone (DESYREL) 100 MG tablet, Take 2 tablets (200 mg) by mouth At Bedtime  varenicline (CHANTIX) 1 MG tablet, Take 1 tablet (1 mg) by mouth 2 times daily  venlafaxine (EFFEXOR-XR) 150 MG 24 hr capsule, Take 1 capsule (150 mg) by mouth daily    No current facility-administered medications on file prior to visit.       Allergies   Allergen Reactions     Lisinopril Cough     cough     Nsaids      NSAIDS - Ibuprofen & Naproxen - symptoms of swelling in hands/feet, hives            Sterling Simons MD  [facility]  [phone]

## 2021-06-18 NOTE — PROGRESS NOTES
Kendy is a 50 year old who is being evaluated via a billable telephone visit.      What phone number would you like to be contacted at? [phone]  How would you like to obtain your AVS? MyChart

## 2021-06-22 NOTE — PROGRESS NOTES
Reason for visit:    Sharmin Nieves came in to the clinic for a two week post op check.    Her surgery was done 6/9/21 by Dr Guillen.  She had right first MTPJ arthrodesis     Assessment:    Sharmin came into the clinic in CAM walker WBAT    The Surgical wounds were exposed and found to be well-healed and without evidence of infection; so the sutures were removed. CMS was found to be intact.    Plan:     She was placed in CAM walker that will be utilized at all times except hygiene.  She was told to WBAT. Work letter was given today to return to work on Monday.      She has an appointment to see Dr. Guillen at that time Dr. Guillen will determine further restrictions.    She has our phone number and will call with questions or problems.      Francia Thornton, ATC

## 2021-06-23 ENCOUNTER — ALLIED HEALTH/NURSE VISIT (OUTPATIENT)
Dept: ORTHOPEDICS | Facility: CLINIC | Age: 51
End: 2021-06-23
Payer: COMMERCIAL

## 2021-06-23 DIAGNOSIS — M25.571 PAIN IN JOINT, ANKLE AND FOOT, RIGHT: Primary | ICD-10-CM

## 2021-06-23 PROCEDURE — 99024 POSTOP FOLLOW-UP VISIT: CPT

## 2021-06-23 NOTE — LETTER
[facility] [address]  [address] FLOOR  [facility] [id number] [phone]        June 23, 2021    Regarding:  Sharmin Nieves  [address]  [place] [facility] [address]              To Whom It May Concern;    Sharmin Nieves was seen and evaluated in clinic. She may return to work on 6/28/2021 with the following restrictions:    -5 hours of standing work and 5 hours of seated work. Hours of standing work can increase by 1 hour each week.   -CAM walker on at all times  -Please allow for breaks to elevate    If you have any questions please call us at [phone].        Electronically signed, Conor Guillen MD

## 2021-06-26 DIAGNOSIS — F10.20 ALCOHOL USE DISORDER, SEVERE, DEPENDENCE (H): ICD-10-CM

## 2021-06-26 DIAGNOSIS — F41.1 GENERALIZED ANXIETY DISORDER: ICD-10-CM

## 2021-06-26 RX ORDER — LAMOTRIGINE 200 MG/1
TABLET ORAL
Qty: 30 TABLET | Refills: 1 | Status: SHIPPED | OUTPATIENT
Start: 2021-06-26 | End: 2021-10-12

## 2021-06-26 RX ORDER — DISULFIRAM 250 MG/1
TABLET ORAL
Qty: 30 TABLET | Refills: 1 | Status: SHIPPED | OUTPATIENT
Start: 2021-06-26 | End: 2021-10-12

## 2021-07-02 ENCOUNTER — VIRTUAL VISIT (OUTPATIENT)
Dept: ADDICTION MEDICINE | Facility: CLINIC | Age: 51
End: 2021-07-02
Payer: COMMERCIAL

## 2021-07-02 DIAGNOSIS — F41.1 GENERALIZED ANXIETY DISORDER: ICD-10-CM

## 2021-07-02 DIAGNOSIS — F10.20 ALCOHOL USE DISORDER, SEVERE, DEPENDENCE (H): Primary | ICD-10-CM

## 2021-07-02 PROCEDURE — 96127 BRIEF EMOTIONAL/BEHAV ASSMT: CPT | Mod: 95 | Performed by: FAMILY MEDICINE

## 2021-07-02 PROCEDURE — 99214 OFFICE O/P EST MOD 30 MIN: CPT | Mod: 95 | Performed by: FAMILY MEDICINE

## 2021-07-02 ASSESSMENT — ANXIETY QUESTIONNAIRES
1. FEELING NERVOUS, ANXIOUS, OR ON EDGE: SEVERAL DAYS
3. WORRYING TOO MUCH ABOUT DIFFERENT THINGS: SEVERAL DAYS
5. BEING SO RESTLESS THAT IT IS HARD TO SIT STILL: SEVERAL DAYS
4. TROUBLE RELAXING: SEVERAL DAYS
7. FEELING AFRAID AS IF SOMETHING AWFUL MIGHT HAPPEN: NOT AT ALL
6. BECOMING EASILY ANNOYED OR IRRITABLE: MORE THAN HALF THE DAYS
GAD7 TOTAL SCORE: 7
7. FEELING AFRAID AS IF SOMETHING AWFUL MIGHT HAPPEN: NOT AT ALL
2. NOT BEING ABLE TO STOP OR CONTROL WORRYING: SEVERAL DAYS
GAD7 TOTAL SCORE: 7

## 2021-07-02 NOTE — PROGRESS NOTES
SUBJECTIVE                                                    SUBSTANCE USE DISORDER FOLLOW UP:    Sharmin Nieves is a 50 year old female who presents to clinic today for follow up of Alcohol Use Disorder (AUD).    Visit performed Virtual, via telephone    Time on phone call: 12 minutes      Recent HPI Details  HPI May 7, 2021  - Stopped gabapentin; struggled with withdrawals  - Cravings intense; having mouth-watering at times, and randomly thoughts popping in her head  - Antabuse keeping her abstinent  - Open to trying naltrexone for a little while before surgery; surgery scheduled for June 9. Unfortunately is taking hydrocodone and is not able to take both  HPI Jun 1, 2021  - Remains abstinent  - Quit smoking 2 weeks ago; was told she needs to quit before surgery  - Concerned about short term memory loss and wondering if gabapentin is making this worse  - She has had worsening memory issues with increased dose 300mg -> 600mg  - Completed pre-op last Friday; was told to potentially reduce her dose but hasn't made any significant changes yet  - Foot surgery next Wednesday  - Somewhat agitated and depressed over this past weekend  - Doing lessons with her [employer], which are helpful. Logging into remote AA meetings roughly twice weekly  - Supported by her 15yo daughter, who is very motivating  - Has not continued meditating. Unable to exercise much with her foot pains; looking into some yoga exercises for people with lower extremity issues  - Wondering if she should stop antabuse sometime before the surgery  HPI Jun 18, 2021  - Foot surgery was successful, no concerns in general  - Was hoping to get back to work on Monday, but foot continues to hurt, so she will take another week off  - Cravings worsening; dreams have been intense for both alcohol and cigarettes. Remains abstinent  - Wants to try naltrexone now that she has stopped opioids  - Continues with chantix; taking zofran d/t nausea with this  "medication      TODAY'S VISIT  HPI Jul 2, 2021  - Minimal alcohol cravings, but significant tobacco cravings  - Mostly triggered by emotional swings  - Naltrexone has improved cravings, no side effects  - Continues to take antabuse as well  - Taking chantix for the past 1.5 months  - Taking gabapentin 600mg in the morning; taking propranolol and hydroxyzine for anxiety relief  - Having recurrent thoughts of drinking; some days starts to plan to stop antabuse  - Now that she is back at work, finds it easier to be distracted and have less \"stinking thinking\"        Social Determinants:    Living situation:  Lives with her adult sons and 16yo daughter   Single///partner single   Children 3 kids   Support system: Reports her family provides some support, but inconsistent   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 n/a        Brief AUBREE History   ROUTE OF SUBSTANCE ADMINISTRATION - oral alcohol ingestion     LONGEST PERIOD OF SOBRIETY - nearly 1 year just over 10 years ago              - Significant protective factors - minimal family support     PREVIOUS DETOX/TREATMENT PROGRAMS - Attended detox last week, required valium d/t hallucinations. Has attended detox 4-5 times in the past. Has attended treatment 3x - two of them just over 10 years ago, and another after a DWI 5 years ago. Initial treatment at  was helpful, stayed abstinent for nearly a year     HISTORY OF OVERDOSE - none     PREVIOUS MEDICATION ASSISTED TREATMENT - Currently taking antabuse without any craving relief. Was prescribed naltrexone, took it for 3 days when she was attempting to stop drinking.     CURRENT RECOVERY ACTIVITIES - yesterday attended 5 virtual AA meetings! Downloaded the [employer]ila, practicing meditation and breathing     Other Substances:     ALCOHOL- as per HPI. Starting using more heavily around age " 37  MARIJUANA- last week; has a vape pen, uses this most nights for sleep  PRESCRIPTION STIMULANTS- none  COCAINE/CRACK- none  METH/AMPHETAMINES- none  OPIATES- history of using these for acute pain around surgeries, but no use outside of her prescritions  BENZODIAZEPINES- PRN use for fear of flying  KRATOM- none  HALLUCINOGENS - none  OTHER - Stays away from casinos     NICOTINE- smoke 1/2ppd              Desire to quit - yes                     Previous attempts to quit - yes, has an Rx for chantix             Infectious disease screening UTD?  Yes              - HIV test date: 2009              - HepC test date: 2010    PAST PSYCHIATRIC HISTORY - Reports history of depression and anxiety, pre-dating alcohol use.       A/P                                                    ASSESSMENT/PLAN    Diagnoses and all orders for this visit:  Alcohol use disorder, severe, dependence (H)  Generalized anxiety disorder      No orders of the defined types were placed in this encounter.      - Encouraged her to continue antabuse  - Continue naltrexone  - Continue gabapentin at 300mg TID  - Anxiety overall well-controlled, especially with PRN support  - Not ready to start outpatient treatment  - Encouraged her to continue with AA and [employer]      Answers for HPI/ROS submitted by the patient on 7/2/2021   DMITRIY 7 TOTAL SCORE: 7  If you checked off any problems, how difficult have these problems made it for you to do your work, take care of things at home, or get along with other people?: Somewhat difficult  PHQ9 TOTAL SCORE: 8        RTC:  2 months      ENCOUNTER FOR LONG TERM USE OF HIGH RISK MEDICATION   High Risk Drug Monitoring?  YES   Drug being monitored: gabapentin, antabuse, naltrexone   Reason for drug: Alcohol Use Disorder (AUD)   What is being monitored?: Dosage, Cravings, Trigger, side effects, and continued abstinence.    Counseled the patient on the importance of having a recovery program in addition to medication to  manage recovery.  Components include avoiding isolating, having willingness to change, avoiding triggers and managing cravings. Encouraged having some type of sober network and practicing honesty with trusted support person(s). Encouraged other services such as counseling, 12 step or other self-help organizations.          [facility] Board of Pharmacy Data Base Reviewed;   Consistent with patient reports and Epic records.            OBJECTIVE                                                    PHYSICAL EXAM:  There were no vitals taken for this visit.    GENERAL: healthy, alert and no distress  RESP: No respiratory distress  MENTAL STATUS EXAM  Appearance/Behavior: No appearant distress  Speech: Normal  Mood/Affect: anxiety  Insight: Adequate    LAB  No results found for any visits on 07/02/21.   LFTs completed 4/18/21, reviewed in [facility].      HISTORY                                                    Problem list reviewed & adjusted, as indicated.  Patient Active Problem List   Diagnosis     Herpes simplex virus (HSV) infection     Tobacco use disorder     IUD (intrauterine device) in place     Esophageal reflux     Ovarian cyst     Irregular menstrual cycle     Chronic low back pain     Right hip pain     CARDIOVASCULAR SCREENING; LDL GOAL LESS THAN 160     Moderate major depression (H)     Rectal pain     Health Care Home     Insomnia     Home Health Care     Generalized anxiety disorder     Dry skin     Restless leg syndrome     Benign essential hypertension     Situational anxiety     Irregular heart beat     Encounter for therapeutic drug monitoring     Alcohol abuse     Degenerative joint disease of foot         MEDICATION LIST (prior to visit)  celecoxib (CELEBREX) 100 MG capsule, Take 1 capsule (100 mg) by mouth 2 times daily as needed for moderate pain  cholecalciferol (VITAMIN D3) 125 mcg (5000 units) capsule, Take 1 capsule by mouth daily  diclofenac (VOLTAREN) 1 % topical gel, Apply topically  4 times daily  Digital Therapy (RESET FOR IOS OR ANDROID MONA) MISC, 1 each daily  disulfiram (ANTABUSE) 250 MG tablet, TAKE 1 TABLET(250 MG) BY MOUTH DAILY  folic acid (FOLVITE) 1 MG tablet, Take 1 tablet (1 mg) by mouth daily  gabapentin (NEURONTIN) 300 MG capsule, Take 1 capsule (300 mg) by mouth 3 times daily  hydrOXYzine (ATARAX) 25 MG tablet, Take 1 tablet (25 mg) by mouth every 4 hours as needed for anxiety  lamoTRIgine (LAMICTAL) 200 MG tablet, TAKE 1 TABLET(200 MG) BY MOUTH DAILY  levonorgestrel (MIRENA) 20 MCG/24HR IUD, 1 each (20 mcg) by Intrauterine route once for 1 dose  LORazepam (ATIVAN) 1 MG tablet, Take 1 tablet (1 mg) by mouth every 6 hours as needed for anxiety  losartan (COZAAR) 100 MG tablet, Take 1 tablet (100 mg) by mouth daily  naltrexone (DEPADE/REVIA) 50 MG tablet, Take 1 tablet (50 mg) by mouth daily  omeprazole (PRILOSEC) 40 MG DR capsule, Take 1 capsule (40 mg) by mouth daily  ondansetron (ZOFRAN) 4 MG tablet, Take 1 tablet (4 mg) by mouth every 8 hours as needed for nausea  propranolol (INDERAL) 20 MG tablet, Take 1 tablet (20 mg) by mouth 3 times daily  thiamine (B-1) 100 MG tablet, Take 1 tablet (100 mg) by mouth daily  traZODone (DESYREL) 100 MG tablet, Take 2 tablets (200 mg) by mouth At Bedtime  varenicline (CHANTIX) 1 MG tablet, Take 1 tablet (1 mg) by mouth 2 times daily  venlafaxine (EFFEXOR-XR) 150 MG 24 hr capsule, Take 1 capsule (150 mg) by mouth daily    No current facility-administered medications on file prior to visit.       Allergies   Allergen Reactions     Lisinopril Cough     cough     Nsaids      NSAIDS - Ibuprofen & Naproxen - symptoms of swelling in hands/feet, hives            Sterling Simons MD  [facility]  [phone]

## 2021-07-02 NOTE — PROGRESS NOTES
Kendy is a 51 year old who is being evaluated via a billable telephone visit.      What phone number would you like to be contacted at? [phone]  How would you like to obtain your AVS? MyChart

## 2021-07-03 ASSESSMENT — PATIENT HEALTH QUESTIONNAIRE - PHQ9: SUM OF ALL RESPONSES TO PHQ QUESTIONS 1-9: 8

## 2021-07-03 ASSESSMENT — ANXIETY QUESTIONNAIRES: GAD7 TOTAL SCORE: 7

## 2021-07-14 ENCOUNTER — VIRTUAL VISIT (OUTPATIENT)
Dept: FAMILY MEDICINE | Facility: CLINIC | Age: 51
End: 2021-07-14
Payer: COMMERCIAL

## 2021-07-14 DIAGNOSIS — Z98.890 HISTORY OF FOOT SURGERY: ICD-10-CM

## 2021-07-14 DIAGNOSIS — M79.674 PAIN OF RIGHT GREAT TOE: ICD-10-CM

## 2021-07-14 DIAGNOSIS — S90.821A: Primary | ICD-10-CM

## 2021-07-14 PROCEDURE — 99214 OFFICE O/P EST MOD 30 MIN: CPT | Mod: GT | Performed by: FAMILY MEDICINE

## 2021-07-14 RX ORDER — GABAPENTIN 300 MG/1
300 CAPSULE ORAL DAILY
Qty: 90 CAPSULE | Refills: 1 | Status: SHIPPED | OUTPATIENT
Start: 2021-07-14 | End: 2021-11-02

## 2021-07-14 RX ORDER — CELECOXIB 100 MG/1
200 CAPSULE ORAL 2 TIMES DAILY PRN
Qty: 120 CAPSULE | Refills: 1 | Status: SHIPPED | OUTPATIENT
Start: 2021-07-14 | End: 2021-10-28

## 2021-07-14 NOTE — PATIENT INSTRUCTIONS
Increase celecoxib 100 mg 2 bid prn    Try to reduce hours on feet    Followup Dr Guillen next week

## 2021-07-14 NOTE — PROGRESS NOTES
"Kendy is a 51 year old who is being evaluated via a billable video visit.      How would you like to obtain your AVS? MyChart  If the video visit is dropped, the invitation should be resent by: Text to cell phone: [phone]   Will anyone else be joining your video visit? No     Video Start Time: 5:27 PM    Assessment & Plan     Pain of right great toe  Due to plantar blister  - celecoxib (CELEBREX) 100 MG capsule; Take 2 capsules (200 mg) by mouth 2 times daily as needed for moderate pain    History of foot surgery  06/09/2021    Review of external notes as documented elsewhere in note  Prescription drug management  30 minutes spent on the date of the encounter doing chart review, history and exam, documentation and further activities per the note     BMI:   Estimated body mass index is 31.57 kg/m  as calculated from the following:    Height as of 8/24/21: 1.778 m (5' 10\").    Weight as of 8/24/21: 99.8 kg (220 lb).   Weight management plan: Discussed healthy diet and exercise guidelines    Patient Instructions   Increase celecoxib 100 mg 2 bid prn    Try to reduce hours on feet    Followup Dr Guillen next week          Return in about 2 days (around 7/16/2021), or if symptoms worsen or fail to improve.    Alex Charlton MD  [facility]    Juana Larry is a 51 year old who presents for the following health issues    HPI     Concern - Post surgery pain  Onset: Went back to work and has had increased pain  Description: Surgery June 9th- Right metatarsal fusion  Intensity: 5/10  Progression of Symptoms:  same  Accompanying Signs & Symptoms: Hard time sleeping  Previous history of similar problem: None  Precipitating factors:        Worsened by: Back to work 3- 10 hour shifts  Alleviating factors:        Improved by: Ice, rest, elevation- with little relief  Therapies tried and outcome: None    Skin Lesion  Onset/Duration: Worse this past 1 to 2 weeks  Description  Location: Plantar aspect " right great toe  Color: brown  Border description: sharp border, raised, scaly  Character: Broken blister  Itching: mild  Bleeding:  no  Intensity:  moderate  Progression of Symptoms:  worsening and constant  Accompanying signs and symptoms:   Bleeding: no  Scaling: YES-blister drained, dry now  Excessive sun exposure/tanning: not applicable  Sunscreen used: not applicable  History:           Any previous history of skin cancer: not applicable  Any family history of melanoma: no  Previous episodes of similar lesion: no  Precipitating or alleviating factors: Very active in [employer], up and down from workstation at home, also performing multiple house holding tasks  Therapies tried and outcome: Tylenol, ibuprofen, gabapentin.  Finished oxycodone, wondering about a refill    Review of Systems   Constitutional, HEENT, cardiovascular, pulmonary, gi and gu systems are negative, except as otherwise noted.      Objective           Vitals:  No vitals were obtained today due to virtual visit.    Physical Exam   GENERAL: alert and moderate  distress  EYES: Eyes grossly normal to inspection.  No discharge or erythema, or obvious scleral/conjunctival abnormalities.  RESP: No audible wheeze, cough, or visible cyanosis.  No visible retractions or increased work of breathing.    SKIN: Visible skin clear. No significant rash, abnormal pigmentation or lesions.  NEURO: Cranial nerves grossly intact.  Mentation and speech appropriate for age.  PSYCH: mentation appears normal and anxious        Video-Visit Details    Type of service:  Video Visit    Video End Time:5:57 PM    Originating Location (pt. Location): Home    Distant Location (provider location):  [facility]     Platform used for Video Visit: Unable to complete video visit

## 2021-07-20 ENCOUNTER — ANCILLARY PROCEDURE (OUTPATIENT)
Dept: GENERAL RADIOLOGY | Facility: CLINIC | Age: 51
End: 2021-07-20
Attending: ORTHOPAEDIC SURGERY
Payer: COMMERCIAL

## 2021-07-20 ENCOUNTER — OFFICE VISIT (OUTPATIENT)
Dept: ORTHOPEDICS | Facility: CLINIC | Age: 51
End: 2021-07-20
Payer: COMMERCIAL

## 2021-07-20 DIAGNOSIS — M25.571 PAIN IN JOINT, ANKLE AND FOOT, RIGHT: Primary | ICD-10-CM

## 2021-07-20 DIAGNOSIS — M25.571 PAIN IN JOINT, ANKLE AND FOOT, RIGHT: ICD-10-CM

## 2021-07-20 PROCEDURE — 99024 POSTOP FOLLOW-UP VISIT: CPT | Performed by: ORTHOPAEDIC SURGERY

## 2021-07-20 PROCEDURE — 73630 X-RAY EXAM OF FOOT: CPT | Mod: RT | Performed by: RADIOLOGY

## 2021-07-20 NOTE — LETTER
7/20/2021         RE: Sharmin Nieves  [address]  [place] [address]        Dear Colleague,    Thank you for referring your patient, Sharmin Nieves, to the [facility] [place]. Please see a copy of my visit note below.    CHIEF COMPLAINT:  Status post right 1st MTP joint arthrodesis performed on 06/09/2021.    HISTORY OF PRESENT ILLNESS:  Ms. Nieves presents today for further followup.  Reports to be doing well.  Reports to be compliant.  The patient reports to have gone back to work maybe a little bit too early and not to have adjusted her activities.    PHYSICAL EXAMINATION:  On today's visit, she presents with a solid fusion of the 1st MTP joint.  Alignment is excellent.  There is minimum swelling.  There is a well-healed surgical incision.    IMAGING:  Three views of the right foot were obtained today and reviewed, which are significant for showing partial consolidation across the arthrodesis site.  Hardware is intact and in place.  Alignment is excellent.    ASSESSMENT:  Status post right 1st MTP joint arthrodesis.    PLAN:  I discussed with the patient that she is making excellent progress.  She is going to proceed with the use of the [employer] while being out and about.  She can discontinue the [employer] Walker while being at home or sleeping.    She will follow up in a month from now, and at that time, 3 views of the right foot will be obtained, and based on those findings, further recommendations will be given to the patient.      Conor Guillen MD

## 2021-07-20 NOTE — PROGRESS NOTES
CHIEF COMPLAINT:  Status post right 1st MTP joint arthrodesis performed on 06/09/2021.    HISTORY OF PRESENT ILLNESS:  Ms. Nieves presents today for further followup.  Reports to be doing well.  Reports to be compliant.  The patient reports to have gone back to work maybe a little bit too early and not to have adjusted her activities.    PHYSICAL EXAMINATION:  On today's visit, she presents with a solid fusion of the 1st MTP joint.  Alignment is excellent.  There is minimum swelling.  There is a well-healed surgical incision.    IMAGING:  Three views of the right foot were obtained today and reviewed, which are significant for showing partial consolidation across the arthrodesis site.  Hardware is intact and in place.  Alignment is excellent.    ASSESSMENT:  Status post right 1st MTP joint arthrodesis.    PLAN:  I discussed with the patient that she is making excellent progress.  She is going to proceed with the use of the [employer] Walker while being out and about.  She can discontinue the [employer] Walker while being at home or sleeping.    She will follow up in a month from now, and at that time, 3 views of the right foot will be obtained, and based on those findings, further recommendations will be given to the patient.

## 2021-07-20 NOTE — NURSING NOTE
Reason For Visit:   Chief Complaint   Patient presents with     RECHECK     6 wk POP Right 1st metatarsophalangeal joint fuison - Right DOS 6/9/21       There were no vitals taken for this visit.    Pain Assessment  Patient Currently in Pain: Yes  0-10 Pain Scale: 1    Esteban Smith ATC

## 2021-07-21 DIAGNOSIS — M25.571 PAIN IN JOINT, ANKLE AND FOOT, RIGHT: Primary | ICD-10-CM

## 2021-07-26 ENCOUNTER — MYC MEDICAL ADVICE (OUTPATIENT)
Dept: FAMILY MEDICINE | Facility: CLINIC | Age: 51
End: 2021-07-26

## 2021-07-26 DIAGNOSIS — M79.674 PAIN OF RIGHT GREAT TOE: ICD-10-CM

## 2021-07-26 NOTE — TELEPHONE ENCOUNTER
Dr Charlton    See [place] [employer] message    Pharm cued    Sapna Bowie RN   [facility]

## 2021-07-27 ENCOUNTER — TELEPHONE (OUTPATIENT)
Dept: UROLOGY | Facility: CLINIC | Age: 51
End: 2021-07-27

## 2021-07-27 ENCOUNTER — VIRTUAL VISIT (OUTPATIENT)
Dept: UROLOGY | Facility: CLINIC | Age: 51
End: 2021-07-27
Payer: COMMERCIAL

## 2021-07-27 VITALS — HEIGHT: 70 IN | WEIGHT: 220 LBS | BODY MASS INDEX: 31.5 KG/M2

## 2021-07-27 DIAGNOSIS — N32.81 URGENCY-FREQUENCY SYNDROME: Primary | ICD-10-CM

## 2021-07-27 DIAGNOSIS — N81.6 RECTOCELE: ICD-10-CM

## 2021-07-27 DIAGNOSIS — N39.41 URGE INCONTINENCE: ICD-10-CM

## 2021-07-27 DIAGNOSIS — Z80.52 FAMILY HISTORY OF BLADDER CANCER: ICD-10-CM

## 2021-07-27 DIAGNOSIS — F17.200 SMOKER: ICD-10-CM

## 2021-07-27 PROCEDURE — 99204 OFFICE O/P NEW MOD 45 MIN: CPT | Mod: GT | Performed by: UROLOGY

## 2021-07-27 RX ORDER — HYDROCODONE BITARTRATE AND ACETAMINOPHEN 5; 325 MG/1; MG/1
1 TABLET ORAL EVERY 6 HOURS PRN
Qty: 4 TABLET | Refills: 0 | Status: SHIPPED | OUTPATIENT
Start: 2021-07-27 | End: 2021-08-31

## 2021-07-27 RX ORDER — SOLIFENACIN SUCCINATE 5 MG/1
5 TABLET, FILM COATED ORAL DAILY
Qty: 30 TABLET | Refills: 3 | Status: SHIPPED | OUTPATIENT
Start: 2021-07-27 | End: 2021-12-29

## 2021-07-27 ASSESSMENT — PAIN SCALES - GENERAL: PAINLEVEL: NO PAIN (0)

## 2021-07-27 ASSESSMENT — MIFFLIN-ST. JEOR: SCORE: 1703.16

## 2021-07-27 NOTE — PROGRESS NOTES
*PT WILL MEET YOU IN MYCHART*    Kendy is a 51 year old who is being evaluated via a billable video visit.      How would you like to obtain your AVS? MyChart  If the video visit is dropped, the invitation should be resent by: [phone]  Will anyone else be joining your video visit? No      Video Start Time: 3:14 PM     July 27, 2021    Referring Provider: Referred Self, MD  No address on file    Primary Care Provider: Randa Bill    Assessment & Plan     Urgency-frequency syndrome/Urge incontinence  We discussed that urge incontinence treatments include observation, weight loss, medications most commonly anticholinergics, physical therapy, biofeedback, intravesical botulinum toxin, percutaneous tibial nerve stimulation and sacral neuromodulation.   She wishes to start with a medication    - solifenacin (VESICARE) 5 MG tablet; Take 1 tablet (5 mg) by mouth daily    Discussed how she may ultimately benefit from pelvic floor therapy given her orthopedic issues as well    Will also ensure no infection  - Routine UA with micro reflex to culture; Future    Rectocele  Will reassess at next in person visit    Smoker  Discussed the importance of smoking cessation in prevention of urologic cancers    Family history of bladder cancer  This in addition with her smoking may place her at higher risk for bladder pathology    Return in about 3 months (around 10/27/2021) for in person.    Trial of medication and return in about 3 months for a cystoscopy, she requests the [place] location    23 minutes were spent in reviewing the EMR and direct patient care today    Sahra Vee MD MPH  (she/her/hers)   of Urology  [facility]      HPI:  Sharmin Nieves is a 51 year old female who presents for evaluation of her pelvic floor symptoms.      3 years of urinary urgency frequency but now having urgency incontinence.  Often times she will even have small volume voids.  Leaks all the way to the bathroom  at night.  Has vaginal pressure.  Did try a pessary at one time but made her more crampy than helped.    Constipation-saw GI 3 years ago.  Was told she has a rectocele.       Denies gross hematuria, UTI, vaginal bleeding    Maternal grandfather had bladder cancer     Still smoking about 1/2 pack a day.      Cryoablation in her 20s, but all normal since    Recently had foot surgery, still in boot.  Foot hurts more now as she just moved to a new [facility] and has been on her feet a lot more    Quit alcohol April 19th.  This has really helped her HTN.    Past Medical History:   Diagnosis Date     Acne      Anxiety      Anxiety state, unspecified     Anxiety     Benign essential hypertension 11/30/2018     Chronic low back pain 5/11/2010     Degenerative joint disease of foot 5/3/2021     Depression     Dr. Gaytan     Diaphragmatic hernia without mention of obstruction or gangrene      Esophageal reflux     with associated esophageal spasm     ETOH abuse      Foot pain      Herpes simplex without mention of complication      History of foot surgery 7/14/2021     Moderate major depression (H) 4/15/2011     Rectal pain 5/16/2011     Right hip pain 5/11/2010     Seasonal affective disorder (H)      STD (sexually transmitted disease)      Surveillance of previously prescribed intrauterine contraceptive device 10/03/05    mirena IUD     Past Surgical History:   Procedure Laterality Date     ARTHRODESIS FOOT Right 6/9/2021    Procedure: Right 1st metatarsophalangeal joint fuison;  Surgeon: Conor Guillen MD;  Location: [facility] STOMACH SURGERY PROCEDURE UNLISTED      Nissen     CHEILECTOMY Right 12/21/2017    Procedure: CHEILECTOMY;  RIGHT FOOT CHEILECTOMY;  Surgeon: Mo Edgar DPM;  Location:  OR     SURGICAL HISTORY OF -   4/04    Lapr Nissen fundoplication     TONSILLECTOMY & ADENOIDECTOMY  1985     [facility] NONSPECIFIC PROCEDURE  1992    CRYO SURGERY for abnl paps     Social History      Socioeconomic History     Marital status:      Spouse name: Esteban     Number of children: 3     Years of education: 13     Highest education level: Not on file   Occupational History     Occupation: Medical Assistant     Employer: [employer]   Tobacco Use     Smoking status: Former Smoker     Packs/day: 0.50     Years: 15.00     Pack years: 7.50     Types: Cigarettes     Quit date: 2021     Years since quittin.1     Smokeless tobacco: Never Used   Substance and Sexual Activity     Alcohol use: Yes     Comment: addict in recovery, current drinking     Drug use: No     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   Other Topics Concern     Parent/sibling w/ CABG, MI or angioplasty before 65F 55M? 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        Social Documentation:        Balanced Diet: YES    Calcium intake: more than 2 per day    Caffeine: 6 cups per day    Exercise:  type of activity 0    Sunscreen: Yes    Seatbelts:  Yes    Self Breast Exam:  No     Self Testicular Exam: No - n/a    Physical/Emotional/Sexual Abuse: No     Do you feel safe in your environment? Yes        Cholesterol screen up to date: No - Pt not fasting today.    CHOL      167   2002    HDL       42   2002    LDL       99   2002    TRIG      131   2002    CHOLHDLRATIO        4   2002        Eye Exam up to date: Yes    Dental Exam up to date: Yes    Pap smear up to date: Yes    Mammogram up to date: Does Not Apply    Dexa Scan up to date: Does Not Apply    Colonoscopy up to date: Does Not Apply    Immunizations up to date: Yes-Td     Glucose screen if over 40:  No - n/a     Social Determinants of Health     Financial Resource Strain:      Difficulty of Paying Living Expenses:    Food Insecurity:      Worried About Running Out of Food in the Last Year:      Ran Out of Food in  the Last Year:    Transportation Needs:      Lack of Transportation (Medical):      Lack of Transportation (Non-Medical):    Physical Activity:      Days of Exercise per Week:      Minutes of Exercise per Session:    Stress:      Feeling of Stress :    Social Connections:      Frequency of Communication with Friends and Family:      Frequency of Social Gatherings with Friends and Family:      Attends Baptism Services:      Active Member of Clubs or Organizations:      Attends Club or Organization Meetings:      Marital Status:    Intimate Partner Violence:      Fear of Current or Ex-Partner:      Emotionally Abused:      Physically Abused:      Sexually Abused:        Family History   Problem Relation Age of Onset     C.A.D. Maternal Grandmother      Hypertension Maternal Grandmother      Alcohol/Drug Maternal Grandmother      Depression Maternal Grandmother      Cerebrovascular Disease Paternal Grandfather      Alcohol/Drug Paternal Grandfather      Breast Cancer Paternal Grandmother      Depression Paternal Grandmother      Alcohol/Drug Father      Depression Father      Gastrointestinal Disease Father         GERD     Alcohol/Drug Maternal Grandfather      Depression Maternal Grandfather      Depression Mother      Obesity Mother      Anxiety Disorder Mother      Diabetes Mother      Depression Sister      Alcoholism Brother      Depression Brother      Alcoholism Brother      Depression Brother      Alcoholism Brother      Depression Brother      Gastrointestinal Disease Brother         severe gerd     Autism Spectrum Disorder Son      Substance Abuse Son      Schizophrenia Son      Substance Abuse Son      Breast Cancer Maternal Aunt      Cancer - colorectal No family hx of      Prostate Cancer No family hx of        ROS    Allergies   Allergen Reactions     Lisinopril Cough     cough     Nsaids      NSAIDS - Ibuprofen & Naproxen - symptoms of swelling in hands/feet, hives        Current Outpatient  Medications   Medication     celecoxib (CELEBREX) 100 MG capsule     celecoxib (CELEBREX) 100 MG capsule     cholecalciferol (VITAMIN D3) 125 mcg (5000 units) capsule     diclofenac (VOLTAREN) 1 % topical gel     Digital Therapy (RESET FOR IOS OR ANDROID MONA) MISC     disulfiram (ANTABUSE) 250 MG tablet     folic acid (FOLVITE) 1 MG tablet     gabapentin (NEURONTIN) 300 MG capsule     HYDROcodone-acetaminophen (NORCO) 5-325 MG tablet     hydrOXYzine (ATARAX) 25 MG tablet     lamoTRIgine (LAMICTAL) 200 MG tablet     LORazepam (ATIVAN) 1 MG tablet     losartan (COZAAR) 100 MG tablet     omeprazole (PRILOSEC) 40 MG DR capsule     ondansetron (ZOFRAN) 4 MG tablet     thiamine (B-1) 100 MG tablet     traZODone (DESYREL) 100 MG tablet     varenicline (CHANTIX) 1 MG tablet     venlafaxine (EFFEXOR-XR) 150 MG 24 hr capsule     levonorgestrel (MIRENA) 20 MCG/24HR IUD     propranolol (INDERAL) 20 MG tablet     No current facility-administered medications for this visit.     GENERAL: healthy, alert and no distress  EYES: Eyes grossly normal to inspection, conjunctivae and sclerae normal  HENT: normal cephalic/atraumatic.  External ears, nose and mouth without ulcers or lesions.  RESP: no audible wheeze, cough, or visible cyanosis.  No visible retractions or increased work of breathing.  Able to speak fully in complete sentences.  NEURO: Cranial nerves grossly intact, mentation intact and speech normal  PSYCH: mentation appears normal, affect normal/bright, judgement and insight intact, normal speech and appearance well-groomed    CC  Patient Care Team:  Randa Bill, LEDY CNP as PCP - General (Nurse Practitioner - Family)  Ariadne Price PA-C as Physician Assistant (Physician Assistant)  Daquan Mcdaniel OD as MD (Optometry)  Francia Paris [facility] as Pharmacist (Pharmacist)  Conor Guillen MD as Assigned Musculoskeletal Provider  Jaky Nelson [facility] as Pharmacist (Pharmacist)  Landy  LEDY Portillo CNP as Assigned PCP  Sahra Vee MD as MD (Urology)  SELF, REFERRED    Video-Visit Details    Type of service:  Video Visit    Video End Time:3:36 PM    Originating Location (pt. Location): Home    Distant Location (provider location):  [facility] [place]     Platform used for Video Visit: [employer]inepap[employer]

## 2021-07-27 NOTE — PATIENT INSTRUCTIONS
Take the medication as directed    Daily fiber supplement that you mix in the water    Websites with free information:    American [employer]ro[employer] Society patient website: www.voicesforpfd.org    Total Control Program: www.totalcontrolprogram.com    Please see one of the dedicated pelvic floor physical therapist ([facility] [phone])    [facility] [place] [address] 5[address] floor [phone]    Please return to see me in 3 months, sooner if needed    Please return for a cystoscopy (procedure to look in the bladder) and pelvic exam    It was a pleasure meeting with you today.  Thank you for allowing me and my team the privilege of caring for you today.  YOU are the reason we are here, and I truly hope we provided you with the excellent service you deserve.  Please let us know if there is anything else we can do for you so that we can be sure you are leaving completely satisfied with your care experience.    Cystoscopy    Cystoscopy is a procedure that lets your doctor look directly inside your urethra and bladder. It can be used to:    Help diagnose a problem with your urethra, bladder, or kidneys.    Take a sample (biopsy) of bladder or urethral tissue.    Treat certain problems (such as removing kidney stones).    Place a stent to bypass an obstruction.    Take special X-rays of the kidneys.  Based on the findings, your doctor may recommend other tests or treatments.  What is a cystoscope?  A cystoscope is a telescope-like instrument that contains lenses and fiberoptics (small glass wires that make bright light). The cystoscope may be straight and rigid, or flexible to bend around curves in the urethra. The doctor may look directly into the cystoscope, or project the image onto a monitor.  Getting ready    Ask your doctor if you should stop taking any medicines before the procedure.    Follow any other instructions your doctor gives you.  Tell your doctor before the  exam if you:    Take any medicines, such as aspirin or blood thinners    Have allergies to any medicines    Are pregnant   The procedure  Cystoscopy is done in the doctor s office, surgery center, or hospital. The doctor and a nurse are present during the procedure. It takes only a few minutes, longer if a biopsy, X-ray, or treatment needs to be done.  During the procedure:    You lie on an exam table on your back, knees bent and legs apart. You are covered with a drape.    Your urethra and the area around it are washed. Anesthetic jelly may be applied to numb the urethra.    The cystoscope is inserted. A sterile fluid is put into the bladder to expand it. You may feel pressure from this fluid.    When the procedure is done, the cystoscope is removed.  After the procedure   Once you re home:    Drink plenty of fluids.    You may have burning or light bleeding when you urinate--this is normal.    Medicines may be prescribed to ease any discomfort or prevent infection. Take these as directed.    Call your doctor if you have heavy bleeding or blood clots, burning that lasts more than a day, a fever over 100 F  (38  C), or trouble urinating.  Date Last Reviewed: 1/1/2017 2000-2017 [employer]. [address] [place] [address], [place] [address]. All rights reserved. This information is not intended as a substitute for professional medical care. Always follow your healthcare professional's instructions.

## 2021-07-30 ENCOUNTER — MYC MEDICAL ADVICE (OUTPATIENT)
Dept: FAMILY MEDICINE | Facility: CLINIC | Age: 51
End: 2021-07-30

## 2021-08-19 DIAGNOSIS — F41.8 SITUATIONAL ANXIETY: ICD-10-CM

## 2021-08-20 DIAGNOSIS — I10 BENIGN ESSENTIAL HYPERTENSION: ICD-10-CM

## 2021-08-20 RX ORDER — LOSARTAN POTASSIUM 100 MG/1
100 TABLET ORAL DAILY
Qty: 90 TABLET | Refills: 1 | Status: SHIPPED | OUTPATIENT
Start: 2021-08-20 | End: 2022-01-10

## 2021-08-20 RX ORDER — PROPRANOLOL HYDROCHLORIDE 20 MG/1
20 TABLET ORAL 3 TIMES DAILY
Qty: 270 TABLET | Refills: 2 | Status: SHIPPED | OUTPATIENT
Start: 2021-08-20 | End: 2021-09-10

## 2021-08-20 NOTE — TELEPHONE ENCOUNTER
"Requested Prescriptions   Signed Prescriptions Disp Refills    propranolol (INDERAL) 20 MG tablet 270 tablet 2     Sig: Take 1 tablet (20 mg) by mouth 3 times daily       Beta-Blockers Protocol Passed - 8/19/2021  6:31 PM        Passed - Blood pressure under 140/90 in past 12 months     BP Readings from Last 3 Encounters:   06/09/21 107/65   05/28/21 118/64   12/09/20 (!) 142/92                 Passed - Patient is age 6 or older        Passed - Recent (12 mo) or future (30 days) visit within the authorizing provider's specialty     Patient has had an office visit with the authorizing provider or a provider within the authorizing providers department within the previous 12 mos or has a future within next 30 days. See \"Patient Info\" tab in inbasket, or \"Choose Columns\" in Meds & Orders section of the refill encounter.              Passed - Medication is active on med list           Dayanara Seo RN  [facility]     "

## 2021-08-24 ENCOUNTER — ANCILLARY PROCEDURE (OUTPATIENT)
Dept: GENERAL RADIOLOGY | Facility: CLINIC | Age: 51
End: 2021-08-24
Attending: ORTHOPAEDIC SURGERY
Payer: COMMERCIAL

## 2021-08-24 ENCOUNTER — OFFICE VISIT (OUTPATIENT)
Dept: ORTHOPEDICS | Facility: CLINIC | Age: 51
End: 2021-08-24
Payer: COMMERCIAL

## 2021-08-24 VITALS — WEIGHT: 220 LBS | BODY MASS INDEX: 31.5 KG/M2 | HEIGHT: 70 IN

## 2021-08-24 DIAGNOSIS — M25.571 PAIN IN JOINT, ANKLE AND FOOT, RIGHT: Primary | ICD-10-CM

## 2021-08-24 DIAGNOSIS — M25.571 PAIN IN JOINT, ANKLE AND FOOT, RIGHT: ICD-10-CM

## 2021-08-24 PROCEDURE — 73630 X-RAY EXAM OF FOOT: CPT | Mod: RT | Performed by: RADIOLOGY

## 2021-08-24 PROCEDURE — 99024 POSTOP FOLLOW-UP VISIT: CPT | Performed by: ORTHOPAEDIC SURGERY

## 2021-08-24 ASSESSMENT — MIFFLIN-ST. JEOR: SCORE: 1703.16

## 2021-08-24 NOTE — LETTER
8/24/2021         RE: Sharmin Nieves  [address]   [facility] [address]        Dear Colleague,    Thank you for referring your patient, Sharmin Nieves, to the [facility] [place]. Please see a copy of my visit note below.    CHIEF COMPLAINT:  Status post right 1st MTP joint fusion performed on 06/09/2021.    HISTORY OF PRESENT ILLNESS:  Ms. Nieves presents today for further followup.  Reports to be doing well.  Reports to have some pain and discomfort at the level of the 1st MTP joint.  She believes it could be secondary to how tight she is placed in the boot on her foot.    PHYSICAL EXAMINATION:  On today's visit, she presents with excellent alignment of the great toe.  There is no obvious motion across the MTP joint.  Alignment is excellent.  There is minimum swelling.    IMAGING:  Three views of the right 1st MTP joint are reviewed today, which are significant for showing some consolidation along the most lateral half with the 1st MTP joint.  I do not see any radiolucency or signs of failure.    ASSESSMENT:  Status post right 1st MTP joint fusion.    PLAN:  I discussed with the patient that I believe that she is doing well in spite of the symptoms that she is reporting.  I encouraged her to proceed with the use of a regular shoe and to use the CAM Walker for comfort purposes.    A prescription for Physical Therapy for gait training was given to the patient.  The patient will follow up on a p.r.n. basis or in 3 months from now if she is not happy with the function of her right foot.    All questions were answered.      Conor Guillen MD

## 2021-08-24 NOTE — PROGRESS NOTES
CHIEF COMPLAINT:  Status post right 1st MTP joint fusion performed on 06/09/2021.    HISTORY OF PRESENT ILLNESS:  Ms. Nieves presents today for further followup.  Reports to be doing well.  Reports to have some pain and discomfort at the level of the 1st MTP joint.  She believes it could be secondary to how tight she is placed in the boot on her foot.    PHYSICAL EXAMINATION:  On today's visit, she presents with excellent alignment of the great toe.  There is no obvious motion across the MTP joint.  Alignment is excellent.  There is minimum swelling.    IMAGING:  Three views of the right 1st MTP joint are reviewed today, which are significant for showing some consolidation along the most lateral half with the 1st MTP joint.  I do not see any radiolucency or signs of failure.    ASSESSMENT:  Status post right 1st MTP joint fusion.    PLAN:  I discussed with the patient that I believe that she is doing well in spite of the symptoms that she is reporting.  I encouraged her to proceed with the use of a regular shoe and to use the CAM Walker for comfort purposes.    A prescription for Physical Therapy for gait training was given to the patient.  The patient will follow up on a p.r.n. basis or in 3 months from now if she is not happy with the function of her right foot.    All questions were answered.

## 2021-08-24 NOTE — NURSING NOTE
"Reason For Visit:   Chief Complaint   Patient presents with     RECHECK     Right 1st Metatarsophalangeal joint fusion       Ht 1.778 m (5' 10\")   Wt 99.8 kg (220 lb)   BMI 31.57 kg/m      Pain Assessment  0-10 Pain Scale: 4 (pain and swelling)    Esteban Smith, EMT    "

## 2021-08-31 ENCOUNTER — MYC REFILL (OUTPATIENT)
Dept: FAMILY MEDICINE | Facility: CLINIC | Age: 51
End: 2021-08-31

## 2021-08-31 ENCOUNTER — MYC MEDICAL ADVICE (OUTPATIENT)
Dept: FAMILY MEDICINE | Facility: CLINIC | Age: 51
End: 2021-08-31

## 2021-08-31 DIAGNOSIS — M79.674 PAIN OF RIGHT GREAT TOE: ICD-10-CM

## 2021-08-31 NOTE — TELEPHONE ENCOUNTER
Routing refill request to provider for review/approval because:  Drug not on the [facility] refill protocol  Refill request for norco    See pt [employer] message     Sapna Bowie RN   [facility]

## 2021-08-31 NOTE — TELEPHONE ENCOUNTER
Refill request sent on pt [employer] message    No further action needed on this encounter    Sapna Bowie RN   [facility]

## 2021-09-01 RX ORDER — HYDROCODONE BITARTRATE AND ACETAMINOPHEN 5; 325 MG/1; MG/1
1 TABLET ORAL EVERY 6 HOURS PRN
Qty: 4 TABLET | Refills: 0 | Status: SHIPPED | OUTPATIENT
Start: 2021-09-01 | End: 2021-09-09

## 2021-09-03 DIAGNOSIS — K22.4 ESOPHAGEAL SPASM: ICD-10-CM

## 2021-09-03 RX ORDER — OMEPRAZOLE 40 MG/1
40 CAPSULE, DELAYED RELEASE ORAL DAILY
Qty: 90 CAPSULE | Refills: 1 | Status: SHIPPED | OUTPATIENT
Start: 2021-09-03 | End: 2022-04-06

## 2021-09-09 ENCOUNTER — MYC REFILL (OUTPATIENT)
Dept: FAMILY MEDICINE | Facility: CLINIC | Age: 51
End: 2021-09-09

## 2021-09-09 DIAGNOSIS — M79.674 PAIN OF RIGHT GREAT TOE: ICD-10-CM

## 2021-09-10 ENCOUNTER — VIRTUAL VISIT (OUTPATIENT)
Dept: ADDICTION MEDICINE | Facility: CLINIC | Age: 51
End: 2021-09-10
Payer: COMMERCIAL

## 2021-09-10 ENCOUNTER — TELEPHONE (OUTPATIENT)
Dept: ADDICTION MEDICINE | Facility: CLINIC | Age: 51
End: 2021-09-10

## 2021-09-10 DIAGNOSIS — F10.20 ALCOHOL USE DISORDER, SEVERE, DEPENDENCE (H): Primary | ICD-10-CM

## 2021-09-10 DIAGNOSIS — F41.1 GENERALIZED ANXIETY DISORDER: ICD-10-CM

## 2021-09-10 PROCEDURE — 96127 BRIEF EMOTIONAL/BEHAV ASSMT: CPT | Mod: 95 | Performed by: FAMILY MEDICINE

## 2021-09-10 PROCEDURE — 99214 OFFICE O/P EST MOD 30 MIN: CPT | Mod: 95 | Performed by: FAMILY MEDICINE

## 2021-09-10 RX ORDER — HYDROCODONE BITARTRATE AND ACETAMINOPHEN 5; 325 MG/1; MG/1
1 TABLET ORAL EVERY 6 HOURS PRN
Qty: 4 TABLET | Refills: 0 | Status: SHIPPED | OUTPATIENT
Start: 2021-09-10 | End: 2021-09-15

## 2021-09-10 RX ORDER — VENLAFAXINE HYDROCHLORIDE 37.5 MG/1
37.5 TABLET, EXTENDED RELEASE ORAL DAILY
Qty: 30 TABLET | Refills: 0 | Status: SHIPPED | OUTPATIENT
Start: 2021-09-10 | End: 2021-10-13

## 2021-09-10 ASSESSMENT — ANXIETY QUESTIONNAIRES
3. WORRYING TOO MUCH ABOUT DIFFERENT THINGS: MORE THAN HALF THE DAYS
2. NOT BEING ABLE TO STOP OR CONTROL WORRYING: MORE THAN HALF THE DAYS
5. BEING SO RESTLESS THAT IT IS HARD TO SIT STILL: NOT AT ALL
6. BECOMING EASILY ANNOYED OR IRRITABLE: NEARLY EVERY DAY
7. FEELING AFRAID AS IF SOMETHING AWFUL MIGHT HAPPEN: NOT AT ALL
7. FEELING AFRAID AS IF SOMETHING AWFUL MIGHT HAPPEN: NOT AT ALL
4. TROUBLE RELAXING: NEARLY EVERY DAY
GAD7 TOTAL SCORE: 12
1. FEELING NERVOUS, ANXIOUS, OR ON EDGE: MORE THAN HALF THE DAYS
8. IF YOU CHECKED OFF ANY PROBLEMS, HOW DIFFICULT HAVE THESE MADE IT FOR YOU TO DO YOUR WORK, TAKE CARE OF THINGS AT HOME, OR GET ALONG WITH OTHER PEOPLE?: SOMEWHAT DIFFICULT

## 2021-09-10 ASSESSMENT — PATIENT HEALTH QUESTIONNAIRE - PHQ9
SUM OF ALL RESPONSES TO PHQ QUESTIONS 1-9: 9
SUM OF ALL RESPONSES TO PHQ QUESTIONS 1-9: 9
10. IF YOU CHECKED OFF ANY PROBLEMS, HOW DIFFICULT HAVE THESE PROBLEMS MADE IT FOR YOU TO DO YOUR WORK, TAKE CARE OF THINGS AT HOME, OR GET ALONG WITH OTHER PEOPLE: SOMEWHAT DIFFICULT

## 2021-09-10 NOTE — PROGRESS NOTES
Answers for HPI/ROS submitted by the patient on 9/10/2021  If you checked off any problems, how difficult have these problems made it for you to do your work, take care of things at home, or get along with other people?: Somewhat difficult  PHQ9 TOTAL SCORE: 9  DMITRIY 7 TOTAL SCORE: 12    Kendy is a 51 year old who is being evaluated via a billable telephone visit.      What phone number would you like to be contacted at? [phone]  How would you like to obtain your AVS? [facility]

## 2021-09-10 NOTE — PROGRESS NOTES
SUBJECTIVE                                                    SUBSTANCE USE DISORDER FOLLOW UP:    Sharmin Nieves is a 50 year old female who presents to clinic today for follow up of Alcohol Use Disorder (AUD).    Visit performed Virtual, via telephone    Time on phone call: 18 minutes      Recent HPI Details  HPI May 7, 2021  - Stopped gabapentin; struggled with withdrawals  - Cravings intense; having mouth-watering at times, and randomly thoughts popping in her head  - Antabuse keeping her abstinent  - Open to trying naltrexone for a little while before surgery; surgery scheduled for June 9. Unfortunately is taking hydrocodone and is not able to take both  HPI Jun 1, 2021  - Remains abstinent  - Quit smoking 2 weeks ago; was told she needs to quit before surgery  - Concerned about short term memory loss and wondering if gabapentin is making this worse  - She has had worsening memory issues with increased dose 300mg -> 600mg  - Completed pre-op last Friday; was told to potentially reduce her dose but hasn't made any significant changes yet  - Foot surgery next Wednesday  - Somewhat agitated and depressed over this past weekend  - Doing lessons with her RESET ludmila, which are helpful. Logging into remote AA meetings roughly twice weekly  - Supported by her 17yo daughter, who is very motivating  - Has not continued meditating. Unable to exercise much with her foot pains; looking into some yoga exercises for people with lower extremity issues  - Wondering if she should stop antabuse sometime before the surgery  HPI Jun 18, 2021  - Foot surgery was successful, no concerns in general  - Was hoping to get back to work on Monday, but foot continues to hurt, so she will take another week off  - Cravings worsening; dreams have been intense for both alcohol and cigarettes. Remains abstinent  - Wants to try naltrexone now that she has stopped opioids  - Continues with chantix; taking zofran d/t nausea with this  "medication  HPI Jul 2, 2021  - Minimal alcohol cravings, but significant tobacco cravings  - Mostly triggered by emotional swings  - Naltrexone has improved cravings, no side effects  - Continues to take antabuse as well  - Taking chantix for the past 1.5 months  - Taking gabapentin 600mg in the morning; taking propranolol and hydroxyzine for anxiety relief  - Having recurrent thoughts of drinking; some days starts to plan to stop antabuse  - Now that she is back at work, finds it easier to be distracted and have less \"stinking thinking\"      TODAY'S VISIT  HPI Sep 10, 2021  - Has been working from home, foot pain has lingered beyond what she had hoped  - She ultimately decided to drink alcohol to help with the pain and stress, and drank for 6 days. Last alcohol 4 days ago  - Taking antabuse daily; she had run out but since she was at home and had foot pain and pharmacy was far away, she did not  her refill. After 7 days she returned to drinking  - She didn't have any fun; felt sick and didn't enjoy it at all, hangovers in the morning  - She told her kids, which felt better  - Has not been attending online meetings  - Not having cravings. Stopped naltrexone and minimally using gabapentin anymore  - Has restarted antabuse this week. 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 n/a        Brief AUBREE History   ROUTE OF SUBSTANCE ADMINISTRATION - oral alcohol ingestion     LONGEST PERIOD OF " SOBRIETY - nearly 1 year just over 10 years ago              - Significant protective factors - minimal family support     PREVIOUS DETOX/TREATMENT PROGRAMS - Attended detox last week, required valium d/t hallucinations. Has attended detox 4-5 times in the past. Has attended treatment 3x - two of them just over 10 years ago, and another after a DWI 5 years ago. Initial treatment at  was helpful, stayed abstinent for nearly a year     HISTORY OF OVERDOSE - none     PREVIOUS MEDICATION ASSISTED TREATMENT - Currently taking antabuse without any craving relief. Was prescribed naltrexone, took it for 3 days when she was attempting to stop drinking.     CURRENT RECOVERY ACTIVITIES - yesterday attended 5 virtual AA meetings! Downloaded the Cal[employer] ludmila, practicing meditation and breathing     Other Substances:     ALCOHOL- as per HPI. Starting using more heavily around age 37  MARIJUANA- last week; has a vape pen, uses this most nights for sleep  PRESCRIPTION STIMULANTS- none  COCAINE/CRACK- none  METH/AMPHETAMINES- none  OPIATES- history of using these for acute pain around surgeries, but no use outside of her prescritions  BENZODIAZEPINES- PRN use for fear of flying  [employer]- none  HALLUCINOGENS - none  OTHER - Stays away from casinos     NICOTINE- smoke 1/2ppd              Desire to quit - yes                     Previous attempts to quit - yes, has an Rx for chantix             Infectious disease screening UTD?  Yes              - HIV test date: 2009              - HepC test date: 2010    PAST PSYCHIATRIC HISTORY - Reports history of depression and anxiety, pre-dating alcohol use.       A/P                                                    ASSESSMENT/PLAN    Diagnoses and all orders for this visit:  Alcohol use disorder, severe, dependence (H)  Generalized anxiety disorder  -     venlafaxine (EFFEXOR-ER) 37.5 MG 24 hr tablet; Take 1 tablet (37.5 mg) by mouth daily Add to 150mg for total dose 187.5mg daily      Orders  Placed This Encounter   Medications     venlafaxine (EFFEXOR-ER) 37.5 MG 24 hr tablet     Sig: Take 1 tablet (37.5 mg) by mouth daily Add to 150mg for total dose 187.5mg daily     Dispense:  30 tablet     Refill:  0       - Encouraged her to continue antabuse  - Continue naltrexone  - Continue gabapentin at 300mg TID  - Anxiety and depression somewhat increased recently, asking about appropriate adjunct tx today. Recommended slow titration of venlafaxine, as this has provided appropriate improvement with past dose increase. Will have her f/up with PCP as scheduled in 2+ weeks for further direction  - Not ready to start outpatient treatment  - Encouraged her to continue with AA and RESET ludmila  - OK for small amount of Vicodin until she is able to see PCP; struggling with prolonged healing and returning to full function after foot surgery.      Answers for HPI/ROS submitted by the patient on 9/10/2021  If you checked off any problems, how difficult have these problems made it for you to do your work, take care of things at home, or get along with other people?: Somewhat difficult  PHQ9 TOTAL SCORE: 9  DMITRIY 7 TOTAL SCORE: 12          RTC:  2 months      ENCOUNTER FOR LONG TERM USE OF HIGH RISK MEDICATION   High Risk Drug Monitoring?  YES   Drug being monitored: gabapentin, antabuse, naltrexone   Reason for drug: Alcohol Use Disorder (AUD)   What is being monitored?: Dosage, Cravings, Trigger, side effects, and continued abstinence.    Counseled the patient on the importance of having a recovery program in addition to medication to manage recovery.  Components include avoiding isolating, having willingness to change, avoiding triggers and managing cravings. Encouraged having some type of sober network and practicing honesty with trusted support person(s). Encouraged other services such as counseling, 12 step or other self-help organizations.          [facility] Board of Pharmacy Data Base Reviewed;   Consistent with  patient reports and Epic records.            OBJECTIVE                                                    PHYSICAL EXAM:  There were no vitals taken for this visit.    GENERAL: healthy, alert and no distress  RESP: No respiratory distress  MENTAL STATUS EXAM  Appearance/Behavior: No appearant distress  Speech: Normal  Mood/Affect: anxiety  Insight: Adequate    LAB  No results found for any visits on 09/10/21.   LFTs completed 4/18/21, reviewed in zaynab Lo.      HISTORY                                                    Problem list reviewed & adjusted, as indicated.  Patient Active Problem List   Diagnosis     Herpes simplex virus (HSV) infection     Tobacco use disorder     IUD (intrauterine device) in place     Esophageal reflux     Ovarian cyst     Irregular menstrual cycle     Chronic low back pain     Right hip pain     CARDIOVASCULAR SCREENING; LDL GOAL LESS THAN 160     Moderate major depression (H)     Rectal pain     Health Care Home     Insomnia     Home Health Care     Generalized anxiety disorder     Dry skin     Restless leg syndrome     Benign essential hypertension     Situational anxiety     Irregular heart beat     Encounter for therapeutic drug monitoring     Alcohol abuse     Degenerative joint disease of foot     History of foot surgery         MEDICATION LIST (prior to visit)  diclofenac (VOLTAREN) 1 % topical gel, Apply topically 4 times daily  disulfiram (ANTABUSE) 250 MG tablet, TAKE 1 TABLET(250 MG) BY MOUTH DAILY  folic acid (FOLVITE) 1 MG tablet, Take 1 tablet (1 mg) by mouth daily  gabapentin (NEURONTIN) 300 MG capsule, Take 1 capsule (300 mg) by mouth daily  lamoTRIgine (LAMICTAL) 200 MG tablet, TAKE 1 TABLET(200 MG) BY MOUTH DAILY  losartan (COZAAR) 100 MG tablet, Take 1 tablet (100 mg) by mouth daily  omeprazole (PRILOSEC) 40 MG DR capsule, Take 1 capsule (40 mg) by mouth daily  solifenacin (VESICARE) 5 MG tablet, Take 1 tablet (5 mg) by mouth daily  traZODone (DESYREL) 100 MG  tablet, Take 2 tablets (200 mg) by mouth At Bedtime  venlafaxine (EFFEXOR-XR) 150 MG 24 hr capsule, Take 1 capsule (150 mg) by mouth daily  celecoxib (CELEBREX) 100 MG capsule, Take 2 capsules (200 mg) by mouth 2 times daily as needed for moderate pain  celecoxib (CELEBREX) 100 MG capsule, Take 1 capsule (100 mg) by mouth 2 times daily as needed for moderate pain  cholecalciferol (VITAMIN D3) 125 mcg (5000 units) capsule, Take 1 capsule by mouth daily  levonorgestrel (MIRENA) 20 MCG/24HR IUD, 1 each (20 mcg) by Intrauterine route once for 1 dose    No current facility-administered medications on file prior to visit.      Allergies   Allergen Reactions     Lisinopril Cough     cough           Sterling Simons MD  [facility]  [phone]   21-Nov-2019 15:47

## 2021-09-11 ASSESSMENT — ANXIETY QUESTIONNAIRES: GAD7 TOTAL SCORE: 12

## 2021-09-13 ENCOUNTER — TELEPHONE (OUTPATIENT)
Dept: ADDICTION MEDICINE | Facility: CLINIC | Age: 51
End: 2021-09-13

## 2021-09-13 ENCOUNTER — MYC MEDICAL ADVICE (OUTPATIENT)
Dept: FAMILY MEDICINE | Facility: CLINIC | Age: 51
End: 2021-09-13

## 2021-09-13 DIAGNOSIS — M79.674 PAIN OF RIGHT GREAT TOE: ICD-10-CM

## 2021-09-13 RX ORDER — HYDROCODONE BITARTRATE AND ACETAMINOPHEN 5; 325 MG/1; MG/1
1 TABLET ORAL EVERY 6 HOURS PRN
Qty: 4 TABLET | Refills: 0 | Status: CANCELLED | OUTPATIENT
Start: 2021-09-13

## 2021-09-13 NOTE — TELEPHONE ENCOUNTER
Central Prior Authorization Team   Phone: [phone]      PA NOT NEEDED    Medication: Hydrocodone-acetaminophen (Norco) 5-325 mg tablet-PA NOT NEEDED  Insurance Company: [employer] - Phone [phone] Fax [phone]  Pharmacy Filling the Rx: [id number] [address]  AT [facility] [address]   Filling Pharmacy Phone: [phone]  Filling Pharmacy Fax:    Start Date: 9/13/2021    Called pharmacy to see what rejection they are getting since [employer] does not do PA's on short acting opioids.  Per pharmacy they did get a paid claim and the patient has already picked up.

## 2021-09-13 NOTE — TELEPHONE ENCOUNTER
Dr. Charlton,    Please see [employer] message.   Med cued if agree.     Thanks,  FADIA Nichole  [facility]

## 2021-09-13 NOTE — TELEPHONE ENCOUNTER
Prior Authorization Retail Medication Request    Medication/Dose: HYDROcodone-acetaminophen (NORCO) 5-325 MG tablet 4 tablet 09/10/2021  ICD code (if different than what is on RX):    Previously Tried and Failed:    Rationale:      Insurance Name:  [phone]  Insurance ID:  [id number]      Pharmacy Information (if different than what is on RX)  Name:    Phone:

## 2021-09-14 ENCOUNTER — MYC REFILL (OUTPATIENT)
Dept: FAMILY MEDICINE | Facility: CLINIC | Age: 51
End: 2021-09-14

## 2021-09-14 DIAGNOSIS — M79.674 PAIN OF RIGHT GREAT TOE: ICD-10-CM

## 2021-09-14 RX ORDER — HYDROCODONE BITARTRATE AND ACETAMINOPHEN 5; 325 MG/1; MG/1
1 TABLET ORAL EVERY 6 HOURS PRN
Qty: 4 TABLET | Refills: 0 | Status: CANCELLED | OUTPATIENT
Start: 2021-09-14

## 2021-09-14 NOTE — TELEPHONE ENCOUNTER
Requested Prescriptions   Pending Prescriptions Disp Refills     HYDROcodone-acetaminophen (NORCO) 5-325 MG tablet 4 tablet 0     Sig: Take 1 tablet by mouth every 6 hours as needed for severe pain       There is no refill protocol information for this order        Routing refill request to provider for review/approval because:  Drug not on the [facility] refill protocol     Payal Acosta RN  [facility]

## 2021-09-14 NOTE — TELEPHONE ENCOUNTER
Dr. Charlton and POD,    Pt calling regarding refill request - states she goes back to work tomorrow (works 10 hour shifts on her feet tomorrow and Thursday) and is wondering if she could get this medication filled to help with ongoing foot pain?    Requested Prescriptions   Pending Prescriptions Disp Refills     HYDROcodone-acetaminophen (NORCO) 5-325 MG tablet 4 tablet 0     Sig: Take 1 tablet by mouth every 6 hours as needed for severe pain       There is no refill protocol information for this order        Routing refill request to provider for review/approval because:  Drug not on the [facility] refill protocol     Payal Acosta RN  [facility]

## 2021-09-15 RX ORDER — HYDROCODONE BITARTRATE AND ACETAMINOPHEN 5; 325 MG/1; MG/1
1 TABLET ORAL
Qty: 7 TABLET | Refills: 0 | Status: SHIPPED | OUTPATIENT
Start: 2021-09-15 | End: 2021-09-22

## 2021-09-15 NOTE — TELEPHONE ENCOUNTER
Rx continued, 1 tab daily as needed.    This is consistent with my care provision as her specialty provider for addiction medicine, as I am supporting her care while her PCP is out and she is awaiting follow-up on September 28.    Sterling Simons MD

## 2021-09-18 ENCOUNTER — HEALTH MAINTENANCE LETTER (OUTPATIENT)
Age: 51
End: 2021-09-18

## 2021-09-22 ENCOUNTER — MYC REFILL (OUTPATIENT)
Dept: FAMILY MEDICINE | Facility: CLINIC | Age: 51
End: 2021-09-22

## 2021-09-22 DIAGNOSIS — M79.674 PAIN OF RIGHT GREAT TOE: ICD-10-CM

## 2021-09-22 RX ORDER — HYDROCODONE BITARTRATE AND ACETAMINOPHEN 5; 325 MG/1; MG/1
1 TABLET ORAL
Qty: 7 TABLET | Refills: 0 | Status: SHIPPED | OUTPATIENT
Start: 2021-09-22 | End: 2021-09-28

## 2021-09-22 NOTE — TELEPHONE ENCOUNTER
Dr. Simons,    Requested Prescriptions   Pending Prescriptions Disp Refills     HYDROcodone-acetaminophen (NORCO) 5-325 MG tablet 7 tablet 0     Sig: Take 1 tablet by mouth nightly as needed for severe pain       There is no refill protocol information for this order        Routing refill request to provider for review/approval because:  Drug not on the [facility] refill protocol     Payal Acosta RN  [facility]

## 2021-09-22 NOTE — TELEPHONE ENCOUNTER
Rx refill written.    Will f/up with PCP next week; will discuss with PCP if needed, but addiction med will not prescribe further hydrocodone.    Thanks,  Sterling Simons MD

## 2021-09-23 DIAGNOSIS — F41.1 GENERALIZED ANXIETY DISORDER: ICD-10-CM

## 2021-09-23 RX ORDER — VENLAFAXINE HYDROCHLORIDE 150 MG/1
150 CAPSULE, EXTENDED RELEASE ORAL DAILY
Qty: 90 CAPSULE | Refills: 1 | Status: SHIPPED | OUTPATIENT
Start: 2021-09-23 | End: 2022-03-28

## 2021-09-23 NOTE — TELEPHONE ENCOUNTER
SECOND REFILL REQUEST  Reason for Call:  Medication or medication refill:    Do you use a [facility]?  Name of the pharmacy and phone number for the current request:    [id number] [address]  AT [facility] [address]  (Pharmacy) [phone]         Name of the medication requested: venlafaxine (EFFEXOR-XR) 150 MG 24 hr capsule    Other request: SECOND REFILL REQUEST    Can we leave a detailed message on this number? YES    Phone number patient can be reached at: Cell number on file:    Telephone Information:   Mobile [phone]       Best Time: ANY    Call taken on 9/23/2021 at 11:04 AM by Alize Johns MA

## 2021-09-23 NOTE — TELEPHONE ENCOUNTER
"Requested Prescriptions   Signed Prescriptions Disp Refills    venlafaxine (EFFEXOR-XR) 150 MG 24 hr capsule 90 capsule 1     Sig: Take 1 capsule (150 mg) by mouth daily       Serotonin-Norepinephrine Reuptake Inhibitors  Passed - 9/23/2021 11:05 AM        Passed - Blood pressure under 140/90 in past 12 months     BP Readings from Last 3 Encounters:   06/09/21 107/65   05/28/21 118/64   12/09/20 (!) 142/92                 Passed - Recent (12 mo) or future (30 days) visit within the authorizing provider's specialty     Patient has had an office visit with the authorizing provider or a provider within the authorizing providers department within the previous 12 mos or has a future within next 30 days. See \"Patient Info\" tab in inbasket, or \"Choose Columns\" in Meds & Orders section of the refill encounter.              Passed - Medication is active on med list        Passed - Patient is age 18 or older        Passed - No active pregnancy on record        Passed - Normal serum creatinine on file in past 12 months     Recent Labs   Lab Test 05/28/21  1407   CR 0.74       Ok to refill medication if creatinine is low          Passed - No positive pregnancy test in past 12 months           Payal Acosta RN  [facility]    "

## 2021-09-28 ENCOUNTER — VIRTUAL VISIT (OUTPATIENT)
Dept: FAMILY MEDICINE | Facility: CLINIC | Age: 51
End: 2021-09-28
Payer: COMMERCIAL

## 2021-09-28 DIAGNOSIS — M79.674 PAIN OF RIGHT GREAT TOE: ICD-10-CM

## 2021-09-28 DIAGNOSIS — F41.1 GAD (GENERALIZED ANXIETY DISORDER): ICD-10-CM

## 2021-09-28 DIAGNOSIS — F33.9 RECURRENT MAJOR DEPRESSIVE DISORDER, REMISSION STATUS UNSPECIFIED (H): ICD-10-CM

## 2021-09-28 DIAGNOSIS — I10 BENIGN ESSENTIAL HYPERTENSION: ICD-10-CM

## 2021-09-28 DIAGNOSIS — M19.071 PRIMARY OSTEOARTHRITIS OF RIGHT FOOT: Primary | ICD-10-CM

## 2021-09-28 DIAGNOSIS — Z98.890 HISTORY OF FOOT SURGERY: ICD-10-CM

## 2021-09-28 DIAGNOSIS — F10.21 ALCOHOL DEPENDENCE IN EARLY FULL REMISSION (H): ICD-10-CM

## 2021-09-28 PROCEDURE — 99214 OFFICE O/P EST MOD 30 MIN: CPT | Mod: 95 | Performed by: NURSE PRACTITIONER

## 2021-09-28 RX ORDER — HYDROCODONE BITARTRATE AND ACETAMINOPHEN 5; 325 MG/1; MG/1
1 TABLET ORAL
Qty: 7 TABLET | Refills: 0 | Status: SHIPPED | OUTPATIENT
Start: 2021-09-28 | End: 2021-10-04

## 2021-09-28 ASSESSMENT — ANXIETY QUESTIONNAIRES
6. BECOMING EASILY ANNOYED OR IRRITABLE: NEARLY EVERY DAY
3. WORRYING TOO MUCH ABOUT DIFFERENT THINGS: SEVERAL DAYS
7. FEELING AFRAID AS IF SOMETHING AWFUL MIGHT HAPPEN: NOT AT ALL
GAD7 TOTAL SCORE: 7
2. NOT BEING ABLE TO STOP OR CONTROL WORRYING: SEVERAL DAYS
5. BEING SO RESTLESS THAT IT IS HARD TO SIT STILL: NOT AT ALL
1. FEELING NERVOUS, ANXIOUS, OR ON EDGE: SEVERAL DAYS
IF YOU CHECKED OFF ANY PROBLEMS ON THIS QUESTIONNAIRE, HOW DIFFICULT HAVE THESE PROBLEMS MADE IT FOR YOU TO DO YOUR WORK, TAKE CARE OF THINGS AT HOME, OR GET ALONG WITH OTHER PEOPLE: SOMEWHAT DIFFICULT

## 2021-09-28 ASSESSMENT — PATIENT HEALTH QUESTIONNAIRE - PHQ9
SUM OF ALL RESPONSES TO PHQ QUESTIONS 1-9: 5
5. POOR APPETITE OR OVEREATING: SEVERAL DAYS

## 2021-09-28 NOTE — PROGRESS NOTES
Kendy is a 51 year old who is being evaluated via a billable video visit.      How would you like to obtain your AVS? MyChart  If the video visit is dropped, the invitation should be resent by: Text to cell phone: [phone]  Will anyone else be joining your video visit? No    Video Start Time: 3:01 PM    Assessment & Plan     Primary osteoarthritis of right foot  Hasn't been able to get in physical therapy so talked about strategy for this and I recommend physical therapy in her work building to reduce barriers and get in appts before second opinion appt.  Affirm second opinion option.  - Physical Therapy Referral; Future    History of foot surgery    - Physical Therapy Referral; Future    Pain of right great toe  Try taking the single hydrocodone in the daytime to allow for outdoor activity like walking for exercise and mental health, as well as physical therapy for foot  - Physical Therapy Referral; Future  - HYDROcodone-acetaminophen (NORCO) 5-325 MG tablet; Take 1 tablet by mouth nightly as needed for severe pain    Benign essential hypertension  Well-controlled since sobriety.  Discussed starting to take 1/2 tablet (50 mg)    Recurrent major depressive disorder, remission status unspecified (H)  Slightly worsened with imobility    DMITIRY (generalized anxiety disorder)  As above    Alcohol dependence in early full remission (H)  One relapse with identified cause and knowledge of CD MD.  Doing really well overall and antabuse is helpful.      Prescription drug management  35 minutes spent on the date of the encounter doing chart review, history and exam, documentation and further activities per the note       There are no Patient Instructions on file for this visit.    No follow-ups on file.    [facility]    Subjective   Kendy is a 51 year old who presents for the following health issues  accompanied by no one:    HPI     Body feels good other than foot  Working from  home since foot surgery  Missed picking up antabuse and ran out for a week  Had slip up and drank alcohol for six days around Labor Day.  [place] awful  Has been able to restart Antabuse and be sobriety    Had appt for second opinion for at [facility] in November 12  Hasn't been able to schedule physical therapy with schedule  Taking hydrocodone x1 at night    Moved to [place]  In clinic on Mondays, Wednesdays and Thursdays    Bouts of depression with limited activity.  Dr. Simons increased Effexor and Lamictal    -120's/70-80's  Wondering about discontinuing losartan at some point          Objective           Vitals:  No vitals were obtained today due to virtual visit.    Physical Exam   GENERAL: Healthy, alert and no distress  EYES: Eyes grossly normal to inspection.  No discharge or erythema, or obvious scleral/conjunctival abnormalities.  RESP: No audible wheeze, cough, or visible cyanosis.  No visible retractions or increased work of breathing.    SKIN: Visible skin clear. No significant rash, abnormal pigmentation or lesions.  NEURO: Cranial nerves grossly intact.  Mentation and speech appropriate for age.  PSYCH: Mentation appears normal, affect normal/bright, judgement and insight intact, normal speech and appearance well-groomed.              Video-Visit Details    Type of service:  Video Visit    Video End Time:3:30 pm    Originating Location (pt. Location): Home    Distant Location (provider location):  [facility]     Platform used for Video Visit: Playdom

## 2021-09-29 ASSESSMENT — ANXIETY QUESTIONNAIRES: GAD7 TOTAL SCORE: 7

## 2021-10-04 ENCOUNTER — MYC MEDICAL ADVICE (OUTPATIENT)
Dept: FAMILY MEDICINE | Facility: CLINIC | Age: 51
End: 2021-10-04

## 2021-10-04 ENCOUNTER — MYC REFILL (OUTPATIENT)
Dept: FAMILY MEDICINE | Facility: CLINIC | Age: 51
End: 2021-10-04

## 2021-10-04 DIAGNOSIS — F51.01 PRIMARY INSOMNIA: ICD-10-CM

## 2021-10-04 DIAGNOSIS — M79.674 PAIN OF RIGHT GREAT TOE: ICD-10-CM

## 2021-10-04 DIAGNOSIS — F41.8 SITUATIONAL ANXIETY: Primary | ICD-10-CM

## 2021-10-04 NOTE — TELEPHONE ENCOUNTER
Requested Prescriptions   Pending Prescriptions Disp Refills     HYDROcodone-acetaminophen (NORCO) 5-325 MG tablet 7 tablet 0     Sig: Take 1 tablet by mouth nightly as needed for severe pain       There is no refill protocol information for this order        Routing refill request to provider for review/approval because:  Drug not on the [facility] refill protocol     ThanksDayanara RN  [facility]

## 2021-10-05 RX ORDER — TRAZODONE HYDROCHLORIDE 100 MG/1
200 TABLET ORAL AT BEDTIME
Qty: 180 TABLET | Refills: 3 | Status: SHIPPED | OUTPATIENT
Start: 2021-10-05 | End: 2021-10-12

## 2021-10-05 RX ORDER — LORAZEPAM 1 MG/1
1 TABLET ORAL EVERY 6 HOURS PRN
Qty: 5 TABLET | Refills: 0 | Status: SHIPPED | OUTPATIENT
Start: 2021-10-05 | End: 2022-02-02

## 2021-10-05 RX ORDER — HYDROCODONE BITARTRATE AND ACETAMINOPHEN 5; 325 MG/1; MG/1
1 TABLET ORAL
Qty: 7 TABLET | Refills: 0 | Status: SHIPPED | OUTPATIENT
Start: 2021-10-05 | End: 2021-10-12

## 2021-10-05 NOTE — TELEPHONE ENCOUNTER
Apologies.  I thought it was tomorrow.  Refiled. Can you find out if patient was able to take this to allow walking instead of at night?  Able to set up physical therapy?  TOMMY Clinton

## 2021-10-05 NOTE — TELEPHONE ENCOUNTER
Mariya,    Please see message below and MyChart.    Trazodone and ativan cued if agree    Hydrocodone refill request sent to you in refill encounter from 10/4    Payal Acosta RN  [facility]

## 2021-10-05 NOTE — TELEPHONE ENCOUNTER
Mariya,    Please see [facility] message from 10/4 (encounter has been routed to you).     Pt provided update that it allowed her to walk, pt is scheduled with PT for 10/15    Payal Acosta RN  [facility]

## 2021-10-05 NOTE — TELEPHONE ENCOUNTER
Patient has an appointment next Wednesday, can this be filled for patients flight? Please advise thank you!

## 2021-10-12 ENCOUNTER — TELEPHONE (OUTPATIENT)
Dept: FAMILY MEDICINE | Facility: CLINIC | Age: 51
End: 2021-10-12

## 2021-10-12 ENCOUNTER — MYC REFILL (OUTPATIENT)
Dept: FAMILY MEDICINE | Facility: CLINIC | Age: 51
End: 2021-10-12

## 2021-10-12 DIAGNOSIS — M79.674 PAIN OF RIGHT GREAT TOE: ICD-10-CM

## 2021-10-12 DIAGNOSIS — F41.1 GENERALIZED ANXIETY DISORDER: ICD-10-CM

## 2021-10-12 DIAGNOSIS — F51.01 PRIMARY INSOMNIA: ICD-10-CM

## 2021-10-12 RX ORDER — LAMOTRIGINE 200 MG/1
TABLET ORAL
Qty: 30 TABLET | Refills: 1 | Status: SHIPPED | OUTPATIENT
Start: 2021-10-12 | End: 2021-12-14

## 2021-10-12 RX ORDER — VENLAFAXINE 37.5 MG/1
TABLET ORAL
Qty: 90 TABLET | Refills: 1 | Status: SHIPPED | OUTPATIENT
Start: 2021-10-12 | End: 2022-03-30

## 2021-10-12 RX ORDER — VENLAFAXINE 37.5 MG/1
TABLET ORAL
Qty: 30 TABLET | OUTPATIENT
Start: 2021-10-12

## 2021-10-12 RX ORDER — TRAZODONE HYDROCHLORIDE 100 MG/1
200 TABLET ORAL AT BEDTIME
Qty: 180 TABLET | Refills: 3 | Status: SHIPPED | OUTPATIENT
Start: 2021-10-12 | End: 2022-11-16

## 2021-10-12 RX ORDER — HYDROCODONE BITARTRATE AND ACETAMINOPHEN 5; 325 MG/1; MG/1
1 TABLET ORAL
Qty: 7 TABLET | Refills: 0 | Status: SHIPPED | OUTPATIENT
Start: 2021-10-12 | End: 2021-10-19

## 2021-10-12 NOTE — TELEPHONE ENCOUNTER
Sharmin requested refills for her     Trazodone 100mg tablets  Last filled: 07/22/2021  QTY: 180    Charlie Luna  [facility]  [phone]

## 2021-10-12 NOTE — TELEPHONE ENCOUNTER
Requested Prescriptions   Pending Prescriptions Disp Refills     HYDROcodone-acetaminophen (NORCO) 5-325 MG tablet 7 tablet 0     Sig: Take 1 tablet by mouth nightly as needed for severe pain       There is no refill protocol information for this order        Routing refill request to provider for review/approval because:  Drug not on the [facility] refill protocol - last refill 10/5 - Disp: 7 tablets

## 2021-10-12 NOTE — TELEPHONE ENCOUNTER
Date of Last Office Visit: 09/10/2021  Date of Next Office Visit: 11/12/2021  No shows since last visit: 0  Cancellations since last visit: 0    Medication requested: venlafaxine (EFFEXOR-ER) 37.5 MG  Date last ordered: 09/10/2021 Qty: 30 Refills: 0    Medication requested: lamoTRIgine (LAMICTAL) 200 MG  Date last ordered: 06/26/2021 Qty: 30 Refills: 1     Lapse in medication adherence greater than 5 days?: possible Lamictal  If yes, call patient and gather details: pt reports she has been taking as prescribed.  Medication refill request verified as identical to current order?: yes  Result of Last DAM, VPA, Li+ Level, CBC, or Carbamazepine Level (at or since last visit): N/A    Last visit treatment plan:   - Encouraged her to continue antabuse  - Continue naltrexone  - Continue gabapentin at 300mg TID  - Anxiety and depression somewhat increased recently, asking about appropriate adjunct tx today. Recommended slow titration of venlafaxine, as this has provided appropriate improvement with past dose increase. Will have her f/up with PCP as scheduled in 2+ weeks for further direction  - Not ready to start outpatient treatment  - Encouraged her to continue with AA and RESET ludmila  - OK for small amount of Vicodin until she is able to see PCP; struggling with prolonged healing and returning to full function after foot surgery.       []Medication refilled per  Medication Refill in Ambulatory Care  policy.  [x]Medication unable to be refilled by RN due to criteria not met as indicated below:    []Eligibility - not seen in the last year   []Supervision - no future appointment   []Compliance - no shows, cancellations or lapse in therapy   []Verification - order discrepancy   []Controlled medication   [x]Medication not included in policy   []90-day supply request   []Other

## 2021-10-13 ENCOUNTER — VIRTUAL VISIT (OUTPATIENT)
Dept: FAMILY MEDICINE | Facility: CLINIC | Age: 51
End: 2021-10-13
Payer: COMMERCIAL

## 2021-10-13 DIAGNOSIS — M19.071 PRIMARY OSTEOARTHRITIS OF RIGHT FOOT: Primary | ICD-10-CM

## 2021-10-13 DIAGNOSIS — I10 BENIGN ESSENTIAL HYPERTENSION: ICD-10-CM

## 2021-10-13 DIAGNOSIS — F10.10 ALCOHOL ABUSE: ICD-10-CM

## 2021-10-13 DIAGNOSIS — F41.1 GENERALIZED ANXIETY DISORDER: ICD-10-CM

## 2021-10-13 DIAGNOSIS — M79.674 PAIN OF RIGHT GREAT TOE: ICD-10-CM

## 2021-10-13 DIAGNOSIS — F32.1 MODERATE MAJOR DEPRESSION (H): ICD-10-CM

## 2021-10-13 PROCEDURE — 99215 OFFICE O/P EST HI 40 MIN: CPT | Mod: 95 | Performed by: NURSE PRACTITIONER

## 2021-10-13 PROCEDURE — 96127 BRIEF EMOTIONAL/BEHAV ASSMT: CPT | Mod: 95 | Performed by: NURSE PRACTITIONER

## 2021-10-13 ASSESSMENT — ANXIETY QUESTIONNAIRES
1. FEELING NERVOUS, ANXIOUS, OR ON EDGE: NOT AT ALL
2. NOT BEING ABLE TO STOP OR CONTROL WORRYING: NOT AT ALL
IF YOU CHECKED OFF ANY PROBLEMS ON THIS QUESTIONNAIRE, HOW DIFFICULT HAVE THESE PROBLEMS MADE IT FOR YOU TO DO YOUR WORK, TAKE CARE OF THINGS AT HOME, OR GET ALONG WITH OTHER PEOPLE: NOT DIFFICULT AT ALL
3. WORRYING TOO MUCH ABOUT DIFFERENT THINGS: NOT AT ALL
GAD7 TOTAL SCORE: 4
6. BECOMING EASILY ANNOYED OR IRRITABLE: NEARLY EVERY DAY
5. BEING SO RESTLESS THAT IT IS HARD TO SIT STILL: NOT AT ALL
7. FEELING AFRAID AS IF SOMETHING AWFUL MIGHT HAPPEN: NOT AT ALL

## 2021-10-13 ASSESSMENT — PATIENT HEALTH QUESTIONNAIRE - PHQ9
SUM OF ALL RESPONSES TO PHQ QUESTIONS 1-9: 1
5. POOR APPETITE OR OVEREATING: SEVERAL DAYS

## 2021-10-13 NOTE — PATIENT INSTRUCTIONS
Prior Authorization Retail Medication Request  [facility] KEY# [id number]    Medication/Dose: DULOXETINE HCI 30MG DR CAPSULES  ICD code (if different than what is on RX):    Previously Tried and Failed:    Rationale:      Insurance Name:    Insurance ID:  [id number]      Pharmacy Information (if different than what is on RX)  Name:    Phone:       Try taking half-tablet of the hydrocodone-tylenol    Consider starting turmeric supplement pills (specifically Curcuma longa) 1000 mg daily.  If this causes nausea, consider combining with vineet.  https://www.acpjournals.org/doi/full/10.7326/H98-0298    Hold off on increase in effexor for now and continue to get in walking and outdoor time    Follow-up in November for ongoing pain management (if needed), update from [facility], and consideration of effexor dosing

## 2021-10-13 NOTE — PROGRESS NOTES
Kendy is a 51 year old who is being evaluated via a billable video visit.      How would you like to obtain your AVS? MyChart  If the video visit is dropped, the invitation should be resent by: Text to cell phone: [phone]  Will anyone else be joining your video visit? No    Video Start Time: 5:57 PM    Assessment & Plan     Primary osteoarthritis of right foot  Try decreasing hydrocodone-acetaminophen to 1/2 tab  Consider adding turmeric supplement  Follow-up with [facility] and physical therapy as planned  Follow-up here in November  Between now and Nov 24, ok with weekly hydrocodone rx, but hope to decrease to 1/2 tab daily    Pain of right great toe  As above    Moderate major depression (H)  Very small benefit, if at all, from increase in effexor.  Has been about four weeks.  Re-eval in Nov and consider increase to 225 mg    Generalized anxiety disorder  Stable    Alcohol abuse  Ongoing sobriety, doing well    Benign essential hypertension  2-3 weeks into reduced losartan dose from 100 mg to 50 mg and BP remains well within normal range.  If continues to be in this range in November, would plan to cut down to 25 mg with goal of not needed at all.  Labs in November      59 minutes spent on the date of the encounter doing chart review, history and exam, documentation and further activities per the note       Patient Instructions   Try taking half-tablet of the hydrocodone-tylenol    Consider starting turmeric supplement pills (specifically Curcuma longa) 1000 mg daily.  If this causes nausea, consider combining with vineet.  https://www.acpjournals.org/doi/full/10.7326/D07-1158    Hold off on increase in effexor for now and continue to get in walking and outdoor time    Follow-up in November for ongoing pain management (if needed), update from [facility], and consideration of effexor dosing      Return in about 6 weeks (around 11/24/2021) for toe/foot pain.    LEDY Seaman [facility]  MAGNOLIA aLrry is a 51 year old who presents for the following health issues     HPI      Sister got  and patient went to [place], [place] for the wedding.    Toe is doing about the same except slight worsening with mild injury.  Friday is first day of physical therapy and will see for second opinion Nov 12th.  Took the hydrocodone in the day when on vacation to help with getting around.  Hasn't tried a half-tab before, but is open to it.  Thinking about getting a stationary bike.    Sobriety is going well.  Was expecting to have some challenges with the wedding and happily surprised that it wasn't.  Antabuse is helpful.    Blood pressure at home is 110-120/70-80.  Did decrease to 50 mg 2-3 weeks ago.    Will be seeing Dr. Vee and having cystoscopy.  Tried #4 pessary and it was a little irritating, but seemed to work.  Not interested in surgery and unsure about pelvic floor therapy.    Depression and Anxiety Follow-Up    How are you doing with your depression since your last visit? No change    How are you doing with your anxiety since your last visit?  No change    Effexor bumped up to 187.5 mg with Dr. Simons for increased depression about a month ago.  Hasn't noticed much difference and also no side effects.      Lamictal 200 mg 3-6 months since last change    Are you having other symptoms that might be associated with depression or anxiety? No    Have you had a significant life event? No     Do you have any concerns with your use of alcohol or other drugs? No    Social History     Tobacco Use     Smoking status: Current Every Day Smoker     Packs/day: 0.50     Years: 15.00     Pack years: 7.50     Types: Cigarettes     Smokeless tobacco: Never Used   Substance Use Topics     Alcohol use: Yes     Comment: addict in recovery, current drinking     Drug use: No     Comment: addict in recovery     PHQ 9/10/2021 9/28/2021 10/13/2021   PHQ-9 Total Score 9 5 1   Q9: Thoughts of better off  dead/self-harm past 2 weeks Not at all Not at all Not at all   Some encounter information is confidential and restricted. Go to Review [employer] activity to see all data.     DMITRIY-7 SCORE 9/10/2021 9/28/2021 10/13/2021   Total Score - - -   Total Score 12 (moderate anxiety) - -   Total Score 12 7 4   Some encounter information is confidential and restricted. Go to Review [employer] activity to see all data.     Last PHQ-9 10/13/2021   1.  Little interest or pleasure in doing things 0   2.  Feeling down, depressed, or hopeless 0   3.  Trouble falling or staying asleep, or sleeping too much 0   4.  Feeling tired or having little energy 1   5.  Poor appetite or overeating 0   6.  Feeling bad about yourself 0   7.  Trouble concentrating 0   8.  Moving slowly or restless 0   Q9: Thoughts of better off dead/self-harm past 2 weeks 0   PHQ-9 Total Score 1   Difficulty at work, home, or with people Not difficult at all   Some encounter information is confidential and restricted. Go to Review [employer] activity to see all data.     DMITRIY-7  10/13/2021   1. Feeling nervous, anxious, or on edge 0   2. Not being able to stop or control worrying 0   3. Worrying too much about different things 0   4. Trouble relaxing 1   5. Being so restless that it is hard to sit still 0   6. Becoming easily annoyed or irritable 3   7. Feeling afraid, as if something awful might happen 0   DMITRIY-7 Total Score 4   If you checked any problems, how difficult have they made it for you to do your work, take care of things at home, or get along with other people? Not difficult at all   Some encounter information is confidential and restricted. Go to Review [employer] activity to see all data.       Suicide Assessment Five-step Evaluation and Treatment (SAFE-T)      How many servings of fruits and vegetables do you eat daily?  2-3    On average, how many sweetened beverages do you drink each day (Examples: soda, juice, sweet tea, etc.  Do NOT count diet or  artificially sweetened beverages)?   0    How many days per week do you exercise enough to make your heart beat faster? 3 or less    How many minutes a day do you exercise enough to make your heart beat faster? 9 or less    How many days per week do you miss taking your medication? 0          Objective           Vitals:  No vitals were obtained today due to virtual visit.    Physical Exam   GENERAL: Healthy, alert and no distress  EYES: Eyes grossly normal to inspection.  No discharge or erythema, or obvious scleral/conjunctival abnormalities.  RESP: No audible wheeze, cough, or visible cyanosis.  No visible retractions or increased work of breathing.    SKIN: Visible skin clear. No significant rash, abnormal pigmentation or lesions.  NEURO: Cranial nerves grossly intact.  Mentation and speech appropriate for age.  PSYCH: Mentation appears normal, affect normal/bright, judgement and insight intact, normal speech and appearance well-groomed.    Labs in Nov        Video-Visit Details    Type of service:  Video Visit    Video End Time:6:32 PM    Originating Location (pt. Location): Home    Distant Location (provider location):  [facility]     Platform used for Video Visit: [employer]ognition [employer]

## 2021-10-14 ASSESSMENT — ANXIETY QUESTIONNAIRES: GAD7 TOTAL SCORE: 4

## 2021-10-15 ENCOUNTER — THERAPY VISIT (OUTPATIENT)
Dept: PHYSICAL THERAPY | Facility: CLINIC | Age: 51
End: 2021-10-15
Payer: COMMERCIAL

## 2021-10-15 DIAGNOSIS — M19.071 PRIMARY OSTEOARTHRITIS OF RIGHT FOOT: ICD-10-CM

## 2021-10-15 DIAGNOSIS — Z98.890 HISTORY OF FOOT SURGERY: ICD-10-CM

## 2021-10-15 DIAGNOSIS — M79.674 PAIN OF RIGHT GREAT TOE: ICD-10-CM

## 2021-10-15 PROCEDURE — 97140 MANUAL THERAPY 1/> REGIONS: CPT | Mod: GP

## 2021-10-15 PROCEDURE — 97530 THERAPEUTIC ACTIVITIES: CPT | Mod: GP

## 2021-10-15 PROCEDURE — 97161 PT EVAL LOW COMPLEX 20 MIN: CPT | Mod: GP

## 2021-10-15 NOTE — PROGRESS NOTES
Physical Therapy Initial Evaluation  Subjective:  The history is provided by the patient. No  was used.   Therapist Generated HPI Evaluation  Problem details: Pt had R GT MTP fusion June 9, 2021 and has been having pain ever since. Had 2 month f/u with Dr. Guillen, everything looked stable. Pt still wearing boot for symptom management, per surgeon not necessary. Achilles tendons starting feeling sore last couple weeks with initially standing, this will go away after a couple of minutes of walking. At best 3/10, at worst 10/10. Without boot can walk about 2 min before pain incr, 30-60 min w/ boot. Working 10-11 hr shifts, standing and walking all day. Very sore at the end of the day. Foot will swell, not into ankle. Pain right at fusion and plantar aspect of GT. Won't wear boot at home but alters gait pattern to avoid pain.      Xray Impression 8/24/2021:  1. Postsurgical changes of right first metatarsophalangeal joint  arthrodesis. No evidence of hardware complication.  2. No acute osseous abnormality..         Affected Side: R GT.    This is a chronic condition.  Occurance: after surgery.  Where condition occurred: other.  Patient reports pain:  Great toe.  Pain is described as sharp, stabbing and aching and is constant.  Pain radiates to:  No radiation.   Since onset symptoms are unchanged.  Associated symptoms:  Numbness (at tip of toe). Symptoms are exacerbated by standing and walking  and relieved by ice and rest (Voltaren gel, pain relieveres).  Special tests included:  X-ray (see above).    Restrictions due to condition include:  Working in normal job without restrictions.  Barriers include:  None as reported by patient.    Patient Health History         Pain is reported as 4/10 on pain scale.    Pertinent medical history includes: high blood pressure, overweight, smoking, depression, chemical dependency and incontinence.   Red flags:  Pain at rest/night (consistent w/ MTP joint  fusion).  Medical allergies: lisinopril.   Surgeries include:  Orthopedic surgery (R 1st MTP joint fusion).    Current medications:  Anti-depressants, high blood pressure medication, sleep medication, pain medication and anti-inflammatory.    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   Primary job tasks include:  Prolonged standing.                                    Objective:    Gait:  With boot: ER w/ R LE  Without boot: weight through lateral aspect of R foot with decreased heel strike. Avoids putting pressure on medial side/GT              Ankle/Foot Evaluation  ROM:  Arom ankle eval: CKC DF knee to wall: R 8 cm, L 10 cm.  AROM:        Inversion:  Left:  40     Right:  30  Eversion:  30     Right:  25        Strength wnl ankle: B INV, EVR, DF 5/5.      PALPATION: Palpation of ankle: tender over R 1st MTP.      MOBILITY TESTING:       Talocrural Left: hypomobile    Talocrural Right: normal      First Ray Left: hypomobile    First Ray Right: normal  FUNCTIONAL TESTS:           Proprioception:  WriteReader ApSk Balance Test: % of Uninvolved: able to balance SL on R, but uncomfortable to fully weight bear/have pressure on MTPJ                                                    General     ROS    Assessment/Plan:    Patient is a 51 year old female with R 1st MTP complaints.    Patient has the following significant findings with corresponding treatment plan.                Diagnosis 1:  S/p R 1st MTPJ fusion  Pain -  hot/cold therapy, manual therapy, splint/taping/bracing/orthotics, self management, education and home program  Decreased ROM/flexibility - manual therapy, therapeutic exercise and home program  Impaired balance - neuro re-education, therapeutic activities and home program  Impaired gait - gait training and home program    Therapy Evaluation Codes:   Cumulative Therapy Evaluation is: Low complexity.    Previous and current functional limitations:  (See Goal Flow Sheet for this information)    Short term  and Long term goals: (See Goal Flow Sheet for this information)     Communication ability:  Patient appears to be able to clearly communicate and understand verbal and written communication and follow directions correctly.  Treatment Explanation - The following has been discussed with the patient:   RX ordered/plan of care  Anticipated outcomes  Possible risks and side effects  This patient would benefit from PT intervention to resume normal activities.   Rehab potential is good.    Frequency:  1 X week, once daily  Duration:  for 6 weeks  Discharge Plan:  Achieve all LTG.  Independent in home treatment program.  Reach maximal therapeutic benefit.    Please refer to the daily flowsheet for treatment today, total treatment time and time spent performing 1:1 timed codes.

## 2021-10-19 ENCOUNTER — MYC REFILL (OUTPATIENT)
Dept: FAMILY MEDICINE | Facility: CLINIC | Age: 51
End: 2021-10-19

## 2021-10-19 DIAGNOSIS — M79.674 PAIN OF RIGHT GREAT TOE: ICD-10-CM

## 2021-10-19 DIAGNOSIS — F17.200 TOBACCO USE DISORDER: Primary | ICD-10-CM

## 2021-10-19 NOTE — TELEPHONE ENCOUNTER
Requested Prescriptions   Pending Prescriptions Disp Refills     HYDROcodone-acetaminophen (NORCO) 5-325 MG tablet 7 tablet 0     Sig: Take 1 tablet by mouth nightly as needed for severe pain       There is no refill protocol information for this order        Routing refill request to provider for review/approval because:  Drug not on the [facility] refill protocol

## 2021-10-20 RX ORDER — HYDROCODONE BITARTRATE AND ACETAMINOPHEN 5; 325 MG/1; MG/1
1 TABLET ORAL
Qty: 7 TABLET | Refills: 0 | Status: SHIPPED | OUTPATIENT
Start: 2021-10-20 | End: 2021-10-28

## 2021-10-20 NOTE — TELEPHONE ENCOUNTER
Mariya  Pt sent another encounter today requesting the refill for this med    Sapna Bowie RN   [facility]

## 2021-10-20 NOTE — TELEPHONE ENCOUNTER
Refilled.  Sorry for the delay.  Has patient been able to reduce to 1/2 tablet?  Send [employer] message.  TOMMY Clinton

## 2021-10-28 ENCOUNTER — MYC REFILL (OUTPATIENT)
Dept: FAMILY MEDICINE | Facility: CLINIC | Age: 51
End: 2021-10-28

## 2021-10-28 DIAGNOSIS — M79.674 PAIN OF RIGHT GREAT TOE: ICD-10-CM

## 2021-10-28 RX ORDER — VARENICLINE TARTRATE 1 MG/1
1 TABLET, FILM COATED ORAL 2 TIMES DAILY
Qty: 120 TABLET | Refills: 0 | Status: SHIPPED | OUTPATIENT
Start: 2021-10-28 | End: 2022-01-10

## 2021-10-28 RX ORDER — CELECOXIB 100 MG/1
100 CAPSULE ORAL 2 TIMES DAILY PRN
Qty: 120 CAPSULE | Refills: 1 | Status: SHIPPED | OUTPATIENT
Start: 2021-10-28 | End: 2021-12-16

## 2021-10-28 RX ORDER — HYDROCODONE BITARTRATE AND ACETAMINOPHEN 5; 325 MG/1; MG/1
1 TABLET ORAL
Qty: 7 TABLET | Refills: 0 | Status: SHIPPED | OUTPATIENT
Start: 2021-10-28 | End: 2021-12-14

## 2021-10-28 NOTE — TELEPHONE ENCOUNTER
Requested Prescriptions   Pending Prescriptions Disp Refills     HYDROcodone-acetaminophen (NORCO) 5-325 MG tablet 7 tablet 0     Sig: Take 1 tablet by mouth nightly as needed for severe pain       There is no refill protocol information for this order        Routing refill request to provider for review/approval because:  Drug not on the [facility] refill protocol - Bill not in clinic until Monday

## 2021-11-02 ENCOUNTER — TELEPHONE (OUTPATIENT)
Dept: PALLIATIVE MEDICINE | Facility: CLINIC | Age: 51
End: 2021-11-02

## 2021-11-02 RX ORDER — GABAPENTIN 300 MG/1
CAPSULE ORAL
Qty: 270 CAPSULE | Refills: 1 | Status: SHIPPED | OUTPATIENT
Start: 2021-11-02 | End: 2021-12-14

## 2021-11-02 RX ORDER — HYDROCODONE BITARTRATE AND ACETAMINOPHEN 5; 325 MG/1; MG/1
1 TABLET ORAL DAILY PRN
Qty: 7 TABLET | Refills: 0 | Status: SHIPPED | OUTPATIENT
Start: 2021-11-02 | End: 2021-11-09

## 2021-11-02 NOTE — TELEPHONE ENCOUNTER
Order received for a New Evaluation.    Are there any red flags that may impact the assessment or management of the patient?   Active or recent alcohol, drug or prescription medication misuse - use comments        Routing to review.    Payal SMITH    [facility]

## 2021-11-09 ENCOUNTER — MYC REFILL (OUTPATIENT)
Dept: FAMILY MEDICINE | Facility: CLINIC | Age: 51
End: 2021-11-09
Payer: COMMERCIAL

## 2021-11-09 DIAGNOSIS — M79.674 PAIN OF RIGHT GREAT TOE: ICD-10-CM

## 2021-11-09 RX ORDER — HYDROCODONE BITARTRATE AND ACETAMINOPHEN 5; 325 MG/1; MG/1
1 TABLET ORAL DAILY PRN
Qty: 7 TABLET | Refills: 0 | Status: SHIPPED | OUTPATIENT
Start: 2021-11-09 | End: 2021-11-16

## 2021-11-09 NOTE — TELEPHONE ENCOUNTER
Requested Prescriptions   Pending Prescriptions Disp Refills     HYDROcodone-acetaminophen (NORCO) 5-325 MG tablet 7 tablet 0     Sig: Take 1 tablet by mouth daily as needed for severe pain       There is no refill protocol information for this order        Routing refill request to provider for review/approval because:  Drug not on the [facility] refill protocol

## 2021-11-12 ENCOUNTER — VIRTUAL VISIT (OUTPATIENT)
Dept: ADDICTION MEDICINE | Facility: CLINIC | Age: 51
End: 2021-11-12
Payer: COMMERCIAL

## 2021-11-12 ENCOUNTER — TRANSFERRED RECORDS (OUTPATIENT)
Dept: HEALTH INFORMATION MANAGEMENT | Facility: CLINIC | Age: 51
End: 2021-11-12

## 2021-11-12 ENCOUNTER — MEDICAL CORRESPONDENCE (OUTPATIENT)
Dept: HEALTH INFORMATION MANAGEMENT | Facility: CLINIC | Age: 51
End: 2021-11-12

## 2021-11-12 DIAGNOSIS — M79.674 PAIN OF RIGHT GREAT TOE: ICD-10-CM

## 2021-11-12 DIAGNOSIS — F41.1 GENERALIZED ANXIETY DISORDER: ICD-10-CM

## 2021-11-12 DIAGNOSIS — F10.20 ALCOHOL USE DISORDER, SEVERE, DEPENDENCE (H): Primary | ICD-10-CM

## 2021-11-12 PROCEDURE — 99214 OFFICE O/P EST MOD 30 MIN: CPT | Mod: 95 | Performed by: FAMILY MEDICINE

## 2021-11-12 RX ORDER — DISULFIRAM 250 MG/1
250 TABLET ORAL DAILY
Qty: 30 TABLET | Refills: 3 | Status: SHIPPED | OUTPATIENT
Start: 2021-11-12 | End: 2022-05-25

## 2021-11-12 ASSESSMENT — ANXIETY QUESTIONNAIRES
GAD7 TOTAL SCORE: 8
1. FEELING NERVOUS, ANXIOUS, OR ON EDGE: SEVERAL DAYS
3. WORRYING TOO MUCH ABOUT DIFFERENT THINGS: SEVERAL DAYS
5. BEING SO RESTLESS THAT IT IS HARD TO SIT STILL: SEVERAL DAYS
GAD7 TOTAL SCORE: 8
2. NOT BEING ABLE TO STOP OR CONTROL WORRYING: SEVERAL DAYS
8. IF YOU CHECKED OFF ANY PROBLEMS, HOW DIFFICULT HAVE THESE MADE IT FOR YOU TO DO YOUR WORK, TAKE CARE OF THINGS AT HOME, OR GET ALONG WITH OTHER PEOPLE?: SOMEWHAT DIFFICULT
6. BECOMING EASILY ANNOYED OR IRRITABLE: NEARLY EVERY DAY
4. TROUBLE RELAXING: SEVERAL DAYS
GAD7 TOTAL SCORE: 8
7. FEELING AFRAID AS IF SOMETHING AWFUL MIGHT HAPPEN: NOT AT ALL
7. FEELING AFRAID AS IF SOMETHING AWFUL MIGHT HAPPEN: NOT AT ALL

## 2021-11-12 ASSESSMENT — PATIENT HEALTH QUESTIONNAIRE - PHQ9
SUM OF ALL RESPONSES TO PHQ QUESTIONS 1-9: 10
10. IF YOU CHECKED OFF ANY PROBLEMS, HOW DIFFICULT HAVE THESE PROBLEMS MADE IT FOR YOU TO DO YOUR WORK, TAKE CARE OF THINGS AT HOME, OR GET ALONG WITH OTHER PEOPLE: SOMEWHAT DIFFICULT
SUM OF ALL RESPONSES TO PHQ QUESTIONS 1-9: 10

## 2021-11-12 NOTE — PROGRESS NOTES
[facility]    A/P                                                    ASSESSMENT/PLAN  Diagnoses and all orders for this visit:  Alcohol use disorder, severe, dependence (H)  -     disulfiram (ANTABUSE) 250 MG tablet; Take 1 tablet (250 mg) by mouth daily  -     Digital Therapy (RESET FOR IOS OR ANDROID LUDMILA) MISC; 1 each daily  Generalized anxiety disorder  Pain of right great toe    Orders Placed This Encounter   Medications     disulfiram (ANTABUSE) 250 MG tablet     Sig: Take 1 tablet (250 mg) by mouth daily     Dispense:  30 tablet     Refill:  3     Digital Therapy (RESET FOR IOS OR ANDROID LUDMILA) MISC     Si each daily     Dispense:  1 each     Refill:  0       Problem list updated 2021   No problems updated.      - Alcohol use disorder in early remission. Continues to grieve her lack of alcohol use but doing well with abstinence. Noticing benefits including improved mood/anxiety, improved GI symptoms  - Continues with antabuse to prevent return to use  - RESET ludmila prescribed again today, which has been helpful  - Advised that, given her ongoing, consistent use of hydrocodone without dose escalation, that I don't have any acute safety concerns with this medication, especially as long as she remains abstinent from alcohol. She continues to access care appropriately for 2nd opinion and further surgical intervention, which will likely help eliminate opioid use in the near future  - Saroj 2-3mo f/up, and invited her to msg via [employer] if needed      PDMP Review       Value Time User    State PDMP site checked  Yes 2021  4:04 PM Sterling Simons MD          Answers for HPI/ROS submitted by the patient on 2021  If you checked off any problems, how difficult have these problems made it for you to do your work, take care of things at home, or get along with other people?: Somewhat difficult  PHQ9 TOTAL SCORE: 10  DMITRIY 7 TOTAL SCORE: 8      RTC  No follow-ups on  file.      Counseled the patient on the importance of having a recovery program in addition to medication to manage recovery.  Components include avoiding isolating, having willingness to change, avoiding triggers and managing cravings. Encouraged having some type of sober network and practicing honesty with trusted support person(s). Encouraged other services such as counseling, 12 step or other self-help organizations.          SUBJECTIVE                                                    Sharmin Nieves is a 51 year old female who presents to clinic today for follow up    Visit performed Virtual, via telephone    Time spent on phone call: 20 minutes    Recent HPI Details:  HPI May 7, 2021  - Stopped gabapentin; struggled with withdrawals  - Cravings intense; having mouth-watering at times, and randomly thoughts popping in her head  - Antabuse keeping her abstinent  - Open to trying naltrexone for a little while before surgery; surgery scheduled for June 9. Unfortunately is taking hydrocodone and is not able to take both  HPI Jun 1, 2021  - Remains abstinent  - Quit smoking 2 weeks ago; was told she needs to quit before surgery  - Concerned about short term memory loss and wondering if gabapentin is making this worse  - She has had worsening memory issues with increased dose 300mg -> 600mg  - Completed pre-op last Friday; was told to potentially reduce her dose but hasn't made any significant changes yet  - Foot surgery next Wednesday  - Somewhat agitated and depressed over this past weekend  - Doing lessons with her RESET ludmila, which are helpful. Logging into remote AA meetings roughly twice weekly  - Supported by her 15yo daughter, who is very motivating  - Has not continued meditating. Unable to exercise much with her foot pains; looking into some yoga exercises for people with lower extremity issues  - Wondering if she should stop antabuse sometime before the surgery  HPI Jun 18, 2021  - Foot surgery was  "successful, no concerns in general  - Was hoping to get back to work on Monday, but foot continues to hurt, so she will take another week off  - Cravings worsening; dreams have been intense for both alcohol and cigarettes. Remains abstinent  - Wants to try naltrexone now that she has stopped opioids  - Continues with chantix; taking zofran d/t nausea with this medication  HPI Jul 2, 2021  - Minimal alcohol cravings, but significant tobacco cravings  - Mostly triggered by emotional swings  - Naltrexone has improved cravings, no side effects  - Continues to take antabuse as well  - Taking chantix for the past 1.5 months  - Taking gabapentin 600mg in the morning; taking propranolol and hydroxyzine for anxiety relief  - Having recurrent thoughts of drinking; some days starts to plan to stop antabuse  - Now that she is back at work, finds it easier to be distracted and have less \"stinking thinking\"  HPI Sep 10, 2021  - Has been working from home, foot pain has lingered beyond what she had hoped  - She ultimately decided to drink alcohol to help with the pain and stress, and drank for 6 days. Last alcohol 4 days ago  - Taking antabuse daily; she had run out but since she was at home and had foot pain and pharmacy was far away, she did not  her refill. After 7 days she returned to drinking  - She didn't have any fun; felt sick and didn't enjoy it at all, hangovers in the morning  - She told her kids, which felt better  - Has not been attending online meetings  - Not having cravings. Stopped naltrexone and minimally using gabapentin anymore  - Has restarted antabuse this week. She relies on this to prevent her from drinking  - Has been experiencing worsening depression as she remains at home with increased pain, unable to move forward with work and healing      TODAY'S VISIT  HPI Nov 12, 2021  - Toe pain persisting. Seen by [facility] for 2nd opinion, which has been helpful as she may have more options  - No alcohol use; " some days she is sad that she isn't able to drink anymore  - Wanting to engage more with walking and physical activities, but her toe is holding her back. Using an elliptical to adapt activity  - Body overall feeling better  - Continues to work regularly, long hours  - Continues to take antabuse daily, which has continued to help her avoid alcohol use  - Has been using Reset with some helpful support, and wants to renew her prescription  - Prescribed hydrocodone for foot pain. Remains helpful, no SE, no escalation of dose      OBJECTIVE                                                    PHYSICAL EXAM:  There were no vitals taken for this visit.    General: NAD  MSE: mood/affect at baseline. Judgment intact.    LAB  No results found for any visits on 11/12/21.      HISTORY                                                    Problem list reviewed & adjusted, as indicated.  Patient Active Problem List   Diagnosis     Herpes simplex virus (HSV) infection     Tobacco use disorder     IUD (intrauterine device) in place     Esophageal reflux     Ovarian cyst     Irregular menstrual cycle     Chronic low back pain     Right hip pain     CARDIOVASCULAR SCREENING; LDL GOAL LESS THAN 160     Moderate major depression (H)     Rectal pain     Health Care Home     Insomnia     Home Health Care     Generalized anxiety disorder     Dry skin     Restless leg syndrome     Benign essential hypertension     Situational anxiety     Irregular heart beat     Encounter for therapeutic drug monitoring     Alcohol abuse     Degenerative joint disease of foot     History of foot surgery     Pain of right great toe         MEDICATION LIST (prior to visit)  celecoxib (CELEBREX) 100 MG capsule, Take 1 capsule (100 mg) by mouth 2 times daily as needed for moderate pain  cholecalciferol (VITAMIN D3) 125 mcg (5000 units) capsule, Take 1 capsule by mouth daily  diclofenac (VOLTAREN) 1 % topical gel, Apply topically 4 times daily  folic acid (FOLVITE)  1 MG tablet, Take 1 tablet (1 mg) by mouth daily  gabapentin (NEURONTIN) 300 MG capsule, Take 1 capsule (300 mg) by mouth daily AND 2 capsules (600 mg) At Bedtime.  HYDROcodone-acetaminophen (NORCO) 5-325 MG tablet, Take 1 tablet by mouth daily as needed for severe pain  HYDROcodone-acetaminophen (NORCO) 5-325 MG tablet, Take 1 tablet by mouth nightly as needed for severe pain  lamoTRIgine (LAMICTAL) 200 MG tablet, TAKE 1 TABLET(200 MG) BY MOUTH DAILY  levonorgestrel (MIRENA) 20 MCG/24HR IUD, 1 each (20 mcg) by Intrauterine route once for 1 dose  LORazepam (ATIVAN) 1 MG tablet, Take 1 tablet (1 mg) by mouth every 6 hours as needed for anxiety  losartan (COZAAR) 100 MG tablet, Take 1 tablet (100 mg) by mouth daily  omeprazole (PRILOSEC) 40 MG DR capsule, Take 1 capsule (40 mg) by mouth daily  solifenacin (VESICARE) 5 MG tablet, Take 1 tablet (5 mg) by mouth daily  traZODone (DESYREL) 100 MG tablet, Take 2 tablets (200 mg) by mouth At Bedtime  varenicline (CHANTIX) 1 MG tablet, Take 1 tablet (1 mg) by mouth 2 times daily  venlafaxine (EFFEXOR) 37.5 MG tablet, TAKE 1 TABLET BY MOUTH EVERY DAY. TAKE WITH 150 MG TABLET. TOTAL DAILY DOSE 187.5 MG DAILY.  venlafaxine (EFFEXOR-XR) 150 MG 24 hr capsule, Take 1 capsule (150 mg) by mouth daily    No current facility-administered medications on file prior to visit.      MEDICATION LIST (after visit)  Current Outpatient Medications   Medication     Digital Therapy (RESET FOR IOS OR ANDROID MONA) MISC     disulfiram (ANTABUSE) 250 MG tablet     celecoxib (CELEBREX) 100 MG capsule     cholecalciferol (VITAMIN D3) 125 mcg (5000 units) capsule     diclofenac (VOLTAREN) 1 % topical gel     folic acid (FOLVITE) 1 MG tablet     gabapentin (NEURONTIN) 300 MG capsule     HYDROcodone-acetaminophen (NORCO) 5-325 MG tablet     HYDROcodone-acetaminophen (NORCO) 5-325 MG tablet     lamoTRIgine (LAMICTAL) 200 MG tablet     levonorgestrel (MIRENA) 20 MCG/24HR IUD     LORazepam (ATIVAN) 1 MG  tablet     losartan (COZAAR) 100 MG tablet     omeprazole (PRILOSEC) 40 MG DR capsule     solifenacin (VESICARE) 5 MG tablet     traZODone (DESYREL) 100 MG tablet     varenicline (CHANTIX) 1 MG tablet     venlafaxine (EFFEXOR) 37.5 MG tablet     venlafaxine (EFFEXOR-XR) 150 MG 24 hr capsule     No current facility-administered medications for this visit.         Allergies   Allergen Reactions     Lisinopril Cough     cough       Additional MDM Details:  none    Sterling Simons MD  [facility]  [phone]

## 2021-11-12 NOTE — PROGRESS NOTES
Kendy is a 51 year old who is being evaluated via a billable telephone visit.      What phone number would you like to be contacted at? [phone]  How would you like to obtain your AVS? MyChart    Answers for HPI/ROS submitted by the patient on 11/12/2021  If you checked off any problems, how difficult have these problems made it for you to do your work, take care of things at home, or get along with other people?: Somewhat difficult  PHQ9 TOTAL SCORE: 10  DMITRIY 7 TOTAL SCORE: 8

## 2021-11-13 ASSESSMENT — ANXIETY QUESTIONNAIRES: GAD7 TOTAL SCORE: 8

## 2021-11-13 ASSESSMENT — PATIENT HEALTH QUESTIONNAIRE - PHQ9: SUM OF ALL RESPONSES TO PHQ QUESTIONS 1-9: 10

## 2021-11-16 ENCOUNTER — MYC REFILL (OUTPATIENT)
Dept: FAMILY MEDICINE | Facility: CLINIC | Age: 51
End: 2021-11-16
Payer: COMMERCIAL

## 2021-11-16 ENCOUNTER — TELEPHONE (OUTPATIENT)
Dept: FAMILY MEDICINE | Facility: CLINIC | Age: 51
End: 2021-11-16
Payer: COMMERCIAL

## 2021-11-16 DIAGNOSIS — M79.674 PAIN OF RIGHT GREAT TOE: ICD-10-CM

## 2021-11-16 RX ORDER — HYDROCODONE BITARTRATE AND ACETAMINOPHEN 5; 325 MG/1; MG/1
1 TABLET ORAL DAILY PRN
Qty: 7 TABLET | Refills: 0 | Status: SHIPPED | OUTPATIENT
Start: 2021-11-16 | End: 2021-11-22

## 2021-11-16 NOTE — TELEPHONE ENCOUNTER
Reason for Call:  Medication or medication refill:    Do you use a [facility]?  Name of the pharmacy and phone number for the current request: [id number] [address]  AT [facility] OF [address] [address] (Pharmacy)    Name of the medication requested:HYDROcodone-acetaminophen (NORCO) 5-325 MG tablet    Other request: PRIOR AUTHORIZATION NEEDED- Pharmacy states plan does not cover this medication    Can we leave a detailed message on this number? YES    Phone number patient can be reached at: Cell number on file:    Telephone Information:   Mobile [phone]       Best Time: any    Call taken on 11/16/2021 at 2:14 PM by Anabel Delcid

## 2021-11-16 NOTE — TELEPHONE ENCOUNTER
PA Team/Landy - OK for PA or do you want to send alternative    Medication: HYDROcodone-acetaminophen (NORCO) 5-325 MG tablet    Formulary Issue:  Called to pharmacy has they were unsure why insurance advised PA this time as she has been picking up medication weekly    Dispensed by cash - today 11/16/2021 per patient request    Pharmacy requests clarification does patient needs to to wait exactly 7 days to  again or if there is another issue - they advised we should still run PA to prevent further blocks from insurance    Please update pharmacy [id number] - [place], [place] - [address] AT [address]

## 2021-11-16 NOTE — TELEPHONE ENCOUNTER
Requested Prescriptions   Pending Prescriptions Disp Refills     HYDROcodone-acetaminophen (NORCO) 5-325 MG tablet 7 tablet 0     Sig: Take 1 tablet by mouth daily as needed for severe pain       There is no refill protocol information for this order        Routing refill request to provider for review/approval because:  Drug not on the [facility] refill protocol

## 2021-11-21 ENCOUNTER — E-VISIT (OUTPATIENT)
Dept: URGENT CARE | Facility: URGENT CARE | Age: 51
End: 2021-11-21
Payer: COMMERCIAL

## 2021-11-21 DIAGNOSIS — Z20.822 SUSPECTED COVID-19 VIRUS INFECTION: Primary | ICD-10-CM

## 2021-11-21 PROCEDURE — 99421 OL DIG E/M SVC 5-10 MIN: CPT | Performed by: FAMILY MEDICINE

## 2021-11-22 ENCOUNTER — LAB (OUTPATIENT)
Dept: FAMILY MEDICINE | Facility: CLINIC | Age: 51
End: 2021-11-22
Attending: FAMILY MEDICINE
Payer: COMMERCIAL

## 2021-11-22 ENCOUNTER — MYC REFILL (OUTPATIENT)
Dept: FAMILY MEDICINE | Facility: CLINIC | Age: 51
End: 2021-11-22

## 2021-11-22 DIAGNOSIS — Z20.822 SUSPECTED COVID-19 VIRUS INFECTION: ICD-10-CM

## 2021-11-22 DIAGNOSIS — M79.674 PAIN OF RIGHT GREAT TOE: ICD-10-CM

## 2021-11-22 LAB — SARS-COV-2 RNA RESP QL NAA+PROBE: NEGATIVE

## 2021-11-22 PROCEDURE — U0005 INFEC AGEN DETEC AMPLI PROBE: HCPCS

## 2021-11-22 PROCEDURE — U0003 INFECTIOUS AGENT DETECTION BY NUCLEIC ACID (DNA OR RNA); SEVERE ACUTE RESPIRATORY SYNDROME CORONAVIRUS 2 (SARS-COV-2) (CORONAVIRUS DISEASE [COVID-19]), AMPLIFIED PROBE TECHNIQUE, MAKING USE OF HIGH THROUGHPUT TECHNOLOGIES AS DESCRIBED BY CMS-2020-01-R: HCPCS

## 2021-11-22 NOTE — PATIENT INSTRUCTIONS
Dear Sharmin Nieves,    Your symptoms show that you may have coronavirus (COVID-19). This illness can cause fever, cough and trouble breathing. Many people get a mild case and get better on their own. Some people can get very sick.    Will I be tested for COVID-19?  We would like to test you for Covid-19 virus. I have placed orders for this test.     For all employees or close contacts (except [employer] and [employer] - see below), go to your [employer] home page and scroll down to the section that says  You have an appointment that needs to be scheduled  and click the large green button that says  Schedule Now  and follow the steps to find the next available opening.     If you are unable to complete these steps or if you cannot find any available times, please call [phone] to schedule employee testing.     [employer] employees or close contacts, please call [phone].   [employer] ([employer]) employees or close contacts call [phone].    Return to work/school/ guidance:  Please let your workplace manager and staffing office know when your quarantine ends     We can t give you an exact date as it depends on the above. You can calculate this on your own or work with your manager/staffing office to calculate this. (For example if you were exposed on 10/4, you would have to quarantine for 14 full days. That would be through 10/18. You could return on 10/19.)      If you receive a positive COVID-19 test result, follow the guidance of the those who are giving you the results. Usually the return to work is 10 (or in some cases 20 days from symptom onset.) If you work at [employer], you must also be cleared by Employee Occupational Health and Safety to return to work.        If you receive a negative COVID-19 test result and did not have a high risk exposure to someone with a known positive COVID-19 test, you can return to work once you're free of fever for 24 hours without fever-reducing medication and  your symptoms are improving or resolved.      If you receive a negative COVID-19 test and If you had a high risk exposure to someone who has tested positive for COVID-19 then you can return to work 14 days after your last contact with the positive individual    Note: If you have ongoing exposure to the covid positive person, this quarantine period may be more than 14 days. (For example, if you are continued to be exposed to your child who tested positive and cannot isolate from them, then the quarantine of 7-14 days can't start until your child is no longer contagious. This is typically 10 days from onset of the child's symptoms. So the total duration may be 17-24 days in this case.)    Sign up for [email or web address].   We know it's scary to hear that you might have COVID-19. We want to track your symptoms to make sure you're okay over the next 2 weeks. Please look for an email from [email or web address]--this is a free, online program that we'll use to keep in touch. To sign up, follow the link in the email you will receive. Learn more at [email or web address]    How can I take care of myself?    Get lots of rest. Drink extra fluids (unless a doctor has told you not to)    Take Tylenol (acetaminophen) or ibuprofen for fever or pain. If you have liver or kidney problems, ask your family doctor if it's okay to take Tylenol o ibuprofen    If you have other health problems (like cancer, heart failure, an organ transplant or severe kidney disease): Call your specialty clinic if you don't feel better in the next 2 days.    Know when to call 911. Emergency warning signs include:  o Trouble breathing or shortness of breath  o Pain or pressure in the chest that doesn't go away  o Feeling confused like you haven't felt before, or not being able to wake up  o Bluish-colored lips or face    Where can I get more information?   [facility] [place] - About COVID-19:   [email or web address]    CDC - What to Do If You're Sick:    www.cdc.gov/coronavirus/2019-ncov/about/steps-when-sick.html

## 2021-11-22 NOTE — TELEPHONE ENCOUNTER
Routing refill request to provider for review/approval because:  Drug not on the [facility] refill protocol     Sapna Bowie RN   [facility]

## 2021-11-23 RX ORDER — HYDROCODONE BITARTRATE AND ACETAMINOPHEN 5; 325 MG/1; MG/1
1 TABLET ORAL DAILY PRN
Qty: 7 TABLET | Refills: 0 | Status: SHIPPED | OUTPATIENT
Start: 2021-11-23 | End: 2021-11-29

## 2021-11-29 ENCOUNTER — MYC REFILL (OUTPATIENT)
Dept: FAMILY MEDICINE | Facility: CLINIC | Age: 51
End: 2021-11-29
Payer: COMMERCIAL

## 2021-11-29 DIAGNOSIS — M79.674 PAIN OF RIGHT GREAT TOE: ICD-10-CM

## 2021-11-29 RX ORDER — HYDROCODONE BITARTRATE AND ACETAMINOPHEN 5; 325 MG/1; MG/1
1 TABLET ORAL DAILY PRN
Qty: 7 TABLET | Refills: 0 | Status: SHIPPED | OUTPATIENT
Start: 2021-11-29 | End: 2021-12-06

## 2021-11-29 NOTE — TELEPHONE ENCOUNTER
Requested Prescriptions   Pending Prescriptions Disp Refills     HYDROcodone-acetaminophen (NORCO) 5-325 MG tablet 7 tablet 0     Sig: Take 1 tablet by mouth daily as needed for severe pain       There is no refill protocol information for this order        Routing refill request to provider for review/approval because:  Drug not on the [facility] refill protocol     ThanksDayanara RN  [facility]

## 2021-12-06 ENCOUNTER — MYC REFILL (OUTPATIENT)
Dept: FAMILY MEDICINE | Facility: CLINIC | Age: 51
End: 2021-12-06
Payer: COMMERCIAL

## 2021-12-06 DIAGNOSIS — M79.674 PAIN OF RIGHT GREAT TOE: ICD-10-CM

## 2021-12-07 ENCOUNTER — MYC MEDICAL ADVICE (OUTPATIENT)
Dept: FAMILY MEDICINE | Facility: CLINIC | Age: 51
End: 2021-12-07
Payer: COMMERCIAL

## 2021-12-07 RX ORDER — HYDROCODONE BITARTRATE AND ACETAMINOPHEN 5; 325 MG/1; MG/1
1 TABLET ORAL DAILY PRN
Qty: 7 TABLET | Refills: 0 | Status: SHIPPED | OUTPATIENT
Start: 2021-12-07 | End: 2021-12-14

## 2021-12-07 NOTE — TELEPHONE ENCOUNTER
Refill request has been sent to provider  No further action needed on this encounter    Sapna Bowie RN   [facility]

## 2021-12-07 NOTE — TELEPHONE ENCOUNTER
Requested Prescriptions   Pending Prescriptions Disp Refills     HYDROcodone-acetaminophen (NORCO) 5-325 MG tablet 7 tablet 0     Sig: Take 1 tablet by mouth daily as needed for severe pain       There is no refill protocol information for this order        Routing refill request to provider for review/approval because:  Drug not on the [facility] refill protocol     ThanksDayanara RN  [facility]

## 2021-12-13 ASSESSMENT — PATIENT HEALTH QUESTIONNAIRE - PHQ9
SUM OF ALL RESPONSES TO PHQ QUESTIONS 1-9: 9
10. IF YOU CHECKED OFF ANY PROBLEMS, HOW DIFFICULT HAVE THESE PROBLEMS MADE IT FOR YOU TO DO YOUR WORK, TAKE CARE OF THINGS AT HOME, OR GET ALONG WITH OTHER PEOPLE: SOMEWHAT DIFFICULT
SUM OF ALL RESPONSES TO PHQ QUESTIONS 1-9: 9

## 2021-12-13 NOTE — PROGRESS NOTES
Kendy is a 51 year old who is being evaluated via a billable video visit.      How would you like to obtain your AVS? MyChart  If the video visit is dropped, the invitation should be resent by: Text to cell phone:    Will anyone else be joining your video visit? No    Video Start Time: 7:21 AM    Assessment & Plan     Pain of right great toe  - CSA completed during virtual visit and mailed to patient to sign  - Drug Abuse Screen Panel 13, Urine (Pain Care Package) - lab collect; Future  - HYDROcodone-acetaminophen (NORCO) 5-325 MG tablet; Take 1 tablet by mouth daily as needed for severe pain    Primary osteoarthritis of right foot  Consider switching from celecoxib to diclofenac oral     Generalized anxiety disorder  Refilled - stable  - lamoTRIgine (LAMICTAL) 200 MG tablet; Take 1 tablet (200 mg) by mouth daily    Ordering of each unique test  Prescription drug management  45 minutes spent on the date of the encounter doing chart review, history and exam, documentation and further activities per the note       Tobacco Cessation:   reports that she has been smoking cigarettes. She has a 7.50 pack-year smoking history. She has never used smokeless tobacco.  Tobacco Cessation Action Plan: discuss [facility] addiction medicine - possibly alternative chantix dosing?  or alternative anti-nausea?    Patient Instructions   Continue turmeric    Do schedule with pain management in case they have any good ideas about alternatives.  It isn't specifically to stop opioids    Give me an update from your next visit with Dr. Arreola    Schedule your mammogram    Sign and return the CSA letter    Do the urine test      Return in about 4 weeks (around 1/11/2022).    LEDY Seaman [facility]    Subjective   Kendy is a 51 year old     HPI     Answers for HPI/ROS submitted by the patient on 12/13/2021  If you checked off any problems, how difficult have these problems made it for you to do your work, take  care of things at home, or get along with other people?: Somewhat difficult  PHQ9 TOTAL SCORE: 9    Met with patient 4/15/2021 ahead of surgery to fuse R great toe and pain was 10/10 with use and 4-5/10 when not weight bearing.  An opioid was started with the thought that the need would end post-op.  Unfortunately, that surgery was delayed did not relieve patient's pain.  She has sought a second opinion at [facility] who believe that they can improve the repair/redo the fusion.  Has received order for CT for Dr. Arreola.  Considering a deep bone stimulator if bone hasn't fused or remove the school.  Calling today to schedule follow-up visit with Dr. Arreola to review CT.      Currently generally toe feels better by the end of the weekend.  When walking around at work.  Started curcuma longa 1000 mg BID for the past two months.  Has had some mild improvement in other pain.    Stopped taking gabapentin due to concerns about effect on her memory.  Depression and anxiety are stable - no worse.    Tried Chantix for smoking cessation due to severe nausea even with zofran.  Nausea did stop when she discontinued the medication.  Would be willing to try one more time.  Due to follow-up with Dr. Simons in addiction medicine.    Review of Systems     ROS:5 point ROS including CONST, HEENT, Respiratory, CV, and GI other than that noted in the HPI,  is negative         Objective           Vitals:  No vitals were obtained today due to virtual visit.    Physical Exam   GENERAL: Healthy, alert and no distress  EYES: Eyes grossly normal to inspection.  No discharge or erythema, or obvious scleral/conjunctival abnormalities.  RESP: No audible wheeze, cough, or visible cyanosis.  No visible retractions or increased work of breathing.    SKIN: Visible skin clear. No significant rash, abnormal pigmentation or lesions.  NEURO: Cranial nerves grossly intact.  Mentation and speech appropriate for age.  PSYCH: Mentation appears normal, affect  normal/bright, judgement and insight intact, normal speech and appearance well-groomed.    utox pending        Video-Visit Details    Type of service:  Video Visit    Video End Time:7:49 AM    Originating Location (pt. Location): Home    Distant Location (provider location):  [facility]     Platform used for Video Visit: [employer]

## 2021-12-14 ENCOUNTER — MYC MEDICAL ADVICE (OUTPATIENT)
Dept: FAMILY MEDICINE | Facility: CLINIC | Age: 51
End: 2021-12-14

## 2021-12-14 ENCOUNTER — VIRTUAL VISIT (OUTPATIENT)
Dept: FAMILY MEDICINE | Facility: CLINIC | Age: 51
End: 2021-12-14
Payer: COMMERCIAL

## 2021-12-14 DIAGNOSIS — Z12.31 ENCOUNTER FOR SCREENING MAMMOGRAM FOR BREAST CANCER: Primary | ICD-10-CM

## 2021-12-14 DIAGNOSIS — M19.071 PRIMARY OSTEOARTHRITIS OF RIGHT FOOT: ICD-10-CM

## 2021-12-14 DIAGNOSIS — F41.1 GENERALIZED ANXIETY DISORDER: ICD-10-CM

## 2021-12-14 DIAGNOSIS — M79.674 PAIN OF RIGHT GREAT TOE: Primary | ICD-10-CM

## 2021-12-14 PROCEDURE — 99215 OFFICE O/P EST HI 40 MIN: CPT | Mod: 95 | Performed by: NURSE PRACTITIONER

## 2021-12-14 RX ORDER — HYDROCODONE BITARTRATE AND ACETAMINOPHEN 5; 325 MG/1; MG/1
1 TABLET ORAL DAILY PRN
Qty: 30 TABLET | Refills: 0 | Status: SHIPPED | OUTPATIENT
Start: 2021-12-14 | End: 2022-01-10

## 2021-12-14 RX ORDER — LAMOTRIGINE 200 MG/1
200 TABLET ORAL DAILY
Qty: 90 TABLET | Refills: 1 | Status: SHIPPED | OUTPATIENT
Start: 2021-12-14 | End: 2022-07-06

## 2021-12-14 RX ORDER — HYDROCODONE BITARTRATE AND ACETAMINOPHEN 5; 325 MG/1; MG/1
1 TABLET ORAL DAILY PRN
Qty: 7 TABLET | Refills: 0 | Status: SHIPPED | OUTPATIENT
Start: 2021-12-14 | End: 2021-12-14

## 2021-12-14 ASSESSMENT — PATIENT HEALTH QUESTIONNAIRE - PHQ9: SUM OF ALL RESPONSES TO PHQ QUESTIONS 1-9: 9

## 2021-12-14 NOTE — PATIENT INSTRUCTIONS
Continue turmeric    Do schedule with pain management in case they have any good ideas about alternatives.  It isn't specifically to stop opioids    Give me an update from your next visit with Dr. Arreola    Schedule your mammogram    Sign and return the CSA letter    Do the urine test

## 2021-12-14 NOTE — LETTER
Opioid / Opioid Plus Controlled Substance Agreement    This is an agreement between you and your provider about the safe and appropriate use of controlled substance/opioids prescribed by your care team. Controlled substances are medicines that can cause physical and mental dependence (abuse).    There are strict laws about having and using these medicines. We here at [facility] are committing to working with you in your efforts to get better. To support you in this work, we ll help you schedule regular office appointments for medicine refills. If we must cancel or change your appointment for any reason, we ll make sure you have enough medicine to last until your next appointment.     As a Provider, I will:    Listen carefully to your concerns and treat you with respect.     Recommend a treatment plan that I believe is in your best interest. This plan may involve therapies other than opioid pain medication.     Talk with you often about the possible benefits, and the risk of harm of any medicine that we prescribe for you.     Provide a plan on how to taper (discontinue or go off) using this medicine if the decision is made to stop its use.    As a Patient, I understand that opioid(s):     Are a controlled substance prescribed by my care team to help me function or work and manage my condition(s).     Are strong medicines and can cause serious side effects such as:    Drowsiness, which can seriously affect my driving ability    A lower breathing rate, enough to cause death    Harm to my thinking ability     Depression     Abuse of and addiction to this medicine    Need to be taken exactly as prescribed. Combining opioids with certain medicines or chemicals (such as illegal drugs, sedatives, sleeping pills, and benzodiazepines) can be dangerous or even fatal. If I stop opioids suddenly, I may have severe withdrawal symptoms.    Do not work for all types of pain nor for all patients. If they re not helpful, I may  be asked to stop them.    The risks, benefits and side effects of these medicine(s) were explained to me. I agree that:  1. I will take part in other treatments as advised by my care team. This may be psychiatry or counseling, physical therapy, behavioral therapy, group treatment or a referral to a specialist.     2. I will keep all my appointments. I understand that this is part of the monitoring of opioids. My care team may require an office visit for EVERY opioid/controlled substance refill. If I miss appointments or don t follow instructions, my care team may stop my medicine.    3. I will take my medicines as prescribed. I will not change the dose or schedule unless my care team tells me to. There will be no refills if I run out early.     4. I may be asked to come to the clinic and complete a urine drug test or complete a pill count at any time. If I don t give a urine sample or participate in a pill count, the care team may stop my medicine.    5. I will only receive prescriptions from this clinic for chronic pain. If I am treated by another provider for acute pain issues, I will tell them that I am taking opioid pain medication for chronic pain and that I have a treatment agreement with this provider. I will inform my [facility] care team within one business day if I am given a prescription for any pain medication by another healthcare provider. My [facility] care team can contact other providers and pharmacists about my use of any medicines.    6. It is up to me to make sure that I don t run out of my medicines on weekends or holidays. If my care team is willing to refill my opioid prescription without a visit, I must request refills only during office hours. Refills may take up to 3 business days to process. I will use one pharmacy to fill all my opioid and other controlled substance prescriptions. I will notify the clinic about any changes to my insurance or medication availability.    7. I  am responsible for my prescriptions. If the medicine/prescription is lost, stolen or destroyed, it will not be replaced. I also agree not to share controlled substance medicines with anyone.    8. I am aware I should not use any illegal or recreational drugs. I agree not to drink alcohol unless my care team says I can.       9. If I enroll in the [place] [facility] Cannabis program, I will tell my care team prior to my next refill.     10. I will tell my care team right away if I become pregnant, have a new medical problem treated outside of my regular clinic, or have a change in my medications.    11. I understand that this medicine can affect my thinking, judgment and reaction time. Alcohol and drugs affect the brain and body, which can affect the safety of my driving. Being under the influence of alcohol or drugs can affect my decision-making, behaviors, personal safety, and the safety of others. Driving while impaired (DWI) can occur if a person is driving, operating, or in physical control of a car, motorcycle, boat, snowmobile, ATV, motorbike, off-road vehicle, or any other motor vehicle ([place] Statute 169A.20). I understand the risk if I choose to drive or operate any vehicle or machinery.    I understand that if I do not follow any of the conditions above, my prescriptions or treatment may be stopped or changed.          Opioids  What You Need to Know    What are opioids?   Opioids are pain medicines that must be prescribed by a doctor. They are also known as narcotics.     Examples are:   1. morphine (MS Contin, Anamaria)  2. oxycodone (Oxycontin)  3. oxycodone and acetaminophen (Percocet)  4. hydrocodone and acetaminophen (Vicodin, Norco)   5. fentanyl patch (Duragesic)   6. hydromorphone (Dilaudid)   7. methadone  8. codeine (Tylenol #3)     What do opioids do well?   Opioids are best for severe short-term pain such as after a surgery or injury. They may work well for cancer pain. They may help some people with  long-lasting (chronic) pain.     What do opioids NOT do well?   Opioids never get rid of pain entirely, and they don t work well for most patients with chronic pain. Opioids don t reduce swelling, one of the causes of pain.                                    Other ways to manage chronic pain and improve function include:       Treat the health problem that may be causing pain    Anti-inflammation medicines, which reduce swelling and tenderness, such as ibuprofen (Advil, Motrin) or naproxen (Aleve)    Acetaminophen (Tylenol)    Antidepressants and anti-seizure medicines, especially for nerve pain    Topical treatments such as patches or creams    Injections or nerve blocks    Chiropractic or osteopathic treatment    Acupuncture, massage, deep breathing, meditation, visual imagery, aromatherapy    Use heat or ice at the pain site    Physical therapy     Exercise    Stop smoking    Take part in therapy       Risks and side effects     Talk to your doctor before you start or decide to keep taking opioids. Possible side effects include:      Lowering your breathing rate enough to cause death    Overdose, including death, especially if taking higher than prescribed doses    Worse depression symptoms; less pleasure in things you usually enjoy    Feeling tired or sluggish    Slower thoughts or cloudy thinking    Being more sensitive to pain over time; pain is harder to control    Trouble sleeping or restless sleep    Changes in hormone levels (for example, less testosterone)    Changes in sex drive or ability to have sex    Constipation    Unsafe driving    Itching and sweating    Dizziness    Nausea, throwing up and dry mouth    What else should I know about opioids?    Opioids may lead to dependence, tolerance, or addiction.      Dependence means that if you stop or reduce the medicine too quickly, you will have withdrawal symptoms. These include loose poop (diarrhea), jitters, flu-like symptoms, nervousness and tremors.  Dependence is not the same as addiction.                       Tolerance means needing higher doses over time to get the same effect. This may increase the chance of serious side effects.      Addiction is when people improperly use a substance that harms their body, their mind or their relations with others. Use of opiates can cause a relapse of addiction if you have a history of drug or alcohol abuse.      People who have used opioids for a long time may have a lower quality of life, worse depression, higher levels of pain and more visits to doctors.    You can overdose on opioids. Take these steps to lower your risk of overdose:    1. Recognize the signs:  Signs of overdose include decrease or loss of consciousness (blackout), slowed breathing, trouble waking up and blue lips. If someone is worried about overdose, they should call 911.    2. Talk to your doctor about Narcan (naloxone).   If you are at risk for overdose, you may be given a prescription for Narcan. This medicine very quickly reverses the effects of opioids.   If you overdose, a friend or family member can give you Narcan while waiting for the ambulance. They need to know the signs of overdose and how to give Narcan.     3. Don't use alcohol or street drugs.   Taking them with opioids can cause death.    4. Do not take any of these medicines unless your doctor says it s OK. Taking these with opioids can cause death:    Benzodiazepines, such as lorazepam (Ativan), alprazolam (Xanax) or diazepam (Valium)    Muscle relaxers, such as cyclobenzaprine (Flexeril)    Sleeping pills like zolpidem (Ambien)     Other opioids      How to keep you and other people safe while taking opioids:    1. Never share your opioids with others.  Opioid medicines are regulated by the Drug Enforcement Agency ([place]). Selling or sharing medications is a criminal act.    2. Be sure to store opioids in a secure place, locked up if possible. Young children can easily swallow them  and overdose.    3. When you are traveling with your medicines, keep them in the original bottles. If you use a pill box, be sure you also carry a copy of your medicine list from your clinic or pharmacy.    4. Safe disposal of opioids    Most pharmacies have places to get rid of medicine, called disposal kiosks. Medicine disposal options are also available in every [facility]. Search your county and  medication disposal  to find more options. You can find more details at:  https://www.pca.[email or web address].mn./living-green/managing-unwanted-medications     I agree that my provider, clinic care team, and pharmacy may work with any city, state or federal law enforcement agency that investigates the misuse, sale, or other diversion of my controlled medicine. I will allow my provider to discuss my care with, or share a copy of, this agreement with any other treating provider, pharmacy or emergency room where I receive care.    I have read this agreement and have asked questions about anything I did not understand.    _______________________________________________________  Patient Signature - Sharmin Nieves _____________________                   Date     _______________________________________________________  Provider Signature - LEDY Seaman CNP   _____________________                   Date     _______________________________________________________  Witness Signature (required if provider not present while patient signing)   _____________________                   Date

## 2021-12-15 ENCOUNTER — ANCILLARY PROCEDURE (OUTPATIENT)
Dept: CT IMAGING | Facility: CLINIC | Age: 51
End: 2021-12-15
Attending: ORTHOPAEDIC SURGERY
Payer: COMMERCIAL

## 2021-12-15 DIAGNOSIS — M79.671 RIGHT FOOT PAIN: ICD-10-CM

## 2021-12-15 PROCEDURE — 73700 CT LOWER EXTREMITY W/O DYE: CPT | Mod: RT | Performed by: RADIOLOGY

## 2021-12-16 RX ORDER — DICLOFENAC SODIUM 100 MG/1
100 TABLET, FILM COATED, EXTENDED RELEASE ORAL DAILY
Qty: 30 TABLET | Refills: 0 | Status: SHIPPED | OUTPATIENT
Start: 2021-12-16 | End: 2022-03-09

## 2021-12-22 ENCOUNTER — E-VISIT (OUTPATIENT)
Dept: URGENT CARE | Facility: CLINIC | Age: 51
End: 2021-12-22
Payer: COMMERCIAL

## 2021-12-22 DIAGNOSIS — R05.9 COUGH: ICD-10-CM

## 2021-12-22 DIAGNOSIS — B34.9 VIRAL SYNDROME: Primary | ICD-10-CM

## 2021-12-22 PROCEDURE — 99421 OL DIG E/M SVC 5-10 MIN: CPT | Performed by: PHYSICIAN ASSISTANT

## 2021-12-22 RX ORDER — ALBUTEROL SULFATE 90 UG/1
1-2 AEROSOL, METERED RESPIRATORY (INHALATION) EVERY 4 HOURS PRN
Qty: 6.7 G | Refills: 0 | Status: SHIPPED | OUTPATIENT
Start: 2021-12-22 | End: 2022-01-10

## 2021-12-22 RX ORDER — BENZONATATE 100 MG/1
100 CAPSULE ORAL 3 TIMES DAILY PRN
Qty: 16 CAPSULE | Refills: 0 | Status: SHIPPED | OUTPATIENT
Start: 2021-12-22 | End: 2022-01-10

## 2021-12-22 NOTE — PATIENT INSTRUCTIONS
"  Dear Sharmin Nieves,  Your symptoms are likely due to a viral illness. I have sent a prescription for a cough suppressant, Tessalon, as well as an albuterol inhaler for your cough. Use the cough suppressant only at night as it is usually not advised to suppress a productive cough. Use Mucinex during the day, drink plenty of fluids.      Viral Syndrome (Adult)  A viral illness may cause a number of symptoms such as fever. Other symptoms depend on the part of the body that the virus affects. If it settles in your nose, throat, and lungs, it may cause cough, sore throat, congestion, runny nose, headache, earache and other ear symptoms, or shortness of breath. If it settles in your stomach and intestinal tract, it may cause nausea, vomiting, cramping, and diarrhea. Sometimes it causes generalized symptoms like \"aching all over,\" feeling tired, loss of energy, or loss of appetite.  A viral illness usually lasts anywhere from several days to several weeks, but sometimes it lasts longer. In some cases, a more serious infection can look like a viral syndrome in the first few days of the illness. You may need another exam and additional tests to know the difference. Watch for the warning signs listed below for when to seek medical advice.  Home care  Follow these guidelines for taking care of yourself at home:    If symptoms are severe, rest at home for the first 2 to 3 days.    Stay away from cigarette smoke - both your smoke and the smoke from others.    You may use over-the-counter acetaminophen or ibuprofen for fever, muscle aching, and headache, unless another medicine was prescribed for this. If you have chronic liver or kidney disease or ever had a stomach ulcer or gastrointestinal bleeding, talk with your healthcare provider before using these medicines. No one who is younger than 18 and ill with a fever should take aspirin. It may cause severe disease or death.    Your appetite may be poor, so a light diet is " fine. Avoid dehydration by drinking 8 to 12, 8-ounce glasses of fluids each day. This may include water; orange juice; lemonade; apple, grape, and cranberry juice; clear fruit drinks; electrolyte replacement and sports drinks; and decaffeinated teas and coffee. If you have been diagnosed with a kidney disease, ask your healthcare provider how much and what types of fluids you should drink to prevent dehydration. If you have kidney disease, drinking too much fluid can cause it build up in the your body and be dangerous to your health.    Over-the-counter remedies won't shorten the length of the illness but may be helpful for symptoms such as cough, sore throat, nasal and sinus congestion, or diarrhea. Don't use decongestants if you have high blood pressure.  Follow-up care  Follow up with your healthcare provider if you do not improve over the next week.  Call 911  Call 911 if any of the following occur:    Convulsion    Feeling weak, dizzy, or like you are going to faint    Chest pain, or more than mild shortness of breath  When to seek medical advice  Call your healthcare provider right away if any of these occur:    Cough with lots of colored sputum (mucus) or blood in your sputum    Chest pain, shortness of breath, wheezing, or trouble breathing    Severe headache; face, neck, or ear pain    Severe, constant pain in the lower right side of your belly (abdominal)    Continued vomiting (can t keep liquids down)    Frequent diarrhea (more than 5 times a day); blood (red or black color) or mucus in diarrhea    Feeling weak, dizzy, or like you are going to faint    Extreme thirst    Fever of 100.4 F (38 C) or higher, or as directed by your healthcare provider  Casie last reviewed this educational content on 4/1/2018 2000-2021 [employer]. All rights reserved. This information is not intended as a substitute for professional medical care. Always follow your healthcare professional's  instructions.    Thanks for choosing us as your health care partner,    Marsha Wiley PA-C

## 2021-12-29 DIAGNOSIS — N39.41 URGE INCONTINENCE: ICD-10-CM

## 2021-12-29 DIAGNOSIS — N32.81 URGENCY-FREQUENCY SYNDROME: ICD-10-CM

## 2021-12-29 RX ORDER — SOLIFENACIN SUCCINATE 5 MG/1
5 TABLET, FILM COATED ORAL DAILY
Qty: 30 TABLET | Refills: 1 | Status: SHIPPED | OUTPATIENT
Start: 2021-12-29 | End: 2022-03-30

## 2022-01-06 ENCOUNTER — LAB (OUTPATIENT)
Dept: LAB | Facility: CLINIC | Age: 52
End: 2022-01-06
Payer: COMMERCIAL

## 2022-01-06 DIAGNOSIS — R53.83 OTHER FATIGUE: ICD-10-CM

## 2022-01-06 DIAGNOSIS — R53.83 OTHER FATIGUE: Primary | ICD-10-CM

## 2022-01-06 LAB — SARS-COV-2 RNA RESP QL NAA+PROBE: NEGATIVE

## 2022-01-06 PROCEDURE — 99000 SPECIMEN HANDLING OFFICE-LAB: CPT | Performed by: PATHOLOGY

## 2022-01-06 PROCEDURE — U0005 INFEC AGEN DETEC AMPLI PROBE: HCPCS | Mod: 90 | Performed by: PATHOLOGY

## 2022-01-06 PROCEDURE — U0003 INFECTIOUS AGENT DETECTION BY NUCLEIC ACID (DNA OR RNA); SEVERE ACUTE RESPIRATORY SYNDROME CORONAVIRUS 2 (SARS-COV-2) (CORONAVIRUS DISEASE [COVID-19]), AMPLIFIED PROBE TECHNIQUE, MAKING USE OF HIGH THROUGHPUT TECHNOLOGIES AS DESCRIBED BY CMS-2020-01-R: HCPCS | Mod: 90 | Performed by: PATHOLOGY

## 2022-01-08 ENCOUNTER — HEALTH MAINTENANCE LETTER (OUTPATIENT)
Age: 52
End: 2022-01-08

## 2022-01-10 ENCOUNTER — VIRTUAL VISIT (OUTPATIENT)
Dept: FAMILY MEDICINE | Facility: CLINIC | Age: 52
End: 2022-01-10
Payer: COMMERCIAL

## 2022-01-10 DIAGNOSIS — R06.83 SNORING: ICD-10-CM

## 2022-01-10 DIAGNOSIS — I10 BENIGN ESSENTIAL HYPERTENSION: Primary | ICD-10-CM

## 2022-01-10 DIAGNOSIS — M79.674 PAIN OF RIGHT GREAT TOE: ICD-10-CM

## 2022-01-10 DIAGNOSIS — F32.1 MODERATE MAJOR DEPRESSION (H): ICD-10-CM

## 2022-01-10 DIAGNOSIS — F10.20 ALCOHOL USE DISORDER, SEVERE, DEPENDENCE (H): ICD-10-CM

## 2022-01-10 DIAGNOSIS — R41.3 MEMORY CHANGE: ICD-10-CM

## 2022-01-10 PROCEDURE — 99215 OFFICE O/P EST HI 40 MIN: CPT | Mod: 95 | Performed by: NURSE PRACTITIONER

## 2022-01-10 RX ORDER — LOSARTAN POTASSIUM 100 MG/1
100 TABLET ORAL DAILY
Qty: 90 TABLET | Refills: 1 | Status: SHIPPED | OUTPATIENT
Start: 2022-01-10 | End: 2022-05-25

## 2022-01-10 RX ORDER — HYDROCODONE BITARTRATE AND ACETAMINOPHEN 5; 325 MG/1; MG/1
1 TABLET ORAL DAILY PRN
Qty: 30 TABLET | Refills: 0 | Status: SHIPPED | OUTPATIENT
Start: 2022-01-10 | End: 2022-02-03

## 2022-01-10 NOTE — PROGRESS NOTES
Kendy is a 51 year old who is being evaluated via a billable video visit.      How would you like to obtain your AVS? MyChart  If the video visit is dropped, the invitation should be resent by: Text to cell phone:  [phone] [email or web address]  Will anyone else be joining your video visit? No    Video Start Time: 8:20 AM    Assessment & Plan     Benign essential hypertension  Last in person BP well under goal.  Get BP at work now while off losartan at the moment.  Likely do not need the losartan 100 mg, but possibly lower dose.  Will send BP from work today.  Due for labs  - losartan (COZAAR) 100 MG tablet; Take 1 tablet (100 mg) by mouth daily  - Comprehensive metabolic panel (BMP + Alb, Alk Phos, ALT, AST, Total. Bili, TP); Future  - Albumin Random Urine Quantitative with Creat Ratio; Future    Memory change  Mild changes at this time without safety concerns.  Discussed multifactorial causes.  Start with looking at medications (trazodone primary concern).  Schedule sleep study due to snoring.  Would consider OB-GYN for perimenopausal changes - discussed would not be eligible for HRT while smoking.  - SLEEP EVALUATION & MANAGEMENT REFERRAL - ADULT -; Future  - TSH with free T4 reflex; Future    Snoring  - SLEEP EVALUATION & MANAGEMENT REFERRAL - ADULT -; Future    Pain of right great toe  - due for UDS  appt next week with ortho  No escalation of dose  - HYDROcodone-acetaminophen (NORCO) 5-325 MG tablet; Take 1 tablet by mouth daily as needed for severe pain    Alcohol use disorder, severe, dependence (H)  Stable - schedule appt with Dr. Simons    Moderate major depression (H)  Stable      Ordering of each unique test  Prescription drug management  54 minutes spent on the date of the encounter doing chart review, history and exam, documentation and further activities per the note        Patient Instructions   Memory - this can be caused by many different causes, including stress.  We will check labs that are  associated with hypertension, but also might give us reassurance that there is no evidence of underlying causes.  I'll include a thyroid test as well.  You should schedule a sleep study and try cutting down trazodone to 100 mg nightly.  We will consider having you see OB-GYN to talk about curt-menopause as a possible factor, but know that you would need to have stopped smoking to be eligible for oral hormones.    Hypertension - take blood pressure this afternoon at work (set an alarm on your phone to remember!).  We may not need you to take the losartan at 100 mg or maybe not at all.  The propranolol is not sufficient for preventing the end points like preventing heart attack, stroke, congestive heart failure.  Send me your BP from work this afternoon    Toe - refilled hydrocodone, follow-up with ortho next week  Please do the urine drug screening!    Alcohol - fantastic job getting through the end of the year sober!  Woot!          Return in about 4 weeks (around 2/7/2022) for pain, Memory, BP Recheck.    LEDY Seaman CNP  [facility]    Subjective   Kendy is a 51 year old who presents for the following health issues    HPI     Great toe - Previous ortho appointment was cancelled by ortho MD (presumptive covid cancellation) and Kendy will see MD now next Tuesday.  Has been taking turmeric for three months.  Hard to say if it is helping.  Has been trying to do ellipitcal machine which is hampered by the cold as it is out in the garage.  Switched from celexocib to diclofenac - thinks it is helping more.    Illness - Thought she had covid last week, but covid test was negative.  Had symptoms of achy, sore throat, and respiratory symptoms.  Symptoms have resolved.    Alcohol - Continues with antabuse and folic acid.  Sobriety going really well.  Considered stopping and having a drink on NYE, but decided it was not worth it.  April will be one year sobriety.  Stopped chantix because it caused  significant nausea.  Currently smoking, but knows she would need to stop for surgery if that is recommended.    Blood pressure - hasn't checked BP at home or work - .  Ran out of losartan and didn't refill.  Taking one propranolol daily int he am that she had from anxiety.  BP Readings from Last 6 Encounters:   06/09/21 107/65   05/28/21 118/64   12/09/20 (!) 142/92   11/18/20 (!) 143/91   12/19/19 122/86   09/27/19 (!) 164/94     Mental health - Stable at patient's baseline which is less than ideal, but better controlled than at worst.      Memory - having some memory issues that we had attributed to gabapentin so stopped this in fall 2021, but hasn't returned to baseline.  Children and coworkers are noting memory loss - repeating self, forgetting phone number.  No safety concerns.  Notes that her daughter did report she snores.    Taking over-the-counter pre-menopause herbal supplement - Amberen.  Doesn't get periods due to the IUD so unsure about periods and curt-menopause    Review of Systems   Constitutional, HEENT, cardiovascular, pulmonary, gi and gu systems are negative, except as otherwise noted.      Objective           Vitals:  No vitals were obtained today due to virtual visit.    Physical Exam   GENERAL: Healthy, alert and no distress  EYES: Eyes grossly normal to inspection.  No discharge or erythema, or obvious scleral/conjunctival abnormalities.  RESP: No audible wheeze, cough, or visible cyanosis.  No visible retractions or increased work of breathing.    SKIN: Visible skin clear. No significant rash, abnormal pigmentation or lesions.  NEURO: Cranial nerves grossly intact.  Mentation and speech appropriate for age.  PSYCH: Mentation appears normal, affect normal/bright, judgement and insight intact, normal speech and appearance well-groomed.    Labs pending        Video-Visit Details    Type of service:  Video Visi    Video End Time:8:54 AM    Originating Location (pt. Location): Home    Distant  Location (provider location):  [facility]     Platform used for Video Visit: Charlie

## 2022-01-10 NOTE — PATIENT INSTRUCTIONS
Memory - this can be caused by many different causes, including stress.  We will check labs that are associated with hypertension, but also might give us reassurance that there is no evidence of underlying causes.  I'll include a thyroid test as well.  You should schedule a sleep study and try cutting down trazodone to 100 mg nightly.  We will consider having you see OB-GYN to talk about curt-menopause as a possible factor, but know that you would need to have stopped smoking to be eligible for oral hormones.    Hypertension - take blood pressure this afternoon at work (set an alarm on your phone to remember!).  We may not need you to take the losartan at 100 mg or maybe not at all.  The propranolol is not sufficient for preventing the end points like preventing heart attack, stroke, congestive heart failure.  Send me your BP from work this afternoon    Toe - refilled hydrocodone, follow-up with ortho next week  Please do the urine drug screening!    Alcohol - fantastic job getting through the end of the year sober!  Woot!  Schedule routine visit with Dr. Simons

## 2022-01-25 ENCOUNTER — TRANSFERRED RECORDS (OUTPATIENT)
Dept: HEALTH INFORMATION MANAGEMENT | Facility: CLINIC | Age: 52
End: 2022-01-25
Payer: COMMERCIAL

## 2022-02-02 ENCOUNTER — OFFICE VISIT (OUTPATIENT)
Dept: FAMILY MEDICINE | Facility: CLINIC | Age: 52
End: 2022-02-02
Payer: COMMERCIAL

## 2022-02-02 ENCOUNTER — MYC MEDICAL ADVICE (OUTPATIENT)
Dept: FAMILY MEDICINE | Facility: CLINIC | Age: 52
End: 2022-02-02
Payer: COMMERCIAL

## 2022-02-02 VITALS
BODY MASS INDEX: 33.28 KG/M2 | OXYGEN SATURATION: 98 % | DIASTOLIC BLOOD PRESSURE: 69 MMHG | HEART RATE: 83 BPM | WEIGHT: 232.5 LBS | HEIGHT: 70 IN | SYSTOLIC BLOOD PRESSURE: 118 MMHG | TEMPERATURE: 98 F

## 2022-02-02 DIAGNOSIS — R41.3 MEMORY CHANGE: ICD-10-CM

## 2022-02-02 DIAGNOSIS — M19.071 PRIMARY OSTEOARTHRITIS OF RIGHT FOOT: ICD-10-CM

## 2022-02-02 DIAGNOSIS — Z12.11 ENCOUNTER FOR COLORECTAL CANCER SCREENING: ICD-10-CM

## 2022-02-02 DIAGNOSIS — Z12.12 ENCOUNTER FOR COLORECTAL CANCER SCREENING: ICD-10-CM

## 2022-02-02 DIAGNOSIS — F17.200 TOBACCO USE DISORDER: ICD-10-CM

## 2022-02-02 DIAGNOSIS — M79.674 PAIN OF RIGHT GREAT TOE: ICD-10-CM

## 2022-02-02 DIAGNOSIS — I10 BENIGN ESSENTIAL HYPERTENSION: ICD-10-CM

## 2022-02-02 DIAGNOSIS — Z01.818 PREOP GENERAL PHYSICAL EXAM: Primary | ICD-10-CM

## 2022-02-02 DIAGNOSIS — Z98.890 HISTORY OF FOOT SURGERY: ICD-10-CM

## 2022-02-02 DIAGNOSIS — F10.288 ALCOHOL DEPENDENCE WITH OTHER ALCOHOL-INDUCED DISORDER (H): ICD-10-CM

## 2022-02-02 LAB
AMPHETAMINES UR QL: NOT DETECTED
BARBITURATES UR QL SCN: NOT DETECTED
BENZODIAZ UR QL SCN: NOT DETECTED
BUPRENORPHINE UR QL: NOT DETECTED
CANNABINOIDS UR QL: NOT DETECTED
COCAINE UR QL SCN: NOT DETECTED
D-METHAMPHET UR QL: NOT DETECTED
HGB BLD-MCNC: 14.1 G/DL (ref 11.7–15.7)
METHADONE UR QL SCN: NOT DETECTED
OPIATES UR QL SCN: DETECTED
OXYCODONE UR QL SCN: NOT DETECTED
PCP UR QL SCN: NOT DETECTED
PROPOXYPH UR QL: NOT DETECTED
TRICYCLICS UR QL SCN: NOT DETECTED

## 2022-02-02 PROCEDURE — 82043 UR ALBUMIN QUANTITATIVE: CPT | Performed by: NURSE PRACTITIONER

## 2022-02-02 PROCEDURE — 93000 ELECTROCARDIOGRAM COMPLETE: CPT | Performed by: NURSE PRACTITIONER

## 2022-02-02 PROCEDURE — 85018 HEMOGLOBIN: CPT | Performed by: NURSE PRACTITIONER

## 2022-02-02 PROCEDURE — 99214 OFFICE O/P EST MOD 30 MIN: CPT | Performed by: NURSE PRACTITIONER

## 2022-02-02 PROCEDURE — 36415 COLL VENOUS BLD VENIPUNCTURE: CPT | Performed by: NURSE PRACTITIONER

## 2022-02-02 PROCEDURE — 80053 COMPREHEN METABOLIC PANEL: CPT | Performed by: NURSE PRACTITIONER

## 2022-02-02 PROCEDURE — 80306 DRUG TEST PRSMV INSTRMNT: CPT | Performed by: NURSE PRACTITIONER

## 2022-02-02 PROCEDURE — 84443 ASSAY THYROID STIM HORMONE: CPT | Performed by: NURSE PRACTITIONER

## 2022-02-02 RX ORDER — VARENICLINE TARTRATE 0.5 MG/1
0.5 TABLET, FILM COATED ORAL DAILY
COMMUNITY
Start: 2022-02-02 | End: 2022-03-29

## 2022-02-02 RX ORDER — FOLIC ACID 1 MG/1
1 TABLET ORAL DAILY
Qty: 90 TABLET | Refills: 1 | Status: SHIPPED | OUTPATIENT
Start: 2022-02-02 | End: 2022-05-25

## 2022-02-02 ASSESSMENT — MIFFLIN-ST. JEOR: SCORE: 1759.86

## 2022-02-02 NOTE — TELEPHONE ENCOUNTER
Mariya  I placed her on your schedule for today at 4p in clinic    Sapna Bowie RN   [facility]

## 2022-02-02 NOTE — PATIENT INSTRUCTIONS
Hold diclofenac (voltaren) 1 day before surgery  Your previous surgeon was ok with the disulfram (Antabuse) not being held   28-Dec-2020 11:17

## 2022-02-02 NOTE — PROGRESS NOTES
[address]  [address]  [facility] [address]  Phone: [phone]  Fax: [phone]  Primary Provider: Randa Hylton  Pre-op Performing Provider: RANDA HYLTON      PREOPERATIVE EVALUATION:  Today's date: 2/2/2022    Sharmin Nieves is a 51 year old female who presents for a preoperative evaluation.    Surgical Information:  Surgery/Procedure: R great toe screw removal, possibly revision  Surgery Location: O  Surgeon: Dr. Arreola  Surgery Date: 02/14/2022  Time of Surgery: 4 pm  Where patient plans to recover: At home with family  Fax number for surgical facility:     Type of Anesthesia Anticipated: General    Pre-op Questionnaire 2/2/2022   Surgery Location: -   Surgeon: -   Surgery/Procedure: -   Surgery Date: -   Time of Surgery: -   1. Have you ever had a heart attack or stroke? No   2. Have you ever had surgery on your heart or blood vessels, such as a stent placement, a coronary artery bypass, or surgery on an artery in your head, neck, heart, or legs? No   3. Do you have chest pain with activity? No   4. Do you have a history of  heart failure? No   5. Do you currently have a cold, bronchitis or symptoms of other infection? No   6. Do you have a cough, shortness of breath, or wheezing? No   7. Do you or anyone in your family have previous history of blood clots? No   8. Do you or does anyone in your family have a serious bleeding problem such as prolonged bleeding following surgeries or cuts? No   9. Have you ever had problems with anemia or been told to take iron pills? No   10. Have you had any abnormal blood loss such as black, tarry or bloody stools, or abnormal vaginal bleeding? No   11. Have you ever had a blood transfusion? No   12. Are you willing to have a blood transfusion if it is medically needed before, during, or after your surgery? Yes   13. Have you or any of your relatives ever had problems with anesthesia? No   14. Do you have sleep apnea,  excessive snoring or daytime drowsiness? No   15. Do you have any artifical heart valves or other implanted medical devices like a pacemaker, defibrillator, or continuous glucose monitor? No   16. Do you have artificial joints? No   17. Are you allergic to latex? No   18. Is there any chance that you may be pregnant? No       Assessment & Plan     The proposed surgical procedure is considered INTERMEDIATE risk.    Preop general physical exam  - EKG 12-lead complete w/read - Clinics  - Hemoglobin; Future    Primary osteoarthritis of right foot  Screw removal and possible revision    History of foot surgery  As above    Pain of right great toe    Benign essential hypertension  Well controlled    Tobacco use disorder  Taking 0.5 mg chantix and has been not smoking since 1/15/22    Alcohol dependence with other alcohol-induced disorder (H)  Sober without any recent slips  - folic acid (FOLVITE) 1 MG tablet; Take 1 tablet (1 mg) by mouth daily       Risks and Recommendations:  The patient has the following additional risks and recommendations for perioperative complications:   - No identified additional risk factors other than previously addressed    Medication Instructions:   - nicotine gum: Take up to two hours before surgery   - diclofenac (Voltaren): HOLD 1 day before surgery.    - SSRIs, SNRIs, TCAs, Antipsychotics: Continue without modification.     RECOMMENDATION:  APPROVAL GIVEN to proceed with proposed procedure, without further diagnostic evaluation.    Review of prior external note(s) from - Outside records from [facility]          Subjective     HPI related to upcoming procedure: OA of right great toe joint - had great toe fusion with two screws 6/2021, but never had resolution of pain.  Having screws removed and (possibly revision of fusion) surgery to correct.      Preop Questions 2/2/2022   1. Have you ever had a heart attack or stroke? No   2. Have you ever had surgery on your heart or blood vessels, such as a  stent placement, a coronary artery bypass, or surgery on an artery in your head, neck, heart, or legs? No   3. Do you have chest pain with activity? No   4. Do you have a history of  heart failure? No   5. Do you currently have a cold, bronchitis or symptoms of other infection? No   6. Do you have a cough, shortness of breath, or wheezing? No   7. Do you or anyone in your family have previous history of blood clots? No   8. Do you or does anyone in your family have a serious bleeding problem such as prolonged bleeding following surgeries or cuts? No   9. Have you ever had problems with anemia or been told to take iron pills? No   10. Have you had any abnormal blood loss such as black, tarry or bloody stools, or abnormal vaginal bleeding? No   11. Have you ever had a blood transfusion? No   12. Are you willing to have a blood transfusion if it is medically needed before, during, or after your surgery? Yes   13. Have you or any of your relatives ever had problems with anesthesia? No   14. Do you have sleep apnea, excessive snoring or daytime drowsiness? No   15. Do you have any artifical heart valves or other implanted medical devices like a pacemaker, defibrillator, or continuous glucose monitor? No   16. Do you have artificial joints? No   17. Are you allergic to latex? No   18. Is there any chance that you may be pregnant? No     Health Care Directive:  Patient does not have a Health Care Directive or Living Will: Discussed advance care planning with patient; information given to patient to review.    Preoperative Review of :   reviewed - controlled substances reflected in medication list.      Status of Chronic Conditions:  DEPRESSION - Patient has a long history of Depression of moderate severity requiring medication for control with recent symptoms being stable..Current symptoms of depression include none.     HYPERTENSION - Patient has longstanding history of HTN , currently denies any symptoms referable  to elevated blood pressure. Specifically denies chest pain, palpitations, dyspnea, orthopnea, PND or peripheral edema. Blood pressure readings have been in normal range. Current medication regimen is as listed below. Patient denies any side effects of medication.       Stopped smoking 1/15 - using gum and taking half-dose Chantix    Review of Systems  Constitutional, neuro, ENT, endocrine, pulmonary, cardiac, gastrointestinal, genitourinary, musculoskeletal, integument and psychiatric systems are negative, except as otherwise noted.    Patient Active Problem List    Diagnosis Date Noted     Alcohol use disorder, severe, dependence (H) 01/10/2022     Priority: Medium     Pain of right great toe 10/15/2021     Priority: Medium     History of foot surgery 07/14/2021     Priority: Medium     Degenerative joint disease of foot 05/03/2021     Priority: Medium     Added automatically from request for surgery [id number]       Alcohol abuse 04/19/2019     Priority: Medium     Encounter for therapeutic drug monitoring 02/22/2019     Priority: Medium     Benign essential hypertension 11/30/2018     Priority: Medium     Situational anxiety 11/30/2018     Priority: Medium     Irregular heart beat 11/30/2018     Priority: Medium     Restless leg syndrome 09/16/2016     Priority: Medium     Dry skin 04/15/2013     Priority: Medium     Generalized anxiety disorder 03/18/2013     Priority: Medium     Diagnosis updated by automated process. Provider to review and confirm.       Home Health Care 02/08/2012     Priority: Medium     EMERGENCY CARE PLAN  April 15, 2013: No current Care Coordination follow up planned. Please refer if Care Coordination services are needed.    Presenting Problem Signs and Symptoms Treatment Plan   Questions or concerns   during clinic hours   I will call my clinic directly:  [address] [id number] [phone].   Questions or concerns outside clinic hours   I  will call the 24 hour nurse line at   [phone] or [phone].   Need to schedule an appointment   I will call the 24 hour scheduling team at [phone] or my clinic directly at [phone].    Same day treatment     I will call my clinic first, nurse line if after hours, urgent care and express care if needed.   Clinic care coordination services (regular clinic hours)     I will call a clinic care coordinator directly:     Azar Howard RN  Mon, Tues, Fri - [phone]  Wed, Thurs - [phone]    Jeny Sellers, SW:    [phone]    Or call my clinic at [phone] and ask to speak with care coordination.   Crisis Services: Behavioral or Mental Health  [facility] 24 Hour Phone Line  [phone]    [facility] 24 Hour Crisis Services  [phone]    [facility] [phone]    [facility]   [phone]       Emergency treatment -- Immediately    [facility] 911            Insomnia 09/20/2011     Priority: Medium     Health Care Home 06/07/2011     Priority: Medium     X  DX V65.8 REPLACED WITH [id number] HEALTH CARE HOME (04/08/2013)       Rectal pain 05/16/2011     Priority: Medium     Moderate major depression (H) 04/15/2011     Priority: Medium     Previous med trials  Prozac (fluoxetine) YES  Zoloft (sertraline) YES  Celexa (citalopram) YES  Lexapro (escitalopram) YES  Paxil (paroxetine) no  Wellbutrin (bupropion) YES  Effexor (venlafaxine) YES  Others:  YES- Cymbalta         CARDIOVASCULAR SCREENING; LDL GOAL LESS THAN 160 10/31/2010     Priority: Medium     Chronic low back pain 05/11/2010     Priority: Medium     Right hip pain 05/11/2010     Priority: Medium     Ovarian cyst 12/05/2006     Priority: Medium     Problem list name updated by automated process. Provider to review       Irregular menstrual cycle 12/05/2006     Priority: Medium     Esophageal reflux 12/14/2005     Priority: Medium     with associated esophageal spasm       IUD  (intrauterine device) in place 10/03/2005     Priority: Medium     7/10/15 Mirena IUD replaced after 6 1/2 years.  Placed today without difficulty.       Tobacco use disorder 06/08/2005     Priority: Medium     Herpes simplex virus (HSV) infection 06/17/2004     Priority: Medium     Problem list name updated by automated process. Provider to review        Past Medical History:   Diagnosis Date     Acne      Anxiety      Anxiety state, unspecified     Anxiety     Benign essential hypertension 11/30/2018     Chronic low back pain 5/11/2010     Degenerative joint disease of foot 5/3/2021     Depression     Dr. Gaytan     Diaphragmatic hernia without mention of obstruction or gangrene      Esophageal reflux     with associated esophageal spasm     ETOH abuse      Foot pain      Herpes simplex without mention of complication      History of foot surgery 7/14/2021     Moderate major depression (H) 4/15/2011     Rectal pain 5/16/2011     Right hip pain 5/11/2010     Seasonal affective disorder (H)      STD (sexually transmitted disease)      Surveillance of previously prescribed intrauterine contraceptive device 10/03/05    mirena IUD     Past Surgical History:   Procedure Laterality Date     ARTHRODESIS FOOT Right 6/9/2021    Procedure: Right 1st metatarsophalangeal joint fuison;  Surgeon: Conor Guillen MD;  Location: [facility] [place]     CHEILECTOMY Right 12/21/2017    Procedure: CHEILECTOMY;  RIGHT FOOT CHEILECTOMY;  Surgeon: Mo Edgar DPM;  Location:  OR     SURGICAL HISTORY OF -   4/04    Lapr Nissen fundoplication     TONSILLECTOMY & ADENOIDECTOMY  1985     Z NONSPECIFIC PROCEDURE  1992    CRYO SURGERY for abnl paps     Z STOMACH SURGERY PROCEDURE UNLISTED      Nissen     Current Outpatient Medications   Medication Sig Dispense Refill     cholecalciferol (VITAMIN D3) 125 mcg (5000 units) capsule Take 1 capsule by mouth daily       diclofenac (VOLTAREN XR) 100 MG 24 hr tablet Take 1 tablet (100  mg) by mouth daily 30 tablet 0     Digital Therapy (RESET FOR IOS OR ANDROID MONA) MISC 1 each daily 1 each 0     disulfiram (ANTABUSE) 250 MG tablet Take 1 tablet (250 mg) by mouth daily 30 tablet 3     folic acid (FOLVITE) 1 MG tablet Take 1 tablet (1 mg) by mouth daily 90 tablet 1     HYDROcodone-acetaminophen (NORCO) 5-325 MG tablet Take 1 tablet by mouth daily as needed for severe pain 30 tablet 0     lamoTRIgine (LAMICTAL) 200 MG tablet Take 1 tablet (200 mg) by mouth daily 90 tablet 1     levonorgestrel (MIRENA) 20 MCG/24HR IUD 1 each (20 mcg) by Intrauterine route once for 1 dose 1 each 0     LORazepam (ATIVAN) 1 MG tablet Take 1 tablet (1 mg) by mouth every 6 hours as needed for anxiety 5 tablet 0     losartan (COZAAR) 100 MG tablet Take 1 tablet (100 mg) by mouth daily 90 tablet 1     omeprazole (PRILOSEC) 40 MG DR capsule Take 1 capsule (40 mg) by mouth daily 90 capsule 1     solifenacin (VESICARE) 5 MG tablet TAKE 1 TABLET (5 MG) BY MOUTH DAILY 30 tablet 1     traZODone (DESYREL) 100 MG tablet Take 2 tablets (200 mg) by mouth At Bedtime 180 tablet 3     venlafaxine (EFFEXOR) 37.5 MG tablet TAKE 1 TABLET BY MOUTH EVERY DAY. TAKE WITH 150 MG TABLET. TOTAL DAILY DOSE 187.5 MG DAILY. 90 tablet 1     venlafaxine (EFFEXOR-XR) 150 MG 24 hr capsule Take 1 capsule (150 mg) by mouth daily 90 capsule 1       Allergies   Allergen Reactions     Lisinopril Cough     cough        Social History     Tobacco Use     Smoking status: Current Every Day Smoker     Packs/day: 0.50     Years: 15.00     Pack years: 7.50     Types: Cigarettes     Smokeless tobacco: Never Used   Substance Use Topics     Alcohol use: Yes     Comment: 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 Maternal Grandmother      Hypertension Maternal Grandmother      Alcohol/Drug Maternal Grandmother      Depression Maternal Grandmother      Cerebrovascular Disease Paternal Grandfather      Alcohol/Drug  "Paternal Grandfather      Breast Cancer Paternal Grandmother      Depression Paternal Grandmother      Alcohol/Drug Father      Depression Father      Gastrointestinal Disease Father         GERD     Alcohol/Drug Maternal Grandfather      Depression Maternal Grandfather      Depression Mother      Obesity Mother      Anxiety Disorder Mother      Diabetes Mother      Depression Sister      Alcoholism Brother      Depression Brother      Alcoholism Brother      Depression Brother      Alcoholism Brother      Depression Brother      Gastrointestinal Disease Brother         severe gerd     Autism Spectrum Disorder Son      Substance Abuse Son      Schizophrenia Son      Substance Abuse Son      Breast Cancer Maternal Aunt      Cancer - colorectal No family hx of      Prostate Cancer No family hx of      History   Drug Use No     Comment: addict in recovery         Objective     /69   Pulse 83   Temp 98  F (36.7  C)   Ht 1.778 m (5' 10\")   Wt 105.5 kg (232 lb 8 oz)   SpO2 98%   BMI 33.36 kg/m      Physical Exam    GENERAL APPEARANCE: healthy, alert and no distress     EYES: EOMI, PERRL     HENT: ear canals and TM's normal and nose and mouth without ulcers or lesions     NECK: no adenopathy, no asymmetry, masses, or scars and thyroid normal to palpation     RESP: lungs clear to auscultation - no rales, rhonchi or wheezes     CV: regular rates and rhythm, normal S1 S2, no S3 or S4 and no murmur, click or rub     ABDOMEN:  soft, nontender, no HSM or masses and bowel sounds normal     MS: extremities normal- no gross deformities noted, no evidence of inflammation in joints, FROM in all extremities.     SKIN: no suspicious lesions or rashes     NEURO: Normal strength and tone, sensory exam grossly normal, mentation intact and speech normal     PSYCH: mentation appears normal. and affect normal/bright     LYMPHATICS: No cervical adenopathy    Recent Labs   Lab Test 05/28/21  1407 02/16/21  0000   HGB 13.2  --  "     --      --    POTASSIUM 4.2 3.3*   CR 0.74 0.76        Diagnostics:  Labs pending at this time.  Results will be reviewed when available.   EKG: appears normal, NSR, normal axis, normal intervals, no acute ST/T changes c/w ischemia, no LVH by voltage criteria, unchanged from previous tracings    Revised Cardiac Risk Index (RCRI):  The patient has the following serious cardiovascular risks for perioperative complications:   - No serious cardiac risks = 0 points     RCRI Interpretation: 1 point: Class II (low risk - 0.9% complication rate)           Signed Electronically by: LEDY Seaman CNP  Copy of this evaluation report is provided to requesting physician.

## 2022-02-02 NOTE — TELEPHONE ENCOUNTER
Response sent to patient via [employer].     Eve Elizondo RN   [facility]

## 2022-02-03 DIAGNOSIS — M79.674 PAIN OF RIGHT GREAT TOE: ICD-10-CM

## 2022-02-03 LAB
ALBUMIN SERPL-MCNC: 3.6 G/DL (ref 3.4–5)
ALP SERPL-CCNC: 78 U/L (ref 40–150)
ALT SERPL W P-5'-P-CCNC: 31 U/L (ref 0–50)
ANION GAP SERPL CALCULATED.3IONS-SCNC: 3 MMOL/L (ref 3–14)
AST SERPL W P-5'-P-CCNC: 15 U/L (ref 0–45)
BILIRUB SERPL-MCNC: 0.2 MG/DL (ref 0.2–1.3)
BUN SERPL-MCNC: 16 MG/DL (ref 7–30)
CALCIUM SERPL-MCNC: 8.4 MG/DL (ref 8.5–10.1)
CHLORIDE BLD-SCNC: 107 MMOL/L (ref 94–109)
CO2 SERPL-SCNC: 27 MMOL/L (ref 20–32)
CREAT SERPL-MCNC: 0.82 MG/DL (ref 0.52–1.04)
CREAT UR-MCNC: 43 MG/DL
GFR SERPL CREATININE-BSD FRML MDRD: 87 ML/MIN/1.73M2
GLUCOSE BLD-MCNC: 90 MG/DL (ref 70–99)
MICROALBUMIN UR-MCNC: <5 MG/L
MICROALBUMIN/CREAT UR: NORMAL MG/G{CREAT}
POTASSIUM BLD-SCNC: 4.5 MMOL/L (ref 3.4–5.3)
PROT SERPL-MCNC: 6.8 G/DL (ref 6.8–8.8)
SODIUM SERPL-SCNC: 137 MMOL/L (ref 133–144)
TSH SERPL DL<=0.005 MIU/L-ACNC: 1.47 MU/L (ref 0.4–4)

## 2022-02-03 RX ORDER — HYDROCODONE BITARTRATE AND ACETAMINOPHEN 5; 325 MG/1; MG/1
1 TABLET ORAL DAILY PRN
Qty: 30 TABLET | Refills: 0 | Status: SHIPPED | OUTPATIENT
Start: 2022-02-03 | End: 2022-03-04

## 2022-02-03 NOTE — TELEPHONE ENCOUNTER
Per justina message:  Also, before I left yesterday you were going to refill the hydrocone?  When you get a chance, would you be able to send this to the [facility] in [place]?

## 2022-02-03 NOTE — TELEPHONE ENCOUNTER
Mariya,    Please see message below.    Routing refill request to provider for review/approval because:  Drug not on the [facility] refill protocol     Payal Acosta RN  [facility]

## 2022-02-08 ENCOUNTER — MYC MEDICAL ADVICE (OUTPATIENT)
Dept: FAMILY MEDICINE | Facility: CLINIC | Age: 52
End: 2022-02-08
Payer: COMMERCIAL

## 2022-02-12 LAB — COLOGUARD-ABSTRACT: NEGATIVE

## 2022-02-14 ENCOUNTER — TRANSFERRED RECORDS (OUTPATIENT)
Dept: HEALTH INFORMATION MANAGEMENT | Facility: CLINIC | Age: 52
End: 2022-02-14

## 2022-02-15 ENCOUNTER — MYC MEDICAL ADVICE (OUTPATIENT)
Dept: FAMILY MEDICINE | Facility: CLINIC | Age: 52
End: 2022-02-15
Payer: COMMERCIAL

## 2022-03-01 ENCOUNTER — TRANSFERRED RECORDS (OUTPATIENT)
Dept: HEALTH INFORMATION MANAGEMENT | Facility: CLINIC | Age: 52
End: 2022-03-01
Payer: COMMERCIAL

## 2022-03-02 NOTE — TELEPHONE ENCOUNTER
Mariya,    Please see mychart response . FYI only no action needed.     Thanks,  FADIA Nichole  [facility]

## 2022-03-04 ENCOUNTER — MYC REFILL (OUTPATIENT)
Dept: FAMILY MEDICINE | Facility: CLINIC | Age: 52
End: 2022-03-04
Payer: COMMERCIAL

## 2022-03-04 DIAGNOSIS — M79.674 PAIN OF RIGHT GREAT TOE: ICD-10-CM

## 2022-03-04 RX ORDER — HYDROCODONE BITARTRATE AND ACETAMINOPHEN 5; 325 MG/1; MG/1
1 TABLET ORAL DAILY PRN
Qty: 30 TABLET | Refills: 0 | Status: SHIPPED | OUTPATIENT
Start: 2022-03-04 | End: 2022-03-29

## 2022-03-04 NOTE — TELEPHONE ENCOUNTER
Pending Prescriptions:                       Disp   Refills    HYDROcodone-acetaminophen (NORCO) 5-325 M*30 tab*0            Sig: Take 1 tablet by mouth daily as needed for severe           pain     Last refill 2/3/22 #30   Routing refill request to provider for review/approval because:  Drug not on the [facility] refill protocol

## 2022-03-05 ENCOUNTER — HEALTH MAINTENANCE LETTER (OUTPATIENT)
Age: 52
End: 2022-03-05

## 2022-03-09 ENCOUNTER — VIRTUAL VISIT (OUTPATIENT)
Dept: FAMILY MEDICINE | Facility: CLINIC | Age: 52
End: 2022-03-09
Payer: COMMERCIAL

## 2022-03-09 DIAGNOSIS — Z98.890 HISTORY OF FOOT SURGERY: ICD-10-CM

## 2022-03-09 DIAGNOSIS — M79.674 PAIN OF RIGHT GREAT TOE: Primary | ICD-10-CM

## 2022-03-09 DIAGNOSIS — M19.071 PRIMARY OSTEOARTHRITIS OF RIGHT FOOT: ICD-10-CM

## 2022-03-09 PROCEDURE — 99214 OFFICE O/P EST MOD 30 MIN: CPT | Mod: 95 | Performed by: NURSE PRACTITIONER

## 2022-03-09 RX ORDER — DICLOFENAC SODIUM 100 MG/1
TABLET, FILM COATED, EXTENDED RELEASE ORAL
Qty: 135 TABLET | Refills: 1 | Status: SHIPPED | OUTPATIENT
Start: 2022-03-09 | End: 2022-04-26

## 2022-03-09 ASSESSMENT — PATIENT HEALTH QUESTIONNAIRE - PHQ9
10. IF YOU CHECKED OFF ANY PROBLEMS, HOW DIFFICULT HAVE THESE PROBLEMS MADE IT FOR YOU TO DO YOUR WORK, TAKE CARE OF THINGS AT HOME, OR GET ALONG WITH OTHER PEOPLE: VERY DIFFICULT
SUM OF ALL RESPONSES TO PHQ QUESTIONS 1-9: 15
SUM OF ALL RESPONSES TO PHQ QUESTIONS 1-9: 15

## 2022-03-09 NOTE — PROGRESS NOTES
"Kendy is a 51 year old who is being evaluated via a billable video visit.      How would you like to obtain your AVS? MyChart  If the video visit is dropped, the invitation should be resent by: Send to e-mail at: [email or web address]  Will anyone else be joining your video visit? No    Start: 03/09/2022 05:17 pm  Stop: 03/09/2022 05:45 pm    Assessment & Plan     Pain of right great toe  Discussed possible options for long-term management if no relief from surgery. We can consider chronic opioid management.  Patient hasn't excalated use since starting the hydrocodone-acetaminophen.  Ideally, I'd like to see better enough control that patient is able to return to her previous level of physical activity.  Possibly look into orthotic shoe to help offload pressure?  Patient would be interested in looking into this.  Not interested in further surgery at this time.    History of foot surgery    Primary osteoarthritis of right foot  Can increase the dose some so we'll do that and look for more daytime improvement.  - diclofenac (VOLTAREN XR) 100 MG 24 hr tablet; Take 1 tablet (100 mg) by mouth daily (with breakfast) AND 0.5 tablets (50 mg) daily (with dinner).    Review of prior external note(s) from - Outside records from Dr. Maxwell SANDOVAL  33 minutes spent on the date of the encounter doing chart review, history and exam, documentation and further activities per the note       BMI:   Estimated body mass index is 33.36 kg/m  as calculated from the following:    Height as of 2/2/22: 1.778 m (5' 10\").    Weight as of 2/2/22: 105.5 kg (232 lb 8 oz).   Weight management plan: Discussed healthy diet and exercise guidelines    Patient Instructions   Ask Dr. Arreola about if there would be a place in the plan for an orthotics device/shoe type thing that would offload that joint and give you more ability to walk with less pain.  Once place you could look into is [facility]    Diclofenac 100 mg in the AM and 50 mg in the PM " "- take it with food    Schedule Dr. Simons     Schedule with pain management      No follow-ups on file.    LEDY Seaman CNP  [facility] is a 51 year old who presents for the following health issues   HPI     Pain History:  When did you first notice your pain? - More than 6 weeks   Have you seen this provider for your pain in the past?   Yes   Where in your body do you have pain? Right great toe  Are you seeing anyone else for your pain? Yes - orthopedics      Chronic Pain Follow Up:    Location of pain: right great toe  Analgesia/pain control:    - Recent changes:  Surgery on 2/14 to remove pin from initial surgery -  Initially seemed like things were improving, when not as active immediate post-op, but when got up and was more active it started to hurt again at the same level pre-op.  They did not do the revision.  - next visit with ortho is 3/22    - Overall control: Tolerable with discomfort until the evening   - Current treatments: hydrocodone-acetaminophen 5-325 mg at night and diclofenac 100 mg in the AM  Immediate post-op period had rx from ortho 10 mg every 4-6 hours on schedule prescribed by orthopedics.  Last week went back to just nighttime.  Adherence:     - Do you ever take more pain medicine than prescribed? No   Adverse effects: No    - Feeling really depressed about it - doesn't want to do surgery again and gaining weight due to inactivity.  Has not had alcohol, but has been \"creeping\" in her brain.  PDMP Review       Value Time User    State PDMP site checked  Yes 2/3/2022  4:35 PM Arvin Carlos MD        Last CSA Agreement:   CSA -- Patient Level:    Controlled Substance Agreement - Opioid - Scan on 2/25/2022  6:02 PM       Last UDS: 2/2/2022     PHQ-9 SCORE 11/12/2021 12/13/2021 3/9/2022   PHQ-9 Total Score - - -   PHQ-9 Total Score MyChart 10 (Moderate depression) 9 (Mild depression) 15 (Moderately severe depression)   PHQ-9 " Total Score 10 9 15     DMITRIY-7 SCORE 9/28/2021 10/13/2021 11/12/2021   Total Score - - -   Total Score - - 8 (mild anxiety)   Total Score 7 4 8     Answers for HPI/ROS submitted by the patient on 3/9/2022  If you checked off any problems, how difficult have these problems made it for you to do your work, take care of things at home, or get along with other people?: Very difficult  PHQ9 TOTAL SCORE: 15  How many servings of fruits and vegetables do you eat daily?: 2-3  On average, how many sweetened beverages do you drink each day (Examples: soda, juice, sweet tea, etc.  Do NOT count diet or artificially sweetened beverages)?: 1  How many minutes a day do you exercise enough to make your heart beat faster?: 9 or less  How many days a week do you exercise enough to make your heart beat faster?: 3 or less  How many days per week do you miss taking your medication?: 0  What is the reason for your visit today?: Foot pain follow up  When did your symptoms begin?: More than a month  What are your symptoms?: Pain with walking  How would you describe these symptoms?: Moderate  Are your symptoms:: Staying the same  Have you had these symptoms before?: Yes  Have you tried or received treatment for these symptoms before?: Yes  Did that treatment work? : No  Is there anything that makes you feel worse?: Walking  Is there anything that makes you feel better?: Rest/ice/elevation    Review of Systems     ROS:5 point ROS including CONST, HEENT, Respiratory, CV, and GI other than that noted in the HPI,  is negative     Objective         Vitals:  No vitals were obtained today due to virtual visit.    Physical Exam   GENERAL: Healthy, alert and no distress  EYES: Eyes grossly normal to inspection.  No discharge or erythema, or obvious scleral/conjunctival abnormalities.  RESP: No audible wheeze, cough, or visible cyanosis.  No visible retractions or increased work of breathing.    SKIN: Visible skin clear. No significant rash, abnormal  pigmentation or lesions.  NEURO: Cranial nerves grossly intact.  Mentation and speech appropriate for age.  PSYCH: Mentation appears normal, affect normal/bright, tearful at times, judgement and insight intact, normal speech and appearance well-groomed.    cc to Christina for pain - call anytime          Video-Visit Details    Type of service:  Video Visit      Originating Location (pt. Location): Home    Distant Location (provider location):  [facility]     Platform used for Video Visit: Megathread

## 2022-03-09 NOTE — Clinical Note
Christina,  Would you mind calling this patient of mine to get her set up for pain clinic eval?  I think Lauren said she still has a little time at this site.  Patient sees me and Dr. Simons.  Referral is in awhile ago, not frm this visit.  Thank you and let me know if you have any questions!  TOMMY Clinton

## 2022-03-09 NOTE — PATIENT INSTRUCTIONS
Ask Dr. Arreola about if there would be a place in the plan for an orthotics device/shoe type thing that would offload that joint and give you more ability to walk with less pain.  Once place you could look into is [facility]    Diclofenac 100 mg in the AM and 50 mg in the PM - take it with food    Schedule Dr. Simons     Schedule with pain management

## 2022-03-10 ASSESSMENT — PATIENT HEALTH QUESTIONNAIRE - PHQ9: SUM OF ALL RESPONSES TO PHQ QUESTIONS 1-9: 15

## 2022-03-22 ENCOUNTER — VIRTUAL VISIT (OUTPATIENT)
Dept: FAMILY MEDICINE | Facility: CLINIC | Age: 52
End: 2022-03-22
Payer: COMMERCIAL

## 2022-03-22 ENCOUNTER — TRANSFERRED RECORDS (OUTPATIENT)
Dept: HEALTH INFORMATION MANAGEMENT | Facility: CLINIC | Age: 52
End: 2022-03-22

## 2022-03-22 VITALS
DIASTOLIC BLOOD PRESSURE: 80 MMHG | SYSTOLIC BLOOD PRESSURE: 110 MMHG | BODY MASS INDEX: 35.55 KG/M2 | HEIGHT: 69 IN | WEIGHT: 240 LBS

## 2022-03-22 DIAGNOSIS — E66.01 MORBID OBESITY (H): Primary | ICD-10-CM

## 2022-03-22 PROCEDURE — 99214 OFFICE O/P EST MOD 30 MIN: CPT | Mod: 95 | Performed by: NURSE PRACTITIONER

## 2022-03-22 RX ORDER — TOPIRAMATE 50 MG/1
TABLET, FILM COATED ORAL
Qty: 97 TABLET | Refills: 0 | Status: SHIPPED | OUTPATIENT
Start: 2022-03-22 | End: 2022-04-25

## 2022-03-22 NOTE — PATIENT INSTRUCTIONS
Schedule with weight management    In the meantime you can start topiramate 25 mg daily for two weeks, then increase to 50 mg daily    Go to fasting labs - lipids, insulin level and A1C  MAKE SURE TO TELL THEM ALL THREE LABS WHEN THEY DO THE DRAW    Noom - wrap around - costs money, but you could use your wellness dollars for this    Wellness program    [employer]itness[employer] - calorie tracking

## 2022-03-22 NOTE — PROGRESS NOTES
Kendy is a 51 year old who is being evaluated via a billable video visit.      How would you like to obtain your AVS? MyChart  If the video visit is dropped, the invitation should be resent by: Send to e-mail at: [email or web address]   [phone]  Will anyone else be joining your video visit? No    Video Start Time: 9:26 AM    Assessment & Plan     Morbid obesity (H)  Discussed topiramate, topiramate+phentermine, wellbutrin-naltrexone and semaglutide.  Cannot take naltrexone due to chronic opioid administration for toe.  Semaglutide unlikely to be covered.  Will start low andres topiramate initially and patient to see weight management.  Discussed apps that might help with calorie tracking and weight loss journey.  Follow-up in three months for all routine    - Comprehensive Weight Management; Future  - Hemoglobin A1c; Future  - Insulin level; Future  - topiramate (TOPAMAX) 50 MG tablet; Take 0.5 tablets (25 mg) by mouth daily for 14 days, THEN 1 tablet (50 mg) daily.    BMI 35.0-35.9,adult    - Comprehensive Weight Management; Future  - Hemoglobin A1c; Future  - Insulin level; Future  - topiramate (TOPAMAX) 50 MG tablet; Take 0.5 tablets (25 mg) by mouth daily for 14 days, THEN 1 tablet (50 mg) daily.    Ordering of each unique test  Prescription drug management  35 minutes spent on the date of the encounter doing chart review, history and exam, documentation and further activities per the note       Patient Instructions   Schedule with weight management    In the meantime you can start topiramate 25 mg daily for two weeks, then increase to 50 mg daily    Go to fasting labs - lipids, insulin level and A1C  MAKE SURE TO TELL THEM ALL THREE LABS WHEN THEY DO THE DRAW    Noom - wrap around - costs money, but you could use your wellness dollars for this    Wellness program    My[employer]itnessPal - calorie tracking      No follow-ups on file.    LEDY Seaman CNP [facility]    Juana   Kendy  "is a 51 year old who presents for the following health issues     HPI     Going to see Dr. Arreola today to review R great toe pain and post-op situation.  Pain has worsened.  Interested in the orthotics idea and not as interested in another surgery.    Yesterday weighed herself at 240 lbs which is the heaviest in her life. Prior to this highest was 220 lb. Lost weight successfully with [employer] in the past. Was really physically active at that time.  Feels trapped with exercise due to the toe pain.    Wondering about prescription weight loss medication.      /80    Review of Systems     ROS:5 point ROS including CONST, HEENT, Respiratory, CV, and GI other than that noted in the HPI,  is negative         Objective       Ht 1.753 m (5' 9\")   Wt 108.9 kg (240 lb)   BMI 35.44 kg/m        Vitals:  No vitals were obtained today due to virtual visit.    Physical Exam   GENERAL: Healthy, alert and no distress  EYES: Eyes grossly normal to inspection.  No discharge or erythema, or obvious scleral/conjunctival abnormalities.  RESP: No audible wheeze, cough, or visible cyanosis.  No visible retractions or increased work of breathing.    SKIN: Visible skin clear. No significant rash, abnormal pigmentation or lesions.  NEURO: Cranial nerves grossly intact.  Mentation and speech appropriate for age.  PSYCH: Mentation appears normal, affect normal/bright, judgement and insight intact, normal speech and appearance well-groomed.    Fasting lipid and insulin plus a1c pending          Video-Visit Details    Type of service:  Video Visit    Video End Time:9:59 AM    Originating Location (pt. Location): Home    Distant Location (provider location):  [facility]     Platform used for Video Visit: Charlie"

## 2022-03-25 DIAGNOSIS — F41.1 GENERALIZED ANXIETY DISORDER: ICD-10-CM

## 2022-03-25 NOTE — TELEPHONE ENCOUNTER
Reason for Call:  Medication or medication refill:    Do you use a [facility]?  Name of the pharmacy and phone number for the current request:  [id number] [address]  AT [facility] [address]    Name of the medication requested: venlafaxine (EFFEXOR-XR) 150 MG 24 hr capsule    Other request: N/A    Can we leave a detailed message on this number? YES    Phone number patient can be reached at: Home number on file [phone] (home)    Best Time: ANY    Call taken on 3/25/2022 at 8:00 AM by Olga Diana

## 2022-03-28 RX ORDER — VENLAFAXINE HYDROCHLORIDE 150 MG/1
150 CAPSULE, EXTENDED RELEASE ORAL DAILY
Qty: 90 CAPSULE | Refills: 1 | Status: SHIPPED | OUTPATIENT
Start: 2022-03-28 | End: 2022-09-19

## 2022-03-28 NOTE — TELEPHONE ENCOUNTER
"Requested Prescriptions   Signed Prescriptions Disp Refills    venlafaxine (EFFEXOR-XR) 150 MG 24 hr capsule 90 capsule 1     Sig: Take 1 capsule (150 mg) by mouth daily       Serotonin-Norepinephrine Reuptake Inhibitors  Passed - 3/25/2022  8:01 AM        Passed - Blood pressure under 140/90 in past 12 months     BP Readings from Last 3 Encounters:   03/22/22 110/80   02/02/22 118/69   06/09/21 107/65                 Passed - Recent (12 mo) or future (30 days) visit within the authorizing provider's specialty     Patient has had an office visit with the authorizing provider or a provider within the authorizing providers department within the previous 12 mos or has a future within next 30 days. See \"Patient Info\" tab in inbasket, or \"Choose Columns\" in Meds & Orders section of the refill encounter.              Passed - Medication is active on med list        Passed - Patient is age 18 or older        Passed - No active pregnancy on record        Passed - Normal serum creatinine on file in past 12 months     Recent Labs   Lab Test 02/02/22  1646   CR 0.82       Ok to refill medication if creatinine is low          Passed - No positive pregnancy test in past 12 months           Dayanara Seo RN  [facility]     "

## 2022-03-29 ENCOUNTER — MYC REFILL (OUTPATIENT)
Dept: FAMILY MEDICINE | Facility: CLINIC | Age: 52
End: 2022-03-29
Payer: COMMERCIAL

## 2022-03-29 ENCOUNTER — TELEPHONE (OUTPATIENT)
Dept: FAMILY MEDICINE | Facility: CLINIC | Age: 52
End: 2022-03-29
Payer: COMMERCIAL

## 2022-03-29 DIAGNOSIS — F41.1 GENERALIZED ANXIETY DISORDER: ICD-10-CM

## 2022-03-29 DIAGNOSIS — F17.200 TOBACCO USE DISORDER: Primary | ICD-10-CM

## 2022-03-29 DIAGNOSIS — M79.674 PAIN OF RIGHT GREAT TOE: ICD-10-CM

## 2022-03-29 RX ORDER — VENLAFAXINE 37.5 MG/1
TABLET ORAL
Qty: 90 TABLET | Refills: 1 | Status: CANCELLED | OUTPATIENT
Start: 2022-03-29

## 2022-03-29 RX ORDER — VARENICLINE TARTRATE 0.5 MG/1
0.5 TABLET, FILM COATED ORAL DAILY
Qty: 90 TABLET | Refills: 1 | Status: SHIPPED | OUTPATIENT
Start: 2022-03-29 | End: 2023-04-17

## 2022-03-29 RX ORDER — HYDROCODONE BITARTRATE AND ACETAMINOPHEN 5; 325 MG/1; MG/1
1 TABLET ORAL DAILY PRN
Qty: 30 TABLET | Refills: 0 | Status: SHIPPED | OUTPATIENT
Start: 2022-03-29 | End: 2022-04-26

## 2022-03-29 NOTE — TELEPHONE ENCOUNTER
Mariya,    Separate encounter sent with med refill.     Thanks,  FADIA iNchole  [facility]

## 2022-03-29 NOTE — TELEPHONE ENCOUNTER
Routing refill request to provider for review/approval because:  Needs DX, also med listed as historical    Sapna Bowie RN   [facility]

## 2022-03-29 NOTE — TELEPHONE ENCOUNTER
Patient is requesting an early  for her HYDROcodone-acetaminophen (NORCO) 5-325 MG tablet.    Needs to  another medication today and would rather not have to go back on Thursday as she uses [employer] for transportaion

## 2022-03-29 NOTE — TELEPHONE ENCOUNTER
Requested Prescriptions   Pending Prescriptions Disp Refills     HYDROcodone-acetaminophen (NORCO) 5-325 MG tablet 30 tablet 0     Sig: Take 1 tablet by mouth daily as needed for severe pain       There is no refill protocol information for this order        Routing refill request to provider for review/approval because:  Drug not on the [facility] refill protocol     Patient comment: Dae Meraz- I just wanted to make sure this get refilled- I think you are out all next week?    Thanks,  FADIA Nichole  [facility]

## 2022-03-31 DIAGNOSIS — N32.81 URGENCY-FREQUENCY SYNDROME: ICD-10-CM

## 2022-03-31 DIAGNOSIS — N39.41 URGE INCONTINENCE: ICD-10-CM

## 2022-03-31 RX ORDER — SOLIFENACIN SUCCINATE 5 MG/1
5 TABLET, FILM COATED ORAL DAILY
Qty: 30 TABLET | Refills: 1 | Status: SHIPPED | OUTPATIENT
Start: 2022-03-31 | End: 2022-04-26

## 2022-03-31 NOTE — TELEPHONE ENCOUNTER
"Requested Prescriptions   Signed Prescriptions Disp Refills    solifenacin (VESICARE) 5 MG tablet 30 tablet 1     Sig: Take 1 tablet (5 mg) by mouth daily       Muscarinic Antagonists (Urinary Incontinence Agents) Passed - 3/31/2022  9:00 AM        Passed - Recent (12 mo) or future (30 days) visit within the authorizing provider's specialty     Patient has had an office visit with the authorizing provider or a provider within the authorizing providers department within the previous 12 mos or has a future within next 30 days. See \"Patient Info\" tab in inbasket, or \"Choose Columns\" in Meds & Orders section of the refill encounter.              Passed - Patient does not have a diagnosis of glaucoma on the problem list     If glaucoma diagnosis is new, refer refill to physician.          Passed - Medication is active on med list        Passed - Patient is 18 years of age or older           Dayanara Seo RN  [facility]     "

## 2022-04-06 DIAGNOSIS — K22.4 ESOPHAGEAL SPASM: ICD-10-CM

## 2022-04-06 RX ORDER — OMEPRAZOLE 40 MG/1
CAPSULE, DELAYED RELEASE ORAL
Qty: 90 CAPSULE | Refills: 1 | Status: SHIPPED | OUTPATIENT
Start: 2022-04-06 | End: 2022-10-11

## 2022-04-06 NOTE — TELEPHONE ENCOUNTER
Prescription approved per [facility] Refill Protocol.    Sapna Bowie RN   [facility]

## 2022-04-25 ENCOUNTER — TELEPHONE (OUTPATIENT)
Dept: ENDOCRINOLOGY | Facility: CLINIC | Age: 52
End: 2022-04-25

## 2022-04-25 ENCOUNTER — OFFICE VISIT (OUTPATIENT)
Dept: ENDOCRINOLOGY | Facility: CLINIC | Age: 52
End: 2022-04-25
Payer: COMMERCIAL

## 2022-04-25 VITALS
SYSTOLIC BLOOD PRESSURE: 126 MMHG | BODY MASS INDEX: 35.77 KG/M2 | OXYGEN SATURATION: 96 % | WEIGHT: 236 LBS | HEIGHT: 68 IN | HEART RATE: 91 BPM | TEMPERATURE: 98.1 F | DIASTOLIC BLOOD PRESSURE: 82 MMHG

## 2022-04-25 DIAGNOSIS — E66.812 CLASS 2 OBESITY DUE TO EXCESS CALORIES WITHOUT SERIOUS COMORBIDITY WITH BODY MASS INDEX (BMI) OF 35.0 TO 35.9 IN ADULT: Primary | ICD-10-CM

## 2022-04-25 DIAGNOSIS — E66.09 CLASS 2 OBESITY DUE TO EXCESS CALORIES WITHOUT SERIOUS COMORBIDITY WITH BODY MASS INDEX (BMI) OF 35.0 TO 35.9 IN ADULT: Primary | ICD-10-CM

## 2022-04-25 DIAGNOSIS — K21.9 GASTROESOPHAGEAL REFLUX DISEASE, UNSPECIFIED WHETHER ESOPHAGITIS PRESENT: ICD-10-CM

## 2022-04-25 DIAGNOSIS — Z98.890 HISTORY OF NISSEN FUNDOPLICATION: ICD-10-CM

## 2022-04-25 PROCEDURE — 99205 OFFICE O/P NEW HI 60 MIN: CPT | Performed by: PHYSICIAN ASSISTANT

## 2022-04-25 RX ORDER — TOPIRAMATE 50 MG/1
75 TABLET, FILM COATED ORAL DAILY
Qty: 45 TABLET | Refills: 3 | COMMUNITY
Start: 2022-04-25 | End: 2022-06-06

## 2022-04-25 ASSESSMENT — PAIN SCALES - GENERAL: PAINLEVEL: MILD PAIN (3)

## 2022-04-25 NOTE — TELEPHONE ENCOUNTER
4/25  Left  for staff message;    Bethany Mckeon PA-C [facility] Bariatric Scheduling Registration Pool  RD 1 month video**  MT Lauren Bloch 1 month to follow up saxenda start and topiramate increase   Dr Loja first available   Bethany 3 months return MWM    **pretty sure she means 1 month after the 5/2 appt already on books-Edd

## 2022-04-25 NOTE — LETTER
"2022       RE: Sharmin Nieves  [address] [facility] [facility] [address]     Dear Colleague,    Thank you for referring your patient, Sharmin Nieves, to the [facility]. Please see a copy of my visit note below.    60 minutes spent on the date of the encounter doing chart review, history and exam, documentation and further activities per the note    New Bariatric Surgery Consultation Note    2022    RE: Sharmin Nieves  MR#: [id number]  : 1970      Referring provider:       2022   Who referred you? Randa Bill CNP       Chief Complaint/Reason for visit: evaluation for possible weight loss surgery    Dear Randa Bill APRN CNP (General),    I had the pleasure of seeing your patient, Sharmin Nieves, to evaluate her obesity and consider her for possible weight loss surgery. As you know, Sharmin Nieves is 51 year old.  She has a height of 5' 8\", a weight of 236 lbs 0 oz, and calculated Body mass index is 35.88 kg/m .    Assessment & Plan   Problem List Items Addressed This Visit        Endocrine Diagnoses    Class 2 obesity due to excess calories without serious comorbidity with body mass index (BMI) of 35.0 to 35.9 in adult - Primary    Relevant Orders    CBC with platelets    Parathyroid Hormone Intact    Vitamin D Deficiency    Adult Gastro Ref - Procedure Only       Other    Esophageal reflux    Relevant Orders    Adult Gastro Ref - Procedure Only    History of Nissen fundoplication    Relevant Orders    Adult Gastro Ref - Procedure Only           HISTORY OF PRESENT ILLNESS:  Weight Loss History Reviewed with Patient 2022   How long have you been overweight? From Middle age and beyond   What is the most that you have ever weighed? 240   What is the most weight you have lost? 30   I have tried the following methods to lose weight Watching portions or calories, Weight " Watchers, Atkins type diet (low carb/high protein), Pre packaged meals ex: Nutrisystem, Slimfast, OTC Medications, Prescription Medications   I have tried the following weight loss medications? (Check all that apply) Topamax/Topiramate   Have you ever had weight loss surgery? No       CO-MORBIDITIES OF OBESITY INCLUDE:     4/25/2022   I have the following health issues associated with obesity: High Blood Pressure, GERD (Reflux), Osteoarthritis (joint disease)       PAST MEDICAL HISTORY:  Past Medical History:   Diagnosis Date     Acne      Anxiety      Anxiety state, unspecified     Anxiety     Benign essential hypertension 11/30/2018     Chronic low back pain 5/11/2010     Degenerative joint disease of foot 5/3/2021     Depression     Dr. Gaytan     Diaphragmatic hernia without mention of obstruction or gangrene      Esophageal reflux     with associated esophageal spasm     ETOH abuse      Foot pain      Herpes simplex without mention of complication      History of foot surgery 7/14/2021     Moderate major depression (H) 4/15/2011     Rectal pain 5/16/2011     Right hip pain 5/11/2010     Seasonal affective disorder (H)      STD (sexually transmitted disease)      Surveillance of previously prescribed intrauterine contraceptive device 10/03/05    mirena IUD       PAST SURGICAL HISTORY:  Past Surgical History:   Procedure Laterality Date     ARTHRODESIS FOOT Right 6/9/2021    Procedure: Right 1st metatarsophalangeal joint fuison;  Surgeon: Conor Guillen MD;  Location: [facility] OR     CHEILECTOMY Right 12/21/2017    Procedure: CHEILECTOMY;  RIGHT FOOT CHEILECTOMY;  Surgeon: Mo Edgar DPM;  Location:  OR     SURGICAL HISTORY OF -   4/04    Lapr Nissen fundoplication     TONSILLECTOMY & ADENOIDECTOMY  1985     [facility] NONSPECIFIC PROCEDURE  1992    CRYO SURGERY for abnl paps     [facility] STOMACH SURGERY PROCEDURE UNLISTED      Nissen       FAMILY HISTORY:   Family History   Problem Relation Age of Onset      CKarrieAKarrieD. Maternal Grandmother      Hypertension Maternal Grandmother      Alcohol/Drug Maternal Grandmother      Depression Maternal Grandmother      Cerebrovascular Disease Paternal Grandfather      Alcohol/Drug Paternal Grandfather      Breast Cancer Paternal Grandmother      Depression Paternal Grandmother      Alcohol/Drug Father      Depression Father      Gastrointestinal Disease Father         GERD     Alcohol/Drug Maternal Grandfather      Depression Maternal Grandfather      Depression Mother      Obesity Mother      Anxiety Disorder Mother      Diabetes Mother      Depression Sister      Alcoholism Brother      Depression Brother      Alcoholism Brother      Depression Brother      Alcoholism Brother      Depression Brother      Gastrointestinal Disease Brother         severe gerd     Autism Spectrum Disorder Son      Substance Abuse Son      Schizophrenia Son      Substance Abuse Son      Breast Cancer Maternal Aunt      Cancer - colorectal No family hx of      Prostate Cancer No family hx of        SOCIAL HISTORY:   Social History Questions Reviewed With Patient 4/25/2022   Which best describes your employment status (select all that apply) I work full-time   If you work, 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 Yes   Who do you have in your support network that can be available to help you for the first 2 weeks after surgery? Children, ex    Who can you count on for support throughout your weight loss surgery journey? Children, mother, sister   Can you afford 3 meals a day?  Yes   Can you afford 50-60 dollars a month for vitamins? Yes       HABITS:     4/25/2022   How often do you drink alcohol? Never   Do you currently use any of the following Nicotine products? cigarettes   Have you ever used any of the following nicotine products? Cigarettes   Have you or are you currently using street drugs or prescription  strength medication for which you do not have a prescription for? No   Do you have a history of chemical dependency (alcohol or drug abuse)? Yes       Currently taking narcotic/opioids No    PSYCHOLOGICAL HISTORY:   Psychological History Reviewed With Patient 4/25/2022   Have you ever attempted suicide? Never.   Have you had thoughts of suicide in the past year? No   Have you ever been hospitalized for mental illness or a suicide attempt? Never.   Do you have a history of chronic pain? Yes   Have you ever been diagnosed with fibromyalgia? No   Are you currently being treated for any of the following? (select all that apply) Depression, Anxiety, I take medication or see a therapist for another mental illness   Are you currently seeing a therapist or counselor?  No   Are you currently seeing a psychiatrist? No       ROS:     4/25/2022   Skin:  None of the above   HEENT: Missing teeth, Teeth, dentures, or bridges needing repairs   If you answered yes to missing teeth, please indicate how many: 8   Musculoskeletal: Joint Pain, Back pain, Limited mobility, Arthritis   Cardiovascular: None of the above   Pulmonary: Shortness of breath with activity   Gastrointestinal: Heartburn, Difficulty swallowing (food gets stuck)   Genitourinary: Kidney stones   Hematological: None of the above   Neurological: None of the above   Female only: Birth control       EATING BEHAVIORS:     4/25/2022   Have you or anyone else thought that you had an eating disorder? No   Do you currently binge eat (eat a large amount of food in a short time)? No   Are you an emotional eater? No   Do you get up to eat after falling asleep? No       EXERCISE:     4/25/2022   How often do you exercise? Less than 1 time per week   What is the duration of your exercise (in minutes)? I do not exercise.   What types of exercise do you do? I don't exercise   What keeps you from being more active?  Pain       MEDICATIONS:  Current Outpatient Medications   Medication  Sig Dispense Refill     cholecalciferol (VITAMIN D3) 125 mcg (5000 units) capsule Take 1 capsule by mouth daily       diclofenac (VOLTAREN XR) 100 MG 24 hr tablet Take 1 tablet (100 mg) by mouth daily (with breakfast) AND 0.5 tablets (50 mg) daily (with dinner). 135 tablet 1     Digital Therapy (RESET FOR IOS OR ANDROID MONA) MISC 1 each daily 1 each 0     disulfiram (ANTABUSE) 250 MG tablet Take 1 tablet (250 mg) by mouth daily 30 tablet 3     folic acid (FOLVITE) 1 MG tablet Take 1 tablet (1 mg) by mouth daily 90 tablet 1     HYDROcodone-acetaminophen (NORCO) 5-325 MG tablet Take 1 tablet by mouth daily as needed for severe pain 30 tablet 0     lamoTRIgine (LAMICTAL) 200 MG tablet Take 1 tablet (200 mg) by mouth daily 90 tablet 1     losartan (COZAAR) 100 MG tablet Take 1 tablet (100 mg) by mouth daily 90 tablet 1     omeprazole (PRILOSEC) 40 MG DR capsule TAKE ONE CAPSULE BY MOUTH ONCE DAILY 90 capsule 1     solifenacin (VESICARE) 5 MG tablet Take 1 tablet (5 mg) by mouth daily 30 tablet 1     topiramate (TOPAMAX) 50 MG tablet Take 1.5 tablets (75 mg) by mouth daily 45 tablet 3     traZODone (DESYREL) 100 MG tablet Take 2 tablets (200 mg) by mouth At Bedtime 180 tablet 3     varenicline (CHANTIX) 0.5 MG tablet Take 1 tablet (0.5 mg) by mouth daily 90 tablet 1     venlafaxine (EFFEXOR) 37.5 MG tablet TAKE 1 TABLET BY MOUTH EVERY DAY. TAKE WITH 150 MG TABLET. TOTAL DAILY DOSE 187.5 MG DAILY. 90 tablet 1     venlafaxine (EFFEXOR-XR) 150 MG 24 hr capsule Take 1 capsule (150 mg) by mouth daily 90 capsule 1     levonorgestrel (MIRENA) 20 MCG/24HR IUD 1 each (20 mcg) by Intrauterine route once for 1 dose 1 each 0       ALLERGIES:  Allergies   Allergen Reactions     Lisinopril Cough     cough       Office Visit on 02/02/2022   Component Date Value Ref Range Status     COLOGUARD-ABSTRACT 02/12/2022 Negative   Final     Sodium 02/02/2022 137  133 - 144 mmol/L Final     Potassium 02/02/2022 4.5  3.4 - 5.3 mmol/L Final      Chloride 02/02/2022 107  94 - 109 mmol/L Final     Carbon Dioxide (CO2) 02/02/2022 27  20 - 32 mmol/L Final     Anion Gap 02/02/2022 3  3 - 14 mmol/L Final     Urea Nitrogen 02/02/2022 16  7 - 30 mg/dL Final     Creatinine 02/02/2022 0.82  0.52 - 1.04 mg/dL Final     Calcium 02/02/2022 8.4 (A) 8.5 - 10.1 mg/dL Final     Glucose 02/02/2022 90  70 - 99 mg/dL Final     Alkaline Phosphatase 02/02/2022 78  40 - 150 U/L Final     AST 02/02/2022 15  0 - 45 U/L Final     ALT 02/02/2022 31  0 - 50 U/L Final     Protein Total 02/02/2022 6.8  6.8 - 8.8 g/dL Final     Albumin 02/02/2022 3.6  3.4 - 5.0 g/dL Final     Bilirubin Total 02/02/2022 0.2  0.2 - 1.3 mg/dL Final     GFR Estimate 02/02/2022 87  >60 mL/min/1.73m2 Final    Effective December 21, 2021 eGFRcr in adults is calculated using the 2021 CKD-EPI creatinine equation which includes age and gender (Arlyn et al., [place], DOI: 10.1056/SZEEyt6350936)     Creatinine Urine mg/dL 02/02/2022 43  mg/dL Final     Albumin Urine mg/L 02/02/2022 <5  mg/L Final     Albumin Urine mg/g Cr 02/02/2022    Final    Unable to calculate:  Urine creatinine or albumin value below detectable level     TSH 02/02/2022 1.47  0.40 - 4.00 mU/L Final     Hemoglobin 02/02/2022 14.1  11.7 - 15.7 g/dL Final     Cannabinoids (55-nfz-1-carboxy-9-T* 02/02/2022 Not Detected  Not Detected, Indeterminate Final    Cutoff for a negative cannabinoid is 50 ng/mL or less.     Phencyclidine 02/02/2022 Not Detected  Not Detected, Indeterminate Final    Cutoff for a negative PCP is 25 ng/mL or less.     Cocaine (Benzoylecgonine) 02/02/2022 Not Detected  Not Detected, Indeterminate Final    Cutoff for a negative cocaine is 150 ng/ml or less.     Methamphetamine (d-Methamphetamine) 02/02/2022 Not Detected  Not Detected, Indeterminate Final    Cutoff for a negative methamphetamine is 500 ng/ml or less.     Opiates (Morphine) 02/02/2022 Detected (A) Not Detected, Indeterminate Final    Cutoff for a positive opiate is  greater than 100 ng/ml.  This is an unconfirmed screening result to be used for medical purposes only.      Amphetamine (d-Amphetamine) 02/02/2022 Not Detected  Not Detected, Indeterminate Final    Cutoff for a negative amphetamine is 500 ng/mL or less.     Benzodiazepines (Nordiazepam) 02/02/2022 Not Detected  Not Detected, Indeterminate Final    Cutoff for a negative benzodiazepine is 150 ng/ml or less.     Tricyclic Antidepressants (Desipra* 02/02/2022 Not Detected  Not Detected, Indeterminate Final    Cutoff for a negative tricyclic antidepressant is 300 ng/ml or less.     Methadone 02/02/2022 Not Detected  Not Detected, Indeterminate Final    Cutoff for a negative methadone is 200 ng/ml or less.     Barbiturates (Butalbital) 02/02/2022 Not Detected  Not Detected, Indeterminate Final    Cutoff for a negative barbituate is 200 ng/ml or less.     Oxycodone 02/02/2022 Not Detected  Not Detected, Indeterminate Final    Cutoff for a negative oxycodone is 100 ng/mL or less.     Propoxyphene (Norpropoxyphene) 02/02/2022 Not Detected  Not Detected, Indeterminate Final    Cutoff for a negative propoxyphene is 300 ng/ml or less.     Buprenorphine 02/02/2022 Not Detected  Not Detected, Indeterminate Final    Cutoff for a negative buprenorphine is 10 ng/ml or less.         Anti-obesity medication ROS:    HEENT  Hx of glaucoma: No    Cardiovascular  CAD:No  HTN:Yes    Gastrointestinal  GERD:Yes hx of nissen  Constipation:No  Liver Dz:No  H/O Pancreatitis:No    Psychiatric  Bipolar: No  Anxiety:Yes  Depression:Yes  History of alcohol/drug abuse: Yes  Hx of eating disorder:No    Endocrine  Personal or family hx of MTC or MEN2:No  Diabetes/prediabetes: No    Neurologic:  Hx of seizures: No  Memory Impairment: No      History of kidney stones: Yes 1x in 20's  Kidney disease: No  Current birth control: Yes IUD      PHYSICAL EXAM:  Objective    /82 (BP Location: Left arm, Patient Position: Chair, Cuff Size: Adult Large)    "Pulse 91   Temp 98.1  F (36.7  C) (Oral)   Ht 1.727 m (5' 8\")   Wt 107 kg (236 lb)   SpO2 96%   BMI 35.88 kg/m    Body mass index is 35.88 kg/m .  Physical Exam   GENERAL: Healthy, alert and no distress  EYES: Eyes grossly normal to inspection.  No discharge or erythema, or obvious scleral/conjunctival abnormalities.  RESP: No audible wheeze, cough, or visible cyanosis.  No visible retractions or increased work of breathing.    SKIN: Visible skin clear. No significant rash, abnormal pigmentation or lesions.  NEURO: Cranial nerves grossly intact.  Mentation and speech appropriate for age.  PSYCH: Mentation appears normal, affect normal/bright, judgement and insight intact, normal speech and appearance well-groomed.        In summary, Sharmin Nieves has Class II obesity with a body mass index of Body mass index is 35.88 kg/m . kg/m2 and the comorbidities stated above. She completed an informational seminar and is a possible candidate for the laparoscopic gastric sleeve.  She will have to complete the following pre-requisites:    If you have not already watched our online seminar please go to [email or web address]    Weight loss requirement: 10 lbs prior to surgery. Will have final weight check 2-3 weeks prior to surgery at anesthesia or nurse pre-op teaching visit.      Bariatric labs ordered, call for a lab only appointment at any [facility] lab. To find a lab location near you, please call [phone]. Please let us know if orders need to be faxed to a non [facility] lab.    Schedule bariatric psych eval as soon as possible.  List of psychologists will be sent to you via [employer] or given to you in clinic.     Call Angel Lopez at [phone] to discuss insurance coverage for bariatric surgery.  Please check with your insurance regarding bariatric surgery coverage also. Angel can also help you with scheduling psych eval if you are having difficulties.    The following clearance letters " are needed: Letters will be sent to you via [employer] or given to you in clinic  - Letter of support from primary care provider. Provider can submit through electronic medical record or fax to [phone]    Smoking cessation and nicotine test needed: Yes    Start Saxenda ramp to 3mg with plan to switch to Wegovy 1mg after (lower doses wegovy not available currently)    Birth control after surgery discussed. Patient instructed that 2 forms of birth control required after surgery and to avoid pregnancy for at least 18 months after surgery: IUD    NEXT VISITS: A  should reach out to you to schedule the following appointments.  If they do not reach you please call [phone] to schedule the following appointments:    -See dietitian in 1 month and monthly for 3 months    -See Lauren Bloch [facility] pharmacist in 1 month to follow up on weight loss medications    -See Bethany in 3 months to follow up on pre-op weight loss and weight loss medications    -See Dr Espitia in 1 month for bariatric surgeon visit. Discuss bariatric surgery.  Important items to discuss Hx of nissen 2003 Dr Bullcok, needs EGD to eval.  Possible failed nissen    Today in the office we discussed gastric sleeve surgery. Preoperative, perioperative, and postoperative processes, management, and follow up were addressed.  Risks and benefits were outlined including the risk of death, staple line leak (1-2%), PE, DVT, ulcer, worsening GERD, N/V, stricture, hernia, wound infection, weight regain, and vitamin deficiencies. I emphasized exercise and activity along with appropriate food choice as the main foundation for weight loss with surgery providing surgical reinforcement of this.  All questions were answered.  A goal sheet and support group handout were given to the patient.      Once the patient has completed the requirements in their task list and there are no further recommendations, the pt will be allowed to see the surgeon of her choice for  consultation on the laparoscopic gastric sleeve surgery. Patient verbalizes understanding of the process to surgery and expectations for the postoperative period including the need for lifelong lifestyle changes, vitamin supplementation, and laboratory monitoring.    Sincerely,     Bethany Mckeon PA-C

## 2022-04-25 NOTE — PATIENT INSTRUCTIONS
If you have not already watched our online seminar please go to [email or web address]    Weight loss requirement: 10 lbs prior to surgery. Will have final weight check 2-3 weeks prior to surgery at anesthesia or nurse pre-op teaching visit.      Bariatric labs ordered, call for a lab only appointment at any [facility] lab. To find a lab location near you, please call [phone]. Please let us know if orders need to be faxed to a non [facility] lab.    Schedule bariatric psych eval as soon as possible.  List of psychologists will be sent to you via [facility] or given to you in clinic.     Call Angel Lopez at [phone] to discuss insurance coverage for bariatric surgery.  Please check with your insurance regarding bariatric surgery coverage also. Angel can also help you with scheduling psych eval if you are having difficulties.    The following clearance letters are needed: Letters will be sent to you via [facility] or given to you in clinic  - Letter of support from primary care provider. Provider can submit through electronic medical record or fax to [phone]    NEXT VISITS: A  should reach out to you to schedule the following appointments.  If they do not reach you please call [phone] to schedule the following appointments:    -See dietitian in 1 month and monthly for 3 months    -See Lauren Bloch [facility] pharmacist in 1 month to follow up on weight loss medications    -See Bethany in 3 months to follow up on pre-op weight loss and weight loss medications    -See Dr Espitia in 1 month for bariatric surgeon visit. Discuss bariatric surgery.  Important items to discuss Hx of nissen 2003 Dr Bullock, needs EGD to eval.  Possible failed nissen      SAXENDA (liralutide)    We are considering starting a GLP-1 (Glucagon-like Peptide-1) medication called Saxenda. One of the ways it works is by slowing down the rate that food leaves your stomach. You feel ferris and will eat less. It also  helps regulate hormones that can help improve your blood sugars.    If you are a patient that checks blood sugars, continue to check as instructed by your doctor. Low blood sugars are rare but can happen if patients are on insulin or other oral agents. If you notice consistent low sugars or high sugars, your medication may need to be adjusted after your appointment. If this is the case, please call RN and provide her your blood sugar record from the last 3-4 days. The RN will get in touch with the doctor and call you back/[email or web address] message with recommendations. We tolerate high sugars for a bit, so if sugars are running 180-200, this is ok. As weight starts dropping the blood sugars should too. If readings are consistently over 200 for 1-2 weeks, then you should call the doctor/nurse.    Dosing for this medication:   Week 1- Inject 0.6 mg daily  Week 2- Inject 1.2 mg daily  Week 3- Inject 1.8 mg daily  Week 4- Inject 2.4 mg daily  Week 5 and thereafter- Inject 3.0 mg daily    Side effects of GLP- Medications include: The most common side effects are all GI related and consist of: nausea, constipation, diarrhea, burping, or gassiness. Patients are advised to eat slowly and less, and nausea typically passes if people can stick it out.     The risk of pancreatitis (inflammation of the pancreas) has been associated with this type of medication, but is very rare.  If you have had pancreatitis in the past, this medication may not be for you. Please let us know about any past history of pancreas problems.    Symptoms of pancreatitis include: Pain in your upper stomach area which may travel to your back and be worse after eating. Your stomach area may be tender to the touch.  You may have vomiting or nausea and/or have a fever. If you should develop any of these symptoms, stop the medication and contact your primary care doctor. They will do a blood test to check for pancreatitis.         There is a small chance you may have  some low blood sugar after taking the medication.   The signs of low blood sugar are:  o Weakness  o Shaky   o Hungry  o Sweating  o Confusion      See below for ways to treat low blood sugar without adding in lots of extra calories.      Treating Low Blood Sugar    If you have symptoms of low blood sugar (sweating, shaking, dizzy, confused) eat 15 grams of carbs and wait 15 minutes:      Glucose Tabs are best for sugars under 70 -  Dex4 or BD Glucose tablets are good, you will need to take 3-4 of these to equal 15 grams.       One small box of raisins    4 oz fruit juice box or   cup fruit juice    1 small apple    1 small banana      cup canned fruit in water      English muffin or a slice of bread with jelly     1 low fat frozen waffle with sugar-free syrup      cup cottage cheese with   cup frozen or fresh blueberries    1 cup skim or low-fat milk      cup whole grain cereal    4-6 crackers such as Triscuits      This medication is usually not covered by insurance and can be quite expensive. Sometimes a prior authorization is required, which may take up to 1-2 weeks for an insurance company to make a decision if they will cover the medication. Please be patient, you will be notified after a decision has been made.    For any questions or concerns please send a [employer] message to our team or call our weight management call center at [phone] during regular business hours. For questions during evenings or weekends your messages will be addressed during the next business day.  For emergencies please call 911 or seek immediate medical care.      (Do not stop taking it if you don't think it's working. For some people it works without them knowing it.)     In order to get refills of this or any medication we prescribe you must be seen in the medical weight mgmt clinic every 2-4 months. Please have your pharmacy fax a refill request to [phone].    WEGOVY (semaglutide)    What is Wegovy?    Wegovy (semaglutide)  injection 2.4 mg is an injectable prescription medicine used for adults with obesity (BMI ?30) or overweight (excess weight) (BMI ?27) who also have weight-related medical problems to help them lose weight and keep the weight off.    1.  Start Wegovy (semaglutide) 0.25 mg once weekly for 4 weeks, then if tolerating increase to 0.5 mg weekly for 4 weeks, then if tolerating increase to 1 mg weekly for 4 weeks, then if tolerating increase to 1.7 mg weekly for 4 weeks, then if tolerating increase to 2.4 mg weekly thereafter.    -Each Wegovy pen is a once weekly single-dose prefilled pen with a pen injector already built within the pen. Discard the Wegovy pen after use in sharps container.     2. Storage: make sure that when you get the prescription that you store the prescription in the refrigerator until it is time to use the Wegovy pen.  Once it is time to use the Wegovy pen, you can keep the pen at room temperature and it is good for up to 28 days at room temperature.     3.  Potential common side effects: nausea, headache, diarrhea, stomach upset.  If these become unmanageable or concerning symptoms, please make sure to call or [employer].      Go to site: Wegovy video to learn more and watch instruction videos.      For any questions or concerns please send a [employer] message to our team or call our weight management call center at [phone] during regular business hours. For questions during evenings or weekends your messages will be addressed during the next business day.  For emergencies please call 911 or seek immediate medical care.       Dear Sharmin Nieves,  Thank you for your interest in [facility] 24-week Healthy Lifestyle Plan -- brought to you by [facility] in partnership with [facility]. We re happy that you ve inquired about our program as you begin your journey toward a healthier lifestyle.   Our mission in the 24-week Healthy Lifestyle Plan  is to provide you with individualized care by giving you the tools, education and support you need to lose weight and maintain a healthy lifestyle. In your 24-week journey, you ll be supported by a dedicated weight loss team that includes registered dietitians, medical weight management providers, health coaches, and nurses -- all with special expertise in weight loss -- to help you every step of the way.   Monthly meetings with your registered dietician or medical weight management provider help to review your progress, update your care plan, and make any adjustments needed to ensure success. Between these visits, weekly and bi-weekly health  visits will help you focus on the four pillars of weight loss -- stress, sleep, nutrition, and exercise -- and how you can best adapt each to achieve sustainable weight loss results.  In addition, you will be given exclusive access to online wellbeing classes through [employer].  Your initial visit will be with a medical weight management provider who will help to understand your weight loss goals and ensure this program is the right fit for you. To prepare for this appointment, we ask that you review the initial visit information on the following pages.  If you have additional questions or concerns prior to your first visit, please contact us at [phone].  We can t wait to help you get started on your journey towards better health!  Sincerely,  Your 24-week Healthy Lifestyle Team                  MEDICATION STARTED AT THIS APPOINTMENT    We are starting a GLP-1 (Glucagon-like Peptide-1) medication called Saxenda. One of the ways it works is by slowing down the rate that food leaves your stomach. You feel ferris and will eat less. It also helps regulate hormones that can help improve your blood sugars.    If you are a patient that checks blood sugars, continue to check as instructed by your doctor. Low blood sugars are rare but can happen if patients are on insulin  or other oral agents. If you notice consistent low sugars or high sugars, your medication may need to be adjusted after your appointment. If this is the case, please call RN and provide her your blood sugar record from the last 3-4 days. The RN will get in touch with the doctor and call you back/[employer] message with recommendations. We tolerate high sugars for a bit, so if sugars are running 180-200, this is ok. As weight starts dropping the blood sugars should too. If readings are consistently over 200 for 1-2 weeks, then you should call the doctor/nurse.    Dosing for this medication:   Week 1- Inject 0.6 mg daily  Week 2- Inject 1.2 mg daily  Week 3- Inject 1.8 mg daily  Week 4- Inject 2.4 mg daily  Week 5 and thereafter- Inject 3.0 mg daily    Side effects of GLP- Medications include: The most common side effects are all GI related and consist of: nausea, constipation, diarrhea, burping, or gassiness. Patients are advised to eat slowly and less, and nausea typically passes if people can stick it out.     The risk of pancreatitis (inflammation of the pancreas) has been associated with this type of medication, but is very rare.  If you have had pancreatitis in the past, this medication may not be for you. Please let us know about any past history of pancreas problems.    Symptoms of pancreatitis include: Pain in your upper stomach area which may travel to your back and be worse after eating. Your stomach area may be tender to the touch.  You may have vomiting or nausea and/or have a fever. If you should develop any of these symptoms, stop the medication and contact your primary care doctor. They will do a blood test to check for pancreatitis.         There is a small chance you may have some low blood sugar after taking the medication.   The signs of low blood sugar are:  o Weakness  o Shaky   o Hungry  o Sweating  o Confusion      See below for ways to treat low blood sugar without adding in lots of extra  calories.      Treating Low Blood Sugar    If you have symptoms of low blood sugar (sweating, shaking, dizzy, confused) eat 15 grams of carbs and wait 15 minutes:      Glucose Tabs are best for sugars under 70 -  Dex4 or BD Glucose tablets are good, you will need to take 3-4 of these to equal 15 grams.       One small box of raisins    4 oz fruit juice box or   cup fruit juice    1 small apple    1 small banana      cup canned fruit in water      English muffin or a slice of bread with jelly     1 low fat frozen waffle with sugar-free syrup      cup cottage cheese with   cup frozen or fresh blueberries    1 cup skim or low-fat milk      cup whole grain cereal    4-6 crackers such as Triscuits      This medication is usually not covered by insurance and can be quite expensive. Sometimes a prior authorization is required, which may take up to 1-2 weeks for an insurance company to make a decision if they will cover the medication. Please be patient, you will be notified after a decision has been made.    Contact the nurse via [employer] or call [phone] if you have any questions or concerns. (Do not stop taking it if you don't think it's working. For some people it works without them knowing it.)     In order to get refills of this or any medication we prescribe you must be seen in the medical weight mgmt clinic every 2-4 months.

## 2022-04-25 NOTE — PROGRESS NOTES
"60 minutes spent on the date of the encounter doing chart review, history and exam, documentation and further activities per the note    New Bariatric Surgery Consultation Note    2022    RE: Sharmin Nieves  MR#: [id number]  : 1970      Referring provider:       2022   Who referred you? Randa Bill CNP       Chief Complaint/Reason for visit: evaluation for possible weight loss surgery    Dear Randa Bill APRN CNP (General),    I had the pleasure of seeing your patient, Sharmin Nieves, to evaluate her obesity and consider her for possible weight loss surgery. As you know, Sharmin Nieves is 51 year old.  She has a height of 5' 8\", a weight of 236 lbs 0 oz, and calculated Body mass index is 35.88 kg/m .    Assessment & Plan   Problem List Items Addressed This Visit        Endocrine Diagnoses    Class 2 obesity due to excess calories without serious comorbidity with body mass index (BMI) of 35.0 to 35.9 in adult - Primary    Relevant Orders    CBC with platelets    Parathyroid Hormone Intact    Vitamin D Deficiency    Adult Gastro Ref - Procedure Only       Other    Esophageal reflux    Relevant Orders    Adult Gastro Ref - Procedure Only    History of Nissen fundoplication    Relevant Orders    Adult Gastro Ref - Procedure Only           HISTORY OF PRESENT ILLNESS:  Weight Loss History Reviewed with Patient 2022   How long have you been overweight? From Middle age and beyond   What is the most that you have ever weighed? 240   What is the most weight you have lost? 30   I have tried the following methods to lose weight Watching portions or calories, Weight Watchers, Atkins type diet (low carb/high protein), Pre packaged meals ex: Nutrisystem, Slimfast, OTC Medications, Prescription Medications   I have tried the following weight loss medications? (Check all that apply) Topamax/Topiramate   Have you ever had weight loss surgery? No       CO-MORBIDITIES OF OBESITY INCLUDE:     " 4/25/2022   I have the following health issues associated with obesity: High Blood Pressure, GERD (Reflux), Osteoarthritis (joint disease)       PAST MEDICAL HISTORY:  Past Medical History:   Diagnosis Date     Acne      Anxiety      Anxiety state, unspecified     Anxiety     Benign essential hypertension 11/30/2018     Chronic low back pain 5/11/2010     Degenerative joint disease of foot 5/3/2021     Depression     Dr. Gaytan     Diaphragmatic hernia without mention of obstruction or gangrene      Esophageal reflux     with associated esophageal spasm     ETOH abuse      Foot pain      Herpes simplex without mention of complication      History of foot surgery 7/14/2021     Moderate major depression (H) 4/15/2011     Rectal pain 5/16/2011     Right hip pain 5/11/2010     Seasonal affective disorder (H)      STD (sexually transmitted disease)      Surveillance of previously prescribed intrauterine contraceptive device 10/03/05    mirena IUD       PAST SURGICAL HISTORY:  Past Surgical History:   Procedure Laterality Date     ARTHRODESIS FOOT Right 6/9/2021    Procedure: Right 1st metatarsophalangeal joint fuison;  Surgeon: Conor Guillen MD;  Location: [facility] OR     CHEILECTOMY Right 12/21/2017    Procedure: CHEILECTOMY;  RIGHT FOOT CHEILECTOMY;  Surgeon: Mo Edgar DPM;  Location:  OR     SURGICAL HISTORY OF -   4/04    Lapr Nissen fundoplication     TONSILLECTOMY & ADENOIDECTOMY  1985     ZZC NONSPECIFIC PROCEDURE  1992    CRYO SURGERY for abnl paps     ZZ STOMACH SURGERY PROCEDURE UNLISTED      Nissen       FAMILY HISTORY:   Family History   Problem Relation Age of Onset     C.A.D. Maternal Grandmother      Hypertension Maternal Grandmother      Alcohol/Drug Maternal Grandmother      Depression Maternal Grandmother      Cerebrovascular Disease Paternal Grandfather      Alcohol/Drug Paternal Grandfather      Breast Cancer Paternal Grandmother      Depression Paternal Grandmother       Alcohol/Drug Father      Depression Father      Gastrointestinal Disease Father         GERD     Alcohol/Drug Maternal Grandfather      Depression Maternal Grandfather      Depression Mother      Obesity Mother      Anxiety Disorder Mother      Diabetes Mother      Depression Sister      Alcoholism Brother      Depression Brother      Alcoholism Brother      Depression Brother      Alcoholism Brother      Depression Brother      Gastrointestinal Disease Brother         severe gerd     Autism Spectrum Disorder Son      Substance Abuse Son      Schizophrenia Son      Substance Abuse Son      Breast Cancer Maternal Aunt      Cancer - colorectal No family hx of      Prostate Cancer No family hx of        SOCIAL HISTORY:   Social History Questions Reviewed With Patient 4/25/2022   Which best describes your employment status (select all that apply) I work full-time   If you work, 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 Yes   Who do you have in your support network that can be available to help you for the first 2 weeks after surgery? Children, ex    Who can you count on for support throughout your weight loss surgery journey? Children, mother, sister   Can you afford 3 meals a day?  Yes   Can you afford 50-60 dollars a month for vitamins? Yes       HABITS:     4/25/2022   How often do you drink alcohol? Never   Do you currently use any of the following Nicotine products? cigarettes   Have you ever used any of the following nicotine products? Cigarettes   Have you or are you currently using street drugs or prescription strength medication for which you do not have a prescription for? No   Do you have a history of chemical dependency (alcohol or drug abuse)? Yes   Quit drinking over 1 year ago, takes antibuse  Wants to quit smoking and has tried chantix in the past, considering trying it again.    Currently taking narcotic/opioids  No    PSYCHOLOGICAL HISTORY:   Psychological History Reviewed With Patient 4/25/2022   Have you ever attempted suicide? Never.   Have you had thoughts of suicide in the past year? No   Have you ever been hospitalized for mental illness or a suicide attempt? Never.   Do you have a history of chronic pain? Yes   Have you ever been diagnosed with fibromyalgia? No   Are you currently being treated for any of the following? (select all that apply) Depression, Anxiety, I take medication or see a therapist for another mental illness   Are you currently seeing a therapist or counselor?  No   Are you currently seeing a psychiatrist? No       ROS:     4/25/2022   Skin:  None of the above   HEENT: Missing teeth, Teeth, dentures, or bridges needing repairs   If you answered yes to missing teeth, please indicate how many: 8   Musculoskeletal: Joint Pain, Back pain, Limited mobility, Arthritis   Cardiovascular: None of the above   Pulmonary: Shortness of breath with activity   Gastrointestinal: Heartburn, Difficulty swallowing (food gets stuck)   Genitourinary: Kidney stones   Hematological: None of the above   Neurological: None of the above   Female only: Birth control       EATING BEHAVIORS:     4/25/2022   Have you or anyone else thought that you had an eating disorder? No   Do you currently binge eat (eat a large amount of food in a short time)? No   Are you an emotional eater? No   Do you get up to eat after falling asleep? No       EXERCISE:     4/25/2022   How often do you exercise? Less than 1 time per week   What is the duration of your exercise (in minutes)? I do not exercise.   What types of exercise do you do? I don't exercise   What keeps you from being more active?  Pain       MEDICATIONS:  Current Outpatient Medications   Medication Sig Dispense Refill     cholecalciferol (VITAMIN D3) 125 mcg (5000 units) capsule Take 1 capsule by mouth daily       diclofenac (VOLTAREN XR) 100 MG 24 hr tablet Take 1 tablet (100  mg) by mouth daily (with breakfast) AND 0.5 tablets (50 mg) daily (with dinner). 135 tablet 1     Digital Therapy (RESET FOR IOS OR ANDROID MONA) MISC 1 each daily 1 each 0     disulfiram (ANTABUSE) 250 MG tablet Take 1 tablet (250 mg) by mouth daily 30 tablet 3     folic acid (FOLVITE) 1 MG tablet Take 1 tablet (1 mg) by mouth daily 90 tablet 1     HYDROcodone-acetaminophen (NORCO) 5-325 MG tablet Take 1 tablet by mouth daily as needed for severe pain 30 tablet 0     lamoTRIgine (LAMICTAL) 200 MG tablet Take 1 tablet (200 mg) by mouth daily 90 tablet 1     losartan (COZAAR) 100 MG tablet Take 1 tablet (100 mg) by mouth daily 90 tablet 1     omeprazole (PRILOSEC) 40 MG DR capsule TAKE ONE CAPSULE BY MOUTH ONCE DAILY 90 capsule 1     solifenacin (VESICARE) 5 MG tablet Take 1 tablet (5 mg) by mouth daily 30 tablet 1     topiramate (TOPAMAX) 50 MG tablet Take 1.5 tablets (75 mg) by mouth daily 45 tablet 3     traZODone (DESYREL) 100 MG tablet Take 2 tablets (200 mg) by mouth At Bedtime 180 tablet 3     varenicline (CHANTIX) 0.5 MG tablet Take 1 tablet (0.5 mg) by mouth daily 90 tablet 1     venlafaxine (EFFEXOR) 37.5 MG tablet TAKE 1 TABLET BY MOUTH EVERY DAY. TAKE WITH 150 MG TABLET. TOTAL DAILY DOSE 187.5 MG DAILY. 90 tablet 1     venlafaxine (EFFEXOR-XR) 150 MG 24 hr capsule Take 1 capsule (150 mg) by mouth daily 90 capsule 1     levonorgestrel (MIRENA) 20 MCG/24HR IUD 1 each (20 mcg) by Intrauterine route once for 1 dose 1 each 0       ALLERGIES:  Allergies   Allergen Reactions     Lisinopril Cough     cough       Office Visit on 02/02/2022   Component Date Value Ref Range Status     COLOGUARD-ABSTRACT 02/12/2022 Negative   Final     Sodium 02/02/2022 137  133 - 144 mmol/L Final     Potassium 02/02/2022 4.5  3.4 - 5.3 mmol/L Final     Chloride 02/02/2022 107  94 - 109 mmol/L Final     Carbon Dioxide (CO2) 02/02/2022 27  20 - 32 mmol/L Final     Anion Gap 02/02/2022 3  3 - 14 mmol/L Final     Urea Nitrogen 02/02/2022  16  7 - 30 mg/dL Final     Creatinine 02/02/2022 0.82  0.52 - 1.04 mg/dL Final     Calcium 02/02/2022 8.4 (A) 8.5 - 10.1 mg/dL Final     Glucose 02/02/2022 90  70 - 99 mg/dL Final     Alkaline Phosphatase 02/02/2022 78  40 - 150 U/L Final     AST 02/02/2022 15  0 - 45 U/L Final     ALT 02/02/2022 31  0 - 50 U/L Final     Protein Total 02/02/2022 6.8  6.8 - 8.8 g/dL Final     Albumin 02/02/2022 3.6  3.4 - 5.0 g/dL Final     Bilirubin Total 02/02/2022 0.2  0.2 - 1.3 mg/dL Final     GFR Estimate 02/02/2022 87  >60 mL/min/1.73m2 Final    Effective December 21, 2021 eGFRcr in adults is calculated using the 2021 CKD-EPI creatinine equation which includes age and gender (Arlyn et al., NE, DOI: 10.1056/EONUun1680516)     Creatinine Urine mg/dL 02/02/2022 43  mg/dL Final     Albumin Urine mg/L 02/02/2022 <5  mg/L Final     Albumin Urine mg/g Cr 02/02/2022    Final    Unable to calculate:  Urine creatinine or albumin value below detectable level     TSH 02/02/2022 1.47  0.40 - 4.00 mU/L Final     Hemoglobin 02/02/2022 14.1  11.7 - 15.7 g/dL Final     Cannabinoids (37-lcg-0-carboxy-9-T* 02/02/2022 Not Detected  Not Detected, Indeterminate Final    Cutoff for a negative cannabinoid is 50 ng/mL or less.     Phencyclidine 02/02/2022 Not Detected  Not Detected, Indeterminate Final    Cutoff for a negative PCP is 25 ng/mL or less.     Cocaine (Benzoylecgonine) 02/02/2022 Not Detected  Not Detected, Indeterminate Final    Cutoff for a negative cocaine is 150 ng/ml or less.     Methamphetamine (d-Methamphetamine) 02/02/2022 Not Detected  Not Detected, Indeterminate Final    Cutoff for a negative methamphetamine is 500 ng/ml or less.     Opiates (Morphine) 02/02/2022 Detected (A) Not Detected, Indeterminate Final    Cutoff for a positive opiate is greater than 100 ng/ml.  This is an unconfirmed screening result to be used for medical purposes only.      Amphetamine (d-Amphetamine) 02/02/2022 Not Detected  Not Detected, Indeterminate  "Final    Cutoff for a negative amphetamine is 500 ng/mL or less.     Benzodiazepines (Nordiazepam) 02/02/2022 Not Detected  Not Detected, Indeterminate Final    Cutoff for a negative benzodiazepine is 150 ng/ml or less.     Tricyclic Antidepressants (Desipra* 02/02/2022 Not Detected  Not Detected, Indeterminate Final    Cutoff for a negative tricyclic antidepressant is 300 ng/ml or less.     Methadone 02/02/2022 Not Detected  Not Detected, Indeterminate Final    Cutoff for a negative methadone is 200 ng/ml or less.     Barbiturates (Butalbital) 02/02/2022 Not Detected  Not Detected, Indeterminate Final    Cutoff for a negative barbituate is 200 ng/ml or less.     Oxycodone 02/02/2022 Not Detected  Not Detected, Indeterminate Final    Cutoff for a negative oxycodone is 100 ng/mL or less.     Propoxyphene (Norpropoxyphene) 02/02/2022 Not Detected  Not Detected, Indeterminate Final    Cutoff for a negative propoxyphene is 300 ng/ml or less.     Buprenorphine 02/02/2022 Not Detected  Not Detected, Indeterminate Final    Cutoff for a negative buprenorphine is 10 ng/ml or less.         Anti-obesity medication ROS:    HEENT  Hx of glaucoma: No    Cardiovascular  CAD:No  HTN:Yes    Gastrointestinal  GERD:Yes hx of nissen  Constipation:No  Liver Dz:No  H/O Pancreatitis:No    Psychiatric  Bipolar: No  Anxiety:Yes  Depression:Yes  History of alcohol/drug abuse: Yes  Hx of eating disorder:No    Endocrine  Personal or family hx of MTC or MEN2:No  Diabetes/prediabetes: No    Neurologic:  Hx of seizures: No  Memory Impairment: No      History of kidney stones: Yes 1x in 20's  Kidney disease: No  Current birth control: Yes IUD      PHYSICAL EXAM:  Objective    /82 (BP Location: Left arm, Patient Position: Chair, Cuff Size: Adult Large)   Pulse 91   Temp 98.1  F (36.7  C) (Oral)   Ht 1.727 m (5' 8\")   Wt 107 kg (236 lb)   SpO2 96%   BMI 35.88 kg/m    Body mass index is 35.88 kg/m .  Physical Exam   GENERAL: Healthy, " alert and no distress  EYES: Eyes grossly normal to inspection.  No discharge or erythema, or obvious scleral/conjunctival abnormalities.  RESP: No audible wheeze, cough, or visible cyanosis.  No visible retractions or increased work of breathing.    SKIN: Visible skin clear. No significant rash, abnormal pigmentation or lesions.  NEURO: Cranial nerves grossly intact.  Mentation and speech appropriate for age.  PSYCH: Mentation appears normal, affect normal/bright, judgement and insight intact, normal speech and appearance well-groomed.        In summary, Sharmin Nieves has Class II obesity with a body mass index of Body mass index is 35.88 kg/m . kg/m2 and the comorbidities stated above. She completed an informational seminar and is a possible candidate for the laparoscopic gastric sleeve.  She will have to complete the following pre-requisites:    If you have not already watched our online seminar please go to [email or web address]    Weight loss requirement: 10 lbs prior to surgery. Will have final weight check 2-3 weeks prior to surgery at anesthesia or nurse pre-op teaching visit.      Bariatric labs ordered, call for a lab only appointment at any [facility] lab. To find a lab location near you, please call [phone]. Please let us know if orders need to be faxed to a non [facility] lab.    Schedule bariatric psych eval as soon as possible.  List of psychologists will be sent to you via [employer] or given to you in clinic.     Call Angel Lopez at [phone] to discuss insurance coverage for bariatric surgery.  Please check with your insurance regarding bariatric surgery coverage also. Angel can also help you with scheduling psych eval if you are having difficulties.    The following clearance letters are needed: Letters will be sent to you via [employer] or given to you in clinic  - Letter of support from primary care provider. Provider can submit through electronic medical record or fax  to [phone]    Smoking cessation and nicotine test needed: Yes    Start Saxenda ramp to 3mg with plan to switch to Wegovy 1mg after (lower doses wegovy not available currently)    Birth control after surgery discussed. Patient instructed that 2 forms of birth control required after surgery and to avoid pregnancy for at least 18 months after surgery: IUD    NEXT VISITS: A  should reach out to you to schedule the following appointments.  If they do not reach you please call [phone] to schedule the following appointments:    -See dietitian in 1 month and monthly for 3 months    -See Lauren Bloch [place] pharmacist in 1 month to follow up on weight loss medications    -See Bethany in 3 months to follow up on pre-op weight loss and weight loss medications    -See Dr Espitia in 1 month for bariatric surgeon visit. Discuss bariatric surgery.  Important items to discuss Hx of nissen 2003 Dr Bullock, needs EGD to eval.  Possible failed nissen    Today in the office we discussed gastric sleeve surgery. Preoperative, perioperative, and postoperative processes, management, and follow up were addressed.  Risks and benefits were outlined including the risk of death, staple line leak (1-2%), PE, DVT, ulcer, worsening GERD, N/V, stricture, hernia, wound infection, weight regain, and vitamin deficiencies. I emphasized exercise and activity along with appropriate food choice as the main foundation for weight loss with surgery providing surgical reinforcement of this.  All questions were answered.  A goal sheet and support group handout were given to the patient.      Once the patient has completed the requirements in their task list and there are no further recommendations, the pt will be allowed to see the surgeon of her choice for consultation on the laparoscopic gastric sleeve surgery. Patient verbalizes understanding of the process to surgery and expectations for the postoperative period including the need for  lifelong lifestyle changes, vitamin supplementation, and laboratory monitoring.    Sincerely,     Bethany Mckeon PA-C

## 2022-04-25 NOTE — NURSING NOTE
"Chief Complaint   Patient presents with     RECHECK     Kendy, is being seen today for a consult.       Vitals:    04/25/22 0805   BP: 126/82   BP Location: Left arm   Patient Position: Chair   Cuff Size: Adult Large   Pulse: 91   Temp: 98.1  F (36.7  C)   TempSrc: Oral   SpO2: 96%   Weight: 107 kg (236 lb)   Height: 1.727 m (5' 8\")       Body mass index is 35.88 kg/m .      Nela Holt LPN    "

## 2022-04-25 NOTE — TELEPHONE ENCOUNTER
"Prior Authorization Retail Medication Request    Medication/Dose: Saxenda  ICD code (if different than what is on RX):  Obesity BMI 35    Previously Tried and Failed:  Watching portions or calories, Weight Watchers, Atkins type diet (low carb/high protein), Pre packaged meals ex: Nutrisystem, Slimfast, OTC Medications, Prescription Medications    Rationale:  I had the pleasure of seeing your patient, Sharmin Nieves, to evaluate her obesity and consider her for possible weight loss surgery. As you know, Sharmin Nieves is 51 year old.  She has a height of 5' 8\", a weight of 236 lbs 0 oz, and calculated Body mass index is 35.88 kg/m . Starting Saxenda with the plan to switch to Wegovy in the future. Currently on Topiramate.     Insurance Name:    Insurance ID:        Pharmacy Information (if different than what is on RX)  Name:  [id number] [address]  AT [facility] [address]  Phone:  [phone]  "

## 2022-04-25 NOTE — Clinical Note
RD 1 month video MTM Lauren Bloch 1 month to follow up saxenda start and topiramate increase Dr Loja first available  Bethany 3 months return MWM

## 2022-04-25 NOTE — Clinical Note
NBS I saw today.  She is P employee [employer] insurance.  Angel, she has hx of nissen so she needs EGD with Dr Loja.  I'm going to order it and tell her to call endoscopy to schedule on a Wed.  Her BMI is 35 but she has HTN controlled on meds, will this count for preferred one?  Thanks! Bethany

## 2022-04-26 ENCOUNTER — VIRTUAL VISIT (OUTPATIENT)
Dept: FAMILY MEDICINE | Facility: CLINIC | Age: 52
End: 2022-04-26
Payer: COMMERCIAL

## 2022-04-26 DIAGNOSIS — K22.4 ESOPHAGEAL SPASM: Primary | ICD-10-CM

## 2022-04-26 DIAGNOSIS — M79.674 PAIN OF RIGHT GREAT TOE: ICD-10-CM

## 2022-04-26 DIAGNOSIS — E66.01 MORBID OBESITY (H): ICD-10-CM

## 2022-04-26 DIAGNOSIS — N32.81 URGENCY-FREQUENCY SYNDROME: ICD-10-CM

## 2022-04-26 DIAGNOSIS — N39.41 URGE INCONTINENCE: ICD-10-CM

## 2022-04-26 DIAGNOSIS — Z79.1 NSAID LONG-TERM USE: ICD-10-CM

## 2022-04-26 DIAGNOSIS — F33.9 RECURRENT MAJOR DEPRESSIVE DISORDER, REMISSION STATUS UNSPECIFIED (H): ICD-10-CM

## 2022-04-26 PROCEDURE — 99215 OFFICE O/P EST HI 40 MIN: CPT | Mod: GT | Performed by: NURSE PRACTITIONER

## 2022-04-26 RX ORDER — SOLIFENACIN SUCCINATE 5 MG/1
5 TABLET, FILM COATED ORAL DAILY
Qty: 90 TABLET | Refills: 1 | Status: SHIPPED | OUTPATIENT
Start: 2022-04-26 | End: 2022-10-11

## 2022-04-26 RX ORDER — HYDROCODONE BITARTRATE AND ACETAMINOPHEN 5; 325 MG/1; MG/1
1 TABLET ORAL DAILY PRN
Qty: 30 TABLET | Refills: 0 | Status: SHIPPED | OUTPATIENT
Start: 2022-04-26 | End: 2022-05-25

## 2022-04-26 NOTE — PROGRESS NOTES
Kendy is a 51 year old who is being evaluated via a billable video visit.      How would you like to obtain your AVS? MyChart  If the video visit is dropped, the invitation should be resent by: Send to e-mail at: [email or web address]   [phone]   Will anyone else be joining your video visit? No    Video Start Time: 7:11 AM    Assessment & Plan     Esophageal spasm  Discontinue diclofenac and send message about pain with this discontinuation.  EGD has been ordered and patient will see Dr. Loja for follow-up to EGD    NSAID long-term use  Discontinue diclofenac    Morbid obesity (H)  Good consult with bariatric.  Medical management currently with topiramate and GLP1a.  Not ruling out sleeve    Recurrent major depressive disorder, remission status unspecified (H)  Stable    Pain of right great toe  appt with Dr. Arreola next week  Call pain management today - more pressing with the need to stop the diclofenac  - HYDROcodone-acetaminophen (NORCO) 5-325 MG tablet; Take 1 tablet by mouth daily as needed for severe pain    Urgency-frequency syndrome  Ok taking over rx  - solifenacin (VESICARE) 5 MG tablet; Take 1 tablet (5 mg) by mouth daily    Urge incontinence    - solifenacin (VESICARE) 5 MG tablet; Take 1 tablet (5 mg) by mouth daily    Review of prior external note(s) from - bariatric  Ordering of each unique test  Prescription drug management  43 minutes spent on the date of the encounter doing chart review, history and exam, documentation and further activities per the note       Patient Instructions   Stop diclofenac  Schedule upper endoscopy  Schedule with pain clinic - [phone]    Schedule lab only visit when you are fasting      Return in about 1 month (around 5/26/2022).    LEDY Seaman CNP  [facility]    Subjective   Kendy is a 51 year old who presents for the following health issues:    HPI     Bariatric appt - yesterday and it went really well. Increased the  topiramate to 75 mg and started liraglutide with taper up.  Can tell she is not as hungry, but no weight loss yet.  Also reviewed possible gastric sleeve, but there is concern about her previous Nissen as well as esophageal spasms.  Will see Dr. Loja who will perform the EGD.    GI - history of esophageal spasms etiology unknown earliest in her 20's.  Had work-up including EGD with biopsy - no Sears. Over time, incidence reduced a couple times a year. Having return of daily symptoms over past nine month - getting food and pills stuck when swallowing necessitating coughing to remove.  No problems with breathing.  No vomiting or diarrhea, no blood in stool.  Open to stopping diclofenac.      Right toe - sees Dr. Arreola next week to review injection effect.  Didn't relieve pain. Patient expecting he will recommend revision.  Taking the hydrocodone-acetaminophen once daily at night    Sobriety - one year sobriety on 4/20! Taking antabuse on Saturdays.    BP - at weight management yesterday.  BP Readings from Last 6 Encounters:   04/25/22 126/82   03/22/22 110/80   02/02/22 118/69   06/09/21 107/65   05/28/21 118/64   12/09/20 (!) 142/92     Mental health - doing really well, happy with one year sobriety, trying to be positive.  Never started therapy.    Review of Systems   Constitutional, HEENT, cardiovascular, pulmonary, gi and gu systems are negative, except as otherwise noted.      Objective             Physical Exam   GENERAL: Healthy, alert and no distress  EYES: Eyes grossly normal to inspection.  No discharge or erythema, or obvious scleral/conjunctival abnormalities.  RESP: No audible wheeze, cough, or visible cyanosis.  No visible retractions or increased work of breathing.    SKIN: Visible skin clear. No significant rash, abnormal pigmentation or lesions.  NEURO: Cranial nerves grossly intact.  Mentation and speech appropriate for age.  PSYCH: Mentation appears normal, affect normal/bright, judgement and  insight intact, normal speech and appearance well-groomed.    pending            Video-Visit Details    Type of service:  Video Visit    Start: 04/26/2022 07:11 am  Stop: 04/26/2022 07:50 am    Originating Location (pt. Location): Home    Distant Location (provider location):  [facility]     Platform used for Video Visit: Mobeon

## 2022-04-26 NOTE — TELEPHONE ENCOUNTER
Central Prior Authorization Team   Phone: [phone]      PA Initiation    Medication: Saxenda-PA initiated  Insurance Company: [employer] - Phone [phone] Fax [phone]  Pharmacy Filling the Rx: [facility] #[id number] [address]  AT [facility] OF [address]  Filling Pharmacy Phone: [phone]  Filling Pharmacy Fax:    Start Date: 4/26/2022

## 2022-04-26 NOTE — PATIENT INSTRUCTIONS
Stop diclofenac  Schedule upper endoscopy  Schedule with pain clinic - [phone]    Schedule lab only visit when you are fasting

## 2022-04-26 NOTE — Clinical Note
Can you contact patient to set up comprehensive pain intake?  She is a long-term patient of mine and very sweet.  Thanks!

## 2022-04-27 ENCOUNTER — HOSPITAL ENCOUNTER (OUTPATIENT)
Facility: CLINIC | Age: 52
End: 2022-04-27
Attending: SURGERY | Admitting: SURGERY
Payer: COMMERCIAL

## 2022-04-27 ENCOUNTER — MYC MEDICAL ADVICE (OUTPATIENT)
Dept: FAMILY MEDICINE | Facility: CLINIC | Age: 52
End: 2022-04-27
Payer: COMMERCIAL

## 2022-04-27 ENCOUNTER — TELEPHONE (OUTPATIENT)
Dept: GASTROENTEROLOGY | Facility: CLINIC | Age: 52
End: 2022-04-27
Payer: COMMERCIAL

## 2022-04-27 DIAGNOSIS — M19.071 PRIMARY OSTEOARTHRITIS OF RIGHT FOOT: Primary | ICD-10-CM

## 2022-04-27 NOTE — TELEPHONE ENCOUNTER
Screening Questions  BlueKIND OF PREP RedLOCATION [review exclusion criteria] GreenSEDATION TYPE  1. Have you had a positive covid test in the last 90 days? N     2. Do you have a legal guardian or medical Power of ?  Are you able to give consent for your medical care?Y (Sedation review/consideration needed)    3. Are you active on mychart? Y    4. What insurance is in the chart? PO     3.   Ordering/Referring Provider: Bethany Mckeon PA-C    4. BMI 35.9 [BMI OVER 40-EXTENDED PREP]  If greater than 40 review exclusion criteria [PAC APPT IF @ [facility]]        5.  Respiratory Screening :  [If yes to any of the following HOSPITAL setting only]     Do you use daily home oxygen? N    Do you have mod to severe Obstructive Sleep Apnea? N  [OKAY @ [facility] SH PH RI]   Do you have Pulmonary Hypertension? N     Do you have UNCONTROLLED asthma? N        6.   Have you had a heart or lung transplant? N      7.   Are you currently on dialysis? N [ If yes, G-PREP & HOSPITAL setting only]     8.   Do you have chronic kidney disease? N [ If yes, G-PREP ]    9.   Have you had a stroke or Transient ischemic attack (TIA - aka  mini stroke ) within 6 months?  N (If yes, please review exclusion criteria)    10.   In the past 6 months, have you had any heart related issues including cardiomyopathy or heart attack? N           If yes, did it require cardiac stenting or other implantable device? N      11.   Do you have any implantable devices in your body (pacemaker, defib, LVAD)? N (If yes, please review exclusion criteria)    12.   Do you take nitroglycerin? N           If yes, how often? N  (if yes, HOSPITAL setting ONLY)    13.   Are you currently taking any blood thinners? N           [IF YES, INFORM PATIENT TO FOLLOW UP W/ ORDERING PROVIDER FOR BRIDGING INSTRUCTIONS]     14.   Do you have a diagnosis of diabetes? N   [ If yes, G-PREP ]    15.   [FEMALES] Are you currently pregnant? N    If yes, how many weeks?  N    16.   Are you taking any prescription pain medications on a routine schedule?  N   [ If yes, EXTENDED PREP.] [If yes, MAC]    17.   Do you have any chemical dependencies such as alcohol, street drugs, or methadone?  N [If yes, MAC]    18.   Do you have any history of post-traumatic stress syndrome, severe anxiety or history of psychosis?  N  [If yes, MAC]    19.   Do you transfer independently?  Y    20.  On a regular basis do you go 3-5 days between bowel movements?    [ If yes, EXTENDED PREP.]    21.   Preferred LOCAL Pharmacy for Pre Prescription        [id number] - [place], [place] - [address] [place] [address]  AT [facility] OF [address]      Scheduling Details      Caller :   (Please ask for phone number if not scheduled by patient)    Type of Procedure Scheduled: EGD  Which Colonoscopy Prep was Sent?:   DIAMOND CF PATIENTS & GROEN'S PATIENTS NEEDS EXTENDED PREP  Surgeon: PAT  Date of Procedure: 6/1  Location: [facility]      Sedation Type: CS  Conscious Sedation- Needs  for 6 hours after the procedure  MAC/General-Needs  for 24 hours after procedure    Pre-op Required at [facility], [place], [place] and OR for MAC sedation: N  (advise patient they will need a pre-op prior to procedure -)      Informed patient they will need an adult  Y  Cannot take any type of public or medical transportation alone    Pre-Procedure Covid test to be completed at [facility] or Externally: PT WILL DO COVID    Confirmed Nurse will call to complete assessment Y    Additional comments:

## 2022-04-27 NOTE — TELEPHONE ENCOUNTER
Mariya    See pt Mychart message  referral cued    Sapna Bowie, RN   [facility]

## 2022-04-27 NOTE — TELEPHONE ENCOUNTER
Prior Authorization Not Needed per Insurance    Medication: Saxenda-PA not needed  Insurance Company: [employer] - Phone [phone] Fax [phone]  Expected CoPay:      Pharmacy Filling the Rx[id number] [address]  [facility]   Pharmacy Notified: Yes-pharmacy has a paid claim  Patient Notified: No

## 2022-05-20 ENCOUNTER — TELEPHONE (OUTPATIENT)
Dept: GASTROENTEROLOGY | Facility: CLINIC | Age: 52
End: 2022-05-20
Payer: COMMERCIAL

## 2022-05-20 NOTE — TELEPHONE ENCOUNTER
Pre assessment questions completed for upcoming EGD procedure scheduled on 6/1/22    COVID test scheduled Pt is aware she needs to schedule. She will call Covid appointment line.     Reviewed procedural arrival time 12:00pm and facility location [facility].    Designated  policy reviewed. Instructed to have someone stay 6 hours post procedure.     Procedure indication: GERD    Prep instructions sent via Neomobile.    Reviewed EGD prep instructions with patient.     Anticoagulation/blood thinners? None    Electronic implanted devices? None     Patient verbalized understanding and had no questions or concerns at this time.    Francia Jeffrey RN

## 2022-05-20 NOTE — TELEPHONE ENCOUNTER
Attempted to contact patient regarding upcoming EGD procedure on 6/1/22 for pre assessment questions. No answer.     Left message to return call to [phone] #2    Covid test scheduled? Left Covid test scheduling number on voicemail [phone]    Arrival time: 1200    Facility location: [facility]    Sedation type: CS - has done well with CS in past.     Indication for procedure: gerd     Anticoagulants: no.     Jennifer Mackenzie RN

## 2022-05-24 DIAGNOSIS — Z11.59 ENCOUNTER FOR SCREENING FOR OTHER VIRAL DISEASES: Primary | ICD-10-CM

## 2022-05-24 NOTE — TELEPHONE ENCOUNTER
Covid test scheduled? Sent [employer] Mobile message reminder with Covid test scheduling number [phone].    Jennifer Mackenzie RN

## 2022-05-25 ENCOUNTER — VIRTUAL VISIT (OUTPATIENT)
Dept: FAMILY MEDICINE | Facility: CLINIC | Age: 52
End: 2022-05-25
Payer: COMMERCIAL

## 2022-05-25 DIAGNOSIS — F33.9 RECURRENT MAJOR DEPRESSIVE DISORDER, REMISSION STATUS UNSPECIFIED (H): Primary | ICD-10-CM

## 2022-05-25 DIAGNOSIS — M79.674 PAIN OF RIGHT GREAT TOE: ICD-10-CM

## 2022-05-25 DIAGNOSIS — I10 BENIGN ESSENTIAL HYPERTENSION: ICD-10-CM

## 2022-05-25 PROCEDURE — 96127 BRIEF EMOTIONAL/BEHAV ASSMT: CPT | Mod: 95 | Performed by: NURSE PRACTITIONER

## 2022-05-25 PROCEDURE — 99215 OFFICE O/P EST HI 40 MIN: CPT | Mod: TEL | Performed by: NURSE PRACTITIONER

## 2022-05-25 RX ORDER — VENLAFAXINE HYDROCHLORIDE 75 MG/1
75 CAPSULE, EXTENDED RELEASE ORAL DAILY
Qty: 90 CAPSULE | Refills: 1 | Status: SHIPPED | OUTPATIENT
Start: 2022-05-25 | End: 2022-11-28

## 2022-05-25 RX ORDER — HYDROCODONE BITARTRATE AND ACETAMINOPHEN 5; 325 MG/1; MG/1
1 TABLET ORAL DAILY PRN
Qty: 30 TABLET | Refills: 0 | Status: SHIPPED | OUTPATIENT
Start: 2022-05-25 | End: 2022-06-20

## 2022-05-25 RX ORDER — LOSARTAN POTASSIUM 100 MG/1
100 TABLET ORAL DAILY
Qty: 90 TABLET | Refills: 1 | Status: SHIPPED | OUTPATIENT
Start: 2022-05-25 | End: 2023-01-31

## 2022-05-25 ASSESSMENT — PATIENT HEALTH QUESTIONNAIRE - PHQ9
10. IF YOU CHECKED OFF ANY PROBLEMS, HOW DIFFICULT HAVE THESE PROBLEMS MADE IT FOR YOU TO DO YOUR WORK, TAKE CARE OF THINGS AT HOME, OR GET ALONG WITH OTHER PEOPLE: SOMEWHAT DIFFICULT
SUM OF ALL RESPONSES TO PHQ QUESTIONS 1-9: 7
SUM OF ALL RESPONSES TO PHQ QUESTIONS 1-9: 7

## 2022-05-25 NOTE — Clinical Note
Hey - apparently, the schedulers are scheduling my specialty MTM requests to our home MTM.  This patient has more drug-resistant depression so I would like her to go to you in psychiatry and this is her request as well.  Can you help me make sure she gets with one of you?  Thanks!  Mariya

## 2022-05-25 NOTE — PROGRESS NOTES
Kendy is a 51 year old who is being evaluated via a billable phone visit      How would you like to obtain your AVS? MyChart  If the video visit is dropped, the invitation should be resent by: Send to e-mail at: [email or web address]  [phone]   Will anyone else be joining your video visit? No  Phone time:  38 minutes    Assessment & Plan     Recurrent major depressive disorder, remission status unspecified (H)  Increase venlafaxine to 225 mg.  Also referring to psych MTM for med resistant depression.  Patient interested in possible newer medication options  - Med Therapy Management Referral  - venlafaxine (EFFEXOR XR) 75 MG 24 hr capsule; Take 1 capsule (75 mg) by mouth daily in combination with 150 mg for total of 225 mg    BMI 35.0-35.9,adult  Losing weight rapidly on liragutide injections, but experiencing significant nausea which is leading to dehydration and less urination.  Advised to go back down to 1.8 mg and only increase after three weeks.  Will message bariatric provider, as well    Benign essential hypertension  Consider decreasing to 50 mg with weight loss, but currently leave at 100 mg  - losartan (COZAAR) 100 MG tablet; Take 1 tablet (100 mg) by mouth daily    Pain of right great toe  Understandably hesitant about a third toe surgery and didn't get benefit from injection with ortho.  Looking forward to options discussed with pain clinic.  Continues to take hydrocodone-acetaminophen nightly without problem or escalation of dosing.  Refilled today and ok for refill ahead of physical that is scheduled in 7 weeks.  - HYDROcodone-acetaminophen (NORCO) 5-325 MG tablet; Take 1 tablet by mouth daily as needed for severe pain    Ordering of each unique test  Prescription drug management  45 minutes spent on the date of the encounter doing chart review, history and exam, documentation and further activities per the note     Patient Instructions   Drop the liraglutide back down to 1.8 mg daily  Increase  "after three weeks      Return in about 7 weeks (around 7/13/2022) for Physical Exam.    LEDY Seaman CNP  [facility] is a 51 year old who presents for the following health issues:     HPI     Has endoscopy for esophageal spasms coming up next week.  May need repeat of the Nissen     Happy with weight management overall.  No longer thinking about surgery.  Has been on liraglutide for a month and has lost 18 lbs.  Topiramate increased to 75 mg.  First couple days had flu-like symptoms so stopped shots for a couple days and then restarted.  Increased the dose weekly over the month and currently at 2.4 mg daily.  Still has a lot of nausea and not a lot of appetite.  Eating about half of what she normally eats.  Does think she is also getting dehydrated because difficult to drink water.  Has had decrease in urination.    Depression - mood is pretty much the same and hasn't noticed a different since the increase in venlafaxine to 187.5 mg.  Wondering about working with clinical pharmacist.  Has been on \"almost all\" the depression medications.    Blood pressure - out of clinic pressures have been good  BP Readings from Last 6 Encounters:   04/25/22 126/82   03/22/22 110/80   02/02/22 118/69   06/09/21 107/65   05/28/21 118/64   12/09/20 (!) 142/92     Alcohol - doing really well and has had no alcohol.  Has been able to be around alcohol without it being an emotional trigger. Hasn't felt she needs the disulfram any longer and hasn't been taking it.  Has been taking the folic acid.    Foot pain - has pain clinic appointment in July.  Cancelled appt with Dr. Arreola because feeling hesitant about surgery and knows that will be his recommendation.  Would like to try pain clinic first.  Does need refill of hydrocodone-acetaminophen.    Review of Systems   Constitutional, HEENT, cardiovascular, pulmonary, gi and gu systems are negative, except as otherwise noted.    "   Objective           Vitals:  No vitals were obtained today due to virtual visit.    Physical Exam   GENERAL: Healthy, alert and no distress  EYES: Eyes grossly normal to inspection.  No discharge or erythema, or obvious scleral/conjunctival abnormalities.  RESP: No audible wheeze, cough, or visible cyanosis.  No visible retractions or increased work of breathing.    SKIN: Visible skin clear. No significant rash, abnormal pigmentation or lesions.  NEURO: Cranial nerves grossly intact.  Mentation and speech appropriate for age.  PSYCH: Mentation appears normal, affect normal/bright, judgement and insight intact, normal speech and appearance well-groomed.      Video-Visit Details    Type of service:  Switched to phone due to technical difficulties    Distant Location (provider location):  [facility]

## 2022-05-25 NOTE — TELEPHONE ENCOUNTER
RN contacted pt to inquire if pre procedure COVID test has been scheduled.    COVID test: pt transferred to endo .    Yu Wilson RN

## 2022-05-28 ENCOUNTER — LAB (OUTPATIENT)
Dept: LAB | Facility: CLINIC | Age: 52
End: 2022-05-28
Payer: COMMERCIAL

## 2022-05-28 DIAGNOSIS — Z11.59 ENCOUNTER FOR SCREENING FOR OTHER VIRAL DISEASES: ICD-10-CM

## 2022-05-28 LAB — SARS-COV-2 RNA RESP QL NAA+PROBE: NEGATIVE

## 2022-05-28 PROCEDURE — U0005 INFEC AGEN DETEC AMPLI PROBE: HCPCS | Mod: 90 | Performed by: PATHOLOGY

## 2022-05-28 PROCEDURE — U0003 INFECTIOUS AGENT DETECTION BY NUCLEIC ACID (DNA OR RNA); SEVERE ACUTE RESPIRATORY SYNDROME CORONAVIRUS 2 (SARS-COV-2) (CORONAVIRUS DISEASE [COVID-19]), AMPLIFIED PROBE TECHNIQUE, MAKING USE OF HIGH THROUGHPUT TECHNOLOGIES AS DESCRIBED BY CMS-2020-01-R: HCPCS | Mod: 90 | Performed by: PATHOLOGY

## 2022-05-28 PROCEDURE — 99000 SPECIMEN HANDLING OFFICE-LAB: CPT | Performed by: PATHOLOGY

## 2022-06-01 ENCOUNTER — ANESTHESIA EVENT (OUTPATIENT)
Dept: SURGERY | Facility: AMBULATORY SURGERY CENTER | Age: 52
End: 2022-06-01
Payer: COMMERCIAL

## 2022-06-01 ENCOUNTER — HOSPITAL ENCOUNTER (OUTPATIENT)
Facility: AMBULATORY SURGERY CENTER | Age: 52
Discharge: HOME | End: 2022-06-01
Attending: SURGERY
Payer: COMMERCIAL

## 2022-06-01 ENCOUNTER — ANESTHESIA (OUTPATIENT)
Dept: SURGERY | Facility: AMBULATORY SURGERY CENTER | Age: 52
End: 2022-06-01
Payer: COMMERCIAL

## 2022-06-01 VITALS
BODY MASS INDEX: 31.21 KG/M2 | RESPIRATION RATE: 19 BRPM | TEMPERATURE: 96.9 F | DIASTOLIC BLOOD PRESSURE: 69 MMHG | SYSTOLIC BLOOD PRESSURE: 111 MMHG | WEIGHT: 218 LBS | HEART RATE: 81 BPM | OXYGEN SATURATION: 97 % | HEIGHT: 70 IN

## 2022-06-01 VITALS — HEART RATE: 88 BPM

## 2022-06-01 LAB
HCG UR QL: NEGATIVE
INTERNAL QC OK POCT: NORMAL
POCT KIT EXPIRATION DATE: NORMAL
POCT KIT LOT NUMBER: NORMAL
UPPER GI ENDOSCOPY: NORMAL

## 2022-06-01 PROCEDURE — 88305 TISSUE EXAM BY PATHOLOGIST: CPT | Mod: TC | Performed by: SURGERY

## 2022-06-01 PROCEDURE — 88305 TISSUE EXAM BY PATHOLOGIST: CPT | Mod: 26 | Performed by: PATHOLOGY

## 2022-06-01 PROCEDURE — 43239 EGD BIOPSY SINGLE/MULTIPLE: CPT

## 2022-06-01 PROCEDURE — 81025 URINE PREGNANCY TEST: CPT | Performed by: PATHOLOGY

## 2022-06-01 RX ORDER — NALOXONE HYDROCHLORIDE 0.4 MG/ML
0.2 INJECTION, SOLUTION INTRAMUSCULAR; INTRAVENOUS; SUBCUTANEOUS
Status: DISCONTINUED | OUTPATIENT
Start: 2022-06-01 | End: 2022-06-02 | Stop reason: HOSPADM

## 2022-06-01 RX ORDER — NALOXONE HYDROCHLORIDE 0.4 MG/ML
0.4 INJECTION, SOLUTION INTRAMUSCULAR; INTRAVENOUS; SUBCUTANEOUS
Status: DISCONTINUED | OUTPATIENT
Start: 2022-06-01 | End: 2022-06-02 | Stop reason: HOSPADM

## 2022-06-01 RX ORDER — SODIUM CHLORIDE, SODIUM LACTATE, POTASSIUM CHLORIDE, CALCIUM CHLORIDE 600; 310; 30; 20 MG/100ML; MG/100ML; MG/100ML; MG/100ML
INJECTION, SOLUTION INTRAVENOUS CONTINUOUS PRN
Status: DISCONTINUED | OUTPATIENT
Start: 2022-06-01 | End: 2022-06-01

## 2022-06-01 RX ORDER — PROPOFOL 10 MG/ML
INJECTION, EMULSION INTRAVENOUS PRN
Status: DISCONTINUED | OUTPATIENT
Start: 2022-06-01 | End: 2022-06-01

## 2022-06-01 RX ORDER — FLUMAZENIL 0.1 MG/ML
0.2 INJECTION, SOLUTION INTRAVENOUS
Status: DISCONTINUED | OUTPATIENT
Start: 2022-06-01 | End: 2022-06-02 | Stop reason: HOSPADM

## 2022-06-01 RX ORDER — ONDANSETRON 4 MG/1
4 TABLET, ORALLY DISINTEGRATING ORAL EVERY 6 HOURS PRN
Status: DISCONTINUED | OUTPATIENT
Start: 2022-06-01 | End: 2022-06-02 | Stop reason: HOSPADM

## 2022-06-01 RX ORDER — LIDOCAINE HYDROCHLORIDE 20 MG/ML
INJECTION, SOLUTION INFILTRATION; PERINEURAL PRN
Status: DISCONTINUED | OUTPATIENT
Start: 2022-06-01 | End: 2022-06-01

## 2022-06-01 RX ORDER — ONDANSETRON 2 MG/ML
4 INJECTION INTRAMUSCULAR; INTRAVENOUS EVERY 6 HOURS PRN
Status: DISCONTINUED | OUTPATIENT
Start: 2022-06-01 | End: 2022-06-02 | Stop reason: HOSPADM

## 2022-06-01 RX ORDER — PROCHLORPERAZINE MALEATE 10 MG
10 TABLET ORAL EVERY 6 HOURS PRN
Status: DISCONTINUED | OUTPATIENT
Start: 2022-06-01 | End: 2022-06-02 | Stop reason: HOSPADM

## 2022-06-01 RX ORDER — PROPOFOL 10 MG/ML
INJECTION, EMULSION INTRAVENOUS CONTINUOUS PRN
Status: DISCONTINUED | OUTPATIENT
Start: 2022-06-01 | End: 2022-06-01

## 2022-06-01 RX ADMIN — PROPOFOL 200 MCG/KG/MIN: 10 INJECTION, EMULSION INTRAVENOUS at 13:16

## 2022-06-01 RX ADMIN — LIDOCAINE HYDROCHLORIDE 50 MG: 20 INJECTION, SOLUTION INFILTRATION; PERINEURAL at 13:16

## 2022-06-01 RX ADMIN — PROPOFOL 20 MG: 10 INJECTION, EMULSION INTRAVENOUS at 13:19

## 2022-06-01 RX ADMIN — PROPOFOL 80 MG: 10 INJECTION, EMULSION INTRAVENOUS at 13:16

## 2022-06-01 RX ADMIN — SODIUM CHLORIDE, SODIUM LACTATE, POTASSIUM CHLORIDE, CALCIUM CHLORIDE: 600; 310; 30; 20 INJECTION, SOLUTION INTRAVENOUS at 13:09

## 2022-06-01 ASSESSMENT — LIFESTYLE VARIABLES: TOBACCO_USE: 1

## 2022-06-01 NOTE — ANESTHESIA POSTPROCEDURE EVALUATION
Patient: Sharmin Nieves    Procedure: Procedure(s):  ESOPHAGOGASTRODUODENOSCOPY, WITH BIOPSY       Anesthesia Type:  MAC    Note:  Disposition: Outpatient   Postop Pain Control: Uneventful            Sign Out: Well controlled pain   PONV: No   Neuro/Psych: Uneventful            Sign Out: Acceptable/Baseline neuro status   Airway/Respiratory: Uneventful            Sign Out: Acceptable/Baseline resp. status   CV/Hemodynamics: Uneventful            Sign Out: Acceptable CV status; No obvious hypovolemia; No obvious fluid overload   Other NRE: NONE   DID A NON-ROUTINE EVENT OCCUR? No           Last vitals:  Vitals Value Taken Time   /69 06/01/22 1346   Temp 36.1  C (96.9  F) 06/01/22 1346   Pulse 81 06/01/22 1346   Resp 19 06/01/22 1346   SpO2 97 % 06/01/22 1346       Electronically Signed By: ALONA SERRANO MD  June 1, 2022  2:02 PM

## 2022-06-01 NOTE — ANESTHESIA CARE TRANSFER NOTE
Patient: Sharmin Nieves    Procedure: Procedure(s):  ESOPHAGOGASTRODUODENOSCOPY, WITH BIOPSY       Diagnosis: Gastroesophageal reflux disease, unspecified whether esophagitis present [K21.9]  Diagnosis Additional Information: No value filed.    Anesthesia Type:   MAC     Note:    Oropharynx: oropharynx clear of all foreign objects  Level of Consciousness: awake  Oxygen Supplementation: room air    Independent Airway: airway patency satisfactory and stable  Dentition: dentition unchanged  Vital Signs Stable: post-procedure vital signs reviewed and stable  Report to RN Given: handoff report given  Patient transferred to: Phase II  Comments: Vital signs per nursing documentation.     Handoff Report: Identifed the Patient, Identified the Reponsible Provider, Reviewed the pertinent medical history, Discussed the surgical course, Reviewed Intra-OP anesthesia mangement and issues during anesthesia, Set expectations for post-procedure period and Allowed opportunity for questions and acknowledgement of understanding      Vitals:  Vitals Value Taken Time   BP     Temp     Pulse     Resp 14    SpO2 96%        Electronically Signed By: LEDY Elmore CRNA  June 1, 2022  1:35 PM

## 2022-06-01 NOTE — ANESTHESIA PREPROCEDURE EVALUATION
Anesthesia Pre-Procedure Evaluation    Patient: Sharmin Nieves   MRN: [id number] : 1970        Procedure : Procedure(s):  ESOPHAGOGASTRODUODENOSCOPY (EGD)          Past Medical History:   Diagnosis Date     Acne      Anxiety      Anxiety state, unspecified     Anxiety     Benign essential hypertension 2018     Chronic low back pain 2010     Degenerative joint disease of foot 5/3/2021     Depression     Dr. Gaytan     Diaphragmatic hernia without mention of obstruction or gangrene      Esophageal reflux     with associated esophageal spasm     ETOH abuse      Foot pain      Herpes simplex without mention of complication      History of foot surgery 2021     Moderate major depression (H) 4/15/2011     Rectal pain 2011     Right hip pain 2010     Seasonal affective disorder (H)      STD (sexually transmitted disease)      Surveillance of previously prescribed intrauterine contraceptive device 10/03/05    mirena IUD      Past Surgical History:   Procedure Laterality Date     ARTHRODESIS FOOT Right 2021    Procedure: Right 1st metatarsophalangeal joint fuison;  Surgeon: Conor Guillen MD;  Location: [facility] OR     CHEILECTOMY Right 2017    Procedure: CHEILECTOMY;  RIGHT FOOT CHEILECTOMY;  Surgeon: Mo Edgar DPM;  Location:  OR     SURGICAL HISTORY OF -       Lapr Nissen fundoplication     TONSILLECTOMY & ADENOIDECTOMY       [facility] NONSPECIFIC PROCEDURE      [facility] SURGERY for abnl paps     [facility] STOMACH SURGERY PROCEDURE UNLISTED      Nissen      Allergies   Allergen Reactions     Lisinopril Cough     cough      Social History     Tobacco Use     Smoking status: Current Every Day Smoker     Packs/day: 0.50     Years: 15.00     Pack years: 7.50     Types: Cigarettes     Smokeless tobacco: Never Used   Substance Use Topics     Alcohol use: Yes     Comment: addict in recovery, current drinking      Wt Readings from Last 1 Encounters:   22 98.9 kg  (218 lb)        Anesthesia Evaluation   Pt has not had prior anesthetic         ROS/MED HX  ENT/Pulmonary:     (+) tobacco use,  (-) sleep apnea   Neurologic: Comment: Memory loss, likely side effect of medication/  - neg neurologic ROS     Cardiovascular:     (+) hypertension-----    METS/Exercise Tolerance:     Hematologic:  - neg hematologic  ROS     Musculoskeletal:  - neg musculoskeletal ROS     GI/Hepatic:     (+) GERD, Asymptomatic on medication,     Renal/Genitourinary:  - neg Renal ROS     Endo:  - neg endo ROS   (+) Obesity,     Psychiatric/Substance Use: Comment: Alcohol abuse in remission   - neg psychiatric ROS     Infectious Disease:  - neg infectious disease ROS     Malignancy:  - neg malignancy ROS     Other:               OUTSIDE LABS:  CBC:   Lab Results   Component Value Date    WBC 6.9 05/28/2021    WBC 5.7 02/22/2019    HGB 14.1 02/02/2022    HGB 13.2 05/28/2021    HCT 38.0 05/28/2021    HCT 40.6 02/22/2019     05/28/2021     02/22/2019     BMP:   Lab Results   Component Value Date     02/02/2022     05/28/2021    POTASSIUM 4.5 02/02/2022    POTASSIUM 4.2 05/28/2021    CHLORIDE 107 02/02/2022    CHLORIDE 104 05/28/2021    CO2 27 02/02/2022    CO2 26 05/28/2021    BUN 16 02/02/2022    BUN 20 05/28/2021    CR 0.82 02/02/2022    CR 0.74 05/28/2021    GLC 90 02/02/2022    GLC 89 05/28/2021     COAGS:   Lab Results   Component Value Date    PTT 30 06/28/2008    INR 1.02 06/28/2008     POC:   Lab Results   Component Value Date    HCG Negative 06/01/2022    HCGS Negative 12/21/2017     HEPATIC:   Lab Results   Component Value Date    ALBUMIN 3.6 02/02/2022    PROTTOTAL 6.8 02/02/2022    ALT 31 02/02/2022    AST 15 02/02/2022    GGT 30 04/11/2009    ALKPHOS 78 02/02/2022    BILITOTAL 0.2 02/02/2022     OTHER:   Lab Results   Component Value Date    A1C 4.6 09/03/2015    GONZALO 8.4 (L) 02/02/2022    PHOS 4.2 06/28/2008    MAG 1.9 08/14/2008    LIPASE 29 03/01/2004    AMYLASE 34  03/01/2004    TSH 1.47 02/02/2022    T4 0.96 07/20/2010    CRP <5.0 07/20/2010    SED 10 07/20/2010       Anesthesia Plan    ASA Status:  2   NPO Status:  NPO Appropriate    Anesthesia Type: MAC.     - Reason for MAC: straight local not clinically adequate, immobility needed   Induction: Intravenous.   Maintenance: TIVA.        Consents    Anesthesia Plan(s) and associated risks, benefits, and realistic alternatives discussed. Questions answered and patient/representative(s) expressed understanding.     - Discussed: Risks, Benefits and Alternatives for BOTH SEDATION and the PROCEDURE were discussed     - Discussed with:  Patient         Postoperative Care    Pain management: Multi-modal analgesia.   PONV prophylaxis: Background Propofol Infusion     Comments:                ALONA SERRANO MD

## 2022-06-02 LAB
PATH REPORT.COMMENTS IMP SPEC: NORMAL
PATH REPORT.COMMENTS IMP SPEC: NORMAL
PATH REPORT.FINAL DX SPEC: NORMAL
PATH REPORT.GROSS SPEC: NORMAL
PATH REPORT.MICROSCOPIC SPEC OTHER STN: NORMAL
PATH REPORT.RELEVANT HX SPEC: NORMAL
PHOTO IMAGE: NORMAL

## 2022-06-03 ENCOUNTER — MYC MEDICAL ADVICE (OUTPATIENT)
Dept: ENDOCRINOLOGY | Facility: CLINIC | Age: 52
End: 2022-06-03
Payer: COMMERCIAL

## 2022-06-03 DIAGNOSIS — E66.812 CLASS 2 OBESITY DUE TO EXCESS CALORIES WITHOUT SERIOUS COMORBIDITY WITH BODY MASS INDEX (BMI) OF 35.0 TO 35.9 IN ADULT: Primary | ICD-10-CM

## 2022-06-03 DIAGNOSIS — E66.09 CLASS 2 OBESITY DUE TO EXCESS CALORIES WITHOUT SERIOUS COMORBIDITY WITH BODY MASS INDEX (BMI) OF 35.0 TO 35.9 IN ADULT: Primary | ICD-10-CM

## 2022-06-06 ENCOUNTER — TELEPHONE (OUTPATIENT)
Dept: GASTROENTEROLOGY | Facility: CLINIC | Age: 52
End: 2022-06-06
Payer: COMMERCIAL

## 2022-06-06 DIAGNOSIS — E66.812 CLASS 2 OBESITY DUE TO EXCESS CALORIES WITHOUT SERIOUS COMORBIDITY WITH BODY MASS INDEX (BMI) OF 35.0 TO 35.9 IN ADULT: ICD-10-CM

## 2022-06-06 DIAGNOSIS — E66.09 CLASS 2 OBESITY DUE TO EXCESS CALORIES WITHOUT SERIOUS COMORBIDITY WITH BODY MASS INDEX (BMI) OF 35.0 TO 35.9 IN ADULT: ICD-10-CM

## 2022-06-06 RX ORDER — TOPIRAMATE 25 MG/1
75 TABLET, FILM COATED ORAL DAILY
Qty: 90 TABLET | Refills: 3 | Status: SHIPPED | OUTPATIENT
Start: 2022-06-06 | End: 2022-06-06

## 2022-06-06 RX ORDER — TOPIRAMATE 25 MG/1
75 TABLET, FILM COATED ORAL DAILY
Qty: 90 TABLET | Refills: 3 | Status: SHIPPED | OUTPATIENT
Start: 2022-06-06 | End: 2022-10-11

## 2022-06-06 NOTE — TELEPHONE ENCOUNTER
[facility] Call Center    Phone Message    May a detailed message be left on voicemail: yes     Reason for Call: Other: Pt did state that she was dealing with a 20lbs weight loss in the last 2 months (but did state she is actively working with our weight loss clinic) please review per protocols.      Action Taken: Message routed to:  [facility] ([facility]): Clinic coordinators.     Travel Screening: Not Applicable

## 2022-06-08 NOTE — TELEPHONE ENCOUNTER
Patient chart will be reviewed by GI clinic management.  Appointment urgency will be determined based on chart review.  Patient will be contacted if an appointment change is necessary after chart review.

## 2022-06-20 ENCOUNTER — MYC REFILL (OUTPATIENT)
Dept: FAMILY MEDICINE | Facility: CLINIC | Age: 52
End: 2022-06-20
Payer: COMMERCIAL

## 2022-06-20 DIAGNOSIS — M79.674 PAIN OF RIGHT GREAT TOE: ICD-10-CM

## 2022-06-21 RX ORDER — HYDROCODONE BITARTRATE AND ACETAMINOPHEN 5; 325 MG/1; MG/1
1 TABLET ORAL DAILY PRN
Qty: 30 TABLET | Refills: 0 | Status: SHIPPED | OUTPATIENT
Start: 2022-06-21 | End: 2022-07-12

## 2022-06-21 NOTE — TELEPHONE ENCOUNTER
Routing refill request to provider for review/approval because:  Drug not on the [facility] refill protocol   Please authorize if appropriate.  Thanks,  Pily Cook RN

## 2022-06-24 ENCOUNTER — CARE COORDINATION (OUTPATIENT)
Dept: ENDOCRINOLOGY | Facility: CLINIC | Age: 52
End: 2022-06-24

## 2022-06-24 NOTE — PROGRESS NOTES
"Tasklist updated and sent to patient via Share[employer].    Bariatric Task List  Fax:  Please fax all paperwork to: [phone] -     Status:  Is patient a candidate for bariatric surgery?:  patient is a candidate for bariatric surgery -     Cleared to schedule surgeon consult?:  cleared to schedule surgeon consult -     Status:  surgery evaluation in process -     Surgeon: Purvi -     Tentative surgery month/year: To be determined -        Insurance: Insurance:  Preferred One -     Other:  BMI<40. Has HTN on meds, has osteoarthritis, see MRI's from 4/17/10. -        Patient Info: Initial Weight:  236 -     Date of Initial Weight/Height:  4/25/2022 -     Goal Weight (lbs):  226 -     Required Weight Loss:  10 -     Surgery Type:  sleeve gastrectomy - To discuss with Dr Loja. Has 2003 Nissen.      Dietician Visits: Structured weight loss required by insurance?:  structured weight loss required - Need a minimum of 3 per our program. bks   Dietician Visit 1:  Needed -     Dietician Visit 2:  Needed -     Dietician Visit 3:  Needed -     Dietician Visit 4:    -     Dietician Visit 5:    -     Dietician Visit 6:    -     Dietician Visit additional:  Needed - Monthly until surgery for weight loss and postop diet teaching. bks      Psychological Evaluation: Psych eval:  Needed -        Lab Work: Complete Blood Count:  Needed -     Comprehensive Metabolic Panel:  Completed - 2/2/22. bks   Vitamin D:  Needed -     PTH:  Needed -     Hgb A1c:  Needed -      Lipids: Needed -      TSH ([facility], [facility], [place] [facility]): Completed - 2/2/22 bks      Testing: EGD:  Completed - 6/1/22 in Epic. bks      PCP: Follow up with PCP:    -     PCP letter of support:  Needed -        Stopping Smoking/ Alcohol Use: Quit tobacco use (3 months smoke free)?:  Needed -     Quit date:    - Let us know your quit date. bks   Quit alcohol use: Completed - 1 year ago. bks      Patient Education:  Information Session:  Needed -     Given \"Making your decision\" " "handout?:  Yes -     Given \"A Roadmap to you Weight Loss Surgery\" handout?: Yes -     Given \"Get Well Loop\" information?: Yes -     Given support group information?:    -  Yes   Attended support group?:    -     Support plan in place?:  Needed -        Additional Surgery Requirements: Other: Covid test 4 days before surgery. bks -     Other: Contact Center to schedule the 1 week and 31+ days postop appts. bks -        Final Tasks:  Before surgery online class:  Needed -     Before surgery online class website link:  [email or web address]   After surgery online class:  Needed -     After surgery online class website link:  [email or web address]   Nurse visit per clinic:  Needed -     History and Physical per clinic:   -  PreAssessment Clinic   Final labs per clinic: Needed -        Notes: Please register for the Sleeve Gastrectomy Get Well Loop when you get an email invitation after you get a surgery date.   The Get Well Loop will give you information via email or text messages that can help you be more successful before and after surgery for up to one year after surgery.  It can also help answer any questions you may have.   Get Well Loop Handout - [email or web address]     -            "

## 2022-07-06 ENCOUNTER — OFFICE VISIT (OUTPATIENT)
Dept: ORTHOPEDICS | Facility: CLINIC | Age: 52
End: 2022-07-06
Payer: COMMERCIAL

## 2022-07-06 DIAGNOSIS — M79.671 RIGHT FOOT PAIN: Primary | ICD-10-CM

## 2022-07-06 DIAGNOSIS — F41.1 GENERALIZED ANXIETY DISORDER: ICD-10-CM

## 2022-07-06 PROCEDURE — 99215 OFFICE O/P EST HI 40 MIN: CPT | Performed by: PODIATRIST

## 2022-07-06 RX ORDER — LAMOTRIGINE 200 MG/1
200 TABLET ORAL DAILY
Qty: 90 TABLET | Refills: 1 | Status: SHIPPED | OUTPATIENT
Start: 2022-07-06 | End: 2022-12-06

## 2022-07-06 NOTE — TELEPHONE ENCOUNTER
"Requested Prescriptions   Pending Prescriptions Disp Refills     lamoTRIgine (LAMICTAL) 200 MG tablet 90 tablet 1     Sig: Take 1 tablet (200 mg) by mouth daily       Anti-Seizure Meds Protocol  Failed - 7/6/2022  7:25 AM        Failed - Review Authorizing provider's last note.      Refer to last progress notes: confirm request is for original authorizing provider (cannot be through other providers).          Passed - Recent (12 mo) or future (30 days) visit within the authorizing provider's specialty     Patient has had an office visit with the authorizing provider or a provider within the authorizing providers department within the previous 12 mos or has a future within next 30 days. See \"Patient Info\" tab in inbasket, or \"Choose Columns\" in Meds & Orders section of the refill encounter.              Passed - Normal CBC on file in past 26 months     Recent Labs   Lab Test 02/02/22  1646 05/28/21  1407   WBC  --  6.9   RBC  --  3.97   HGB 14.1 13.2   HCT  --  38.0   PLT  --  251                 Passed - Normal ALT or AST on file in past 26 months     Recent Labs   Lab Test 02/02/22  1646   ALT 31     Recent Labs   Lab Test 02/02/22  1646   AST 15             Passed - Normal platelet count on file in past 26 months     Recent Labs   Lab Test 05/28/21  1407                  Passed - Medication is active on med list        Passed - No active pregnancy on record        Passed - No positive pregnancy test in last 12 months           Kristie MENESES RN    "

## 2022-07-06 NOTE — LETTER
7/6/2022         RE: Sharmin Nieves  [address]  [facility] [address]        Dear Colleague,    Thank you for referring your patient, Sharmin Nieves, to the [facility] [place]. Please see a copy of my visit note below.    Chief Complaint:   Chief Complaint   Patient presents with     Follow Up     Right foot.           Allergies   Allergen Reactions     Lisinopril Cough     cough         Subjective: Sharmin is a 52 year old female who presents to the clinic today for a follow up of right foot pain.  I last saw her couple of years ago.  She has had a first MTPJ arthrodesis since then.  She relates that about 6 weeks ago, she started pain in the midfoot of the right foot.  This is not around her surgical site.  She relates the pain is quite sharp.  It hurts when she is walking.  Rest will sometimes alleviate, however she can happen when she rests.  She has taken NSAIDs for this, ever she wants to decrease the amount of NSAID she is on because she has some esophagitis from this.    Objective  Data Unavailable Data Unavailable Data Unavailable Data Unavailable Data Unavailable 0 lbs 0 oz  DP and PT pulses are 2/4.  Capillary fill time is instant.  Positive pedal hair.  Gross sensation is intact.  Pain is noted with palpation of the base of the fourth metatarsal and its articulation with the cuboid.  Some pain noted along the shafts of the second, third, and fourth metatarsals.  Pain noted the base of these metatarsals as well.  Pain noted with eversion of the midtarsal joint.  Less pain with inversion.  Mental muscle testing is 5/5.    right foot xrays indicated in 3 weightbearing views.    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  Hardware fracture noted.  No pathology noted around the midtarsal joint, except some TN joint arthritis      Assessment: Kendy is a 52-year-old with pain in the right foot.  It is an unknown etiology.  I recommended that she get an MRI of the area.  She does agree to  this.    Plan:   - Pt seen and evaluated  -X-rays taken and discussed with her.  -MRI was ordered.  We will hold treatment until we get more information on this.  - Pt to return to clinic status post MRI.  -Consider bone stimulator after MRI for the pseudoarthrosis of the first MTPJ.  On the date of service, sent 40 minutes with patient care, documentation, imagery, chart review, care coordination       Brian Gibson DPM

## 2022-07-06 NOTE — PROGRESS NOTES
Chief Complaint:   Chief Complaint   Patient presents with     Follow Up     Right foot.           Allergies   Allergen Reactions     Lisinopril Cough     cough         Subjective: Sharmin is a 52 year old female who presents to the clinic today for a follow up of right foot pain.  I last saw her couple of years ago.  She has had a first MTPJ arthrodesis since then.  She relates that about 6 weeks ago, she started pain in the midfoot of the right foot.  This is not around her surgical site.  She relates the pain is quite sharp.  It hurts when she is walking.  Rest will sometimes alleviate, however she can happen when she rests.  She has taken NSAIDs for this, ever she wants to decrease the amount of NSAID she is on because she has some esophagitis from this.    Objective  Data Unavailable Data Unavailable Data Unavailable Data Unavailable Data Unavailable 0 lbs 0 oz  DP and PT pulses are 2/4.  Capillary fill time is instant.  Positive pedal hair.  Gross sensation is intact.  Pain is noted with palpation of the base of the fourth metatarsal and its articulation with the cuboid.  Some pain noted along the shafts of the second, third, and fourth metatarsals.  Pain noted the base of these metatarsals as well.  Pain noted with eversion of the midtarsal joint.  Less pain with inversion.  Mental muscle testing is 5/5.    right foot xrays indicated in 3 weightbearing views.    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  Hardware fracture noted.  No pathology noted around the midtarsal joint, except some TN joint arthritis      Assessment: Kendy is a 52-year-old with pain in the right foot.  It is an unknown etiology.  I recommended that she get an MRI of the area.  She does agree to this.    Plan:   - Pt seen and evaluated  -X-rays taken and discussed with her.  -MRI was ordered.  We will hold treatment until we get more information on this.  - Pt to return to clinic status post MRI.  -Consider bone stimulator after MRI for the  pseudoarthrosis of the first MTPJ.  On the date of service, sent 40 minutes with patient care, documentation, imagery, chart review, care coordination

## 2022-07-08 ENCOUNTER — HOSPITAL ENCOUNTER (OUTPATIENT)
Dept: MRI IMAGING | Facility: CLINIC | Age: 52
Discharge: HOME | End: 2022-07-08
Attending: PODIATRIST | Admitting: PODIATRIST
Payer: COMMERCIAL

## 2022-07-08 DIAGNOSIS — M79.671 RIGHT FOOT PAIN: ICD-10-CM

## 2022-07-08 PROCEDURE — 73718 MRI LOWER EXTREMITY W/O DYE: CPT | Mod: 26 | Performed by: RADIOLOGY

## 2022-07-08 PROCEDURE — 73718 MRI LOWER EXTREMITY W/O DYE: CPT | Mod: RT

## 2022-07-11 ENCOUNTER — OFFICE VISIT (OUTPATIENT)
Dept: ORTHOPEDICS | Facility: CLINIC | Age: 52
End: 2022-07-11
Payer: COMMERCIAL

## 2022-07-11 DIAGNOSIS — M79.671 RIGHT FOOT PAIN: Primary | ICD-10-CM

## 2022-07-11 PROCEDURE — 99207 PR NO CHARGE NURSE ONLY: CPT

## 2022-07-11 NOTE — PROGRESS NOTES
Patient spoke with Dr. Gibson today and reviewed her MRI.  He instructed her to be fit with a short cam boot and to wear this until a follow up in 5 weeks.  She stated that she will MyChart in to make the appointment.  She had no other questions.

## 2022-07-12 ENCOUNTER — OFFICE VISIT (OUTPATIENT)
Dept: FAMILY MEDICINE | Facility: CLINIC | Age: 52
End: 2022-07-12
Payer: COMMERCIAL

## 2022-07-12 VITALS
RESPIRATION RATE: 16 BRPM | SYSTOLIC BLOOD PRESSURE: 122 MMHG | HEART RATE: 90 BPM | DIASTOLIC BLOOD PRESSURE: 62 MMHG | BODY MASS INDEX: 23.29 KG/M2 | TEMPERATURE: 97.8 F | OXYGEN SATURATION: 97 % | WEIGHT: 160 LBS

## 2022-07-12 DIAGNOSIS — Z98.890 HISTORY OF FOOT SURGERY: ICD-10-CM

## 2022-07-12 DIAGNOSIS — S92.334A CLOSED NONDISPLACED FRACTURE OF THIRD METATARSAL BONE OF RIGHT FOOT, INITIAL ENCOUNTER: ICD-10-CM

## 2022-07-12 DIAGNOSIS — K20.0 EOSINOPHILIC ESOPHAGITIS: Primary | ICD-10-CM

## 2022-07-12 DIAGNOSIS — K22.4 ESOPHAGEAL SPASM: ICD-10-CM

## 2022-07-12 DIAGNOSIS — K22.10 ESOPHAGEAL EROSIONS: ICD-10-CM

## 2022-07-12 DIAGNOSIS — M79.674 PAIN OF RIGHT GREAT TOE: ICD-10-CM

## 2022-07-12 DIAGNOSIS — M19.071 PRIMARY OSTEOARTHRITIS OF RIGHT FOOT: ICD-10-CM

## 2022-07-12 DIAGNOSIS — Z97.5 IUD (INTRAUTERINE DEVICE) IN PLACE: ICD-10-CM

## 2022-07-12 PROBLEM — F10.229 ALCOHOL DEPENDENCE WITH ACUTE ALCOHOLIC INTOXICATION (H): Status: ACTIVE | Noted: 2019-04-12

## 2022-07-12 PROBLEM — F10.930 ALCOHOL WITHDRAWAL SYNDROME WITHOUT COMPLICATION (H): Status: ACTIVE | Noted: 2021-02-17

## 2022-07-12 PROCEDURE — 99215 OFFICE O/P EST HI 40 MIN: CPT | Performed by: NURSE PRACTITIONER

## 2022-07-12 RX ORDER — CALCIUM CARBONATE 500(1250)
TABLET ORAL
COMMUNITY
End: 2023-01-31

## 2022-07-12 RX ORDER — VENLAFAXINE 37.5 MG/1
37.5 TABLET ORAL
COMMUNITY
End: 2022-07-12

## 2022-07-12 RX ORDER — SUCRALFATE 1 G/1
1 TABLET ORAL 4 TIMES DAILY
Qty: 360 TABLET | Refills: 3 | Status: SHIPPED | OUTPATIENT
Start: 2022-07-12 | End: 2022-10-11

## 2022-07-12 RX ORDER — HYDROCODONE BITARTRATE AND ACETAMINOPHEN 5; 325 MG/1; MG/1
1 TABLET ORAL 2 TIMES DAILY PRN
Qty: 60 TABLET | Refills: 0 | Status: SHIPPED | OUTPATIENT
Start: 2022-07-12 | End: 2022-08-08

## 2022-07-12 RX ORDER — MISOPROSTOL 200 UG/1
200 TABLET ORAL ONCE
Qty: 1 TABLET | Refills: 0 | Status: SHIPPED | OUTPATIENT
Start: 2022-07-12 | End: 2022-07-12

## 2022-07-12 ASSESSMENT — ENCOUNTER SYMPTOMS
HEADACHES: 0
HEMATURIA: 0
CONSTIPATION: 0
ABDOMINAL PAIN: 0
PARESTHESIAS: 0
DIZZINESS: 0
BREAST MASS: 0
SHORTNESS OF BREATH: 0
NERVOUS/ANXIOUS: 1
NAUSEA: 0
JOINT SWELLING: 0
ARTHRALGIAS: 1
HEMATOCHEZIA: 0
MYALGIAS: 0
FEVER: 0
SORE THROAT: 0
DYSURIA: 0
FREQUENCY: 0
CHILLS: 0
COUGH: 0
HEARTBURN: 1
PALPITATIONS: 0
WEAKNESS: 0
EYE PAIN: 0
DIARRHEA: 0

## 2022-07-12 ASSESSMENT — PATIENT HEALTH QUESTIONNAIRE - PHQ9
10. IF YOU CHECKED OFF ANY PROBLEMS, HOW DIFFICULT HAVE THESE PROBLEMS MADE IT FOR YOU TO DO YOUR WORK, TAKE CARE OF THINGS AT HOME, OR GET ALONG WITH OTHER PEOPLE: SOMEWHAT DIFFICULT
SUM OF ALL RESPONSES TO PHQ QUESTIONS 1-9: 11
SUM OF ALL RESPONSES TO PHQ QUESTIONS 1-9: 11

## 2022-07-12 NOTE — PROGRESS NOTES
SUBJECTIVE:   CC: Sharmin Nieves is an 52 year old woman who presents for     Right toe - planning to do surgery and seeing Dr. Arreola at [facility] in two days. Cancelled pain clinic appt    Right stress fracture - noted 7/8 on imaging    Weight management - has lost 30 lbs in past three months on liraglutide. No longer having n/v with liraglutide    GI - has been having esophageal spasms, had EGD showing hyperplasia and eosinophils. Has had Nissen 2003-4.  Had been taking daily NSAIDs for at least the past two years.  Stopped NSAIDs 4/2022. Has been taking omeprazole for years and we increased to 40 mg 8/2020.    Mental health - taking effexor 262.5 mg. Appt for psychiatry [facility] was cancelled on provider end.    Smoking 1/2 ppd - has varencicline on hand for pre-op    Today's PHQ-2 Score:   PHQ-2 ( 1999 Pfizer) 7/12/2022   Q1: Little interest or pleasure in doing things 2   Q2: Feeling down, depressed or hopeless 2   PHQ-2 Score 4   PHQ-2 Total Score (12-17 Years)- Positive if 3 or more points; Administer PHQ-A if positive -   Q1: Little interest or pleasure in doing things More than half the days   Q2: Feeling down, depressed or hopeless More than half the days   PHQ-2 Score 4       Abuse: Current or Past (Physical, Sexual or Emotional) - NO  Do you feel safe in your environment? Yes    Have you ever done Advance Care Planning? (For example, a Health Directive, POLST, or a discussion with a medical provider or your loved ones about your wishes): No, advance care planning information given to patient to review.  Patient declined advance care planning discussion at this time.    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 No    Alcohol Use 7/12/2022   Prescreen: >3 drinks/day or >7 drinks/week? Not Applicable   Prescreen: >3 drinks/day or >7 drinks/week? -   No flowsheet data found.    Reviewed orders with patient.  Reviewed health maintenance and updated orders accordingly - Yes  Lab work is in process  Labs reviewed in EPIC    Breast Cancer Screening:    Breast CA Risk Assessment (FHS-7) 2/2/2022   Do you have a family history of breast, colon, or ovarian cancer? No / Unknown       History of abnormal Pap smear: NO - age 30-65 PAP every 5 years with negative HPV co-testing recommended  PAP / HPV Latest Ref Rng & Units 10/4/2017 1/13/2014 7/26/2010   PAP (Historical) - NIL NIL NIL   HPV16 NEG:Negative Negative - -   HPV18 NEG:Negative Negative - -   HRHPV NEG:Negative Negative - -     Reviewed and updated as needed this visit by clinical staff   Tobacco  Allergies  Meds   Med Hx  Surg Hx  Fam Hx  Soc Hx          Reviewed and updated as needed this visit by Provider                     Healthy Habits:     Getting at least 3 servings of Calcium per day:  Yes    Bi-annual eye exam:  Yes    Dental care twice a year:  NO    Sleep apnea or symptoms of sleep apnea:  None    Diet:  Regular (no restrictions)    Frequency of exercise:  None    Taking medications regularly:  Yes    Medication side effects:  Other    PHQ-2 Total Score: 4    Additional concerns today:  Yes        Review of Systems   Constitutional: Negative for chills and fever.   HENT: Negative for congestion, ear pain, hearing loss and sore throat.    Eyes: Negative for pain and visual disturbance.   Respiratory: Negative for cough and shortness of breath.    Cardiovascular: Positive for chest pain. Negative for palpitations and peripheral edema.   Gastrointestinal: Positive for heartburn. Negative for abdominal pain, constipation, diarrhea, hematochezia and nausea.   Breasts:  Negative for tenderness, breast mass and discharge.    Genitourinary: Negative for dysuria, frequency, genital sores, hematuria, pelvic pain, urgency, vaginal bleeding and vaginal discharge.   Musculoskeletal: Positive for arthralgias. Negative for joint swelling and myalgias.   Skin: Negative for rash.   Neurological: Negative for dizziness, weakness, headaches and paresthesias.   Psychiatric/Behavioral: Negative for mood changes. The patient is nervous/anxious.         OBJECTIVE:   /62   Pulse 90   Temp 97.8  F (36.6  C) (Tympanic)   Resp 16   Wt 72.6 kg (160 lb)   SpO2 97%   BMI 23.29 kg/m    Physical Exam  GENERAL: healthy, alert and no distress  PSYCH: mentation appears normal, affect normal/bright    Diagnostic Test Results:  Labs reviewed in Epic  none     ASSESSMENT/PLAN:   Initial plan for today was preventative visit with IUD change out. Patient can no longer take NSAIDs and so is unable to take this for pre-med for IUD. Additionally, has new stress fracture of the right foot in which she also has continued post-surgical pain from CMC joint revision. Ran out of vicodin a couple days ago. She is not sexually active or having significant bleeding so while IUD is seven years as of this week, we can defer the change out. I've prescribed misprostol for cervical softening and I'll have the patient take the hydrocodone-acetaminophen as pre-med for the procedure when we reschedule.    (K20.0) Eosinophilic esophagitis  (primary encounter diagnosis)  Comment:   Plan: Adult GI  Referral - Consult Only,         sucralfate (CARAFATE) 1 GM tablet    Cannot wait until Dec for consult.  Referred with new findings of hyperplasia and possible EOE. Has already stopped NSAIDs and is taking omeprazole 40 mg daily. We are adding sucralafate as well.    (K22.10) Esophageal erosions  Comment:   Plan: Adult GI  Referral - Consult Only,         sucralfate (CARAFATE) 1 GM tablet            (Z97.5) IUD (intrauterine device) in place  Comment:   Plan:  misoprostol (CYTOTEC) 200 MCG tablet            (K22.4) Esophageal spasm  Comment:   Plan: sucralfate (CARAFATE) 1 GM tablet            (Z98.890) History of foot surgery  Comment:   Plan: HYDROcodone-acetaminophen (NORCO) 5-325 MG         tablet        (S92.334A) Closed nondisplaced fracture of third metatarsal bone of right foot, initial encounter  Comment:   Plan: HYDROcodone-acetaminophen (NORCO) 5-325 MG         tablet    Increasing hydrocodone in light of new fracture, continued post-surgical pain AND need to discontinue NSAIDS. I do encourage patient to schedule with pain management for 6 weeks out from anticipated surgery with [facility]    (M19.071) Primary osteoarthritis of right foot  Comment:   Plan: HYDROcodone-acetaminophen (NORCO) 5-325 MG         tablet            (M79.674) Pain of right great toe  Comment:   Plan: HYDROcodone-acetaminophen (NORCO) 5-325 MG         tablet            She reports that she has been smoking cigarettes. She has a 7.50 pack-year smoking history. She has never used smokeless tobacco.  Tobacco Cessation Action Plan:   Pharmacotherapies : Chantix      Counseling Resources:  ATP IV Guidelines  Pooled Cohorts Equation Calculator  Breast Cancer Risk Calculator  BRCA-Related Cancer Risk Assessment: FHS-7 Tool  FRAX Risk Assessment  ICSI Preventive Guidelines  Dietary Guidelines for Americans, 2010  USDA's MyPlate  ASA Prophylaxis  Lung CA Screening    LEDY Seaman CNP  [facility]  Answers for HPI/ROS submitted by the patient on 7/12/2022  If you checked off any problems, how difficult have these problems made it for you to do your work, take care of things at home, or get along with other people?: Somewhat difficult  PHQ9 TOTAL SCORE: 11

## 2022-07-14 ENCOUNTER — TRANSFERRED RECORDS (OUTPATIENT)
Dept: HEALTH INFORMATION MANAGEMENT | Facility: CLINIC | Age: 52
End: 2022-07-14

## 2022-07-25 ENCOUNTER — ALLIED HEALTH/NURSE VISIT (OUTPATIENT)
Dept: INTERNAL MEDICINE | Facility: CLINIC | Age: 52
End: 2022-07-25
Payer: COMMERCIAL

## 2022-07-25 DIAGNOSIS — Z23 ENCOUNTER FOR ADMINISTRATION OF COVID-19 VACCINE: Primary | ICD-10-CM

## 2022-07-25 PROCEDURE — 91305 COVID-19,PF,PFIZER (12+ YRS): CPT

## 2022-07-25 PROCEDURE — 0054A COVID-19,PF,PFIZER (12+ YRS): CPT

## 2022-07-25 NOTE — NURSING NOTE
Chief Complaint   Patient presents with     Imm/Inj     Pfizer booster       SAHIL Chung at 12:39 PM on 7/25/2022.

## 2022-07-25 NOTE — PROGRESS NOTES
Sharmin Nieves received the Covid Pfizer 2nd booster today in clinic at the request of the patient. The immunization was given under the supervision of Dr. Santiago Cabello if assistance was needed. The immunization site was cleaned with an alcohol prep wipe. The immunization was given without incident--see immunization list for administration details. No swelling or redness was observed at the site of injection after the immunization was given. The patient was advised to remain in [facility] lobby for 15 minutes after the injection in case of an averse reaction.     Liam Obregon, EMT at 2:02 PM on 7/25/2022.

## 2022-08-08 ENCOUNTER — MYC REFILL (OUTPATIENT)
Dept: FAMILY MEDICINE | Facility: CLINIC | Age: 52
End: 2022-08-08

## 2022-08-08 DIAGNOSIS — M79.674 PAIN OF RIGHT GREAT TOE: ICD-10-CM

## 2022-08-08 DIAGNOSIS — S92.334A CLOSED NONDISPLACED FRACTURE OF THIRD METATARSAL BONE OF RIGHT FOOT, INITIAL ENCOUNTER: ICD-10-CM

## 2022-08-08 DIAGNOSIS — Z98.890 HISTORY OF FOOT SURGERY: ICD-10-CM

## 2022-08-08 DIAGNOSIS — M19.071 PRIMARY OSTEOARTHRITIS OF RIGHT FOOT: ICD-10-CM

## 2022-08-08 RX ORDER — HYDROCODONE BITARTRATE AND ACETAMINOPHEN 5; 325 MG/1; MG/1
1 TABLET ORAL 2 TIMES DAILY PRN
Qty: 60 TABLET | Refills: 0 | Status: SHIPPED | OUTPATIENT
Start: 2022-08-08 | End: 2022-09-01

## 2022-08-08 NOTE — TELEPHONE ENCOUNTER
Routing refill request to provider for review/approval because:  Drug not on the [facility] refill protocol, refill request for Marialuisa Bowie RN   [facility]

## 2022-08-23 ENCOUNTER — MYC MEDICAL ADVICE (OUTPATIENT)
Dept: FAMILY MEDICINE | Facility: CLINIC | Age: 52
End: 2022-08-23

## 2022-09-01 ENCOUNTER — OFFICE VISIT (OUTPATIENT)
Dept: FAMILY MEDICINE | Facility: CLINIC | Age: 52
End: 2022-09-01
Payer: COMMERCIAL

## 2022-09-01 VITALS
WEIGHT: 202.5 LBS | HEIGHT: 69 IN | SYSTOLIC BLOOD PRESSURE: 138 MMHG | TEMPERATURE: 98 F | HEART RATE: 95 BPM | BODY MASS INDEX: 29.99 KG/M2 | DIASTOLIC BLOOD PRESSURE: 88 MMHG | RESPIRATION RATE: 14 BRPM | OXYGEN SATURATION: 98 %

## 2022-09-01 DIAGNOSIS — Z12.4 CERVICAL CANCER SCREENING: ICD-10-CM

## 2022-09-01 DIAGNOSIS — M19.071 PRIMARY OSTEOARTHRITIS OF RIGHT FOOT: ICD-10-CM

## 2022-09-01 DIAGNOSIS — S92.334A CLOSED NONDISPLACED FRACTURE OF THIRD METATARSAL BONE OF RIGHT FOOT, INITIAL ENCOUNTER: ICD-10-CM

## 2022-09-01 DIAGNOSIS — F41.8 SITUATIONAL ANXIETY: ICD-10-CM

## 2022-09-01 DIAGNOSIS — Z98.890 HISTORY OF FOOT SURGERY: ICD-10-CM

## 2022-09-01 DIAGNOSIS — F33.9 RECURRENT MAJOR DEPRESSIVE DISORDER, REMISSION STATUS UNSPECIFIED (H): ICD-10-CM

## 2022-09-01 DIAGNOSIS — Z01.818 PRE-OP EXAM: Primary | ICD-10-CM

## 2022-09-01 DIAGNOSIS — M79.674 PAIN OF RIGHT GREAT TOE: ICD-10-CM

## 2022-09-01 DIAGNOSIS — N32.81 URGENCY-FREQUENCY SYNDROME: ICD-10-CM

## 2022-09-01 DIAGNOSIS — K22.4 ESOPHAGEAL SPASM: ICD-10-CM

## 2022-09-01 DIAGNOSIS — I10 BENIGN ESSENTIAL HYPERTENSION: ICD-10-CM

## 2022-09-01 LAB — HGB BLD-MCNC: 12.8 G/DL (ref 11.7–15.7)

## 2022-09-01 PROCEDURE — 85018 HEMOGLOBIN: CPT | Performed by: NURSE PRACTITIONER

## 2022-09-01 PROCEDURE — 99214 OFFICE O/P EST MOD 30 MIN: CPT | Performed by: NURSE PRACTITIONER

## 2022-09-01 PROCEDURE — 80048 BASIC METABOLIC PNL TOTAL CA: CPT | Performed by: NURSE PRACTITIONER

## 2022-09-01 PROCEDURE — 36415 COLL VENOUS BLD VENIPUNCTURE: CPT | Performed by: NURSE PRACTITIONER

## 2022-09-01 RX ORDER — HYDROCODONE BITARTRATE AND ACETAMINOPHEN 5; 325 MG/1; MG/1
1 TABLET ORAL 2 TIMES DAILY PRN
Qty: 60 TABLET | Refills: 0 | Status: SHIPPED | OUTPATIENT
Start: 2022-09-01 | End: 2022-09-30

## 2022-09-01 RX ORDER — LORAZEPAM 1 MG/1
1 TABLET ORAL EVERY 6 HOURS PRN
Qty: 5 TABLET | Refills: 0 | Status: SHIPPED | OUTPATIENT
Start: 2022-09-01 | End: 2022-10-11

## 2022-09-01 ASSESSMENT — PAIN SCALES - GENERAL: PAINLEVEL: MODERATE PAIN (4)

## 2022-09-01 NOTE — PROGRESS NOTES
[address]  [address]  [facility] [address]  Phone: [phone]  Fax: [phone]  Primary Provider: Randa Hylton  Pre-op Performing Provider: RANDA HYLTON    Pt requesting Ativan for upcoming trip.      PREOPERATIVE EVALUATION:  Today's date: 9/1/2022    Sharmin Nieves is a 52 year old female who presents for a preoperative evaluation.    Surgical Information:  Surgery/Procedure: R foot  Surgery Location: O  Surgeon: Maxwell KELLEY  Surgery Date: 9/16/22  Time of Surgery: TBD  Where patient plans to recover: At home with family  Fax number for surgical facility: To be faxed; number to be determined    Type of Anesthesia Anticipated: Local with MAC    Assessment & Plan     The proposed surgical procedure is considered INTERMEDIATE risk.    Pre-op exam  - Basic metabolic panel  (Ca, Cl, CO2, Creat, Gluc, K, Na, BUN); Future  - Hemoglobin; Future    History of foot surgery  Setting up pain clinic appointment  - HYDROcodone-acetaminophen (NORCO) 5-325 MG tablet; Take 1 tablet by mouth 2 times daily as needed for severe pain    Closed nondisplaced fracture of third metatarsal bone of right foot, initial encounter    - HYDROcodone-acetaminophen (NORCO) 5-325 MG tablet; Take 1 tablet by mouth 2 times daily as needed for severe pain    Primary osteoarthritis of right foot    - HYDROcodone-acetaminophen (NORCO) 5-325 MG tablet; Take 1 tablet by mouth 2 times daily as needed for severe pain    Pain of right great toe    - HYDROcodone-acetaminophen (NORCO) 5-325 MG tablet; Take 1 tablet by mouth 2 times daily as needed for severe pain    Cervical cancer screening  Will e doen at same time as IUD changeout    Situational anxiety  Has taken before for air travel anxiety. Does not take concurrent with hydrocodone. Has narca  - LORazepam (ATIVAN) 1 MG tablet; Take 1 tablet (1 mg) by mouth every 6 hours as needed for anxiety    Benign essential hypertension  BP Readings from  Last 6 Encounters:   09/01/22 138/88   07/12/22 122/62   06/01/22 111/69   04/25/22 126/82   03/22/22 110/80   02/02/22 118/69     - Basic metabolic panel  (Ca, Cl, CO2, Creat, Gluc, K, Na, BUN); Future    Urgency-frequency syndrome  Higher volume incontinence overnight last night  - UA Macro with Reflex to Micro and Culture - lab collect; Future    Esophageal spasm  [facility]  - Adult GI  Referral - Consult Only; Future    Recurrent major depressive disorder, remission status unspecified (H)  Stable       Risks and Recommendations:  The patient has the following additional risks and recommendations for perioperative complications:  Social and Substance:    - Patient is taking medications for chronic pain   - Patient has esophageal spasms and EOE    Medication Instructions:     - ACE/ARB: May be continued on the day of surgery.    - GLP-1 Injectable (exenitide, liraglutide, semaglutide, dulaglutide, etc.): HOLD day of surgery    - Antiepileptics: Continue without modification.   - SSRIs, SNRIs, TCAs, Antipsychotics: Continue without modification   - HOLD solifenacin    RECOMMENDATION:  APPROVAL GIVEN to proceed with proposed procedure, without further diagnostic evaluation.      36 minutes spent on the date of the encounter doing chart review, history and exam, documentation and further activities per the note  6}    Subjective     HPI related to upcoming procedure: right great toe surgery June 2021 after unsuccessful conservative management; continued to have joint pain and had hardware removal Feb 2022 without success. This surgery is a complete revision.    Wt Readings from Last 5 Encounters:   09/01/22 91.9 kg (202 lb 8 oz)   07/12/22 72.6 kg (160 lb)   06/01/22 98.9 kg (218 lb)   04/25/22 107 kg (236 lb)   03/22/22 108.9 kg (240 lb)       Preop Questions 9/1/2022   1. Have you ever had a heart attack or stroke? No   2. Have you ever had surgery on your heart  or blood vessels, such as a stent placement, a coronary artery bypass, or surgery on an artery in your head, neck, heart, or legs? No   3. Do you have chest pain with activity? No   4. Do you have a history of  heart failure? No   5. Do you currently have a cold, bronchitis or symptoms of other infection? No   6. Do you have a cough, shortness of breath, or wheezing? No   7. Do you or anyone in your family have previous history of blood clots? No   8. Do you or does anyone in your family have a serious bleeding problem such as prolonged bleeding following surgeries or cuts? No   9. Have you ever had problems with anemia or been told to take iron pills? No   10. Have you had any abnormal blood loss such as black, tarry or bloody stools, or abnormal vaginal bleeding? No   11. Have you ever had a blood transfusion? No   12. Are you willing to have a blood transfusion if it is medically needed before, during, or after your surgery? Yes   13. Have you or any of your relatives ever had problems with anesthesia? No   14. Do you have sleep apnea, excessive snoring or daytime drowsiness? No   15. Do you have any artifical heart valves or other implanted medical devices like a pacemaker, defibrillator, or continuous glucose monitor? No   16. Do you have artificial joints? No   17. Are you allergic to latex? No   18. Is there any chance that you may be pregnant? No       Health Care Directive:  Patient does not have a Health Care Directive or Living Will: Discussed advance care planning with patient; information given to patient to review.    Preoperative Review of :   reviewed - controlled substances reflected in medication list.      Status of Chronic Conditions:  DEPRESSION - Patient has a long history of Depression of moderate severity requiring medication for control with recent symptoms being stable..Current symptoms of depression include none.     HYPERTENSION -     Review of Systems  Constitutional, neuro, ENT,  endocrine, pulmonary, cardiac, gastrointestinal, genitourinary, musculoskeletal, integument and psychiatric systems are negative, except as otherwise noted.    Patient Active Problem List    Diagnosis Date Noted     Esophageal spasm 04/26/2022     Priority: Medium     History of Nissen fundoplication 04/25/2022     Priority: Medium     Class 2 obesity due to excess calories without serious comorbidity with body mass index (BMI) of 35.0 to 35.9 in adult 03/22/2022     Priority: Medium     BMI 35.0-35.9,adult 03/22/2022     Priority: Medium     Alcohol use disorder, severe, dependence (H) 01/10/2022     Priority: Medium     Pain of right great toe 10/15/2021     Priority: Medium     History of foot surgery 07/14/2021     Priority: Medium     Degenerative joint disease of foot 05/03/2021     Priority: Medium     Added automatically from request for surgery [id number]       Alcohol withdrawal syndrome without complication (H) 02/17/2021     Priority: Medium     Alcohol abuse 04/19/2019     Priority: Medium     Alcohol dependence with acute alcoholic intoxication (H) 04/12/2019     Priority: Medium     Encounter for therapeutic drug monitoring 02/22/2019     Priority: Medium     Benign essential hypertension 11/30/2018     Priority: Medium     Situational anxiety 11/30/2018     Priority: Medium     Irregular heart beat 11/30/2018     Priority: Medium     Restless leg syndrome 09/16/2016     Priority: Medium     Dry skin 04/15/2013     Priority: Medium     Generalized anxiety disorder 03/18/2013     Priority: Medium     Diagnosis updated by automated process. Provider to review and confirm.       Home Health Care 02/08/2012     Priority: Medium     EMERGENCY CARE PLAN  April 15, 2013: No current Care Coordination follow up planned. Please refer if Care Coordination services are needed.    Presenting Problem Signs and Symptoms Treatment Plan   Questions or concerns   during clinic hours   I will call my clinic  directly:  [address] [address]  [phone].   Questions or concerns outside clinic hours   I will call the 24 hour nurse line at   [phone] or [phone].   Need to schedule an appointment   I will call the 24 hour scheduling team at [phone] or my clinic directly at [phone].    Same day treatment     I will call my clinic first, nurse line if after hours, urgent care and express care if needed.   Clinic care coordination services (regular clinic hours)     I will call a clinic care coordinator directly:     Azar Howard RN  Mon, Tues, Fri - [phone]  Wed, Thurs - [phone]    Jeny Sellers :    [phone]    Or call my clinic at [phone] and ask to speak with care coordination.   Crisis Services: Behavioral or Mental Health  [facility] 24 Hour Phone Line  [phone]    [facility] 24 Hour Crisis Services  [phone]    [facility] ([facility] Prov[facility] [phone]    [facility]   [phone]       Emergency treatment -- Immediately    [facility] 911            Insomnia 09/20/2011     Priority: Medium     Health Care Home 06/07/2011     Priority: Medium     X  DX V65.8 REPLACED WITH 72827 HEALTH CARE HOME (04/08/2013)       Rectal pain 05/16/2011     Priority: Medium     Moderate major depression (H) 04/15/2011     Priority: Medium     Previous med trials  Prozac (fluoxetine) YES  Zoloft (sertraline) YES  Celexa (citalopram) YES  Lexapro (escitalopram) YES  Paxil (paroxetine) no  Wellbutrin (bupropion) YES  Effexor (venlafaxine) YES  Others:  YES- Cymbalta         CARDIOVASCULAR SCREENING; LDL GOAL LESS THAN 160 10/31/2010     Priority: Medium     Chronic low back pain 05/11/2010     Priority: Medium     Right hip pain 05/11/2010     Priority: Medium     Ovarian cyst 12/05/2006     Priority: Medium     Problem list name updated by automated process. Provider to review       Irregular menstrual  cycle 12/05/2006     Priority: Medium     Esophageal reflux 12/14/2005     Priority: Medium     with associated esophageal spasm       IUD (intrauterine device) in place 10/03/2005     Priority: Medium     7/10/15 Mirena IUD replaced after 6 1/2 years.  Placed today without difficulty.       Tobacco use disorder 06/08/2005     Priority: Medium     Herpes simplex virus (HSV) infection 06/17/2004     Priority: Medium     Problem list name updated by automated process. Provider to review        Past Medical History:   Diagnosis Date     Acne      Anxiety      Anxiety state, unspecified     Anxiety     Benign essential hypertension 11/30/2018     Chronic low back pain 5/11/2010     Degenerative joint disease of foot 5/3/2021     Depression     Dr. Gaytan     Diaphragmatic hernia without mention of obstruction or gangrene      Esophageal reflux     with associated esophageal spasm     ETOH abuse      Foot pain      Herpes simplex without mention of complication      History of foot surgery 7/14/2021     Moderate major depression (H) 4/15/2011     Rectal pain 5/16/2011     Right hip pain 5/11/2010     Seasonal affective disorder (H)      STD (sexually transmitted disease)      Surveillance of previously prescribed intrauterine contraceptive device 10/03/05    mirena IUD     Past Surgical History:   Procedure Laterality Date     ARTHRODESIS FOOT Right 6/9/2021    Procedure: Right 1st metatarsophalangeal joint fuison;  Surgeon: Conor Guillen MD;  [place] [facility] [place]     CHEILECTOMY Right 12/21/2017    Procedure: CHEILECTOMY;  RIGHT FOOT CHEILECTOMY;  Surgeon: Mo Edgar DPM;  Location:  OR     ESOPHAGOSCOPY, GASTROSCOPY, DUODENOSCOPY (EGD), COMBINED N/A 6/1/2022    Procedure: ESOPHAGOGASTRODUODENOSCOPY, WITH BIOPSY;  Surgeon: Neal Loja MD;  [place] [facility] [place]     SURGICAL HISTORY OF -   4/04    Lapr Nissen fundoplication     TONSILLECTOMY & ADENOIDECTOMY  1985     [facility] NONSPECIFIC PROCEDURE   1992    [facility] SURGERY for abnl paps     [facility] STOMACH SURGERY PROCEDURE UNLISTED      Nissen     Current Outpatient Medications   Medication Sig Dispense Refill     Black Cohosh (REMIFEMIN MENOPAUSE RELIEF PO)        calcium carbonate (OS-GONZALO) 500 MG tablet        cholecalciferol (VITAMIN D3) 125 mcg (5000 units) capsule Take 1 capsule by mouth daily       HYDROcodone-acetaminophen (NORCO) 5-325 MG tablet Take 1 tablet by mouth 2 times daily as needed for severe pain 60 tablet 0     insulin pen needle (31G X 5 MM) 31G X 5 MM miscellaneous Use 1 pen needles daily or as directed. 100 each 0     lamoTRIgine (LAMICTAL) 200 MG tablet Take 1 tablet (200 mg) by mouth daily 90 tablet 1     liraglutide - Weight Management (SAXENDA) 18 MG/3ML pen Week 1: 0.6 mg subcutaneous daily, Week 2: 1.2 mg daily, Week 3: 1.8 mg daily, Week 4: 2.4 mg daily, Week 5 & on: 3 mg daily 15 mL 3     LORazepam (ATIVAN) 1 MG tablet Take 1 tablet (1 mg) by mouth every 6 hours as needed for anxiety 5 tablet 0     losartan (COZAAR) 100 MG tablet Take 1 tablet (100 mg) by mouth daily 90 tablet 1     omeprazole (PRILOSEC) 40 MG DR capsule TAKE ONE CAPSULE BY MOUTH ONCE DAILY 90 capsule 1     solifenacin (VESICARE) 5 MG tablet Take 1 tablet (5 mg) by mouth daily 90 tablet 1     sucralfate (CARAFATE) 1 GM tablet Take 1 tablet (1 g) by mouth 4 times daily for 90 days 360 tablet 3     topiramate (TOPAMAX) 25 MG tablet Take 3 tablets (75 mg) by mouth daily 90 tablet 3     traZODone (DESYREL) 100 MG tablet Take 2 tablets (200 mg) by mouth At Bedtime 180 tablet 3     venlafaxine (EFFEXOR XR) 75 MG 24 hr capsule Take 1 capsule (75 mg) by mouth daily in combination with 150 mg for total of 225 mg 90 capsule 1     venlafaxine (EFFEXOR-XR) 150 MG 24 hr capsule Take 1 capsule (150 mg) by mouth daily 90 capsule 1     levonorgestrel (MIRENA) 20 MCG/24HR IUD 1 each (20 mcg) by Intrauterine route once for 1 dose 1 each 0     varenicline (CHANTIX) 0.5 MG tablet Take 1  "tablet (0.5 mg) by mouth daily (Patient not taking: Reported on 9/1/2022) 90 tablet 1       Allergies   Allergen Reactions     Lisinopril Cough     cough        Social History     Tobacco Use     Smoking status: Current Every Day Smoker     Packs/day: 0.50     Years: 15.00     Pack years: 7.50     Types: Cigarettes     Smokeless tobacco: Never Used   Substance Use Topics     Alcohol use: Not Currently     Family History   Problem Relation Age of Onset     C.A.D. Maternal Grandmother      Hypertension Maternal Grandmother      Alcohol/Drug Maternal Grandmother      Depression Maternal Grandmother      Cerebrovascular Disease Paternal Grandfather      Alcohol/Drug Paternal Grandfather      Breast Cancer Paternal Grandmother      Depression Paternal Grandmother      Alcohol/Drug Father      Depression Father      Gastrointestinal Disease Father         GERD     Alcohol/Drug Maternal Grandfather      Depression Maternal Grandfather      Depression Mother      Obesity Mother      Anxiety Disorder Mother      Diabetes Mother      Depression Sister      Alcoholism Brother      Depression Brother      Alcoholism Brother      Depression Brother      Alcoholism Brother      Depression Brother      Gastrointestinal Disease Brother         severe gerd     Autism Spectrum Disorder Son      Substance Abuse Son      Schizophrenia Son      Substance Abuse Son      Breast Cancer Maternal Aunt      Cancer - colorectal No family hx of      Prostate Cancer No family hx of      History   Drug Use No     Comment: addict in recovery         Objective     /88   Pulse 95   Temp 98  F (36.7  C)   Resp 14   Ht 1.743 m (5' 8.62\")   Wt 91.9 kg (202 lb 8 oz)   SpO2 98%   BMI 30.23 kg/m      Physical Exam    GENERAL APPEARANCE: healthy, alert and no distress     EYES: EOMI, PERRL     HENT: ear canals and TM's normal and nose and mouth without ulcers or lesions     NECK: no adenopathy, no asymmetry, masses, or scars and thyroid normal " to palpation     RESP: lungs clear to auscultation - no rales, rhonchi or wheezes     CV: regular rates and rhythm, normal S1 S2, no S3 or S4 and no murmur, click or rub     ABDOMEN:  soft, nontender, no HSM or masses and bowel sounds normal     MS: extremities normal- no gross deformities noted, no evidence of inflammation in joints, FROM in all extremities.     SKIN: no suspicious lesions or rashes     NEURO: Normal strength and tone, sensory exam grossly normal, mentation intact and speech normal     PSYCH: mentation appears normal. and affect normal/bright     LYMPHATICS: No cervical adenopathy    Recent Labs   Lab Test 02/02/22  1646 05/28/21  1407   HGB 14.1 13.2   PLT  --  251    138   POTASSIUM 4.5 4.2   CR 0.82 0.74        Diagnostics:  Labs pending at this time.  Results will be reviewed when available.   No EKG required, no history of coronary heart disease, significant arrhythmia, peripheral arterial disease or other structural heart disease.  EKG 2/2022: appears normal, NSR, normal axis, normal intervals, no acute ST/T changes c/w ischemia, no LVH by voltage criteria    Revised Cardiac Risk Index (RCRI):  The patient has the following serious cardiovascular risks for perioperative complications:   - No serious cardiac risks = 0 points     RCRI Interpretation: 1 point: Class II (low risk - 0.9% complication rate)           Signed Electronically by: LEDY Seaman CNP  Copy of this evaluation report is provided to requesting physician.      Answers for HPI/ROS submitted by the patient on 9/1/2022  If you checked off any problems, how difficult have these problems made it for you to do your work, take care of things at home, or get along with other people?: Somewhat difficult  PHQ9 TOTAL SCORE: 7

## 2022-09-01 NOTE — PATIENT INSTRUCTIONS
Nictoine gum starting today  Chantix when you get home from vacay  Steadily reduce your number of cigarettes over the next 10 days to zero for best surgical outcomes

## 2022-09-02 LAB
ANION GAP SERPL CALCULATED.3IONS-SCNC: 5 MMOL/L (ref 3–14)
BUN SERPL-MCNC: 10 MG/DL (ref 7–30)
CALCIUM SERPL-MCNC: 8.9 MG/DL (ref 8.5–10.1)
CHLORIDE BLD-SCNC: 106 MMOL/L (ref 94–109)
CO2 SERPL-SCNC: 26 MMOL/L (ref 20–32)
CREAT SERPL-MCNC: 0.79 MG/DL (ref 0.52–1.04)
GFR SERPL CREATININE-BSD FRML MDRD: >90 ML/MIN/1.73M2
GLUCOSE BLD-MCNC: 93 MG/DL (ref 70–99)
POTASSIUM BLD-SCNC: 3.9 MMOL/L (ref 3.4–5.3)
SODIUM SERPL-SCNC: 137 MMOL/L (ref 133–144)

## 2022-09-12 ENCOUNTER — ANCILLARY PROCEDURE (OUTPATIENT)
Dept: MAMMOGRAPHY | Facility: CLINIC | Age: 52
End: 2022-09-12
Attending: NURSE PRACTITIONER
Payer: COMMERCIAL

## 2022-09-12 DIAGNOSIS — Z12.31 ENCOUNTER FOR SCREENING MAMMOGRAM FOR BREAST CANCER: ICD-10-CM

## 2022-09-12 PROCEDURE — 77067 SCR MAMMO BI INCL CAD: CPT | Mod: GC | Performed by: STUDENT IN AN ORGANIZED HEALTH CARE EDUCATION/TRAINING PROGRAM

## 2022-09-12 PROCEDURE — 77063 BREAST TOMOSYNTHESIS BI: CPT | Mod: GC | Performed by: STUDENT IN AN ORGANIZED HEALTH CARE EDUCATION/TRAINING PROGRAM

## 2022-09-16 ENCOUNTER — TRANSFERRED RECORDS (OUTPATIENT)
Dept: HEALTH INFORMATION MANAGEMENT | Facility: CLINIC | Age: 52
End: 2022-09-16

## 2022-09-19 DIAGNOSIS — F41.1 GENERALIZED ANXIETY DISORDER: ICD-10-CM

## 2022-09-19 RX ORDER — VENLAFAXINE HYDROCHLORIDE 150 MG/1
150 CAPSULE, EXTENDED RELEASE ORAL DAILY
Qty: 90 CAPSULE | Refills: 0 | Status: SHIPPED | OUTPATIENT
Start: 2022-09-19 | End: 2022-12-14

## 2022-09-19 NOTE — TELEPHONE ENCOUNTER
REFERRAL INFORMATION:    Referring Provider:  LEDY Rosa CNP     Referring Clinic:  [facility]    Reason for Visit/Diagnosis: GERD, Esophagitis      FUTURE VISIT INFORMATION:    Appointment Date: 12/16/2022    Appointment Time: 10 AM      NOTES STATUS DETAILS   OFFICE NOTE from Referring Provider Internal 9/1/2022, 7/12/2022, 4/26/2022 Office visit with LEDY Rosa CNP     OFFICE NOTE from Other Specialist N/A    HOSPITAL DISCHARGE SUMMARY/  ED VISITS N/A    OPERATIVE REPORT N/A    MEDICATION LIST Internal         ENDOSCOPY  Internal EGD: 6/1/2022, 10/5/07, 2/21/07   COLONOSCOPY N/A    ERCP N/A    EUS N/A    STOOL TESTING N/A    PERTINENT LABS Internal    PATHOLOGY REPORTS (RELATED) Internal 6/1/2022   IMAGING (CT, MRI, EGD, MRCP, Small Bowel Follow Through/SBT, MR/CT Enterography) N/A

## 2022-09-28 ENCOUNTER — MYC MEDICAL ADVICE (OUTPATIENT)
Dept: FAMILY MEDICINE | Facility: CLINIC | Age: 52
End: 2022-09-28

## 2022-09-28 DIAGNOSIS — M79.674 PAIN OF RIGHT GREAT TOE: ICD-10-CM

## 2022-09-28 DIAGNOSIS — Z98.890 HISTORY OF FOOT SURGERY: ICD-10-CM

## 2022-09-28 DIAGNOSIS — G89.29 CHRONIC BILATERAL LOW BACK PAIN WITHOUT SCIATICA: Primary | ICD-10-CM

## 2022-09-28 DIAGNOSIS — M54.50 CHRONIC BILATERAL LOW BACK PAIN WITHOUT SCIATICA: Primary | ICD-10-CM

## 2022-09-28 DIAGNOSIS — M19.071 PRIMARY OSTEOARTHRITIS OF RIGHT FOOT: ICD-10-CM

## 2022-09-28 DIAGNOSIS — S92.334A CLOSED NONDISPLACED FRACTURE OF THIRD METATARSAL BONE OF RIGHT FOOT, INITIAL ENCOUNTER: ICD-10-CM

## 2022-09-30 RX ORDER — HYDROCODONE BITARTRATE AND ACETAMINOPHEN 5; 325 MG/1; MG/1
1 TABLET ORAL 2 TIMES DAILY PRN
Qty: 60 TABLET | Refills: 0 | Status: SHIPPED | OUTPATIENT
Start: 2022-09-30 | End: 2022-10-25

## 2022-10-04 ENCOUNTER — TRANSFERRED RECORDS (OUTPATIENT)
Dept: HEALTH INFORMATION MANAGEMENT | Facility: CLINIC | Age: 52
End: 2022-10-04

## 2022-10-06 ENCOUNTER — MYC MEDICAL ADVICE (OUTPATIENT)
Dept: FAMILY MEDICINE | Facility: CLINIC | Age: 52
End: 2022-10-06

## 2022-10-07 ENCOUNTER — TELEPHONE (OUTPATIENT)
Dept: ANESTHESIOLOGY | Facility: CLINIC | Age: 52
End: 2022-10-07

## 2022-10-07 NOTE — TELEPHONE ENCOUNTER
I called patient to remind them of there appointment 11:00 am to arrive 15-20 minutes prior allow time for parking    Also to complete there questionnaire prior to there appointment

## 2022-10-10 ENCOUNTER — OFFICE VISIT (OUTPATIENT)
Dept: ANESTHESIOLOGY | Facility: CLINIC | Age: 52
End: 2022-10-10
Attending: NURSE PRACTITIONER
Payer: COMMERCIAL

## 2022-10-10 VITALS — HEART RATE: 90 BPM | DIASTOLIC BLOOD PRESSURE: 75 MMHG | OXYGEN SATURATION: 97 % | SYSTOLIC BLOOD PRESSURE: 116 MMHG

## 2022-10-10 DIAGNOSIS — G89.29 CHRONIC BILATERAL LOW BACK PAIN WITHOUT SCIATICA: ICD-10-CM

## 2022-10-10 DIAGNOSIS — M54.50 CHRONIC BILATERAL LOW BACK PAIN WITHOUT SCIATICA: ICD-10-CM

## 2022-10-10 DIAGNOSIS — M19.071 PRIMARY OSTEOARTHRITIS OF RIGHT FOOT: ICD-10-CM

## 2022-10-10 PROCEDURE — 99205 OFFICE O/P NEW HI 60 MIN: CPT

## 2022-10-10 ASSESSMENT — ENCOUNTER SYMPTOMS
HEARTBURN: 1
JOINT SWELLING: 0
CONSTIPATION: 0
STIFFNESS: 1
RECTAL PAIN: 0
HEMATURIA: 0
BOWEL INCONTINENCE: 0
MUSCLE CRAMPS: 0
VOMITING: 0
FLANK PAIN: 0
INSOMNIA: 1
DIARRHEA: 0
DIFFICULTY URINATING: 0
BLOATING: 0
MYALGIAS: 0
NECK PAIN: 0
DEPRESSION: 1
ABDOMINAL PAIN: 0
PANIC: 0
ARTHRALGIAS: 1
NERVOUS/ANXIOUS: 1
DECREASED CONCENTRATION: 1
BLOOD IN STOOL: 0
BACK PAIN: 0
NAUSEA: 0
DYSURIA: 0
JAUNDICE: 0
MUSCLE WEAKNESS: 0

## 2022-10-10 ASSESSMENT — ANXIETY QUESTIONNAIRES
5. BEING SO RESTLESS THAT IT IS HARD TO SIT STILL: SEVERAL DAYS
GAD7 TOTAL SCORE: 7
8. IF YOU CHECKED OFF ANY PROBLEMS, HOW DIFFICULT HAVE THESE MADE IT FOR YOU TO DO YOUR WORK, TAKE CARE OF THINGS AT HOME, OR GET ALONG WITH OTHER PEOPLE?: SOMEWHAT DIFFICULT
IF YOU CHECKED OFF ANY PROBLEMS ON THIS QUESTIONNAIRE, HOW DIFFICULT HAVE THESE PROBLEMS MADE IT FOR YOU TO DO YOUR WORK, TAKE CARE OF THINGS AT HOME, OR GET ALONG WITH OTHER PEOPLE: SOMEWHAT DIFFICULT
6. BECOMING EASILY ANNOYED OR IRRITABLE: SEVERAL DAYS
7. FEELING AFRAID AS IF SOMETHING AWFUL MIGHT HAPPEN: SEVERAL DAYS
7. FEELING AFRAID AS IF SOMETHING AWFUL MIGHT HAPPEN: SEVERAL DAYS
4. TROUBLE RELAXING: SEVERAL DAYS
GAD7 TOTAL SCORE: 7
2. NOT BEING ABLE TO STOP OR CONTROL WORRYING: SEVERAL DAYS
1. FEELING NERVOUS, ANXIOUS, OR ON EDGE: SEVERAL DAYS
3. WORRYING TOO MUCH ABOUT DIFFERENT THINGS: SEVERAL DAYS

## 2022-10-10 ASSESSMENT — PAIN SCALES - PAIN ENJOYMENT GENERAL ACTIVITY SCALE (PEG)
INTERFERED_ENJOYMENT_LIFE: 4
PEG_TOTALSCORE: 4
PEG_TOTALSCORE: 4
INTERFERED_GENERAL_ACTIVITY: 4
INTERFERED_GENERAL_ACTIVITY: 4
AVG_PAIN_PASTWEEK: 4
AVG_PAIN_PASTWEEK: 4
INTERFERED_ENJOYMENT_LIFE: 4

## 2022-10-10 ASSESSMENT — PAIN SCALES - GENERAL: PAINLEVEL: MODERATE PAIN (4)

## 2022-10-10 NOTE — NURSING NOTE
Patient presents with:  Consult: New Consult  Right Foot pain level 4/10      Moderate Pain (4)     Pain Medications     Opioid Combinations Refills Start End     HYDROcodone-acetaminophen (NORCO) 5-325 MG tablet    0 9/30/2022     Sig - Route: Take 1 tablet by mouth 2 times daily as needed for severe pain - Oral    Class: E-Prescribe    Earliest Fill Date: 9/30/2022          What medications are you using for pain? Gabapentin, Hydrocodone    (New patients only) Have you been seen by another pain clinic/ provider? no    (Return Patients only) What refills are you needing today? No    Expectations Want to be able to work with out pain

## 2022-10-10 NOTE — PROGRESS NOTES
[facility]     Date of visit: 10/10/2022    Assessment:  Sharmin Nieves is a 52 year old female with a past medical history significant for chronic LBP, DJD of first metatarsal right foot/hallux limitus, anxiety/depression, HTN, s/p right first metatarsal fusion/revision x 3, who presents with complaints of right foot pain.      1. It is hard to determine long-term outcome from surgery at this point, as she is less than 4 weeks postop. She has another follow up next month and will ask about PT. I will be happy to place referral if needed. She has been struggling with pain over the past several years and takes minimal opioids. She reports using cannabis to help with pain at night, which has helped with improved sleep. I think bedtime medical cannabis use is reasonable and recommend certifying for the [place] medical marijuana program. Cannabis is not FDA regulated, but state programs have regulations in place, making it a safer channel for exploring the benefits of cannabis for chronic pain.       Visit diagnoses:   1. Chronic bilateral low back pain without sciatica    2. Primary osteoarthritis of right foot        Plan:  The following recommendations were given to the patient. Diagnosis, treatment options, risks, benefits, and alternatives were discussed, and all questions were answered. The patient expressed understanding of the plan for management.     I am recommending a multidisciplinary treatment plan to help this patient better manage her pain.  This includes:      1.  Pain Physical Therapy:  NO   She will as about PT at next postop follow up in a month. I am happy to place a referral if needed.    2.  Pain Psychologist to address relaxation, behavioral change, coping style, and other factors important to improvement.  NO   3.  Diagnostic Studies:  None    4.  Medication Management: Kendy was certified for [place] medical cannabis program today. She finds cannabis helpful at bedtime and improved  sleep.    5.  Potential procedures: None     6.  Referrals:   7.  Follow up with LEDY Brown CNP for annual medical cannabis re-certification, or sooner if needed.     Review of Electronic Chart: Today I have also reviewed available medical information in the patient's medical record at [facility] ([facility]) and Care Everywhere (if available), including relevant provider notes, laboratory work, and imaging.     Meri Toth DNP, LEDY, AGNP-C  [facility]         -------------------------------------------------------------------    Subjective     Reason for consultation:    Sharmin Nieves is a 52 year old female who is seen today for evaluation of her pain issues and recommendations for management, with specific emphasis on right foot pain.     Please see the [facility] [place] health questionnaire which the patient completed and reviewed with me in detail.    Review of [place] Prescription Monitoring Program (): No concern for abuse or misuse of controlled medications based on this report. Reviewed 10/10/22 - appears appropriate.     Review of Electronic Chart: Today I have also reviewed available medical information in the patient's medical record at [facility] ([facility]), including relevant provider notes, laboratory work, and imaging.     Pain medications are being prescribed by PCP.     Chief Complaint:    Chief Complaint   Patient presents with     Consult     New Consult  Right Foot pain level 4/10         HPI:     Sharmin Nieves is a 52 year old female presents with a chief complaint of right foot pain.     The pain has been present for years, though worse since she had surgery fusion of right first metatarsal in 2020.    The pain is Moderate Pain (4) in severity.    -She has had 3 surgeries on right foot, had fusion of first metatarsal. Saw Dr. Guillen first, then went to [facility].   -She recently had third surgery on 9/16/22 (we do not have access to O  "records)  -She has altered gait, reports \"walking funny\" with foot injury.   -She was referred here to establish with pain, in the event her pain does not improve beyond expected recovery time.   -She has h/o gastic ulcers, cannot take NSAIDs.   -Currently taking Hydrocodone 5-325 - 1 in AM and 1 in PM.   -She had post op appt and sutures removed. Has not had PT post op yet. She goes back in another month for next postop f/up.  -It is most painful when she is up and walking and working.   -She reports increased pain at bedtime after working during day up on feet, uses cannabis to help with pain and sleep (finds if quite helpful).     Sharmin Nieves has not been seen at a pain clinic in the past.         Current Pain Treatments:    1. Medications:    Hydrocodone 5-325 mg - 1 BID PRN   Medical cannabis   2. Other therapies:    Rest   Ice       Current Outpatient Medications   Medication     Black Cohosh (REMIFEMIN MENOPAUSE RELIEF PO)     calcium carbonate (OS-GONZALO) 500 MG tablet     cholecalciferol (VITAMIN D3) 125 mcg (5000 units) capsule     HYDROcodone-acetaminophen (NORCO) 5-325 MG tablet     insulin pen needle (31G X 5 MM) 31G X 5 MM miscellaneous     lamoTRIgine (LAMICTAL) 200 MG tablet     liraglutide - Weight Management (SAXENDA) 18 MG/3ML pen     losartan (COZAAR) 100 MG tablet     traZODone (DESYREL) 100 MG tablet     varenicline (CHANTIX) 0.5 MG tablet     venlafaxine (EFFEXOR XR) 150 MG 24 hr capsule     venlafaxine (EFFEXOR XR) 75 MG 24 hr capsule     levonorgestrel (MIRENA) 20 MCG/24HR IUD     omeprazole (PRILOSEC) 40 MG DR capsule     solifenacin (VESICARE) 10 MG tablet     sucralfate (CARAFATE) 1 GM tablet     topiramate (TOPAMAX) 25 MG tablet     No current facility-administered medications for this visit.     Allergies   Allergen Reactions     Lisinopril Cough     cough      Past Medical History:   Diagnosis Date     Acne      Anxiety      Anxiety state, unspecified     Anxiety     Benign essential " hypertension 11/30/2018     Chronic low back pain 5/11/2010     Degenerative joint disease of foot 5/3/2021     Depression     Dr. Gaytan     Diaphragmatic hernia without mention of obstruction or gangrene      Esophageal reflux     with associated esophageal spasm     ETOH abuse      Foot pain      Herpes simplex without mention of complication      History of foot surgery 7/14/2021     Moderate major depression (H) 4/15/2011     Rectal pain 5/16/2011     Right hip pain 5/11/2010     Seasonal affective disorder (H)      STD (sexually transmitted disease)      Surveillance of previously prescribed intrauterine contraceptive device 10/03/05    mirena IUD     Past Surgical History:   Procedure Laterality Date     ARTHRODESIS FOOT Right 6/9/2021    Procedure: Right 1st metatarsophalangeal joint fuison;  Surgeon: Conor Guillen MD;  Location: [facility] [place] OR     CHEILECTOMY Right 12/21/2017    Procedure: CHEILECTOMY;  RIGHT FOOT CHEILECTOMY;  Surgeon: Mo Edgar DPM;  Location:  OR     ESOPHAGOSCOPY, GASTROSCOPY, DUODENOSCOPY (EGD), COMBINED N/A 6/1/2022    Procedure: ESOPHAGOGASTRODUODENOSCOPY, WITH BIOPSY;  Surgeon: eNal Loja MD;  Location: [facility] [place] OR     SURGICAL HISTORY OF -   4/04    Lapr Nissen fundoplication     TONSILLECTOMY & ADENOIDECTOMY  1985     Z NONSPECIFIC PROCEDURE  1992    CRYO SURGERY for abnl paps     Z STOMACH SURGERY PROCEDURE UNLISTED      Nissen     Family History   Problem Relation Age of Onset     C.A.D. Maternal Grandmother      Hypertension Maternal Grandmother      Alcohol/Drug Maternal Grandmother      Depression Maternal Grandmother      Cerebrovascular Disease Paternal Grandfather      Alcohol/Drug Paternal Grandfather      Breast Cancer Paternal Grandmother      Depression Paternal Grandmother      Alcohol/Drug Father      Depression Father      Gastrointestinal Disease Father         GERD     Alcohol/Drug Maternal Grandfather      Depression Maternal  Grandfather      Depression Mother      Obesity Mother      Anxiety Disorder Mother      Diabetes Mother      Depression Sister      Alcoholism Brother      Depression Brother      Alcoholism Brother      Depression Brother      Alcoholism Brother      Depression Brother      Gastrointestinal Disease Brother         severe gerd     Autism Spectrum Disorder Son      Substance Abuse Son      Schizophrenia Son      Substance Abuse Son      Breast Cancer Maternal Aunt      Cancer - colorectal No family hx of      Prostate Cancer No family hx of      Social History     Socioeconomic History     Marital status:      Spouse name: Esteban     Number of children: 3     Years of education: 13   Occupational History     Occupation: Medical Assistant     Employer: [employer]   Tobacco Use     Smoking status: Every Day     Packs/day: 0.50     Years: 15.00     Pack years: 7.50     Types: Cigarettes     Smokeless tobacco: Current   Vaping Use     Vaping Use: Never used   Substance and Sexual Activity     Alcohol use: Not Currently     Drug use: No     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        Social Documentation:        Balanced Diet: YES    Calcium intake: more than 2 per day    Caffeine: 6 cups per day    Exercise:  type of activity 0    Sunscreen: Yes    Seatbelts:  Yes    Self Breast Exam:  No     Self Testicular Exam: No - n/a    Physical/Emotional/Sexual Abuse: No     Do you feel safe in your environment? Yes        Cholesterol screen up to date: No - Pt not fasting today.    CHOL      167   11/06/2002    HDL       42   11/06/2002    LDL       99   11/06/2002    TRIG      131   11/06/2002    CHOLHDLRATIO        4   11/06/2002        Eye Exam up to date: Yes    Dental Exam up to date: Yes    Pap smear up to date: Yes    Mammogram up to date: Does Not Apply     Dexa Scan up to date: Does Not Apply    Colonoscopy up to date: Does Not Apply    Immunizations up to date: Yes-Td 2002    Glucose screen if over 40:  No - n/a      ROS: 10 point ROS neg other than the symptoms noted above in the HPI.      Objective      Diagnostic Testing - Imaging/Labs:  MR right foot without  contrast 7/8/2022 2:37 PM     History: 4th met base pain - non-nion of the 1st MTPJ arthrodesis.;  Right foot pain     Additional History from EMR: Patient with prior history of first MTPJ  arthrodesis with right-sided foot pain starting about 6 weeks ago in  the mid foot. Patient describes it as sharp and is painful when  walking. Not near her prior surgical scar.     Techniques: Multiplanar multisequence imaging of the right foot was  obtained without  administration of intravenous contrast.     Comparison: Right foot radiographs 7/6/2022     Findings:     Dorsal external marker placed over the third/fourth mid metatarsal  shafts.     Metallic susceptibility artifact secondary to first  metatarsophalangeal joint arthrodesis with screw fragment partially  compromising assessment.     Bones     Relatively corresponding to the external marker and recent  radiographic abnormality, nondisplaced fracture third metatarsal base  with associated edema, signal intensity, periostitis or regional soft  tissue edema, possibly secondary to stress fracture.     Joints and periarticular soft tissue     Joint effusion: Physiologic amount of joint fluid are present.     Plantar plates: Intersesamoidal ligament and sesamoidal phalangeal  ligaments of the first metatarsophalangeal joints are intact. Plantar  plates of the second through fifth toe at metatarsophalangeal joints  are grossly intact.     Intermetatarsal spaces: Approximately 4 mm in mediolateral dimension  plantar fat effacement along the second interspace interdigital nerve,  tiny interdigital neuroma cannot be entirely excluded. Trace fluid in  the third  interspace.     Ligaments and Tendons     Lisfranc interosseous ligament: Intact.     Tendons: The visualized courses of flexor and extensor tendons are  intact.      Muscles     No atrophy or edema.     ANCILLARY FINDINGS     Plantar subcutaneous fat effacement at the fifth metatarsal head,  likely related to pressure callus.                                                                      Impression:   1. Relatively corresponding to a external marker and recent  radiographic abnormality, stress fracture third metatarsal proximal  shaft.  2.      I have personally reviewed the examination and initial interpretation  and I agree with the findings.     PATI HERNANDEZ       Physical Exam  HENT:      Head: Normocephalic.   Pulmonary:      Effort: Pulmonary effort is normal.   Musculoskeletal:         General: Signs of injury present.      Comments: RLE - postop boot   Neurological:      Mental Status: She is alert.   Psychiatric:         Mood and Affect: Mood normal.           BILLING TIME DOCUMENTATION:   The total TIME spent on this patient on the date of the encounter/appointment was 62 minutes.      TOTAL TIME includes:   Time spent preparing to see the patient (reviewing records and tests)   Time spent face to face (or over the phone) with the patient   Time spent ordering tests, medications, procedures and referrals   Time spent Referring and communicating with other healthcare professionals   Time spent documenting clinical information in Epic

## 2022-10-10 NOTE — PROGRESS NOTES
"-She has had 3 surgeries on right foot, had fusion of first metatarsal. Saw Dr. Guillen first, then went to [facility], [place].   -She has altered gait, \"walking funny\" with foot injury.   -She was referred here to establish with pain.   -She has gastic ulcers    -Hydrocodone 5-325, 1 in AM and 1 in PM.   -She had post op appt and sutures removed. Surgery was on 9/16.     It is most painful when she is up and walking and working.       -Has not had PT post op yet. SHe goes back in another month.   -  "

## 2022-10-10 NOTE — LETTER
10/10/2022       RE: Sharmin Nieves  [address] [place] [address]     Dear Colleague,    Thank you for referring your patient, Sharmin Nieves, to the [facility] CLINIC FOR COMPREHENSIVE PAIN MANAGEMENT [place] at [facility]. Please see a copy of my visit note below.      [facility] Pain Management     Date of visit: 10/10/2022    Assessment:  Sharmin Nieves is a 52 year old female with a past medical history significant for chronic LBP, DJD of first metatarsal right foot/hallux limitus, anxiety/depression, HTN, s/p right first metatarsal fusion/revision x 3, who presents with complaints of right foot pain.      1. It is hard to determine long-term outcome from surgery at this point, as she is less than 4 weeks postop. She has another follow up next month and will ask about PT. I will be happy to place referral if needed. She has been struggling with pain over the past several years and takes minimal opioids. She reports using cannabis to help with pain at night, which has helped with improved sleep. I think bedtime medical cannabis use is reasonable and recommend certifying for the [place] medical marijuana program. Cannabis is not FDA regulated, but state programs have regulations in place, making it a safer channel for exploring the benefits of cannabis for chronic pain.       Visit diagnoses:   1. Chronic bilateral low back pain without sciatica    2. Primary osteoarthritis of right foot        Plan:  The following recommendations were given to the patient. Diagnosis, treatment options, risks, benefits, and alternatives were discussed, and all questions were answered. The patient expressed understanding of the plan for management.     I am recommending a multidisciplinary treatment plan to help this patient better manage her pain.  This includes:      1.  Pain Physical Therapy:  NO   She will as about PT at next postop follow up in a month. I am  happy to place a referral if needed.    2.  Pain Psychologist to address relaxation, behavioral change, coping style, and other factors important to improvement.  NO   3.  Diagnostic Studies:  None    4.  Medication Management: Kendy was [place] for [place] medical [place] today. She finds cannabis helpful at bedtime and improved sleep.    5.  Potential procedures: None     6.  Referrals:   7.  Follow up with LEDY Brown CNP for annual medical cannabis re-certification, or sooner if needed.     Review of Electronic Chart: Today I have also reviewed available medical information in the patient's medical record at [facility] ([facility]) and Care Everywhere (if available), including relevant provider notes, laboratory work, and imaging.     Meri Toth DNP, LEDY, AGNP-C  [facility] Pain Management         -------------------------------------------------------------------    Subjective     Reason for consultation:    Sharmin Nieves is a 52 year old female who is seen today for evaluation of her pain issues and recommendations for management, with specific emphasis on right foot pain.     Please see the [facility] health questionnaire which the patient completed and reviewed with me in detail.    Review of [place] Prescription Monitoring Program (): No concern for abuse or misuse of controlled medications based on this report. Reviewed 10/10/22 - appears appropriate.     Review of Electronic Chart: Today I have also reviewed available medical information in the patient's medical record at [facility] ([facility]), including relevant provider notes, laboratory work, and imaging.     Pain medications are being prescribed by PCP.     Chief Complaint:    Chief Complaint   Patient presents with     Consult     New Consult  Right Foot pain level 4/10         HPI:     Sharmin Nieves is a 52 year old female presents with a chief complaint of right foot pain.     The pain has been  "present for years, though worse since she had surgery fusion of right first metatarsal in 2020.    The pain is Moderate Pain (4) in severity.    -She has had 3 surgeries on right foot, had fusion of first metatarsal. Saw Dr. Guillen first, then went to [facility].   -She recently had third surgery on 9/16/22 (we do not have access to [facility] records)  -She has altered gait, reports \"walking funny\" with foot injury.   -She was referred here to establish with pain, in the event her pain does not improve beyond expected recovery time.   -She has h/o gastic ulcers, cannot take NSAIDs.   -Currently taking Hydrocodone 5-325 - 1 in AM and 1 in PM.   -She had post op appt and sutures removed. Has not had PT post op yet. She goes back in another month for next postop f/up.  -It is most painful when she is up and walking and working.   -She reports increased pain at bedtime after working during day up on feet, uses cannabis to help with pain and sleep (finds if quite helpful).     Sharmin Nieves has not been seen at a pain clinic in the past.         Current Pain Treatments:    1. Medications:    Hydrocodone 5-325 mg - 1 BID PRN   Medical cannabis   2. Other therapies:    Rest   Ice       Current Outpatient Medications   Medication     Black Cohosh (REMIFEMIN MENOPAUSE RELIEF PO)     calcium carbonate (OS-GONZALO) 500 MG tablet     cholecalciferol (VITAMIN D3) 125 mcg (5000 units) capsule     HYDROcodone-acetaminophen (NORCO) 5-325 MG tablet     insulin pen needle (31G X 5 MM) 31G X 5 MM miscellaneous     lamoTRIgine (LAMICTAL) 200 MG tablet     liraglutide - Weight Management (SAXENDA) 18 MG/3ML pen     losartan (COZAAR) 100 MG tablet     traZODone (DESYREL) 100 MG tablet     varenicline (CHANTIX) 0.5 MG tablet     venlafaxine (EFFEXOR XR) 150 MG 24 hr capsule     venlafaxine (EFFEXOR XR) 75 MG 24 hr capsule     levonorgestrel (MIRENA) 20 MCG/24HR IUD     omeprazole (PRILOSEC) 40 MG DR capsule     solifenacin (VESICARE) 10 MG tablet     " sucralfate (CARAFATE) 1 GM tablet     topiramate (TOPAMAX) 25 MG tablet     No current facility-administered medications for this visit.     Allergies   Allergen Reactions     Lisinopril Cough     cough      Past Medical History:   Diagnosis Date     Acne      Anxiety      Anxiety state, unspecified     Anxiety     Benign essential hypertension 11/30/2018     Chronic low back pain 5/11/2010     Degenerative joint disease of foot 5/3/2021     Depression     Dr. Gaytan     Diaphragmatic hernia without mention of obstruction or gangrene      Esophageal reflux     with associated esophageal spasm     ETOH abuse      Foot pain      Herpes simplex without mention of complication      History of foot surgery 7/14/2021     Moderate major depression (H) 4/15/2011     Rectal pain 5/16/2011     Right hip pain 5/11/2010     Seasonal affective disorder (H)      STD (sexually transmitted disease)      Surveillance of previously prescribed intrauterine contraceptive device 10/03/05    mirena IUD     Past Surgical History:   Procedure Laterality Date     ARTHRODESIS FOOT Right 6/9/2021    Procedure: Right 1st metatarsophalangeal joint fuison;  Surgeon: Conor Guillen MD;  [place] [facility] [place]     CHEILECTOMY Right 12/21/2017    Procedure: CHEILECTOMY;  RIGHT FOOT CHEILECTOMY;  Surgeon: Mo Edgar DPM;  Location:  OR     ESOPHAGOSCOPY, GASTROSCOPY, DUODENOSCOPY (EGD), COMBINED N/A 6/1/2022    Procedure: ESOPHAGOGASTRODUODENOSCOPY, WITH BIOPSY;  Surgeon: Neal Loja MD;  [place] [facility] [place]     SURGICAL HISTORY OF -   4/04    Lapr Nissen fundoplication     TONSILLECTOMY & ADENOIDECTOMY  1985     Z NONSPECIFIC PROCEDURE  1992    CRYO SURGERY for abnl paps     [facility] STOMACH SURGERY PROCEDURE UNLISTED      Nissen     Family History   Problem Relation Age of Onset     C.A.D. Maternal Grandmother      Hypertension Maternal Grandmother      Alcohol/Drug Maternal Grandmother      Depression Maternal Grandmother       Cerebrovascular Disease Paternal Grandfather      Alcohol/Drug Paternal Grandfather      Breast Cancer Paternal Grandmother      Depression Paternal Grandmother      Alcohol/Drug Father      Depression Father      Gastrointestinal Disease Father         GERD     Alcohol/Drug Maternal Grandfather      Depression Maternal Grandfather      Depression Mother      Obesity Mother      Anxiety Disorder Mother      Diabetes Mother      Depression Sister      Alcoholism Brother      Depression Brother      Alcoholism Brother      Depression Brother      Alcoholism Brother      Depression Brother      Gastrointestinal Disease Brother         severe gerd     Autism Spectrum Disorder Son      Substance Abuse Son      Schizophrenia Son      Substance Abuse Son      Breast Cancer Maternal Aunt      Cancer - colorectal No family hx of      Prostate Cancer No family hx of      Social History     Socioeconomic History     Marital status:      Spouse name: Esteban     Number of children: 3     Years of education: 13   Occupational History     Occupation: Medical Assistant     Employer: [employer]   Tobacco Use     Smoking status: Every Day     Packs/day: 0.50     Years: 15.00     Pack years: 7.50     Types: Cigarettes     Smokeless tobacco: Current   Vaping Use     Vaping Use: Never used   Substance and Sexual Activity     Alcohol use: Not Currently     Drug use: No     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        Social Documentation:        Balanced Diet: YES    Calcium intake: more than 2 per day    Caffeine: 6 cups per day    Exercise:  type of activity 0    Sunscreen: Yes    Seatbelts:  Yes    Self Breast Exam:  No     Self Testicular Exam: No - n/a    Physical/Emotional/Sexual Abuse: No     Do you feel safe in your environment? Yes        Cholesterol screen up to  date: No - Pt not fasting today.    CHOL      167   11/06/2002    HDL       42   11/06/2002    LDL       99   11/06/2002    TRIG      131   11/06/2002    CHOLHDLRATIO        4   11/06/2002        Eye Exam up to date: Yes    Dental Exam up to date: Yes    Pap smear up to date: Yes    Mammogram up to date: Does Not Apply    Dexa Scan up to date: Does Not Apply    Colonoscopy up to date: Does Not Apply    Immunizations up to date: Yes-Td 2002    Glucose screen if over 40:  No - n/a      ROS: 10 point ROS neg other than the symptoms noted above in the HPI.      Objective      Diagnostic Testing - Imaging/Labs:  MR right foot without  contrast 7/8/2022 2:37 PM     History: 4th met base pain - non-nion of the 1st MTPJ arthrodesis.;  Right foot pain     Additional History from EMR: Patient with prior history of first MTPJ  arthrodesis with right-sided foot pain starting about 6 weeks ago in  the mid foot. Patient describes it as sharp and is painful when  walking. Not near her prior surgical scar.     Techniques: Multiplanar multisequence imaging of the right foot was  obtained without  administration of intravenous contrast.     Comparison: Right foot radiographs 7/6/2022     Findings:     Dorsal external marker placed over the third/fourth mid metatarsal  shafts.     Metallic susceptibility artifact secondary to first  metatarsophalangeal joint arthrodesis with screw fragment partially  compromising assessment.     Bones     Relatively corresponding to the external marker and recent  radiographic abnormality, nondisplaced fracture third metatarsal base  with associated edema, signal intensity, periostitis or regional soft  tissue edema, possibly secondary to stress fracture.     Joints and periarticular soft tissue     Joint effusion: Physiologic amount of joint fluid are present.     Plantar plates: Intersesamoidal ligament and sesamoidal phalangeal  ligaments of the first metatarsophalangeal joints are intact.  Plantar  plates of the second through fifth toe at metatarsophalangeal joints  are grossly intact.     Intermetatarsal spaces: Approximately 4 mm in mediolateral dimension  plantar fat effacement along the second interspace interdigital nerve,  tiny interdigital neuroma cannot be entirely excluded. Trace fluid in  the third interspace.     Ligaments and Tendons     Lisfranc interosseous ligament: Intact.     Tendons: The visualized courses of flexor and extensor tendons are  intact.      Muscles     No atrophy or edema.     ANCILLARY FINDINGS     Plantar subcutaneous fat effacement at the fifth metatarsal head,  likely related to pressure callus.                                                                      Impression:   1. Relatively corresponding to a external marker and recent  radiographic abnormality, stress fracture third metatarsal proximal  shaft.  2.      I have personally reviewed the examination and initial interpretation  and I agree with the findings.     PATI HERNANDEZ       Physical Exam  HENT:      Head: Normocephalic.   Pulmonary:      Effort: Pulmonary effort is normal.   Musculoskeletal:         General: Signs of injury present.      Comments: RLE - postop boot   Neurological:      Mental Status: She is alert.   Psychiatric:         Mood and Affect: Mood normal.       BILLING TIME DOCUMENTATION:   The total TIME spent on this patient on the date of the encounter/appointment was 62 minutes.      TOTAL TIME includes:   Time spent preparing to see the patient (reviewing records and tests)   Time spent face to face (or over the phone) with the patient   Time spent ordering tests, medications, procedures and referrals   Time spent Referring and communicating with other healthcare professionals   Time spent documenting clinical information in Epic     Sincerely,    LEDY Brown CNP

## 2022-10-11 ENCOUNTER — VIRTUAL VISIT (OUTPATIENT)
Dept: FAMILY MEDICINE | Facility: CLINIC | Age: 52
End: 2022-10-11
Payer: COMMERCIAL

## 2022-10-11 DIAGNOSIS — K22.10 ESOPHAGEAL EROSIONS: ICD-10-CM

## 2022-10-11 DIAGNOSIS — K20.0 EOSINOPHILIC ESOPHAGITIS: ICD-10-CM

## 2022-10-11 DIAGNOSIS — N39.41 URGE INCONTINENCE: ICD-10-CM

## 2022-10-11 DIAGNOSIS — N32.81 URGENCY-FREQUENCY SYNDROME: ICD-10-CM

## 2022-10-11 DIAGNOSIS — E66.812 CLASS 2 OBESITY DUE TO EXCESS CALORIES WITHOUT SERIOUS COMORBIDITY WITH BODY MASS INDEX (BMI) OF 35.0 TO 35.9 IN ADULT: ICD-10-CM

## 2022-10-11 DIAGNOSIS — E66.09 CLASS 2 OBESITY DUE TO EXCESS CALORIES WITHOUT SERIOUS COMORBIDITY WITH BODY MASS INDEX (BMI) OF 35.0 TO 35.9 IN ADULT: ICD-10-CM

## 2022-10-11 DIAGNOSIS — Z98.890 HISTORY OF FOOT SURGERY: ICD-10-CM

## 2022-10-11 DIAGNOSIS — M79.674 PAIN OF RIGHT GREAT TOE: Primary | ICD-10-CM

## 2022-10-11 DIAGNOSIS — K22.4 ESOPHAGEAL SPASM: ICD-10-CM

## 2022-10-11 PROCEDURE — 99214 OFFICE O/P EST MOD 30 MIN: CPT | Mod: 95 | Performed by: NURSE PRACTITIONER

## 2022-10-11 RX ORDER — TOPIRAMATE 25 MG/1
75 TABLET, FILM COATED ORAL DAILY
Qty: 270 TABLET | Refills: 1 | Status: SHIPPED | OUTPATIENT
Start: 2022-10-11 | End: 2023-04-11

## 2022-10-11 RX ORDER — SOLIFENACIN SUCCINATE 10 MG/1
10 TABLET, FILM COATED ORAL DAILY
Qty: 90 TABLET | Refills: 3 | Status: SHIPPED | OUTPATIENT
Start: 2022-10-11 | End: 2024-01-18

## 2022-10-11 RX ORDER — OMEPRAZOLE 40 MG/1
40 CAPSULE, DELAYED RELEASE ORAL DAILY
Qty: 90 CAPSULE | Refills: 1 | Status: SHIPPED | OUTPATIENT
Start: 2022-10-11 | End: 2023-03-27

## 2022-10-11 RX ORDER — SUCRALFATE 1 G/1
1 TABLET ORAL 4 TIMES DAILY
Qty: 360 TABLET | Refills: 3 | Status: SHIPPED | OUTPATIENT
Start: 2022-10-11 | End: 2023-08-23

## 2022-10-11 NOTE — PROGRESS NOTES
Kendy is a 52 year old who is being evaluated via a billable video visit.      How would you like to obtain your AVS? MyChart  If the video visit is dropped, the invitation should be resent by: Text to cell phone: [phone]  Will anyone else be joining your video visit? No    Assessment & Plan     Pain of right great toe  In a better post-op place than with previous surgeries. Hopeful about options. Had a great consult with pain clinic. Second post-op visit within the month. May need to wean down on the opioids rather than just stop. I can refill opioids at this point.    History of foot surgery  See above    Urgency-frequency syndrome  No side effects - ok to go to 10 mg  - solifenacin (VESICARE) 10 MG tablet; Take 1 tablet (10 mg) by mouth daily    Urge incontinence  - solifenacin (VESICARE) 10 MG tablet; Take 1 tablet (10 mg) by mouth daily    Eosinophilic esophagitis  - no NSAIDS  - sucralfate (CARAFATE) 1 GM tablet; Take 1 tablet (1 g) by mouth 4 times daily    Esophageal erosions  - sucralfate (CARAFATE) 1 GM tablet; Take 1 tablet (1 g) by mouth 4 times daily    Esophageal spasm  - sucralfate (CARAFATE) 1 GM tablet; Take 1 tablet (1 g) by mouth 4 times daily  - omeprazole (PRILOSEC) 40 MG DR capsule; Take 1 capsule (40 mg) by mouth daily    Class 2 obesity due to excess calories without serious comorbidity with body mass index (BMI) of 35.0 to 35.9 in adult  - I will take this over from weight management  Reasonable to stop the liraglutide with the weight loss she had and is largely maintaining with the topiramate.  - topiramate (TOPAMAX) 25 MG tablet; Take 3 tablets (75 mg) by mouth daily for 90 days    Prescription drug management      Return in about 4 weeks (around 11/8/2022) for IUD .   Coordinate with Rhea Bill, LEDY CNP  [facility]    Juana Larry is a 52 year old accompanied by her self, presenting for the following health issues:  No chief complaint  on file.      HPI     Answers for HPI/ROS submitted by the patient on 10/10/2022  What is the reason for your visit today? : Post op/foot surgery  How many servings of fruits and vegetables do you eat daily?: 2-3  On average, how many sweetened beverages do you drink each day (Examples: soda, juice, sweet tea, etc.  Do NOT count diet or artificially sweetened beverages)?: 1  How many minutes a day do you exercise enough to make your heart beat faster?: 9 or less  How many days a week do you exercise enough to make your heart beat faster?: 3 or less  How many days per week do you miss taking your medication?: 0    Great toe surgery  - revision surgery on 9/16/22 and had post-op with ortho PA, returned to work yesterday. Will have another post-op in a month. Pain at this point and has less great toe range of motion than in previous post-op periods. Some pain left from surgery and fourth metatarsal fracture.    Was able to see Pain Management clinic yesterday and had a good consult. Will plan to follow and has ideas if pain does continue post-op. Signed up for medical marijuana. Was ok to take over prescribing opioids, if needed. Taking acetaminophen-hydrocodone 5-325 mg twice a day. Ortho MD prescribed gabapentin 300 mg TID - not experiencing mental fog. Of note, cannot take NSAIDs related to esophageal spasms and damage. Could try topical NSAID again, but wait until after second ortho consult.    Urinary incontinence - had increased nocturia and one episode of overnight significant incontinence. Tried doubling solifenacin to 10 mg and this has helped.     Blood pressure - at pain yesterday was 116/80s    Esophageal spasm and EOE - has an appt with [facility] for December. Taking sucralafate and it helps immediately after, but . Still having spasms. Peppermint can also help.    Weight loss - hasn't been taking liraglutide. Had lost 37 lbs and had nausea side effect with liraglutide.  Continues topiramate for  maintenance.  Has gained 3 lbs during post-op period of inactivity.    IUD - off every Friday, Thursday after 4 pm    Review of Systems   Constitutional, HEENT, cardiovascular, pulmonary, gi and gu systems are negative, except as otherwise noted.      Objective             Physical Exam   GENERAL: Healthy, alert and no distress  EYES: Eyes grossly normal to inspection.  No discharge or erythema, or obvious scleral/conjunctival abnormalities.  RESP: No audible wheeze, cough, or visible cyanosis.  No visible retractions or increased work of breathing.    SKIN: Visible skin clear. No significant rash, abnormal pigmentation or lesions.  NEURO: Cranial nerves grossly intact.  Mentation and speech appropriate for age.  PSYCH: Mentation appears normal, affect normal/bright, judgement and insight intact, normal speech and appearance well-groomed.          Video-Visit Details    Video Start Time: 11:24 AM    Type of service:  Video Visit    Video End Time:11:46 AM    Originating Location (pt. Location): Home    Distant Location (provider location):  [facility]     Platform used for Video Visit: PolyRem[employer]

## 2022-10-14 ENCOUNTER — MYC MEDICAL ADVICE (OUTPATIENT)
Dept: FAMILY MEDICINE | Facility: CLINIC | Age: 52
End: 2022-10-14

## 2022-10-18 NOTE — PATIENT INSTRUCTIONS
1.  Pain Physical Therapy:  NO   She will as about PT at next postop follow up in a month. I am happy to place a referral if needed.    2.  Pain Psychologist to address relaxation, behavioral change, coping style, and other factors important to improvement.  NO   3.  Diagnostic Studies:  None    4.  Medication Management: Kendy was [place] today. She finds cannabis helpful at bedtime and improved sleep.    5.  Potential procedures: None     6.  Referrals:   7.  Follow up with LEDY Brown CNP for annual medical cannabis re-certification, or sooner if needed.

## 2022-10-25 ENCOUNTER — MYC MEDICAL ADVICE (OUTPATIENT)
Dept: FAMILY MEDICINE | Facility: CLINIC | Age: 52
End: 2022-10-25

## 2022-10-25 ENCOUNTER — MYC REFILL (OUTPATIENT)
Dept: FAMILY MEDICINE | Facility: CLINIC | Age: 52
End: 2022-10-25

## 2022-10-25 DIAGNOSIS — M19.071 PRIMARY OSTEOARTHRITIS OF RIGHT FOOT: ICD-10-CM

## 2022-10-25 DIAGNOSIS — Z98.890 HISTORY OF FOOT SURGERY: ICD-10-CM

## 2022-10-25 DIAGNOSIS — M79.674 PAIN OF RIGHT GREAT TOE: ICD-10-CM

## 2022-10-25 DIAGNOSIS — S92.334A CLOSED NONDISPLACED FRACTURE OF THIRD METATARSAL BONE OF RIGHT FOOT, INITIAL ENCOUNTER: ICD-10-CM

## 2022-10-25 RX ORDER — HYDROCODONE BITARTRATE AND ACETAMINOPHEN 5; 325 MG/1; MG/1
1 TABLET ORAL 2 TIMES DAILY PRN
Qty: 60 TABLET | Refills: 0 | Status: SHIPPED | OUTPATIENT
Start: 2022-10-25 | End: 2022-11-21

## 2022-10-25 NOTE — TELEPHONE ENCOUNTER
Requested Prescriptions   Pending Prescriptions Disp Refills     HYDROcodone-acetaminophen (NORCO) 5-325 MG tablet 60 tablet 0     Sig: Take 1 tablet by mouth 2 times daily as needed for severe pain       There is no refill protocol information for this order          Routing refill request to provider for review/approval because:  Drug not on the [facility] refill protocol   Pt was last seen in clinic on 09/01/2022    Carla Abdul RN  [facility]

## 2022-10-25 NOTE — TELEPHONE ENCOUNTER
Patient calling in about this. Would like to know if medication can be approved for early  today instead of 10/28. Planning on going to pharmacy around 1pm.    Nimisha Navarro RN

## 2022-11-01 ENCOUNTER — MYC MEDICAL ADVICE (OUTPATIENT)
Dept: FAMILY MEDICINE | Facility: CLINIC | Age: 52
End: 2022-11-01

## 2022-11-01 DIAGNOSIS — T50.905A DRUG-INDUCED NAUSEA AND VOMITING: Primary | ICD-10-CM

## 2022-11-01 DIAGNOSIS — R11.2 DRUG-INDUCED NAUSEA AND VOMITING: Primary | ICD-10-CM

## 2022-11-01 RX ORDER — ONDANSETRON 4 MG/1
4 TABLET, ORALLY DISINTEGRATING ORAL EVERY 8 HOURS PRN
Qty: 90 TABLET | Refills: 0 | Status: SHIPPED | OUTPATIENT
Start: 2022-11-01 | End: 2023-04-17

## 2022-11-02 ENCOUNTER — TELEPHONE (OUTPATIENT)
Dept: FAMILY MEDICINE | Facility: CLINIC | Age: 52
End: 2022-11-02

## 2022-11-02 NOTE — TELEPHONE ENCOUNTER
[facility] Prior Authorization Team Request    Medication: ONDANSETRON 4MG ODT TBDP    Dosing:   NDC (required for Medicaid members):     Insurance   BIN: [id number]  PCN: [id number]  Grp:   ID: [id number]    CoverMyMeds Key (if applicable):     Additional documentation:     Filling Pharmacy: [facility]  Phone Number: [phone]  Contact: P PHARM RAAD PA (P [id number]) please send all responses to this pool.  Pharmacy NPI (required for Medicaid members):

## 2022-11-02 NOTE — TELEPHONE ENCOUNTER
Prior Authorization Approval    Authorization Effective Date: 11/2/2022  Authorization Expiration Date: 11/2/2023  Medication: ondansetron (ZOFRAN ODT) 4 MG ODT tab - [place] APPROVED   Insurance Company: [employer] - Phone [phone] Fax [phone]  Which Pharmacy is filling the prescription (Not needed for infusion/clinic administered): [facility] [place], [place] - [address] [place] [address] [id number]  Pharmacy Notified: Yes  Patient Notified: Yes (pharmacy will notify patient when ready)

## 2022-11-02 NOTE — TELEPHONE ENCOUNTER
Central Prior Authorization Team   Phone: [phone]      PA Initiation    Medication: ondansetron (ZOFRAN ODT) 4 MG ODT tab - INITIATED    Insurance Company: [employer] - Phone [phone] Fax [phone]  Pharmacy Filling the Rx: [facility] [place], [place] - [address] [place] [address] [id number]  Filling Pharmacy Phone: [phone]  Filling Pharmacy Fax:    Start Date: 11/2/2022

## 2022-11-16 DIAGNOSIS — F51.01 PRIMARY INSOMNIA: ICD-10-CM

## 2022-11-16 RX ORDER — TRAZODONE HYDROCHLORIDE 100 MG/1
200 TABLET ORAL AT BEDTIME
Qty: 180 TABLET | Refills: 1 | Status: SHIPPED | OUTPATIENT
Start: 2022-11-16 | End: 2023-04-28

## 2022-11-16 NOTE — TELEPHONE ENCOUNTER
"Requested Prescriptions   Pending Prescriptions Disp Refills     traZODone (DESYREL) 100 MG tablet 180 tablet 3     Sig: Take 2 tablets (200 mg) by mouth At Bedtime       Serotonin Modulators Passed - 11/16/2022 11:24 AM        Passed - Recent (12 mo) or future (30 days) visit within the authorizing provider's specialty     Patient has had an office visit with the authorizing provider or a provider within the authorizing providers department within the previous 12 mos or has a future within next 30 days. See \"Patient Info\" tab in inbasket, or \"Choose Columns\" in Meds & Orders section of the refill encounter.              Passed - Medication is active on med list        Passed - Patient is age 18 or older        Passed - No active pregnancy on record        Passed - No positive pregnancy test in past 12 months           Routing refill request to provider for review/approval because:  Drug interaction warning      Izzy Shepard RN on 11/16/2022 at 11:26 AM    "

## 2022-11-21 ENCOUNTER — MYC REFILL (OUTPATIENT)
Dept: FAMILY MEDICINE | Facility: CLINIC | Age: 52
End: 2022-11-21

## 2022-11-21 DIAGNOSIS — S92.334A CLOSED NONDISPLACED FRACTURE OF THIRD METATARSAL BONE OF RIGHT FOOT, INITIAL ENCOUNTER: ICD-10-CM

## 2022-11-21 DIAGNOSIS — M19.071 PRIMARY OSTEOARTHRITIS OF RIGHT FOOT: ICD-10-CM

## 2022-11-21 DIAGNOSIS — Z98.890 HISTORY OF FOOT SURGERY: ICD-10-CM

## 2022-11-21 DIAGNOSIS — M79.674 PAIN OF RIGHT GREAT TOE: ICD-10-CM

## 2022-11-21 RX ORDER — HYDROCODONE BITARTRATE AND ACETAMINOPHEN 5; 325 MG/1; MG/1
1 TABLET ORAL 2 TIMES DAILY PRN
Qty: 60 TABLET | Refills: 0 | Status: SHIPPED | OUTPATIENT
Start: 2022-11-21 | End: 2022-12-18

## 2022-11-28 DIAGNOSIS — F33.9 RECURRENT MAJOR DEPRESSIVE DISORDER, REMISSION STATUS UNSPECIFIED (H): ICD-10-CM

## 2022-11-28 RX ORDER — VENLAFAXINE HYDROCHLORIDE 75 MG/1
75 CAPSULE, EXTENDED RELEASE ORAL DAILY
Qty: 90 CAPSULE | Refills: 1 | Status: SHIPPED | OUTPATIENT
Start: 2022-11-28 | End: 2023-04-07 | Stop reason: DRUGHIGH

## 2022-12-05 DIAGNOSIS — F41.1 GENERALIZED ANXIETY DISORDER: ICD-10-CM

## 2022-12-05 NOTE — TELEPHONE ENCOUNTER
"Requested Prescriptions   Pending Prescriptions Disp Refills     lamoTRIgine (LAMICTAL) 200 MG tablet 90 tablet 1     Sig: Take 1 tablet (200 mg) by mouth daily       Anti-Seizure Meds Protocol  Failed - 12/5/2022  3:21 PM        Failed - Review Authorizing provider's last note.      Refer to last progress notes: confirm request is for original authorizing provider (cannot be through other providers).          Passed - Recent (12 mo) or future (30 days) visit within the authorizing provider's specialty     Patient has had an office visit with the authorizing provider or a provider within the authorizing providers department within the previous 12 mos or has a future within next 30 days. See \"Patient Info\" tab in inbasket, or \"Choose Columns\" in Meds & Orders section of the refill encounter.              Passed - Normal CBC on file in past 26 months     Recent Labs   Lab Test 09/01/22  1537 02/02/22  1646 05/28/21  1407   WBC  --   --  6.9   RBC  --   --  3.97   HGB 12.8   < > 13.2   HCT  --   --  38.0   PLT  --   --  251    < > = values in this interval not displayed.                 Passed - Normal ALT or AST on file in past 26 months     Recent Labs   Lab Test 02/02/22  1646   ALT 31     Recent Labs   Lab Test 02/02/22  1646   AST 15             Passed - Normal platelet count on file in past 26 months     Recent Labs   Lab Test 05/28/21  1407                  Passed - Medication is active on med list        Passed - No active pregnancy on record        Passed - No positive pregnancy test in last 12 months         Routing refill request to provider for review/approval because:  Review last note    Ridge Ocampo RN   [facility]            "

## 2022-12-06 RX ORDER — LAMOTRIGINE 200 MG/1
200 TABLET ORAL DAILY
Qty: 90 TABLET | Refills: 1 | Status: SHIPPED | OUTPATIENT
Start: 2022-12-06 | End: 2023-02-03

## 2022-12-12 DIAGNOSIS — F41.1 GENERALIZED ANXIETY DISORDER: ICD-10-CM

## 2022-12-12 NOTE — TELEPHONE ENCOUNTER
"Requested Prescriptions   Pending Prescriptions Disp Refills     venlafaxine (EFFEXOR XR) 150 MG 24 hr capsule 90 capsule 0     Sig: Take 1 capsule (150 mg) by mouth daily       Serotonin-Norepinephrine Reuptake Inhibitors  Passed - 12/12/2022  2:46 PM        Passed - Blood pressure under 140/90 in past 12 months     BP Readings from Last 3 Encounters:   10/10/22 116/75   09/01/22 138/88   07/12/22 122/62                 Passed - Recent (12 mo) or future (30 days) visit within the authorizing provider's specialty     Patient has had an office visit with the authorizing provider or a provider within the authorizing providers department within the previous 12 mos or has a future within next 30 days. See \"Patient Info\" tab in inbasket, or \"Choose Columns\" in Meds & Orders section of the refill encounter.              Passed - Medication is active on med list        Passed - Patient is age 18 or older        Passed - No active pregnancy on record        Passed - Normal serum creatinine on file in past 12 months     Recent Labs   Lab Test 09/01/22  1537   CR 0.79       Ok to refill medication if creatinine is low          Passed - No positive pregnancy test in past 12 months         Routing refill request to provider for review/approval because:  trazodone and venlafaxine interaction    Thanks!  Ridge Ocampo RN   [facility]                "

## 2022-12-14 RX ORDER — VENLAFAXINE HYDROCHLORIDE 150 MG/1
150 CAPSULE, EXTENDED RELEASE ORAL DAILY
Qty: 90 CAPSULE | Refills: 0 | Status: SHIPPED | OUTPATIENT
Start: 2022-12-14 | End: 2023-03-03

## 2022-12-16 ENCOUNTER — VIRTUAL VISIT (OUTPATIENT)
Dept: GASTROENTEROLOGY | Facility: CLINIC | Age: 52
End: 2022-12-16
Payer: COMMERCIAL

## 2022-12-16 ENCOUNTER — PRE VISIT (OUTPATIENT)
Dept: GASTROENTEROLOGY | Facility: CLINIC | Age: 52
End: 2022-12-16

## 2022-12-16 VITALS — BODY MASS INDEX: 29.86 KG/M2 | WEIGHT: 200 LBS

## 2022-12-16 DIAGNOSIS — R13.19 ESOPHAGEAL DYSPHAGIA: ICD-10-CM

## 2022-12-16 DIAGNOSIS — K22.10 ESOPHAGEAL EROSIONS: ICD-10-CM

## 2022-12-16 DIAGNOSIS — R07.89 ATYPICAL CHEST PAIN: Primary | ICD-10-CM

## 2022-12-16 PROCEDURE — 99205 OFFICE O/P NEW HI 60 MIN: CPT | Mod: 95 | Performed by: PHYSICIAN ASSISTANT

## 2022-12-16 ASSESSMENT — PAIN SCALES - GENERAL: PAINLEVEL: NO PAIN (0)

## 2022-12-16 NOTE — PROGRESS NOTES
Kendy is a 52 year old who is being evaluated via a billable video visit.      How would you like to obtain your AVS? [employer]  If the video visit is dropped, the invitation should be resent by: Text to cell phone: [phone]  Will anyone else be joining your video visit? No      Video-Visit Details    Video Start Time: 954    Type of service:  Video Visit    Video End Time:1032    Originating Location (pt. Location): Home        Distant Location (provider location):  Off-site    Platform used for Video Visit: [employer]

## 2022-12-16 NOTE — LETTER
12/16/2022         RE: Sharmin Nieves  [address]  [place] [address]        Dear Colleague,    Thank you for referring your patient, Sharmin Nieves, to the [facility] [place]. Please see a copy of my visit note below.    GI CLINIC VISIT    CC/REFERRING PROVIDER: Referred Self    HPI: 52 year old female with PMH of class II obesity, GERD s/p Nissen (2003) presenting to GI clinic for possible eosinophilic esophagitis    Kendy states she has had a long history of GERD symptoms, for which she underwent Nissen in 2003, which never really worked completely. She ultimately was started back on PPI 40 daily, which does effectively control her GERD symptoms without any significant breakthrough episodes. However, she is now reporting increased frequency of what she refers to as esophageal spasms. She states she will get a pain/pressure/sharp feeling substernally, which can radiate to her left neck, and also cause SOB. There is no nausea, vomiting, or diaphoresis associated with the episodes. Unrelated to PO intake. Episodes are random and non-exertional. Episodes previously only occurred a few times per year, and now have been increasing in frequency.    Upon GI ROS, she does note longstanding esophageal dysphagia to certain solid textures and pills. The pill dysphagia occurs daily, with variable frequency of solid dysphagia, depending on what she is eating.    Kendy underwent EGD 6/1/2022 for concern of heartburn. LA grade A esopahgitis was noted. There were no musocal inflammatory features concerning for EoE present. There was a small hiatal hernia, and evidence of prior Nissen. Distal esophageal biopsies noted 3 eosinohils/HPF with associated histologic changes consistent with reflux.    She has been on Norco since June 2022. She has history of a rectocele. She has irregular bowel habits with loose stool, alternating with up to 3 days without bowel movement, which then requires  digital assistance through the vaginal vault and straining. There have been some episodes of BRBPR. Cologuard was negative. She notes severe rectal pain that can awaken her from sleep.     No FHx of GI malignancy      PAST MEDICAL HISTORY:  Past Medical History:   Diagnosis Date     Acne      Anxiety      Anxiety state, unspecified     Anxiety     Benign essential hypertension 11/30/2018     Chronic low back pain 5/11/2010     Degenerative joint disease of foot 5/3/2021     Depression     Dr. Gaytan     Diaphragmatic hernia without mention of obstruction or gangrene      Esophageal reflux     with associated esophageal spasm     ETOH abuse      Foot pain      Herpes simplex without mention of complication      History of foot surgery 7/14/2021     Moderate major depression (H) 4/15/2011     Rectal pain 5/16/2011     Right hip pain 5/11/2010     Seasonal affective disorder (H)      STD (sexually transmitted disease)      Surveillance of previously prescribed intrauterine contraceptive device 10/03/05    mirena IUD       PREVIOUS ABDOMINAL/GYNECOLOGIC SURGERIES:    Past Surgical History:   Procedure Laterality Date     ARTHRODESIS FOOT Right 6/9/2021    Procedure: Right 1st metatarsophalangeal joint fuison;  Surgeon: Conor Guillen MD;  [place] [facility] [place] [place]     CHEILECTOMY Right 12/21/2017    Procedure: CHEILECTOMY;  RIGHT FOOT CHEILECTOMY;  Surgeon: Mo Edgar DPM;  Location:  OR     ESOPHAGOSCOPY, GASTROSCOPY, DUODENOSCOPY (EGD), COMBINED N/A 6/1/2022    Procedure: ESOPHAGOGASTRODUODENOSCOPY, WITH BIOPSY;  Surgeon: Neal Loja MD;  [place] [facility] [place]     SURGICAL HISTORY OF -   4/04    Lapr Nissen fundoplication     TONSILLECTOMY & ADENOIDECTOMY  1985     [facility] NONSPECIFIC PROCEDURE  1992    CRYO SURGERY for abnl paps     [facility] STOMACH SURGERY PROCEDURE UNLISTED      Nissen         PERTINENT MEDICATIONS:  Current Outpatient Medications   Medication Sig Dispense Refill     Black Cohosh  (REMIFEMIN MENOPAUSE RELIEF PO)        calcium carbonate (OS-GONZALO) 500 MG tablet        cholecalciferol (VITAMIN D3) 125 mcg (5000 units) capsule Take 1 capsule by mouth daily       HYDROcodone-acetaminophen (NORCO) 5-325 MG tablet Take 1 tablet by mouth 2 times daily as needed for severe pain (7-10) 60 tablet 0     insulin pen needle (31G X 5 MM) 31G X 5 MM miscellaneous Use 1 pen needles daily or as directed. 100 each 0     lamoTRIgine (LAMICTAL) 200 MG tablet Take 1 tablet (200 mg) by mouth daily 90 tablet 1     levonorgestrel (MIRENA) 20 MCG/24HR IUD 1 each (20 mcg) by Intrauterine route once for 1 dose 1 each 0     liraglutide - Weight Management (SAXENDA) 18 MG/3ML pen Week 1: 0.6 mg subcutaneous daily, Week 2: 1.2 mg daily, Week 3: 1.8 mg daily, Week 4: 2.4 mg daily, Week 5 & on: 3 mg daily 15 mL 3     losartan (COZAAR) 100 MG tablet Take 1 tablet (100 mg) by mouth daily 90 tablet 1     omeprazole (PRILOSEC) 40 MG DR capsule Take 1 capsule (40 mg) by mouth daily 90 capsule 1     ondansetron (ZOFRAN ODT) 4 MG ODT tab Take 1 tablet (4 mg) by mouth every 8 hours as needed for nausea 90 tablet 0     solifenacin (VESICARE) 10 MG tablet Take 1 tablet (10 mg) by mouth daily 90 tablet 3     sucralfate (CARAFATE) 1 GM tablet Take 1 tablet (1 g) by mouth 4 times daily 360 tablet 3     topiramate (TOPAMAX) 25 MG tablet Take 3 tablets (75 mg) by mouth daily for 90 days 270 tablet 1     traZODone (DESYREL) 100 MG tablet Take 2 tablets (200 mg) by mouth At Bedtime 180 tablet 1     varenicline (CHANTIX) 0.5 MG tablet Take 1 tablet (0.5 mg) by mouth daily 90 tablet 1     venlafaxine (EFFEXOR XR) 150 MG 24 hr capsule Take 1 capsule (150 mg) by mouth daily 90 capsule 0     venlafaxine (EFFEXOR XR) 75 MG 24 hr capsule Take 1 capsule (75 mg) by mouth daily in combination with 150 mg for total of 225 mg 90 capsule 1       SOCIAL HISTORY:  Social History     Socioeconomic History     Marital status:      Spouse name: Esteban      Number of children: 3     Years of education: 13     Highest education level: Not on file   Occupational History     Occupation: Medical Assistant     Employer: [employer]   Tobacco Use     Smoking status: Every Day     Packs/day: 0.50     Years: 15.00     Pack years: 7.50     Types: Cigarettes     Smokeless tobacco: Current   Vaping Use     Vaping Use: Never used   Substance and Sexual Activity     Alcohol use: Not Currently     Drug use: No     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   Other Topics Concern     Parent/sibling w/ CABG, MI or angioplasty before 65F 55M? 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        Social Documentation:        Balanced Diet: YES    Calcium intake: more than 2 per day    Caffeine: 6 cups per day    Exercise:  type of activity 0    Sunscreen: Yes    Seatbelts:  Yes    Self Breast Exam:  No     Self Testicular Exam: No - n/a    Physical/Emotional/Sexual Abuse: No     Do you feel safe in your environment? Yes        Cholesterol screen up to date: No - Pt not fasting today.    CHOL      167   11/06/2002    HDL       42   11/06/2002    LDL       99   11/06/2002    TRIG      131   11/06/2002    CHOLHDLRATIO        4   11/06/2002        Eye Exam up to date: Yes    Dental Exam up to date: Yes    Pap smear up to date: Yes    Mammogram up to date: Does Not Apply    Dexa Scan up to date: Does Not Apply    Colonoscopy up to date: Does Not Apply    Immunizations up to date: Yes-Td 2002    Glucose screen if over 40:  No - n/a     Social Determinants of Health     Financial Resource Strain: Not on file   Food Insecurity: Not on file   Transportation Needs: Not on file   Physical Activity: Not on file   Stress: Not on file   Social Connections: Not on file   Intimate Partner Violence: Not on file   Housing Stability: Not on file       FAMILY HISTORY:    Family History  "  Problem Relation Age of Onset     C.A.D. Maternal Grandmother      Hypertension Maternal Grandmother      Alcohol/Drug Maternal Grandmother      Depression Maternal Grandmother      Cerebrovascular Disease Paternal Grandfather      Alcohol/Drug Paternal Grandfather      Breast Cancer Paternal Grandmother      Depression Paternal Grandmother      Alcohol/Drug Father      Depression Father      Gastrointestinal Disease Father         GERD     Alcohol/Drug Maternal Grandfather      Depression Maternal Grandfather      Depression Mother      Obesity Mother      Anxiety Disorder Mother      Diabetes Mother      Depression Sister      Alcoholism Brother      Depression Brother      Alcoholism Brother      Depression Brother      Alcoholism Brother      Depression Brother      Gastrointestinal Disease Brother         severe gerd     Autism Spectrum Disorder Son      Substance Abuse Son      Schizophrenia Son      Substance Abuse Son      Breast Cancer Maternal Aunt      Cancer - colorectal No family hx of      Prostate Cancer No family hx of        PHYSICAL EXAMINATION:  Vitals reviewed  There were no vitals taken for this visit.  Video physical exam  General: Patient appears well in no acute distress.   Skin: No visualized rash or lesions on visualized skin  Eyes: EOMI, no erythema, sclera icterus or discharge noted  Resp: Appears to be breathing comfortably without accessory muscle usage, speaking in full sentences, no cough  MSK: Appears to have normal range of motion based on visualized movements  Neurologic: No apparent tremors, facial movements symmetric  Psych: affect normal, alert and oriented    The rest of a comprehensive physical examination is deferred due to PHE (public health emergency) video restrictions      PERTINENT STUDIES Reviewed in EMR    ASSESSMENT/PLAN:    # Atypical chest pain  Long history of episodic chest pain, described as \"esophageal spasm\", non-exertional and unrelated to PO intake, lasting " minutes to longer. Episodes have been increasing significantly in frequency. There has been no formal cardiac work-up outside of pre-operative EKG. The pain does radiate to her jaw and can be associated with shortness of breath, and preceded use of Norco for pain. EGD with LA grade A esophagitis, small hiatal hernia, and evidence of Nissen.  We discussed that while there can be GI etiologies for chest pain, I would recommend cardiac evaluation to rule out cardiac etiology for symptoms. We can continue to work-up dysphagia, as above, and this can potentially reveal GI etiologies, such as GERD, dysmotility, etc,    # Esophageal dysphagia  # GERD s/p Nissen  # LA grade A esophagitis   S/p Nissen in 2003 with ongoing need for PPI despite surgery, which does effectively control symptoms. With this, she has a long history of pill and solid food dysphagia, with mild increase in frequency of symptoms in recent history. EGD 6/2022 showing small hiatal hernia, evidence of Nissen, and LA grade A esophagitis . Distal esophageal biopsies noted 3 eosinohils/HPF with associated histologic changes consistent with reflux. We discussed that eosinophils are commonly present in the esophagus in the setting of acid exposure to the esophageal tissue. Given underlying heartburn symptoms and LA grade A esophagitis, eosinophils are likely secondary to acid exposure. A diagnosis of EoE is made with the presence of at least 15 eosinophils per high power field, with associated clinical symptoms. There were no inflammatory mucosal changes that would be suspicious for EoE. That being said, EoE is often a patchy disease, and can easily be missed when less than 6-8 esophageal biopsies are not obtained, so this is still a possibility. Otherwise, differential includes dysmotility, ongoing esophagitis, and/or dysphagia 2/2 Nissen. Of note, she is using Norco daily, which can also result in esophageal dysmotility, however symptoms preceded  use.    Plan to start with timed barium esophagram with tablet. Pending this result, consider repeat EGD with esophageal biopsies +/- possible dilation, versus HRM.    # Irregular bowel habits  # Rectocele  # Rectal pain  # BRBPR  Alternating loose stool and constipated stool pattern, with patient report of rectocele, requiring digital assistance through the vaginal vault. She uses imodium. Discussed that loose stool may represent overflow in the context of suspected pelvic floor dysfunction, rectocele, and constipating medications. Start trial of clear-dissolving fiber supplement, with potential to add in an osmotic laxative. Cologuard was reassuringly negative, however she does have intermittent BRBPR and rectal pain that awakens her from sleep. We discussed colonoscopy; if we plan to do EGD, above, we can order both simultaneously. Otherwise, I would recommend colonoscopy for reassurance. Will re-discuss following esophagram, above.    RTC 3 months    Thank you for this consultation. It was a pleasure to participate in the care of this patient; please contact us with any further questions.    Nena Granados PA-C    65 minutes spent on the date of the encounter doing chart review, review of test results, interpretation of tests, patient visit and documentation

## 2022-12-16 NOTE — PATIENT INSTRUCTIONS
It was a pleasure taking care of you today.  I've included a brief summary of our discussion and care plan from today's visit below.  Please review this information with your primary care provider.  _______________________________________________________________________    My recommendations are summarized as follows:    1) Order placed for an esophagram. To schedule imaging, please call [phone]   2) Continue the omeprazole 40 mg daily for now.  3) Referral placed to cardiology for their input and reassurance  4) Depending on the esophagram result, we may consider repeating the endoscopy to obtain further samples, versus proceed with the motility testing (manometry), or both. Once we know our plan, we can also order a colonoscopy for reassurance purposes given the rectal pain and intermittent bleeding.  5) Start a clear dissolving fiber supplement, like Benefiber. Start 1 tablespoon daily, with option to increase to 1 tablespoon three times daily if needed.  6) If you are still having hard stools or difficulty evacuating despite Benefiber, try adding in a gentle laxative called MiraLAX, 1 capful daily.     Return to GI Clinic in 3 months to review your progress.    ______________________________________________________________________    How do I schedule labs, imaging studies, or procedures that were ordered in clinic today?     Labs: To schedule lab appointment at the [facility], use my chart or call [phone]. If you have a [place] lab closer to home where you are regularly seen you can give them a call.     Procedures: If a colonoscopy, upper endoscopy, breath test, esophageal manometry, or pH impedence was ordered today, our endoscopy team will call you to schedule this. If you have not heard from our endoscopy team within a week, please call [phone] to schedule.     Imaging Studies: If you were scheduled for a CT scan, X-ray, MRI, ultrasound, HIDA scan or other imaging study,  please call [phone] to have this scheduled.     Referral: If a referral to another specialty was ordered, expect a phone call or follow instructions above. If you have not heard from anyone regarding your referral in a week, please call our clinic to check the status.     Who do I call with any questions after my visit?  Please be in touch if there are any further questions that arise following today's visit.  There are multiple ways to contact your gastroenterology care team.      During business hours, you may reach a Gastroenterology nurse at [phone]    To schedule or reschedule an appointment, please call [phone].     You can always send a secure message through iFlexMe.  iFlexMe messages are answered by your nurse or doctor typically within 24 hours.  Please allow extra time on weekends and holidays.      For urgent/emergent questions after business hours, you may reach the on-call GI Fellow by contacting the [facility]  at [phone].     How will I get the results of any tests ordered?    You will receive all of your results.  If you have signed up for [employer], any tests ordered at your visit will be available to you after your physician reviews them.  Typically this takes 1-2 weeks.  If there are urgent results that require a change in your care plan, your physician or nurse will call you to discuss the next steps.      What is iFlexMe?  iFlexMe is a secure way for you to access all of your healthcare records from the [facility].  It is a web based computer program, so you can sign on to it from any location.  It also allows you to send secure messages to your care team.  I recommend signing up for iFlexMe access if you have not already done so and are comfortable with using a computer.      How to I schedule a follow-up visit?  If you did not schedule a follow-up visit today, please call [phone] to schedule a follow-up office visit.       Sincerely,    Nena Granados PA-C  Division of Gastroenterology, Hepatology & Nutrition  [facility]

## 2022-12-16 NOTE — PROGRESS NOTES
GI CLINIC VISIT    CC/REFERRING PROVIDER: Referred Self    HPI: 52 year old female with PMH of class II obesity, GERD s/p Nissen (2003) presenting to GI clinic for possible eosinophilic esophagitis    Kendy states she has had a long history of GERD symptoms, for which she underwent Nissen in 2003, which never really worked completely. She ultimately was started back on PPI 40 daily, which does effectively control her GERD symptoms without any significant breakthrough episodes. However, she is now reporting increased frequency of what she refers to as esophageal spasms. She states she will get a pain/pressure/sharp feeling substernally, which can radiate to her left neck, and also cause SOB. There is no nausea, vomiting, or diaphoresis associated with the episodes. Unrelated to PO intake. Episodes are random and non-exertional. Episodes previously only occurred a few times per year, and now have been increasing in frequency.    Upon GI ROS, she does note longstanding esophageal dysphagia to certain solid textures and pills. The pill dysphagia occurs daily, with variable frequency of solid dysphagia, depending on what she is eating.    Kendy underwent EGD 6/1/2022 for concern of heartburn. LA grade A esopahgitis was noted. There were no musocal inflammatory features concerning for EoE present. There was a small hiatal hernia, and evidence of prior Nissen. Distal esophageal biopsies noted 3 eosinohils/HPF with associated histologic changes consistent with reflux.    She has been on Norco since June 2022. She has history of a rectocele. She has irregular bowel habits with loose stool, alternating with up to 3 days without bowel movement, which then requires digital assistance through the vaginal vault and straining. There have been some episodes of BRBPR. Cologuard was negative. She notes severe rectal pain that can awaken her from sleep.     No FHx of GI malignancy      PAST MEDICAL HISTORY:  Past Medical History:    Diagnosis Date     Acne      Anxiety      Anxiety state, unspecified     Anxiety     Benign essential hypertension 11/30/2018     Chronic low back pain 5/11/2010     Degenerative joint disease of foot 5/3/2021     Depression     Dr. Gaytan     Diaphragmatic hernia without mention of obstruction or gangrene      Esophageal reflux     with associated esophageal spasm     ETOH abuse      Foot pain      Herpes simplex without mention of complication      History of foot surgery 7/14/2021     Moderate major depression (H) 4/15/2011     Rectal pain 5/16/2011     Right hip pain 5/11/2010     Seasonal affective disorder (H)      STD (sexually transmitted disease)      Surveillance of previously prescribed intrauterine contraceptive device 10/03/05    mirena IUD       PREVIOUS ABDOMINAL/GYNECOLOGIC SURGERIES:    Past Surgical History:   Procedure Laterality Date     ARTHRODESIS FOOT Right 6/9/2021    Procedure: Right 1st metatarsophalangeal joint fuison;  Surgeon: Conor Guillen MD;  Location: [facility] [place] OR     CHEILECTOMY Right 12/21/2017    Procedure: CHEILECTOMY;  RIGHT FOOT CHEILECTOMY;  Surgeon: Mo Edgar DPM;  Location:  OR     ESOPHAGOSCOPY, GASTROSCOPY, DUODENOSCOPY (EGD), COMBINED N/A 6/1/2022    Procedure: ESOPHAGOGASTRODUODENOSCOPY, WITH BIOPSY;  Surgeon: Neal Loja MD;  Location: [facility] OR     SURGICAL HISTORY OF -   4/04    Lapr Nissen fundoplication     TONSILLECTOMY & ADENOIDECTOMY  1985     [facility] [[email or web address] NONSPECIFIC PROCEDURE  1992    CRYO SURGERY for abnl paps     [facility] [[email or web address]] STOMACH SURGERY PROCEDURE UNLISTED      Nissen         PERTINENT MEDICATIONS:  Current Outpatient Medications   Medication Sig Dispense Refill     Black Cohosh (REMIFEMIN MENOPAUSE RELIEF PO)        calcium carbonate (OS-GONZALO) 500 MG tablet        cholecalciferol (VITAMIN D3) 125 mcg (5000 units) capsule Take 1 capsule by mouth daily       HYDROcodone-acetaminophen (NORCO) 5-325 MG tablet Take 1 tablet by mouth 2 times daily  as needed for severe pain (7-10) 60 tablet 0     insulin pen needle (31G X 5 MM) 31G X 5 MM miscellaneous Use 1 pen needles daily or as directed. 100 each 0     lamoTRIgine (LAMICTAL) 200 MG tablet Take 1 tablet (200 mg) by mouth daily 90 tablet 1     levonorgestrel (MIRENA) 20 MCG/24HR IUD 1 each (20 mcg) by Intrauterine route once for 1 dose 1 each 0     liraglutide - Weight Management (SAXENDA) 18 MG/3ML pen Week 1: 0.6 mg subcutaneous daily, Week 2: 1.2 mg daily, Week 3: 1.8 mg daily, Week 4: 2.4 mg daily, Week 5 & on: 3 mg daily 15 mL 3     losartan (COZAAR) 100 MG tablet Take 1 tablet (100 mg) by mouth daily 90 tablet 1     omeprazole (PRILOSEC) 40 MG DR capsule Take 1 capsule (40 mg) by mouth daily 90 capsule 1     ondansetron (ZOFRAN ODT) 4 MG ODT tab Take 1 tablet (4 mg) by mouth every 8 hours as needed for nausea 90 tablet 0     solifenacin (VESICARE) 10 MG tablet Take 1 tablet (10 mg) by mouth daily 90 tablet 3     sucralfate (CARAFATE) 1 GM tablet Take 1 tablet (1 g) by mouth 4 times daily 360 tablet 3     topiramate (TOPAMAX) 25 MG tablet Take 3 tablets (75 mg) by mouth daily for 90 days 270 tablet 1     traZODone (DESYREL) 100 MG tablet Take 2 tablets (200 mg) by mouth At Bedtime 180 tablet 1     varenicline (CHANTIX) 0.5 MG tablet Take 1 tablet (0.5 mg) by mouth daily 90 tablet 1     venlafaxine (EFFEXOR XR) 150 MG 24 hr capsule Take 1 capsule (150 mg) by mouth daily 90 capsule 0     venlafaxine (EFFEXOR XR) 75 MG 24 hr capsule Take 1 capsule (75 mg) by mouth daily in combination with 150 mg for total of 225 mg 90 capsule 1       SOCIAL HISTORY:  Social History     Socioeconomic History     Marital status:      Spouse name: Esteban     Number of children: 3     Years of education: 13     Highest education level: Not on file   Occupational History     Occupation: Medical Assistant     Employer: [employer]   Tobacco Use     Smoking status: Every Day     Packs/day: 0.50     Years:  15.00     Pack years: 7.50     Types: Cigarettes     Smokeless tobacco: Current   Vaping Use     Vaping Use: Never used   Substance and Sexual Activity     Alcohol use: Not Currently     Drug use: No     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   Other Topics Concern     Parent/sibling w/ CABG, MI or angioplasty before 65F 55M? 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        Social Documentation:        Balanced Diet: YES    Calcium intake: more than 2 per day    Caffeine: 6 cups per day    Exercise:  type of activity 0    Sunscreen: Yes    Seatbelts:  Yes    Self Breast Exam:  No     Self Testicular Exam: No - n/a    Physical/Emotional/Sexual Abuse: No     Do you feel safe in your environment? Yes        Cholesterol screen up to date: No - Pt not fasting today.    CHOL      167   11/06/2002    HDL       42   11/06/2002    LDL       99   11/06/2002    TRIG      131   11/06/2002    CHOLHDLRATIO        4   11/06/2002        Eye Exam up to date: Yes    Dental Exam up to date: Yes    Pap smear up to date: Yes    Mammogram up to date: Does Not Apply    Dexa Scan up to date: Does Not Apply    Colonoscopy up to date: Does Not Apply    Immunizations up to date: Yes-Td 2002    Glucose screen if over 40:  No - n/a     Social Determinants of Health     Financial Resource Strain: Not on file   Food Insecurity: Not on file   Transportation Needs: Not on file   Physical Activity: Not on file   Stress: Not on file   Social Connections: Not on file   Intimate Partner Violence: Not on file   Housing Stability: Not on file       FAMILY HISTORY:    Family History   Problem Relation Age of Onset     C.A.D. Maternal Grandmother      Hypertension Maternal Grandmother      Alcohol/Drug Maternal Grandmother      Depression Maternal Grandmother      Cerebrovascular Disease Paternal Grandfather      Alcohol/Drug Paternal Grandfather   "    Breast Cancer Paternal Grandmother      Depression Paternal Grandmother      Alcohol/Drug Father      Depression Father      Gastrointestinal Disease Father         GERD     Alcohol/Drug Maternal Grandfather      Depression Maternal Grandfather      Depression Mother      Obesity Mother      Anxiety Disorder Mother      Diabetes Mother      Depression Sister      Alcoholism Brother      Depression Brother      Alcoholism Brother      Depression Brother      Alcoholism Brother      Depression Brother      Gastrointestinal Disease Brother         severe gerd     Autism Spectrum Disorder Son      Substance Abuse Son      Schizophrenia Son      Substance Abuse Son      Breast Cancer Maternal Aunt      Cancer - colorectal No family hx of      Prostate Cancer No family hx of        PHYSICAL EXAMINATION:  Vitals reviewed  There were no vitals taken for this visit.  Video physical exam  General: Patient appears well in no acute distress.   Skin: No visualized rash or lesions on visualized skin  Eyes: EOMI, no erythema, sclera icterus or discharge noted  Resp: Appears to be breathing comfortably without accessory muscle usage, speaking in full sentences, no cough  MSK: Appears to have normal range of motion based on visualized movements  Neurologic: No apparent tremors, facial movements symmetric  Psych: affect normal, alert and oriented    The rest of a comprehensive physical examination is deferred due to PHE (public health emergency) video restrictions      PERTINENT STUDIES Reviewed in EMR    ASSESSMENT/PLAN:    # Atypical chest pain  Long history of episodic chest pain, described as \"esophageal spasm\", non-exertional and unrelated to PO intake, lasting minutes to longer. Episodes have been increasing significantly in frequency. There has been no formal cardiac work-up outside of pre-operative EKG. The pain does radiate to her jaw and can be associated with shortness of breath, and preceded use of Norco for pain. EGD " with LA grade A esophagitis, small hiatal hernia, and evidence of Nissen.  We discussed that while there can be GI etiologies for chest pain, I would recommend cardiac evaluation to rule out cardiac etiology for symptoms. We can continue to work-up dysphagia, as above, and this can potentially reveal GI etiologies, such as GERD, dysmotility, etc,    # Esophageal dysphagia  # GERD s/p Nissen  # LA grade A esophagitis   S/p Nissen in 2003 with ongoing need for PPI despite surgery, which does effectively control symptoms. With this, she has a long history of pill and solid food dysphagia, with mild increase in frequency of symptoms in recent history. EGD 6/2022 showing small hiatal hernia, evidence of Nissen, and LA grade A esophagitis . Distal esophageal biopsies noted 3 eosinohils/HPF with associated histologic changes consistent with reflux. We discussed that eosinophils are commonly present in the esophagus in the setting of acid exposure to the esophageal tissue. Given underlying heartburn symptoms and LA grade A esophagitis, eosinophils are likely secondary to acid exposure. A diagnosis of EoE is made with the presence of at least 15 eosinophils per high power field, with associated clinical symptoms. There were no inflammatory mucosal changes that would be suspicious for EoE. That being said, EoE is often a patchy disease, and can easily be missed when less than 6-8 esophageal biopsies are not obtained, so this is still a possibility. Otherwise, differential includes dysmotility, ongoing esophagitis, and/or dysphagia 2/2 Nissen. Of note, she is using Norco daily, which can also result in esophageal dysmotility, however symptoms preceded use.    Plan to start with timed barium esophagram with tablet. Pending this result, consider repeat EGD with esophageal biopsies +/- possible dilation, versus HRM.    # Irregular bowel habits  # Rectocele  # Rectal pain  # BRBPR  Alternating loose stool and constipated stool  pattern, with patient report of rectocele, requiring digital assistance through the vaginal vault. She uses imodium. Discussed that loose stool may represent overflow in the context of suspected pelvic floor dysfunction, rectocele, and constipating medications. Start trial of clear-dissolving fiber supplement, with potential to add in an osmotic laxative. Cologuard was reassuringly negative, however she does have intermittent BRBPR and rectal pain that awakens her from sleep. We discussed colonoscopy; if we plan to do EGD, above, we can order both simultaneously. Otherwise, I would recommend colonoscopy for reassurance. Will re-discuss following esophagram, above.    RTC 3 months    Thank you for this consultation. It was a pleasure to participate in the care of this patient; please contact us with any further questions.    Nena Granados PA-C    65 minutes spent on the date of the encounter doing chart review, review of test results, interpretation of tests, patient visit and documentation

## 2022-12-18 ENCOUNTER — MYC REFILL (OUTPATIENT)
Dept: FAMILY MEDICINE | Facility: CLINIC | Age: 52
End: 2022-12-18

## 2022-12-18 DIAGNOSIS — M79.674 PAIN OF RIGHT GREAT TOE: ICD-10-CM

## 2022-12-18 DIAGNOSIS — S92.334A CLOSED NONDISPLACED FRACTURE OF THIRD METATARSAL BONE OF RIGHT FOOT, INITIAL ENCOUNTER: ICD-10-CM

## 2022-12-18 DIAGNOSIS — Z98.890 HISTORY OF FOOT SURGERY: ICD-10-CM

## 2022-12-18 DIAGNOSIS — M19.071 PRIMARY OSTEOARTHRITIS OF RIGHT FOOT: ICD-10-CM

## 2022-12-19 RX ORDER — HYDROCODONE BITARTRATE AND ACETAMINOPHEN 5; 325 MG/1; MG/1
1 TABLET ORAL 2 TIMES DAILY PRN
Qty: 60 TABLET | Refills: 0 | Status: SHIPPED | OUTPATIENT
Start: 2022-12-19 | End: 2023-01-13

## 2022-12-20 ENCOUNTER — TELEPHONE (OUTPATIENT)
Dept: GASTROENTEROLOGY | Facility: CLINIC | Age: 52
End: 2022-12-20

## 2022-12-20 NOTE — TELEPHONE ENCOUNTER
RONAL and sent [employer] for scheduling Return in about 3 months (around 3/16/2023) for Follow up, with Nena Granados.

## 2022-12-27 ENCOUNTER — VIRTUAL VISIT (OUTPATIENT)
Dept: FAMILY MEDICINE | Facility: CLINIC | Age: 52
End: 2022-12-27
Payer: COMMERCIAL

## 2022-12-27 DIAGNOSIS — R06.02 SHORTNESS OF BREATH: ICD-10-CM

## 2022-12-27 DIAGNOSIS — U07.1 INFECTION DUE TO 2019 NOVEL CORONAVIRUS: Primary | ICD-10-CM

## 2022-12-27 DIAGNOSIS — R05.1 ACUTE COUGH: ICD-10-CM

## 2022-12-27 PROCEDURE — 99213 OFFICE O/P EST LOW 20 MIN: CPT | Mod: CS

## 2022-12-27 NOTE — PROGRESS NOTES
Kendy is a 52 year old who is being evaluated via a billable video visit.      How would you like to obtain your AVS? MyChart        Assessment & Plan     Acute cough, Shortness of breath, Infection due to COVID-19  - nirmatrelvir and ritonavir (PAXLOVID) therapy pack; Take 3 tablets by mouth 2 times daily for 5 days (Take 2 Nirmatrelvir tablets and 1 Ritonavir tablet twice daily for 5 days)  Patient at risk for severe infection due to hypertension, heavy alcohol use in past, wheezing symptoms.  Discussed with pharmacy potential medication interactions for the patient through regimen and Paxlovid.  Pharmacy advised a 50% reduction in the Norco dose for the time that patient is taking Paxlovid, a 50% or cessation of trazodone with resumption 3 days after finishing Paxlovid, and limitation of Vesicare to 5 mg daily while taking Paxil  with resumption of normal dose 3 days after finishing Paxlovid.  Patient verbalized understanding instructions: reported intention to stop her trazodone and vesicare and dose her norco at 50% while taking Paxlovid    No follow-ups on file.    Modesto Adames NP  [facility]    uJana Larry is a 52 year old   presenting for the following health issues:  No chief complaint on file.  Son tested positive for COVID- home test on Wednesday of last week. Started feeling sick on Sunday afternoon. Patient had tested positive for COVID.  Congestion, coughing, body aches, left ear pain, wheezing if taking a deep breath, fatigue, subjective fever.      HPI     Review of Systems   Constitutional, HEENT, cardiovascular, pulmonary, gi and gu systems are negative, except as otherwise noted.      Objective           Vitals:  No vitals were obtained today due to virtual visit.    Physical Exam   GENERAL: Healthy, alert and no distress  EYES: Eyes grossly normal to inspection.  No discharge or erythema, or obvious scleral/conjunctival abnormalities.  RESP:Dry-sounding cough. No  audible wheeze, or visible cyanosis.  No visible retractions or increased work of breathing.    SKIN: Visible skin clear. No significant rash, abnormal pigmentation or lesions.  NEURO: Cranial nerves grossly intact.  Mentation and speech appropriate for age.  PSYCH: Mentation appears normal, affect normal/bright, judgement and insight intact, normal speech and appearance well-groomed.          Video-Visit Details    Type of service:  Video Visit     Joined the call at 12/27/2022, 7:22:12 am.  Left the call at 12/27/2022, 7:33:11 am.  Originating Location (pt. Location): Home  Distant Location (provider location):  On-site  Platform used for Video Visit: Charlie

## 2023-01-13 ENCOUNTER — MYC REFILL (OUTPATIENT)
Dept: FAMILY MEDICINE | Facility: CLINIC | Age: 53
End: 2023-01-13
Payer: COMMERCIAL

## 2023-01-13 DIAGNOSIS — M79.674 PAIN OF RIGHT GREAT TOE: ICD-10-CM

## 2023-01-13 DIAGNOSIS — Z98.890 HISTORY OF FOOT SURGERY: ICD-10-CM

## 2023-01-13 DIAGNOSIS — S92.334A CLOSED NONDISPLACED FRACTURE OF THIRD METATARSAL BONE OF RIGHT FOOT, INITIAL ENCOUNTER: ICD-10-CM

## 2023-01-13 DIAGNOSIS — M19.071 PRIMARY OSTEOARTHRITIS OF RIGHT FOOT: ICD-10-CM

## 2023-01-13 NOTE — TELEPHONE ENCOUNTER
Routing refill request to provider for review/approval because:  Drug not on the [facility] refill protocol     Izzy Shepard RN  [facility]

## 2023-01-16 RX ORDER — HYDROCODONE BITARTRATE AND ACETAMINOPHEN 5; 325 MG/1; MG/1
1 TABLET ORAL 2 TIMES DAILY PRN
Qty: 60 TABLET | Refills: 0 | Status: SHIPPED | OUTPATIENT
Start: 2023-01-16 | End: 2023-02-09

## 2023-01-17 ENCOUNTER — MYC MEDICAL ADVICE (OUTPATIENT)
Dept: FAMILY MEDICINE | Facility: CLINIC | Age: 53
End: 2023-01-17
Payer: COMMERCIAL

## 2023-01-17 NOTE — TELEPHONE ENCOUNTER
Mariya Cuellar Pt requesting virtual appt on Tuesday.     Would you like us to advise alt day for a virtual since it is your only in-office days?     Please advise.   Thank you  Meri MENESES RN  [facility]

## 2023-01-31 ENCOUNTER — MYC MEDICAL ADVICE (OUTPATIENT)
Dept: FAMILY MEDICINE | Facility: CLINIC | Age: 53
End: 2023-01-31

## 2023-01-31 ENCOUNTER — VIRTUAL VISIT (OUTPATIENT)
Dept: FAMILY MEDICINE | Facility: CLINIC | Age: 53
End: 2023-01-31
Payer: COMMERCIAL

## 2023-01-31 DIAGNOSIS — F33.9 RECURRENT MAJOR DEPRESSIVE DISORDER, REMISSION STATUS UNSPECIFIED (H): Primary | ICD-10-CM

## 2023-01-31 DIAGNOSIS — F41.1 GENERALIZED ANXIETY DISORDER: ICD-10-CM

## 2023-01-31 DIAGNOSIS — F10.288 ALCOHOL DEPENDENCE WITH OTHER ALCOHOL-INDUCED DISORDER (H): ICD-10-CM

## 2023-01-31 DIAGNOSIS — I10 BENIGN ESSENTIAL HYPERTENSION: ICD-10-CM

## 2023-01-31 DIAGNOSIS — E66.01 MORBID OBESITY (H): ICD-10-CM

## 2023-01-31 DIAGNOSIS — Z98.890 HISTORY OF FOOT SURGERY: ICD-10-CM

## 2023-01-31 DIAGNOSIS — Z86.16 HISTORY OF 2019 NOVEL CORONAVIRUS DISEASE (COVID-19): ICD-10-CM

## 2023-01-31 DIAGNOSIS — K21.00 GASTROESOPHAGEAL REFLUX DISEASE WITH ESOPHAGITIS WITHOUT HEMORRHAGE: ICD-10-CM

## 2023-01-31 PROCEDURE — 99215 OFFICE O/P EST HI 40 MIN: CPT | Mod: GT | Performed by: NURSE PRACTITIONER

## 2023-01-31 RX ORDER — LOSARTAN POTASSIUM 100 MG/1
100 TABLET ORAL DAILY
Qty: 90 TABLET | Refills: 1 | Status: SHIPPED | OUTPATIENT
Start: 2023-01-31 | End: 2023-09-25

## 2023-01-31 ASSESSMENT — ANXIETY QUESTIONNAIRES
GAD7 TOTAL SCORE: 15
GAD7 TOTAL SCORE: 15
4. TROUBLE RELAXING: MORE THAN HALF THE DAYS
7. FEELING AFRAID AS IF SOMETHING AWFUL MIGHT HAPPEN: MORE THAN HALF THE DAYS
5. BEING SO RESTLESS THAT IT IS HARD TO SIT STILL: MORE THAN HALF THE DAYS
GAD7 TOTAL SCORE: 15
6. BECOMING EASILY ANNOYED OR IRRITABLE: NEARLY EVERY DAY
8. IF YOU CHECKED OFF ANY PROBLEMS, HOW DIFFICULT HAVE THESE MADE IT FOR YOU TO DO YOUR WORK, TAKE CARE OF THINGS AT HOME, OR GET ALONG WITH OTHER PEOPLE?: SOMEWHAT DIFFICULT
7. FEELING AFRAID AS IF SOMETHING AWFUL MIGHT HAPPEN: MORE THAN HALF THE DAYS
IF YOU CHECKED OFF ANY PROBLEMS ON THIS QUESTIONNAIRE, HOW DIFFICULT HAVE THESE PROBLEMS MADE IT FOR YOU TO DO YOUR WORK, TAKE CARE OF THINGS AT HOME, OR GET ALONG WITH OTHER PEOPLE: SOMEWHAT DIFFICULT
2. NOT BEING ABLE TO STOP OR CONTROL WORRYING: MORE THAN HALF THE DAYS
1. FEELING NERVOUS, ANXIOUS, OR ON EDGE: MORE THAN HALF THE DAYS
3. WORRYING TOO MUCH ABOUT DIFFERENT THINGS: MORE THAN HALF THE DAYS

## 2023-01-31 ASSESSMENT — PATIENT HEALTH QUESTIONNAIRE - PHQ9
10. IF YOU CHECKED OFF ANY PROBLEMS, HOW DIFFICULT HAVE THESE PROBLEMS MADE IT FOR YOU TO DO YOUR WORK, TAKE CARE OF THINGS AT HOME, OR GET ALONG WITH OTHER PEOPLE: VERY DIFFICULT
SUM OF ALL RESPONSES TO PHQ QUESTIONS 1-9: 16
SUM OF ALL RESPONSES TO PHQ QUESTIONS 1-9: 16

## 2023-01-31 NOTE — PROGRESS NOTES
Kendy is a 52 year old who is being evaluated via a billable video visit.      How would you like to obtain your AVS? MyChart  If the video visit is dropped, the invitation should be resent by: Text to cell phone: [phone] [email or web address] Weekdays  Will anyone else be joining your video visit? No        Assessment & Plan     Recurrent major depressive disorder, remission status unspecified (H)  Significant worsening more recently with deep sadness and apathy  Followed covid infection  Did have incomplete treatment prior to covid, so agree with MTM consult  At upper end of dosing for lamictal and venlafaxine  Will add SAD light  - SAD Light, 10,000 Lux Order for DME - ONLY FOR DME  - Adult Post Covid Clinic Referral; Future    Generalized anxiety disorder  See above    History of foot surgery  No real improvement even after revision. Unable to follow-up with [facility] at this time due to financial constraints. [place] [facility]. Discussed pain management - foot remains painful, initially norco every day and currently at BID, but this is over 3+ years. Has CSA and  was reviewed. No concerns about abuse. Has established with pain clinic and plans to return there for possible novel therapies. At this time, I am ok with refills by myself or clinic colleagues when needed every 1-3 months at current dosing. Kendy does occasionally need lorazepam for flying anxiety and we review each time to not to take the benzo and opioid concurrently.    History of 2019 novel coronavirus disease (COVID-19)  - SAD Light, 10,000 Lux Order for DME - ONLY FOR DME  - Adult Post Covid Clinic Referral; Future    Benign essential hypertension  Well controlled  - losartan (COZAAR) 100 MG tablet; Take 1 tablet (100 mg) by mouth daily    Gastroesophageal reflux disease with esophagitis without hemorrhage  Reviewed GI consult and plan with patient  Has cardiology consult scheduled for r/o cardiac etiology    Morbid  "obesity (H)  Stable off GLP-1 at this time    Alcohol dependence with other alcohol-induced disorder (H)  Entirely sober      Review of prior external note(s) from - GI  Ordering of each unique test  Prescription drug management  50 minutes spent on the date of the encounter doing chart review, history and exam, documentation and further activities per the note       BMI:   Estimated body mass index is 29.86 kg/m  as calculated from the following:    Height as of 9/1/22: 1.743 m (5' 8.62\").    Weight as of 12/16/22: 90.7 kg (200 lb).   Weight management plan: topiramate    There are no Patient Instructions on file for this visit.    No follow-ups on file.    LEDY Seaman CNP  [facility] Services[facility]ELGICA Larry is a 52 year old accompanied by her self, presenting for the following health issues:  No chief complaint on file.      HPI   Answers for HPI/ROS submitted by the patient on 1/31/2023  If you checked off any problems, how difficult have these problems made it for you to do your work, take care of things at home, or get along with other people?: Very difficult  PHQ9 TOTAL SCORE: 16  DMITRIY 7 TOTAL SCORE: 15  Depression/Anxiety: Depression & Anxiety  Status since last visit:: bad  Anxiety since last: : medium  Other associated symptoms of depression:: No  Other associated symotome: : No  Significant life event: : No  Anxious:: Yes  Current substance use:: No  What is the reason for your visit today? : Med check  How many servings of fruits and vegetables do you eat daily?: 2-3  On average, how many sweetened beverages do you drink each day (Examples: soda, juice, sweet tea, etc.  Do NOT count diet or artificially sweetened beverages)?: 1  How many minutes a day do you exercise enough to make your heart beat faster?: 9 or less  How many days a week do you exercise enough to make your heart beat faster?: 3 or less  How many days per week do you miss taking your medication?: 0      Covid " infection Dec 2022 - symptoms included congestion, coughing, body aches, left ear pain, wheezing if taking a deep breath, fatigue, subjective fever. Took paxlovid and during course needed to hold trazodone, vesicare and decrease norco by 50%. Feeling like depression is worse.    Mental health - feeling really sad and low motivation, no SI. Has scheduled an appointment with MTM.     Foot pain/surgery - no significant changes since revision, will see podiatry ortho at [facility] for imaging. Cannot see Dr. Reardon at [facility] until she makes payments on bill. Not planning on any future surgeries. Planning to [place] back with pain management clinic for pain control.     Esophageal spasm - s/p Nissen in 2003. EGD 6/1/22 - LA grade A esophagitis without concern for EoE and noted small hiatal hernia. Bx showed histologic changes c/w reflux. GI consult 12/16/22. Recommended  1)  r/o cardiac etiology for chest pain symptoms. Will see cardiology next week.  2) GI work-up dysphagia - timed barium esophagram with tablet  3) EoE still a possibility due to inconsistent nature of work-up when less than 6-8 bx are taken. Consider repeat EGD, additional bx +/- dilation  4) norco can affect dysmotility    Weight - weight stable around 200 lb and just taking the topiramate 75 mg daily. Stopped GLP-1 fall 2022 due to rapid weight loss.  Wt Readings from Last 5 Encounters:   12/16/22 90.7 kg (200 lb)   09/01/22 91.9 kg (202 lb 8 oz)   07/12/22 72.6 kg (160 lb)   06/01/22 98.9 kg (218 lb)   04/25/22 107 kg (236 lb)       BP Readings from Last 6 Encounters:   10/10/22 116/75   09/01/22 138/88   07/12/22 122/62   06/01/22 111/69   04/25/22 126/82   03/22/22 110/80         Review of Systems         Objective           Vitals:  No vitals were obtained today due to virtual visit.    Physical Exam   GENERAL: Healthy, alert and no distress  EYES: Eyes grossly normal to inspection.  No discharge or erythema, or obvious scleral/conjunctival  abnormalities.  RESP: No audible wheeze, cough, or visible cyanosis.  No visible retractions or increased work of breathing.    SKIN: Visible skin clear. No significant rash, abnormal pigmentation or lesions.  NEURO: Cranial nerves grossly intact.  Mentation and speech appropriate for age.  PSYCH: Mentation appears normal, affect normal/bright, judgement and insight intact, normal speech and appearance well-groomed.      Video-Visit Details    Type of service:  Video Visit     Originating Location (pt. Location): Home  Distant Location (provider location):  On-site  Platform used for Video Visit: Charlie

## 2023-02-01 NOTE — PROGRESS NOTES
Medication Therapy Management (MTM) Encounter    ASSESSMENT:                            Medication Adherence/Access: No issues identified    Depression/Anxiety: Patient has been on venlafaxine for depression and experienced some benefit but current depressive symptoms are worsening so additional treatment is likely needed. We are hesitant to discontinue venlafaxine at this time because multiple antidepressants have been unsuccessful for treating her depression. Patient inquired about using Rexulti after seeing a medication advertisement and we agreed an antipsychotic agent could be helpful in managing her depressive symptoms. We discussed adding on Rexulti or Abilify to patient's treatment regimen and ultimately decided to initiate Abilify due to the potential for it to have a cheaper co-pay since only brand-name Rexulti is available at this time. We also discussed the potential side effects of weight gain, movement disorders like akathisia, and sedation when starting Abilify. Will start Abilify 2mg daily with plan to increase based on efficacy and tolerability.    Recent COVID: Stable - patient is at baseline physical health following resolution of infection.    Hypertension: Stable    GERD/Esophageal Spasms: Stable on current medications - patient scheduling follow up appointments for further assessment.    Overactive Bladder: Not adequately managed with current medication - patient speaking with PCP about different treatment options    Weight Management: Stable    History of Foot Surgery/Foot Pain: Stable    Smoking Cessation: Patient still currently using cigarettes but open to discussing smoking cessation plan with [facility] pharmacy in the future    PLAN:                            - Abilify 2mg daily initiated for depression, prescription sent to pharmacy    - Patient will follow up with [facility] regarding smoking cessation       Follow-up: Friday, March 3rd 1:00PM to assess Abilify tolerability and  "efficacy    SUBJECTIVE/OBJECTIVE:                          Sharmin Nieves is a 52 year old female contacted via secure video for an initial visit. She was referred to me from LEDY Seaman CNP.      Reason for visit: \"discuss mental health medications\".    Allergies/ADRs: Reviewed in chart  Past Medical History: Reviewed in chart  Tobacco: She reports that she has been smoking cigarettes. She has a 7.50 pack-year smoking history. She uses smokeless tobacco.Nicotine/Tobacco Cessation Plan: Discussed with patient option to follow-up with Francia when ready to discuss/start smoking cessation plan.  Alcohol: history of alcohol dependence - currently sober per 1/31/2023 progress note.    Medication Adherence/Access: no issues reported - patient reported using pill box to remember to take medications.    Depression/Anxiety:   Sharmin is seen at [facility] by PCP - LEDY Medrano. Most recent visit was 1/31/2023 at which time no medication changes were made. Please see note by pt's provider for additional details regarding symptoms and history.     Patient is currently taking venlafaxine XR 225mg daily and trazodone 200mg at bedtime. Patient has been taking venlafaxine for a long time and feels it was initially beneficial but may not be working as well- has not experienced any side effects with this medication. Patient reported the trazodone has been helpful for sleep and has not experienced any side effects. Patient is not currently seeing a psychotherapist or psychiatrist.     Today, patient reports feeling depressed and describing this as not being able to feel rito, difficulties getting out of bed, and lack of motivation. Patient reports having anxiety that comes and goes throughout the day around financial things, but experiences more depression than anxiety. Patient has propranolol as needed for anxiety and finds this to be a little helpful but has not needed this medication recently. Patient is " able to sleep well. Patient inquired about using Rexulti for depression because she saw an advertisement for this medication.    Past medication trials:   Zoloft 50mg (2004) - not effective  Wellbutrin SR 200mg BID (6903-3811 and 5204-4067) - not effective  Celexa 50mg (2005 and 1066-8888) - not effective  Cymbalta 120mg daily (4591-3138 ) - not effective  Effexor 225mg daily (8739-8319 and 1076-7628) - somewhat helpful  Risperidone 3mg HS (8503-9714) - not helpful  Seroquel 100mg HS (2008) - no effect  Propanol PRN anxiety - a little helpful    Recent COVID: Patient was recently infected with COVID, tested positive on 12/25/23 and treated with paxlovid. Patient feels she is back to her baseline physical health and is not needing to use the albuterol inhaler for shortness of breath or cough.    Hypertension: Patient is currently taking losartan 100mg daily and stated this medication is effective with no side effects. Patient does not monitor blood pressure at home but occasionally checks at work with general readings in the 120s/80s.    BP Readings from Last 3 Encounters:   10/10/22 116/75   09/01/22 138/88   07/12/22 122/62      GERD/Esopheal Spasms: Patient currently uses omeprazole 40mg daily to manage her esophageal symptoms with no side effects and occassionally uses sucralfate 1g as needed. Patient reported she is planning on scheduling a cardiology follow up appointment to make sure esophageal spasms are not cardiac-related.     Overactive Bladder: Patient is currently taking solifenacin 10mg daily but does not find this medication affective and reported experiencing dry mouth while taking this medication. Patient is planning to speak with PCP about switching to a different agent.    Weight Management: Patient is currently managing weight with topiramate 75mg daily and finds this is helpful for maintaining weight loss with no side effects. Patient previously used saxenda but stopped after experiencing rapid  weight loss- patient believes to have lost 30lbs in 6 weeks.     Wt Readings from Last 4 Encounters:   12/16/22 200 lb (90.7 kg)   09/01/22 202 lb 8 oz (91.9 kg)   07/12/22 160 lb (72.6 kg)   06/01/22 218 lb (98.9 kg)     History of foot surgery/Foot Pain: Patient currently manages foot pain with norco 5-325mg 1 tablet be mouth 2 times daily as needed for severe pain. Patient typically takes this twice daily and stated this adequately controls her pain without experiencing any side effects.    Smoking Cessation: Patient is currently using cigarettes without any smoking cessation aids but would like to try quitting in the future. Patient stated varenacline works better than anything else for smoking cessation but she does experience nausea with this medication which she manages with ondansetron. Patient is interested in meeting again for smoking cessation follow up in the future.    ----------------      I spent 35 minutes with this patient today. All changes were made via collaborative practice agreement with Randa Bill. A copy of the visit note was provided to the patient's provider(s).    A summary of these recommendations was sent via [employer].    Nreeyda Valdez, PharmD, PGY2 Psychiatry Pharmacy Resident  Francia Paris, PharmD, BCPP  Medication Therapy Management Pharmacist  [facility]    Telemedicine Visit Details  Type of service:  Video Conference via [employer]  Start Time: 9:30 AM  End Time: 10:05 AM     Medication Therapy Recommendations  Moderate major depression (H)    Current Medication: venlafaxine (EFFEXOR XR) 150 MG 24 hr capsule   Rationale: Synergistic therapy - Needs additional medication therapy - Indication   Recommendation: Start Medication - Abilify 2 MG Tabs   Status: Accepted per CPA

## 2023-02-03 ENCOUNTER — VIRTUAL VISIT (OUTPATIENT)
Dept: PHARMACY | Facility: CLINIC | Age: 53
End: 2023-02-03
Payer: COMMERCIAL

## 2023-02-03 DIAGNOSIS — N32.81 OVERACTIVE BLADDER: ICD-10-CM

## 2023-02-03 DIAGNOSIS — M79.673 FOOT PAIN: ICD-10-CM

## 2023-02-03 DIAGNOSIS — F41.8 SITUATIONAL ANXIETY: ICD-10-CM

## 2023-02-03 DIAGNOSIS — E66.01 MORBID OBESITY (H): ICD-10-CM

## 2023-02-03 DIAGNOSIS — K22.4 ESOPHAGEAL SPASM: ICD-10-CM

## 2023-02-03 DIAGNOSIS — I10 BENIGN ESSENTIAL HYPERTENSION: ICD-10-CM

## 2023-02-03 DIAGNOSIS — Z72.0 TOBACCO USE: ICD-10-CM

## 2023-02-03 DIAGNOSIS — F33.9 RECURRENT MAJOR DEPRESSIVE DISORDER, REMISSION STATUS UNSPECIFIED (H): Primary | ICD-10-CM

## 2023-02-03 DIAGNOSIS — Z86.16 HISTORY OF 2019 NOVEL CORONAVIRUS DISEASE (COVID-19): ICD-10-CM

## 2023-02-03 PROCEDURE — 99605 MTMS BY PHARM NP 15 MIN: CPT | Performed by: PHARMACIST

## 2023-02-03 PROCEDURE — 99607 MTMS BY PHARM ADDL 15 MIN: CPT | Performed by: PHARMACIST

## 2023-02-03 RX ORDER — ARIPIPRAZOLE 2 MG/1
2 TABLET ORAL DAILY
Qty: 30 TABLET | Refills: 1 | Status: SHIPPED | OUTPATIENT
Start: 2023-02-03 | End: 2023-03-03 | Stop reason: DRUGHIGH

## 2023-02-03 NOTE — PATIENT INSTRUCTIONS
"Recommendations from today's MTM visit:                                                      - Abilify 2mg daily initiated for depression, prescription sent to pharmacy    - Patient will follow up with MTM Pharmacy regarding smoking cessation       Follow-up: Friday, March 3rd 1:00PM to assess Abilify tolerability and efficacy    It was great speaking with you today.  I value your experience and would be very thankful for your time in providing feedback in our clinic survey. In the next few days, you may receive an email or text message from [employer] with a link to a survey related to your  clinical pharmacist.\"     To schedule another MTM appointment, please call the clinic directly or you may call the MTM scheduling line at [phone] or toll-free at [phone].     My Clinical Pharmacist's contact information:                                                      Please feel free to contact me with any questions or concerns you have.      Francia Paris, PharmD, BCPP  Medication Therapy Management Pharmacist  [facility]     "

## 2023-02-03 NOTE — Clinical Note
Hello, thank you for the referral! We started Abilify for patient today. I will check in with her again in 1 month.   Thank you,   Francia

## 2023-02-09 ENCOUNTER — MYC REFILL (OUTPATIENT)
Dept: FAMILY MEDICINE | Facility: CLINIC | Age: 53
End: 2023-02-09
Payer: COMMERCIAL

## 2023-02-09 DIAGNOSIS — Z98.890 HISTORY OF FOOT SURGERY: ICD-10-CM

## 2023-02-09 DIAGNOSIS — M19.071 PRIMARY OSTEOARTHRITIS OF RIGHT FOOT: ICD-10-CM

## 2023-02-09 DIAGNOSIS — S92.334A CLOSED NONDISPLACED FRACTURE OF THIRD METATARSAL BONE OF RIGHT FOOT, INITIAL ENCOUNTER: ICD-10-CM

## 2023-02-09 DIAGNOSIS — M79.674 PAIN OF RIGHT GREAT TOE: ICD-10-CM

## 2023-02-09 RX ORDER — HYDROCODONE BITARTRATE AND ACETAMINOPHEN 5; 325 MG/1; MG/1
1 TABLET ORAL 2 TIMES DAILY PRN
Qty: 60 TABLET | Refills: 0 | Status: SHIPPED | OUTPATIENT
Start: 2023-02-09 | End: 2023-03-08

## 2023-02-09 NOTE — TELEPHONE ENCOUNTER
Requested Prescriptions   Pending Prescriptions Disp Refills     HYDROcodone-acetaminophen (NORCO) 5-325 MG tablet 60 tablet 0     Sig: Take 1 tablet by mouth 2 times daily as needed for severe pain (7-10)       There is no refill protocol information for this order        Routing refill request to provider for review/approval because:  Drug not on the [facility] refill protocol     Carla Abdul RN  [facility]

## 2023-03-02 NOTE — PROGRESS NOTES
Medication Therapy Management (MTM) Encounter    ASSESSMENT:                            Medication Adherence/Access: No issues identified    Depression/Anxiety: Kendy is tolerating Abilify well, no significant improvement in depression symptoms.  We will plan to increase to 5 mg daily.  She did ask about max dose of venlafaxine.  We discussed that technically venlafaxine extended release max dose is 225 mg daily although sometimes doses up to 300/375 mg daily are used.  We could consider venlafaxine dose increase in the future if Abilify is poorly tolerated/ineffective.    PLAN:                            Increase Abilify to 5 mg daily.    Follow-up: 3/31/2023 1 PM telephone visit    SUBJECTIVE/OBJECTIVE:                          Sharmin Nieves is a 52 year old female contacted via secure video for a follow-up visit.  Today's visit is a follow-up MTM visit from 2/3/23     Reason for visit: Abilify check-in.    Allergies/ADRs: Reviewed in chart  Past Medical History: Reviewed in chart  Tobacco: She reports that she has been smoking cigarettes. She has a 7.50 pack-year smoking history. She uses smokeless tobacco.Nicotine/Tobacco Cessation Plan:   not interested at this time  Alcohol: none      Medication Adherence/Access: no issues reported    Depression/Anxiety:   Current medication:   venlafaxine XR 225mg daily  trazodone 200mg at bedtime  Abilify 2mg daily    Sharmin is seen at [facility] by PCP - LEDY Medrano. Most recent visit was 1/31/2023 at which time no medication changes were made. Most recent MTM visit 2/3/23 at which time Abilify 2mg daily was started for depression augmentation.     Today, Kendy reports some possible slight improvement since starting Abilify, but overall no significant improvement.  Still endorsing symptoms of low motivation, difficulty getting out of bed on the weekends, anhedonia.  No side effects since starting Abilify.    Past medication trials:   Zoloft 50mg (2004) -  not effective  Wellbutrin SR 200mg BID (5015-0107 and 3858-3225) - not effective  Celexa 50mg (2005 and 3550-6099) - not effective  Cymbalta 120mg daily (8946-9517 ) - not effective  Effexor 225mg daily (4168-1912 and 3821-0028) - somewhat helpful  Risperidone 3mg HS (2795-0036) - not helpful  Seroquel 100mg HS (2008) - no effect  Propanol PRN anxiety - a little helpful    ----------------      I spent 15 minutes with this patient today. All changes were made via collaborative practice agreement with Randa Bill A copy of the visit note was provided to the patient's provider(s).    A summary of these recommendations was sent via [employer].    Francia Paris, PharmD, BCPP  Medication Therapy Management Pharmacist  [facility]      Telemedicine Visit Details  Type of service:  Telephone visit  Start Time: 1:00 PM  End Time: 1:14 PM     Medication Therapy Recommendations  Moderate major depression (H)    Current Medication: ARIPiprazole (ABILIFY) 2 MG tablet (Discontinued)   Rationale: Dose too low - Dosage too low - Effectiveness   Recommendation: Increase Dose - Abilify 5 MG Tabs   Status: Accepted per CPA

## 2023-03-03 ENCOUNTER — VIRTUAL VISIT (OUTPATIENT)
Dept: PHARMACY | Facility: CLINIC | Age: 53
End: 2023-03-03
Payer: COMMERCIAL

## 2023-03-03 DIAGNOSIS — F41.1 GENERALIZED ANXIETY DISORDER: ICD-10-CM

## 2023-03-03 DIAGNOSIS — F41.1 GENERALIZED ANXIETY DISORDER: Primary | ICD-10-CM

## 2023-03-03 DIAGNOSIS — F33.9 RECURRENT MAJOR DEPRESSIVE DISORDER, REMISSION STATUS UNSPECIFIED (H): ICD-10-CM

## 2023-03-03 DIAGNOSIS — F41.8 SITUATIONAL ANXIETY: ICD-10-CM

## 2023-03-03 DIAGNOSIS — F33.9 RECURRENT MAJOR DEPRESSIVE DISORDER, REMISSION STATUS UNSPECIFIED (H): Primary | ICD-10-CM

## 2023-03-03 PROCEDURE — 99606 MTMS BY PHARM EST 15 MIN: CPT | Mod: VID | Performed by: PHARMACIST

## 2023-03-03 RX ORDER — ARIPIPRAZOLE 5 MG/1
5 TABLET ORAL DAILY
Qty: 30 TABLET | Refills: 1 | Status: SHIPPED | OUTPATIENT
Start: 2023-03-03 | End: 2023-04-07

## 2023-03-03 RX ORDER — VENLAFAXINE HYDROCHLORIDE 150 MG/1
150 CAPSULE, EXTENDED RELEASE ORAL DAILY
Qty: 90 CAPSULE | Refills: 0 | Status: SHIPPED | OUTPATIENT
Start: 2023-03-03 | End: 2023-04-07

## 2023-03-03 NOTE — PATIENT INSTRUCTIONS
"Recommendations from today's MTM visit:                                                      Increase Abilify to 5 mg daily.    Follow-up: 3/31/2023 1 PM telephone visit    It was great speaking with you today.  I value your experience and would be very thankful for your time in providing feedback in our clinic survey. In the next few days, you may receive an email or text message from [facility] [employer] with a link to a survey related to your  clinical pharmacist.\"     To schedule another MTM appointment, please call the clinic directly or you may call the MTM scheduling line at [phone] or toll-free at [phone].     My Clinical Pharmacist's contact information:                                                      Please feel free to contact me with any questions or concerns you have.      Francia Paris, PharmD, BCPP  Medication Therapy Management Pharmacist  [facility]     "

## 2023-03-03 NOTE — Clinical Note
Dae Meraz,   Just an update that we increased Abilify to 5mg daily for Kendy today. I will check in with her again in 1 month.   Thank you!  Francia Paris, PharmD, BCPP Medication Therapy Management Pharmacist [facility]

## 2023-03-03 NOTE — TELEPHONE ENCOUNTER
"Requested Prescriptions   Pending Prescriptions Disp Refills     venlafaxine (EFFEXOR XR) 150 MG 24 hr capsule 90 capsule 0     Sig: Take 1 capsule (150 mg) by mouth daily       Serotonin-Norepinephrine Reuptake Inhibitors  Passed - 3/3/2023 11:05 AM        Passed - Blood pressure under 140/90 in past 12 months     BP Readings from Last 3 Encounters:   10/10/22 116/75   09/01/22 138/88   07/12/22 122/62                 Passed - Recent (12 mo) or future (30 days) visit within the authorizing provider's specialty     Patient has had an office visit with the authorizing provider or a provider within the authorizing providers department within the previous 12 mos or has a future within next 30 days. See \"Patient Info\" tab in inbasket, or \"Choose Columns\" in Meds & Orders section of the refill encounter.              Passed - Medication is active on med list        Passed - Patient is age 18 or older        Passed - No active pregnancy on record        Passed - Normal serum creatinine on file in past 12 months     Recent Labs   Lab Test 09/01/22  1537   CR 0.79       Ok to refill medication if creatinine is low          Passed - No positive pregnancy test in past 12 months           Prescription approved per [facility] Refill Protocol.    Pt seen on 07/12/22    Carla Abdul RN  [facility]   "

## 2023-03-08 ENCOUNTER — MYC MEDICAL ADVICE (OUTPATIENT)
Dept: FAMILY MEDICINE | Facility: CLINIC | Age: 53
End: 2023-03-08
Payer: COMMERCIAL

## 2023-03-08 ENCOUNTER — MYC REFILL (OUTPATIENT)
Dept: FAMILY MEDICINE | Facility: CLINIC | Age: 53
End: 2023-03-08
Payer: COMMERCIAL

## 2023-03-08 DIAGNOSIS — M79.674 PAIN OF RIGHT GREAT TOE: ICD-10-CM

## 2023-03-08 DIAGNOSIS — Z98.890 HISTORY OF FOOT SURGERY: ICD-10-CM

## 2023-03-08 DIAGNOSIS — M19.071 PRIMARY OSTEOARTHRITIS OF RIGHT FOOT: ICD-10-CM

## 2023-03-08 DIAGNOSIS — S92.334A CLOSED NONDISPLACED FRACTURE OF THIRD METATARSAL BONE OF RIGHT FOOT, INITIAL ENCOUNTER: ICD-10-CM

## 2023-03-08 RX ORDER — HYDROCODONE BITARTRATE AND ACETAMINOPHEN 5; 325 MG/1; MG/1
1 TABLET ORAL 2 TIMES DAILY PRN
Qty: 60 TABLET | Refills: 0 | Status: SHIPPED | OUTPATIENT
Start: 2023-03-08 | End: 2023-04-03

## 2023-03-08 NOTE — TELEPHONE ENCOUNTER
Requested Prescriptions   Pending Prescriptions Disp Refills     HYDROcodone-acetaminophen (NORCO) 5-325 MG tablet 60 tablet 0     Sig: Take 1 tablet by mouth 2 times daily as needed for severe pain (7-10)       There is no refill protocol information for this order      Routing refill request to provider for review/approval because:  Drug not on the [facility] refill protocol     Carla Abdul RN  [facility]

## 2023-03-22 ENCOUNTER — OFFICE VISIT (OUTPATIENT)
Dept: ORTHOPEDICS | Facility: CLINIC | Age: 53
End: 2023-03-22
Payer: COMMERCIAL

## 2023-03-22 ENCOUNTER — ANCILLARY PROCEDURE (OUTPATIENT)
Dept: CT IMAGING | Facility: CLINIC | Age: 53
End: 2023-03-22
Attending: PODIATRIST
Payer: COMMERCIAL

## 2023-03-22 DIAGNOSIS — M79.671 RIGHT FOOT PAIN: ICD-10-CM

## 2023-03-22 DIAGNOSIS — B35.1 OM (ONYCHOMYCOSIS): Primary | ICD-10-CM

## 2023-03-22 PROCEDURE — 99214 OFFICE O/P EST MOD 30 MIN: CPT | Performed by: PODIATRIST

## 2023-03-22 PROCEDURE — 73700 CT LOWER EXTREMITY W/O DYE: CPT | Mod: RT | Performed by: RADIOLOGY

## 2023-03-22 RX ORDER — TERBINAFINE HYDROCHLORIDE 250 MG/1
250 TABLET ORAL DAILY
Qty: 90 TABLET | Refills: 0 | Status: SHIPPED | OUTPATIENT
Start: 2023-03-22 | End: 2023-06-20

## 2023-03-22 NOTE — LETTER
3/22/2023         RE: Sharmin Nieves  [address]  [place] [address]        Dear Colleague,    Thank you for referring your patient, Sharmin Nieves, to the [facility] [place]. Please see a copy of my visit note below.    Chief Complaint:   Chief Complaint   Patient presents with     Follow Up     Pain, right foot.           Allergies   Allergen Reactions     Lisinopril Cough     cough         Subjective: Sharmin is a 52 year old female who presents to the clinic today for a follow up of right foot pain.  She relates that in September, she went to [facility] and had the right first MTPJ fusion performed again.  There is now a plate and screws in the area.  She relates that she still has continued pain in the first MTPJ.  It is difficult for her to walk.  She also relates that she has thickened nails on the second, third, and fourth toes bilaterally.    Objective  Last Comprehensive Metabolic Panel:  Sodium   Date Value Ref Range Status   09/01/2022 137 133 - 144 mmol/L Final   05/28/2021 138 133 - 144 mmol/L Final     Potassium   Date Value Ref Range Status   09/01/2022 3.9 3.4 - 5.3 mmol/L Final   05/28/2021 4.2 3.4 - 5.3 mmol/L Final     Chloride   Date Value Ref Range Status   09/01/2022 106 94 - 109 mmol/L Final   05/28/2021 104 94 - 109 mmol/L Final     Carbon Dioxide   Date Value Ref Range Status   05/28/2021 26 20 - 32 mmol/L Final     Carbon Dioxide (CO2)   Date Value Ref Range Status   09/01/2022 26 20 - 32 mmol/L Final     Anion Gap   Date Value Ref Range Status   09/01/2022 5 3 - 14 mmol/L Final   05/28/2021 8 3 - 14 mmol/L Final     Glucose   Date Value Ref Range Status   09/01/2022 93 70 - 99 mg/dL Final   05/28/2021 89 70 - 99 mg/dL Final     Urea Nitrogen   Date Value Ref Range Status   09/01/2022 10 7 - 30 mg/dL Final   05/28/2021 20 7 - 30 mg/dL Final     Creatinine   Date Value Ref Range Status   09/01/2022 0.79 0.52 - 1.04 mg/dL Final   05/28/2021 0.74 0.52 - 1.04  mg/dL Final     GFR Estimate   Date Value Ref Range Status   09/01/2022 >90 >60 mL/min/1.73m2 Final     Comment:     Effective December 21, 2021 eGFRcr in adults is calculated using the 2021 CKD-EPI creatinine equation which includes age and gender (Arlyn ling al., NEJM, DOI: 10.1056/XNYYmi2851054)   05/28/2021 >90 >60 mL/min/[1.73_m2] Final     Comment:     Non  GFR Calc  Starting 12/18/2018, serum creatinine based estimated GFR (eGFR) will be   calculated using the Chronic Kidney Disease Epidemiology Collaboration   (CKD-EPI) equation.       Calcium   Date Value Ref Range Status   09/01/2022 8.9 8.5 - 10.1 mg/dL Final   05/28/2021 8.9 8.5 - 10.1 mg/dL Final     Bilirubin Total   Date Value Ref Range Status   02/02/2022 0.2 0.2 - 1.3 mg/dL Final   04/19/2019 0.2 0.2 - 1.3 mg/dL Final     Alkaline Phosphatase   Date Value Ref Range Status   02/02/2022 78 40 - 150 U/L Final   04/19/2019 56 40 - 150 U/L Final     ALT   Date Value Ref Range Status   02/02/2022 31 0 - 50 U/L Final   04/19/2019 25 0 - 50 U/L Final     AST   Date Value Ref Range Status   02/02/2022 15 0 - 45 U/L Final   04/19/2019 19 0 - 45 U/L Final               Fusion site on the right first MTPJ feels solid and does not move.  She does have pain with palpation around the joint.  No edema or ecchymosis noted.  Scar is healed.  Onychomycosis noted to the middle 3 toes bilaterally.    Assessment: Kendy is a 52-year-old who has had multiple procedures on the right first MTPJ.  She still has pain in the toe.  She may have a pseudoarthrosis.  I recommend we get a CT scan of the area.  She does agree to this.  This was ordered.  She also has onychomycosis of both feet.  She would like treatment for this.    Plan:   - Pt seen and evaluated  -CT scan was ordered for the right foot.  -I did send in a prescription for 90 days of Lamisil.  She can pick this up at her convenience.  - Pt to return to clinic after the CT is performed.  On the date of  service I spent 36 minutes with patient care, documentation, chart review, care coordination.      Brian Gibson DPM

## 2023-03-22 NOTE — PROGRESS NOTES
Chief Complaint:   Chief Complaint   Patient presents with     Follow Up     Pain, right foot.           Allergies   Allergen Reactions     Lisinopril Cough     cough         Subjective: Sharmin is a 52 year old female who presents to the clinic today for a follow up of right foot pain.  She relates that in September, she went to [facility] and had the right first MTPJ fusion performed again.  There is now a plate and screws in the area.  She relates that she still has continued pain in the first MTPJ.  It is difficult for her to walk.  She also relates that she has thickened nails on the second, third, and fourth toes bilaterally.    Objective  Last Comprehensive Metabolic Panel:  Sodium   Date Value Ref Range Status   09/01/2022 137 133 - 144 mmol/L Final   05/28/2021 138 133 - 144 mmol/L Final     Potassium   Date Value Ref Range Status   09/01/2022 3.9 3.4 - 5.3 mmol/L Final   05/28/2021 4.2 3.4 - 5.3 mmol/L Final     Chloride   Date Value Ref Range Status   09/01/2022 106 94 - 109 mmol/L Final   05/28/2021 104 94 - 109 mmol/L Final     Carbon Dioxide   Date Value Ref Range Status   05/28/2021 26 20 - 32 mmol/L Final     Carbon Dioxide (CO2)   Date Value Ref Range Status   09/01/2022 26 20 - 32 mmol/L Final     Anion Gap   Date Value Ref Range Status   09/01/2022 5 3 - 14 mmol/L Final   05/28/2021 8 3 - 14 mmol/L Final     Glucose   Date Value Ref Range Status   09/01/2022 93 70 - 99 mg/dL Final   05/28/2021 89 70 - 99 mg/dL Final     Urea Nitrogen   Date Value Ref Range Status   09/01/2022 10 7 - 30 mg/dL Final   05/28/2021 20 7 - 30 mg/dL Final     Creatinine   Date Value Ref Range Status   09/01/2022 0.79 0.52 - 1.04 mg/dL Final   05/28/2021 0.74 0.52 - 1.04 mg/dL Final     GFR Estimate   Date Value Ref Range Status   09/01/2022 >90 >60 mL/min/1.73m2 Final     Comment:     Effective December 21, 2021 eGFRcr in adults is calculated using the 2021 CKD-EPI creatinine equation which includes age and gender ( et al.,  NEJM, DOI: 10.1056/YMSFyx7215319)   05/28/2021 >90 >60 mL/min/[1.73_m2] Final     Comment:     Non  GFR Calc  Starting 12/18/2018, serum creatinine based estimated GFR (eGFR) will be   calculated using the Chronic Kidney Disease Epidemiology Collaboration   (CKD-EPI) equation.       Calcium   Date Value Ref Range Status   09/01/2022 8.9 8.5 - 10.1 mg/dL Final   05/28/2021 8.9 8.5 - 10.1 mg/dL Final     Bilirubin Total   Date Value Ref Range Status   02/02/2022 0.2 0.2 - 1.3 mg/dL Final   04/19/2019 0.2 0.2 - 1.3 mg/dL Final     Alkaline Phosphatase   Date Value Ref Range Status   02/02/2022 78 40 - 150 U/L Final   04/19/2019 56 40 - 150 U/L Final     ALT   Date Value Ref Range Status   02/02/2022 31 0 - 50 U/L Final   04/19/2019 25 0 - 50 U/L Final     AST   Date Value Ref Range Status   02/02/2022 15 0 - 45 U/L Final   04/19/2019 19 0 - 45 U/L Final               Fusion site on the right first MTPJ feels solid and does not move.  She does have pain with palpation around the joint.  No edema or ecchymosis noted.  Scar is healed.  Onychomycosis noted to the middle 3 toes bilaterally.    Assessment: Kendy is a 52-year-old who has had multiple procedures on the right first MTPJ.  She still has pain in the toe.  She may have a pseudoarthrosis.  I recommend we get a CT scan of the area.  She does agree to this.  This was ordered.  She also has onychomycosis of both feet.  She would like treatment for this.    Plan:   - Pt seen and evaluated  -CT scan was ordered for the right foot.  -I did send in a prescription for 90 days of Lamisil.  She can pick this up at her convenience.  - Pt to return to clinic after the CT is performed.  On the date of service I spent 36 minutes with patient care, documentation, chart review, care coordination.

## 2023-03-24 ENCOUNTER — VIRTUAL VISIT (OUTPATIENT)
Dept: ORTHOPEDICS | Facility: CLINIC | Age: 53
End: 2023-03-24
Payer: COMMERCIAL

## 2023-03-24 DIAGNOSIS — Z98.1 ARTHRODESIS STATUS: Primary | ICD-10-CM

## 2023-03-24 DIAGNOSIS — M96.0 NONUNION AFTER ARTHRODESIS: ICD-10-CM

## 2023-03-24 PROCEDURE — 99212 OFFICE O/P EST SF 10 MIN: CPT | Mod: VID | Performed by: PODIATRIST

## 2023-03-24 NOTE — PROGRESS NOTES
Kendy is a 52 year old who is being evaluated via a billable video visit.      How would you like to obtain your AVS? MyChart  If the video visit is dropped, the invitation should be resent by: Text to cell phone: [phone]  Will anyone else be joining your video visit? No          Assessment & Plan     Encounter Diagnoses   Name Primary?     Arthrodesis status Yes     Nonunion after arthrodesis      I discussed her treatment options.  We will order her bone stim as the nonunion has been noted for months.  She continues to have pain in the area.  I discussed with her that if the screws were to come out, this may cause slight more motion at the pseudoarthrosis and cause more pain.  She will consider having a consult with Dr. Anderson to discuss revision of the joint arthrodesis.      15 minutes spent on the date of the encounter doing chart review, review of test results, interpretation of tests, patient visit and documentation            No follow-ups on file.    Brian Gibson DPM  [facility] [place]    Juana Larry is a 52 year old, presenting for the following health issues:  Chief Complaint   Patient presents with     Pain     Right foot       HPI     Kendy notes that she has recently been to [facility] and had a revision arthrodesis.  It was first noted in December 15, 2021 that there was only partial bony bridging centrally of the arthrodesis that was performed earlier that year.  She had an x-ray in the summer 2022 and MRI 2022 and these also noted that there was only partial bridging and likely nonunion.  She comes back after having a CT scan yesterday that showed no bridging of the arthrodesis site with a fracture of the plate.      Review of Systems   Constitutional, HEENT, cardiovascular, pulmonary, gi and gu systems are negative, except as otherwise noted.      Objective           Vitals:  No vitals were obtained today due to virtual visit.    Physical Exam   GENERAL: Healthy,  alert and no distress  EYES: Eyes grossly normal to inspection.  No discharge or erythema, or obvious scleral/conjunctival abnormalities.  RESP: No audible wheeze, cough, or visible cyanosis.  No visible retractions or increased work of breathing.    SKIN: Visible skin clear. No significant rash, abnormal pigmentation or lesions.  NEURO: Cranial nerves grossly intact.  Mentation and speech appropriate for age.  PSYCH: Mentation appears normal, affect normal/bright, judgement and insight intact, normal speech and appearance well-groomed.    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            Video-Visit Details    Type of service:  Video Visit     Originating Location (pt. Location): Home    Distant Location (provider location):  On-site  Platform used for Video Visit: Telephone

## 2023-03-24 NOTE — LETTER
3/24/2023         RE: Sharmin Nieves  [address] [facility] [address]        Dear Colleague,    Thank you for referring your patient, Sharmin Nieves, to the [facility]. Please see a copy of my visit note below.    Kendy is a 52 year old who is being evaluated via a billable video visit.      How would you like to obtain your AVS? MyChart  If the video visit is dropped, the invitation should be resent by: Text to cell phone: [phone]  Will anyone else be joining your video visit? No          Assessment & Plan     Encounter Diagnoses   Name Primary?     Arthrodesis status Yes     Nonunion after arthrodesis      I discussed her treatment options.  We will order her bone stim as the nonunion has been noted for months.  She continues to have pain in the area.  I discussed with her that if the screws were to come out, this may cause slight more motion at the pseudoarthrosis and cause more pain.  She will consider having a consult with Dr. Anderson to discuss revision of the joint arthrodesis.      15 minutes spent on the date of the encounter doing chart review, review of test results, interpretation of tests, patient visit and documentation            No follow-ups on file.    Brian Gibson, ARIANA  [facility]    Subjective   Kendy is a 52 year old, presenting for the following health issues:  Chief Complaint   Patient presents with     Pain     Right foot       HPI     Kendy notes that she has recently been to [facility] and had a revision arthrodesis.  It was first noted in December 15, 2021 that there was only partial bony bridging centrally of the arthrodesis that was performed earlier that year.  She had an x-ray in the summer 2022 and MRI 2022 and these also noted that there was only partial bridging and likely nonunion.  She comes back after having a CT scan yesterday that showed no bridging of the arthrodesis site with a fracture of the  plate.      Review of Systems   Constitutional, HEENT, cardiovascular, pulmonary, gi and gu systems are negative, except as otherwise noted.     Objective           Vitals:  No vitals were obtained today due to virtual visit.    Physical Exam   GENERAL: Healthy, alert and no distress  EYES: Eyes grossly normal to inspection.  No discharge or erythema, or obvious scleral/conjunctival abnormalities.  RESP: No audible wheeze, cough, or visible cyanosis.  No visible retractions or increased work of breathing.    SKIN: Visible skin clear. No significant rash, abnormal pigmentation or lesions.  NEURO: Cranial nerves grossly intact.  Mentation and speech appropriate for age.  PSYCH: Mentation appears normal, affect normal/bright, judgement and insight intact, normal speech and appearance well-groomed.    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           Video-Visit Details    Type of service:  Video Visit     Originating Location (pt. Location): Home    Distant Location (provider location):  On-site  Platform used for Video Visit: Telephone        Brian Gibson DPM

## 2023-03-27 DIAGNOSIS — K22.4 ESOPHAGEAL SPASM: ICD-10-CM

## 2023-03-27 RX ORDER — OMEPRAZOLE 40 MG/1
40 CAPSULE, DELAYED RELEASE ORAL DAILY
Qty: 90 CAPSULE | Refills: 1 | Status: SHIPPED | OUTPATIENT
Start: 2023-03-27 | End: 2023-09-25

## 2023-03-27 NOTE — TELEPHONE ENCOUNTER
"Requested Prescriptions   Pending Prescriptions Disp Refills     omeprazole (PRILOSEC) 40 MG DR capsule 90 capsule 1     Sig: Take 1 capsule (40 mg) by mouth daily       PPI Protocol Passed - 3/27/2023 11:00 AM        Passed - Not on Clopidogrel (unless Pantoprazole ordered)        Passed - No diagnosis of osteoporosis on record        Passed - Recent (12 mo) or future (30 days) visit within the authorizing provider's specialty     Patient has had an office visit with the authorizing provider or a provider within the authorizing providers department within the previous 12 mos or has a future within next 30 days. See \"Patient Info\" tab in inbasket, or \"Choose Columns\" in Meds & Orders section of the refill encounter.              Passed - Medication is active on med list        Passed - Patient is age 18 or older        Passed - No active pregnacy on record        Passed - No positive pregnancy test in past 12 months           Prescription approved per [facility] Refill Protocol.    Carla Abdul RN  [facility]   "

## 2023-04-03 ENCOUNTER — MYC REFILL (OUTPATIENT)
Dept: FAMILY MEDICINE | Facility: CLINIC | Age: 53
End: 2023-04-03
Payer: COMMERCIAL

## 2023-04-03 DIAGNOSIS — M79.674 PAIN OF RIGHT GREAT TOE: ICD-10-CM

## 2023-04-03 DIAGNOSIS — M19.071 PRIMARY OSTEOARTHRITIS OF RIGHT FOOT: ICD-10-CM

## 2023-04-03 DIAGNOSIS — Z98.890 HISTORY OF FOOT SURGERY: ICD-10-CM

## 2023-04-03 DIAGNOSIS — S92.334A CLOSED NONDISPLACED FRACTURE OF THIRD METATARSAL BONE OF RIGHT FOOT, INITIAL ENCOUNTER: ICD-10-CM

## 2023-04-03 NOTE — TELEPHONE ENCOUNTER
Requested Prescriptions   Pending Prescriptions Disp Refills     HYDROcodone-acetaminophen (NORCO) 5-325 MG tablet 60 tablet 0     Sig: Take 1 tablet by mouth 2 times daily as needed for severe pain       There is no refill protocol information for this order      Routing refill request to provider for review/approval because:  Drug not on the [facility] refill protocol     Carla Abdul RN  [facility]

## 2023-04-04 RX ORDER — HYDROCODONE BITARTRATE AND ACETAMINOPHEN 5; 325 MG/1; MG/1
1 TABLET ORAL 2 TIMES DAILY PRN
Qty: 60 TABLET | Refills: 0 | Status: SHIPPED | OUTPATIENT
Start: 2023-04-04 | End: 2023-04-27

## 2023-04-06 NOTE — PROGRESS NOTES
Medication Therapy Management (MTM) Encounter    ASSESSMENT:                            Medication Adherence/Access: No issues identified    Depression/Anxiety: Kendy has been on Abilify for 2 months with no improvement in depression symptoms. She is having some worsening anxiety and emotional blunting so we will plan to discontinue Abilify and increase Effexor.       PLAN:                            - Increase Effexor XR 300mg daily  - Decrease Abilify to 2.5mg daily for 1 week then stop    Follow-up: 2 months    SUBJECTIVE/OBJECTIVE:                          Sharmin Nieves is a 52 year old female called for a follow-up visit.  Today's visit is a follow-up MTM visit from 3/3/23     Reason for visit: medication check in.    Allergies/ADRs: Reviewed in chart  Past Medical History: Reviewed in chart  Tobacco: She reports that she has been smoking cigarettes. She has a 7.50 pack-year smoking history. She uses smokeless tobacco.Nicotine/Tobacco Cessation Plan:   did not discuss today  Alcohol: none      Medication Adherence/Access: no issues reported    Depression/Anxiety:   Current medication:   venlafaxine XR 225mg daily  trazodone 200mg at bedtime  Abilify 5mg daily    Sharmin is seen at [facility] by PCP - LEDY Medrano. Most recent visit was 1/31/2023 at which time no medication changes were made. Most recent MTM visit was 3/3/23 at which time Abilify was increased from 2mg daily to 5mg daily (Abilify was started 2/3/23 for depression augmentation).      Today, Kendy reports no overall improvement with Abilify dose increase. She is experiencing some increased anxiety and emotional blunting. Also feels less bubbly.      Past medication trials:   Zoloft 50mg (2004) - not effective  Wellbutrin SR 200mg BID (7293-6510 and 6628-1414) - not effective  Celexa 50mg (2005 and 2371-5766) - not effective  Cymbalta 120mg daily (1312-6993 ) - not effective  Effexor 225mg daily (1092-1514 and 5864-2579) - somewhat  helpful  Risperidone 3mg HS (5211-0587) - not helpful  Seroquel 100mg HS (2008) - no effect  Propanol PRN anxiety - a little helpful    Recent Labs   Lab Test 11/30/18  0918 09/03/15  0747   CHOL 164 154   HDL 70 62   LDL 57 44   TRIG 185* 242*   CHOLHDLRATIO  --  2.5       ----------------      I spent 20 minutes with this patient today. All changes were made via collaborative practice agreement with Randa Bill A copy of the visit note was provided to the patient's provider(s).    A summary of these recommendations was sent via [email or web address].    Francia Paris, PharmD, BCPP  Medication Therapy Management Pharmacist  [facility]    Telemedicine Visit Details  Type of service:  Telephone visit  Start Time: 1:05 pm  End Time: 1:25 pm     Medication Therapy Recommendations  Recurrent major depressive disorder, remission status unspecified (H)    Current Medication: ARIPiprazole (ABILIFY) 5 MG tablet (Discontinued)   Rationale: Undesirable effect - Adverse medication event - Safety   Recommendation: Discontinue Medication   Status: Accepted per CPA          Current Medication: venlafaxine (EFFEXOR XR) 150 MG 24 hr capsule   Rationale: Dose too low - Dosage too low - Effectiveness   Recommendation: Increase Dose   Status: Accepted per CPA

## 2023-04-07 ENCOUNTER — VIRTUAL VISIT (OUTPATIENT)
Dept: PHARMACY | Facility: CLINIC | Age: 53
End: 2023-04-07
Payer: COMMERCIAL

## 2023-04-07 DIAGNOSIS — F33.9 RECURRENT MAJOR DEPRESSIVE DISORDER, REMISSION STATUS UNSPECIFIED (H): Primary | ICD-10-CM

## 2023-04-07 DIAGNOSIS — F41.1 GENERALIZED ANXIETY DISORDER: ICD-10-CM

## 2023-04-07 PROCEDURE — 99607 MTMS BY PHARM ADDL 15 MIN: CPT | Performed by: PHARMACIST

## 2023-04-07 PROCEDURE — 99606 MTMS BY PHARM EST 15 MIN: CPT | Performed by: PHARMACIST

## 2023-04-07 RX ORDER — VENLAFAXINE HYDROCHLORIDE 150 MG/1
300 CAPSULE, EXTENDED RELEASE ORAL DAILY
Qty: 180 CAPSULE | Refills: 0 | Status: SHIPPED | OUTPATIENT
Start: 2023-04-07 | End: 2023-06-23

## 2023-04-07 NOTE — PATIENT INSTRUCTIONS
"Recommendations from today's MTM visit:                                                      - Increase Effexor XR 300mg daily  - Decrease Abilify to 2.5mg daily for 1 week then stop    Follow-up: 2 months    It was great speaking with you today.  I value your experience and would be very thankful for your time in providing feedback in our clinic survey. In the next few days, you may receive an email or text message from [employer] with a link to a survey related to your  clinical pharmacist.\"     To schedule another MTM appointment, please call the clinic directly or you may call the MTM scheduling line at [phone] or toll-free at [phone].     My Clinical Pharmacist's contact information:                                                      Please feel free to contact me with any questions or concerns you have.      Francia Paris, PharmD, BCPP  Medication Therapy Management Pharmacist  [facility]     "

## 2023-04-10 DIAGNOSIS — E66.812 CLASS 2 OBESITY DUE TO EXCESS CALORIES WITHOUT SERIOUS COMORBIDITY WITH BODY MASS INDEX (BMI) OF 35.0 TO 35.9 IN ADULT: ICD-10-CM

## 2023-04-10 DIAGNOSIS — E66.09 CLASS 2 OBESITY DUE TO EXCESS CALORIES WITHOUT SERIOUS COMORBIDITY WITH BODY MASS INDEX (BMI) OF 35.0 TO 35.9 IN ADULT: ICD-10-CM

## 2023-04-11 RX ORDER — TOPIRAMATE 25 MG/1
75 TABLET, FILM COATED ORAL DAILY
Qty: 270 TABLET | Refills: 1 | Status: SHIPPED | OUTPATIENT
Start: 2023-04-11 | End: 2023-10-12

## 2023-04-11 NOTE — TELEPHONE ENCOUNTER
"Requested Prescriptions   Pending Prescriptions Disp Refills     topiramate (TOPAMAX) 25 MG tablet 270 tablet 1     Sig: Take 3 tablets (75 mg) by mouth daily       Anti-Seizure Meds Protocol  Failed - 4/10/2023  4:51 PM        Failed - Review Authorizing provider's last note.      Refer to last progress notes: confirm request is for original authorizing provider (cannot be through other providers).          Passed - Recent (12 mo) or future (30 days) visit within the authorizing provider's specialty     Patient has had an office visit with the authorizing provider or a provider within the authorizing providers department within the previous 12 mos or has a future within next 30 days. See \"Patient Info\" tab in inbasket, or \"Choose Columns\" in Meds & Orders section of the refill encounter.              Passed - Normal CBC on file in past 26 months     Recent Labs   Lab Test 09/01/22  1537 02/02/22  1646 05/28/21  1407   WBC  --   --  6.9   RBC  --   --  3.97   HGB 12.8   < > 13.2   HCT  --   --  38.0   PLT  --   --  251    < > = values in this interval not displayed.                 Passed - Normal ALT or AST on file in past 26 months     Recent Labs   Lab Test 02/02/22  1646   ALT 31     Recent Labs   Lab Test 02/02/22  1646   AST 15             Passed - Normal platelet count on file in past 26 months     Recent Labs   Lab Test 05/28/21  1407                  Passed - Medication is active on med list        Passed - No active pregnancy on record        Passed - No positive pregnancy test in last 12 months         Routing refill request to provider for review/approval because medication did not pass protocol.    Pt has a appointment on 04/17/23    Carla Abdul RN  [facility]   "

## 2023-04-15 ENCOUNTER — HEALTH MAINTENANCE LETTER (OUTPATIENT)
Age: 53
End: 2023-04-15

## 2023-04-17 ENCOUNTER — MYC MEDICAL ADVICE (OUTPATIENT)
Dept: FAMILY MEDICINE | Facility: CLINIC | Age: 53
End: 2023-04-17

## 2023-04-17 ENCOUNTER — VIRTUAL VISIT (OUTPATIENT)
Dept: FAMILY MEDICINE | Facility: CLINIC | Age: 53
End: 2023-04-17
Payer: COMMERCIAL

## 2023-04-17 DIAGNOSIS — K22.4 ESOPHAGEAL SPASM: ICD-10-CM

## 2023-04-17 DIAGNOSIS — M96.0 NONUNION AFTER ARTHRODESIS: Primary | ICD-10-CM

## 2023-04-17 DIAGNOSIS — E66.812 CLASS 2 OBESITY DUE TO EXCESS CALORIES WITHOUT SERIOUS COMORBIDITY WITH BODY MASS INDEX (BMI) OF 35.0 TO 35.9 IN ADULT: ICD-10-CM

## 2023-04-17 DIAGNOSIS — F33.9 RECURRENT MAJOR DEPRESSIVE DISORDER, REMISSION STATUS UNSPECIFIED (H): ICD-10-CM

## 2023-04-17 DIAGNOSIS — E66.09 CLASS 2 OBESITY DUE TO EXCESS CALORIES WITHOUT SERIOUS COMORBIDITY WITH BODY MASS INDEX (BMI) OF 35.0 TO 35.9 IN ADULT: ICD-10-CM

## 2023-04-17 DIAGNOSIS — I10 BENIGN ESSENTIAL HYPERTENSION: ICD-10-CM

## 2023-04-17 PROCEDURE — 96127 BRIEF EMOTIONAL/BEHAV ASSMT: CPT | Performed by: NURSE PRACTITIONER

## 2023-04-17 PROCEDURE — 99215 OFFICE O/P EST HI 40 MIN: CPT | Mod: VID | Performed by: NURSE PRACTITIONER

## 2023-04-17 RX ORDER — PHENTERMINE HYDROCHLORIDE 15 MG/1
15 CAPSULE ORAL EVERY MORNING
Qty: 30 CAPSULE | Refills: 0 | Status: SHIPPED | OUTPATIENT
Start: 2023-04-17 | End: 2023-05-16

## 2023-04-17 ASSESSMENT — PATIENT HEALTH QUESTIONNAIRE - PHQ9
10. IF YOU CHECKED OFF ANY PROBLEMS, HOW DIFFICULT HAVE THESE PROBLEMS MADE IT FOR YOU TO DO YOUR WORK, TAKE CARE OF THINGS AT HOME, OR GET ALONG WITH OTHER PEOPLE: VERY DIFFICULT
SUM OF ALL RESPONSES TO PHQ QUESTIONS 1-9: 12
SUM OF ALL RESPONSES TO PHQ QUESTIONS 1-9: 12

## 2023-04-17 NOTE — Clinical Note
Hi! Thank you for working with Kendy! I was wondering if amitriptyline would be contraindicated? I need a more comprehensive pain plan than the hydrocodone-APAP. The most recent CT showed her foot hardware failed (it's cracked!). I am doing scheduled acetaminophen and want to trial amitriptyline if I can. She also has a pain clinic person she is going to schedule with. Poor lady. She keeps being so kind in the face of all this!  Mariya

## 2023-04-17 NOTE — PROGRESS NOTES
Kendy is a 52 year old who is being evaluated via a billable video visit.      How would you like to obtain your AVS? MyChart  If the video visit is dropped, the invitation should be resent by: Text to cell phone: [phone]  Will anyone else be joining your video visit? No      Assessment & Plan     Nonunion after arthrodesis  Hardware failure - plan to meet with new surgeon.  Will need more comprehensive pain management plan  Trial scheduled acetaminophen   Consider amitriptyline nightly    Class 2 obesity due to excess calories without serious comorbidity with body mass index (BMI) of 35.0 to 35.9 in adult  GLP-1a caused rapid weight loss and GI side effects. Has significant GERD and esophageal spasm history. Not achieving weight loss with topiramate alone. Trial of phentermine 15 mg. Follow-up in 6 weeks at in- clinic appt. Concern for BP management on phentermine. Monitor closely  - phentermine (ADIPEX-P) 15 MG capsule; Take 1 capsule (15 mg) by mouth every morning    Benign essential hypertension  Currently well controlled on losartan 100 mg, but would not want to add second medication to cover for increase from phentermine.  BP Readings from Last 6 Encounters:   10/10/22 116/75   09/01/22 138/88   07/12/22 122/62   06/01/22 111/69   04/25/22 126/82   03/22/22 110/80       Esophageal spasm  Cannot take NSAIDs    Recurrent major depressive disorder, remission status unspecified (H)  Improved - working with MTM      Review of external notes as documented elsewhere in note  Review of the result(s) of each unique test - BP trend, LFTs trends  Ordering of each unique test  Prescription drug management  48 minutes spent by me on the date of the encounter doing chart review, history and exam, documentation and further activities per the note       Patient Instructions   Foot/Pain management   - look into schedule with the new foot surgeon now to not have too big a gap between end of 8 weeks deep bone stimulation and  that consult   - continue the hydrocodone/APAP 5-325 mg twice a day   - add acetaminophen 500 mg taken at 7:30 am, 11:30 am and 3:30 pm daily   - I will send a message to Francia about if we can safely trial amitriptyline    - set up an appointment with the pain medicine person    Weight loss   - add phentermine 15 mg daily   - monitor BP at least twice a week   - get a BP while at work today and send in [employer] message      LEDY Seaman CNP  [facility] Larry is a 52 year old, presenting for the following health issues:  Foot Pain and Recheck Medication    HPI     Answers for HPI/ROS submitted by the patient on 4/17/2023  If you checked off any problems, how difficult have these problems made it for you to do your work, take care of things at home, or get along with other people?: Very difficult  PHQ9 TOTAL SCORE: 12  What is the reason for your visit today? : Foot pain follow up  How many servings of fruits and vegetables do you eat daily?: 2-3  On average, how many sweetened beverages do you drink each day (Examples: soda, juice, sweet tea, etc.  Do NOT count diet or artificially sweetened beverages)?: 1  How many minutes a day do you exercise enough to make your heart beat faster?: 9 or less  How many days a week do you exercise enough to make your heart beat faster?: 3 or less  How many days per week do you miss taking your medication?: 0    Foot - saw Dr. Gibson at the [facility]-  CT showed metal plate failed and fractured and bone remains in complete non-union. Trying deep bone stimulator for 8 weeks. May meet with new foot surgeon at [facility] to discuss new revision and/or options sometime in July. Did meet with pain medication clinic.     Weight loss - trial of liraglutide and lost weight quickly and had nausea. Taking topiramate 75 mg and gaining weight (10 lbs) rather than losing. No other side effects. Wondering about taking phentermine. Has taken bupropion for  depression and didn't have side effects. Cannot take naltrexone.     Mental health - seeing Francia Wellington, MTM - tried Abilify which worsened anxiety so stopped and increased venlafaxine to 300 mg    BP Readings from Last 6 Encounters:   10/10/22 116/75   09/01/22 138/88   07/12/22 122/62   06/01/22 111/69   04/25/22 126/82   03/22/22 110/80     3/1 at work BP automatic cuff on the arm - 122/80 pulse 81    Review of Systems         Objective           Vitals:  No vitals were obtained today due to virtual visit.    Physical Exam   GENERAL: Healthy, alert and no distress  EYES: Eyes grossly normal to inspection.  No discharge or erythema, or obvious scleral/conjunctival abnormalities.  RESP: No audible wheeze, cough, or visible cyanosis.  No visible retractions or increased work of breathing.    SKIN: Visible skin clear. No significant rash, abnormal pigmentation or lesions.  NEURO: Cranial nerves grossly intact.  Mentation and speech appropriate for age.  PSYCH: Mentation appears normal, affect normal/bright, judgement and insight intact, normal speech and appearance well-groomed.            Video-Visit Details    Type of service:  Video Visit     Originating Location (pt. Location): Home  Distant Location (provider location):  Off-site  Platform used for Video Visit: Charlie

## 2023-04-17 NOTE — PATIENT INSTRUCTIONS
Foot/Pain management   - look into schedule with the new foot surgeon now to not have too big a gap between end of 8 weeks deep bone stimulation and that consult   - continue the hydrocodone/APAP 5-325 mg twice a day   - add acetaminophen 500 mg taken at 7:30 am, 11:30 am and 3:30 pm daily   - I will send a message to Francia about if we can safely trial amitriptyline    - set up an appointment with the pain medicine person    Weight loss   - add phentermine 15 mg daily   - monitor BP at least twice a week   - get a BP while at work today and send in [employer] message

## 2023-04-27 ENCOUNTER — MYC REFILL (OUTPATIENT)
Dept: FAMILY MEDICINE | Facility: CLINIC | Age: 53
End: 2023-04-27
Payer: COMMERCIAL

## 2023-04-27 DIAGNOSIS — S92.334A CLOSED NONDISPLACED FRACTURE OF THIRD METATARSAL BONE OF RIGHT FOOT, INITIAL ENCOUNTER: ICD-10-CM

## 2023-04-27 DIAGNOSIS — M19.071 PRIMARY OSTEOARTHRITIS OF RIGHT FOOT: ICD-10-CM

## 2023-04-27 DIAGNOSIS — Z98.890 HISTORY OF FOOT SURGERY: ICD-10-CM

## 2023-04-27 DIAGNOSIS — M79.674 PAIN OF RIGHT GREAT TOE: ICD-10-CM

## 2023-04-27 NOTE — TELEPHONE ENCOUNTER
Requested Prescriptions   Pending Prescriptions Disp Refills     HYDROcodone-acetaminophen (NORCO) 5-325 MG tablet 60 tablet 0     Sig: Take 1 tablet by mouth 2 times daily as needed for severe pain (ok to fill 4/7/2023)       There is no refill protocol information for this order      Last OV on 7/12/23    Thanks!  Ridge Ocampo RN   [facility]

## 2023-04-28 DIAGNOSIS — F51.01 PRIMARY INSOMNIA: ICD-10-CM

## 2023-04-28 RX ORDER — HYDROCODONE BITARTRATE AND ACETAMINOPHEN 5; 325 MG/1; MG/1
1 TABLET ORAL 2 TIMES DAILY PRN
Qty: 60 TABLET | Refills: 0 | Status: SHIPPED | OUTPATIENT
Start: 2023-04-28 | End: 2023-05-25

## 2023-04-28 NOTE — TELEPHONE ENCOUNTER
"Requested Prescriptions   Pending Prescriptions Disp Refills     traZODone (DESYREL) 100 MG tablet 180 tablet 1     Sig: Take 2 tablets (200 mg) by mouth At Bedtime       Serotonin Modulators Passed - 4/28/2023 12:10 PM        Passed - Recent (12 mo) or future (30 days) visit within the authorizing provider's specialty     Patient has had an office visit with the authorizing provider or a provider within the authorizing providers department within the previous 12 mos or has a future within next 30 days. See \"Patient Info\" tab in inbasket, or \"Choose Columns\" in Meds & Orders section of the refill encounter.              Passed - Medication is active on med list        Passed - Patient is age 18 or older        Passed - No active pregnancy on record        Passed - No positive pregnancy test in past 12 months         Routing refill request to provider for review/approval because:  Drug interaction warning    Pt has a appointment on 05/30/23    Carla Abdul RN  [facility]   "

## 2023-05-01 RX ORDER — TRAZODONE HYDROCHLORIDE 100 MG/1
200 TABLET ORAL AT BEDTIME
Qty: 180 TABLET | Refills: 0 | Status: SHIPPED | OUTPATIENT
Start: 2023-05-01 | End: 2023-07-24

## 2023-05-02 ENCOUNTER — PRE VISIT (OUTPATIENT)
Dept: ORTHOPEDICS | Facility: CLINIC | Age: 53
End: 2023-05-02
Payer: COMMERCIAL

## 2023-05-02 ENCOUNTER — MYC MEDICAL ADVICE (OUTPATIENT)
Dept: FAMILY MEDICINE | Facility: CLINIC | Age: 53
End: 2023-05-02
Payer: COMMERCIAL

## 2023-05-02 DIAGNOSIS — M79.674 PAIN OF RIGHT GREAT TOE: Primary | ICD-10-CM

## 2023-05-02 NOTE — TELEPHONE ENCOUNTER
Action May 2, 2023 8:50 AM MT   Action Taken Records from [facility] received and sent to scan.

## 2023-05-05 NOTE — TELEPHONE ENCOUNTER
Mariya -- please see pt message below regarding Chantix and nausea. She is requesting anti-nausea medication. Please advise if you can prescribe. Is this side effect considered temporary?    Thank you  INDY ShaferN, RN  [facility]      165.1

## 2023-05-11 NOTE — TELEPHONE ENCOUNTER
Patient is scheduled for 05/12 at 11:20am for an in person visit. I will notify mom.  Shelley Moreira

## 2023-05-16 ENCOUNTER — MYC MEDICAL ADVICE (OUTPATIENT)
Dept: FAMILY MEDICINE | Facility: CLINIC | Age: 53
End: 2023-05-16
Payer: COMMERCIAL

## 2023-05-16 ENCOUNTER — MYC REFILL (OUTPATIENT)
Dept: FAMILY MEDICINE | Facility: CLINIC | Age: 53
End: 2023-05-16
Payer: COMMERCIAL

## 2023-05-16 DIAGNOSIS — E66.09 CLASS 2 OBESITY DUE TO EXCESS CALORIES WITHOUT SERIOUS COMORBIDITY WITH BODY MASS INDEX (BMI) OF 35.0 TO 35.9 IN ADULT: ICD-10-CM

## 2023-05-16 DIAGNOSIS — E66.812 CLASS 2 OBESITY DUE TO EXCESS CALORIES WITHOUT SERIOUS COMORBIDITY WITH BODY MASS INDEX (BMI) OF 35.0 TO 35.9 IN ADULT: ICD-10-CM

## 2023-05-16 RX ORDER — PHENTERMINE HYDROCHLORIDE 15 MG/1
15 CAPSULE ORAL EVERY MORNING
Qty: 30 CAPSULE | Refills: 0 | Status: SHIPPED | OUTPATIENT
Start: 2023-05-16 | End: 2023-06-23

## 2023-05-16 NOTE — TELEPHONE ENCOUNTER
Requested Prescriptions   Pending Prescriptions Disp Refills     phentermine (ADIPEX-P) 15 MG capsule 30 capsule 0     Sig: Take 1 capsule (15 mg) by mouth every morning       There is no refill protocol information for this order        Routing refill request to provider for review/approval because:  Drug not on the [facility] refill protocol     Carla Abdul RN  [facility]

## 2023-05-19 ENCOUNTER — VIRTUAL VISIT (OUTPATIENT)
Dept: ANESTHESIOLOGY | Facility: CLINIC | Age: 53
End: 2023-05-19
Payer: COMMERCIAL

## 2023-05-19 DIAGNOSIS — M79.671 CHRONIC FOOT PAIN, RIGHT: Primary | ICD-10-CM

## 2023-05-19 DIAGNOSIS — M96.0 NONUNION AFTER ARTHRODESIS: ICD-10-CM

## 2023-05-19 DIAGNOSIS — M19.071 PRIMARY OSTEOARTHRITIS OF RIGHT FOOT: ICD-10-CM

## 2023-05-19 DIAGNOSIS — G89.29 CHRONIC FOOT PAIN, RIGHT: Primary | ICD-10-CM

## 2023-05-19 PROCEDURE — 99214 OFFICE O/P EST MOD 30 MIN: CPT | Mod: VID

## 2023-05-19 ASSESSMENT — PAIN SCALES - GENERAL: PAINLEVEL: MODERATE PAIN (4)

## 2023-05-19 NOTE — PATIENT INSTRUCTIONS
1.  Pain Physical Therapy:  NO   Continue activity as tolerated. PT per Dr. Gibson's recommendation.               2.  Pain Psychologist to address relaxation, behavioral change, coping style, and other factors important to improvement.  NO              3.  Diagnostic Studies:  None               4.  Medication Management:   Continue diclofenac gel twice daily to right foot.   Recommend trial OTC topical analgesic patches - menthol 5% (Icy Hot) and Tiger [place]. Try using during daytime while at work. Allow 2 hours in between diclofenac gel application and patches. Be sure to wipe off any excess product before applications.   Buprenorphine - we discussed possible trial of buprenorphine today, if she does not appreciate much improvement in pain with bone stimulator therapy and topical analgesics. Her PCP is currently prescribing hydrocodone 5-325 mg twice daily as needed, which is somewhat helpful, but she does not seem to have adequate pain control with current dose. Advised she will need to follow up in person with me if interested in buprenorphine trial, will need to establish CSA.               5.  Potential procedures: None                6.  Referrals: None               7.  Follow up with LEDY Brown CNP if interested in trial of buprenorphine, and/or as needed

## 2023-05-19 NOTE — LETTER
5/19/2023       RE: Sharmin Nieves  [address]  [facility] [address]     Dear Colleague,    Thank you for referring your patient, Sharmin iNeves, to the [facility] MANAGEMENT [place] at [facility]. Please see a copy of my visit note below.      [facility] Pain Management     Date of visit: 5/19/2023      Assessment:   Sharmin Nieves is a 52 year old female with a past medical history significant for chronic LBP, DJD of first metatarsal right foot/hallux limitus, anxiety/depression, HTN, s/p right first metatarsal fusion/revision x 3, who presents with complaints of right foot pain.       Right foot pain - Etiology is most consistent with degenerative joint and postsurgical changes of foot, notably first metatarsal phalangeal joint.     Visit Diagnoses:  1. Chronic foot pain, right    2. Nonunion after arthrodesis    3. Primary osteoarthritis of right foot        Plan:  Diagnosis reviewed, treatment option addressed, and risk/benifits discussed.  Self-care instructions given.  I am recommending a multidisciplinary treatment plan to help this patient better manage their pain.                  1.  Pain Physical Therapy:  NO   Continue activity as tolerated. PT per Dr. Gibson's recommendation.               2.  Pain Psychologist to address relaxation, behavioral change, coping style, and other factors important to improvement.  NO              3.  Diagnostic Studies:  None               4.  Medication Management:   Continue diclofenac gel twice daily to right foot.   Recommend trial OTC topical analgesic patches - menthol 5% (Icy Hot) and [place]. Try using during daytime while at work. Allow 2 hours in between diclofenac gel application and patches. Be sure to wipe off any excess product before applications.   Buprenorphine - we discussed possible trial of buprenorphine today, if she does not appreciate much  improvement in pain with bone stimulator therapy and topical analgesics. Her PCP is currently prescribing hydrocodone 5-325 mg twice daily as needed, which is somewhat helpful, but she does not seem to have adequate pain control with current dose. Advised she will need to follow up in person with me if interested in buprenorphine trial, will need to establish CSA.               5.  Potential procedures: None                6.  Referrals: None               7.  Follow up with LEDY Brown CNP if interested in trial of buprenorphine, and/or as needed       Review of Electronic Chart: Today I have also reviewed available medical information in the patient's medical record at [facility] ([facility]) and Care Everywhere (if available), including relevant provider notes, laboratory work, and imaging.     Meri Toth DNP, LEDY, AGNP-C  [facility] Pain Management     -------------------------------------------------    Subjective:    Chief complaint:   Chief Complaint   Patient presents with    Follow Up     Follow-up Right Foot Pain        Interval history:  Sharmin Nieves is a 52 year old female last seen on 10/10/22.  They are a patient of mine seen in follow up.     Recommendations/plan at the last visit included:              1.  Pain Physical Therapy:  NO   She will as about PT at next postop follow up in a month. I am happy to place a referral if needed.               2.  Pain Psychologist to address relaxation, behavioral change, coping style, and other factors important to improvement.  NO              3.  Diagnostic Studies:  None               4.  Medication Management: Kendy was certified for [place] medical cannabis program today. She finds cannabis helpful at bedtime and improved sleep.               5.  Potential procedures: None                6.  Referrals:              7.  Follow up with LEDY Brown CNP for annual medical cannabis re-certification, or sooner if needed.       Since her last  "visit, Sharmin Nieves reports:  -her pain is somewhat worse than it was at last visit.   -At last visit she was 4 weeks postop from surgery.   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   -Bone stimulator recommended to start, possible revision surgery in the future, pending outcome.   -She has pain in right toe joints, painful notably with walking, every step she takes.   -She works long hours, pain worse at the end of the workday.   -She continues to have significant pain around surgical site.   -She describes pain as sharp, \"bone on bone\" sensation.   -She was recently started on amitriptyline about 2 weeks ago, not sure about pain benefit quite yet.   -She notes increased drowsiness during daytime since starting TCA.   -She takes tylenol 1000 mg TID.   -She uses Voltaren gel in the morning, sometimes during workday, also applies in the evening.   -She cannot take NSAIDs.   -She continues to take hydrocodone 1 tab BID, managed by PCP.   -She reports PCP is hesitant to increase norco, partly due to hx of alcohol use.   -She was certified for medical cannabis at last visit but did not complete registration due to cost.       Pain Information:   Pain rating: averages 4/10 on a 0-10 scale.        HPI from initial visit with me on 10/10/22:  Sharmin Nieves is a 52 year old female presents with a chief complaint of right foot pain.      The pain has been present for years, though worse since she had surgery fusion of right first metatarsal in 2020.    The pain is Moderate Pain (4) in severity.    -She has had 3 surgeries on right foot, had fusion of first metatarsal. Saw Dr. Guillen first, then went to [facility].   -She recently had third surgery on 9/16/22 (we do not have access to [facility] records)  -She has altered gait, reports \"walking funny\" with foot injury.   -She was referred here to establish with pain, in the event her pain does not improve beyond expected " recovery time.   -She has h/o gastic ulcers, cannot take NSAIDs.   -Currently taking Hydrocodone 5-325 - 1 in AM and 1 in PM.   -She had post op appt and sutures removed. Has not had PT post op yet. She goes back in another month for next postop f/up.  -It is most painful when she is up and walking and working.   -She reports increased pain at bedtime after working during day up on feet, uses cannabis to help with pain and sleep (finds if quite helpful).      Sharmin Nieves has not been seen at a pain clinic in the past.          Current Pain Treatments:    Medications:               Hydrocodone 5-325 mg - 1 BID PRN (PCP managing)              Medical cannabis    Amitriptyline 25 mg at bedtime    Tylenol 1000 mg TID     2. Other therapies:               Rest              Ice                  Current MME: 10    Review of [place] Prescription Monitoring Program (): No concern for abuse or misuse of controlled medications based on this report. Reviewed - appears appropriate.     Past pain treatments:  Surgery - arthrodesis      Medications:  Current Outpatient Medications   Medication Sig Dispense Refill    amitriptyline (ELAVIL) 25 MG tablet Take 1 tablet (25 mg) by mouth At Bedtime 90 tablet 1    HYDROcodone-acetaminophen (NORCO) 5-325 MG tablet Take 1 tablet by mouth 2 times daily as needed for severe pain (ok to fill 4/7/2023) 60 tablet 0    losartan (COZAAR) 100 MG tablet Take 1 tablet (100 mg) by mouth daily 90 tablet 1    omeprazole (PRILOSEC) 40 MG DR capsule Take 1 capsule (40 mg) by mouth daily 90 capsule 1    phentermine (ADIPEX-P) 15 MG capsule Take 1 capsule (15 mg) by mouth every morning 30 capsule 0    solifenacin (VESICARE) 10 MG tablet Take 1 tablet (10 mg) by mouth daily 90 tablet 3    terbinafine (LAMISIL) 250 MG tablet Take 1 tablet (250 mg) by mouth daily for 90 days 90 tablet 0    topiramate (TOPAMAX) 25 MG tablet Take 3 tablets (75 mg) by mouth daily 270 tablet 1    traZODone (DESYREL) 100  MG tablet Take 2 tablets (200 mg) by mouth At Bedtime 180 tablet 0    venlafaxine (EFFEXOR XR) 150 MG 24 hr capsule Take 2 capsules (300 mg) by mouth daily 180 capsule 0    levonorgestrel (MIRENA) 20 MCG/24HR IUD 1 each (20 mcg) by Intrauterine route once for 1 dose 1 each 0    sucralfate (CARAFATE) 1 GM tablet Take 1 tablet (1 g) by mouth 4 times daily (Patient taking differently: Take 1 g by mouth 4 times daily as needed (heartburn flare)) 360 tablet 3       Medical History: any changes in medical history since they were last seen? Yes - dx with nonunion after arthrodesis      Objective:    Physical Exam:  not currently breastfeeding.  GENERAL: Healthy, alert and no distress  EYES: Eyes grossly normal to inspection.  No discharge or erythema, or obvious scleral/conjunctival abnormalities.  RESP: No audible wheeze, cough, or visible cyanosis.  No visible retractions or increased work of breathing.    SKIN: Visible skin clear. No significant rash, abnormal pigmentation or lesions.  NEURO: Cranial nerves grossly intact.  Mentation and speech appropriate for age.  PSYCH: Mentation appears normal, affect normal/bright, judgement and insight intact, normal speech and appearance well-groomed.    Diagnostic Tests/Imaging/Labs:  Exam: CT FOOT RIGHT W/O CONTRAST 3/22/2023 12:43 PM     History: Right foot pain     Techniques: Multislice CT examination was performed of the right foot  with multiplanar reconstructions displayed in multiple window  settings. Imaging was performed without IV contrast material.      DLP: 379 mGy-cm  CTDIvol: 20.6 mGy     Comparison: Radiographs dated 7/6/2022 and MRI dated 7/8/2022     Findings:      image(s) demonstrate(s) fractured plate of the MTP ORIF fusion  instrumentation     Bones:     There are postsurgical changes of first metatarsal phalangeal joint  attempted arthrodesis of the right great toe. There are 3 screws in  the distal metatarsal and 2 screws within the proximal phalanx  of the  first digit. The plate at the dorsal aspect of the first MTP joint is  fractured. There is no significant osseous bridging of the first MTP  joint.     The tibiotalar joint appears normally aligned. The medial and lateral  clear spaces are symmetric.     Normal appearance of the subtalar joints with about significant  degenerative changes. Osteophytosis of the talonavicular joint. The  midfoot joints appear normal.     At the plantar aspect of the third digit proximal metatarsal, there is  cortical disruption and a sclerotic line, likely sequela of prior  stress fracture in this area. The remainder of the metatarsals appear  unremarkable.     Soft tissues:   Evaluation of the soft tissue, particularly internal derangement of  joint assessment is limited with CT technique.      The lateral peroneus brevis and longus tendons appear intact. The  medial flexor tendons are intact.                                                                      Impression:     1. Postsurgical changes of arthrodesis of the first MTP joint reveal  hardware failure with a fractured metal plate, intact screws as  described above.  2. No significant osseous bridging at the level of the first MTP joint  arthrodesis is noted.  3. Mild to moderate degenerative changes of the talonavicular joint,  the remainder of the joints appear unremarkable, talonavicular and  subtalar joints, hindfoot /midfoot articulation are preserved.  4. Subtle cortical lucency and sclerotic line at the plantar aspect of  the third metatarsal proximally in the area of the prior seen stress  fracture. Recommend correlation for residual clinical findings in this  area.    BILLING TIME DOCUMENTATION:   The total TIME spent on this patient on the date of the encounter/appointment was 30 minutes.      TOTAL TIME includes:   Time spent preparing to see the patient (reviewing records and tests)   Time spent face to face (or over the phone) with the patient   Time  spent ordering tests, medications, procedures and referrals   Time spent Referring and communicating with other healthcare professionals   Time spent documenting clinical information in Epic             Kendy is a 52 year old who is being evaluated via a billable video visit.      How would you like to obtain your AVS? MyChart  If the video visit is dropped, the invitation should be resent by: Text to cell phone: [phone]  Will anyone else be joining your video visit? No              Again, thank you for allowing me to participate in the care of your patient.      Sincerely,    LEDY Brown CNP

## 2023-05-19 NOTE — NURSING NOTE
Patient presents with:  Follow Up: Follow-up Right Foot Pain       Moderate Pain (4)     Pain Medications     Opioid Combinations Refills Start End     HYDROcodone-acetaminophen (NORCO) 5-325 MG tablet    0 4/28/2023     Sig - Route: Take 1 tablet by mouth 2 times daily as needed for severe pain (ok to fill 4/7/2023) - Oral    Class: E-Prescribe    Earliest Fill Date: 4/28/2023          What medications are you using for pain? Hydrocodone, Amitripptyline, Tylenol    (New patients only) Have you been seen by another pain clinic/ provider? no    (Return Patients only) What refills are you needing today? no

## 2023-05-19 NOTE — PROGRESS NOTES
Video-Visit Details    Type of service:  Video Visit     Originating Location (pt. Location): Home    Distant Location (provider location):  On-site  Platform used for Video Visit: [facility]     Date of visit: 5/19/2023      Assessment:   Sharmin Nieves is a 52 year old female with a past medical history significant for chronic LBP, DJD of first metatarsal right foot/hallux limitus, anxiety/depression, HTN, s/p right first metatarsal fusion/revision x 3, who presents with complaints of right foot pain.       1. Right foot pain - Etiology is most consistent with degenerative joint and postsurgical changes of foot, notably first metatarsal phalangeal joint.     Visit Diagnoses:  1. Chronic foot pain, right    2. Nonunion after arthrodesis    3. Primary osteoarthritis of right foot        Plan:  Diagnosis reviewed, treatment option addressed, and risk/benifits discussed.  Self-care instructions given.  I am recommending a multidisciplinary treatment plan to help this patient better manage their pain.                  1.  Pain Physical Therapy:  NO   Continue activity as tolerated. PT per Dr. Gibson's recommendation.               2.  Pain Psychologist to address relaxation, behavioral change, coping style, and other factors important to improvement.  NO              3.  Diagnostic Studies:  None               4.  Medication Management:     Continue diclofenac gel twice daily to right foot.     Recommend trial OTC topical analgesic patches - menthol 5% (Icy Hot) and [place]. Try using during daytime while at work. Allow 2 hours in between diclofenac gel application and patches. Be sure to wipe off any excess product before applications.     Buprenorphine - we discussed possible trial of buprenorphine today, if she does not appreciate much improvement in pain with bone stimulator therapy and topical analgesics. Her PCP is currently prescribing hydrocodone 5-325 mg twice daily as  needed, which is somewhat helpful, but she does not seem to have adequate pain control with current dose. Advised she will need to follow up in person with me if interested in buprenorphine trial, will need to establish CSA.               5.  Potential procedures: None                6.  Referrals: None               7.  Follow up with LEDY Brown CNP if interested in trial of buprenorphine, and/or as needed       Review of Electronic Chart: Today I have also reviewed available medical information in the patient's medical record at [facility] ([facility]) and Care Everywhere (if available), including relevant provider notes, laboratory work, and imaging.     Meri Toth DNP, LEDY, AGNP-C  [facility]     -------------------------------------------------    Subjective:    Chief complaint:   Chief Complaint   Patient presents with     Follow Up     Follow-up Right Foot Pain        Interval history:  Sharmin Nieves is a 52 year old female last seen on 10/10/22.  They are a patient of mine seen in follow up.     Recommendations/plan at the last visit included:              1.  Pain Physical Therapy:  NO   She will as about PT at next postop follow up in a month. I am happy to place a referral if needed.               2.  Pain Psychologist to address relaxation, behavioral change, coping style, and other factors important to improvement.  NO              3.  Diagnostic Studies:  None               4.  Medication Management: Kendy was certified for [place] medical cannabis program today. She finds cannabis helpful at bedtime and improved sleep.               5.  Potential procedures: None                6.  Referrals:              7.  Follow up with LEDY Brown CNP for annual medical cannabis re-certification, or sooner if needed.       Since her last visit, Sharmin Nieves reports:  -her pain is somewhat worse than it was at last visit.   -At last visit she was 4 weeks postop from surgery.  "  -She continued to have pain and it was determined she has fractured plate and nonunion delayed healing.   -Bone stimulator recommended to start, possible revision surgery in the future, pending outcome.   -She has pain in right toe joints, painful notably with walking, every step she takes.   -She works long hours, pain worse at the end of the workday.   -She continues to have significant pain around surgical site.   -She describes pain as sharp, \"bone on bone\" sensation.   -She was recently started on amitriptyline about 2 weeks ago, not sure about pain benefit quite yet.   -She notes increased drowsiness during daytime since starting TCA.   -She takes tylenol 1000 mg TID.   -She uses Voltaren gel in the morning, sometimes during workday, also applies in the evening.   -She cannot take NSAIDs.   -She continues to take hydrocodone 1 tab BID, managed by PCP.   -She reports PCP is hesitant to increase norco, partly due to hx of alcohol use.   -She was certified for medical cannabis at last visit but did not complete registration due to cost.       Pain Information:   Pain rating: averages 4/10 on a 0-10 scale.        HPI from initial visit with me on 10/10/22:  Shramin Nieves is a 52 year old female presents with a chief complaint of right foot pain.      The pain has been present for years, though worse since she had surgery fusion of right first metatarsal in 2020.    The pain is Moderate Pain (4) in severity.    -She has had 3 surgeries on right foot, had fusion of first metatarsal. Saw Dr. Guillen first, then went to [facility].   -She recently had third surgery on 9/16/22 (we do not have access to [facility] records)  -She has altered gait, reports \"walking funny\" with foot injury.   -She was referred here to establish with pain, in the event her pain does not improve beyond expected recovery time.   -She has h/o gastic ulcers, cannot take NSAIDs.   -Currently taking Hydrocodone 5-325 - 1 in AM and 1 in PM.   -She had post op " appt and sutures removed. Has not had PT post op yet. She goes back in another month for next postop f/up.  -It is most painful when she is up and walking and working.   -She reports increased pain at bedtime after working during day up on feet, uses cannabis to help with pain and sleep (finds if quite helpful).      Sharmin Nieves has not been seen at a pain clinic in the past.          Current Pain Treatments:    2. Medications:               Hydrocodone 5-325 mg - 1 BID PRN (PCP managing)              Medical cannabis    Amitriptyline 25 mg at bedtime    Tylenol 1000 mg TID     2. Other therapies:               Rest              Ice                  Current MME: 10    Review of [place] Prescription Monitoring Program (): No concern for abuse or misuse of controlled medications based on this report. Reviewed - appears appropriate.     Past pain treatments:  Surgery - arthrodesis      Medications:  Current Outpatient Medications   Medication Sig Dispense Refill     amitriptyline (ELAVIL) 25 MG tablet Take 1 tablet (25 mg) by mouth At Bedtime 90 tablet 1     HYDROcodone-acetaminophen (NORCO) 5-325 MG tablet Take 1 tablet by mouth 2 times daily as needed for severe pain (ok to fill 4/7/2023) 60 tablet 0     losartan (COZAAR) 100 MG tablet Take 1 tablet (100 mg) by mouth daily 90 tablet 1     omeprazole (PRILOSEC) 40 MG DR capsule Take 1 capsule (40 mg) by mouth daily 90 capsule 1     phentermine (ADIPEX-P) 15 MG capsule Take 1 capsule (15 mg) by mouth every morning 30 capsule 0     solifenacin (VESICARE) 10 MG tablet Take 1 tablet (10 mg) by mouth daily 90 tablet 3     terbinafine (LAMISIL) 250 MG tablet Take 1 tablet (250 mg) by mouth daily for 90 days 90 tablet 0     topiramate (TOPAMAX) 25 MG tablet Take 3 tablets (75 mg) by mouth daily 270 tablet 1     traZODone (DESYREL) 100 MG tablet Take 2 tablets (200 mg) by mouth At Bedtime 180 tablet 0     venlafaxine (EFFEXOR XR) 150 MG 24 hr capsule Take 2 capsules  (300 mg) by mouth daily 180 capsule 0     levonorgestrel (MIRENA) 20 MCG/24HR IUD 1 each (20 mcg) by Intrauterine route once for 1 dose 1 each 0     sucralfate (CARAFATE) 1 GM tablet Take 1 tablet (1 g) by mouth 4 times daily (Patient taking differently: Take 1 g by mouth 4 times daily as needed (heartburn flare)) 360 tablet 3       Medical History: any changes in medical history since they were last seen? Yes - dx with nonunion after arthrodesis      Objective:    Physical Exam:  not currently breastfeeding.  GENERAL: Healthy, alert and no distress  EYES: Eyes grossly normal to inspection.  No discharge or erythema, or obvious scleral/conjunctival abnormalities.  RESP: No audible wheeze, cough, or visible cyanosis.  No visible retractions or increased work of breathing.    SKIN: Visible skin clear. No significant rash, abnormal pigmentation or lesions.  NEURO: Cranial nerves grossly intact.  Mentation and speech appropriate for age.  PSYCH: Mentation appears normal, affect normal/bright, judgement and insight intact, normal speech and appearance well-groomed.    Diagnostic Tests/Imaging/Labs:  Exam: CT FOOT RIGHT W/O CONTRAST 3/22/2023 12:43 PM     History: Right foot pain     Techniques: Multislice CT examination was performed of the right foot  with multiplanar reconstructions displayed in multiple window  settings. Imaging was performed without IV contrast material.      DLP: 379 mGy-cm  CTDIvol: 20.6 mGy     Comparison: Radiographs dated 7/6/2022 and MRI dated 7/8/2022     Findings:      image(s) demonstrate(s) fractured plate of the MTP ORIF fusion  instrumentation     Bones:     There are postsurgical changes of first metatarsal phalangeal joint  attempted arthrodesis of the right great toe. There are 3 screws in  the distal metatarsal and 2 screws within the proximal phalanx of the  first digit. The plate at the dorsal aspect of the first MTP joint is  fractured. There is no significant osseous bridging  of the first MTP  joint.     The tibiotalar joint appears normally aligned. The medial and lateral  clear spaces are symmetric.     Normal appearance of the subtalar joints with about significant  degenerative changes. Osteophytosis of the talonavicular joint. The  midfoot joints appear normal.     At the plantar aspect of the third digit proximal metatarsal, there is  cortical disruption and a sclerotic line, likely sequela of prior  stress fracture in this area. The remainder of the metatarsals appear  unremarkable.     Soft tissues:   Evaluation of the soft tissue, particularly internal derangement of  joint assessment is limited with CT technique.      The lateral peroneus brevis and longus tendons appear intact. The  medial flexor tendons are intact.                                                                      Impression:     1. Postsurgical changes of arthrodesis of the first MTP joint reveal  hardware failure with a fractured metal plate, intact screws as  described above.  2. No significant osseous bridging at the level of the first MTP joint  arthrodesis is noted.  3. Mild to moderate degenerative changes of the talonavicular joint,  the remainder of the joints appear unremarkable, talonavicular and  subtalar joints, hindfoot /midfoot articulation are preserved.  4. Subtle cortical lucency and sclerotic line at the plantar aspect of  the third metatarsal proximally in the area of the prior seen stress  fracture. Recommend correlation for residual clinical findings in this  area.    BILLING TIME DOCUMENTATION:   The total TIME spent on this patient on the date of the encounter/appointment was 30 minutes.      TOTAL TIME includes:   Time spent preparing to see the patient (reviewing records and tests)   Time spent face to face (or over the phone) with the patient   Time spent ordering tests, medications, procedures and referrals   Time spent Referring and communicating with other healthcare  professionals   Time spent documenting clinical information in Epic

## 2023-05-19 NOTE — PROGRESS NOTES
Kendy is a 52 year old who is being evaluated via a billable video visit.      How would you like to obtain your AVS? [email or web address]  If the video visit is dropped, the invitation should be resent by: Text to cell phone: [phone]  Will anyone else be joining your video visit? No

## 2023-05-25 ENCOUNTER — MYC MEDICAL ADVICE (OUTPATIENT)
Dept: FAMILY MEDICINE | Facility: CLINIC | Age: 53
End: 2023-05-25
Payer: COMMERCIAL

## 2023-05-25 ENCOUNTER — MYC REFILL (OUTPATIENT)
Dept: FAMILY MEDICINE | Facility: CLINIC | Age: 53
End: 2023-05-25
Payer: COMMERCIAL

## 2023-05-25 DIAGNOSIS — Z98.890 HISTORY OF FOOT SURGERY: ICD-10-CM

## 2023-05-25 DIAGNOSIS — S92.334A CLOSED NONDISPLACED FRACTURE OF THIRD METATARSAL BONE OF RIGHT FOOT, INITIAL ENCOUNTER: ICD-10-CM

## 2023-05-25 DIAGNOSIS — M79.674 PAIN OF RIGHT GREAT TOE: ICD-10-CM

## 2023-05-25 DIAGNOSIS — M19.071 PRIMARY OSTEOARTHRITIS OF RIGHT FOOT: ICD-10-CM

## 2023-05-26 ENCOUNTER — MYC MEDICAL ADVICE (OUTPATIENT)
Dept: FAMILY MEDICINE | Facility: CLINIC | Age: 53
End: 2023-05-26
Payer: COMMERCIAL

## 2023-05-26 RX ORDER — HYDROCODONE BITARTRATE AND ACETAMINOPHEN 5; 325 MG/1; MG/1
1 TABLET ORAL 2 TIMES DAILY PRN
Qty: 60 TABLET | Refills: 0 | Status: SHIPPED | OUTPATIENT
Start: 2023-05-26 | End: 2023-06-23

## 2023-05-26 NOTE — TELEPHONE ENCOUNTER
Requested Prescriptions   Pending Prescriptions Disp Refills     HYDROcodone-acetaminophen (NORCO) 5-325 MG tablet 60 tablet 0     Sig: Take 1 tablet by mouth 2 times daily as needed for severe pain (ok to fill 4/7/2023)       There is no refill protocol information for this order        Routing refill request to provider for review/approval because:  Drug not on the [facility] refill protocol     Carla Abdul RN  [facility]

## 2023-05-26 NOTE — TELEPHONE ENCOUNTER
Pt calling back stating they are going out of town and requesting early refill, routing high priority to PCP and to POD for approval for early fill.    Thanks!  Ridge Ocampo RN   [facility]

## 2023-05-26 NOTE — TELEPHONE ENCOUNTER
Called pharmacy to relay that provider Mariya Bill was okay with a early refill of the [place],    Will update pt. Thanks    Carla Abdul RN  [facility]

## 2023-06-09 ENCOUNTER — MYC MEDICAL ADVICE (OUTPATIENT)
Dept: FAMILY MEDICINE | Facility: CLINIC | Age: 53
End: 2023-06-09
Payer: COMMERCIAL

## 2023-06-14 NOTE — TELEPHONE ENCOUNTER
This should have been scheduled into those spots instead of routed back to me. Please schedule this patient and her daughter into the 10:40 and 11:20 am spots on 6/23 that I offered her and she accepted.  TOMMY Clinton

## 2023-06-14 NOTE — TELEPHONE ENCOUNTER
I moved the schedule around so that the pt and her daughter are in those slots. I did put Kendy down for an office visit and daughter down for annual establish care appointment. But please advise if wanting daughter scheduled for different type of appointment.    Thanks!  Ridge Ocampo RN   [facility]

## 2023-06-19 ASSESSMENT — ENCOUNTER SYMPTOMS
FEVER: 0
NERVOUS/ANXIOUS: 1
HEMATURIA: 0
EYE PAIN: 0
JOINT SWELLING: 1
ABDOMINAL PAIN: 0
BREAST MASS: 0
PARESTHESIAS: 0
CHILLS: 0
SHORTNESS OF BREATH: 0
CONSTIPATION: 0
WEAKNESS: 0
HEARTBURN: 1
COUGH: 0
FREQUENCY: 1
HEMATOCHEZIA: 0
ARTHRALGIAS: 1
DIARRHEA: 0
DIZZINESS: 0
MYALGIAS: 0
HEADACHES: 0
DYSURIA: 0
PALPITATIONS: 0
NAUSEA: 1
SORE THROAT: 0

## 2023-06-23 ENCOUNTER — OFFICE VISIT (OUTPATIENT)
Dept: FAMILY MEDICINE | Facility: CLINIC | Age: 53
End: 2023-06-23
Payer: COMMERCIAL

## 2023-06-23 VITALS
BODY MASS INDEX: 29.28 KG/M2 | TEMPERATURE: 98 F | HEART RATE: 91 BPM | RESPIRATION RATE: 16 BRPM | WEIGHT: 204.5 LBS | HEIGHT: 70 IN | DIASTOLIC BLOOD PRESSURE: 78 MMHG | SYSTOLIC BLOOD PRESSURE: 128 MMHG | OXYGEN SATURATION: 97 %

## 2023-06-23 DIAGNOSIS — Z92.29 HISTORY OF HEPATITIS B VACCINATION: ICD-10-CM

## 2023-06-23 DIAGNOSIS — M19.071 PRIMARY OSTEOARTHRITIS OF RIGHT FOOT: ICD-10-CM

## 2023-06-23 DIAGNOSIS — Z98.890 HISTORY OF FOOT SURGERY: ICD-10-CM

## 2023-06-23 DIAGNOSIS — M79.674 PAIN OF RIGHT GREAT TOE: ICD-10-CM

## 2023-06-23 DIAGNOSIS — F10.10 ALCOHOL ABUSE: ICD-10-CM

## 2023-06-23 DIAGNOSIS — Z00.00 ROUTINE GENERAL MEDICAL EXAMINATION AT A HEALTH CARE FACILITY: Primary | ICD-10-CM

## 2023-06-23 DIAGNOSIS — E66.09 CLASS 2 OBESITY DUE TO EXCESS CALORIES WITHOUT SERIOUS COMORBIDITY WITH BODY MASS INDEX (BMI) OF 35.0 TO 35.9 IN ADULT: ICD-10-CM

## 2023-06-23 DIAGNOSIS — Z12.4 CERVICAL CANCER SCREENING: ICD-10-CM

## 2023-06-23 DIAGNOSIS — F41.1 GENERALIZED ANXIETY DISORDER: ICD-10-CM

## 2023-06-23 DIAGNOSIS — S92.334A CLOSED NONDISPLACED FRACTURE OF THIRD METATARSAL BONE OF RIGHT FOOT, INITIAL ENCOUNTER: ICD-10-CM

## 2023-06-23 DIAGNOSIS — E66.812 CLASS 2 OBESITY DUE TO EXCESS CALORIES WITHOUT SERIOUS COMORBIDITY WITH BODY MASS INDEX (BMI) OF 35.0 TO 35.9 IN ADULT: ICD-10-CM

## 2023-06-23 DIAGNOSIS — N95.1 MENOPAUSAL VAGINAL DRYNESS: ICD-10-CM

## 2023-06-23 LAB — CREAT UR-MCNC: 97 MG/DL

## 2023-06-23 PROCEDURE — 80373 DRUG SCREENING TRAMADOL: CPT | Performed by: NURSE PRACTITIONER

## 2023-06-23 PROCEDURE — 80359 METHYLENEDIOXYAMPHETAMINES: CPT | Performed by: NURSE PRACTITIONER

## 2023-06-23 PROCEDURE — 90471 IMMUNIZATION ADMIN: CPT | Performed by: NURSE PRACTITIONER

## 2023-06-23 PROCEDURE — 80358 DRUG SCREENING METHADONE: CPT | Performed by: NURSE PRACTITIONER

## 2023-06-23 PROCEDURE — 80355 GABAPENTIN NON-BLOOD: CPT | Performed by: NURSE PRACTITIONER

## 2023-06-23 PROCEDURE — 99396 PREV VISIT EST AGE 40-64: CPT | Mod: 25 | Performed by: NURSE PRACTITIONER

## 2023-06-23 PROCEDURE — 80372 DRUG SCREENING TAPENTADOL: CPT | Performed by: NURSE PRACTITIONER

## 2023-06-23 PROCEDURE — 80357 KETAMINE AND NORKETAMINE: CPT | Performed by: NURSE PRACTITIONER

## 2023-06-23 PROCEDURE — 90472 IMMUNIZATION ADMIN EACH ADD: CPT | Performed by: NURSE PRACTITIONER

## 2023-06-23 PROCEDURE — 90750 HZV VACC RECOMBINANT IM: CPT | Performed by: NURSE PRACTITIONER

## 2023-06-23 PROCEDURE — 90677 PCV20 VACCINE IM: CPT | Performed by: NURSE PRACTITIONER

## 2023-06-23 PROCEDURE — 99214 OFFICE O/P EST MOD 30 MIN: CPT | Mod: 25 | Performed by: NURSE PRACTITIONER

## 2023-06-23 PROCEDURE — 83992 ASSAY FOR PHENCYCLIDINE: CPT | Performed by: NURSE PRACTITIONER

## 2023-06-23 PROCEDURE — 80361 OPIATES 1 OR MORE: CPT | Performed by: NURSE PRACTITIONER

## 2023-06-23 PROCEDURE — 80346 BENZODIAZEPINES1-12: CPT | Performed by: NURSE PRACTITIONER

## 2023-06-23 PROCEDURE — 80363 OPIOIDS & OPIATE ANALOGS 3/4: CPT | Performed by: NURSE PRACTITIONER

## 2023-06-23 PROCEDURE — 80348 DRUG SCREENING BUPRENORPHINE: CPT | Performed by: NURSE PRACTITIONER

## 2023-06-23 PROCEDURE — 80324 DRUG SCREEN AMPHETAMINES 1/2: CPT | Performed by: NURSE PRACTITIONER

## 2023-06-23 PROCEDURE — 80354 DRUG SCREENING FENTANYL: CPT | Performed by: NURSE PRACTITIONER

## 2023-06-23 PROCEDURE — 87624 HPV HI-RISK TYP POOLED RSLT: CPT | Performed by: NURSE PRACTITIONER

## 2023-06-23 PROCEDURE — G0145 SCR C/V CYTO,THINLAYER,RESCR: HCPCS | Performed by: NURSE PRACTITIONER

## 2023-06-23 PROCEDURE — 80367 DRUG SCREENING PROPOXYPHENE: CPT | Performed by: NURSE PRACTITIONER

## 2023-06-23 PROCEDURE — 80356 HEROIN METABOLITE: CPT | Performed by: NURSE PRACTITIONER

## 2023-06-23 PROCEDURE — 80365 DRUG SCREENING OXYCODONE: CPT | Performed by: NURSE PRACTITIONER

## 2023-06-23 PROCEDURE — 90715 TDAP VACCINE 7 YRS/> IM: CPT | Performed by: NURSE PRACTITIONER

## 2023-06-23 PROCEDURE — 80360 METHYLPHENIDATE: CPT | Performed by: NURSE PRACTITIONER

## 2023-06-23 PROCEDURE — 80366 DRUG SCREENING PREGABALIN: CPT | Performed by: NURSE PRACTITIONER

## 2023-06-23 PROCEDURE — 80353 DRUG SCREENING COCAINE: CPT | Performed by: NURSE PRACTITIONER

## 2023-06-23 RX ORDER — ESTRADIOL 0.1 MG/G
2 CREAM VAGINAL
Qty: 42.5 G | Refills: 1 | Status: SHIPPED | OUTPATIENT
Start: 2023-06-26

## 2023-06-23 RX ORDER — PHENTERMINE HYDROCHLORIDE 37.5 MG/1
37.5 TABLET ORAL
Qty: 30 TABLET | Refills: 0 | Status: SHIPPED | OUTPATIENT
Start: 2023-06-23 | End: 2023-07-22

## 2023-06-23 RX ORDER — VENLAFAXINE HYDROCHLORIDE 150 MG/1
300 CAPSULE, EXTENDED RELEASE ORAL DAILY
Qty: 180 CAPSULE | Refills: 1 | Status: SHIPPED | OUTPATIENT
Start: 2023-06-23 | End: 2024-02-26

## 2023-06-23 RX ORDER — HYDROCODONE BITARTRATE AND ACETAMINOPHEN 5; 325 MG/1; MG/1
1 TABLET ORAL 2 TIMES DAILY PRN
Qty: 60 TABLET | Refills: 0 | Status: SHIPPED | OUTPATIENT
Start: 2023-06-23 | End: 2023-07-18

## 2023-06-23 ASSESSMENT — ENCOUNTER SYMPTOMS
HEARTBURN: 1
HEMATOCHEZIA: 0
BREAST MASS: 0
NAUSEA: 1
FEVER: 0
DIARRHEA: 0
NERVOUS/ANXIOUS: 1
EYE PAIN: 0
WEAKNESS: 0
FREQUENCY: 1
DYSURIA: 0
CHILLS: 0
ARTHRALGIAS: 1
JOINT SWELLING: 1
PARESTHESIAS: 0
DIZZINESS: 0
SORE THROAT: 0
ABDOMINAL PAIN: 0
HEMATURIA: 0
COUGH: 0
CONSTIPATION: 0
PALPITATIONS: 0
HEADACHES: 0
SHORTNESS OF BREATH: 0
MYALGIAS: 0

## 2023-06-23 ASSESSMENT — PAIN SCALES - GENERAL: PAINLEVEL: MILD PAIN (3)

## 2023-06-23 NOTE — LETTER
Opioid / Opioid Plus Controlled Substance Agreement    This is an agreement between you and your provider about the safe and appropriate use of controlled substance/opioids prescribed by your care team. Controlled substances are medicines that can cause physical and mental dependence (abuse).    There are strict laws about having and using these medicines. We here at [facility] are committing to working with you in your efforts to get better. To support you in this work, we ll help you schedule regular office appointments for medicine refills. If we must cancel or change your appointment for any reason, we ll make sure you have enough medicine to last until your next appointment.     As a Provider, I will:  Listen carefully to your concerns and treat you with respect.   Recommend a treatment plan that I believe is in your best interest. This plan may involve therapies other than opioid pain medication.   Talk with you often about the possible benefits, and the risk of harm of any medicine that we prescribe for you.   Provide a plan on how to taper (discontinue or go off) using this medicine if the decision is made to stop its use.    As a Patient, I understand that opioid(s):   Are a controlled substance prescribed by my care team to help me function or work and manage my condition(s).   Are strong medicines and can cause serious side effects such as:  Drowsiness, which can seriously affect my driving ability  A lower breathing rate, enough to cause death  Harm to my thinking ability   Depression   Abuse of and addiction to this medicine  Need to be taken exactly as prescribed. Combining opioids with certain medicines or chemicals (such as illegal drugs, sedatives, sleeping pills, and benzodiazepines) can be dangerous or even fatal. If I stop opioids suddenly, I may have severe withdrawal symptoms.  Do not work for all types of pain nor for all patients. If they re not helpful, I may be asked to stop  them.      The risks, benefits and side effects of these medicine(s) were explained to me. I agree that:  I will take part in other treatments as advised by my care team. This may be psychiatry or counseling, physical therapy, behavioral therapy, group treatment or a referral to a specialist.     I will keep all my appointments. I understand that this is part of the monitoring of opioids. My care team may require an office visit for EVERY opioid/controlled substance refill. If I miss appointments or don t follow instructions, my care team may stop my medicine.    I will take my medicines as prescribed. I will not change the dose or schedule unless my care team tells me to. There will be no refills if I run out early.     I may be asked to come to the clinic and complete a urine drug test or complete a pill count at any time. If I don t give a urine sample or participate in a pill count, the care team may stop my medicine.    I will only receive prescriptions from this clinic for chronic pain. If I am treated by another provider for acute pain issues, I will tell them that I am taking opioid pain medication for chronic pain and that I have a treatment agreement with this provider. I will inform my [facility] care team within one business day if I am given a prescription for any pain medication by another healthcare provider. My [facility] care team can contact other providers and pharmacists about my use of any medicines.    It is up to me to make sure that I don t run out of my medicines on weekends or holidays. If my care team is willing to refill my opioid prescription without a visit, I must request refills only during office hours. Refills may take up to 3 business days to process. I will use one pharmacy to fill all my opioid and other controlled substance prescriptions. I will notify the clinic about any changes to my insurance or medication availability.    I am responsible for my prescriptions.  If the medicine/prescription is lost, stolen or destroyed, it will not be replaced. I also agree not to share controlled substance medicines with anyone.    I am aware I should not use any illegal or recreational drugs. I agree not to drink alcohol unless my care team says I can.       If I enroll in the [place] [facility] Cann[facility] program, I will tell my care team prior to my next refill.     I will tell my care team right away if I become pregnant, have a new medical problem treated outside of my regular clinic, or have a change in my medications.    I understand that this medicine can affect my thinking, judgment and reaction time. Alcohol and drugs affect the brain and body, which can affect the safety of my driving. Being under the influence of alcohol or drugs can affect my decision-making, behaviors, personal safety, and the safety of others. Driving while impaired (DWI) can occur if a person is driving, operating, or in physical control of a car, motorcycle, boat, snowmobile, ATV, motorbike, off-road vehicle, or any other motor vehicle ([place] Statute 169A.20). I understand the risk if I choose to drive or operate any vehicle or machinery.    I understand that if I do not follow any of the conditions above, my prescriptions or treatment may be stopped or changed.          Opioids  What You Need to Know    What are opioids?   Opioids are pain medicines that must be prescribed by a doctor. They are also known as narcotics.     Examples are:   morphine (MS Contin, Anamaria)  oxycodone (Oxycontin)  oxycodone and acetaminophen (Percocet)  hydrocodone and acetaminophen (Vicodin, Norco)   fentanyl patch (Duragesic)   hydromorphone (Dilaudid)   methadone  codeine (Tylenol #3)     What do opioids do well?   Opioids are best for severe short-term pain such as after a surgery or injury. They may work well for cancer pain. They may help some people with long-lasting (chronic) pain.     What do opioids NOT do well?   Opioids  never get rid of pain entirely, and they don t work well for most patients with chronic pain. Opioids don t reduce swelling, one of the causes of pain.                                    Other ways to manage chronic pain and improve function include:     Treat the health problem that may be causing pain  Anti-inflammation medicines, which reduce swelling and tenderness, such as ibuprofen (Advil, Motrin) or naproxen (Aleve)  Acetaminophen (Tylenol)  Antidepressants and anti-seizure medicines, especially for nerve pain  Topical treatments such as patches or creams  Injections or nerve blocks  Chiropractic or osteopathic treatment  Acupuncture, massage, deep breathing, meditation, visual imagery, aromatherapy  Use heat or ice at the pain site  Physical therapy   Exercise  Stop smoking  Take part in therapy       Risks and side effects     Talk to your doctor before you start or decide to keep taking opioids. Possible side effects include:    Lowering your breathing rate enough to cause death  Overdose, including death, especially if taking higher than prescribed doses  Worse depression symptoms; less pleasure in things you usually enjoy  Feeling tired or sluggish  Slower thoughts or cloudy thinking  Being more sensitive to pain over time; pain is harder to control  Trouble sleeping or restless sleep  Changes in hormone levels (for example, less testosterone)  Changes in sex drive or ability to have sex  Constipation  Unsafe driving  Itching and sweating  Dizziness  Nausea, throwing up and dry mouth    What else should I know about opioids?    Opioids may lead to dependence, tolerance, or addiction.    Dependence means that if you stop or reduce the medicine too quickly, you will have withdrawal symptoms. These include loose poop (diarrhea), jitters, flu-like symptoms, nervousness and tremors. Dependence is not the same as addiction.                     Tolerance means needing higher doses over time to get the same  effect. This may increase the chance of serious side effects.    Addiction is when people improperly use a substance that harms their body, their mind or their relations with others. Use of opiates can cause a relapse of addiction if you have a history of drug or alcohol abuse.    People who have used opioids for a long time may have a lower quality of life, worse depression, higher levels of pain and more visits to doctors.    You can overdose on opioids. Take these steps to lower your risk of overdose:    Recognize the signs:  Signs of overdose include decrease or loss of consciousness (blackout), slowed breathing, trouble waking up and blue lips. If someone is worried about overdose, they should call 911.    Talk to your doctor about Narcan (naloxone).   If you are at risk for overdose, you may be given a prescription for Narcan. This medicine very quickly reverses the effects of opioids.   If you overdose, a friend or family member can give you Narcan while waiting for the ambulance. They need to know the signs of overdose and how to give Narcan.     Don't use alcohol or street drugs.   Taking them with opioids can cause death.    Do not take any of these medicines unless your doctor says it s OK. Taking these with opioids can cause death:  Benzodiazepines, such as lorazepam (Ativan), alprazolam (Xanax) or diazepam (Valium)  Muscle relaxers, such as cyclobenzaprine (Flexeril)  Sleeping pills like zolpidem (Ambien)   Other opioids      How to keep you and other people safe while taking opioids:    Never share your opioids with others.  Opioid medicines are regulated by the Drug Enforcement Agency ([place]). Selling or sharing medications is a criminal act.    2. Be sure to store opioids in a secure place, locked up if possible. Young children can easily swallow them and overdose.    3. When you are traveling with your medicines, keep them in the original bottles. If you use a pill box, be sure you also carry a copy  of your medicine list from your clinic or pharmacy.    4. Safe disposal of opioids    Most pharmacies have places to get rid of medicine, called disposal kiosks. Medicine disposal options are also available in every [facility]. Search your county and  medication disposal  to find more options. You can find more details at:  [email or web address]://[email or web address]-green/managing-unwanted-medications     I agree that my provider, clinic care team, and pharmacy may work with any city, state or federal law enforcement agency that investigates the misuse, sale, or other diversion of my controlled medicine. I will allow my provider to discuss my care with, or share a copy of, this agreement with any other treating provider, pharmacy or emergency room where I receive care.    I have read this agreement and have asked questions about anything I did not understand.    _______________________________________________________  Patient Signature - Sharmin Nieves _____________________                   Date     _______________________________________________________  Provider Signature - LEDY Seaman CNP   _____________________                   Date     _______________________________________________________  Witness Signature (required if provider not present while patient signing)   _____________________                   Date

## 2023-06-23 NOTE — PROGRESS NOTES
SUBJECTIVE:   CC: Kendy is an 52 year old who presents for preventive health visit.       6/23/2023    10:49 AM   Additional Questions   Roomed by Janee     Healthy Habits:     Getting at least 3 servings of Calcium per day:  Yes    Bi-annual eye exam:  NO    Dental care twice a year:  NO    Sleep apnea or symptoms of sleep apnea:  Daytime drowsiness and Excessive snoring    Diet:  Regular (no restrictions)    Frequency of exercise:  2-3 days/week    Duration of exercise:  Less than 15 minutes    Taking medications regularly:  Yes    Medication side effects:  Other    PHQ-2 Total Score: 4    Additional concerns today:  No    BP - checking at work and getting 120's/70s    Alcohol - 2.5 years sober from alcohol    Weight - stable at 204 lb. Highest was 230's. Lost 30 lbs with GLP-1. Gained to 210 lbs when stopped and then started phentermine and has been able to lose and stay at around 200's. Has taken this now for about two months. Also continuing to take topiramate 75 mg, which was initially started by weight loss clinic. Does have reduced appetite and able to have smaller portion. No having side effects of jitteriness, racing heart rate, mood change.   Wt Readings from Last 5 Encounters:   06/23/23 92.8 kg (204 lb 8 oz)   12/16/22 90.7 kg (200 lb)   09/01/22 91.9 kg (202 lb 8 oz)   07/12/22 72.6 kg (160 lb)   06/01/22 98.9 kg (218 lb)     Mood - amitriptyline provoked a deep depression, took at nighttime and still sleepy in the day and sleeping late in to the day and taking naps. Mood has improved since stopping about three weeks ago. It also didn't help with pain. Mood now stable and baseline. No significant anxiety. Not going to therapy.    Right foot - doing deep bone stimulator for total of eight weeks and then will see ortho and have imaging. Not feeling any difference. Saw pain medicine clinic last month. Discussed doing patches and buprenorphine.    Urine - vesicare not working very well and having  significant dry mouth. Continues to have urgency feeling of needing to urinate. Passes about 200 ml urine. If doesn't make it in time, will leak urine. No pain with urination or blood. Tests with strips at work and never has blood. Planning to talk to urology.     Social History     Tobacco Use     Smoking status: Every Day     Packs/day: 0.50     Years: 15.00     Pack years: 7.50     Types: Cigarettes     Passive exposure: Current     Smokeless tobacco: Never   Substance Use Topics     Alcohol use: Not Currently         2023     8:30 PM   Alcohol Use   Prescreen: >3 drinks/day or >7 drinks/week? Not Applicable          No data to display              Reviewed orders with patient.  Reviewed health maintenance and updated orders accordingly - Yes  Lab work is in process  Labs reviewed in EPIC    Breast Cancer Screenin/2/2022     3:52 PM   Breast CA Risk Assessment (FHS-7)   Do you have a family history of breast, colon, or ovarian cancer? No / Unknown       click delete button to remove this line now  Mammogram Screening: Recommended annual mammography  Pertinent mammograms are reviewed under the imaging tab.    History of abnormal Pap smear: NO - age 30-65 PAP every 5 years with negative HPV co-testing recommended      Latest Ref Rng & Units 10/4/2017    11:27 AM 10/4/2017     9:38 AM 2014    12:00 AM   PAP / HPV   PAP (Historical)   NIL  NIL    HPV 16 DNA NEG^Negative Negative      HPV 18 DNA NEG^Negative Negative      Other HR HPV NEG^Negative Negative        Reviewed and updated as needed this visit by clinical staff   Tobacco  Allergies  Meds              Reviewed and updated as needed this visit by Provider                     Review of Systems   Constitutional: Negative for chills and fever.   HENT: Negative for congestion, ear pain, hearing loss and sore throat.    Eyes: Negative for pain and visual disturbance.   Respiratory: Negative for cough and shortness of breath.   "  Cardiovascular: Negative for chest pain, palpitations and peripheral edema.   Gastrointestinal: Positive for heartburn and nausea. Negative for abdominal pain, constipation, diarrhea and hematochezia.   Breasts:  Negative for tenderness, breast mass and discharge.   Genitourinary: Positive for frequency and urgency. Negative for dysuria, genital sores, hematuria, pelvic pain, vaginal bleeding and vaginal discharge.   Musculoskeletal: Positive for arthralgias and joint swelling. Negative for myalgias.   Skin: Negative for rash.   Neurological: Negative for dizziness, weakness, headaches and paresthesias.   Psychiatric/Behavioral: Positive for mood changes. The patient is nervous/anxious.        OBJECTIVE:   /78   Pulse 91   Temp 98  F (36.7  C) (Temporal)   Resp 16   Ht 1.772 m (5' 9.75\")   Wt 92.8 kg (204 lb 8 oz)   SpO2 97%   BMI 29.55 kg/m    Physical Exam  GENERAL: healthy, alert and no distress  EYES: Eyes grossly normal to inspection, PERRL and conjunctivae and sclerae normal  HENT: ear canals and TM's normal, nose and mouth without ulcers or lesions  NECK: no adenopathy, no asymmetry, masses, or scars and thyroid normal to palpation  RESP: lungs clear to auscultation - no rales, rhonchi or wheezes  BREAST: normal without masses, tenderness or nipple discharge and no palpable axillary masses or adenopathy  CV: regular rate and rhythm, normal S1 S2, no S3 or S4, no murmur, click or rub, no peripheral edema and peripheral pulses strong  ABDOMEN: soft, nontender, no hepatosplenomegaly, no masses and bowel sounds normal   (female): normal female external genitalia, normal urethral meatus, vaginal mucosa pink, moist, well rugated, and normal cervix/adnexa/uterus without masses or discharge  MS: no gross musculoskeletal defects noted, no edema  SKIN: no suspicious lesions or rashes  NEURO: Normal strength and tone, mentation intact and speech normal  PSYCH: mentation appears normal, affect " normal/bright    Diagnostic Test Results:  Labs reviewed in Epic  Results for orders placed or performed in visit on 06/23/23   [id number] - Urine Drug Confirmation Panel (Comprehensive)     Status: None (In process)    Narrative    The following orders were created for panel order [id number] - Urine Drug Confirmation Panel (Comprehensive).  Procedure                               Abnormality         Status                     ---------                               -----------         ------                     Urine Drug Confirmation ...[[id number]]                      In process                 Urine Creatinine for Abhinav...[[id number]]                      In process                   Please view results for these tests on the individual orders.       ASSESSMENT/PLAN:   (Z00.00) Routine general medical examination at a health care facility  (primary encounter diagnosis)  Comment:   Plan:     (E66.09,  Z68.35) Class 2 obesity due to excess calories without serious comorbidity with body mass index (BMI) of 35.0 to 35.9 in adult  Comment:   Plan: phentermine (ADIPEX-P) 37.5 MG tablet        Increase dose. Stable without weight gain, but no loss either. Blood pressure hasn't increased with the phentermine 15 mg. Will want BP report on 37.5 mg dose before next refill    (F41.1) Generalized anxiety disorder  Comment:   Plan: venlafaxine (EFFEXOR XR) 150 MG 24 hr capsule        Stable - refill    (M19.071) Primary osteoarthritis of right foot  Comment:   Plan: HYDROcodone-acetaminophen (NORCO) 5-325 MG         tablet        Refilled - I do support the idea of switching to buprenorphine. Ideally, would have liked no medications, but the longer we get with the surgeries, it seems that this may not be the outcome. Buprenorphine would be a safer long term medication for the pain than the hydrocodone. Next f/u [facility] pain is after seeing Paige in late July. Ok with July refill for hydrocodone, but I will be out in late August so may  ask pain medicine if they can cover that month.    (Z98.890) History of foot surgery  Comment:   Plan: HYDROcodone-acetaminophen (NORCO) 5-325 MG         tablet            (S92.334A) Closed nondisplaced fracture of third metatarsal bone of right foot, initial encounter  Comment:   Plan: HYDROcodone-acetaminophen (NORCO) 5-325 MG         tablet            (M79.674) Pain of right great toe  Comment:   Plan: HYDROcodone-acetaminophen (NORCO) 5-325 MG         tablet            (N95.1) Menopausal vaginal dryness  Comment:   Plan: estradiol (ESTRACE) 0.1 MG/GM vaginal cream        Wondering if part of the urine frequency is vaginal dryness, lower estrogen related. Presuming some amount of this, but not bleeding pattern to determine menopausal status 2/2 IUD. Recommend IUD change out with midwfery.    (F10.10) Alcohol abuse  Comment:   Plan: [id number] - Urine Drug Confirmation Panel         (Comprehensive)        Due for annual UDS. CSA renewed    (Z12.4) Cervical cancer screening  Comment:   Plan: Pap Screen with HPV - recommended age 30 - 65         years            (Z92.29) History of hepatitis B vaccination  Comment:   Plan: Hepatitis B Surface Antibody        Had series and then testing with [facility] and no antibodies, so repeat boosters. No repeat titer so doing this.      Patient has been advised of split billing requirements and indicates understanding: Yes      COUNSELING:  Reviewed preventive health counseling, as reflected in patient instructions        She reports that she has been smoking cigarettes. She has a 7.50 pack-year smoking history. She has been exposed to tobacco smoke. She has never used smokeless tobacco.  Nicotine/Tobacco Cessation Plan:   Information offered: Patient not interested at this time    LEDY Seaman CNP  [facility]  Answers for HPI/ROS submitted by the patient on 6/23/2023  If you checked off any problems, how difficult have these problems made  it for you to do your work, take care of things at home, or get along with other people?: Somewhat difficult  PHQ9 TOTAL SCORE: 10

## 2023-06-27 LAB
BKR LAB AP GYN ADEQUACY: NORMAL
BKR LAB AP GYN INTERPRETATION: NORMAL
BKR LAB AP HPV REFLEX: NORMAL
BKR LAB AP PREVIOUS ABNORMAL: NORMAL
HYDROCODONE UR CFM-MCNC: 80 NG/ML
HYDROCODONE/CREAT UR: 82 NG/MG {CREAT}
PATH REPORT.COMMENTS IMP SPEC: NORMAL
PATH REPORT.COMMENTS IMP SPEC: NORMAL
PATH REPORT.RELEVANT HX SPEC: NORMAL

## 2023-06-28 LAB
HUMAN PAPILLOMA VIRUS 16 DNA: NEGATIVE
HUMAN PAPILLOMA VIRUS 18 DNA: NEGATIVE
HUMAN PAPILLOMA VIRUS FINAL DIAGNOSIS: NORMAL
HUMAN PAPILLOMA VIRUS OTHER HR: NEGATIVE

## 2023-07-10 ENCOUNTER — DOCUMENTATION ONLY (OUTPATIENT)
Dept: WOUND CARE | Facility: CLINIC | Age: 53
End: 2023-07-10
Payer: COMMERCIAL

## 2023-07-10 DIAGNOSIS — M96.0 PSEUDARTHROSIS AFTER FUSION OR ARTHRODESIS: Primary | ICD-10-CM

## 2023-07-10 DIAGNOSIS — Z98.1 ARTHRODESIS STATUS: ICD-10-CM

## 2023-07-10 DIAGNOSIS — R26.89 OTHER ABNORMALITIES OF GAIT AND MOBILITY: ICD-10-CM

## 2023-07-18 ENCOUNTER — MYC REFILL (OUTPATIENT)
Dept: FAMILY MEDICINE | Facility: CLINIC | Age: 53
End: 2023-07-18
Payer: COMMERCIAL

## 2023-07-18 DIAGNOSIS — M19.071 PRIMARY OSTEOARTHRITIS OF RIGHT FOOT: ICD-10-CM

## 2023-07-18 DIAGNOSIS — Z98.890 HISTORY OF FOOT SURGERY: ICD-10-CM

## 2023-07-18 DIAGNOSIS — M79.674 PAIN OF RIGHT GREAT TOE: ICD-10-CM

## 2023-07-18 DIAGNOSIS — S92.334A CLOSED NONDISPLACED FRACTURE OF THIRD METATARSAL BONE OF RIGHT FOOT, INITIAL ENCOUNTER: ICD-10-CM

## 2023-07-18 RX ORDER — HYDROCODONE BITARTRATE AND ACETAMINOPHEN 5; 325 MG/1; MG/1
1 TABLET ORAL 2 TIMES DAILY PRN
Qty: 60 TABLET | Refills: 0 | Status: SHIPPED | OUTPATIENT
Start: 2023-07-18 | End: 2023-08-14

## 2023-07-18 NOTE — TELEPHONE ENCOUNTER
Requested Prescriptions   Pending Prescriptions Disp Refills     HYDROcodone-acetaminophen (NORCO) 5-325 MG tablet 60 tablet 0     Sig: Take 1 tablet by mouth 2 times daily as needed for severe pain (ok to fill 4/7/2023)       There is no refill protocol information for this order        Last OV 6/23/23    Meri MENESES RN  [facility]

## 2023-07-22 ENCOUNTER — MYC REFILL (OUTPATIENT)
Dept: FAMILY MEDICINE | Facility: CLINIC | Age: 53
End: 2023-07-22
Payer: COMMERCIAL

## 2023-07-22 DIAGNOSIS — E66.812 CLASS 2 OBESITY DUE TO EXCESS CALORIES WITHOUT SERIOUS COMORBIDITY WITH BODY MASS INDEX (BMI) OF 35.0 TO 35.9 IN ADULT: ICD-10-CM

## 2023-07-22 DIAGNOSIS — E66.09 CLASS 2 OBESITY DUE TO EXCESS CALORIES WITHOUT SERIOUS COMORBIDITY WITH BODY MASS INDEX (BMI) OF 35.0 TO 35.9 IN ADULT: ICD-10-CM

## 2023-07-24 ENCOUNTER — MYC REFILL (OUTPATIENT)
Dept: FAMILY MEDICINE | Facility: CLINIC | Age: 53
End: 2023-07-24
Payer: COMMERCIAL

## 2023-07-24 DIAGNOSIS — F51.01 PRIMARY INSOMNIA: ICD-10-CM

## 2023-07-24 RX ORDER — TRAZODONE HYDROCHLORIDE 100 MG/1
200 TABLET ORAL AT BEDTIME
Qty: 180 TABLET | Refills: 0 | Status: SHIPPED | OUTPATIENT
Start: 2023-07-24 | End: 2023-10-12

## 2023-07-24 RX ORDER — PHENTERMINE HYDROCHLORIDE 37.5 MG/1
37.5 TABLET ORAL
Qty: 30 TABLET | Refills: 0 | Status: SHIPPED | OUTPATIENT
Start: 2023-07-24 | End: 2023-08-23

## 2023-07-24 NOTE — TELEPHONE ENCOUNTER
Requested Prescriptions   Pending Prescriptions Disp Refills    phentermine (ADIPEX-P) 37.5 MG tablet 30 tablet 0     Sig: Take 1 tablet (37.5 mg) by mouth every morning (before breakfast)       There is no refill protocol information for this order        Routing refill request to provider for review/approval because:  Drug not on the [facility] refill protocol     Meri MENESES RN  [facility]

## 2023-07-24 NOTE — TELEPHONE ENCOUNTER
"Requested Prescriptions   Pending Prescriptions Disp Refills    traZODone (DESYREL) 100 MG tablet 180 tablet 0     Sig: Take 2 tablets (200 mg) by mouth At Bedtime       Serotonin Modulators Passed - 7/24/2023 12:33 PM        Passed - Recent (12 mo) or future (30 days) visit within the authorizing provider's specialty     Patient has had an office visit with the authorizing provider or a provider within the authorizing providers department within the previous 12 mos or has a future within next 30 days. See \"Patient Info\" tab in inbasket, or \"Choose Columns\" in Meds & Orders section of the refill encounter.              Passed - Medication is active on med list        Passed - Patient is age 18 or older        Passed - No active pregnancy on record        Passed - No positive pregnancy test in past 12 months          Routing refill request to provider for review/approval because:  Drug interaction warning       Pt was last seen on 06/23/23    Carla Abdul RN  [facility]   "

## 2023-08-14 ENCOUNTER — PATIENT OUTREACH (OUTPATIENT)
Dept: CARE COORDINATION | Facility: CLINIC | Age: 53
End: 2023-08-14
Payer: COMMERCIAL

## 2023-08-14 ENCOUNTER — MYC REFILL (OUTPATIENT)
Dept: FAMILY MEDICINE | Facility: CLINIC | Age: 53
End: 2023-08-14
Payer: COMMERCIAL

## 2023-08-14 DIAGNOSIS — M19.071 PRIMARY OSTEOARTHRITIS OF RIGHT FOOT: ICD-10-CM

## 2023-08-14 DIAGNOSIS — S92.334A CLOSED NONDISPLACED FRACTURE OF THIRD METATARSAL BONE OF RIGHT FOOT, INITIAL ENCOUNTER: ICD-10-CM

## 2023-08-14 DIAGNOSIS — Z98.890 HISTORY OF FOOT SURGERY: ICD-10-CM

## 2023-08-14 DIAGNOSIS — M79.674 PAIN OF RIGHT GREAT TOE: ICD-10-CM

## 2023-08-14 RX ORDER — HYDROCODONE BITARTRATE AND ACETAMINOPHEN 5; 325 MG/1; MG/1
1 TABLET ORAL 2 TIMES DAILY PRN
Qty: 60 TABLET | Refills: 0 | Status: SHIPPED | OUTPATIENT
Start: 2023-08-18 | End: 2023-09-12

## 2023-08-14 NOTE — TELEPHONE ENCOUNTER
Requested Prescriptions   Pending Prescriptions Disp Refills    HYDROcodone-acetaminophen (NORCO) 5-325 MG tablet 60 tablet 0     Sig: Take 1 tablet by mouth 2 times daily as needed for severe pain (ok to fill 4/7/2023)       There is no refill protocol information for this order         Routing refill request to provider for review/approval because:  Drug not on the [facility] refill protocol     Carla Abdul RN  [facility]

## 2023-08-23 ENCOUNTER — MYC REFILL (OUTPATIENT)
Dept: FAMILY MEDICINE | Facility: CLINIC | Age: 53
End: 2023-08-23
Payer: COMMERCIAL

## 2023-08-23 DIAGNOSIS — E66.812 CLASS 2 OBESITY DUE TO EXCESS CALORIES WITHOUT SERIOUS COMORBIDITY WITH BODY MASS INDEX (BMI) OF 35.0 TO 35.9 IN ADULT: ICD-10-CM

## 2023-08-23 DIAGNOSIS — E66.09 CLASS 2 OBESITY DUE TO EXCESS CALORIES WITHOUT SERIOUS COMORBIDITY WITH BODY MASS INDEX (BMI) OF 35.0 TO 35.9 IN ADULT: ICD-10-CM

## 2023-08-23 RX ORDER — PHENTERMINE HYDROCHLORIDE 37.5 MG/1
37.5 TABLET ORAL
Qty: 30 TABLET | Refills: 0 | Status: SHIPPED | OUTPATIENT
Start: 2023-08-23 | End: 2023-09-23

## 2023-08-23 NOTE — TELEPHONE ENCOUNTER
Requested Prescriptions   Pending Prescriptions Disp Refills    phentermine (ADIPEX-P) 37.5 MG tablet 30 tablet 0     Sig: Take 1 tablet (37.5 mg) by mouth every morning (before breakfast)       There is no refill protocol information for this order        Last annual on 6/23/23.    Thanks!  Ridge Ocampo RN   [facility]

## 2023-09-11 ENCOUNTER — PATIENT OUTREACH (OUTPATIENT)
Dept: CARE COORDINATION | Facility: CLINIC | Age: 53
End: 2023-09-11
Payer: COMMERCIAL

## 2023-09-12 ENCOUNTER — MYC REFILL (OUTPATIENT)
Dept: FAMILY MEDICINE | Facility: CLINIC | Age: 53
End: 2023-09-12
Payer: COMMERCIAL

## 2023-09-12 ENCOUNTER — MYC MEDICAL ADVICE (OUTPATIENT)
Dept: FAMILY MEDICINE | Facility: CLINIC | Age: 53
End: 2023-09-12
Payer: COMMERCIAL

## 2023-09-12 DIAGNOSIS — M19.071 PRIMARY OSTEOARTHRITIS OF RIGHT FOOT: ICD-10-CM

## 2023-09-12 DIAGNOSIS — M79.674 PAIN OF RIGHT GREAT TOE: ICD-10-CM

## 2023-09-12 DIAGNOSIS — S92.334A CLOSED NONDISPLACED FRACTURE OF THIRD METATARSAL BONE OF RIGHT FOOT, INITIAL ENCOUNTER: ICD-10-CM

## 2023-09-12 DIAGNOSIS — Z98.890 HISTORY OF FOOT SURGERY: ICD-10-CM

## 2023-09-13 RX ORDER — HYDROCODONE BITARTRATE AND ACETAMINOPHEN 5; 325 MG/1; MG/1
1 TABLET ORAL 2 TIMES DAILY PRN
Qty: 60 TABLET | Refills: 0 | Status: SHIPPED | OUTPATIENT
Start: 2023-09-13 | End: 2023-10-12

## 2023-09-13 NOTE — TELEPHONE ENCOUNTER
Requested Prescriptions   Pending Prescriptions Disp Refills    HYDROcodone-acetaminophen (NORCO) 5-325 MG tablet 60 tablet 0     Sig: Take 1 tablet by mouth 2 times daily as needed for severe pain       There is no refill protocol information for this order         Routing refill request to provider for review/approval because:  Drug not on the [facility] refill protocol     Carla Abdul RN  [facility]

## 2023-09-25 DIAGNOSIS — K22.4 ESOPHAGEAL SPASM: ICD-10-CM

## 2023-09-25 DIAGNOSIS — I10 BENIGN ESSENTIAL HYPERTENSION: ICD-10-CM

## 2023-09-25 RX ORDER — LOSARTAN POTASSIUM 100 MG/1
100 TABLET ORAL DAILY
Qty: 90 TABLET | Refills: 1 | Status: SHIPPED | OUTPATIENT
Start: 2023-09-25 | End: 2024-02-26

## 2023-09-25 RX ORDER — OMEPRAZOLE 40 MG/1
40 CAPSULE, DELAYED RELEASE ORAL DAILY
Qty: 90 CAPSULE | Refills: 1 | Status: SHIPPED | OUTPATIENT
Start: 2023-09-25 | End: 2024-02-26

## 2023-09-25 NOTE — TELEPHONE ENCOUNTER
"Requested Prescriptions   Pending Prescriptions Disp Refills    losartan (COZAAR) 100 MG tablet 90 tablet 1     Sig: Take 1 tablet (100 mg) by mouth daily       Angiotensin-II Receptors Failed - 9/25/2023  9:13 AM        Failed - Normal serum creatinine on file in past 12 months     Recent Labs   Lab Test 09/01/22  1537   CR 0.79       Ok to refill medication if creatinine is low          Failed - Normal serum potassium on file in past 12 months     Recent Labs   Lab Test 09/01/22  1537   POTASSIUM 3.9                    Passed - Last blood pressure under 140/90 in past 12 months     BP Readings from Last 3 Encounters:   06/23/23 128/78   10/10/22 116/75   09/01/22 138/88                 Passed - Recent (12 mo) or future (30 days) visit within the authorizing provider's specialty     Patient has had an office visit with the authorizing provider or a provider within the authorizing providers department within the previous 12 mos or has a future within next 30 days. See \"Patient Info\" tab in inbasket, or \"Choose Columns\" in Meds & Orders section of the refill encounter.              Passed - Medication is active on med list        Passed - Patient is age 18 or older        Passed - No active pregnancy on record        Passed - No positive pregnancy test in past 12 months          Routing refill request to provider for review/approval because medication did not pass protocol.    Pt was last seen on 06/23/23    Carla Abdul RN  [facility]   "

## 2023-09-25 NOTE — TELEPHONE ENCOUNTER
Pending Prescriptions:                       Disp   Refills    omeprazole (PRILOSEC) 40 MG DR capsule    90 cap*1            Sig: Take 1 capsule (40 mg) by mouth daily

## 2023-09-25 NOTE — TELEPHONE ENCOUNTER
"Requested Prescriptions   Pending Prescriptions Disp Refills    omeprazole (PRILOSEC) 40 MG DR capsule 90 capsule 1     Sig: Take 1 capsule (40 mg) by mouth daily       PPI Protocol Passed - 9/25/2023  9:07 AM        Passed - Not on Clopidogrel (unless Pantoprazole ordered)        Passed - No diagnosis of osteoporosis on record        Passed - Recent (12 mo) or future (30 days) visit within the authorizing provider's specialty     Patient has had an office visit with the authorizing provider or a provider within the authorizing providers department within the previous 12 mos or has a future within next 30 days. See \"Patient Info\" tab in inbasket, or \"Choose Columns\" in Meds & Orders section of the refill encounter.              Passed - Medication is active on med list        Passed - Patient is age 18 or older        Passed - No active pregnacy on record        Passed - No positive pregnancy test in past 12 months          Prescription approved per [facility] Refill Protocol.     Pt was last seen on 06/23/23    Carla Abdul RN  [facility]   "

## 2023-10-10 ENCOUNTER — OFFICE VISIT (OUTPATIENT)
Dept: ORTHOPEDICS | Facility: CLINIC | Age: 53
End: 2023-10-10
Payer: COMMERCIAL

## 2023-10-10 DIAGNOSIS — M79.671 RIGHT FOOT PAIN: ICD-10-CM

## 2023-10-10 DIAGNOSIS — M96.0 NONUNION AFTER ARTHRODESIS: Primary | ICD-10-CM

## 2023-10-10 PROCEDURE — 99213 OFFICE O/P EST LOW 20 MIN: CPT | Performed by: PODIATRIST

## 2023-10-10 NOTE — PROGRESS NOTES
Chief Complaint:   Chief Complaint   Patient presents with    RECHECK     Follow up/deep bone stimulator          Allergies   Allergen Reactions    Lisinopril Cough     cough         Subjective: Sharmin is a 53 year old female who presents to the clinic today for a follow up of right foot pain.  She notes that she continues to have pain in the toe and has had no change in this.    Objective  Data Unavailable Data Unavailable Data Unavailable Data Unavailable Data Unavailable 0 lbs 0 oz  Fusion site on the right first MTPJ feels solid and does not move. She does have pain with palpation around the joint. No edema or ecchymosis noted. Scar is healed. Onychomycosis noted to the middle 3 toes bilaterally.     Assessment:   Encounter Diagnoses   Name Primary?    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  I recommended that she either see if she can tolerate this and never this, or talk to one of our surgeons about revision.  She would likely need a bone graft for this.  She would like to speak to Dr. Anderson.  We will help her get scheduled for this.  -Stop bone stimulator.  - Pt to return to clinic as needed

## 2023-10-10 NOTE — LETTER
10/10/2023         RE: Sharmin Nieves  [address]  [facility] [address]        Dear Colleague,    Thank you for referring your patient, Sharmin Nieves, to the [facility] [place]. Please see a copy of my visit note below.    Chief Complaint:   Chief Complaint   Patient presents with    RECHECK     Follow up/deep bone stimulator          Allergies   Allergen Reactions    Lisinopril Cough     cough         Subjective: Sharmin is a 53 year old female who presents to the clinic today for a follow up of right foot pain.  She notes that she continues to have pain in the toe and has had no change in this.    Objective  Data Unavailable Data Unavailable Data Unavailable Data Unavailable Data Unavailable 0 lbs 0 oz  Fusion site on the right first MTPJ feels solid and does not move. She does have pain with palpation around the joint. No edema or ecchymosis noted. Scar is healed. Onychomycosis noted to the middle 3 toes bilaterally.     Assessment:   Encounter Diagnoses   Name Primary?    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  I recommended that she either see if she can tolerate this and never this, or talk to one of our surgeons about revision.  She would likely need a bone graft for this.  She would like to speak to Dr. Anderson.  We will help her get scheduled for this.  -Stop bone stimulator.  - Pt to return to clinic as needed          Brian Gibson DPM

## 2023-10-10 NOTE — NURSING NOTE
Reason For Visit:   Chief Complaint   Patient presents with    RECHECK     Follow up/deep bone stimulator       There were no vitals taken for this visit.    Pain Assessment  Patient Currently in Pain: Yes  0-10 Pain Scale: 5  Primary Pain Location: Foot    Nimco MARILOU Velasco

## 2023-10-12 ENCOUNTER — VIRTUAL VISIT (OUTPATIENT)
Dept: FAMILY MEDICINE | Facility: CLINIC | Age: 53
End: 2023-10-12
Payer: COMMERCIAL

## 2023-10-12 ENCOUNTER — MYC MEDICAL ADVICE (OUTPATIENT)
Dept: FAMILY MEDICINE | Facility: CLINIC | Age: 53
End: 2023-10-12

## 2023-10-12 DIAGNOSIS — E66.812 CLASS 2 OBESITY DUE TO EXCESS CALORIES WITHOUT SERIOUS COMORBIDITY WITH BODY MASS INDEX (BMI) OF 35.0 TO 35.9 IN ADULT: ICD-10-CM

## 2023-10-12 DIAGNOSIS — N32.81 URGENCY-FREQUENCY SYNDROME: ICD-10-CM

## 2023-10-12 DIAGNOSIS — M19.071 PRIMARY OSTEOARTHRITIS OF RIGHT FOOT: ICD-10-CM

## 2023-10-12 DIAGNOSIS — E66.09 CLASS 2 OBESITY DUE TO EXCESS CALORIES WITHOUT SERIOUS COMORBIDITY WITH BODY MASS INDEX (BMI) OF 35.0 TO 35.9 IN ADULT: ICD-10-CM

## 2023-10-12 DIAGNOSIS — N39.41 URGE INCONTINENCE: ICD-10-CM

## 2023-10-12 DIAGNOSIS — I10 BENIGN ESSENTIAL HYPERTENSION: Primary | ICD-10-CM

## 2023-10-12 DIAGNOSIS — M79.674 PAIN OF RIGHT GREAT TOE: ICD-10-CM

## 2023-10-12 DIAGNOSIS — Z98.890 HISTORY OF FOOT SURGERY: ICD-10-CM

## 2023-10-12 DIAGNOSIS — F51.01 PRIMARY INSOMNIA: ICD-10-CM

## 2023-10-12 DIAGNOSIS — S92.334A CLOSED NONDISPLACED FRACTURE OF THIRD METATARSAL BONE OF RIGHT FOOT, INITIAL ENCOUNTER: ICD-10-CM

## 2023-10-12 PROCEDURE — 99214 OFFICE O/P EST MOD 30 MIN: CPT | Mod: VID | Performed by: NURSE PRACTITIONER

## 2023-10-12 RX ORDER — HYDROCODONE BITARTRATE AND ACETAMINOPHEN 5; 325 MG/1; MG/1
1 TABLET ORAL 2 TIMES DAILY PRN
Qty: 60 TABLET | Refills: 0 | Status: SHIPPED | OUTPATIENT
Start: 2023-10-12 | End: 2023-11-08

## 2023-10-12 RX ORDER — TRAZODONE HYDROCHLORIDE 100 MG/1
200 TABLET ORAL AT BEDTIME
Qty: 180 TABLET | Refills: 1 | Status: SHIPPED | OUTPATIENT
Start: 2023-10-12 | End: 2024-03-17

## 2023-10-12 RX ORDER — PHENTERMINE HYDROCHLORIDE 37.5 MG/1
37.5 TABLET ORAL
Qty: 90 TABLET | Refills: 0 | Status: SHIPPED | OUTPATIENT
Start: 2023-10-23 | End: 2024-01-18

## 2023-10-12 RX ORDER — TOPIRAMATE 25 MG/1
75 TABLET, FILM COATED ORAL DAILY
Qty: 270 TABLET | Refills: 1 | Status: SHIPPED | OUTPATIENT
Start: 2023-10-12 | End: 2024-03-27

## 2023-10-12 RX ORDER — MIRABEGRON 25 MG/1
25 TABLET, FILM COATED, EXTENDED RELEASE ORAL DAILY
Qty: 90 TABLET | Refills: 1 | Status: SHIPPED | OUTPATIENT
Start: 2023-10-12 | End: 2024-01-18

## 2023-10-12 NOTE — PATIENT INSTRUCTIONS
Psyllium husk powder 1 Tbs in 8 oz+ of water per day  If still having days of constipation, then increase to 1 Tbs twice a day  Another option or addition is to add ground flax seed sprinkled on meals    You should be aware of symptoms of serotonin syndrome: restlessness, disorientation, sweating, increased heart rate, increased blood pressure, vomiting, diarrhea, and tremor.

## 2023-10-12 NOTE — PROGRESS NOTES
Kendy is a 53 year old who is being evaluated via a billable video visit.      How would you like to obtain your AVS? MyChart  If the video visit is dropped, the invitation should be resent by: Text to cell phone: [phone]  Will anyone else be joining your video visit? No          Assessment & Plan     Benign essential hypertension  Well controlled  Continue losartan 100 mg  No appreciable effect of phentermine  - Comprehensive metabolic panel (BMP + Alb, Alk Phos, ALT, AST, Total. Bili, TP); Future  - Lipid panel reflex to direct LDL Non-fasting; Future  - Albumin Random Urine Quantitative with Creat Ratio; Future    Pain of right great toe  Will meet with surgical subspecialist to discuss possible third revision  Discussed to move forward with pan management plan regardless  I am supportive of buprenorphine for pain management  PDMP reviewed  - HYDROcodone-acetaminophen (NORCO) 5-325 MG tablet; Take 1 tablet by mouth 2 times daily as needed for severe pain    Class 2 obesity due to excess calories without serious comorbidity with body mass index (BMI) of 35.0 to 35.9 in adult  Continues with steady weight loss  Limited in exercise by foot pain  - phentermine (ADIPEX-P) 37.5 MG tablet; Take 1 tablet (37.5 mg) by mouth every morning (before breakfast)  - topiramate (TOPAMAX) 25 MG tablet; Take 3 tablets (75 mg) by mouth daily    Urgency-frequency syndrome  Significant anticholinergic effects with the solifenacin and not entirely controlled. Will trial switch to mirabegron.  - mirabegron (MYRBETRIQ) 25 MG 24 hr tablet; Take 1 tablet (25 mg) by mouth daily    Urge incontinence  As above  - mirabegron (MYRBETRIQ) 25 MG 24 hr tablet; Take 1 tablet (25 mg) by mouth daily    Primary insomnia  Continue trazodone  - traZODone (DESYREL) 100 MG tablet; Take 2 tablets (200 mg) by mouth at bedtime    Primary osteoarthritis of right foot  As above  - HYDROcodone-acetaminophen (NORCO) 5-325 MG tablet; Take 1 tablet by mouth 2  times daily as needed for severe pain    History of foot surgery  As above  - HYDROcodone-acetaminophen (NORCO) 5-325 MG tablet; Take 1 tablet by mouth 2 times daily as needed for severe pain    Closed nondisplaced fracture of third metatarsal bone of right foot, initial encounter  As above  - HYDROcodone-acetaminophen (NORCO) 5-325 MG tablet; Take 1 tablet by mouth 2 times daily as needed for severe pain    Prescription drug management  I spent a total of 30 minutes on the day of the visit.   Time spent by me doing chart review, history and exam, documentation and further activities per the note        LEDY Seaman CNP  [facility]    Juana Larry is a 53 year old, presenting for the following health issues:  Recheck Medication and Hypertension        6/23/2023    10:49 AM   Additional Questions   Roomed by Janee MEDRANO     Ortho/toe - visit with Dr. Gibson yesterday, extended deep bone stimulator hasn't been successful and Kendy is scheduled with Dr. Anderson 11/17 to discuss options and possible revision, but leaning against revision.    Pain management - planning to set up meeting with her    Weight - has lost 6 lbs with the increased phentermine dose to 37.5 mg. Also continues topiramate 75 mg.   No headaches,   Has dry mouth (also on vesicare). Sometimes constipation. Has miralax and will sometimes take this.  Breakfast - grazing on fruit or eggs or uncrustable. Lunch is usually dinner leftovers. Dinner - stir stapleton, pasta, chicken and rice.  Has energy and desire to exercise, but limited by pain of foot.  Current weight 197 lb.  Wt Readings from Last 5 Encounters:   06/23/23 92.8 kg (204 lb 8 oz)   12/16/22 90.7 kg (200 lb)   09/01/22 91.9 kg (202 lb 8 oz)   07/12/22 72.6 kg (160 lb)   06/01/22 98.9 kg (218 lb)     BP - mid 120-130/70-80, stayed stable with phentermine increase    Urinary incontinence - mostly urge incontinence.  Vesicare helps because it has resolved overnight  leakage, but not entirely covering symptoms. Monitors liquid intake, especially with ride and walking home from bus. Has been taking vesicare for the past two years. A colleague mentioned mirabegron as possibly more helpful with less side effects. Pelvic floor therapy would be very difficult to get to with schedule.    Smoking less than 1/2 ppd    Getting flu shot today at work    Review of Systems   Constitutional, HEENT, cardiovascular, pulmonary, gi and gu systems are negative, except as otherwise noted.      Objective           Vitals:  No vitals were obtained today due to virtual visit.    Physical Exam   GENERAL: Healthy, alert and no distress  EYES: Eyes grossly normal to inspection.  No discharge or erythema, or obvious scleral/conjunctival abnormalities.  RESP: No audible wheeze, cough, or visible cyanosis.  No visible retractions or increased work of breathing.    SKIN: Visible skin clear. No significant rash, abnormal pigmentation or lesions.  NEURO: Cranial nerves grossly intact.  Mentation and speech appropriate for age.  PSYCH: Mentation appears normal, affect normal/bright, judgement and insight intact, normal speech and appearance well-groomed.          Video-Visit Details    Type of service:  Video Visit   Originating Location (pt. Location): Home  Distant Location (provider location):  On-site  Platform used for Video Visit: [employer]

## 2023-10-19 ENCOUNTER — VIRTUAL VISIT (OUTPATIENT)
Dept: FAMILY MEDICINE | Facility: CLINIC | Age: 53
End: 2023-10-19
Payer: COMMERCIAL

## 2023-10-19 DIAGNOSIS — H92.03 OTALGIA OF BOTH EARS: ICD-10-CM

## 2023-10-19 DIAGNOSIS — R05.9 COUGH, UNSPECIFIED TYPE: Primary | ICD-10-CM

## 2023-10-19 DIAGNOSIS — R09.81 NASAL CONGESTION: ICD-10-CM

## 2023-10-19 DIAGNOSIS — R05.9 COUGH, UNSPECIFIED TYPE: ICD-10-CM

## 2023-10-19 DIAGNOSIS — J34.89 SINUS PRESSURE: ICD-10-CM

## 2023-10-19 PROCEDURE — 99213 OFFICE O/P EST LOW 20 MIN: CPT | Mod: VID | Performed by: FAMILY MEDICINE

## 2023-10-19 RX ORDER — MOMETASONE FUROATE MONOHYDRATE 50 UG/1
2 SPRAY, METERED NASAL DAILY
Qty: 51 G | OUTPATIENT
Start: 2023-10-19

## 2023-10-19 RX ORDER — MOMETASONE FUROATE MONOHYDRATE 50 UG/1
2 SPRAY, METERED NASAL DAILY
Qty: 17 G | Refills: 0 | Status: SHIPPED | OUTPATIENT
Start: 2023-10-19 | End: 2024-01-18

## 2023-10-19 ASSESSMENT — PATIENT HEALTH QUESTIONNAIRE - PHQ9
SUM OF ALL RESPONSES TO PHQ QUESTIONS 1-9: 6
SUM OF ALL RESPONSES TO PHQ QUESTIONS 1-9: 6
10. IF YOU CHECKED OFF ANY PROBLEMS, HOW DIFFICULT HAVE THESE PROBLEMS MADE IT FOR YOU TO DO YOUR WORK, TAKE CARE OF THINGS AT HOME, OR GET ALONG WITH OTHER PEOPLE: SOMEWHAT DIFFICULT

## 2023-10-19 NOTE — PROGRESS NOTES
Kendy is a 53 year old who is being evaluated via a billable video visit.      How would you like to obtain your AVS? MyChart  If the video visit is dropped, the invitation should be resent by: Text to cell phone: [phone]  Will anyone else be joining your video visit? No            ICD-10-CM    1. Cough, unspecified type  R05.9       2. Sinus pressure  J34.89       3. Otalgia of both ears  H92.03       4. Nasal congestion  R09.81         Seen vittually for congestion , cough, and sinus pressure/ear pain for 4 days.  Sick contact covid at work , has had 5 covid neg test.  Declined pcr test today, slowly improving  Advised saline spray, flonase tylneol prn  Contact us if not resolving symptoms in 1 week for antibiotics  Consider claritin as well  If ear pain worsening then needs inperson appoint      Subjective   Kendy is a 53 year old, presenting for the following health issues:  Cough      History of Present Illness       Reason for visit:  Cough, congestion, bilateral ear pain  Symptoms include:  NEGATIVE COVID  Symptom intensity:  Moderate  Symptom progression:  Staying the same  Had these symptoms before:  Yes      3 neg test  Low grade fever  Coughing   Flonase, dayquil  Saline spray    Cough -non productive  Congestion mostly sinuses  Started Sunday  She was exposed to covid, 3 neg test        Review of Systems   Constitutional, HEENT, cardiovascular, pulmonary, GI, , musculoskeletal, neuro, skin, endocrine and psych systems are negative, except as otherwise noted.      Objective           Vitals:  No vitals were obtained today due to virtual visit.    Physical Exam   GENERAL: Healthy, alert and no distress  EYES: Eyes grossly normal to inspection.  No discharge or erythema, or obvious scleral/conjunctival abnormalities.  RESP: No audible wheeze, cough, or visible cyanosis.  No visible retractions or increased work of breathing.    SKIN: Visible skin clear. No significant rash, abnormal pigmentation or  lesions.  NEURO: Cranial nerves grossly intact.  Mentation and speech appropriate for age.  PSYCH: Mentation appears normal, affect normal/bright, judgement and insight intact, normal speech and appearance well-groomed.                Video-Visit Details    Type of service:  Video Visit     Originating Location (pt. Location): Home    Distant Location (provider location):  On-site  Platform used for Video Visit: Charlie

## 2023-11-08 ENCOUNTER — MYC REFILL (OUTPATIENT)
Dept: FAMILY MEDICINE | Facility: CLINIC | Age: 53
End: 2023-11-08
Payer: COMMERCIAL

## 2023-11-08 DIAGNOSIS — M79.674 PAIN OF RIGHT GREAT TOE: ICD-10-CM

## 2023-11-08 DIAGNOSIS — S92.334A CLOSED NONDISPLACED FRACTURE OF THIRD METATARSAL BONE OF RIGHT FOOT, INITIAL ENCOUNTER: ICD-10-CM

## 2023-11-08 DIAGNOSIS — M19.071 PRIMARY OSTEOARTHRITIS OF RIGHT FOOT: ICD-10-CM

## 2023-11-08 DIAGNOSIS — Z98.890 HISTORY OF FOOT SURGERY: ICD-10-CM

## 2023-11-09 DIAGNOSIS — M79.671 RIGHT FOOT PAIN: Primary | ICD-10-CM

## 2023-11-10 RX ORDER — HYDROCODONE BITARTRATE AND ACETAMINOPHEN 5; 325 MG/1; MG/1
1 TABLET ORAL 2 TIMES DAILY PRN
Qty: 60 TABLET | Refills: 0 | Status: SHIPPED | OUTPATIENT
Start: 2023-11-10 | End: 2023-12-08

## 2023-11-15 NOTE — TELEPHONE ENCOUNTER
DIAGNOSIS: discuss first metaral revision on right foot, referred Dr. Gibson    APPOINTMENT DATE: 11/17/23   NOTES STATUS DETAILS   OFFICE NOTE from referring provider Internal 3/22/23 AMOR GIBSON DPM   MEDICATION LIST Internal    MRI Internal 7/8/22 MR FOOT RIGHT    CT SCAN Internal 12/15/21 CT FOOT RT   3/22/23 CT FOOT RT    XRAYS (IMAGES & REPORTS) Internal 10/4/17 XR FOOT RT  12/11/18 XR JOINT INJ SMALL RIGHT  1/20/20 XR FOOT RT   11/10/20 XR FOOT RT   7/20/21 XR FOOT RT  8/24/21 XR FOOT RT   8/31/21 XR TOES RT   7/6/22 XR FOOT RT   11/17/23 XR FOOT RT

## 2023-11-17 ENCOUNTER — PRE VISIT (OUTPATIENT)
Dept: ORTHOPEDICS | Facility: CLINIC | Age: 53
End: 2023-11-17

## 2023-11-19 ENCOUNTER — HEALTH MAINTENANCE LETTER (OUTPATIENT)
Age: 53
End: 2023-11-19

## 2023-12-08 ENCOUNTER — MYC REFILL (OUTPATIENT)
Dept: FAMILY MEDICINE | Facility: CLINIC | Age: 53
End: 2023-12-08

## 2023-12-08 ENCOUNTER — MYC MEDICAL ADVICE (OUTPATIENT)
Dept: FAMILY MEDICINE | Facility: CLINIC | Age: 53
End: 2023-12-08

## 2023-12-08 DIAGNOSIS — M79.674 PAIN OF RIGHT GREAT TOE: ICD-10-CM

## 2023-12-08 DIAGNOSIS — Z98.890 HISTORY OF FOOT SURGERY: ICD-10-CM

## 2023-12-08 DIAGNOSIS — S92.334A CLOSED NONDISPLACED FRACTURE OF THIRD METATARSAL BONE OF RIGHT FOOT, INITIAL ENCOUNTER: ICD-10-CM

## 2023-12-08 DIAGNOSIS — M19.071 PRIMARY OSTEOARTHRITIS OF RIGHT FOOT: ICD-10-CM

## 2023-12-08 RX ORDER — HYDROCODONE BITARTRATE AND ACETAMINOPHEN 5; 325 MG/1; MG/1
1 TABLET ORAL 2 TIMES DAILY PRN
Qty: 60 TABLET | Refills: 0 | Status: SHIPPED | OUTPATIENT
Start: 2023-12-08 | End: 2024-01-03

## 2024-01-03 ENCOUNTER — MYC REFILL (OUTPATIENT)
Dept: FAMILY MEDICINE | Facility: CLINIC | Age: 54
End: 2024-01-03
Payer: COMMERCIAL

## 2024-01-03 ENCOUNTER — MYC MEDICAL ADVICE (OUTPATIENT)
Dept: FAMILY MEDICINE | Facility: CLINIC | Age: 54
End: 2024-01-03
Payer: COMMERCIAL

## 2024-01-03 DIAGNOSIS — M79.674 PAIN OF RIGHT GREAT TOE: ICD-10-CM

## 2024-01-03 DIAGNOSIS — M19.071 PRIMARY OSTEOARTHRITIS OF RIGHT FOOT: ICD-10-CM

## 2024-01-03 DIAGNOSIS — Z98.890 HISTORY OF FOOT SURGERY: ICD-10-CM

## 2024-01-03 DIAGNOSIS — S92.334A CLOSED NONDISPLACED FRACTURE OF THIRD METATARSAL BONE OF RIGHT FOOT, INITIAL ENCOUNTER: ICD-10-CM

## 2024-01-03 DIAGNOSIS — N39.41 URGE INCONTINENCE: Primary | ICD-10-CM

## 2024-01-04 RX ORDER — HYDROCODONE BITARTRATE AND ACETAMINOPHEN 5; 325 MG/1; MG/1
1 TABLET ORAL 2 TIMES DAILY PRN
Qty: 60 TABLET | Refills: 0 | Status: SHIPPED | OUTPATIENT
Start: 2024-01-04 | End: 2024-01-30

## 2024-01-10 ENCOUNTER — TELEPHONE (OUTPATIENT)
Dept: FAMILY MEDICINE | Facility: CLINIC | Age: 54
End: 2024-01-10
Payer: COMMERCIAL

## 2024-01-16 NOTE — TELEPHONE ENCOUNTER
PA Initiation    Medication: GEMTESA 75 MG PO TABS  Insurance Company: [employer] - Phone [phone] Fax [phone]  Pharmacy Filling the Rx: [id number] [address]  AT [facility] [address]   Filling Pharmacy Phone: [phone]  Filling Pharmacy Fax:    Start Date: 1/16/2024  Retail Pharmacy Prior Authorization Team   Phone: [phone]

## 2024-01-18 ENCOUNTER — LAB (OUTPATIENT)
Dept: LAB | Facility: CLINIC | Age: 54
End: 2024-01-18
Payer: COMMERCIAL

## 2024-01-18 ENCOUNTER — VIRTUAL VISIT (OUTPATIENT)
Dept: FAMILY MEDICINE | Facility: CLINIC | Age: 54
End: 2024-01-18
Payer: COMMERCIAL

## 2024-01-18 DIAGNOSIS — F32.1 MODERATE MAJOR DEPRESSION (H): ICD-10-CM

## 2024-01-18 DIAGNOSIS — E66.09 CLASS 2 OBESITY DUE TO EXCESS CALORIES WITHOUT SERIOUS COMORBIDITY WITH BODY MASS INDEX (BMI) OF 35.0 TO 35.9 IN ADULT: ICD-10-CM

## 2024-01-18 DIAGNOSIS — I10 BENIGN ESSENTIAL HYPERTENSION: ICD-10-CM

## 2024-01-18 DIAGNOSIS — E66.812 CLASS 2 OBESITY DUE TO EXCESS CALORIES WITHOUT SERIOUS COMORBIDITY WITH BODY MASS INDEX (BMI) OF 35.0 TO 35.9 IN ADULT: ICD-10-CM

## 2024-01-18 DIAGNOSIS — F33.9 RECURRENT MAJOR DEPRESSIVE DISORDER, REMISSION STATUS UNSPECIFIED (H): Primary | ICD-10-CM

## 2024-01-18 DIAGNOSIS — E66.01 MORBID OBESITY (H): ICD-10-CM

## 2024-01-18 DIAGNOSIS — F10.20 ALCOHOL USE DISORDER, SEVERE, DEPENDENCE (H): ICD-10-CM

## 2024-01-18 DIAGNOSIS — F41.1 GENERALIZED ANXIETY DISORDER: ICD-10-CM

## 2024-01-18 PROCEDURE — 80053 COMPREHEN METABOLIC PANEL: CPT

## 2024-01-18 PROCEDURE — 82043 UR ALBUMIN QUANTITATIVE: CPT

## 2024-01-18 PROCEDURE — 36415 COLL VENOUS BLD VENIPUNCTURE: CPT

## 2024-01-18 PROCEDURE — 99215 OFFICE O/P EST HI 40 MIN: CPT | Mod: 95 | Performed by: NURSE PRACTITIONER

## 2024-01-18 PROCEDURE — 82570 ASSAY OF URINE CREATININE: CPT

## 2024-01-18 PROCEDURE — 80061 LIPID PANEL: CPT

## 2024-01-18 RX ORDER — PHENTERMINE HYDROCHLORIDE 37.5 MG/1
37.5 TABLET ORAL
Qty: 90 TABLET | Refills: 0 | Status: SHIPPED | OUTPATIENT
Start: 2024-01-18 | End: 2024-04-04

## 2024-01-18 ASSESSMENT — ANXIETY QUESTIONNAIRES
6. BECOMING EASILY ANNOYED OR IRRITABLE: MORE THAN HALF THE DAYS
8. IF YOU CHECKED OFF ANY PROBLEMS, HOW DIFFICULT HAVE THESE MADE IT FOR YOU TO DO YOUR WORK, TAKE CARE OF THINGS AT HOME, OR GET ALONG WITH OTHER PEOPLE?: SOMEWHAT DIFFICULT
1. FEELING NERVOUS, ANXIOUS, OR ON EDGE: MORE THAN HALF THE DAYS
5. BEING SO RESTLESS THAT IT IS HARD TO SIT STILL: SEVERAL DAYS
GAD7 TOTAL SCORE: 9
7. FEELING AFRAID AS IF SOMETHING AWFUL MIGHT HAPPEN: SEVERAL DAYS
2. NOT BEING ABLE TO STOP OR CONTROL WORRYING: SEVERAL DAYS
GAD7 TOTAL SCORE: 9
4. TROUBLE RELAXING: SEVERAL DAYS
7. FEELING AFRAID AS IF SOMETHING AWFUL MIGHT HAPPEN: SEVERAL DAYS
3. WORRYING TOO MUCH ABOUT DIFFERENT THINGS: SEVERAL DAYS
IF YOU CHECKED OFF ANY PROBLEMS ON THIS QUESTIONNAIRE, HOW DIFFICULT HAVE THESE PROBLEMS MADE IT FOR YOU TO DO YOUR WORK, TAKE CARE OF THINGS AT HOME, OR GET ALONG WITH OTHER PEOPLE: SOMEWHAT DIFFICULT

## 2024-01-18 ASSESSMENT — PATIENT HEALTH QUESTIONNAIRE - PHQ9
10. IF YOU CHECKED OFF ANY PROBLEMS, HOW DIFFICULT HAVE THESE PROBLEMS MADE IT FOR YOU TO DO YOUR WORK, TAKE CARE OF THINGS AT HOME, OR GET ALONG WITH OTHER PEOPLE: VERY DIFFICULT
SUM OF ALL RESPONSES TO PHQ QUESTIONS 1-9: 14
SUM OF ALL RESPONSES TO PHQ QUESTIONS 1-9: 14

## 2024-01-18 NOTE — COMMUNITY RESOURCES LIST (ENGLISH)
01/18/2024   [facility] [address]  N/A  For questions about this resource list or additional care needs, please contact your primary care clinic or care manager.  Phone: [phone]   Email: N/A   Address: [address] [place] [address]   Hours: N/A        Transportation       Free or low-cost transportation  1  [employer] [facility] Bus Loop - Free or low-cost transportation Distance: 5.52 miles      In-Person   [address] [place] [address]  Language: English  Hours: Mon - Fri 9:00 AM - 5:00 PM  Fees: Free   Phone: (945) 717-9833 Email: info@[employer] Website: https://www.[employer]/     2  [facility] - Free Bus and Gas Cards - Free or low-cost transportation Distance: 7.07 miles      [facility]   [address] [place] [address]  Language: English  Hours: Mon - Fri 9:00 AM - 4:00 PM , Sun 9:30 AM - 12:00 PM  Fees: Free   Phone: (445) 113-6895 Email: [email or web address] Website: [email or web address]     Transportation to medical appointments  3  [employer] Ride Distance: 5.34 miles      In-Person   [address] [address]  Language: English  Hours: Mon - Thu 6:00 AM - 6:00 PM , Fri 6:00 AM - 5:00 PM  Fees: Insurance, Self Pay   Phone: (942) 279-5090 Email: office@[employer] Website: https://www.[employer]/     4  [employer] -   Family Wellness (AIFW) Distance: 8.16 miles      In-Person   [address] [place] [address]  Language: Icelandic, Occitan, English, Gujarati, Ulises, Khmer, Polish, Croatian, Mongolian, Icelandic  Hours: Mon - Wed 9:00 AM - 5:00 PM , Thu 12:00 PM - 6:00 PM , Fri 9:00 AM - 5:00 PM , Sun 10:30 AM - 2:00 PM Appt. Only  Fees: Free   Phone: (881) 508-4521 Email: info@[employer].org Website: https://www.[employer].org/          Important Numbers & Websites       Emergency Services   911  [facility]   311  Poison Control   (701)  298-8619  Suicide Prevention Lifeline   (640) 756-7285 (TALK)  Child Abuse Hotline   (222) 932-3380 (4-A-Child)  Sexual Assault Hotline   (121) 919-9093 (HOPE)  National Runaway Safeline   (509) 851-7550 (RUNAWAY)  All-Options Talkline   (884) 783-3599  Substance Abuse Referral   (417) 789-5852 (HELP)

## 2024-01-18 NOTE — PATIENT INSTRUCTIONS
[facility]    You can reach us by calling [phone] during the following hours:  Mon - Thurs 9:00 a.m. - 9:00 p.m.  Fri - Sat 9:00 a.m. and 5:00 p.m.    [place] [facility] [address], [address]  [place] [address]  Telephone: [phone]    [facility]  [email or web address]  [address] [facility] [address]  [place] [address]  Tel: [phone]

## 2024-01-18 NOTE — PROGRESS NOTES
"Kendy is a 53 year old who is being evaluated via a billable video visit.      How would you like to obtain your AVS? MyChart  If the video visit is dropped, the invitation should be resent by: Text to cell phone: [phone]  Will anyone else be joining your video visit? No      Assessment & Plan     Recurrent major depressive disorder, remission status unspecified (H24)  Strongly recommend therapy - referred to [facility] or [facility] or [facility]. Kendy will also schedule with MTM to discuss medication changes. Consider treatment resistant depression clinic  - [facility] Referral; Future    Previous med trials  Previous med trials  Prozac (fluoxetine) YES  Zoloft (sertraline) YES  Celexa (citalopram) YES  Lexapro (escitalopram) YES  Paxil (paroxetine) no  Wellbutrin (bupropion) YES  Effexor (venlafaxine) YES    Others:    Duloxetine - YES  Aripiprazole - YES  Lamotrigine - YES  Risperidone - YES  Quetiapine - YES    Alcohol use disorder, severe, dependence (H)  Sober    Morbid obesity (H)  Continue phentermine for maintenance    Generalized anxiety disorder  As above  - [facility] Referral; Future    Class 2 obesity due to excess calories without serious comorbidity with body mass index (BMI) of 35.0 to 35.9 in adult  Continue phentermine  - phentermine (ADIPEX-P) 37.5 MG tablet; Take 1 tablet (37.5 mg) by mouth every morning (before breakfast)    Prescription drug management  I spent a total of 47 minutes on the day of the visit.   Time spent by me doing chart review, history and exam, documentation and further activities per the note      BMI  Estimated body mass index is 29.55 kg/m  as calculated from the following:    Height as of 6/23/23: 1.772 m (5' 9.75\").    Weight as of 6/23/23: 92.8 kg (204 lb 8 oz).   Weight management plan: Discussed healthy diet and exercise guidelines    Patient Instructions   [facility]    You can reach us by calling [phone] during the " following hours:  Mon - Thurs 9:00 a.m. - 9:00 p.m.  Fri - Sat 9:00 a.m. and 5:00 p.m.    [place] [facility] on [facility] in [address], [address]  [place] [address]  Telephone: [phone]    [employer] [employer]  [email or web address]  [address]  [place] [address]  Tel: [phone]            Juana Larry is a 53 year old, presenting for the following health issues:  Recheck Medication    HPI     Medication trials for urge incontinence  Solifenacin - developed significant dry mouth  Mirabegron - worked well and did not get dry mouth  Vibegron - Pa initiated 1/16    Toe - no changes. Had to cancel most recent appt because could not afford the Uber to the appointment.     Mental health - has had significant increased stress with daughter's mental health challenges. Feels like meds are not working as well. Feeling no rito.    Weight - taking phentermine 37.5 mg daily. Helps maintain weight and not gain weight. No side effects noted.    HTN -   BP Readings from Last 6 Encounters:   06/23/23 128/78   10/10/22 116/75   09/01/22 138/88   07/12/22 122/62   06/01/22 111/69   04/25/22 126/82             Objective           Vitals:  No vitals were obtained today due to virtual visit.      Physical Exam   GENERAL: alert and no distress  EYES: Eyes grossly normal to inspection.  No discharge or erythema, or obvious scleral/conjunctival abnormalities.  RESP: No audible wheeze, cough, or visible cyanosis.    SKIN: Visible skin clear. No significant rash, abnormal pigmentation or lesions.  NEURO: Cranial nerves grossly intact.  Mentation and speech appropriate for age.  PSYCH: Appropriate affect, tone, and pace of words        Video-Visit Details    Type of service:  Video Visit   Originating Location (pt. Location): Home  Distant Location (provider location):  Off-site  Platform used for Video Visit: Charlie [employer]  Electronically by: LEDY Seaman CNP

## 2024-01-19 LAB
ALBUMIN SERPL BCG-MCNC: 4.6 G/DL (ref 3.5–5.2)
ALP SERPL-CCNC: 77 U/L (ref 40–150)
ALT SERPL W P-5'-P-CCNC: 14 U/L (ref 0–50)
ANION GAP SERPL CALCULATED.3IONS-SCNC: 11 MMOL/L (ref 7–15)
AST SERPL W P-5'-P-CCNC: 18 U/L (ref 0–45)
BILIRUB SERPL-MCNC: 0.5 MG/DL
BUN SERPL-MCNC: 16.3 MG/DL (ref 6–20)
CALCIUM SERPL-MCNC: 9.6 MG/DL (ref 8.6–10)
CHLORIDE SERPL-SCNC: 105 MMOL/L (ref 98–107)
CHOLEST SERPL-MCNC: 227 MG/DL
CREAT SERPL-MCNC: 0.78 MG/DL (ref 0.51–0.95)
CREAT UR-MCNC: 97 MG/DL
DEPRECATED HCO3 PLAS-SCNC: 24 MMOL/L (ref 22–29)
EGFRCR SERPLBLD CKD-EPI 2021: >90 ML/MIN/1.73M2
FASTING STATUS PATIENT QL REPORTED: NO
GLUCOSE SERPL-MCNC: 96 MG/DL (ref 70–99)
HDLC SERPL-MCNC: 79 MG/DL
LDLC SERPL CALC-MCNC: 128 MG/DL
MICROALBUMIN UR-MCNC: <12 MG/L
MICROALBUMIN/CREAT UR: NORMAL MG/G{CREAT}
NONHDLC SERPL-MCNC: 148 MG/DL
POTASSIUM SERPL-SCNC: 3.8 MMOL/L (ref 3.4–5.3)
PROT SERPL-MCNC: 7 G/DL (ref 6.4–8.3)
SODIUM SERPL-SCNC: 140 MMOL/L (ref 135–145)
TRIGL SERPL-MCNC: 102 MG/DL

## 2024-01-23 ENCOUNTER — MYC MEDICAL ADVICE (OUTPATIENT)
Dept: FAMILY MEDICINE | Facility: CLINIC | Age: 54
End: 2024-01-23
Payer: COMMERCIAL

## 2024-01-23 DIAGNOSIS — N32.81 URGENCY-FREQUENCY SYNDROME: Primary | ICD-10-CM

## 2024-01-23 DIAGNOSIS — N39.41 URGE INCONTINENCE OF URINE: ICD-10-CM

## 2024-01-23 NOTE — TELEPHONE ENCOUNTER
PRIOR AUTHORIZATION DENIED    Medication: GEMTESA 75 MG PO TABS  Insurance Company: [email or web address] - Phone [phone] Fax [phone]  Denial Date: 1/18/2024  Denial Reason(s):         Appeal Information:     Patient Notified: Pharmacy will notify patient.

## 2024-01-24 PROBLEM — N32.81 URGENCY-FREQUENCY SYNDROME: Status: ACTIVE | Noted: 2024-01-24

## 2024-01-24 PROBLEM — N39.41 URGE INCONTINENCE OF URINE: Status: ACTIVE | Noted: 2024-01-24

## 2024-01-24 RX ORDER — OXYBUTYNIN CHLORIDE 15 MG/1
15 TABLET, EXTENDED RELEASE ORAL DAILY
Qty: 90 TABLET | Refills: 3 | Status: SHIPPED | OUTPATIENT
Start: 2024-01-24 | End: 2024-04-04

## 2024-01-24 NOTE — TELEPHONE ENCOUNTER
The 10-year ASCVD risk score (Dylan HUYNH, et al., 2019) is: 3.9%    Values used to calculate the score:      Age: 53 years      Sex: Female      Is Non- : No      Diabetic: No      Tobacco smoker: Yes      Systolic Blood Pressure: 128 mmHg      Is BP treated: Yes      HDL Cholesterol: 79 mg/dL      Total Cholesterol: 227 mg/dL    Lab Results   Component Value Date    A1C 4.6 09/03/2015

## 2024-01-26 ENCOUNTER — VIRTUAL VISIT (OUTPATIENT)
Dept: PHARMACY | Facility: CLINIC | Age: 54
End: 2024-01-26
Payer: COMMERCIAL

## 2024-01-26 DIAGNOSIS — F33.9 RECURRENT MAJOR DEPRESSIVE DISORDER, REMISSION STATUS UNSPECIFIED (H): ICD-10-CM

## 2024-01-26 DIAGNOSIS — F41.1 GENERALIZED ANXIETY DISORDER: Primary | ICD-10-CM

## 2024-01-26 DIAGNOSIS — K21.9 ESOPHAGEAL REFLUX: ICD-10-CM

## 2024-01-26 DIAGNOSIS — N32.81 OVERACTIVE BLADDER: ICD-10-CM

## 2024-01-26 DIAGNOSIS — I10 BENIGN ESSENTIAL HYPERTENSION: ICD-10-CM

## 2024-01-26 DIAGNOSIS — E66.01 MORBID OBESITY (H): ICD-10-CM

## 2024-01-26 PROCEDURE — 99607 MTMS BY PHARM ADDL 15 MIN: CPT | Mod: 93 | Performed by: PHARMACIST

## 2024-01-26 PROCEDURE — 99606 MTMS BY PHARM EST 15 MIN: CPT | Mod: 93 | Performed by: PHARMACIST

## 2024-01-26 NOTE — PROGRESS NOTES
Medication Therapy Management (MTM) Encounter    ASSESSMENT:                            Medication Adherence/Access: No issues identified    Depression/Anxiety:   Suboptimally controlled symptoms.  Discussed options today including switching from Effexor to an alternate antidepressant.  Patient has also been referred to interventional psychiatry and prefers to defer medication changes until she sees how long it will be before she can be seen by that team.    Hypertension:   Stable. Patient is meeting blood pressure goal of < 130/80mmHg.    GERD:   stable    Weight management:   No weights on file since phentermine started, but patient feels it has been helpful at maintaining weight. Tolerating well.    Urinary Urgency:   Plan in place to start Oxybutynin ER 15mg.      PLAN:                             - Provided phone number for interventional psychiatry.  Since you have not heard from them, please call to get an appointment scheduled.  -If it will be a while before you are able to be seen, please let me know and we can discuss potential medication changes.    Follow-up: 1-2 months or sooner if requested    SUBJECTIVE/OBJECTIVE:                          Kendy Nieves is a 53 year old female called for an initial visit for 2024. She was referred to me from PCP.     Reason for visit: medication review, depression/anxiety.    Allergies/ADRs: Reviewed in chart  Past Medical History: Reviewed in chart  Tobacco: She reports that she has been smoking cigarettes. She has a 7.5 pack-year smoking history. She has been exposed to tobacco smoke. She has never used smokeless tobacco.Nicotine/Tobacco Cessation Plan  Did not discuss    Alcohol: none    Medication Adherence/Access: no issues reported      Depression/Anxiety:   Current medication:   venlafaxine XR 300mg daily  trazodone 200mg at bedtime    Sharmin is seen at [facility] by PCP - LEDY Medrano. Most recent MTM visit was April 2023 at which time Fabrizio  was stopped due to inefficacy/feeling blunted and effexor was increased to 300mg.  No mental health medication changes made since that time.     Today, Kendy reports worsening mood - Feels lack of rito, anhedonia, low mood, worry.  Current stressors include: family stressors. Doesn't feel she has found a medication that has been helpful. Referred to interventional psychiatry, hasn't heard from them yet.     Past medication trials:   Zoloft 50mg (2004) - not effective  Wellbutrin SR 200mg BID (7291-9262 and 0838-4245) - not effective  Celexa 50mg (2005 and 2237-3153) - not effective  Cymbalta 120mg daily (7866-4499 ) - not effective  Effexor 225mg daily (4919-8690 and 8819-1255) - somewhat helpful  Risperidone 3mg HS (5649-4031) - not helpful  Seroquel 100mg HS (2008) - no effect  Propanol PRN anxiety - a little helpful  Abilify 5 (2-4/2023) - not helpful, caused blunting    Hypertension     Losartan 100mg daily  Patient reports no current medication side effects       BP Readings from Last 3 Encounters:   06/23/23 128/78   10/10/22 116/75   09/01/22 138/88     Pulse Readings from Last 3 Encounters:   06/23/23 91   10/10/22 90   09/01/22 95     GERD:   Omeprazole 40 mg once daily   Patient feels that current regimen is effective.    Weight management:   Phentermine 37.5mg daily  Topiramate 75mg daily    Maintaining weight with current regimen. Started phentermine June 2023 - doesn't feel it has worsened anxiety.     Wt Readings from Last 4 Encounters:   06/23/23 204 lb 8 oz (92.8 kg)   12/16/22 200 lb (90.7 kg)   09/01/22 202 lb 8 oz (91.9 kg)   07/12/22 160 lb (72.6 kg)       Urinary Urgency:   Oxybutynin ER 15mg - hasn't started yet. Just recently prescribed.    ----------------      I spent 30 minutes with this patient today. A copy of the visit note was provided to the patient's provider(s).    A summary of these recommendations was sent via [employer].    Francia Paris, PharmD, BCPP  Medication Therapy Management  Pharmacist  [facility]    Telemedicine Visit Details  Type of service:  Telephone visit  Start Time:  10:20 AM  End Time:  10:50 AM     Medication Therapy Recommendations  No medication therapy recommendations to display

## 2024-01-26 NOTE — PATIENT INSTRUCTIONS
"Recommendations from today's MTM visit:                                                       - Provided phone number for interventional psychiatry.  Since you have not heard from them, please call to get an appointment scheduled.  -If it will be a while before you are able to be seen, please let me know and we can discuss potential medication changes.    Follow-up: 1-2 months or sooner if requested    It was great speaking with you today.  I value your experience and would be very thankful for your time in providing feedback in our clinic survey. In the next few days, you may receive an email or text message from [employer] with a link to a survey related to your  clinical pharmacist.\"     To schedule another MTM appointment, please call the clinic directly or you may call the MTM scheduling line at [phone] or toll-free at [phone].     My Clinical Pharmacist's contact information:                                                      Please feel free to contact me with any questions or concerns you have.      Francia Paris, PharmD, BCPP  Medication Therapy Management Pharmacist  [facility]    "

## 2024-01-30 ENCOUNTER — ANCILLARY PROCEDURE (OUTPATIENT)
Dept: GENERAL RADIOLOGY | Facility: CLINIC | Age: 54
End: 2024-01-30
Attending: PODIATRIST
Payer: COMMERCIAL

## 2024-01-30 ENCOUNTER — MYC MEDICAL ADVICE (OUTPATIENT)
Dept: FAMILY MEDICINE | Facility: CLINIC | Age: 54
End: 2024-01-30

## 2024-01-30 ENCOUNTER — MYC REFILL (OUTPATIENT)
Dept: FAMILY MEDICINE | Facility: CLINIC | Age: 54
End: 2024-01-30

## 2024-01-30 ENCOUNTER — OFFICE VISIT (OUTPATIENT)
Dept: ORTHOPEDICS | Facility: CLINIC | Age: 54
End: 2024-01-30
Payer: COMMERCIAL

## 2024-01-30 ENCOUNTER — TELEPHONE (OUTPATIENT)
Dept: FAMILY MEDICINE | Facility: CLINIC | Age: 54
End: 2024-01-30

## 2024-01-30 DIAGNOSIS — M79.671 RIGHT FOOT PAIN: ICD-10-CM

## 2024-01-30 DIAGNOSIS — Z98.890 HISTORY OF FOOT SURGERY: ICD-10-CM

## 2024-01-30 DIAGNOSIS — S92.334A CLOSED NONDISPLACED FRACTURE OF THIRD METATARSAL BONE OF RIGHT FOOT, INITIAL ENCOUNTER: ICD-10-CM

## 2024-01-30 DIAGNOSIS — M25.871 SESAMOIDITIS OF RIGHT FOOT: ICD-10-CM

## 2024-01-30 DIAGNOSIS — M79.674 PAIN OF RIGHT GREAT TOE: ICD-10-CM

## 2024-01-30 DIAGNOSIS — M25.871 SESAMOIDITIS OF RIGHT FOOT: Primary | ICD-10-CM

## 2024-01-30 DIAGNOSIS — M19.071 PRIMARY OSTEOARTHRITIS OF RIGHT FOOT: ICD-10-CM

## 2024-01-30 PROCEDURE — 73630 X-RAY EXAM OF FOOT: CPT | Mod: RT | Performed by: RADIOLOGY

## 2024-01-30 PROCEDURE — 99214 OFFICE O/P EST MOD 30 MIN: CPT | Performed by: PODIATRIST

## 2024-01-30 RX ORDER — HYDROCODONE BITARTRATE AND ACETAMINOPHEN 5; 325 MG/1; MG/1
1 TABLET ORAL 2 TIMES DAILY PRN
Qty: 60 TABLET | Refills: 0 | Status: SHIPPED | OUTPATIENT
Start: 2024-01-30 | End: 2024-02-26

## 2024-01-30 NOTE — TELEPHONE ENCOUNTER
Pt calling stating that the pharmacy needs a staff member to ok  date of 1/30/24. Prescription was written to be filled today on 1/30/24.     Called the pharmacy and relayed that pt could  her medication. Thanks    Carla Abdul RN  [facility]

## 2024-01-30 NOTE — LETTER
2024     Seen today: Yes    Patient:  Sharmin Nieves  :   1970  MRN:     [id number]  Physician: BRIAN GIBSON    Sharmin Nieves needs to stay off of her foot for 24 and 24. She may return to work on Date: 24.          Patient limitations:  Non-weight bearing to the right foot x 2 days.             Electronically signed by Brian Gibson DPM

## 2024-01-30 NOTE — PROGRESS NOTES
Chief Complaint   Patient presents with    Follow Up     Pain, right foot. Patient relates that on Monday the bottom of her foot, next to the surgical site, feels swollen and is painful to walk. Numbness. Patient relates that there is no recent injury.               Allergies   Allergen Reactions    Lisinopril Cough     cough         Subjective: Sharmin is a 53 year old female who presents to the clinic today for a follow up of right foot pain.  She notes that she now is having pain in the bottom of the 1st MTPJ on the right. This has been happening for a week or so. Rest alleviates, walking and weight bearing aggravate.     Objective  Data Unavailable Data Unavailable Data Unavailable Data Unavailable Data Unavailable 0 lbs 0 oz  Fusion site on the right first MTPJ feels solid and does not move. She does have pain with palpation of the fibular sesamoid on the right foot.     right foot xrays indicated in 3 weightbearing views.    Bony spurring noted to the fibular sesamoid. No acute processes noted.       Assessment:   Encounter Diagnoses   Name Primary?    Sesamoiditis of right foot Yes    Right foot pain              Plan:   - Pt seen and evaluated  - XRs taken and independently interpreted by myself. Reviewed her previous CT, which showed cystic changes to the sesamoid.  - Dancer's pad to the foot.   - Pt to return to clinic as needed.  - On the DOS, I spent 30 min with patient care, documentation, chart/image review, and care coordination.

## 2024-01-30 NOTE — LETTER
1/30/2024         RE: Sharmin Nieves  [address]  [place] [facility] [place] [facility] [place] [address]        Dear Colleague,    Thank you for referring your patient, Sharmin Nieves, to the [facility] [place]. Please see a copy of my visit note below.    Chief Complaint   Patient presents with    Follow Up     Pain, right foot. Patient relates that on Monday the bottom of her foot, next to the surgical site, feels swollen and is painful to walk. Numbness. Patient relates that there is no recent injury.               Allergies   Allergen Reactions    Lisinopril Cough     cough         Subjective: Sharmin is a 53 year old female who presents to the clinic today for a follow up of right foot pain.  She notes that she now is having pain in the bottom of the 1st MTPJ on the right. This has been happening for a week or so. Rest alleviates, walking and weight bearing aggravate.     Objective  Data Unavailable Data Unavailable Data Unavailable Data Unavailable Data Unavailable 0 lbs 0 oz  Fusion site on the right first MTPJ feels solid and does not move. She does have pain with palpation of the fibular sesamoid on the right foot.     right foot xrays indicated in 3 weightbearing views.    Bony spurring noted to the fibular sesamoid. No acute processes noted.       Assessment:   Encounter Diagnoses   Name Primary?    Sesamoiditis of right foot Yes    Right foot pain              Plan:   - Pt seen and evaluated  - XRs taken and independently interpreted by myself. Reviewed her previous CT, which showed cystic changes to the sesamoid.  - Dancer's pad to the foot.   - Pt to return to clinic as needed.  - On the DOS, I spent 30 min with patient care, documentation, chart/image review, and care coordination.       Brian Gibson DPM

## 2024-02-21 ENCOUNTER — MYC MEDICAL ADVICE (OUTPATIENT)
Dept: ORTHOPEDICS | Facility: CLINIC | Age: 54
End: 2024-02-21
Payer: COMMERCIAL

## 2024-02-21 DIAGNOSIS — M25.871 SESAMOIDITIS OF RIGHT FOOT: Primary | ICD-10-CM

## 2024-02-21 NOTE — LETTER
Return to Work  2024     Seen today: No    Patient:  Sharmin Nieves  :   1970  MRN:     [id number]  Physician: BRIAN GIBSON    Please allow Sharmin Nieves to work from home today due to a flare up of pain in her right foot. Please allow patient to work from home in the future due to flare ups of sesamoiditis in the right foot.         Electronically signed by Brian Gibson DPM

## 2024-02-22 ENCOUNTER — MYC MEDICAL ADVICE (OUTPATIENT)
Dept: FAMILY MEDICINE | Facility: CLINIC | Age: 54
End: 2024-02-22
Payer: COMMERCIAL

## 2024-02-22 ENCOUNTER — TELEPHONE (OUTPATIENT)
Dept: WOUND CARE | Facility: CLINIC | Age: 54
End: 2024-02-22
Payer: COMMERCIAL

## 2024-02-25 ENCOUNTER — TELEPHONE (OUTPATIENT)
Dept: FAMILY MEDICINE | Facility: CLINIC | Age: 54
End: 2024-02-25
Payer: COMMERCIAL

## 2024-02-25 DIAGNOSIS — F41.1 GENERALIZED ANXIETY DISORDER: ICD-10-CM

## 2024-02-25 DIAGNOSIS — K22.4 ESOPHAGEAL SPASM: ICD-10-CM

## 2024-02-25 DIAGNOSIS — I10 BENIGN ESSENTIAL HYPERTENSION: ICD-10-CM

## 2024-02-25 RX ORDER — OMEPRAZOLE 40 MG/1
40 CAPSULE, DELAYED RELEASE ORAL DAILY
Qty: 90 CAPSULE | Refills: 1 | Status: CANCELLED | OUTPATIENT
Start: 2024-02-25

## 2024-02-25 RX ORDER — VENLAFAXINE HYDROCHLORIDE 150 MG/1
300 CAPSULE, EXTENDED RELEASE ORAL DAILY
Qty: 180 CAPSULE | Refills: 1 | Status: CANCELLED | OUTPATIENT
Start: 2024-02-25

## 2024-02-25 RX ORDER — LOSARTAN POTASSIUM 100 MG/1
100 TABLET ORAL DAILY
Qty: 90 TABLET | Refills: 1 | Status: CANCELLED | OUTPATIENT
Start: 2024-02-25

## 2024-02-26 ENCOUNTER — VIRTUAL VISIT (OUTPATIENT)
Dept: FAMILY MEDICINE | Facility: CLINIC | Age: 54
End: 2024-02-26
Payer: COMMERCIAL

## 2024-02-26 ENCOUNTER — MYC MEDICAL ADVICE (OUTPATIENT)
Dept: FAMILY MEDICINE | Facility: CLINIC | Age: 54
End: 2024-02-26

## 2024-02-26 DIAGNOSIS — G89.29 CHRONIC FOOT PAIN, RIGHT: Primary | ICD-10-CM

## 2024-02-26 DIAGNOSIS — K22.4 ESOPHAGEAL SPASM: ICD-10-CM

## 2024-02-26 DIAGNOSIS — Z98.890 HISTORY OF FOOT SURGERY: ICD-10-CM

## 2024-02-26 DIAGNOSIS — F17.200 TOBACCO USE DISORDER: ICD-10-CM

## 2024-02-26 DIAGNOSIS — S92.334A CLOSED NONDISPLACED FRACTURE OF THIRD METATARSAL BONE OF RIGHT FOOT, INITIAL ENCOUNTER: ICD-10-CM

## 2024-02-26 DIAGNOSIS — I10 BENIGN ESSENTIAL HYPERTENSION: ICD-10-CM

## 2024-02-26 DIAGNOSIS — F41.1 GENERALIZED ANXIETY DISORDER: ICD-10-CM

## 2024-02-26 DIAGNOSIS — M19.071 PRIMARY OSTEOARTHRITIS OF RIGHT FOOT: ICD-10-CM

## 2024-02-26 DIAGNOSIS — M79.671 CHRONIC FOOT PAIN, RIGHT: Primary | ICD-10-CM

## 2024-02-26 DIAGNOSIS — M25.871 SESAMOIDITIS OF RIGHT FOOT: ICD-10-CM

## 2024-02-26 DIAGNOSIS — M79.674 PAIN OF RIGHT GREAT TOE: ICD-10-CM

## 2024-02-26 PROCEDURE — 99214 OFFICE O/P EST MOD 30 MIN: CPT | Mod: 95 | Performed by: NURSE PRACTITIONER

## 2024-02-26 RX ORDER — OMEPRAZOLE 40 MG/1
40 CAPSULE, DELAYED RELEASE ORAL DAILY
Qty: 90 CAPSULE | Refills: 1 | Status: SHIPPED | OUTPATIENT
Start: 2024-02-26 | End: 2024-08-22

## 2024-02-26 RX ORDER — VENLAFAXINE HYDROCHLORIDE 150 MG/1
300 CAPSULE, EXTENDED RELEASE ORAL DAILY
Qty: 180 CAPSULE | Refills: 1 | Status: SHIPPED | OUTPATIENT
Start: 2024-02-26 | End: 2024-08-14

## 2024-02-26 RX ORDER — LOSARTAN POTASSIUM 100 MG/1
100 TABLET ORAL DAILY
Qty: 90 TABLET | Refills: 1 | Status: SHIPPED | OUTPATIENT
Start: 2024-02-26 | End: 2024-08-22

## 2024-02-26 RX ORDER — VARENICLINE TARTRATE 0.5 (11)-1
KIT ORAL
Qty: 53 TABLET | Refills: 0 | Status: SHIPPED | OUTPATIENT
Start: 2024-02-26 | End: 2024-04-04

## 2024-02-26 RX ORDER — ONDANSETRON 4 MG/1
4 TABLET, FILM COATED ORAL EVERY 8 HOURS PRN
Qty: 30 TABLET | Refills: 0 | Status: SHIPPED | OUTPATIENT
Start: 2024-02-26

## 2024-02-26 ASSESSMENT — PATIENT HEALTH QUESTIONNAIRE - PHQ9
SUM OF ALL RESPONSES TO PHQ QUESTIONS 1-9: 7
SUM OF ALL RESPONSES TO PHQ QUESTIONS 1-9: 7
10. IF YOU CHECKED OFF ANY PROBLEMS, HOW DIFFICULT HAVE THESE PROBLEMS MADE IT FOR YOU TO DO YOUR WORK, TAKE CARE OF THINGS AT HOME, OR GET ALONG WITH OTHER PEOPLE: SOMEWHAT DIFFICULT

## 2024-02-26 NOTE — PATIENT INSTRUCTIONS
You should be aware of symptoms of serotonin syndrome: restlessness, disorientation, sweating, increased heart rate, increased blood pressure, vomiting, diarrhea, and tremor.

## 2024-02-26 NOTE — PROGRESS NOTES
Kendy is a 53 year old who is being evaluated via a billable video visit.      How would you like to obtain your AVS? MyChart  If the video visit is dropped, the invitation should be resent by: Text to cell phone: [phone]  Will anyone else be joining your video visit? No    Assessment & Plan     Chronic foot pain, right  New diagnosis of sesamoiditis int he right foot. Has appt with Dr. Anderson scheduled. Unfortunately, this has caused pain now on the bottom of the foot as well as in the great toe joint. Patient has been taking an additional hydrocodone-acetaminophen 5-325 mg tablet for daytime pain. Discussion of pain management options and review of previous trials. Reviewed pain management consult note and they did discuss buprenorphine and I am advising a transition to this as we don't know when surgery will be and I am not confident that we'd achieve pain management after surgery. Patient open to this. Would need to be off norco for 1-3 days Reaching out to pain management provider.    Pain of right great toe  - HYDROcodone-acetaminophen (NORCO) 5-325 MG tablet; Take 1 tablet by mouth 3 times daily as needed for severe pain    Sesamoiditis of right foot    Generalized anxiety disorder  Stable  - venlafaxine (EFFEXOR XR) 150 MG 24 hr capsule; Take 2 capsules (300 mg) by mouth daily    Benign essential hypertension  Controlled - need a clinic pressure  - losartan (COZAAR) 100 MG tablet; Take 1 tablet (100 mg) by mouth daily  BP Readings from Last 6 Encounters:   06/23/23 128/78   10/10/22 116/75   09/01/22 138/88   07/12/22 122/62   06/01/22 111/69   04/25/22 126/82       Esophageal spasm  Stable at this dosing  - omeprazole (PRILOSEC) 40 MG DR capsule; Take 1 capsule (40 mg) by mouth daily    Primary osteoarthritis of right foot    - HYDROcodone-acetaminophen (NORCO) 5-325 MG tablet; Take 1 tablet by mouth 3 times daily as needed for severe pain    History of foot surgery    - HYDROcodone-acetaminophen (NORCO)  5-325 MG tablet; Take 1 tablet by mouth 3 times daily as needed for severe pain    Closed nondisplaced fracture of third metatarsal bone of right foot, initial encounter    - HYDROcodone-acetaminophen (NORCO) 5-325 MG tablet; Take 1 tablet by mouth 3 times daily as needed for severe pain    Tobacco use disorder  Would like to stop smoking ahead of surgery. Had success with vareniciline.  - varenicline (CHANTIX REESE) 0.5 MG X 11 & 1 MG X 42 tablet; Take 0.5 mg tab daily for 3 days, THEN 0.5 mg tab twice daily for 4 days, THEN 1 mg twice daily.  - ondansetron (ZOFRAN) 4 MG tablet; Take 1 tablet (4 mg) by mouth every 8 hours as needed for nausea    Review of prior external note(s) from - pain management  Prescription drug management  No LOS data to display   Time spent by me doing chart review, history and exam, documentation and further activities per the note        Patient Instructions   You should be aware of symptoms of serotonin syndrome: restlessness, disorientation, sweating, increased heart rate, increased blood pressure, vomiting, diarrhea, and tremor.          Juana Larry is a 53 year old, presenting for the following health issues:  Foot Pain    History of Present Illness       Reason for visit:  Foot pain    She eats 2-3 servings of fruits and vegetables daily.She consumes 1 sweetened beverage(s) daily.She exercises with enough effort to increase her heart rate 9 or less minutes per day.  She exercises with enough effort to increase her heart rate 3 or less days per week.   She is taking medications regularly.     Foot pain abruptly worsened two weeks ago . New pain at bottom of foot near surgical site that was new. Got in to see Dr. Gibson and diagnosed with sesmoiditis. Imaging shows cysts inside the sesmoid bone and spurs on the sesmoid. Recommended surgery, but acknowledged that this will be complicated and she's had downstream surgical complications already. Recommended sesmoid bone foot  pads    Scheduled with Dr. Anderson on 3/22.     Had been taking hydrocodone-acetaminophen 5-325 mg in the morning before work and in the evening. Missed pain clinic appointment with Dr. Toth when she thought it was on Tuesday instead of Monday. Worried about making changes and not having pain coverage. Feeling fed up and scared. No constipation or sedation with increased opioid administration. No symptoms of withdraw such as shakiness as dose comes due.     Previous trials include  duloxetine - taken for a long time, no benefit  amitriptyline - tried 25 mg within the past year, caused significant fatigue, cannot recall any benefit  gabapentin - increased dose causing sedation and unsure that it helped pain  diclofenac gel - still taking, 30 minutes benefit  Icy hot - still taking    Would like to start chantix to stop smoking before anticipated surgery. Chantix worked well, but had nausea at the onset and would like ondansetron to help with.       Review of Systems  Constitutional, HEENT, cardiovascular, pulmonary, gi and gu systems are negative, except as otherwise noted.      Objective           Vitals:  No vitals were obtained today due to virtual visit.    Physical Exam   GENERAL: alert and no distress  EYES: Eyes grossly normal to inspection.  No discharge or erythema, or obvious scleral/conjunctival abnormalities.  RESP: No audible wheeze, cough, or visible cyanosis.    SKIN: Visible skin clear. No significant rash, abnormal pigmentation or lesions.  NEURO: Cranial nerves grossly intact.  Mentation and speech appropriate for age.  PSYCH: Appropriate affect, tone, and pace of words        Video-Visit Details    Type of service:  Video Visit   Originating Location (pt. Location): Home  Distant Location (provider location):  On-site  Platform used for Video Visit: Charlie  Signed Electronically by: LEDY Seaman CNP

## 2024-02-26 NOTE — Clinical Note
Meri, Just saw Kendy today. She has sesamoiditis and an appt with ortho to discuss yet another surgery. She has increased her norco to three times a day. We discussed that it is time to change the plan and she is open to buprenorphine. She is scheduled with you on 3/22. I'd like to try and get her started before then if possible. Wondering if you would be able to give me advice on how to get her started. My medical director can also give some guidance. He wasn't sure that Belbuca would even be covered, though. Thanks for any advice!  TOMMY Clinton

## 2024-02-27 RX ORDER — HYDROCODONE BITARTRATE AND ACETAMINOPHEN 5; 325 MG/1; MG/1
1 TABLET ORAL 3 TIMES DAILY PRN
Qty: 75 TABLET | Refills: 0 | Status: SHIPPED | OUTPATIENT
Start: 2024-02-27 | End: 2024-03-27

## 2024-02-27 NOTE — TELEPHONE ENCOUNTER
Mariya -     Please see mychart -   Last Norco 1/30/24  for fill was 1 tablet 2 times daily.  VV sig changed to - 1 tablet by mouth 3 times daily as needed for severe pain  And sent local print    Okay to send Eprescribe?    Meri MENESES RN  [facility]

## 2024-02-27 NOTE — TELEPHONE ENCOUNTER
Mariya -      Please see mychart -   Last Norco 1/30/24  for fill was 1 tablet 2 times daily.  VV sig changed to - 1 tablet by mouth 3 times daily as needed for severe pain  And sent local print     Okay to send Eprescribe?     Meri MENESES RN  [facility]

## 2024-02-27 NOTE — TELEPHONE ENCOUNTER
Sent pt mycdorindat relaying rx sent this morning.    Ridge Ocampo RN   [facility]

## 2024-02-27 NOTE — TELEPHONE ENCOUNTER
Sent mychart via other encounter relaying rx sent this morning 2/27.    Ridge Ocampo RN   [facility]

## 2024-03-04 ENCOUNTER — MYC MEDICAL ADVICE (OUTPATIENT)
Dept: FAMILY MEDICINE | Facility: CLINIC | Age: 54
End: 2024-03-04

## 2024-03-04 ENCOUNTER — VIRTUAL VISIT (OUTPATIENT)
Dept: FAMILY MEDICINE | Facility: CLINIC | Age: 54
End: 2024-03-04
Payer: COMMERCIAL

## 2024-03-04 DIAGNOSIS — G89.29 CHRONIC FOOT PAIN, RIGHT: ICD-10-CM

## 2024-03-04 DIAGNOSIS — M25.871 SESAMOIDITIS OF RIGHT FOOT: ICD-10-CM

## 2024-03-04 DIAGNOSIS — M79.671 CHRONIC FOOT PAIN, RIGHT: ICD-10-CM

## 2024-03-04 DIAGNOSIS — M19.071 PRIMARY OSTEOARTHRITIS OF RIGHT FOOT: ICD-10-CM

## 2024-03-04 DIAGNOSIS — U07.1 INFECTION DUE TO 2019 NOVEL CORONAVIRUS: Primary | ICD-10-CM

## 2024-03-04 PROCEDURE — 99215 OFFICE O/P EST HI 40 MIN: CPT | Mod: 95 | Performed by: NURSE PRACTITIONER

## 2024-03-04 NOTE — LETTER
March 4, 2024      Sharmin Nieves  [address] [place] [address]        To Whom It May Concern:    Sharmin Nieves was seen in our clinic. She may work with the following restrictions while covid positive: work from home for entirety of assigned work hours as tolerated by patient.      Sincerely,       TOMMY Clinton

## 2024-03-04 NOTE — PROGRESS NOTES
Kendy is a 53 year old who is being evaluated via a billable video visit.      How would you like to obtain your AVS? MyChart  If the video visit is dropped, the invitation should be resent by: Text to cell phone: [phone]  Will anyone else be joining your video visit? No      Assessment & Plan     Infection due to 2019 novel coronavirus  Discussed benefits/risks/side effects of paxlovid to reduce risk for severe illness or hospitalization. Reviewed medication interactions - not taking oxybutynin currently. Reviewed lowering dose of hydrocodone-acetaminophen from TID to BID or every day for duration of paxlovid. Discussed benefits/risks/side effects of metformin for possible reduction of long covid potential. She would like to take this.   - nirmatrelvir and ritonavir (PAXLOVID) 300 mg/100 mg therapy pack; Take 3 tablets by mouth 2 times daily for 5 days  - metFORMIN (GLUCOPHAGE) 500 MG tablet; Day 1: 500 mg (1 tab) once, Days 2-5: 500 mg (1 tab) in AM and PM; Days 6-14: 500 mg in AM and 1000 mg (2 tab) in PM    Primary osteoarthritis of right foot  Multiple failed surgeries, current sesamoiditis, failed tiered pain management medication trials and increasing opioid dosing - appropriate to transition to buprenorphine and patient is open to this. Consulted with patient's established pain medicine provider to determine appropriate dosing. Trial rx to determine coverage. If covered, may need to start with at bedtime dosing while on paxlovid. May also need wash out of hydrocodone - will consult pain med.  - Buprenorphine HCl (BELBUCA) 150 MCG FILM buccal film; Place 1 Film (150 mcg) inside cheek every 12 hours    Chronic foot pain, right  As above  - Buprenorphine HCl (BELBUCA) 150 MCG FILM buccal film; Place 1 Film (150 mcg) inside cheek every 12 hours    Sesamoiditis of right foot  As above  - Buprenorphine HCl (BELBUCA) 150 MCG FILM buccal film; Place 1 Film (150 mcg) inside cheek every 12 hours    Review of  "external notes as documented elsewhere in note  Prescription drug management  I spent a total of 40 minutes on the day of the visit.   Time spent by me doing chart review, history and exam, documentation and further activities per the note        Juana Larry is a 53 year old, presenting for the following health issues:  Covid Concern        6/23/2023    10:49 AM   Additional Questions   Roomed by Janee       Tested positive for covid 3/1/24. Symptoms started Friday- chest feels heavy, \"froggy in throat\". Mild congestion, no shortness of breath, no cough. Gets hot and cold. HAsn't taken temp. No n/v/d    Took Paxlovid Shmuel 1 year ago, remembers stopping oxybutynin at this time. Would like to take paxlovid with this infection. Open to metformin as well    Pain medicine replied with positive to move ahead with buprenorphine. Patient has tried gabapentin and amitriptyline with no benefit and significiant side effects with amitriptyline. She cannot take NSAIDs daily related to gastritis and esophageal spasm history. She is currently taking duloxetine at maximum dose without achieving adequate pain management. She has participated in physical therapy as directed. Recently, patient was diagnosed with sesamoiditis in the right foot on top of chronic great toe joint pain. Dosing of hydrocodone-acetaminophen as escalated from 1 every day initially to currently TID in context of inflammation and failed surgeries. She has had a consult with pain medicine who recommended buprenorphine and patient is ready to trial this option, which is safer than chronic opioids.          Objective           Vitals:  No vitals were obtained today due to virtual visit.    Physical Exam   GENERAL: alert and no distress  EYES: Eyes grossly normal to inspection.  No discharge or erythema, or obvious scleral/conjunctival abnormalities.  RESP: No audible wheeze, cough, or visible cyanosis.    SKIN: Visible skin clear. No significant rash, " abnormal pigmentation or lesions.  NEURO: Cranial nerves grossly intact.  Mentation and speech appropriate for age.  PSYCH: Appropriate affect, tone, and pace of words        Video-Visit Details    Type of service:  Video Visit   Originating Location (pt. Location): Home  Distant Location (provider location):  On-site  Platform used for Video Visit: Charlie  Signed Electronically by: LEDY Seaman CNP

## 2024-03-07 ENCOUNTER — MYC MEDICAL ADVICE (OUTPATIENT)
Dept: FAMILY MEDICINE | Facility: CLINIC | Age: 54
End: 2024-03-07
Payer: COMMERCIAL

## 2024-03-07 ENCOUNTER — TELEPHONE (OUTPATIENT)
Dept: FAMILY MEDICINE | Facility: CLINIC | Age: 54
End: 2024-03-07
Payer: COMMERCIAL

## 2024-03-07 NOTE — LETTER
March 8, 2024      Sharmin Nieves  [address] [place] [address]        To Whom It May Concern:    Sharmin Nieves is under my medical care. She may return to work 3/11/24 without restrictions.      Sincerely,        LEDY Seaman CNP

## 2024-03-07 NOTE — LETTER
March 8, 2024      Sharmin ROC Ezequiel  [address] [place] [address]        To Whom It May Concern:    Sharmin Nieves was seen in our clinic. She may return to work on  3/11/24 with no restrictions.      Sincerely,

## 2024-03-07 NOTE — TELEPHONE ENCOUNTER
Forms/Letter Request    Type of form/letter: FMLA - Unknown      Do we have the form/letter: Yes: [employer]    Who is the form from? Insurance comp    Where did/will the form come from? form was faxed in    When is form/letter needed by: ASAP    How would you like the form/letter returned: [phone]    Patient Notified form requests are processed in 5-7 business days:No    Could we send this information to you in [employer] or would you prefer to receive a phone call?:   Patient would like to be contacted via [employer]

## 2024-03-07 NOTE — TELEPHONE ENCOUNTER
[employer] calling for verification that pt is seen. They were told to fax the information to the clinic in regards of what the information they want. Want to verify with pt that it is okay to release information once received. Thanks    Carla Abdul RN  [facility]

## 2024-03-08 NOTE — TELEPHONE ENCOUNTER
Please see my chart message. Letter has been pended. Thanks    Carla Abdul RN  [facility]

## 2024-03-11 ENCOUNTER — TELEPHONE (OUTPATIENT)
Dept: FAMILY MEDICINE | Facility: CLINIC | Age: 54
End: 2024-03-11

## 2024-03-11 NOTE — TELEPHONE ENCOUNTER
Routed to  alex Little    See message, there is a letter in pt chart    Sapna Bowie RN   [facility]

## 2024-03-11 NOTE — TELEPHONE ENCOUNTER
Sheryl calling for [employer] to confirm if there is an out of work note or restrictions/limitations given to the patient. Please call her back to assist and follow-up on paper work sent on 3/7.    Anna Thakkar RN

## 2024-03-11 NOTE — TELEPHONE ENCOUNTER
Forms are on Mariya desk waiting for completion.    Once completed will fax back to [facility]

## 2024-03-11 NOTE — TELEPHONE ENCOUNTER
Patient did not stop working with covid - she worked remotely. I don't believe she does not need this [employer] form. Could you speak with patient and/or the [employer] rep?  TOMMY Clinton

## 2024-03-12 NOTE — TELEPHONE ENCOUNTER
Attempted to call pt. Left a voicemail message for pt to call the clinic back and ask to speak to one of the triage nurses. Thanks    Carla Abdul RN  [facility]

## 2024-03-17 DIAGNOSIS — F51.01 PRIMARY INSOMNIA: ICD-10-CM

## 2024-03-17 NOTE — TELEPHONE ENCOUNTER
DIAGNOSIS: Sesamoiditis of right foot [M25.871]  - Primary   APPOINTMENT DATE: 03/22/2024   NOTES STATUS DETAILS   OFFICE NOTE from referring provider Internal 02/21/2024 - Brian Gibson DPM - [facility]   OFFICE NOTE from other specialist Internal 08/24/2021 - Conor Guillen MD - [facility]   OPERATIVE REPORT Internal 06/09/2021 - Right 1st metatarsophalangeal joint fusion.   IMPLANT RECORD/STICKER Internal    LABS     INJECTIONS DONE IN RADIOLOGY PACS Internal   MRI PACS Internal   CT SCAN PACS Internal   XRAYS (IMAGES & REPORTS) PACS Internal

## 2024-03-18 RX ORDER — TRAZODONE HYDROCHLORIDE 100 MG/1
200 TABLET ORAL AT BEDTIME
Qty: 180 TABLET | Refills: 1 | Status: SHIPPED | OUTPATIENT
Start: 2024-03-18 | End: 2024-09-10

## 2024-03-22 ENCOUNTER — PRE VISIT (OUTPATIENT)
Dept: ORTHOPEDICS | Facility: CLINIC | Age: 54
End: 2024-03-22

## 2024-03-26 ASSESSMENT — ANXIETY QUESTIONNAIRES
GAD7 TOTAL SCORE: 20
8. IF YOU CHECKED OFF ANY PROBLEMS, HOW DIFFICULT HAVE THESE MADE IT FOR YOU TO DO YOUR WORK, TAKE CARE OF THINGS AT HOME, OR GET ALONG WITH OTHER PEOPLE?: EXTREMELY DIFFICULT
8. IF YOU CHECKED OFF ANY PROBLEMS, HOW DIFFICULT HAVE THESE MADE IT FOR YOU TO DO YOUR WORK, TAKE CARE OF THINGS AT HOME, OR GET ALONG WITH OTHER PEOPLE?: EXTREMELY DIFFICULT
5. BEING SO RESTLESS THAT IT IS HARD TO SIT STILL: MORE THAN HALF THE DAYS
6. BECOMING EASILY ANNOYED OR IRRITABLE: NEARLY EVERY DAY
5. BEING SO RESTLESS THAT IT IS HARD TO SIT STILL: MORE THAN HALF THE DAYS
IF YOU CHECKED OFF ANY PROBLEMS ON THIS QUESTIONNAIRE, HOW DIFFICULT HAVE THESE PROBLEMS MADE IT FOR YOU TO DO YOUR WORK, TAKE CARE OF THINGS AT HOME, OR GET ALONG WITH OTHER PEOPLE: EXTREMELY DIFFICULT
6. BECOMING EASILY ANNOYED OR IRRITABLE: NEARLY EVERY DAY
2. NOT BEING ABLE TO STOP OR CONTROL WORRYING: NEARLY EVERY DAY
1. FEELING NERVOUS, ANXIOUS, OR ON EDGE: NEARLY EVERY DAY
GAD7 TOTAL SCORE: 20
1. FEELING NERVOUS, ANXIOUS, OR ON EDGE: NEARLY EVERY DAY
GAD7 TOTAL SCORE: 20
7. FEELING AFRAID AS IF SOMETHING AWFUL MIGHT HAPPEN: NEARLY EVERY DAY
3. WORRYING TOO MUCH ABOUT DIFFERENT THINGS: NEARLY EVERY DAY
7. FEELING AFRAID AS IF SOMETHING AWFUL MIGHT HAPPEN: NEARLY EVERY DAY
2. NOT BEING ABLE TO STOP OR CONTROL WORRYING: NEARLY EVERY DAY
3. WORRYING TOO MUCH ABOUT DIFFERENT THINGS: NEARLY EVERY DAY
GAD7 TOTAL SCORE: 20
GAD7 TOTAL SCORE: 20
4. TROUBLE RELAXING: NEARLY EVERY DAY
4. TROUBLE RELAXING: NEARLY EVERY DAY
IF YOU CHECKED OFF ANY PROBLEMS ON THIS QUESTIONNAIRE, HOW DIFFICULT HAVE THESE PROBLEMS MADE IT FOR YOU TO DO YOUR WORK, TAKE CARE OF THINGS AT HOME, OR GET ALONG WITH OTHER PEOPLE: EXTREMELY DIFFICULT

## 2024-03-26 ASSESSMENT — PATIENT HEALTH QUESTIONNAIRE - PHQ9
10. IF YOU CHECKED OFF ANY PROBLEMS, HOW DIFFICULT HAVE THESE PROBLEMS MADE IT FOR YOU TO DO YOUR WORK, TAKE CARE OF THINGS AT HOME, OR GET ALONG WITH OTHER PEOPLE: EXTREMELY DIFFICULT
SUM OF ALL RESPONSES TO PHQ QUESTIONS 1-9: 20
SUM OF ALL RESPONSES TO PHQ QUESTIONS 1-9: 20

## 2024-03-27 ENCOUNTER — VIRTUAL VISIT (OUTPATIENT)
Dept: FAMILY MEDICINE | Facility: CLINIC | Age: 54
End: 2024-03-27
Payer: COMMERCIAL

## 2024-03-27 DIAGNOSIS — E66.09 CLASS 2 OBESITY DUE TO EXCESS CALORIES WITHOUT SERIOUS COMORBIDITY WITH BODY MASS INDEX (BMI) OF 35.0 TO 35.9 IN ADULT: ICD-10-CM

## 2024-03-27 DIAGNOSIS — E66.812 CLASS 2 OBESITY DUE TO EXCESS CALORIES WITHOUT SERIOUS COMORBIDITY WITH BODY MASS INDEX (BMI) OF 35.0 TO 35.9 IN ADULT: ICD-10-CM

## 2024-03-27 DIAGNOSIS — F33.9 RECURRENT MAJOR DEPRESSIVE DISORDER, REMISSION STATUS UNSPECIFIED (H): Primary | ICD-10-CM

## 2024-03-27 DIAGNOSIS — F41.1 GENERALIZED ANXIETY DISORDER: ICD-10-CM

## 2024-03-27 PROCEDURE — 99214 OFFICE O/P EST MOD 30 MIN: CPT | Mod: 95 | Performed by: NURSE PRACTITIONER

## 2024-03-27 RX ORDER — TOPIRAMATE 25 MG/1
75 TABLET, FILM COATED ORAL DAILY
Qty: 270 TABLET | Refills: 1 | Status: SHIPPED | OUTPATIENT
Start: 2024-03-27 | End: 2024-09-16

## 2024-03-27 NOTE — LETTER
March 27, 2024      Sharmin Nieves  [address] [place] [address]        To Whom It May Concern:    Sharmin Nieves was seen in our clinic 3/27/24 and advised to stay home from work. She may return to work without restrictions on 3/29/24.      Sincerely,        LEDY Seaman CNP

## 2024-03-27 NOTE — PROGRESS NOTES
Kendy is a 53 year old who is being evaluated via a billable video visit.    How would you like to obtain your AVS? MyChart  If the video visit is dropped, the invitation should be resent by: Text to cell phone: [phone]  Will anyone else be joining your video visit? No      Assessment & Plan     Recurrent major depressive disorder, remission status unspecified (H24)  Understandable mood changes in conjunction with increased stress at work and sadness about changes in the workplace. Has upcoming interventional psychiatry clinic appts so deferring any medication changes at this time. Letter written to support off work today and tomorrow.    Generalized anxiety disorder  As above      I spent a total of 30 minutes on the day of the visit.   Time spent by me doing chart review, history and exam, documentation and further activities per the note        Subjective   Kendy is a 53 year old, presenting for the following health issues:   Follow Up    History of Present Illness       Mental Health Follow-up:  Patient presents to follow-up on Depression & Anxiety.Patient's depression since last visit has been:  Worse  The patient is not having other symptoms associated with depression.  Patient's anxiety since last visit has been:  Worse  The patient is not having other symptoms associated with anxiety.  Any significant life events: job concerns  Patient is feeling anxious or having panic attacks.  Patient has no concerns about alcohol or drug use.    She eats 2-3 servings of fruits and vegetables daily.She consumes 1 sweetened beverage(s) daily.She exercises with enough effort to increase her heart rate 9 or less minutes per day.  She exercises with enough effort to increase her heart rate 3 or less days per week.   She is taking medications regularly.    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 A lot of long-term coworkers and friends are leaving. Very busy at work without enough time or resources. Has been in her  position for over six years.     Feeling increased stress and sadness and anxiety. Has lost motivation to work so hard. Has first appt with interventional psychiatry in two days. The first appt is a , second visit is with MTM and third with the psychiatrist.     Having significant deep body aches since having covid three weeks ago. But, we had also switched from hydrocodone-acetaminophen to buprenorphine earlier this month. No change in toe and foot pain - not better or worse.     Hasn't started varencline.          Objective           Vitals:  No vitals were obtained today due to virtual visit.    Physical Exam   GENERAL: alert and no distress  EYES: Eyes grossly normal to inspection.  No discharge or erythema, or obvious scleral/conjunctival abnormalities.  RESP: No audible wheeze, cough, or visible cyanosis.    SKIN: Visible skin clear. No significant rash, abnormal pigmentation or lesions.  NEURO: Cranial nerves grossly intact.  Mentation and speech appropriate for age.  PSYCH: mentation appears normal, tearful, and anxious        Video-Visit Details    Type of service:  Video Visit   Originating Location (pt. Location): Home  Distant Location (provider location):  On-site  Platform used for Video Visit: Charlie  Signed Electronically by: LEDY Seaman CNP

## 2024-03-28 ASSESSMENT — PATIENT HEALTH QUESTIONNAIRE - PHQ9
10. IF YOU CHECKED OFF ANY PROBLEMS, HOW DIFFICULT HAVE THESE PROBLEMS MADE IT FOR YOU TO DO YOUR WORK, TAKE CARE OF THINGS AT HOME, OR GET ALONG WITH OTHER PEOPLE: EXTREMELY DIFFICULT
SUM OF ALL RESPONSES TO PHQ QUESTIONS 1-9: 21
SUM OF ALL RESPONSES TO PHQ QUESTIONS 1-9: 21

## 2024-03-29 ENCOUNTER — VIRTUAL VISIT (OUTPATIENT)
Dept: PSYCHIATRY | Facility: CLINIC | Age: 54
End: 2024-03-29
Attending: NURSE PRACTITIONER
Payer: COMMERCIAL

## 2024-03-29 VITALS — BODY MASS INDEX: 27.49 KG/M2 | HEIGHT: 70 IN | WEIGHT: 192 LBS

## 2024-03-29 DIAGNOSIS — F41.1 GENERALIZED ANXIETY DISORDER: ICD-10-CM

## 2024-03-29 DIAGNOSIS — F33.2 SEVERE RECURRENT MAJOR DEPRESSION WITHOUT PSYCHOTIC FEATURES (H): Primary | ICD-10-CM

## 2024-03-29 DIAGNOSIS — F40.10 SOCIAL ANXIETY DISORDER: ICD-10-CM

## 2024-03-29 ASSESSMENT — PAIN SCALES - GENERAL: PAINLEVEL: MODERATE PAIN (4)
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
## 2024-03-29 NOTE — NURSING NOTE
PHQ-9 score is 21.     Is the patient currently in the state of [place]? YES    Visit mode:VIDEO    If the visit is dropped, the patient can be reconnected by: VIDEO VISIT: Text to cell phone:   Telephone Information:   Mobile [phone]       Will anyone else be joining the visit? NO  (If patient encounters technical issues they should call [phone])    How would you like to obtain your AVS? MyChart    Are changes needed to the allergy or medication list? N/A    Reason for visit: Consult    Tad Raphael VVF    Depression Response    Patient completed the PHQ-9 assessment for depression and scored >9? Yes  Question 9 on the PHQ-9 was positive for suicidality? No  Does patient have current mental health provider? No    Is this a virtual visit? Yes   Does patient have suicidal ideation (positive question 9)? No - offer to place Mental Health Referral.  Patient declined referral/not needed    I personally notified the following: visit provider- PHQ-9 score placed in the message column for the provider.

## 2024-04-03 ENCOUNTER — MYC MEDICAL ADVICE (OUTPATIENT)
Dept: FAMILY MEDICINE | Facility: CLINIC | Age: 54
End: 2024-04-03
Payer: COMMERCIAL

## 2024-04-04 ENCOUNTER — VIRTUAL VISIT (OUTPATIENT)
Dept: FAMILY MEDICINE | Facility: CLINIC | Age: 54
End: 2024-04-04
Payer: COMMERCIAL

## 2024-04-04 DIAGNOSIS — E66.09 CLASS 2 OBESITY DUE TO EXCESS CALORIES WITHOUT SERIOUS COMORBIDITY WITH BODY MASS INDEX (BMI) OF 35.0 TO 35.9 IN ADULT: ICD-10-CM

## 2024-04-04 DIAGNOSIS — M79.674 PAIN OF RIGHT GREAT TOE: ICD-10-CM

## 2024-04-04 DIAGNOSIS — E66.812 CLASS 2 OBESITY DUE TO EXCESS CALORIES WITHOUT SERIOUS COMORBIDITY WITH BODY MASS INDEX (BMI) OF 35.0 TO 35.9 IN ADULT: ICD-10-CM

## 2024-04-04 DIAGNOSIS — M96.0 NONUNION AFTER ARTHRODESIS: ICD-10-CM

## 2024-04-04 DIAGNOSIS — N32.81 OVERACTIVE BLADDER: Primary | ICD-10-CM

## 2024-04-04 DIAGNOSIS — M25.871 SESAMOIDITIS OF RIGHT FOOT: ICD-10-CM

## 2024-04-04 PROCEDURE — 99214 OFFICE O/P EST MOD 30 MIN: CPT | Mod: 95 | Performed by: NURSE PRACTITIONER

## 2024-04-04 RX ORDER — PHENTERMINE HYDROCHLORIDE 37.5 MG/1
37.5 TABLET ORAL
Qty: 90 TABLET | Refills: 0 | Status: SHIPPED | OUTPATIENT
Start: 2024-04-04 | End: 2024-07-16

## 2024-04-04 NOTE — PROGRESS NOTES
Kendy is a 53 year old who is being evaluated via a billable video visit.    How would you like to obtain your AVS? MyChart  If the video visit is dropped, the invitation should be resent by: Text to cell phone: [phone]  Will anyone else be joining your video visit? No      Assessment & Plan     Overactive bladder  Has had trials and non-success with preferred formulary options.  - vibegron (GEMTESA) 75 MG TABS tablet; Take 1 tablet (75 mg) by mouth daily    Class 2 obesity due to excess calories without serious comorbidity with body mass index (BMI) of 35.0 to 35.9 in adult  Continue - no side effects or effect on BP or HR  - phentermine (ADIPEX-P) 37.5 MG tablet; Take 1 tablet (37.5 mg) by mouth every morning (before breakfast)    Pain of right great toe  Cannot afford the Belbuca. Oral bup would be a very large jump in dose. Could consider switching to Suboxone, but unclear if this will be covered for chronic pain only indication and patient is out of medication now. Strongly advise appt with pain clinic to help navigate accessibility to the medications. If appt is after this refill of hydrocodone-acetaminophen is set to run out, we can trial a rx for suboxone to see if covered  - HYDROcodone-acetaminophen (NORCO) 5-325 MG tablet; Take 1 tablet by mouth 3 times daily as needed for severe pain    Nonunion after arthrodesis  - HYDROcodone-acetaminophen (NORCO) 5-325 MG tablet; Take 1 tablet by mouth 3 times daily as needed for severe pain    Sesamoiditis of right foot  - HYDROcodone-acetaminophen (NORCO) 5-325 MG tablet; Take 1 tablet by mouth 3 times daily as needed for severe pain    Discussion of management or test interpretation with external physician/other qualified healthcare professional/appropriate source - discussed buprenorphine forms and conversion with Addiction Medicine  Prescription drug management  I spent a total of 38 minutes on the day of the visit.   Time spent by me doing chart review,  history and exam, documentation and further activities per the note        There are no Patient Instructions on file for this visit.    Juana Larry is a 53 year old, presenting for the following health issues:  MH Follow Up and Recheck Medication    HPI     Mental health  Met with  from interventional psychiatrist yesterday. Next week meets with MTM followed by psychiatry. Reviewed ECT, ketamine, laser therapy, TMS. Felt some of the therapies were intense, but hopeful to feel there are options.    Overactive bladder  Stopped oxybutynin 15 mg due to dry mouth. Solifenacin also caused side effects. Had good good effect and no side effects with mirabegeron, but it was too expensive. Gemtesa was denied in favor of formulary alternatives.     Foot pain  Buprenorphine is ok, but is expensive. Ended up paying around $300 for one month. Missed appointment with pain medication and ortho because they were the day of the snow storm last month.    Weight loss  Not having side effects with phentermine and blood pressure has been stable. Will have someone take it at work today and send through [employer]. Losing weight, but not sure if attributable to phentermine or depression. Appetite is lower. Weight around 189 lbs.      Review of Systems  Constitutional, HEENT, cardiovascular, pulmonary, gi and gu systems are negative, except as otherwise noted.      Objective           Vitals:  No vitals were obtained today due to virtual visit.    Physical Exam   GENERAL: alert and no distress  EYES: Eyes grossly normal to inspection.  No discharge or erythema, or obvious scleral/conjunctival abnormalities.  RESP: No audible wheeze, cough, or visible cyanosis.    SKIN: Visible skin clear. No significant rash, abnormal pigmentation or lesions.  NEURO: Cranial nerves grossly intact.  Mentation and speech appropriate for age.  PSYCH: Appropriate affect, tone, and pace of words        Video-Visit Details    Type of service:   Video Visit   Originating Location (pt. Location): Home  Distant Location (provider location):  Off-site  Platform used for Video Visit: Charlie  Signed Electronically by: LEDY Seaman CNP

## 2024-04-05 ENCOUNTER — VIRTUAL VISIT (OUTPATIENT)
Dept: PSYCHIATRY | Facility: CLINIC | Age: 54
End: 2024-04-05
Attending: NURSE PRACTITIONER
Payer: COMMERCIAL

## 2024-04-05 VITALS — WEIGHT: 190 LBS | BODY MASS INDEX: 27.2 KG/M2 | HEIGHT: 70 IN

## 2024-04-05 DIAGNOSIS — F41.1 GENERALIZED ANXIETY DISORDER: Primary | ICD-10-CM

## 2024-04-05 DIAGNOSIS — F33.2 SEVERE RECURRENT MAJOR DEPRESSION WITHOUT PSYCHOTIC FEATURES (H): ICD-10-CM

## 2024-04-05 RX ORDER — HYDROCODONE BITARTRATE AND ACETAMINOPHEN 5; 325 MG/1; MG/1
1 TABLET ORAL 3 TIMES DAILY PRN
Qty: 90 TABLET | Refills: 0 | Status: SHIPPED | OUTPATIENT
Start: 2024-04-05 | End: 2024-05-02

## 2024-04-05 ASSESSMENT — PATIENT HEALTH QUESTIONNAIRE - PHQ9
SUM OF ALL RESPONSES TO PHQ QUESTIONS 1-9: 23
10. IF YOU CHECKED OFF ANY PROBLEMS, HOW DIFFICULT HAVE THESE PROBLEMS MADE IT FOR YOU TO DO YOUR WORK, TAKE CARE OF THINGS AT HOME, OR GET ALONG WITH OTHER PEOPLE: EXTREMELY DIFFICULT
SUM OF ALL RESPONSES TO PHQ QUESTIONS 1-9: 23

## 2024-04-05 ASSESSMENT — PAIN SCALES - GENERAL: PAINLEVEL: MODERATE PAIN (4)

## 2024-04-05 NOTE — NURSING NOTE
Patient scored 23 on PHQ-9.       Depression Response    Patient completed the PHQ-9 assessment for depression and scored >9? Yes  Question 9 on the PHQ-9 was positive for suicidality? No  Does patient have current mental health provider? Yes    Is this a virtual visit? Yes   Does patient have suicidal ideation (positive question 9)? No    I personally notified the following: visit provider Put PHQ-9 score in MSG Column for provider awareness.           Is the patient currently in the state of [place]? YES    Visit mode:VIDEO    If the visit is dropped, the patient can be reconnected by: VIDEO VISIT: Text to cell phone:   Telephone Information:   Mobile [phone]       Will anyone else be joining the visit? NO  (If patient encounters technical issues they should call [phone]    How would you like to obtain your AVS? MyChart    Are changes needed to the allergy or medication list? N/A    Are refills needed on medications prescribed by this physician?     Reason for visit: Consult    Nela FOSTER

## 2024-04-05 NOTE — PROGRESS NOTES
"    Patient:  Sharmin Nieves  :  1970   Age:  53 year old   Today's Video Visit:  2024    [facility]                                                              [address], [address]  [place], [place]  [address]       [facility]istant [facility] ([facility]   Medication Therapy Management   Video Visit Note  This video visit is from my home to the patient's home via [facility] to reduce exposure to COVID. [facility] in case we get disconnected, we will use [employer]erlife [phone].        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 Patient is seen in video visit today for a [facility]TM Consultation.       Patient lives in [place], [place]                                                                                                       This patient is a new adult to the [facility] TRD MTM program.       Psychiatrist is: Dr. Roland Hall will see the patient on 2024 in clinic which was communicated to the patient                                                                                                                                        Chief Complaint                                   \" Depression, anxiety [trauma, eating disorder, insomnia, AUD] \"      Reason for Consultation                                Rating medication trials for antidepressant failure and assessment of antidepressant drugs and their history.                                                  Informants include: Patient and review of past medical record. Please note that during the consultation I was not in the complete possession of all past medical records and psychiatrist medical records.     Pertinent Background  : [initial eval 24]     Patient has a cannabis Vape pen " uses it once to twice per day for pain and sleep.  Was on Belbuca 150 mcg buccal film which was changed due to price increase to hydrocodone/acetaminophen combination 5-325 1 tablet 3 times daily. Sober since 2019    Psych pertinent item history includes suicidal ideation, trauma hx, eating disorder , substance use: alcohol and cannabis, mutiple psychotropic trials , and FHx psych- Maternal and paternal side    Medication Information                                                    Current Outpatient Medications   Medication Sig Dispense Refill    Buprenorphine HCl (BELBUCA) 150 MCG FILM buccal film Place 1 Film (150 mcg) inside cheek every 12 hours 60 Film 0    estradiol (ESTRACE) 0.1 MG/GM vaginal cream Place 2 g vaginally twice a week 42.5 g 1    HYDROcodone-acetaminophen (NORCO) 5-325 MG tablet Take 1 tablet by mouth 3 times daily as needed for severe pain 90 tablet 0    levonorgestrel (MIRENA) 20 MCG/24HR IUD 1 each (20 mcg) by Intrauterine route once for 1 dose 1 each 0    losartan (COZAAR) 100 MG tablet Take 1 tablet (100 mg) by mouth daily 90 tablet 1    omeprazole (PRILOSEC) 40 MG DR capsule Take 1 capsule (40 mg) by mouth daily 90 capsule 1    ondansetron (ZOFRAN) 4 MG tablet Take 1 tablet (4 mg) by mouth every 8 hours as needed for nausea 30 tablet 0    phentermine (ADIPEX-P) 37.5 MG tablet Take 1 tablet (37.5 mg) by mouth every morning (before breakfast) 90 tablet 0    topiramate (TOPAMAX) 25 MG tablet Take 3 tablets (75 mg) by mouth daily 270 tablet 1    traZODone (DESYREL) 100 MG tablet Take 2 tablets (200 mg) by mouth at bedtime 180 tablet 1    venlafaxine (EFFEXOR XR) 150 MG 24 hr capsule Take 2 capsules (300 mg) by mouth daily 180 capsule 1    vibegron (GEMTESA) 75 MG TABS tablet Take 1 tablet (75 mg) by mouth daily 90 tablet 3         Current Psychotropic Medications:    Effexor  mg every morning  Trazodone 200 mg at bedtime  Topiramate 75 mg/day for weight loss  Belbuca 150 mcg buccal film  changed to hydrocodone 5 mg-acetaminophen 325 mg 1 tablet 3 times daily  Chantix/Varenicline 2 mg was not yet started    Herbal Remedies:   Cannabis Vape pen once to twice per day for pain and sleep                                                                                                           Drug to Drug Interaction      Venlafaxine and trazodone may result in increased risk of serotonin syndrome and increased risk of QT interval prolongation  Venlafaxine and hydrocodone may result in increased risk of serotonin syndrome  Trazodone and topiramate or hydrocodone may result in increased risk of CNS depression    Current Reported Side Effects      Patient reports side effects to current psychiatric medications:  No   Gene testing:  No   Results:      ALLERGIES/SENSITIVITY     Lisinopril     MEDICAL HISTORY     Patient Active Problem List   Diagnosis    Herpes simplex virus (HSV) infection    Tobacco use disorder    IUD (intrauterine device) in place    Esophageal reflux    Ovarian cyst    Irregular menstrual cycle    Chronic low back pain    Right hip pain    CARDIOVASCULAR SCREENING; LDL GOAL LESS THAN 160    Moderate major depression (H)    Rectal pain    Health Care Home    Insomnia    Home Health Care    Generalized anxiety disorder    Dry skin    Restless leg syndrome    Benign essential hypertension    Situational anxiety    Irregular heart beat    Encounter for therapeutic drug monitoring    Alcohol abuse    Degenerative joint disease of foot    History of foot surgery    Pain of right great toe    Alcohol use disorder, severe, dependence (H)    Class 2 obesity due to excess calories without serious comorbidity with body mass index (BMI) of 35.0 to 35.9 in adult    BMI 35.0-35.9,adult    History of Nissen fundoplication    Esophageal spasm    Alcohol dependence with acute alcoholic intoxication (H)    Alcohol withdrawal syndrome without complication (H)    History of 2019 novel coronavirus disease  (COVID-19)    Recurrent major depressive disorder, remission status unspecified (H24)    Morbid obesity (H)    Nonunion after arthrodesis    Urgency-frequency syndrome    Urge incontinence of urine    Overactive bladder                               Psychiatric pertinent history                Depression History  1. First time experienced:  At age ~32-After her second child in 2001 was born   2. First time Antidepressant Drug started:  At age 32. Drug: Sertraline   3. Last Episode started: Date: Continues     4. Hospitalization for severe (SI) suicidal ideation or (SA) suicide attempt   No Date:   Comments:    5. Suicidal ideation current:  Rarely passive thoughts  6. Therapist: Currently none  6. (CBT) Cognitive behavioral therapy  . Effective:     7. (DBT) Dialectical behavior therapy    . Effective:     8. Emergency Plan: Patient will distract herself by cooking or playing games sleep and if it gets really bad she will call her daughter.           Social and Environmental Aspects                          A. Living situation is: With her 2 adult children 21 and 18  B. Does patient have a pet  Yes. Pet cat  C. Marital Status: see DA                                Pharmacotherapy Indicators: Pharmaceutical Aspects:       1. Economic Assessment: Patient has [employer] and she has prescription coverage with her insurance plan  Prescription drug copayment is $ Manageable                                      The cost of obtaining prescribed medications: Does not interfere with compliance.   Comment:  2. Patient's Pharmacy: [facility]                                                            3. Compliance assessment shows that the patient is Independent in medication administration  4. Historian: the patient is a poor historian  5. Pill box:Is used  c. The type of pillbox used is: Weekly   d. Misses Medications: adherent                               Pharmacokinetic                  Habits and Chemical Use  A. Alcohol:  "History of alcohol abuse mostly\" rum \"last time she used April 19, 2019  B. Tobacco: His current smoking half a pack a day  C. Caffeine: 1 cups/day of coffee  D. Recreational Drugs: Uses cannabis vape at night and tried methamphetamine x 2  E. Exercise: Patient currently has chronic foot pain before she was a runner and actually got runner's high  F: Hobbies: Making sourdough    Rating of Antidepressant Drugs:                  Selective serotonin reuptake inhibitor:   Citalopram was tried in 2005 in 2012 ,2013 max dose in the chart was 40 mg/day this would give it a rating of 4  Escitalopram/Lexapro was tried  Fluoxetine/Prozac was tried  Sertraline/Zoloft was tried from 2508-5756 max dose 50 mg in the chart according to the patient it stopped working, I assume they increased it ?    Serotonin - Noradrenaline  reuptake inhibitor:   Duloxetine/Cymbalta was tried in 2007 I saw in the chart at the dose of 120 mg/day which would give it a rating of 4 -but nothing more specific  Venlafaxine/Effexor was tried in 2004 and in 2013 to present- max dose 300 mg/day-side effects withdrawal is recognized immediately when late with medication, effectiveness  somewhat, is used with trazodone    Serotonin Modulator and Stimulator:   Negative    Noradrenaline and Dopamine reuptake inhibitor:   Bupropion/Wellbutrin SR was tried the first time in 2005 and then again in 2009 till 2012- max dose 400 mg/day-rating of 3    Tricyclic Antidepressants (TCA):   Amitriptyline was tried in 2023-25 mg patient was extremely sleepy on it  Nortriptyline was tried between 2010 and 2011 max dose of 150 mg/day    Tetracyclic Antidepressants:   Trazodone from 2006 to present max dose 200 mg at bedtime, patient sleeps okay with it    Monoamine Oxidase Inhibitor (MAOI):   Negative    Augmentation/Bipolar Therapy:       Lamotrigine max dose 200 mg/day was tried between 2022 and 2023  Topiramate 75 mg/day was tried between 2021 and 2022    Miscellaneous " "Augmentation Therapy:      Antipsychotics:   Aripiprazole 5 mg was tried in 2023  Quetiapine up to 100 mg/day was tried in 2008  Risperidone 3 mg was tried between 2011 and 2017        Stimulants:   Adderall \"was given to her by a friend \"and it helped her to do task managing better  Atomoxetine-40 mg/d was tried in 2007       Benzodiazepines:   Clonazepam 0.5 mg was used in 2013  Lorazepam 1 mg for anxiety as needed between 2021 and 2022     Miscellaneous:   Gabapentin between 2008 and 2011 max dose 900 mg/day was used for pain  Hydroxyzine up to 100 mg before bed was tried in 2005  Estrogen-Estrace vagina vaginally twice a week/Mirena IUD  Lyrica 150 mg was tried in 2008  Phentermine/Adipex 37.5 mg since 2023 to present for weight control     Miscellaneous Sleep Aides:   Ambien, melatonin, clonazepam, gabapentin, doxylamine , and amitriptyline was tried unsuccessfully  Trazodone 200 mg is used for many years     Ketamine Treatment:   Negative     Other Reported Treatment for Depression and Related Mood Disorder History:   Negative     Comments:   Answers submitted by the patient for this visit:  Patient Health Questionnaire (Submitted on 4/5/2024)  If you checked off any problems, how difficult have these problems made it for you to do your work, take care of things at home, or get along with other people?: Extremely difficult  PHQ9 TOTAL SCORE: 23       Patient will follow with MTM as needed. All MTM findings will be shared with clinic psychiatrist. Patient was instructed to return for upcoming appointment with Dr. Roland Hall for recommendations and future treatment options.       MARCE BRADLEY, PHARMD    Pharmaceutical Care Coordinator   Time spent was 120 minutes without the patient and 45 minutes with the patient.        Virtual Visit Details    Type of service:  Video Visit     Originating Location (pt. Location): Home    Distant Location (provider location):  Off-site  Platform used for Video Visit: " AmWell

## 2024-04-25 ENCOUNTER — MYC MEDICAL ADVICE (OUTPATIENT)
Dept: FAMILY MEDICINE | Facility: CLINIC | Age: 54
End: 2024-04-25
Payer: COMMERCIAL

## 2024-05-02 ENCOUNTER — MYC MEDICAL ADVICE (OUTPATIENT)
Dept: FAMILY MEDICINE | Facility: CLINIC | Age: 54
End: 2024-05-02
Payer: COMMERCIAL

## 2024-05-02 ENCOUNTER — MYC REFILL (OUTPATIENT)
Dept: FAMILY MEDICINE | Facility: CLINIC | Age: 54
End: 2024-05-02
Payer: COMMERCIAL

## 2024-05-02 DIAGNOSIS — M25.871 SESAMOIDITIS OF RIGHT FOOT: ICD-10-CM

## 2024-05-02 DIAGNOSIS — M79.674 PAIN OF RIGHT GREAT TOE: ICD-10-CM

## 2024-05-02 DIAGNOSIS — M96.0 NONUNION AFTER ARTHRODESIS: ICD-10-CM

## 2024-05-02 RX ORDER — HYDROCODONE BITARTRATE AND ACETAMINOPHEN 5; 325 MG/1; MG/1
1 TABLET ORAL 3 TIMES DAILY PRN
Qty: 90 TABLET | Refills: 0 | Status: SHIPPED | OUTPATIENT
Start: 2024-05-02 | End: 2024-05-28

## 2024-05-02 NOTE — TELEPHONE ENCOUNTER
Please see My chart message. Attempted to call the pharmacy, but they are currently closed for lunch. Will call back. Thanks    Carla Abdul RN  [facility]

## 2024-05-02 NOTE — TELEPHONE ENCOUNTER
Called pharmacy to verify that pt's prescription of Bruner was ordered to be able to be picked up today. Thanks    Carla Abdul RN  [facility]

## 2024-05-13 ENCOUNTER — OFFICE VISIT (OUTPATIENT)
Dept: ANESTHESIOLOGY | Facility: CLINIC | Age: 54
End: 2024-05-13
Payer: COMMERCIAL

## 2024-05-13 VITALS
WEIGHT: 204 LBS | SYSTOLIC BLOOD PRESSURE: 107 MMHG | BODY MASS INDEX: 29.2 KG/M2 | DIASTOLIC BLOOD PRESSURE: 68 MMHG | OXYGEN SATURATION: 98 % | HEIGHT: 70 IN | HEART RATE: 105 BPM

## 2024-05-13 DIAGNOSIS — M79.671 CHRONIC FOOT PAIN, RIGHT: Primary | ICD-10-CM

## 2024-05-13 DIAGNOSIS — M79.2 NEUROPATHIC PAIN: ICD-10-CM

## 2024-05-13 DIAGNOSIS — G89.29 CHRONIC FOOT PAIN, RIGHT: Primary | ICD-10-CM

## 2024-05-13 PROCEDURE — 99215 OFFICE O/P EST HI 40 MIN: CPT

## 2024-05-13 RX ORDER — PREGABALIN 25 MG/1
CAPSULE ORAL
Qty: 90 CAPSULE | Refills: 0 | Status: SHIPPED | OUTPATIENT
Start: 2024-05-13 | End: 2024-05-31

## 2024-05-13 RX ORDER — PREGABALIN 50 MG/1
50 CAPSULE ORAL 2 TIMES DAILY
Qty: 60 CAPSULE | Refills: 1 | Status: SHIPPED | OUTPATIENT
Start: 2024-05-13 | End: 2024-09-16

## 2024-05-13 ASSESSMENT — PAIN SCALES - GENERAL: PAINLEVEL: MODERATE PAIN (4)

## 2024-05-13 ASSESSMENT — PAIN SCALES - PAIN ENJOYMENT GENERAL ACTIVITY SCALE (PEG)
AVG_PAIN_PASTWEEK: 4
INTERFERED_GENERAL_ACTIVITY: 3
INTERFERED_ENJOYMENT_LIFE: 4
PEG_TOTALSCORE: 3.67

## 2024-05-13 NOTE — PROGRESS NOTES
[facility] Pain Management     Date of visit: 5/13/2024      Assessment:   Sharmin Nieves is a 53 year old female with a past medical history significant for chronic LBP, DJD of first metatarsal right foot/hallux limitus, anxiety/depression, HTN, s/p right first metatarsal fusion/revision x 3, who presents with complaints of right foot pain.       Right foot pain - Etiology is most consistent with degenerative joint and postsurgical changes of foot, notably first metatarsal phalangeal joint, possible underlying neuropathic process contributing to an extent.     Ass[place] [facility].     Visit Diagnoses:  1. Chronic foot pain, right    2. Neuropathic pain        Plan:  Diagnosis reviewed, treatment option addressed, and risk/benifits discussed.  Self-care instructions given.  I am recommending a multidisciplinary treatment plan to help this patient better manage their pain.                  1.  Pain Physical Therapy:  NO   Continue activity as tolerated. PT per Dr. Gibson's recommendation.               2.  Pain Psychologist to address relaxation, behavioral change, coping style, and other factors important to improvement.  NO              3.  Diagnostic Studies:  None               4.  Medication Management:   Continue diclofenac gel 2-3 x daily to right foot. Appreciates slight benefit initially after application. No side effects.   Recommend trial OTC topical analgesic patches - menthol 5% (Icy Hot) and Tiger [place]. Try using during daytime while at work. Allow 2 hours in between diclofenac gel application and patches. Be sure to wipe off any excess product before applications.   Hydrocodone 5-325 mg TID PRN - takes 3 tabs/day consistently, appreciates some degree of benefit but overall pain not well controlled. No side effects. It would be reasonable to increase dose, 4 tabs/day versus increase to 7.5-325 mg strength and continue 3 tabs/day. Recommending 30 MME/day or less of short acting/full  opioid agonist medications. Will message PCP about recommendations.   Trial of Belbuca 150 mcg BID x 1 month, benefit similar to hydrocodone, cannot afford high out of pocket cost. Could consider Suboxone or Subutex alternatively for chronic pain; however, I am concerned for oversedation with higher dose opioid (compared to current hydrocodone dose).   Lyrica - start 25 mg at bedtime and titrate to goal dose 50 mg BID, per instructions on prescription bottle. Cautioned about sedation effects. Monitor for changes in pain level/intensity.   Two prescriptions sent into pharmacy today- 25 mg and 50 mg capsules. Advised to contact pharmacy when 50 mg capsules needed.               5.  Potential procedures: None                6.  Referrals: None               7.  Follow up with LEDY Brown CNP in around 8 weeks or sooner if needed.       Review of Electronic Chart: Today I have also reviewed available medical information in the patient's medical record at [facility] ([facility]) and Care Everywhere (if available), including relevant provider notes, laboratory work, and imaging.     Meri Toth DNP, LEDY, AGNP-C  [facility] Pain Management     -------------------------------------------------    Subjective:    Chief complaint:   Chief Complaint   Patient presents with    Follow Up     Follow-up Right Foot Pain        Interval history:  Sharmin Nieves is a 53 year old female last seen on 5/19/23.  They are a patient of mine seen in follow up.     Recommendations/plan at the last visit included:              1.  Pain Physical Therapy:  NO   Continue activity as tolerated. PT per Dr. Gibson's recommendation.               2.  Pain Psychologist to address relaxation, behavioral change, coping style, and other factors important to improvement.  NO              3.  Diagnostic Studies:  None               4.  Medication Management:   Continue diclofenac gel twice daily to right foot.   Recommend trial OTC  topical analgesic patches - menthol 5% (Icy Hot) and Tiger [place]. Try using during daytime while at work. Allow 2 hours in between diclofenac gel application and patches. Be sure to wipe off any excess product before applications.   Buprenorphine - we discussed possible trial of buprenorphine today, if she does not appreciate much improvement in pain with bone stimulator therapy and topical analgesics. Her PCP is currently prescribing hydrocodone 5-325 mg twice daily as needed, which is somewhat helpful, but she does not seem to have adequate pain control with current dose. Advised she will need to follow up in person with me if interested in buprenorphine trial, will need to establish CSA.               5.  Potential procedures: None                6.  Referrals: None               7.  Follow up with LEDY Brown CNP if interested in trial of buprenorphine, and/or as needed     Since her last visit, Sharmin Nieves reports:  -She continues to have persistent right foot pain.   -She's had 3 surgeries, s/p arthrodesis.   -Not sure if she is going to pursue 4th surgery.  -She has been taking hydrocodone 5-325 mg TID. Appreciates some degree of benefit with use, no side effects.   -She tried Belbuca 150 mcg BID x 1 month, but cost is too much out of pocket.   -She reports some degree of benefit, but not much different in analgesic benefit.   -She is under CSA with PCP.   -She reports trial of amitriptyline last year contributed to daytime sedation.   -She is open to trial Lyrica.     Pain Information:   Pain rating: averages 4/10 on a 0-10 scale.      Interval history from last visit on 5/19/23:  -her pain is somewhat worse than it was at last visit.   -At last visit she was 4 weeks postop from surgery.   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   -Bone stimulator recommended to start, possible revision surgery in the future, pending outcome.   -She has  "pain in right toe joints, painful notably with walking, every step she takes.   -She works long hours, pain worse at the end of the workday.   -She continues to have significant pain around surgical site.   -She describes pain as sharp, \"bone on bone\" sensation.   -She was recently started on amitriptyline about 2 weeks ago, not sure about pain benefit quite yet.   -She notes increased drowsiness during daytime since starting TCA.   -She takes tylenol 1000 mg TID.   -She uses Voltaren gel in the morning, sometimes during workday, also applies in the evening.   -She cannot take NSAIDs.   -She continues to take hydrocodone 1 tab BID, managed by PCP.   -She reports PCP is hesitant to increase norco, partly due to hx of alcohol use.   -She was certified for medical cannabis at last visit but did not complete registration due to cost.   Pain Information:              Pain rating: averages 4/10 on a 0-10 scale.           HPI from initial visit with me on 10/10/22:  Sharmin Nieves is a 52 year old female presents with a chief complaint of right foot pain.      The pain has been present for years, though worse since she had surgery fusion of right first metatarsal in 2020.    The pain is Moderate Pain (4) in severity.    -She has had 3 surgeries on right foot, had fusion of first metatarsal. Saw Dr. Guillen first, then went to [facility].   -She recently had third surgery on 9/16/22 (we do not have access to [facility] records)  -She has altered gait, reports \"walking funny\" with foot injury.   -She was referred here to establish with pain, in the event her pain does not improve beyond expected recovery time.   -She has h/o gastic ulcers, cannot take NSAIDs.   -Currently taking Hydrocodone 5-325 - 1 in AM and 1 in PM.   -She had post op appt and sutures removed. Has not had PT post op yet. She goes back in another month for next postop f/up.  -It is most painful when she is up and walking and working.   -She reports increased pain at " bedtime after working during day up on feet, uses cannabis to help with pain and sleep (finds if quite helpful).      Sharmin Nieves has not been seen at a pain clinic in the past.             Current Pain Treatments:     Medications:               Hydrocodone 5-325 mg - 1 TID PRN (PCP managing)               OTC Medical cannabis - THC vape at bedtime              Tylenol 1000 mg TID    Diclofenac gel - 2-3 x day    Lyrica 50 mg BID       2. Other therapies:               Rest              Ice                   Current MME: 10     Review of [place] Prescription Monitoring Program (): No concern for abuse or misuse of controlled medications based on this report. Reviewed - appears appropriate.      Past pain treatments:  Surgery - arthrodesis  Amitriptyline 25 mg     Medications:  Current Outpatient Medications   Medication Sig Dispense Refill    estradiol (ESTRACE) 0.1 MG/GM vaginal cream Place 2 g vaginally twice a week 42.5 g 1    HYDROcodone-acetaminophen (NORCO) 5-325 MG tablet Take 1 tablet by mouth 3 times daily as needed for severe pain 90 tablet 0    losartan (COZAAR) 100 MG tablet Take 1 tablet (100 mg) by mouth daily 90 tablet 1    omeprazole (PRILOSEC) 40 MG DR capsule Take 1 capsule (40 mg) by mouth daily 90 capsule 1    ondansetron (ZOFRAN) 4 MG tablet Take 1 tablet (4 mg) by mouth every 8 hours as needed for nausea 30 tablet 0    phentermine (ADIPEX-P) 37.5 MG tablet Take 1 tablet (37.5 mg) by mouth every morning (before breakfast) 90 tablet 0    pregabalin (LYRICA) 25 MG capsule Start 25 mg at bedtime x 1 week, then 25 mg BID x 1 week, then 25 mg in morning and 50 mg at bedtime x 1 week, then increase to 50 mg BID. 90 capsule 0    pregabalin (LYRICA) 50 MG capsule Take 1 capsule (50 mg) by mouth 2 times daily 60 capsule 1    topiramate (TOPAMAX) 25 MG tablet Take 3 tablets (75 mg) by mouth daily 270 tablet 1    traZODone (DESYREL) 100 MG tablet Take 2 tablets (200 mg) by mouth at bedtime 180  "tablet 1    venlafaxine (EFFEXOR XR) 150 MG 24 hr capsule Take 2 capsules (300 mg) by mouth daily 180 capsule 1    levonorgestrel (MIRENA) 20 MCG/24HR IUD 1 each (20 mcg) by Intrauterine route once for 1 dose 1 each 0    vibegron (GEMTESA) 75 MG TABS tablet Take 1 tablet (75 mg) by mouth daily 90 tablet 3       Medical History: any changes in medical history since they were last seen? Yes - see chart for changes in the last year      Objective:    Physical Exam:  Blood pressure 107/68, pulse 105, height 1.772 m (5' 9.75\"), weight 92.5 kg (204 lb), SpO2 98%, not currently breastfeeding.  Constitutional: Well developed, well nourished, appears stated age.  Gait: Intact   HEENT: Head atraumatic, normocephalic. Eyes without conjunctival injection or jaundice. Oropharynx clear. Neck supple. No obvious neck masses.  Skin: No rash, lesions, or petechiae of exposed skin.   Psychiatric/mental status: Alert, without lethargy or stupor. Speech fluent. Appropriate affect. Mood normal. Able to follow commands without difficulty.     Diagnostic Tests/Imaging/Labs:  CMP on 1/18/24 - WNL, GFR >90    BILLING TIME DOCUMENTATION:   The total TIME spent on this patient on the date of the encounter/appointment was 41 minutes.      TOTAL TIME includes:   Time spent preparing to see the patient (reviewing records and tests)   Time spent face to face (or over the phone) with the patient   Time spent ordering tests, medications, procedures and referrals   Time spent Referring and communicating with other healthcare professionals   Time spent documenting clinical information in Epic           "

## 2024-05-13 NOTE — NURSING NOTE
Patient presents with:  Follow Up: Follow-up Right Foot Pain       Moderate Pain (4)     Pain Medications       Opioid Combinations Refills Start End     HYDROcodone-acetaminophen (NORCO) 5-325 MG tablet 0 5/2/2024 --    Sig - Route: Take 1 tablet by mouth 3 times daily as needed for severe pain - Oral    Class: E-Prescribe    Earliest Fill Date: 5/2/2024       Opioid Partial Agonists Refills Start End     Buprenorphine HCl (BELBUCA) 150 MCG FILM buccal film 0 3/4/2024 --    Sig - Route: Place 1 Film (150 mcg) inside cheek every 12 hours - Buccal    Class: E-Prescribe    Notes to Pharmacy: Transition to buprenorphine and discontinuing hydrocodone-acetaminophen after the most recent fill.            What medications are you using for pain? Hydrocodone    (New patients only) Have you been seen by another pain clinic/ provider? no    (Return Patients only) What refills are you needing today? no

## 2024-05-13 NOTE — PATIENT INSTRUCTIONS
1.  Pain Physical Therapy:  NO   Continue activity as tolerated. PT per Dr. Gibson's recommendation.               2.  Pain Psychologist to address relaxation, behavioral change, coping style, and other factors important to improvement.  NO              3.  Diagnostic Studies:  None               4.  Medication Management:   Continue diclofenac gel 2-3 x daily to right foot. Appreciates slight benefit initially after application. No side effects.   Recommend trial OTC topical analgesic patches - menthol 5% (Icy Hot) and Tiger [place]. Try using during daytime while at work. Allow 2 hours in between diclofenac gel application and patches. Be sure to wipe off any excess product before applications.   Hydrocodone 5-325 mg TID PRN - takes 3 tabs/day consistently, appreciates some degree of benefit but overall pain not well controlled. No side effects. It would be reasonable to increase dose, 4 tabs/day versus increase to 7.5-325 mg strength and continue 3 tabs/day. Recommending 30 MME/day or less of short acting/full opioid agonist medications. Will message PCP about recommendations.   Trial of Belbuca 150 mcg BID x 1 month, benefit similar to hydrocodone, cannot afford high out of pocket cost. Could consider Suboxone or Subutex alternatively for chronic pain; however, I am concerned for oversedation with higher dose opioid (compared to current hydrocodone dose).   Lyrica - start 25 mg at bedtime and titrate to goal dose 50 mg BID, per instructions on prescription bottle. Cautioned about sedation effects. Monitor for changes in pain level/intensity.   Two prescriptions sent into pharmacy today- 25 mg and 50 mg capsules. Advised to contact pharmacy when 50 mg capsules needed.               5.  Potential procedures: None                6.  Referrals: None               7.  Follow up with LEDY Brown CNP in around 8 weeks or sooner if needed.

## 2024-05-13 NOTE — LETTER
5/13/2024       RE: Sharmin Nieves  [address]  [facility] [address]     Dear Colleague,    Thank you for referring your patient, Sharmin Nieves, to the [facility] FOR COMPREHENSIVE PAIN MANAGEMENT [place] at [facility]. Please see a copy of my visit note below.    [facility] Pain Management     Date of visit: 5/13/2024      Assessment:   Sharmin Nieves is a 53 year old female with a past medical history significant for chronic LBP, DJD of first metatarsal right foot/hallux limitus, anxiety/depression, HTN, s/p right first metatarsal fusion/revision x 3, who presents with complaints of right foot pain.       Right foot pain - Etiology is most consistent with degenerative joint and postsurgical changes of foot, notably first metatarsal phalangeal joint, possible underlying neuropathic process contributing to an extent.     Assigned to [facility] nursing team.     Visit Diagnoses:  1. Chronic foot pain, right    2. Neuropathic pain        Plan:  Diagnosis reviewed, treatment option addressed, and risk/benifits discussed.  Self-care instructions given.  I am recommending a multidisciplinary treatment plan to help this patient better manage their pain.                  1.  Pain Physical Therapy:  NO   Continue activity as tolerated. PT per Dr. Gibson's recommendation.               2.  Pain Psychologist to address relaxation, behavioral change, coping style, and other factors important to improvement.  NO              3.  Diagnostic Studies:  None               4.  Medication Management:   Continue diclofenac gel 2-3 x daily to right foot. Appreciates slight benefit initially after application. No side effects.   Recommend trial OTC topical analgesic patches - menthol 5% (Icy Hot) and Tiger [place]. Try using during daytime while at work. Allow 2 hours in between diclofenac gel application and patches. Be sure to wipe off any excess product before  applications.   Hydrocodone 5-325 mg TID PRN - takes 3 tabs/day consistently, appreciates some degree of benefit but overall pain not well controlled. No side effects. It would be reasonable to increase dose, 4 tabs/day versus increase to 7.5-325 mg strength and continue 3 tabs/day. Recommending 30 MME/day or less of short acting/full opioid agonist medications. Will message PCP about recommendations.   Trial of Belbuca 150 mcg BID x 1 month, benefit similar to hydrocodone, cannot afford high out of pocket cost. Could consider Suboxone or Subutex alternatively for chronic pain; however, I am concerned for oversedation with higher dose opioid (compared to current hydrocodone dose).   Lyrica - start 25 mg at bedtime and titrate to goal dose 50 mg BID, per instructions on prescription bottle. Cautioned about sedation effects. Monitor for changes in pain level/intensity.   Two prescriptions sent into pharmacy today- 25 mg and 50 mg capsules. Advised to contact pharmacy when 50 mg capsules needed.               5.  Potential procedures: None                6.  Referrals: None               7.  Follow up with LEDY Brown CNP in around 8 weeks or sooner if needed.       Review of Electronic Chart: Today I have also reviewed available medical information in the patient's medical record at [facility] ([facility]) and Care Everywhere (if available), including relevant provider notes, laboratory work, and imaging.     Meri Toth DNP, LEDY, AGNP-C  [facility] Pain Management     -------------------------------------------------    Subjective:    Chief complaint:   Chief Complaint   Patient presents with    Follow Up     Follow-up Right Foot Pain        Interval history:  Sharmin Nieves is a 53 year old female last seen on 5/19/23.  They are a patient of mine seen in follow up.     Recommendations/plan at the last visit included:              1.  Pain Physical Therapy:  NO   Continue activity as tolerated. PT  per Dr. Gibson's recommendation.               2.  Pain Psychologist to address relaxation, behavioral change, coping style, and other factors important to improvement.  NO              3.  Diagnostic Studies:  None               4.  Medication Management:   Continue diclofenac gel twice daily to right foot.   Recommend trial OTC topical analgesic patches - menthol 5% (Icy Hot) and Tiger [place]. Try using during daytime while at work. Allow 2 hours in between diclofenac gel application and patches. Be sure to wipe off any excess product before applications.   Buprenorphine - we discussed possible trial of buprenorphine today, if she does not appreciate much improvement in pain with bone stimulator therapy and topical analgesics. Her PCP is currently prescribing hydrocodone 5-325 mg twice daily as needed, which is somewhat helpful, but she does not seem to have adequate pain control with current dose. Advised she will need to follow up in person with me if interested in buprenorphine trial, will need to establish CSA.               5.  Potential procedures: None                6.  Referrals: None               7.  Follow up with LEDY Brown CNP if interested in trial of buprenorphine, and/or as needed     Since her last visit, Sharmin Nieves reports:  -She continues to have persistent right foot pain.   -She's had 3 surgeries, s/p arthrodesis.   -Not sure if she is going to pursue 4th surgery.  -She has been taking hydrocodone 5-325 mg TID. Appreciates some degree of benefit with use, no side effects.   -She tried Belbuca 150 mcg BID x 1 month, but cost is too much out of pocket.   -She reports some degree of benefit, but not much different in analgesic benefit.   -She is under CSA with PCP.   -She reports trial of amitriptyline last year contributed to daytime sedation.   -She is open to trial Lyrica.     Pain Information:   Pain rating: averages 4/10 on a 0-10 scale.      Interval history from last visit on  "5/19/23:  -her pain is somewhat worse than it was at last visit.   -At last visit she was 4 weeks postop from surgery.   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   -Bone stimulator recommended to start, possible revision surgery in the future, pending outcome.   -She has pain in right toe joints, painful notably with walking, every step she takes.   -She works long hours, pain worse at the end of the workday.   -She continues to have significant pain around surgical site.   -She describes pain as sharp, \"bone on bone\" sensation.   -She was recently started on amitriptyline about 2 weeks ago, not sure about pain benefit quite yet.   -She notes increased drowsiness during daytime since starting TCA.   -She takes tylenol 1000 mg TID.   -She uses Voltaren gel in the morning, sometimes during workday, also applies in the evening.   -She cannot take NSAIDs.   -She continues to take hydrocodone 1 tab BID, managed by PCP.   -She reports PCP is hesitant to increase norco, partly due to hx of alcohol use.   -She was certified for medical cannabis at last visit but did not complete registration due to cost.   Pain Information:              Pain rating: averages 4/10 on a 0-10 scale.           HPI from initial visit with me on 10/10/22:  Sharmin Nieves is a 52 year old female presents with a chief complaint of right foot pain.      The pain has been present for years, though worse since she had surgery fusion of right first metatarsal in 2020.    The pain is Moderate Pain (4) in severity.    -She has had 3 surgeries on right foot, had fusion of first metatarsal. Saw Dr. Guillen first, then went to [facility].   -She recently had third surgery on 9/16/22 (we do not have access to [facility] records)  -She has altered gait, reports \"walking funny\" with foot injury.   -She was referred here to establish with pain, in the event her pain does not improve beyond expected recovery time. "   -She has h/o gastic ulcers, cannot take NSAIDs.   -Currently taking Hydrocodone 5-325 - 1 in AM and 1 in PM.   -She had post op appt and sutures removed. Has not had PT post op yet. She goes back in another month for next postop f/up.  -It is most painful when she is up and walking and working.   -She reports increased pain at bedtime after working during day up on feet, uses cannabis to help with pain and sleep (finds if quite helpful).      Sharmin Nieves has not been seen at a pain clinic in the past.             Current Pain Treatments:     Medications:               Hydrocodone 5-325 mg - 1 TID PRN (PCP managing)               OTC Medical cannabis - THC vape at bedtime              Tylenol 1000 mg TID    Diclofenac gel - 2-3 x day    Lyrica 50 mg BID       2. Other therapies:               Rest              Ice                   Current MME: 10     Review of [place] Prescription Monitoring Program (): No concern for abuse or misuse of controlled medications based on this report. Reviewed - appears appropriate.      Past pain treatments:  Surgery - arthrodesis  Amitriptyline 25 mg     Medications:  Current Outpatient Medications   Medication Sig Dispense Refill    estradiol (ESTRACE) 0.1 MG/GM vaginal cream Place 2 g vaginally twice a week 42.5 g 1    HYDROcodone-acetaminophen (NORCO) 5-325 MG tablet Take 1 tablet by mouth 3 times daily as needed for severe pain 90 tablet 0    losartan (COZAAR) 100 MG tablet Take 1 tablet (100 mg) by mouth daily 90 tablet 1    omeprazole (PRILOSEC) 40 MG DR capsule Take 1 capsule (40 mg) by mouth daily 90 capsule 1    ondansetron (ZOFRAN) 4 MG tablet Take 1 tablet (4 mg) by mouth every 8 hours as needed for nausea 30 tablet 0    phentermine (ADIPEX-P) 37.5 MG tablet Take 1 tablet (37.5 mg) by mouth every morning (before breakfast) 90 tablet 0    pregabalin (LYRICA) 25 MG capsule Start 25 mg at bedtime x 1 week, then 25 mg BID x 1 week, then 25 mg in morning and 50 mg at  "bedtime x 1 week, then increase to 50 mg BID. 90 capsule 0    pregabalin (LYRICA) 50 MG capsule Take 1 capsule (50 mg) by mouth 2 times daily 60 capsule 1    topiramate (TOPAMAX) 25 MG tablet Take 3 tablets (75 mg) by mouth daily 270 tablet 1    traZODone (DESYREL) 100 MG tablet Take 2 tablets (200 mg) by mouth at bedtime 180 tablet 1    venlafaxine (EFFEXOR XR) 150 MG 24 hr capsule Take 2 capsules (300 mg) by mouth daily 180 capsule 1    levonorgestrel (MIRENA) 20 MCG/24HR IUD 1 each (20 mcg) by Intrauterine route once for 1 dose 1 each 0    vibegron (GEMTESA) 75 MG TABS tablet Take 1 tablet (75 mg) by mouth daily 90 tablet 3       Medical History: any changes in medical history since they were last seen? Yes - see chart for changes in the last year      Objective:    Physical Exam:  Blood pressure 107/68, pulse 105, height 1.772 m (5' 9.75\"), weight 92.5 kg (204 lb), SpO2 98%, not currently breastfeeding.  Constitutional: Well developed, well nourished, appears stated age.  Gait: Intact   HEENT: Head atraumatic, normocephalic. Eyes without conjunctival injection or jaundice. Oropharynx clear. Neck supple. No obvious neck masses.  Skin: No rash, lesions, or petechiae of exposed skin.   Psychiatric/mental status: Alert, without lethargy or stupor. Speech fluent. Appropriate affect. Mood normal. Able to follow commands without difficulty.     Diagnostic Tests/Imaging/Labs:  CMP on 1/18/24 - WNL, GFR >90    BILLING TIME DOCUMENTATION:   The total TIME spent on this patient on the date of the encounter/appointment was 41 minutes.      TOTAL TIME includes:   Time spent preparing to see the patient (reviewing records and tests)   Time spent face to face (or over the phone) with the patient   Time spent ordering tests, medications, procedures and referrals   Time spent Referring and communicating with other healthcare professionals   Time spent documenting clinical information in Epic             Again, thank you for " allowing me to participate in the care of your patient.      Sincerely,    LEDY Brown CNP

## 2024-05-13 NOTE — Clinical Note
Dae Tolentino,  I saw Kendy today in the pain clinic and recommended Lyrica trial. Also, we discussed recent Belbuca trial and efficacy of current hydrocodone dose. I made recommendations for you to consider in my note. It would be reasonable to go up a little bit on hydrocodone dose, but recommend staying at or below 30 MME/day.   If she is interested in trial of Suboxone or Subutex in the future, I am happy to take over prescribing opioids for a while to get her stabilized.  Let me know if you have any questions/concerns.   Thanks, Meri Toth, DNP, APRN, AGNP-C [facility] Pain Management

## 2024-05-14 ENCOUNTER — TELEPHONE (OUTPATIENT)
Dept: ANESTHESIOLOGY | Facility: CLINIC | Age: 54
End: 2024-05-14
Payer: COMMERCIAL

## 2024-05-14 ASSESSMENT — ANXIETY QUESTIONNAIRES
IF YOU CHECKED OFF ANY PROBLEMS ON THIS QUESTIONNAIRE, HOW DIFFICULT HAVE THESE PROBLEMS MADE IT FOR YOU TO DO YOUR WORK, TAKE CARE OF THINGS AT HOME, OR GET ALONG WITH OTHER PEOPLE: EXTREMELY DIFFICULT
4. TROUBLE RELAXING: NEARLY EVERY DAY
5. BEING SO RESTLESS THAT IT IS HARD TO SIT STILL: NEARLY EVERY DAY
GAD7 TOTAL SCORE: 19
1. FEELING NERVOUS, ANXIOUS, OR ON EDGE: NEARLY EVERY DAY
8. IF YOU CHECKED OFF ANY PROBLEMS, HOW DIFFICULT HAVE THESE MADE IT FOR YOU TO DO YOUR WORK, TAKE CARE OF THINGS AT HOME, OR GET ALONG WITH OTHER PEOPLE?: EXTREMELY DIFFICULT
GAD7 TOTAL SCORE: 19
7. FEELING AFRAID AS IF SOMETHING AWFUL MIGHT HAPPEN: MORE THAN HALF THE DAYS
2. NOT BEING ABLE TO STOP OR CONTROL WORRYING: NEARLY EVERY DAY
3. WORRYING TOO MUCH ABOUT DIFFERENT THINGS: NEARLY EVERY DAY
6. BECOMING EASILY ANNOYED OR IRRITABLE: MORE THAN HALF THE DAYS
GAD7 TOTAL SCORE: 19
7. FEELING AFRAID AS IF SOMETHING AWFUL MIGHT HAPPEN: MORE THAN HALF THE DAYS

## 2024-05-14 ASSESSMENT — PATIENT HEALTH QUESTIONNAIRE - PHQ9
10. IF YOU CHECKED OFF ANY PROBLEMS, HOW DIFFICULT HAVE THESE PROBLEMS MADE IT FOR YOU TO DO YOUR WORK, TAKE CARE OF THINGS AT HOME, OR GET ALONG WITH OTHER PEOPLE: EXTREMELY DIFFICULT
SUM OF ALL RESPONSES TO PHQ QUESTIONS 1-9: 23
SUM OF ALL RESPONSES TO PHQ QUESTIONS 1-9: 23

## 2024-05-14 NOTE — TELEPHONE ENCOUNTER
Left Voicemail (1st Attempt) and Sent Mychart (1st Attempt) for the patient to call back and schedule the following:    Appointment type: Return pain  Provider: Meri Toth  Return date: Approx. 7/13  Specialty phone number: [phone]

## 2024-05-15 ENCOUNTER — VIRTUAL VISIT (OUTPATIENT)
Dept: FAMILY MEDICINE | Facility: CLINIC | Age: 54
End: 2024-05-15
Payer: COMMERCIAL

## 2024-05-15 DIAGNOSIS — F33.9 RECURRENT MAJOR DEPRESSIVE DISORDER, REMISSION STATUS UNSPECIFIED (H): Primary | ICD-10-CM

## 2024-05-15 DIAGNOSIS — R63.4 WEIGHT LOSS: ICD-10-CM

## 2024-05-15 DIAGNOSIS — F41.1 GENERALIZED ANXIETY DISORDER: ICD-10-CM

## 2024-05-15 PROCEDURE — 99214 OFFICE O/P EST MOD 30 MIN: CPT | Mod: 95 | Performed by: NURSE PRACTITIONER

## 2024-05-15 PROCEDURE — 96127 BRIEF EMOTIONAL/BEHAV ASSMT: CPT | Mod: 95 | Performed by: NURSE PRACTITIONER

## 2024-05-15 PROCEDURE — G2211 COMPLEX E/M VISIT ADD ON: HCPCS | Mod: 95 | Performed by: NURSE PRACTITIONER

## 2024-05-15 NOTE — PROGRESS NOTES
Kendy is a 53 year old who is being evaluated via a billable video visit.    How would you like to obtain your AVS? MyChart  If the video visit is dropped, the invitation should be resent by: Text to cell phone: [phone]  Will anyone else be joining your video visit? No      Assessment & Plan     Recurrent major depressive disorder, remission status unspecified (H24)  Letter for work this week  FMLA completed - awaiting fax number and work details to finish and fax  Interventional psychiatry later this month - no changes at this time    Generalized anxiety disorder  As above    Weight loss  Difficult to determine if this is expected and desired effect of phentermine or solely attributable to worsened mood symptoms. Patient not having other side effects with phentermine. Not taking phentermine every day. Will continue to monitor weight loss and if continuing to rapidly lose weight at nex tappt at end of month, discontinue all together.      The longitudinal plan of care for the diagnosis(es)/condition(s) as documented were addressed during this visit. Due to the added complexity in care, I will continue to support Kendy in the subsequent management and with ongoing continuity of care.    Review of prior external note(s) from - pain management  I spent a total of 38 minutes on the day of the visit.   Time spent by me doing chart review, history and exam, documentation and further activities per the note      Subjective   Kendy is a 53 year old, presenting for the following health issues:   Follow Up    History of Present Illness       Mental Health Follow-up:  Patient presents to follow-up on Depression & Anxiety.Patient's depression since last visit has been:  Worse  The patient is not having other symptoms associated with depression.  Patient's anxiety since last visit has been:  Bad  The patient is not having other symptoms associated with anxiety.  Any significant life events: job concerns  Patient is feeling  anxious or having panic attacks.  Patient has no concerns about alcohol or drug use.    She eats 2-3 servings of fruits and vegetables daily.She consumes 2 sweetened beverage(s) daily.She exercises with enough effort to increase her heart rate 9 or less minutes per day.  She exercises with enough effort to increase her heart rate 3 or less days per week.   She is taking medications regularly.     Not doing well. Work is really hard. Close friend at work is leaving. Appt with interventional psychiatry is May 30th. Feel hungry, but thought of eating causes nausea. Finding it hard to eat at home. When she eats at work, no nausea or vomiting. No heartburn symptoms. Has lost a lot of weight and now at 180 lbs. Has stopped taking the phentermine 37.5 mg due to symptoms. Does have reduction in symptoms if held for a couple days.     FMLA information  Condition started - Started Jan 18, 2024  Chronic condition  Intermittent basis - 1-4 times per month for 4-8 hours per episode.  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      Review of Systems    ROS:5 point ROS including CONST, HEENT, Respiratory, CV, and GI other than that noted in the HPI,  is negative         Objective           Vitals:  No vitals were obtained today due to virtual visit.    Physical Exam   GENERAL: alert and no distress  EYES: Eyes grossly normal to inspection.  No discharge or erythema, or obvious scleral/conjunctival abnormalities.  RESP: No audible wheeze, cough, or visible cyanosis.    SKIN: Visible skin clear. No significant rash, abnormal pigmentation or  lesions.  NEURO: Cranial nerves grossly intact.  Mentation and speech appropriate for age.  PSYCH: Appropriate affect, tone, and pace of words          Video-Visit Details    Type of service:  Video Visit   Originating Location (pt. Location): Home  Distant Location (provider location):  On-site  Platform used for Video Visit: Charlie  Signed Electronically by: LEDY Seaman CNP

## 2024-05-15 NOTE — LETTER
May 15, 2024      Sharmin Nieves  [address] [place] [address]        To Whom It May Concern:    Sharmin Nieves was seen on 5/15/24.  Please excuse her until 5/20/24 due to illness. Please note that FMLA is pending for this condition.        Sincerely,        LEDY Seaman CNP

## 2024-05-16 ENCOUNTER — TELEPHONE (OUTPATIENT)
Dept: ANESTHESIOLOGY | Facility: CLINIC | Age: 54
End: 2024-05-16
Payer: COMMERCIAL

## 2024-05-16 NOTE — TELEPHONE ENCOUNTER
Left Voicemail (2nd Attempt) for the patient to call back and schedule the following:    Appointment type: Return pain  Provider: Meri Toth  Return date: Approx. 7/13  Specialty phone number: [phone]

## 2024-05-17 ENCOUNTER — TELEPHONE (OUTPATIENT)
Dept: FAMILY MEDICINE | Facility: CLINIC | Age: 54
End: 2024-05-17
Payer: COMMERCIAL

## 2024-05-17 NOTE — TELEPHONE ENCOUNTER
Reason for Call:  Form, our goal is to have forms completed with 72 hours, however, some forms may require a visit or additional information.    Type of letter, form or note:   STD    Who is the form from?:[employer]  Where did the form come from: form was faxed in    What clinic location was the form placed at?: [facility]    Where the form was placed:  [employer] Box/Folder    What number is listed as a contact on the form?: F [phone]       Additional comments:     Call taken on 5/17/2024 at 8:23 AM by Anabel Delcid

## 2024-05-24 ENCOUNTER — PATIENT OUTREACH (OUTPATIENT)
Dept: CARE COORDINATION | Facility: CLINIC | Age: 54
End: 2024-05-24
Payer: COMMERCIAL

## 2024-05-28 ENCOUNTER — MYC REFILL (OUTPATIENT)
Dept: FAMILY MEDICINE | Facility: CLINIC | Age: 54
End: 2024-05-28
Payer: COMMERCIAL

## 2024-05-28 ENCOUNTER — MYC MEDICAL ADVICE (OUTPATIENT)
Dept: FAMILY MEDICINE | Facility: CLINIC | Age: 54
End: 2024-05-28
Payer: COMMERCIAL

## 2024-05-28 DIAGNOSIS — M79.674 PAIN OF RIGHT GREAT TOE: ICD-10-CM

## 2024-05-28 DIAGNOSIS — M25.871 SESAMOIDITIS OF RIGHT FOOT: ICD-10-CM

## 2024-05-28 DIAGNOSIS — M96.0 NONUNION AFTER ARTHRODESIS: ICD-10-CM

## 2024-05-28 RX ORDER — HYDROCODONE BITARTRATE AND ACETAMINOPHEN 5; 325 MG/1; MG/1
1 TABLET ORAL 3 TIMES DAILY PRN
Qty: 90 TABLET | Refills: 0 | Status: SHIPPED | OUTPATIENT
Start: 2024-05-28 | End: 2024-06-25

## 2024-05-29 ASSESSMENT — PATIENT HEALTH QUESTIONNAIRE - PHQ9
SUM OF ALL RESPONSES TO PHQ QUESTIONS 1-9: 16
SUM OF ALL RESPONSES TO PHQ QUESTIONS 1-9: 16
10. IF YOU CHECKED OFF ANY PROBLEMS, HOW DIFFICULT HAVE THESE PROBLEMS MADE IT FOR YOU TO DO YOUR WORK, TAKE CARE OF THINGS AT HOME, OR GET ALONG WITH OTHER PEOPLE: EXTREMELY DIFFICULT

## 2024-05-29 ASSESSMENT — ANXIETY QUESTIONNAIRES
1. FEELING NERVOUS, ANXIOUS, OR ON EDGE: SEVERAL DAYS
5. BEING SO RESTLESS THAT IT IS HARD TO SIT STILL: NOT AT ALL
IF YOU CHECKED OFF ANY PROBLEMS ON THIS QUESTIONNAIRE, HOW DIFFICULT HAVE THESE PROBLEMS MADE IT FOR YOU TO DO YOUR WORK, TAKE CARE OF THINGS AT HOME, OR GET ALONG WITH OTHER PEOPLE: VERY DIFFICULT
4. TROUBLE RELAXING: SEVERAL DAYS
3. WORRYING TOO MUCH ABOUT DIFFERENT THINGS: MORE THAN HALF THE DAYS
GAD7 TOTAL SCORE: 11
GAD7 TOTAL SCORE: 11
7. FEELING AFRAID AS IF SOMETHING AWFUL MIGHT HAPPEN: MORE THAN HALF THE DAYS
2. NOT BEING ABLE TO STOP OR CONTROL WORRYING: MORE THAN HALF THE DAYS
8. IF YOU CHECKED OFF ANY PROBLEMS, HOW DIFFICULT HAVE THESE MADE IT FOR YOU TO DO YOUR WORK, TAKE CARE OF THINGS AT HOME, OR GET ALONG WITH OTHER PEOPLE?: VERY DIFFICULT
GAD7 TOTAL SCORE: 11
7. FEELING AFRAID AS IF SOMETHING AWFUL MIGHT HAPPEN: MORE THAN HALF THE DAYS
6. BECOMING EASILY ANNOYED OR IRRITABLE: NEARLY EVERY DAY

## 2024-05-30 ENCOUNTER — OFFICE VISIT (OUTPATIENT)
Dept: PSYCHIATRY | Facility: CLINIC | Age: 54
End: 2024-05-30
Payer: COMMERCIAL

## 2024-05-30 VITALS
TEMPERATURE: 98.4 F | HEIGHT: 70 IN | DIASTOLIC BLOOD PRESSURE: 81 MMHG | HEART RATE: 96 BPM | SYSTOLIC BLOOD PRESSURE: 118 MMHG | BODY MASS INDEX: 26.31 KG/M2 | WEIGHT: 183.8 LBS

## 2024-05-30 DIAGNOSIS — F33.2 SEVERE EPISODE OF RECURRENT MAJOR DEPRESSIVE DISORDER, WITHOUT PSYCHOTIC FEATURES (H): Primary | ICD-10-CM

## 2024-05-30 DIAGNOSIS — F41.1 GENERALIZED ANXIETY DISORDER: ICD-10-CM

## 2024-05-30 DIAGNOSIS — R45.851 SUICIDAL IDEATION: ICD-10-CM

## 2024-05-30 DIAGNOSIS — F10.91 ALCOHOL USE DISORDER IN REMISSION: ICD-10-CM

## 2024-05-30 DIAGNOSIS — N95.1 PERIMENOPAUSE: ICD-10-CM

## 2024-05-30 DIAGNOSIS — F12.90 CONTINUOUS CANNABIS USE: ICD-10-CM

## 2024-05-30 NOTE — PROGRESS NOTES
" [facility] TMS Program  [address], [address]  [place] [address]  New Patient Evaluation       Sharmin Nieves is a 53 year old female who prefers the name *** and pronoun {pronoun:181166}.  Therapist: {NONE/NA:857541}  PCP: Randa Bill  Other Providers: {NONE/NA:907165}  Referred by *** for evaluation of {Psych Assess List:638548}.     History was provided by {PATIENT. FAMILY:469771} who was a {PSYCHISTORIAN:666002} historian.    Psych critical item history includes {CRIT:818875}.       Chief Complaint                                                                                                        \" *** \"     History of Present Illness                                                                                4, 4      Pertinent Background:  {PB1:690720}.      Psychiatric Review of Symptoms:   {PSYROS3:689293}  Depression: Positive for depressive symptoms including sadness, irritability, anhedonia, social isolation, loss of appetite, increased appetite, insomnia, hypersomnia, psychomotor agitation, psychomotor retardation, fatigue, feeling worthless, guilt, poor concentration, indecisiveness, passive thoughts of death, suicidal ideation with intent or plan to act and weight change  ***.     Anxiety: Positive for symptoms of anxiety including panic attacks with symptoms such as  racing heart, sweating, shaking, shortness of breath, choking, chest pain, nausea, dizziness, derealization, depersonalization, fear of losing control, fear of going crazy, fear of dying, tingling, numbness, chills and hot flushes, agoraphobia, specific phobia ***, obsessions *** , compulsions ***, generalized worry, restlessness, fatigue, poor concentration, irritability, muscle tension and insomnia     PTSD: Denies traumatic experience of sufficient severity to meet criterion A necessary for diagnosis of PTSD. Endorses PTSD or acute stress symptoms including intrusive memories, nightmares, flashbacks, avoidance " of trauma reminders, limited memory of trauma, anhedonia, feeling detached, feeling numb, feeling doomed, insomnia, anger, poor concentration, hypervigilance, feeling easily startled, hyper-reactivity to cues, diminished interest/participation in activities, detachment or estrangement from others, restricted range of affect and sense of foreshortened future.    Delphine: Negative  Psychosis: Negative   Eating disorder: Negative  Homicidal Ideation: Negative         Recent Substance Use:  {SUB:199476}      Substance Use History     Past Use- {SUB:199476}  Treatment [#, most recent]- {:803470}    Medical Consequences [withdrawal, sz etc]- SIB- {:321790}   HIV/Hepatitis- SIB- {:074036}    Legal Consequences- SIB- {:954204}       Psychiatric History     Past diagnoses: ***  Suicidal ideation- {:224496}   Suicide Attempt:   #- ***   Most Recent- ***  SIB- {:044850}   Delphine- {:298572}    Psychosis- {:796329}    Violence/Aggression- {:707453}   Psych Hosp- {:471181}   ECT- {:824841}   Eating Disorder- {:963717}   Outpatient Programs [ DBT, Day Treatment, Eating Disorder Tx etc]- {:082013}        Psychiatric Medication Trials     {PSYMEDS:357576}                                                       OR this and delete the other    Drug Duration (mos) Dose (mg) Helpful (Y/N) Adverse Effects DC Reason / Date                              Social/ Family History               [per patient report]                                                 1ea, 1ea     FINANCIAL SUPPORT- {f:032730}       RELATIONSHIPS/CHILDREN- {:971342}       LIVING SITUATION- ***      LEGAL- {:458696}  EARLY HISTORY/ EDUCATION- {:643858}  SOCIAL/ SPIRITUAL SUPPORT- {:758386}       CULTURAL INFLUENCES/ IMPACT- {:178814}       TRAUMA HISTORY (self-report)- {:120474}  FEELS SAFE AT HOME- {No Yes:580047}  FAMILY HISTORY-  {:319824}  ***     Medical / Surgical History     Patient Active Problem List   Diagnosis    Herpes simplex virus (HSV) infection    Tobacco  use disorder    IUD (intrauterine device) in place    Esophageal reflux    Ovarian cyst    Irregular menstrual cycle    Chronic low back pain    Right hip pain    CARDIOVASCULAR SCREENING; LDL GOAL LESS THAN 160    Moderate major depression (H)    Rectal pain    Health Care Home    Insomnia    Home Health Care    Generalized anxiety disorder    Dry skin    Restless leg syndrome    Benign essential hypertension    Situational anxiety    Irregular heart beat    Encounter for therapeutic drug monitoring    Alcohol abuse    Degenerative joint disease of foot    History of foot surgery    Pain of right great toe    Alcohol use disorder, severe, dependence (H)    Class 2 obesity due to excess calories without serious comorbidity with body mass index (BMI) of 35.0 to 35.9 in adult    BMI 35.0-35.9,adult    History of Nissen fundoplication    Esophageal spasm    Alcohol dependence with acute alcoholic intoxication (H)    Alcohol withdrawal syndrome without complication (H)    History of 2019 novel coronavirus disease (COVID-19)    Recurrent major depressive disorder, remission status unspecified (H24)    Morbid obesity (H)    Nonunion after arthrodesis    Urgency-frequency syndrome    Urge incontinence of urine    Overactive bladder       Past Surgical History:   Procedure Laterality Date    ARTHRODESIS FOOT Right 6/9/2021    Procedure: Right 1st metatarsophalangeal joint fuison;  Surgeon: Conor Guillen MD;  Location: [facility] OR    CHEILECTOMY Right 12/21/2017    Procedure: CHEILECTOMY;  RIGHT FOOT CHEILECTOMY;  Surgeon: Mo Edgar DPM;  Location:  OR    ESOPHAGOSCOPY, GASTROSCOPY, DUODENOSCOPY (EGD), COMBINED N/A 6/1/2022    Procedure: ESOPHAGOGASTRODUODENOSCOPY, WITH BIOPSY;  Surgeon: Neal Loja MD;  Location: [facility] OR    SURGICAL HISTORY OF -   4/04    Lapr Nissen fundoplication    TONSILLECTOMY & ADENOIDECTOMY  1985    [facility] NONSPECIFIC PROCEDURE  1992    CRYO SURGERY for abnl paps    [facility]  STOMACH SURGERY PROCEDURE UNLISTED      Nissen         Medical Review of Systems                                                                                                 2, 10     GENERAL: {:675233}  HEENT: {:388132}  NECK: {:816271}  RESPIRATORY: {:925360}  CARDIOVASCULAR: {:419009}  GI: {:524455}  : {:542156}  GYN: {:454101}.  LMP: ***.  MUSCULOSKELETAL: {:593713}  SKIN: {:816405}  PSYCH: {:325752}  HEMATOLOGY/LYMPHOLOGY {:805761}  ENDOCRINE: {:409187}  NEURO:  {:687140}      Metals Screen   Yes No Item     *** Implanted or lodged metals in body     *** Implanted surgical devices     *** Metal containing facial or scalp tattoos     *** Non removable piercings   Seizure Screen  Yes No Item     *** Current Seizure Disorder?     *** History of Seizure?     Does patient have a cochlear implant? __***________  Does patient have any shunts?___***________  Does patient have a pacemaker?___***_______  Does patient have a vagus nerve stimulator?___***_______  Does patient have a deep brain stimulator?___***_______  Any other implanted device?___***_____________       Allergy   Lisinopril     Current Medications     Current Outpatient Medications   Medication Sig Dispense Refill    estradiol (ESTRACE) 0.1 MG/GM vaginal cream Place 2 g vaginally twice a week 42.5 g 1    HYDROcodone-acetaminophen (NORCO) 5-325 MG tablet Take 1 tablet by mouth 3 times daily as needed for severe pain 90 tablet 0    levonorgestrel (MIRENA) 20 MCG/24HR IUD 1 each (20 mcg) by Intrauterine route once for 1 dose 1 each 0    losartan (COZAAR) 100 MG tablet Take 1 tablet (100 mg) by mouth daily 90 tablet 1    omeprazole (PRILOSEC) 40 MG DR capsule Take 1 capsule (40 mg) by mouth daily 90 capsule 1    ondansetron (ZOFRAN) 4 MG tablet Take 1 tablet (4 mg) by mouth every 8 hours as needed for nausea 30 tablet 0    phentermine (ADIPEX-P) 37.5 MG tablet Take 1 tablet (37.5 mg) by mouth every morning (before breakfast) 90 tablet 0    pregabalin  (LYRICA) 25 MG capsule Start 25 mg at bedtime x 1 week, then 25 mg BID x 1 week, then 25 mg in morning and 50 mg at bedtime x 1 week, then increase to 50 mg BID. 90 capsule 0    pregabalin (LYRICA) 50 MG capsule Take 1 capsule (50 mg) by mouth 2 times daily 60 capsule 1    topiramate (TOPAMAX) 25 MG tablet Take 3 tablets (75 mg) by mouth daily 270 tablet 1    traZODone (DESYREL) 100 MG tablet Take 2 tablets (200 mg) by mouth at bedtime 180 tablet 1    venlafaxine (EFFEXOR XR) 150 MG 24 hr capsule Take 2 capsules (300 mg) by mouth daily 180 capsule 1        Vitals                                                                                                                        3, 3     There were no vitals taken for this visit.      Mental Status Exam                                                                                   9, 14 cog gs     Alertness: {ALERTNESS DESCRIPTION:163886}  Appearance: {a:977042}  Behavior/Demeanor: {BEHAVIOR Description:551531}, with {Desc; good/fair/poor:902387} eye contact   Speech: {SPEECH Description:710325}  Language: {LANGUAGE Description:397305}  Psychomotor: {p:681835}  Mood: {m:830239}  Affect: {a:937191}; {was:000158} congruent to mood; {was:962027} congruent to content  Thought Process/Associations: {THOUGHT PROCESS Description:218684}  Thought Content:  Reports {t:362630};  Denies {t:027396}  Perception:  Reports {p:683578};  Denies {p:101941}  Insight: {INSIGHT Description:597505}  Judgment: {JUDGMENT Description:033893}  Cognition: (6) {co}  Gait and Station: {UNREMARKABLE refresh:127725}     Labs and Data     Rating Scales:    {PSYCHRATINGSCALES:974330}    PHQ9 Today:  ***      2024     1:08 PM 2024     9:15 PM 2024     5:25 PM   PHQ   PHQ-9 Total Score 23 23 16   Q9: Thoughts of better off dead/self-harm past 2 weeks Not at all Not at all Several days   F/U: Thoughts of suicide or self-harm   No   F/U: Safety concerns   No         Recent  "Labs   Lab Test 01/18/24  1447 09/01/22  1537 02/02/22  1646   CR 0.78 0.79 0.82   GFRESTIMATED >90 >90 87     Recent Labs   Lab Test 01/18/24  1447 02/02/22  1646   AST 18 15   ALT 14 31   ALKPHOS 77 78       Diagnosis and Assessment                                                                             m2, h3     Sharmin Nieves is a 53 year old female with previous psychiatric history of MDD, recurrent, severe who presents for evaluation of depression and discussion of advanced therapeutic options. Diagnostically, ***    She has a well documented failure of adequate trials of >= 4 antidepressants which represent multiple antidepressant classes as well as augmentation therapies. The patient has completed an adequate dose of individual psychotherapy.     Patient is burdened by her chronic symptoms of depression and her current episode has lasted over *** months causing significant psychosocial dysfunction despite multiple trials of psychotropic medications and individual therapy. Due to remaining profound depression and numerous failed previous treatment modalities, the patient is a candidate for {ASW TRD Treatments List:444564::\"rTMS treament\",\"intranasal ketamine\"}. ***    The risks, benefits, alternatives and potential adverse effects have been explained and are understood by the patient. Sharmin Nieves agrees to the treatment plan with the ability to do so. The pt knows to call the clinic for any problems or access emergency care if needed.   Medical considerations relevant to treatment, as noted above, include ***.   Substance concerns relevant to treatment inlcude ***.       During the course of these treatments, the patient will be asked not to make any medication changes.   While waiting to initiate these treatments, it is absolutely reasonable to make medication changes, and suggestions (if any) are below.  After treatment is complete, the patient will transfer back to the referring provider. "     Suicide Risk Assessment:  Today Sharmin Nieves reports ***. She has notable risk factors for self-harm, including {Suicide Risk Factors:669704}. However, risk is mitigated by {Suicide Protective Factors:814005}. Therefore, based on all available evidence including the factors cited above, she does not appear to be at imminent risk for self-harm, does not meet criteria for a 72-hr hold, and therefore involuntary hospitalization will not be pursued at this  time. However, based on degree of symptoms, voluntary referral for {Psychiatry Levels of Care:837281} was recommended, she {ACCEPTED/DECLINED:087078} this offer. Additional steps taken to minimize risk include: ***    Today the following issues were addressed:  1) Major depressive disorder, recurrent, severe without psychotic features  2) ***    Clinical Global Impression, Severity of Illness (1-7): ***  Clinical Global Impression, Improvement (1-7): N/A    {PSYPMP:853523}    {PSYDRUGINT:280514}    Plan                                                                                                                     m2, h3     1) Major depressive disorder, recurrent, severe  -- Medications: Continue current outpatient psychotropic medications  -- Psychotherapy: Continue regular individual psychotherapy   -- Procedures:    - Pt is approved for an acute series of rTMS   - Coil: F8 or H1  -- Referrals: None  {PSYTX:757979}    2) ***  {PSYTX:714251}    3) ***  {PSYTX:812061}    RTC: ***    CRISIS NUMBERS:   Provided routinely in AVS.    Treatment Risk Statement:  The patient understands the risks, benefits, adverse effects and alternatives. Agrees to treatment with the capacity to do so. No medical contraindications to treatment. Agrees to call clinic for any problems. The patient understands to call 911 or go to the nearest ED if life threatening or urgent symptoms occur.     WHODAS 2.0  TODAY total score = {NA:093439}; [a 12-item WHODAS 2.0 assessment  {was:231362} completed by the pt today and/or recorded in EPIC].      Treatment Summary     Care Team   Outpatient Prescriber: Outpatient Therapist: JANICE Psychiatrist: JANICE Therapist: JENA completed by: Pharmacist Consult:           Formulation (primary and secondary factors guiding treatment plan):   Chief concern:  Primary diagnosis:  Secondary diagnoses/factors:   Treatment plan (What are the next 1-3 steps in treatment?)   Elements to consider:  Procedures (ECT, TMS, VNS):  If TMS: Which coil?    Medications (ketamine, MAOI):  Who will prescribe medication?  Will pt need dietary counseling?  Does pt need to taper (e.g., benzo) or stop (e.g., washout) medication?    Psychotherapy (BA, PE, CPT, DBT):  Are we recommending BA concurrent to a procedure/medication?  Are we recommending another form of therapy?    Are we recommending concurrent/combination treatments?     How is waitlist affecting plan?     What ancillary supports/resources/therapies need to be organized by our team?   Does pt need an outpatient psychiatric prescriber?  Does pt need to establish with a therapist?  Are we recommending a therapy not provided by our program (e.g. DBT, PE, CPT)?     Clinical Global Impression - Severity (at DA) / Improvement (at FU)   Considering your total clinical experience with this particular population, how severely ill is the patient at this time?  Score (1-7):   What is the plan and rough timeframe for completing care with TRD Program?   What is the primary endpoint/goal of treatment?  Timeframe for discharge from our program?    Who will continue outpatient medication management?  Has this been communicated to pt?  Does a care coordination call with outpatient provider(s) need to be scheduled?    Will the pt need ongoing psychotherapy?  Will there be ongoing follow-up for ketamine?  Has lab monitoring been ordered?          PROVIDER:  Rosaura Millard MD    Time based billing.  Time on day of service  "includes:  60min spent face to face with the patient   45*** minutes pre-reviewing records  30*** minutes preparing consultation note and follow up messages/documentation    Level of Medical Decision Making:   {Acuity / problems addressed (Optional):867742::\"- At least 1 chronic problem that is not stable\"}  {Risk of complications / morbidity / mortality (Optional):890971::\"- Engaged in prescription drug management during visit (discussed any medication benefits, side effects, alternatives, etc.)\"}  {Complexity / amount of data reviewed / analyzed (Optional):769183::\" \"}  {   Must meet 2 out of 3 of the above MDM elements to bill at the specified level     For help more info about elements / criteria, click here-> MDM Help Grid     **No need to delete blue text, it disappears when note is signed**       Use the following statement and the  add on code if providing longitudinal care for a chronic diagnosis.   :457205}  {:584203::\"The longitudinal plan of care for *** was addressed during this visit. Due to the added complexity in care, I will continue to support Kendy in the subsequent management of this condition(s) and with the ongoing continuity of care of this condition(s).\"}         {AMBULATORY ATTENDING and RESIDENT/FELLOW Attestation Options:06851402}    "
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
## 2024-05-31 ENCOUNTER — VIRTUAL VISIT (OUTPATIENT)
Dept: FAMILY MEDICINE | Facility: CLINIC | Age: 54
End: 2024-05-31
Payer: COMMERCIAL

## 2024-05-31 DIAGNOSIS — F33.9 RECURRENT MAJOR DEPRESSIVE DISORDER, REMISSION STATUS UNSPECIFIED (H): Primary | ICD-10-CM

## 2024-05-31 DIAGNOSIS — F41.1 GENERALIZED ANXIETY DISORDER: ICD-10-CM

## 2024-05-31 PROCEDURE — G2211 COMPLEX E/M VISIT ADD ON: HCPCS | Mod: 95 | Performed by: NURSE PRACTITIONER

## 2024-05-31 PROCEDURE — 99214 OFFICE O/P EST MOD 30 MIN: CPT | Mod: 95 | Performed by: NURSE PRACTITIONER

## 2024-05-31 NOTE — Clinical Note
Hi! The interventional psychiatrist recommended TMS and Kendy is planning to move forward with that. They also recommended adding bupropion-dextromethorphan. When I look at the dosing for the combo it is, of course, not matching ordering as separate components. Do you have a recommendation for how to dose them separately to get as close to the combo dosing as possible?  Thanks!  TOMMY Clinton

## 2024-05-31 NOTE — PATIENT INSTRUCTIONS
For the days this weeks   - talk to HR about what you need - is this FMLA or a simple letter?   - if you don't have FMLA already on file, we might want to do one for the year to cover outside of the leave of absence    For the leave of absence, TMS treatment and care of your mom   - ask HR about how much short term disability time you have for taking a leave of absence   - if you have enough time to cover the entire time, including taking care of your mom, we can do a single short term disability with the start date around mom's post-op date    I will message Francia about the bupropion-dextromethorphan dosing

## 2024-05-31 NOTE — PROGRESS NOTES
Kendy is a 53 year old who is being evaluated via a billable video visit.    How would you like to obtain your AVS? MyChart  If the video visit is dropped, the invitation should be resent by: Text to cell phone: [phone]  Will anyone else be joining your video visit? No      Assessment & Plan     Recurrent major depressive disorder, remission status unspecified (H24)  Discussed leave of absence and FMLA  Will ask pharmacist about dosing for the bupropion-dextromethorphan as I anticipate it to be not covered as combo    Generalized anxiety disorder  As above    The longitudinal plan of care for the diagnosis(es)/condition(s) as documented were addressed during this visit. Due to the added complexity in care, I will continue to support Kendy in the subsequent management and with ongoing continuity of care.  I spent a total of 38 minutes on the day of the visit.   Time spent by me doing chart review, history and exam, documentation and further activities per the note        Patient Instructions   For the days this weeks   - talk to HR about what you need - is this FMLA or a simple letter?   - if you don't have FMLA already on file, we might want to do one for the year to cover outside of the leave of absence    For the leave of absence, TMS treatment and care of your mom   - ask HR about how much short term disability time you have for taking a leave of absence   - if you have enough time to cover the entire time, including taking care of your mom, we can do a single short term disability with the start date around mom's post-op date    I will message Francia about the bupropion-dextromethorphan dosing    Subjective   Kendy is a 53 year old, presenting for the following health issues:   Follow Up    History of Present Illness       Mental Health Follow-up:  Patient presents to follow-up on Depression & Anxiety.Patient's depression since last visit has been:  Worse  The patient is not having other symptoms associated  with depression.  Patient's anxiety since last visit has been:  Bad  The patient is not having other symptoms associated with anxiety.  Any significant life events: grief or loss  Patient is not feeling anxious or having panic attacks.  Patient has no concerns about alcohol or drug use.    Reason for visit:  Follow up- Interventional Psychiatry/Pain Management    She eats 2-3 servings of fruits and vegetables daily.She consumes 2 sweetened beverage(s) daily.She exercises with enough effort to increase her heart rate 9 or less minutes per day.  She exercises with enough effort to increase her heart rate 3 or less days per week.   She is taking medications regularly.     Visit with interventional psychiatry yesterday was really good. Was recommended to try TMS and they will move forward with that starting in about a month. Involves daily treatment for 8 weeks. No changes to current mental health medications, but could add bupropion-dextromethorphan.    Cousin committed suicide and Kendy has been reeling from this. Went to work on Tuesday, but went home early and then didn't work on Thursday or Friday.     Mom is having hip surgery in a month and Kendy wants to take time to go help her in July. Will need care of family FMLA. Hoping to take a leave of absence from work overall. Needs coverage for 1/ of Tuesday, Wednesday and Thursday.       Review of Systems        Objective           Vitals:  No vitals were obtained today due to virtual visit.    Physical Exam   GENERAL: alert and no distress  EYES: Eyes grossly normal to inspection.  No discharge or erythema, or obvious scleral/conjunctival abnormalities.  RESP: No audible wheeze, cough, or visible cyanosis.    SKIN: Visible skin clear. No significant rash, abnormal pigmentation or lesions.  NEURO: Cranial nerves grossly intact.  Mentation and speech appropriate for age.  PSYCH: Appropriate affect, tone, and pace of words        Video-Visit Details    Type of service:   Video Visit   Originating Location (pt. Location): Home    Distant Location (provider location):  On-site  Platform used for Video Visit: Charlie  Signed Electronically by: LEDY Seaman CNP

## 2024-06-02 ENCOUNTER — MYC MEDICAL ADVICE (OUTPATIENT)
Dept: FAMILY MEDICINE | Facility: CLINIC | Age: 54
End: 2024-06-02
Payer: COMMERCIAL

## 2024-06-05 ENCOUNTER — VIRTUAL VISIT (OUTPATIENT)
Dept: FAMILY MEDICINE | Facility: CLINIC | Age: 54
End: 2024-06-05
Payer: COMMERCIAL

## 2024-06-05 DIAGNOSIS — F33.9 RECURRENT MAJOR DEPRESSIVE DISORDER, REMISSION STATUS UNSPECIFIED (H): Primary | ICD-10-CM

## 2024-06-05 PROCEDURE — 99214 OFFICE O/P EST MOD 30 MIN: CPT | Mod: 95 | Performed by: NURSE PRACTITIONER

## 2024-06-05 PROCEDURE — G2211 COMPLEX E/M VISIT ADD ON: HCPCS | Mod: 95 | Performed by: NURSE PRACTITIONER

## 2024-06-05 RX ORDER — DEXTROMETHORPHAN HBR 15 MG/1
45 CAPSULE, LIQUID FILLED ORAL 2 TIMES DAILY
Qty: 180 CAPSULE | Refills: 1 | Status: SHIPPED | OUTPATIENT
Start: 2024-06-05 | End: 2024-08-06

## 2024-06-05 RX ORDER — BUPROPION HYDROCHLORIDE 100 MG/1
100 TABLET, EXTENDED RELEASE ORAL 2 TIMES DAILY
Qty: 180 TABLET | Refills: 1 | Status: SHIPPED | OUTPATIENT
Start: 2024-06-05 | End: 2024-08-06

## 2024-06-05 NOTE — PROGRESS NOTES
Kendy is a 53 year old who is being evaluated via a billable video visit.    How would you like to obtain your AVS? MyChart  If the video visit is dropped, the invitation should be resent by: Text to cell phone: [phone]  Will anyone else be joining your video visit? No      Assessment & Plan     Recurrent major depressive disorder, remission status unspecified (H24)  FMLA form completed with noting verbal consent. Expecting STD form to come through today and will be completed using today's date for start and date Kendy gets to me for end date  Wrote for the dextromethorphan-bupropion based on info from [facility] about combining the separate medication (due to anticipated non-coverage of the combine brand).   - Dextromethorphan HBr 15 MG CAPS; Take 45 mg by mouth 2 times daily for 90 days  - buPROPion (WELLBUTRIN SR) 100 MG 12 hr tablet; Take 1 tablet (100 mg) by mouth 2 times daily    The longitudinal plan of care for the diagnosis(es)/condition(s) as documented were addressed during this visit. Due to the added complexity in care, I will continue to support Kendy in the subsequent management and with ongoing continuity of care.Prescription drug management          Patient Instructions   Start dextromethorphan 45 mg (3 capsules) twice a day AND bupropion  mg ONCE day for 3-7 days, then increase bupropion  mg to TWICE a day    Subjective   Kendy is a 53 year old, presenting for the following health issues:  No chief complaint on file.    HPI     Wants to starting short term disability now for mental health. Has 26 weeks covered. Expecting interventional psychiatry treatment to start in about a month and last 8 weeks. Start date for STD today (6/5/24).         Objective           Vitals:  No vitals were obtained today due to virtual visit.    Physical Exam   GENERAL: alert and no distress  EYES: Eyes grossly normal to inspection.  No discharge or erythema, or obvious scleral/conjunctival abnormalities.  RESP: No  audible wheeze, cough, or visible cyanosis.    SKIN: Visible skin clear. No significant rash, abnormal pigmentation or lesions.  NEURO: Cranial nerves grossly intact.  Mentation and speech appropriate for age.  PSYCH: Appropriate affect, tone, and pace of words          Video-Visit Details    Type of service:  Video Visit   Originating Location (pt. Location): Home    Distant Location (provider location):  Off-site  Platform used for Video Visit: [facility]  Signed Electronically by: LEDY Seaman CNP

## 2024-06-05 NOTE — PATIENT INSTRUCTIONS
Start dextromethorphan 45 mg (3 capsules) twice a day AND bupropion  mg ONCE day for 3-7 days, then increase bupropion  mg to TWICE a day

## 2024-06-07 ENCOUNTER — PATIENT OUTREACH (OUTPATIENT)
Dept: CARE COORDINATION | Facility: CLINIC | Age: 54
End: 2024-06-07
Payer: COMMERCIAL

## 2024-06-12 ENCOUNTER — TELEPHONE (OUTPATIENT)
Dept: FAMILY MEDICINE | Facility: CLINIC | Age: 54
End: 2024-06-12
Payer: COMMERCIAL

## 2024-06-12 NOTE — TELEPHONE ENCOUNTER
Reason for Call:  Form, our goal is to have forms completed with 72 hours, however, some forms may require a visit or additional information.    Type of letter, form or note:  disability    Who is the form from?: Insurance comp    Where did the form come from: form was faxed in    What clinic location was the form placed at?: [facility]    Where the form was placed:  [employer] Box/Folder    What number is listed as a contact on the form?:   [phone]        Additional comments:     Call taken on 6/12/2024 at 10:38 AM by Anabel Delcid

## 2024-06-13 ENCOUNTER — TELEPHONE (OUTPATIENT)
Dept: PSYCHIATRY | Facility: CLINIC | Age: 54
End: 2024-06-13

## 2024-06-13 ENCOUNTER — MYC MEDICAL ADVICE (OUTPATIENT)
Dept: FAMILY MEDICINE | Facility: CLINIC | Age: 54
End: 2024-06-13
Payer: COMMERCIAL

## 2024-06-17 NOTE — TELEPHONE ENCOUNTER
Landy- this is patient response to date for forms     Let s do September 13th- of course we can change that if needed. Thank you so much!!

## 2024-06-17 NOTE — TELEPHONE ENCOUNTER
Has been sent to [facility], abstracted, and place on Anabel desk for review.

## 2024-06-24 ENCOUNTER — MYC MEDICAL ADVICE (OUTPATIENT)
Dept: FAMILY MEDICINE | Facility: CLINIC | Age: 54
End: 2024-06-24
Payer: COMMERCIAL

## 2024-06-24 DIAGNOSIS — F41.8 SITUATIONAL ANXIETY: ICD-10-CM

## 2024-06-24 DIAGNOSIS — M79.674 PAIN OF RIGHT GREAT TOE: ICD-10-CM

## 2024-06-24 DIAGNOSIS — M25.871 SESAMOIDITIS OF RIGHT FOOT: ICD-10-CM

## 2024-06-24 DIAGNOSIS — M96.0 NONUNION AFTER ARTHRODESIS: ICD-10-CM

## 2024-06-25 RX ORDER — LORAZEPAM 1 MG/1
1 TABLET ORAL EVERY 6 HOURS PRN
Qty: 5 TABLET | Refills: 0 | Status: SHIPPED | OUTPATIENT
Start: 2024-06-25

## 2024-06-25 RX ORDER — HYDROCODONE BITARTRATE AND ACETAMINOPHEN 5; 325 MG/1; MG/1
1 TABLET ORAL 3 TIMES DAILY PRN
Qty: 90 TABLET | Refills: 0 | Status: SHIPPED | OUTPATIENT
Start: 2024-06-25 | End: 2024-07-22

## 2024-07-03 ENCOUNTER — VIRTUAL VISIT (OUTPATIENT)
Dept: FAMILY MEDICINE | Facility: CLINIC | Age: 54
End: 2024-07-03
Payer: COMMERCIAL

## 2024-07-03 DIAGNOSIS — F33.9 RECURRENT MAJOR DEPRESSIVE DISORDER, REMISSION STATUS UNSPECIFIED (H): Primary | ICD-10-CM

## 2024-07-03 DIAGNOSIS — F41.1 GENERALIZED ANXIETY DISORDER: ICD-10-CM

## 2024-07-03 PROCEDURE — 99213 OFFICE O/P EST LOW 20 MIN: CPT | Mod: 95 | Performed by: NURSE PRACTITIONER

## 2024-07-03 PROCEDURE — G2211 COMPLEX E/M VISIT ADD ON: HCPCS | Mod: 95 | Performed by: NURSE PRACTITIONER

## 2024-07-03 ASSESSMENT — PATIENT HEALTH QUESTIONNAIRE - PHQ9
SUM OF ALL RESPONSES TO PHQ QUESTIONS 1-9: 18
SUM OF ALL RESPONSES TO PHQ QUESTIONS 1-9: 18
10. IF YOU CHECKED OFF ANY PROBLEMS, HOW DIFFICULT HAVE THESE PROBLEMS MADE IT FOR YOU TO DO YOUR WORK, TAKE CARE OF THINGS AT HOME, OR GET ALONG WITH OTHER PEOPLE: EXTREMELY DIFFICULT

## 2024-07-03 NOTE — PROGRESS NOTES
Kendy is a 54 year old who is being evaluated via a billable video visit.    How would you like to obtain your AVS? MyChart  If the video visit is dropped, the invitation should be resent by: Text to cell phone: [phone]  Will anyone else be joining your video visit? No      Assessment & Plan     Recurrent major depressive disorder, remission status unspecified (H24)  FMLA form addended and letter written to clarify.    Generalized anxiety disorder      The longitudinal plan of care for the diagnosis(es)/condition(s) as documented were addressed during this visit. Due to the added complexity in care, I will continue to support Kendy in the subsequent management and with ongoing continuity of care.  I spent a total of 22 minutes on the day of the visit.   Time spent by me doing chart review, history and exam, documentation and further activities per the note      Subjective   Kendy is a 54 year old, presenting for the following health issues:  MH Follow Up    History of Present Illness       Reason for visit:  Mental health follow-up    She eats 2-3 servings of fruits and vegetables daily.She consumes 1 sweetened beverage(s) daily.She exercises with enough effort to increase her heart rate 9 or less minutes per day.  She exercises with enough effort to increase her heart rate 3 or less days per week.   She is taking medications regularly.     [employer] requires additional paperwork. Told patient that STD was denied because the FMLA was marked as intermittent.    FMLA continuous from May 29-June 5 and then STD starts June 5th    Waiting for STD approval before scheduling TMS    Mom is doing well.          Objective           Vitals:  No vitals were obtained today due to virtual visit.    Physical Exam   GENERAL: alert and no distress  EYES: Eyes grossly normal to inspection.  No discharge or erythema, or obvious scleral/conjunctival abnormalities.  RESP: No audible wheeze, cough, or visible cyanosis.    SKIN: Visible skin  clear. No significant rash, abnormal pigmentation or lesions.  NEURO: Cranial nerves grossly intact.  Mentation and speech appropriate for age.  PSYCH: Appropriate affect, tone, and pace of words          Video-Visit Details    Type of service:  Video Visit   Originating Location (pt. Location): Home  Distant Location (provider location):  Off-site  Platform used for Video Visit: Charlie  Signed Electronically by: LEDY Seaman CNP

## 2024-07-03 NOTE — LETTER
July 3, 2024      Sharmin Nieves  [address] [place] [address]        To Whom It May Concern:    Please accept this addended FMLA form with further designation of continuous leave from 5/29/24-6/5/2024, in addition to the intermittent leave that is intended for dates prior to and after the continuous leave.        Sincerely,        LEDY Seaman CNP

## 2024-07-05 ENCOUNTER — TELEPHONE (OUTPATIENT)
Dept: FAMILY MEDICINE | Facility: CLINIC | Age: 54
End: 2024-07-05
Payer: COMMERCIAL

## 2024-07-05 NOTE — TELEPHONE ENCOUNTER
Reason for Call (Form)    Goal: Our goal is to have forms completed within 72 hours. However, some forms may require a visit or additional information.    Type of Letter, Form, or Note: fmla form    Form Origin: [facility]    Received From: [facility]    Placed At: [facility]    Provider: Randa Bill    Additional Comments:

## 2024-07-16 ENCOUNTER — MYC REFILL (OUTPATIENT)
Dept: FAMILY MEDICINE | Facility: CLINIC | Age: 54
End: 2024-07-16
Payer: COMMERCIAL

## 2024-07-16 DIAGNOSIS — E66.09 CLASS 2 OBESITY DUE TO EXCESS CALORIES WITHOUT SERIOUS COMORBIDITY WITH BODY MASS INDEX (BMI) OF 35.0 TO 35.9 IN ADULT: ICD-10-CM

## 2024-07-16 DIAGNOSIS — E66.812 CLASS 2 OBESITY DUE TO EXCESS CALORIES WITHOUT SERIOUS COMORBIDITY WITH BODY MASS INDEX (BMI) OF 35.0 TO 35.9 IN ADULT: ICD-10-CM

## 2024-07-16 RX ORDER — PHENTERMINE HYDROCHLORIDE 37.5 MG/1
37.5 TABLET ORAL
Qty: 90 TABLET | Refills: 0 | Status: SHIPPED | OUTPATIENT
Start: 2024-07-16

## 2024-07-18 ASSESSMENT — PATIENT HEALTH QUESTIONNAIRE - PHQ9
SUM OF ALL RESPONSES TO PHQ QUESTIONS 1-9: 23
10. IF YOU CHECKED OFF ANY PROBLEMS, HOW DIFFICULT HAVE THESE PROBLEMS MADE IT FOR YOU TO DO YOUR WORK, TAKE CARE OF THINGS AT HOME, OR GET ALONG WITH OTHER PEOPLE: EXTREMELY DIFFICULT
SUM OF ALL RESPONSES TO PHQ QUESTIONS 1-9: 23
10. IF YOU CHECKED OFF ANY PROBLEMS, HOW DIFFICULT HAVE THESE PROBLEMS MADE IT FOR YOU TO DO YOUR WORK, TAKE CARE OF THINGS AT HOME, OR GET ALONG WITH OTHER PEOPLE: EXTREMELY DIFFICULT

## 2024-07-18 ASSESSMENT — ANXIETY QUESTIONNAIRES
7. FEELING AFRAID AS IF SOMETHING AWFUL MIGHT HAPPEN: NEARLY EVERY DAY
1. FEELING NERVOUS, ANXIOUS, OR ON EDGE: NEARLY EVERY DAY
3. WORRYING TOO MUCH ABOUT DIFFERENT THINGS: NEARLY EVERY DAY
6. BECOMING EASILY ANNOYED OR IRRITABLE: NEARLY EVERY DAY
2. NOT BEING ABLE TO STOP OR CONTROL WORRYING: NEARLY EVERY DAY
5. BEING SO RESTLESS THAT IT IS HARD TO SIT STILL: MORE THAN HALF THE DAYS
7. FEELING AFRAID AS IF SOMETHING AWFUL MIGHT HAPPEN: NEARLY EVERY DAY
GAD7 TOTAL SCORE: 19
4. TROUBLE RELAXING: MORE THAN HALF THE DAYS
8. IF YOU CHECKED OFF ANY PROBLEMS, HOW DIFFICULT HAVE THESE MADE IT FOR YOU TO DO YOUR WORK, TAKE CARE OF THINGS AT HOME, OR GET ALONG WITH OTHER PEOPLE?: EXTREMELY DIFFICULT
IF YOU CHECKED OFF ANY PROBLEMS ON THIS QUESTIONNAIRE, HOW DIFFICULT HAVE THESE PROBLEMS MADE IT FOR YOU TO DO YOUR WORK, TAKE CARE OF THINGS AT HOME, OR GET ALONG WITH OTHER PEOPLE: EXTREMELY DIFFICULT
GAD7 TOTAL SCORE: 19
GAD7 TOTAL SCORE: 19

## 2024-07-19 ENCOUNTER — ALLIED HEALTH/NURSE VISIT (OUTPATIENT)
Dept: PSYCHIATRY | Facility: CLINIC | Age: 54
End: 2024-07-19
Payer: COMMERCIAL

## 2024-07-19 ENCOUNTER — OFFICE VISIT (OUTPATIENT)
Dept: PSYCHIATRY | Facility: CLINIC | Age: 54
End: 2024-07-19
Payer: COMMERCIAL

## 2024-07-19 DIAGNOSIS — F33.2 SEVERE EPISODE OF RECURRENT MAJOR DEPRESSIVE DISORDER, WITHOUT PSYCHOTIC FEATURES (H): Primary | ICD-10-CM

## 2024-07-19 NOTE — PROGRESS NOTES
Interventional Psychiatry Program  [address], [address]  [place] [address]  TMS Procedure Note   Sharmin Nieves MRN# [id number]  Age: 54 year old year old YOB: 1970    Sharmin Nieves comes into clinic today at the request of FESTUS Millard Ordering Provider for TMS.    Pre-Procedure:  History and Physical: Reviewed in medical record  Consent Signed by: Sharimn Nieves for this course of treatment.  On: 07/19/2024    Reviewed possible side effects associated with treatment as well as questions regarding possible course of treatments.  Patient agreed to procedure and was able to reflect implications.    Clinical Narrative:  Patient presents to initiate an acute course of TMS for management of her symptoms of resistant depression.    Indications for TMS:   MDD, recurrent, severe; 4+ medication trials (from 2+ classes) ineffective; Psychotherapy ineffective    Procedure Diagnosis:  MDD, recurrent, severe; F33.2  Treatment Hx:  Treatment number this series: 1  Total lifetime treatment number: 1    Allergies   Allergen Reactions    Lisinopril Cough     cough      There were no vitals taken for this visit.    Pause for the Cause  Right patient: Yes  Right procedure/correct coil:  Yes; rTMS; [id number]; F8 coil.   Earplugs in place:  Yes    Procedure  Patient was seated in procedure chair. Identity and procedure was verified. Ear plugs were placed in ears and patient-specific cap was placed on head and tightened appropriately. Ruler locations were verified. Bone conducting headphones were not used.  Coil was placed at F3 and stimulator was set to 33% (90% of MT).  Initial train was well tolerated so 60 trains were delivered.  Patient tolerated procedure well with minimal right eyebrow movement.    Motor Threshold Determination  Distance from nasion to inion: 37cm  Tragus to tragus distance: 40cm  Head circumference: 58.5cm  Distance along the vertex: 10.1cm  Distance along circumference  from midline: 6.7cm  MT 1: F3 @ 37% on 07/19/2024    Theta LDLFPC     Frequency: 50 Hz  Burst Frequency: 5 Hz  Train Duration: 10 sec   Number of Bursts: 10   Total pulses delivered: 1800  Tx Loc: F3  Energy: 33% (90%MT)  Trains: 60             5/29/2024     5:25 PM 7/3/2024    11:24 AM 7/18/2024     9:47 PM   PHQ-9 SCORE   PHQ-9 Total Score MyChart 16 (Moderately severe depression) 18 (Moderately severe depression) 23 (Severe depression)   PHQ-9 Total Score 16 18 23    23     Date MADRS QIDS PHQ-9   07/19/24 37 20 23    --           Plan   - increase energy as tolerated  - continue treatments      This service provided today was under the supervising provider of the day Sonam Hopkins MD who determined motor threshold and was available if needed.    Payal Austin, TMS Technician  [facility]

## 2024-07-19 NOTE — PROGRESS NOTES
TMS Education     Sharmin Nieves MRN# [id number]  Age: 54 year old year old YOB: 1970        Patient is here in clinic for TMS education and consenting.  Patient will be starting TMS today on the [employer].  Writer and patient reviewed mechanics of TMS.  Discussed using magnetic pulses to stimulate and induce an electrical field inside the brain.  Discussed the difference between F8 and H1 coil.  Patient was able to verbalize understanding of this.  Discussed that the idea is that we are treating the DLPFC of the brain, as it has been shown by in studies that people who have depression have decreased activity in this part of the brain, the idea is that we stimulate this part of the brain to increase activity.       Reviewed processing of mapping and how visit today would go.  Encouraged patient to communicate with staff if treatment at anytime became painful.         Discussed side effects of TMS.  Side effects include headaches after treatment, hearing loss, lightheadedness/dizziness, short lived vision changes, and seizures.  Discussed ways that TMS Clinic helps with preventing these side effects.  Such as retesting motor thresholds and having patient wear ear plugs.  Instructed patient that she can take OTC pain relievers such as tylenol or IBU if she has a headache after today's treatment.  Reviewed safety concerns regarding sleep and use of illegal drugs/alcohol.        Patient was able to ask questions regarding procedure.  Patient informed of 10 minute late policy and attendance policy.  Consent was signed by patient today.

## 2024-07-22 ENCOUNTER — OFFICE VISIT (OUTPATIENT)
Dept: PSYCHIATRY | Facility: CLINIC | Age: 54
End: 2024-07-22
Payer: COMMERCIAL

## 2024-07-22 ENCOUNTER — MYC REFILL (OUTPATIENT)
Dept: FAMILY MEDICINE | Facility: CLINIC | Age: 54
End: 2024-07-22
Payer: COMMERCIAL

## 2024-07-22 DIAGNOSIS — M79.674 PAIN OF RIGHT GREAT TOE: ICD-10-CM

## 2024-07-22 DIAGNOSIS — M25.871 SESAMOIDITIS OF RIGHT FOOT: ICD-10-CM

## 2024-07-22 DIAGNOSIS — M96.0 NONUNION AFTER ARTHRODESIS: ICD-10-CM

## 2024-07-22 DIAGNOSIS — F33.2 SEVERE EPISODE OF RECURRENT MAJOR DEPRESSIVE DISORDER, WITHOUT PSYCHOTIC FEATURES (H): Primary | ICD-10-CM

## 2024-07-22 RX ORDER — HYDROCODONE BITARTRATE AND ACETAMINOPHEN 5; 325 MG/1; MG/1
1 TABLET ORAL 3 TIMES DAILY PRN
Qty: 90 TABLET | Refills: 0 | Status: SHIPPED | OUTPATIENT
Start: 2024-07-23 | End: 2024-08-19

## 2024-07-22 ASSESSMENT — PATIENT HEALTH QUESTIONNAIRE - PHQ9
SUM OF ALL RESPONSES TO PHQ QUESTIONS 1-9: 21
10. IF YOU CHECKED OFF ANY PROBLEMS, HOW DIFFICULT HAVE THESE PROBLEMS MADE IT FOR YOU TO DO YOUR WORK, TAKE CARE OF THINGS AT HOME, OR GET ALONG WITH OTHER PEOPLE: EXTREMELY DIFFICULT
SUM OF ALL RESPONSES TO PHQ QUESTIONS 1-9: 21
10. IF YOU CHECKED OFF ANY PROBLEMS, HOW DIFFICULT HAVE THESE PROBLEMS MADE IT FOR YOU TO DO YOUR WORK, TAKE CARE OF THINGS AT HOME, OR GET ALONG WITH OTHER PEOPLE: EXTREMELY DIFFICULT

## 2024-07-22 NOTE — PROGRESS NOTES
Interventional Psychiatry Program  [address], [address]  [place] [address]  TMS Procedure Note   Sharmin Nieves MRN# [id number]  Age: 54 year old   YOB: 1970    Sharmin Nieves comes into clinic today at the request ofFESTUS MD, ordering provider for TMS treatments.    Pre-Procedure:  History and Physical: Reviewed in medical record  Consent signed by: Sharmin Nieves for this course of treatment on: 07/19/2024    Clinical Narrative:  Patient tolerated treatment. Patient was tearful and anxious during treatment. Reports getting headaches over the weekend.    Indications for TMS:   MDD, recurrent, severe; 4+ medication trials (from 2+ classes) ineffective; Psychotherapy ineffective    Procedure Diagnosis:  MDD, recurrent, severe; F33.2    Treatment Hx:  Treatment number this series: 2  Total lifetime treatment number: 2    Allergies   Allergen Reactions    Lisinopril Cough     cough      There were no vitals taken for this visit.    Pause for the Cause  Right patient: Yes  Right procedure/correct coil:  Yes; rTMS; [id number]; F8 coil.   Earplugs in place:  Yes    Procedure  Patient was seated in procedure chair. Identity and procedure was verified. Ear plugs were placed in ears and patient-specific cap was placed on head and tightened appropriately. Ruler locations were verified. Bone conducting headphones were not used. Coil was placed at F3 and stimulator was set to 33% (90% of MT). Initial train was well tolerated so 60 trains were delivered. Patient tolerated procedure well with minimal right eyebrow movement.    Motor Threshold Determination  Distance from nasion to inion: 37cm  Tragus to tragus distance: 40cm  Head circumference: 58.5cm  Distance along the vertex: 10.1cm  Distance along circumference from midline: 6.7cm  MT 1: F3 @ 37% on 07/19/2024    Theta LDLFPC   Frequency: 50 Hz  Burst Frequency: 5 Hz  Train Duration: 10 sec   Number of Bursts: 10   Total  pulses delivered: 1800  Tx Loc: F3  Energy: 33% (90%MT)  Trains: 60         7/3/2024    11:24 AM 7/18/2024     9:47 PM 7/22/2024    11:05 AM   PHQ-9 SCORE   PHQ-9 Total Score MyChart 18 (Moderately severe depression) 23 (Severe depression) 21 (Severe depression)   PHQ-9 Total Score 18 23    23 21    21     Date MADRS QIDS PHQ-9   07/19/24 37 20 23    --       Plan   - Increase energy as tolerated  - Continue TMS treatments    This service provided today was under the supervising provider of the day, Raffi Palmer MD, who was available if needed.    Anna Seo, TMS Technician  [facility]

## 2024-07-23 ENCOUNTER — OFFICE VISIT (OUTPATIENT)
Dept: PSYCHIATRY | Facility: CLINIC | Age: 54
End: 2024-07-23
Payer: COMMERCIAL

## 2024-07-23 DIAGNOSIS — F33.2 SEVERE EPISODE OF RECURRENT MAJOR DEPRESSIVE DISORDER, WITHOUT PSYCHOTIC FEATURES (H): Primary | ICD-10-CM

## 2024-07-23 NOTE — PROGRESS NOTES
Interventional Psychiatry Program  [address], [address]  [place] [address]  TMS Procedure Note   Sharmin Nieves MRN# [id number]  Age: 54 year old   YOB: 1970    Sharmin Nieves comes into clinic today at the request ofFESTUS MD, ordering provider for TMS treatments.    Pre-Procedure:  History and Physical: Reviewed in medical record  Consent signed by: Sharmin Nieves for this course of treatment on: 07/19/2024    Clinical Narrative:  Patient tolerated treatment. Patient tolerating treatment a lot better today.     Indications for TMS:   MDD, recurrent, severe; 4+ medication trials (from 2+ classes) ineffective; Psychotherapy ineffective    Procedure Diagnosis:  MDD, recurrent, severe; F33.2    Treatment Hx:  Treatment number this series: 3  Total lifetime treatment number: 3    Allergies   Allergen Reactions    Lisinopril Cough     cough      There were no vitals taken for this visit.    Pause for the Cause  Right patient: Yes  Right procedure/correct coil:  Yes; rTMS; [id number]; F8 coil.   Earplugs in place:  Yes    Procedure  Patient was seated in procedure chair. Identity and procedure was verified. Ear plugs were placed in ears and patient-specific cap was placed on head and tightened appropriately. Ruler locations were verified. Bone conducting headphones were not used. Coil was placed at F3 and stimulator was set to 33% (90% of MT). Initial train was well tolerated so 60 trains were delivered. Patient tolerated procedure well with minimal right eyebrow movement.    Motor Threshold Determination  Distance from nasion to inion: 37cm  Tragus to tragus distance: 40cm  Head circumference: 58.5cm  Distance along the vertex: 10.1cm  Distance along circumference from midline: 6.7cm  MT 1: F3 @ 37% on 07/19/2024    LDLPFC    Frequency: 10  Train Duration: 4  Total pulses delivered: 3000  Inter-train interval: 15  Tx Loc: F3  Energy: 33 (90%MT)  Trains: 75    Tried  standard today, may go back to theta tomorrow.        7/18/2024     9:47 PM 7/22/2024    11:05 AM 7/22/2024     1:37 PM   PHQ-9 SCORE   PHQ-9 Total Score MyChart 23 (Severe depression) 21 (Severe depression)    PHQ-9 Total Score 23    23 21    21 23     Date MADRS QIDS PHQ-9   07/19/24 37 20 23    --       Plan   - Increase energy as tolerated  - Continue TMS treatments    This service provided today was under the supervising provider of the day, Raffi Palmer MD, who was available if needed.    Kristie Stahl, TMS Technician  [facility]  [facility]

## 2024-07-24 ENCOUNTER — TELEPHONE (OUTPATIENT)
Dept: PSYCHIATRY | Facility: CLINIC | Age: 54
End: 2024-07-24

## 2024-07-24 NOTE — TELEPHONE ENCOUNTER
Patient called this morning to cancel TMS session. After further discussion learned patients brother passed away and that we would cancel the rest of the week's session. Patient assured writer that are remaining safe and will seek out resources if needed like [facility]. Patient will call clinic on Monday and discuss if they need more time before resuming TMS.

## 2024-07-29 NOTE — TELEPHONE ENCOUNTER
Patient called to update writer.  for her brother is pushed back to tomorrow. [place] a [place] traveling in for this. Patient expects to resume TMS on Friday this week.

## 2024-08-02 ENCOUNTER — OFFICE VISIT (OUTPATIENT)
Dept: PSYCHIATRY | Facility: CLINIC | Age: 54
End: 2024-08-02
Payer: COMMERCIAL

## 2024-08-02 DIAGNOSIS — F33.2 SEVERE EPISODE OF RECURRENT MAJOR DEPRESSIVE DISORDER, WITHOUT PSYCHOTIC FEATURES (H): Primary | ICD-10-CM

## 2024-08-02 ASSESSMENT — PATIENT HEALTH QUESTIONNAIRE - PHQ9
SUM OF ALL RESPONSES TO PHQ QUESTIONS 1-9: 25
10. IF YOU CHECKED OFF ANY PROBLEMS, HOW DIFFICULT HAVE THESE PROBLEMS MADE IT FOR YOU TO DO YOUR WORK, TAKE CARE OF THINGS AT HOME, OR GET ALONG WITH OTHER PEOPLE: EXTREMELY DIFFICULT
SUM OF ALL RESPONSES TO PHQ QUESTIONS 1-9: 25

## 2024-08-02 NOTE — PROGRESS NOTES
Interventional Psychiatry Program  [address], [address]  [place] [address]  TMS Procedure Note   Sharmin Nieves MRN# [id number]  Age: 54 year old   YOB: 1970    Sharmin Nieves comes into clinic today at the request ofFESTUS MD, ordering provider for TMS treatments.    Pre-Procedure:  History and Physical: Reviewed in medical record  Consent signed by: Sharmin Nieves for this course of treatment on: 07/19/2024    Clinical Narrative:  Patient tolerated treatment. No changes reported. Patient switched back to Theta protocol.    Indications for TMS:   MDD, recurrent, severe; 4+ medication trials (from 2+ classes) ineffective; Psychotherapy ineffective    Procedure Diagnosis:  MDD, recurrent, severe; F33.2    Treatment Hx:  Treatment number this series: 4  Total lifetime treatment number: 4    Allergies   Allergen Reactions    Lisinopril Cough     cough      There were no vitals taken for this visit.    Pause for the Cause  Right patient: Yes  Right procedure/correct coil:  Yes; rTMS; [id number]; F8 coil.   Earplugs in place:  Yes    Procedure  Patient was seated in procedure chair. Identity and procedure was verified. Ear plugs were placed in ears and patient-specific cap was placed on head and tightened appropriately. Ruler locations were verified. Bone conducting headphones were not used. Coil was placed at F3 and stimulator was set to 33% (90% of MT). Initial train was well tolerated so 60 trains were delivered. Patient tolerated procedure well with minimal right eyebrow movement.    Motor Threshold Determination  Distance from nasion to inion: 37cm  Tragus to tragus distance: 40cm  Head circumference: 58.5cm  Distance along the vertex: 10.1cm  Distance along circumference from midline: 6.7cm  MT 1: F3 @ 37% on 07/19/2024    Theta LDLFPC   Frequency: 50 Hz  Burst Frequency: 5 Hz  Train Duration: 10 sec   Number of Bursts: 10   Total pulses delivered: 1800  Tx Loc:  F3  Energy: 33% (90%MT)  Trains: 60           7/22/2024    11:05 AM 7/22/2024     1:37 PM 8/2/2024    12:32 PM   PHQ-9 SCORE   PHQ-9 Total Score MyChart 21 (Severe depression)  25 (Severe depression)   PHQ-9 Total Score 21    21 23 25     Date MADRS QIDS PHQ-9   07/19/24 37 20 23   8/2/24 -- 24 25     Plan   - Increase energy as tolerated  - Continue TMS treatments    This service provided today was under the supervising provider of the day, Roland Hall MD, who was available if needed.    Anna Seo, TMS Technician  [facility] Neuromodulation

## 2024-08-05 ENCOUNTER — OFFICE VISIT (OUTPATIENT)
Dept: PSYCHIATRY | Facility: CLINIC | Age: 54
End: 2024-08-05
Payer: COMMERCIAL

## 2024-08-05 DIAGNOSIS — F33.2 SEVERE EPISODE OF RECURRENT MAJOR DEPRESSIVE DISORDER, WITHOUT PSYCHOTIC FEATURES (H): Primary | ICD-10-CM

## 2024-08-05 ASSESSMENT — ANXIETY QUESTIONNAIRES
7. FEELING AFRAID AS IF SOMETHING AWFUL MIGHT HAPPEN: NEARLY EVERY DAY
2. NOT BEING ABLE TO STOP OR CONTROL WORRYING: NEARLY EVERY DAY
8. IF YOU CHECKED OFF ANY PROBLEMS, HOW DIFFICULT HAVE THESE MADE IT FOR YOU TO DO YOUR WORK, TAKE CARE OF THINGS AT HOME, OR GET ALONG WITH OTHER PEOPLE?: EXTREMELY DIFFICULT
IF YOU CHECKED OFF ANY PROBLEMS ON THIS QUESTIONNAIRE, HOW DIFFICULT HAVE THESE PROBLEMS MADE IT FOR YOU TO DO YOUR WORK, TAKE CARE OF THINGS AT HOME, OR GET ALONG WITH OTHER PEOPLE: EXTREMELY DIFFICULT
3. WORRYING TOO MUCH ABOUT DIFFERENT THINGS: NEARLY EVERY DAY
4. TROUBLE RELAXING: NEARLY EVERY DAY
6. BECOMING EASILY ANNOYED OR IRRITABLE: NEARLY EVERY DAY
GAD7 TOTAL SCORE: 21
7. FEELING AFRAID AS IF SOMETHING AWFUL MIGHT HAPPEN: NEARLY EVERY DAY
5. BEING SO RESTLESS THAT IT IS HARD TO SIT STILL: NEARLY EVERY DAY
1. FEELING NERVOUS, ANXIOUS, OR ON EDGE: NEARLY EVERY DAY
GAD7 TOTAL SCORE: 21

## 2024-08-05 ASSESSMENT — PATIENT HEALTH QUESTIONNAIRE - PHQ9
SUM OF ALL RESPONSES TO PHQ QUESTIONS 1-9: 23
10. IF YOU CHECKED OFF ANY PROBLEMS, HOW DIFFICULT HAVE THESE PROBLEMS MADE IT FOR YOU TO DO YOUR WORK, TAKE CARE OF THINGS AT HOME, OR GET ALONG WITH OTHER PEOPLE: EXTREMELY DIFFICULT

## 2024-08-05 NOTE — PROGRESS NOTES
Interventional Psychiatry Program  [address], [address]  [place] [address]  TMS Procedure Note   Sharmin Nieves MRN# [id number]  Age: 54 year old   YOB: 1970    Sharmin Nieves comes into clinic today at the request ofFESTUS MD, ordering provider for TMS treatments.    Pre-Procedure:  History and Physical: Reviewed in medical record  Consent signed by: Sharmin Nieves for this course of treatment on: 07/19/2024    Clinical Narrative:  Patient tolerated treatment. No changes reported. Tolerated increase to 100%.    Indications for TMS:   MDD, recurrent, severe; 4+ medication trials (from 2+ classes) ineffective; Psychotherapy ineffective    Procedure Diagnosis:  MDD, recurrent, severe; F33.2    Treatment Hx:  Treatment number this series: 5  Total lifetime treatment number: 5    Allergies   Allergen Reactions    Lisinopril Cough     cough      There were no vitals taken for this visit.    Pause for the Cause  Right patient: Yes  Right procedure/correct coil:  Yes; rTMS; [id number]; F8 coil.   Earplugs in place:  Yes    Procedure  Patient was seated in procedure chair. Identity and procedure was verified. Ear plugs were placed in ears and patient-specific cap was placed on head and tightened appropriately. Ruler locations were verified. Bone conducting headphones were not used. Coil was placed at F3 and stimulator was set to 37% (100% of MT). Initial train was well tolerated so 60 trains were delivered. Patient tolerated procedure well with minimal right eyebrow movement.    Motor Threshold Determination  Distance from nasion to inion: 37cm  Tragus to tragus distance: 40cm  Head circumference: 58.5cm  Distance along the vertex: 10.1cm  Distance along circumference from midline: 6.7cm  MT 1: F3 @ 37% on 07/19/2024    Theta LDLFPC   Frequency: 50 Hz  Burst Frequency: 5 Hz  Train Duration: 10 sec   Number of Bursts: 10   Total pulses delivered: 1800  Tx Loc: F3  Energy: 37%  (100%MT)  Trains: 60           7/22/2024    11:05 AM 7/22/2024     1:37 PM 8/2/2024    12:32 PM   PHQ-9 SCORE   PHQ-9 Total Score MyChart 21 (Severe depression)  25 (Severe depression)   PHQ-9 Total Score 21    21 23 25     Date MADRS QIDS PHQ-9   07/19/24 37 20 23   8/2/24 -- 24 25     Plan   - Increase energy as tolerated  - Continue TMS treatments    This service provided today was under the supervising provider of the day, Roland Hall MD, who was available if needed.    Anna Seo, TMS Technician  [facility]  Mental [facility] Neuromodulation

## 2024-08-06 ENCOUNTER — OFFICE VISIT (OUTPATIENT)
Dept: PSYCHIATRY | Facility: CLINIC | Age: 54
End: 2024-08-06
Payer: COMMERCIAL

## 2024-08-06 ENCOUNTER — VIRTUAL VISIT (OUTPATIENT)
Dept: FAMILY MEDICINE | Facility: CLINIC | Age: 54
End: 2024-08-06
Payer: COMMERCIAL

## 2024-08-06 DIAGNOSIS — F41.1 GENERALIZED ANXIETY DISORDER: ICD-10-CM

## 2024-08-06 DIAGNOSIS — F51.04 PSYCHOPHYSIOLOGICAL INSOMNIA: ICD-10-CM

## 2024-08-06 DIAGNOSIS — F33.2 SEVERE EPISODE OF RECURRENT MAJOR DEPRESSIVE DISORDER, WITHOUT PSYCHOTIC FEATURES (H): Primary | ICD-10-CM

## 2024-08-06 DIAGNOSIS — F33.9 RECURRENT MAJOR DEPRESSIVE DISORDER, REMISSION STATUS UNSPECIFIED (H): Primary | ICD-10-CM

## 2024-08-06 DIAGNOSIS — F43.21 GRIEF: ICD-10-CM

## 2024-08-06 PROCEDURE — 99215 OFFICE O/P EST HI 40 MIN: CPT | Mod: 95 | Performed by: NURSE PRACTITIONER

## 2024-08-06 PROCEDURE — G2211 COMPLEX E/M VISIT ADD ON: HCPCS | Mod: 95 | Performed by: NURSE PRACTITIONER

## 2024-08-06 RX ORDER — HYDROXYZINE HYDROCHLORIDE 25 MG/1
25-50 TABLET, FILM COATED ORAL
Qty: 60 TABLET | Refills: 0 | Status: SHIPPED | OUTPATIENT
Start: 2024-08-06 | End: 2024-09-16

## 2024-08-06 ASSESSMENT — PATIENT HEALTH QUESTIONNAIRE - PHQ9
10. IF YOU CHECKED OFF ANY PROBLEMS, HOW DIFFICULT HAVE THESE PROBLEMS MADE IT FOR YOU TO DO YOUR WORK, TAKE CARE OF THINGS AT HOME, OR GET ALONG WITH OTHER PEOPLE: EXTREMELY DIFFICULT
SUM OF ALL RESPONSES TO PHQ QUESTIONS 1-9: 23
SUM OF ALL RESPONSES TO PHQ QUESTIONS 1-9: 23
10. IF YOU CHECKED OFF ANY PROBLEMS, HOW DIFFICULT HAVE THESE PROBLEMS MADE IT FOR YOU TO DO YOUR WORK, TAKE CARE OF THINGS AT HOME, OR GET ALONG WITH OTHER PEOPLE: EXTREMELY DIFFICULT
SUM OF ALL RESPONSES TO PHQ QUESTIONS 1-9: 23
SUM OF ALL RESPONSES TO PHQ QUESTIONS 1-9: 23

## 2024-08-06 NOTE — PROGRESS NOTES
Kendy is a 54 year old who is being evaluated via a billable video visit.    How would you like to obtain your AVS? MyChart  If the video visit is dropped, the invitation should be resent by: Text to cell phone: [phone]  Will anyone else be joining your video visit? No      Assessment & Plan     Recurrent major depressive disorder, remission status unspecified (H24)  Worsened anxiety and depression symptoms 2/2 death of brother by suicide  Not sleeping well - prescribing hydroxyzine for short term use. Discussed possibility of CNS sedation due to multiple agents. Advised AGAINST trazodone 300 mg    Generalized anxiety disorder  Considered remeron, but cannot add with current medications due to higher risk serotonin syndrome. Would require taper off effexor or discontinuation of another medication of which none are optional at this time.    Grief  Has support and seeing EAP. Recommend long term therapy and trauma focused therapy.    Psychophysiological insomnia  - hydrOXYzine HCl (ATARAX) 25 MG tablet; Take 1-2 tablets (25-50 mg) by mouth nightly as needed for anxiety or other (insomnia)      Patient Instructions   Look at the [facility] website and see if there is anyone that you think would be a good fit    [email or web address]    Hydroxyzine 25-50 mg at night  Do not take trazodone 300 mg    The longitudinal plan of care for the diagnosis(es)/condition(s) as documented were addressed during this visit. Due to the added complexity in care, I will continue to support Kendy in the subsequent management and with ongoing continuity of care.Diagnosis or treatment significantly limited by social determinants of health - significant mental health symptoms  Prescription drug management  I spent a total of 47 minutes on the day of the visit.   Time spent by me doing chart review, history and exam, documentation and further activities per the note    Subjective   Kendy is a 54 year old, presenting for the  following health issues:   Follow Up    History of Present Illness       Mental Health Follow-up:  Patient presents to follow-up on Depression & Anxiety.Patient's depression since last visit has been:  Worse  The patient is having other symptoms associated with depression.  Patient's anxiety since last visit has been:  Worse  The patient is having other symptoms associated with anxiety.  Any significant life events: grief or loss  Patient is feeling anxious or having panic attacks.  Patient has no concerns about alcohol or drug use.      Brother committed suicide.  was last week. History of metal illness and chemical dependency.     Kendy has been struggling a lot since this event. Lots of panic attacks and not sleeping. Attending TMS treatments, but needed to take week off for . Met with therapist from [facility] and has appt tomorrow at 5 pm. Has been talking to family members for support.    Not sleeping - has tried taking trazodone 300 mg (baseline dose is 200 mg).       Review of Systems  CONSTITUTIONAL: NEGATIVE for fever, chills, change in weight  ENT/MOUTH: NEGATIVE for ear, mouth and throat problems  RESP: NEGATIVE for significant cough or SOB  CV: NEGATIVE for chest pain, palpitations or peripheral edema      Objective           Vitals:  No vitals were obtained today due to virtual visit.    Physical Exam   GENERAL: alert and no distress  EYES: Eyes grossly normal to inspection.  No discharge or erythema, or obvious scleral/conjunctival abnormalities.  RESP: No audible wheeze, cough, or visible cyanosis.    SKIN: Visible skin clear. No significant rash, abnormal pigmentation or lesions.  NEURO: Cranial nerves grossly intact.  Mentation and speech appropriate for age.  PSYCH: Appropriate affect, tone, and pace of words          Video-Visit Details    Type of service:  Video Visit   Originating Location (pt. Location): Home    Distant Location (provider location):  On-site  Platform used for Video  Visit: Charlie  Signed Electronically by: LEDY Seaman CNP

## 2024-08-06 NOTE — PATIENT INSTRUCTIONS
Look at the [facility] website and see if there is anyone that you think would be a good fit    [email or web address]    Hydroxyzine 25-50 mg at night  Do not take trazodone 300 mg

## 2024-08-06 NOTE — PROGRESS NOTES
Interventional Psychiatry Program  [address], [address]  [place] [address]  TMS Procedure Note   Sharmin Nieves MRN# [id number]  Age: 54 year old   YOB: 1970    Sharmin Nieves comes into clinic today at the request ofFESTUS MD, ordering provider for TMS treatments.    Pre-Procedure:  History and Physical: Reviewed in medical record  Consent signed by: Sharmin Nieves for this course of treatment on: 07/19/2024    Clinical Narrative:  Patient tolerated treatment. Struggling with sleep due to brothers passing. Patient wanted to stay at 100%.    Indications for TMS:   MDD, recurrent, severe; 4+ medication trials (from 2+ classes) ineffective; Psychotherapy ineffective    Procedure Diagnosis:  MDD, recurrent, severe; F33.2    Treatment Hx:  Treatment number this series: 6  Total lifetime treatment number: 6    Allergies   Allergen Reactions    Lisinopril Cough     cough      There were no vitals taken for this visit.    Pause for the Cause  Right patient: Yes  Right procedure/correct coil:  Yes; rTMS; [id number]; F8 coil.   Earplugs in place:  Yes    Procedure  Patient was seated in procedure chair. Identity and procedure was verified. Ear plugs were placed in ears and patient-specific cap was placed on head and tightened appropriately. Ruler locations were verified. Bone conducting headphones were not used. Coil was placed at F3 and stimulator was set to 37% (100% of MT). Initial train was well tolerated so 60 trains were delivered. Patient tolerated procedure well with minimal right eyebrow movement.    Motor Threshold Determination  Distance from nasion to inion: 37cm  Tragus to tragus distance: 40cm  Head circumference: 58.5cm  Distance along the vertex: 10.1cm  Distance along circumference from midline: 6.7cm  MT 1: F3 @ 37% on 07/19/2024    Theta LDLFPC   Frequency: 50 Hz  Burst Frequency: 5 Hz  Train Duration: 10 sec   Number of Bursts: 10   Total pulses delivered:  1800  Tx Loc: F3  Energy: 37% (100%MT)  Trains: 60           7/22/2024    11:05 AM 7/22/2024     1:37 PM 8/2/2024    12:32 PM   PHQ-9 SCORE   PHQ-9 Total Score MyChart 21 (Severe depression)  25 (Severe depression)   PHQ-9 Total Score 21    21 23 25     Date MADRS QIDS PHQ-9   07/19/24 37 20 23   8/2/24 -- 24 25     Plan   - Increase energy as tolerated  - Continue TMS treatments    This service provided today was under the supervising provider of the day, Win Lucas MD, who was available if needed.    Kristie Stahl, TMS Technician  [facility] Neuromodulation

## 2024-08-07 ENCOUNTER — OFFICE VISIT (OUTPATIENT)
Dept: PSYCHIATRY | Facility: CLINIC | Age: 54
End: 2024-08-07
Payer: COMMERCIAL

## 2024-08-07 DIAGNOSIS — F33.2 SEVERE EPISODE OF RECURRENT MAJOR DEPRESSIVE DISORDER, WITHOUT PSYCHOTIC FEATURES (H): Primary | ICD-10-CM

## 2024-08-07 NOTE — PROGRESS NOTES
Interventional Psychiatry Program  [address], [address]  [place] [address]  TMS Procedure Note   Sharmin Nieves MRN# [id number]  Age: 54 year old   YOB: 1970    Sharmin Nieves comes into clinic today at the request ofFESTUS MD, ordering provider for TMS treatments.    Pre-Procedure:  History and Physical: Reviewed in medical record  Consent signed by: Sharmin Nieves for this course of treatment on: 07/19/2024    Clinical Narrative:  Patient tolerated treatment. Met with primary today, added hydroxyzine to help with sleep. Asked to stay at 100% today. Willing to try increase tomorrow.    Indications for TMS:   MDD, recurrent, severe; 4+ medication trials (from 2+ classes) ineffective; Psychotherapy ineffective    Procedure Diagnosis:  MDD, recurrent, severe; F33.2    Treatment Hx:  Treatment number this series: 7  Total lifetime treatment number: 7    Allergies   Allergen Reactions    Lisinopril Cough     cough      There were no vitals taken for this visit.    Pause for the Cause  Right patient: Yes  Right procedure/correct coil:  Yes; rTMS; [id number]; F8 coil.   Earplugs in place:  Yes    Procedure  Patient was seated in procedure chair. Identity and procedure was verified. Ear plugs were placed in ears and patient-specific cap was placed on head and tightened appropriately. Ruler locations were verified. Bone conducting headphones were not used. Coil was placed at F3 and stimulator was set to 37% (100% of MT). Initial train was well tolerated so 60 trains were delivered. Patient tolerated procedure well with minimal right eyebrow movement.    Motor Threshold Determination  Distance from nasion to inion: 37cm  Tragus to tragus distance: 40cm  Head circumference: 58.5cm  Distance along the vertex: 10.1cm  Distance along circumference from midline: 6.7cm  MT 1: F3 @ 37% on 07/19/2024    Theta LDLFPC   Frequency: 50 Hz  Burst Frequency: 5 Hz  Train Duration: 10 sec    Number of Bursts: 10   Total pulses delivered: 1800  Tx Loc: F3  Energy: 37% (100%MT)  Trains: 60           7/22/2024    11:05 AM 7/22/2024     1:37 PM 8/2/2024    12:32 PM   PHQ-9 SCORE   PHQ-9 Total Score MyChart 21 (Severe depression)  25 (Severe depression)   PHQ-9 Total Score 21    21 23 25     Date MADRS QIDS PHQ-9   07/19/24 37 20 23   8/2/24 -- 24 25     Plan   - Increase energy as tolerated  - Continue TMS treatments    This service provided today was under the supervising provider of the day, Sterling Stratton MD, who was available if needed.    Payal Austin, TMS Technician  [facility] Neuromodulation

## 2024-08-08 ENCOUNTER — OFFICE VISIT (OUTPATIENT)
Dept: PSYCHIATRY | Facility: CLINIC | Age: 54
End: 2024-08-08
Payer: COMMERCIAL

## 2024-08-08 ENCOUNTER — MYC MEDICAL ADVICE (OUTPATIENT)
Dept: FAMILY MEDICINE | Facility: CLINIC | Age: 54
End: 2024-08-08
Payer: COMMERCIAL

## 2024-08-08 DIAGNOSIS — F33.2 SEVERE EPISODE OF RECURRENT MAJOR DEPRESSIVE DISORDER, WITHOUT PSYCHOTIC FEATURES (H): Primary | ICD-10-CM

## 2024-08-08 ASSESSMENT — PATIENT HEALTH QUESTIONNAIRE - PHQ9
10. IF YOU CHECKED OFF ANY PROBLEMS, HOW DIFFICULT HAVE THESE PROBLEMS MADE IT FOR YOU TO DO YOUR WORK, TAKE CARE OF THINGS AT HOME, OR GET ALONG WITH OTHER PEOPLE: EXTREMELY DIFFICULT
SUM OF ALL RESPONSES TO PHQ QUESTIONS 1-9: 23
10. IF YOU CHECKED OFF ANY PROBLEMS, HOW DIFFICULT HAVE THESE PROBLEMS MADE IT FOR YOU TO DO YOUR WORK, TAKE CARE OF THINGS AT HOME, OR GET ALONG WITH OTHER PEOPLE: EXTREMELY DIFFICULT

## 2024-08-08 NOTE — PROGRESS NOTES
Interventional Psychiatry Program  [address], [address]  [place] [address]  TMS Procedure Note   Sharmin Nieves MRN# [id number]  Age: 54 year old   YOB: 1970    Sharmin Nieves comes into clinic today at the request ofFESTUS MD, ordering provider for TMS treatments.    Pre-Procedure:  History and Physical: Reviewed in medical record  Consent signed by: Sharmin Nieves for this course of treatment on: 07/19/2024    Clinical Narrative:  Patient tolerated treatment. Took her daughter to an appointment this morning.    Indications for TMS:   MDD, recurrent, severe; 4+ medication trials (from 2+ classes) ineffective; Psychotherapy ineffective    Procedure Diagnosis:  MDD, recurrent, severe; F33.2    Treatment Hx:  Treatment number this series: 8  Total lifetime treatment number: 8    Allergies   Allergen Reactions    Lisinopril Cough     cough      There were no vitals taken for this visit.    Pause for the Cause  Right patient: Yes  Right procedure/correct coil:  Yes; rTMS; [id number]; F8 coil.   Earplugs in place:  Yes    Procedure  Patient was seated in procedure chair. Identity and procedure was verified. Ear plugs were placed in ears and patient-specific cap was placed on head and tightened appropriately. Ruler locations were verified. Bone conducting headphones were not used. Coil was placed at F3 and stimulator was set to 39% (105% of MT). Initial train was well tolerated so 60 trains were delivered. Patient tolerated procedure well with minimal right eyebrow movement.    Motor Threshold Determination  Distance from nasion to inion: 37cm  Tragus to tragus distance: 40cm  Head circumference: 58.5cm  Distance along the vertex: 10.1cm  Distance along circumference from midline: 6.7cm  MT 1: F3 @ 37% on 07/19/2024    Theta LDLFPC   Frequency: 50 Hz  Burst Frequency: 5 Hz  Train Duration: 10 sec   Number of Bursts: 10   Total pulses delivered: 1800  Tx Loc: F3  Energy: 39%  (105%MT)  Trains: 60           7/22/2024    11:05 AM 7/22/2024     1:37 PM 8/2/2024    12:32 PM   PHQ-9 SCORE   PHQ-9 Total Score MyChart 21 (Severe depression)  25 (Severe depression)   PHQ-9 Total Score 21    21 23 25     Date MADRS QIDS PHQ-9   07/19/24 37 20 23   8/2/24 -- 24 25     Plan   - Increase energy as tolerated  - Continue TMS treatments    This service provided today was under the supervising provider of the day, FESTUS Millard MD, who was available if needed.    Anna Seo, TMS Technician  [facility] Neuromodulation

## 2024-08-09 ENCOUNTER — OFFICE VISIT (OUTPATIENT)
Dept: PSYCHIATRY | Facility: CLINIC | Age: 54
End: 2024-08-09
Payer: COMMERCIAL

## 2024-08-09 DIAGNOSIS — F33.2 SEVERE EPISODE OF RECURRENT MAJOR DEPRESSIVE DISORDER, WITHOUT PSYCHOTIC FEATURES (H): Primary | ICD-10-CM

## 2024-08-09 NOTE — PROGRESS NOTES
Interventional Psychiatry Program  [address], [address]  [place] [address]  TMS Procedure Note   Sharmin Nieves MRN# [id number]  Age: 54 year old   YOB: 1970    Sharmin Nieves comes into clinic today at the request ofFESTUS MD, ordering provider for TMS treatments.    Pre-Procedure:  History and Physical: Reviewed in medical record  Consent signed by: Sharmin Nieves for this course of treatment on: 07/19/2024    Clinical Narrative:  Patient tolerated treatment. Might try to work on the patio this weekend.    Indications for TMS:   MDD, recurrent, severe; 4+ medication trials (from 2+ classes) ineffective; Psychotherapy ineffective    Procedure Diagnosis:  MDD, recurrent, severe; F33.2    Treatment Hx:  Treatment number this series: 9  Total lifetime treatment number: 9    Allergies   Allergen Reactions    Lisinopril Cough     cough      There were no vitals taken for this visit.    Pause for the Cause  Right patient: Yes  Right procedure/correct coil:  Yes; rTMS; [id number]; F8 coil.   Earplugs in place:  Yes    Procedure  Patient was seated in procedure chair. Identity and procedure was verified. Ear plugs were placed in ears and patient-specific cap was placed on head and tightened appropriately. Ruler locations were verified. Bone conducting headphones were not used. Coil was placed at F3 and stimulator was set to 41% (110% of MT). Initial train was well tolerated so 60 trains were delivered. Patient tolerated procedure well with minimal right eyebrow movement.    Motor Threshold Determination  Distance from nasion to inion: 37cm  Tragus to tragus distance: 40cm  Head circumference: 58.5cm  Distance along the vertex: 10.1cm  Distance along circumference from midline: 6.7cm  MT 1: F3 @ 37% on 07/19/2024    Theta LDLFPC   Frequency: 50 Hz  Burst Frequency: 5 Hz  Train Duration: 10 sec   Number of Bursts: 10   Total pulses delivered: 1800  Tx Loc: F3  Energy: 41%  (110%MT)  Trains: 60           7/22/2024    11:05 AM 7/22/2024     1:37 PM 8/2/2024    12:32 PM   PHQ-9 SCORE   PHQ-9 Total Score MyChart 21 (Severe depression)  25 (Severe depression)   PHQ-9 Total Score 21    21 23 25     Date MADRS QIDS PHQ-9   07/19/24 37 20 23   8/2/24 -- 24 25   8/9/24  19 21     Plan   - Increase energy as tolerated  - Continue TMS treatments    This service provided today was under the supervising provider of the day, Roland Hall MD, who was available if needed.    Payal Austin, TMS Technician  [facility] Neuromodulation

## 2024-08-12 ENCOUNTER — OFFICE VISIT (OUTPATIENT)
Dept: PSYCHIATRY | Facility: CLINIC | Age: 54
End: 2024-08-12
Payer: COMMERCIAL

## 2024-08-12 DIAGNOSIS — F33.2 SEVERE EPISODE OF RECURRENT MAJOR DEPRESSIVE DISORDER, WITHOUT PSYCHOTIC FEATURES (H): Primary | ICD-10-CM

## 2024-08-12 ASSESSMENT — PATIENT HEALTH QUESTIONNAIRE - PHQ9
SUM OF ALL RESPONSES TO PHQ QUESTIONS 1-9: 20
10. IF YOU CHECKED OFF ANY PROBLEMS, HOW DIFFICULT HAVE THESE PROBLEMS MADE IT FOR YOU TO DO YOUR WORK, TAKE CARE OF THINGS AT HOME, OR GET ALONG WITH OTHER PEOPLE: EXTREMELY DIFFICULT
10. IF YOU CHECKED OFF ANY PROBLEMS, HOW DIFFICULT HAVE THESE PROBLEMS MADE IT FOR YOU TO DO YOUR WORK, TAKE CARE OF THINGS AT HOME, OR GET ALONG WITH OTHER PEOPLE: EXTREMELY DIFFICULT
SUM OF ALL RESPONSES TO PHQ QUESTIONS 1-9: 20

## 2024-08-12 NOTE — PROGRESS NOTES
Interventional Psychiatry Program  [address], [address]  [place] [address]  TMS Procedure Note   Sharmin Nieves MRN# [id number]  Age: 54 year old   YOB: 1970    Sharmin Nieves comes into clinic today at the request ofFESTUS MD, ordering provider for TMS treatments.    Pre-Procedure:  History and Physical: Reviewed in medical record  Consent signed by: Sharmin Nieves for this course of treatment on: 07/19/2024    Clinical Narrative:  Patient tolerated treatment. No changes reported.    Indications for TMS:   MDD, recurrent, severe; 4+ medication trials (from 2+ classes) ineffective; Psychotherapy ineffective    Procedure Diagnosis:  MDD, recurrent, severe; F33.2    Treatment Hx:  Treatment number this series: 10  Total lifetime treatment number: 10    Allergies   Allergen Reactions    Lisinopril Cough     cough      There were no vitals taken for this visit.    Pause for the Cause  Right patient: Yes  Right procedure/correct coil:  Yes; rTMS; [id number]; F8 coil.   Earplugs in place:  Yes    Procedure  Patient was seated in procedure chair. Identity and procedure was verified. Ear plugs were placed in ears and patient-specific cap was placed on head and tightened appropriately. Ruler locations were verified. Bone conducting headphones were not used. Coil was placed at F3 and stimulator was set to 41% (110% of MT). Initial train was well tolerated so 60 trains were delivered. Patient tolerated procedure well with minimal right eyebrow movement.    Motor Threshold Determination  Distance from nasion to inion: 37cm  Tragus to tragus distance: 40cm  Head circumference: 58.5cm  Distance along the vertex: 10.1cm  Distance along circumference from midline: 6.7cm  MT 1: F3 @ 37% on 07/19/2024    Theta LDLFPC   Frequency: 50 Hz  Burst Frequency: 5 Hz  Train Duration: 10 sec   Number of Bursts: 10   Total pulses delivered: 1800  Tx Loc: F3  Energy: 41% (110%MT)  Trains: 60            7/22/2024    11:05 AM 7/22/2024     1:37 PM 8/2/2024    12:32 PM   PHQ-9 SCORE   PHQ-9 Total Score MyChart 21 (Severe depression)  25 (Severe depression)   PHQ-9 Total Score 21    21 23 25     Date MADRS QIDS PHQ-9   07/19/24 37 20 23   8/2/24 -- 24 25   8/9/24  19 21     Plan   - Increase energy as tolerated  - Continue TMS treatments    This service provided today was under the supervising provider of the day, Sterling Stratton MD, who was available if needed.    Anna Seo, TMS Technician  [facility]  Mental [facility] Neuromodulation

## 2024-08-13 ENCOUNTER — PATIENT OUTREACH (OUTPATIENT)
Dept: CARE COORDINATION | Facility: CLINIC | Age: 54
End: 2024-08-13
Payer: COMMERCIAL

## 2024-08-13 ENCOUNTER — OFFICE VISIT (OUTPATIENT)
Dept: PSYCHIATRY | Facility: CLINIC | Age: 54
End: 2024-08-13
Payer: COMMERCIAL

## 2024-08-13 DIAGNOSIS — F33.2 SEVERE EPISODE OF RECURRENT MAJOR DEPRESSIVE DISORDER, WITHOUT PSYCHOTIC FEATURES (H): Primary | ICD-10-CM

## 2024-08-13 NOTE — PROGRESS NOTES
Interventional Psychiatry Program  [address] [facility], [address]  [place] [address]  TMS Procedure Note   Sharmin Nieves MRN# [id number]  Age: 54 year old   YOB: 1970    Sharmin Nieves comes into clinic today at the request ofFSETUS MD, ordering provider for TMS treatments.    Pre-Procedure:  History and Physical: Reviewed in medical record  Consent signed by: Sharmin Nieves for this course of treatment on: 07/19/2024    Clinical Narrative:  Patient tolerated treatment. Having some issues with mail. Patient tolerated an increase to 115%.    Indications for TMS:   MDD, recurrent, severe; 4+ medication trials (from 2+ classes) ineffective; Psychotherapy ineffective    Procedure Diagnosis:  MDD, recurrent, severe; F33.2    Treatment Hx:  Treatment number this series: 11  Total lifetime treatment number: 11    Allergies   Allergen Reactions    Lisinopril Cough     cough      There were no vitals taken for this visit.    Pause for the Cause  Right patient: Yes  Right procedure/correct coil:  Yes; rTMS; [id number]; F8 coil.   Earplugs in place:  Yes    Procedure  Patient was seated in procedure chair. Identity and procedure was verified. Ear plugs were placed in ears and patient-specific cap was placed on head and tightened appropriately. Ruler locations were verified. Bone conducting headphones were not used. Coil was placed at F3 and stimulator was set to 43% (115% of MT). Initial train was well tolerated so 60 trains were delivered. Patient tolerated procedure well with minimal right eyebrow movement.    Motor Threshold Determination  Distance from nasion to inion: 37cm  Tragus to tragus distance: 40cm  Head circumference: 58.5cm  Distance along the vertex: 10.1cm  Distance along circumference from midline: 6.7cm  MT 1: F3 @ 37% on 07/19/2024    Theta LDLFPC   Frequency: 50 Hz  Burst Frequency: 5 Hz  Train Duration: 10 sec   Number of Bursts: 10   Total pulses delivered: 1800  Tx  Loc: F3  Energy: 43% (115%MT)  Trains: 60           7/22/2024    11:05 AM 7/22/2024     1:37 PM 8/2/2024    12:32 PM   PHQ-9 SCORE   PHQ-9 Total Score MyChart 21 (Severe depression)  25 (Severe depression)   PHQ-9 Total Score 21    21 23 25     Date MADRS QIDS PHQ-9   07/19/24 37 20 23   8/2/24 -- 24 25   8/9/24  19 21     Plan   - Increase energy as tolerated  - Continue TMS treatments    This service provided today was under the supervising provider of the day, FESTUS Millard MD, who was available if needed.    Kristie Stahl, TMS Technician  [facility] Neuromodulation

## 2024-08-14 ENCOUNTER — OFFICE VISIT (OUTPATIENT)
Dept: PSYCHIATRY | Facility: CLINIC | Age: 54
End: 2024-08-14
Payer: COMMERCIAL

## 2024-08-14 ENCOUNTER — MYC REFILL (OUTPATIENT)
Dept: FAMILY MEDICINE | Facility: CLINIC | Age: 54
End: 2024-08-14
Payer: COMMERCIAL

## 2024-08-14 DIAGNOSIS — F33.2 SEVERE EPISODE OF RECURRENT MAJOR DEPRESSIVE DISORDER, WITHOUT PSYCHOTIC FEATURES (H): Primary | ICD-10-CM

## 2024-08-14 DIAGNOSIS — F41.1 GENERALIZED ANXIETY DISORDER: ICD-10-CM

## 2024-08-14 ASSESSMENT — PATIENT HEALTH QUESTIONNAIRE - PHQ9
SUM OF ALL RESPONSES TO PHQ QUESTIONS 1-9: 16
10. IF YOU CHECKED OFF ANY PROBLEMS, HOW DIFFICULT HAVE THESE PROBLEMS MADE IT FOR YOU TO DO YOUR WORK, TAKE CARE OF THINGS AT HOME, OR GET ALONG WITH OTHER PEOPLE: EXTREMELY DIFFICULT
SUM OF ALL RESPONSES TO PHQ QUESTIONS 1-9: 16
SUM OF ALL RESPONSES TO PHQ QUESTIONS 1-9: 16
10. IF YOU CHECKED OFF ANY PROBLEMS, HOW DIFFICULT HAVE THESE PROBLEMS MADE IT FOR YOU TO DO YOUR WORK, TAKE CARE OF THINGS AT HOME, OR GET ALONG WITH OTHER PEOPLE: EXTREMELY DIFFICULT
SUM OF ALL RESPONSES TO PHQ QUESTIONS 1-9: 16

## 2024-08-14 NOTE — PROGRESS NOTES
Interventional Psychiatry Program  [address], [address]  [place] [address]  TMS Procedure Note   Sharmin Nieves MRN# [id number]  Age: 54 year old   YOB: 1970    Sharmin Nieves comes into clinic today at the request ofFESTUS MD, ordering provider for TMS treatments.    Pre-Procedure:  History and Physical: Reviewed in medical record  Consent signed by: Sharmin Nieves for this course of treatment on: 07/19/2024    Clinical Narrative:  Patient tolerated treatment. Patient tolerated an increase to 120%.    Indications for TMS:   MDD, recurrent, severe; 4+ medication trials (from 2+ classes) ineffective; Psychotherapy ineffective    Procedure Diagnosis:  MDD, recurrent, severe; F33.2    Treatment Hx:  Treatment number this series: 12  Total lifetime treatment number: 12    Allergies   Allergen Reactions    Lisinopril Cough     cough      There were no vitals taken for this visit.    Pause for the Cause  Right patient: Yes  Right procedure/correct coil:  Yes; rTMS; [id number]; F8 coil.   Earplugs in place:  Yes    Procedure  Patient was seated in procedure chair. Identity and procedure was verified. Ear plugs were placed in ears and patient-specific cap was placed on head and tightened appropriately. Ruler locations were verified. Bone conducting headphones were not used. Coil was placed at F3 and stimulator was set to 44% (120% of MT). Initial train was well tolerated so 60 trains were delivered. Patient tolerated procedure well with minimal right eyebrow movement.    Motor Threshold Determination  Distance from nasion to inion: 37cm  Tragus to tragus distance: 40cm  Head circumference: 58.5cm  Distance along the vertex: 10.1cm  Distance along circumference from midline: 6.7cm  MT 1: F3 @ 37% on 07/19/2024    Theta LDLFPC   Frequency: 50 Hz  Burst Frequency: 5 Hz  Train Duration: 10 sec   Number of Bursts: 10   Total pulses delivered: 1800  Tx Loc: F3  Energy: 44%  (120%MT)  Trains: 60           8/12/2024     1:02 PM 8/13/2024     1:38 PM 8/14/2024    12:33 PM   PHQ-9 SCORE   PHQ-9 Total Score MyChart 20 (Severe depression)  16 (Moderately severe depression)   PHQ-9 Total Score 20    20 18 16    16       Date MADRS QIDS PHQ-9   07/19/24 37 20 23   8/2/24 -- 24 25   8/9/24  19 21     Plan   - Continue TMS treatments    This service provided today was under the supervising provider of the day, Roland Hall MD, who was available if needed.    Payal Austin, TMS Technician  [facility]  [facility]ental [facility] Neuromodulation

## 2024-08-15 ENCOUNTER — OFFICE VISIT (OUTPATIENT)
Dept: PSYCHIATRY | Facility: CLINIC | Age: 54
End: 2024-08-15
Payer: COMMERCIAL

## 2024-08-15 DIAGNOSIS — F33.2 SEVERE EPISODE OF RECURRENT MAJOR DEPRESSIVE DISORDER, WITHOUT PSYCHOTIC FEATURES (H): Primary | ICD-10-CM

## 2024-08-15 RX ORDER — VENLAFAXINE HYDROCHLORIDE 150 MG/1
300 CAPSULE, EXTENDED RELEASE ORAL DAILY
Qty: 180 CAPSULE | Refills: 0 | Status: SHIPPED | OUTPATIENT
Start: 2024-08-15

## 2024-08-15 ASSESSMENT — PATIENT HEALTH QUESTIONNAIRE - PHQ9: SUM OF ALL RESPONSES TO PHQ QUESTIONS 1-9: 16

## 2024-08-15 NOTE — PROGRESS NOTES
Interventional Psychiatry Program  [address] [facility], [address]  [place] [address]  TMS Procedure Note   Sharmin Nieves MRN# [id number]  Age: 54 year old   YOB: 1970    Sharmin Nieves comes into clinic today at the request ofFESTUS MD, ordering provider for TMS treatments.    Pre-Procedure:  History and Physical: Reviewed in medical record  Consent signed by: Sharmin Nieves for this course of treatment on: 07/19/2024    Clinical Narrative:  Patient tolerated treatment.     Indications for TMS:   MDD, recurrent, severe; 4+ medication trials (from 2+ classes) ineffective; Psychotherapy ineffective    Procedure Diagnosis:  MDD, recurrent, severe; F33.2    Treatment Hx:  Treatment number this series: 13  Total lifetime treatment number: 13    Allergies   Allergen Reactions    Lisinopril Cough     cough      There were no vitals taken for this visit.    Pause for the Cause  Right patient: Yes  Right procedure/correct coil:  Yes; rTMS; [id number]; F8 coil.   Earplugs in place:  Yes    Procedure  Patient was seated in procedure chair. Identity and procedure was verified. Ear plugs were placed in ears and patient-specific cap was placed on head and tightened appropriately. Ruler locations were verified. Bone conducting headphones were not used. Coil was placed at F3 and stimulator was set to 44% (120% of MT). Initial train was well tolerated so 60 trains were delivered. Patient tolerated procedure well with minimal right eyebrow movement.    Motor Threshold Determination  Distance from nasion to inion: 37cm  Tragus to tragus distance: 40cm  Head circumference: 58.5cm  Distance along the vertex: 10.1cm  Distance along circumference from midline: 6.7cm  MT 1: F3 @ 37% on 07/19/2024    Theta LDLFPC   Frequency: 50 Hz  Burst Frequency: 5 Hz  Train Duration: 10 sec   Number of Bursts: 10   Total pulses delivered: 1800  Tx Loc: F3  Energy: 44% (120%MT)  Trains: 60           8/12/2024      1:02 PM 8/13/2024     1:38 PM 8/14/2024    12:33 PM   PHQ-9 SCORE   PHQ-9 Total Score MyChart 20 (Severe depression)  16 (Moderately severe depression)   PHQ-9 Total Score 20    20 18 16    16       Date MADRS QIDS PHQ-9   07/19/24 37 20 23   8/2/24 -- 24 25   8/9/24  19 21     Plan   - Continue TMS treatments    This service provided today was under the supervising provider of the day, FESTUS Millard MD, who was available if needed.    Payal Austin, TMS Technician  [facility] Neuromodulation

## 2024-08-16 ENCOUNTER — OFFICE VISIT (OUTPATIENT)
Dept: PSYCHIATRY | Facility: CLINIC | Age: 54
End: 2024-08-16
Payer: COMMERCIAL

## 2024-08-16 DIAGNOSIS — F33.2 SEVERE EPISODE OF RECURRENT MAJOR DEPRESSIVE DISORDER, WITHOUT PSYCHOTIC FEATURES (H): Primary | ICD-10-CM

## 2024-08-16 ASSESSMENT — PATIENT HEALTH QUESTIONNAIRE - PHQ9
SUM OF ALL RESPONSES TO PHQ QUESTIONS 1-9: 15
10. IF YOU CHECKED OFF ANY PROBLEMS, HOW DIFFICULT HAVE THESE PROBLEMS MADE IT FOR YOU TO DO YOUR WORK, TAKE CARE OF THINGS AT HOME, OR GET ALONG WITH OTHER PEOPLE: VERY DIFFICULT
SUM OF ALL RESPONSES TO PHQ QUESTIONS 1-9: 15

## 2024-08-16 NOTE — PROGRESS NOTES
Interventional Psychiatry Program  [address] [facility], [address]  [place] [address]  TMS Procedure Note   Sharmin Nieves MRN# [id number]  Age: 54 year old   YOB: 1970    Sharmin Nieves comes into clinic today at the request ofFESTUS MD, ordering provider for TMS treatments.    Pre-Procedure:  History and Physical: Reviewed in medical record  Consent signed by: Sharmin Nieves for this course of treatment on: 07/19/2024    Clinical Narrative:  Patient tolerated treatment. No plans for the weekend.    Indications for TMS:   MDD, recurrent, severe; 4+ medication trials (from 2+ classes) ineffective; Psychotherapy ineffective    Procedure Diagnosis:  MDD, recurrent, severe; F33.2    Treatment Hx:  Treatment number this series: 14  Total lifetime treatment number: 14    Allergies   Allergen Reactions    Lisinopril Cough     cough      There were no vitals taken for this visit.    Pause for the Cause  Right patient: Yes  Right procedure/correct coil:  Yes; rTMS; [id number]; F8 coil.   Earplugs in place:  Yes    Procedure  Patient was seated in procedure chair. Identity and procedure was verified. Ear plugs were placed in ears and patient-specific cap was placed on head and tightened appropriately. Ruler locations were verified. Bone conducting headphones were not used. Coil was placed at F3 and stimulator was set to 44% (120% of MT). Initial train was well tolerated so 60 trains were delivered. Patient tolerated procedure well with minimal right eyebrow movement.    Motor Threshold Determination  Distance from nasion to inion: 37cm  Tragus to tragus distance: 40cm  Head circumference: 58.5cm  Distance along the vertex: 10.1cm  Distance along circumference from midline: 6.7cm  MT 1: F3 @ 37% on 07/19/2024    Theta LDLFPC   Frequency: 50 Hz  Burst Frequency: 5 Hz  Train Duration: 10 sec   Number of Bursts: 10   Total pulses delivered: 1800  Tx Loc: F3  Energy: 44% (120%MT)  Trains: 60            8/14/2024    12:33 PM 8/15/2024     1:40 PM 8/16/2024    11:40 AM   PHQ-9 SCORE   PHQ-9 Total Score MyChart 16 (Moderately severe depression)  15 (Moderately severe depression)   PHQ-9 Total Score 16    16 16 15       Date MADRS QIDS PHQ-9   07/19/24 37 20 23   8/2/24 -- 24 25   8/9/24  19 21   8/16/24  19 15     Plan   - Continue TMS treatments    This service provided today was under the supervising provider of the day, Roland Hall MD, who was available if needed.    Payal Austin, TMS Technician  [facility] Neuromodulation

## 2024-08-19 ENCOUNTER — OFFICE VISIT (OUTPATIENT)
Dept: PSYCHIATRY | Facility: CLINIC | Age: 54
End: 2024-08-19
Payer: COMMERCIAL

## 2024-08-19 ENCOUNTER — MYC REFILL (OUTPATIENT)
Dept: FAMILY MEDICINE | Facility: CLINIC | Age: 54
End: 2024-08-19
Payer: COMMERCIAL

## 2024-08-19 DIAGNOSIS — M96.0 NONUNION AFTER ARTHRODESIS: ICD-10-CM

## 2024-08-19 DIAGNOSIS — F33.2 SEVERE EPISODE OF RECURRENT MAJOR DEPRESSIVE DISORDER, WITHOUT PSYCHOTIC FEATURES (H): Primary | ICD-10-CM

## 2024-08-19 DIAGNOSIS — M25.871 SESAMOIDITIS OF RIGHT FOOT: ICD-10-CM

## 2024-08-19 DIAGNOSIS — M79.674 PAIN OF RIGHT GREAT TOE: ICD-10-CM

## 2024-08-19 RX ORDER — HYDROCODONE BITARTRATE AND ACETAMINOPHEN 5; 325 MG/1; MG/1
1 TABLET ORAL 3 TIMES DAILY PRN
Qty: 90 TABLET | Refills: 0 | Status: SHIPPED | OUTPATIENT
Start: 2024-08-21 | End: 2024-09-16

## 2024-08-19 ASSESSMENT — PATIENT HEALTH QUESTIONNAIRE - PHQ9
SUM OF ALL RESPONSES TO PHQ QUESTIONS 1-9: 11
10. IF YOU CHECKED OFF ANY PROBLEMS, HOW DIFFICULT HAVE THESE PROBLEMS MADE IT FOR YOU TO DO YOUR WORK, TAKE CARE OF THINGS AT HOME, OR GET ALONG WITH OTHER PEOPLE: VERY DIFFICULT
SUM OF ALL RESPONSES TO PHQ QUESTIONS 1-9: 11

## 2024-08-19 NOTE — TELEPHONE ENCOUNTER
POD see pt message    Effexor script was sent on 8/15/24 to pharm  Pt is asking if she could get an OK to  the norco today, this would be an early fill, start day is listed as 8/21/24    Sapna Bowie RN   [facility]

## 2024-08-19 NOTE — TELEPHONE ENCOUNTER
Baudilio,    Patient calling to request ok to refill norco today.  Knows covering provider refilled it for her today.  States she is doing TMS therapy every day in [facility].  Does not drive, so she had to take uber there every day.  Costs her $50 every time she has to take an uber, so financial concerns related to keeping up with this.    Is out of effexor and has to go to pharmacy today after TMS appointment.  Hoping that she can  Norco at the same time so she does not have to make two trips.    Please advise.  Ursula SIFUENTES RN

## 2024-08-19 NOTE — PROGRESS NOTES
Interventional Psychiatry Program  [address], [address]  [place] [address]  TMS Procedure Note   Sharmin Nieves MRN# [id number]  Age: 54 year old   YOB: 1970    Sharmin Nieves comes into clinic today at the request ofFETSUS MD, ordering provider for TMS treatments.    Pre-Procedure:  History and Physical: Reviewed in medical record  Consent signed by: Sharmin Nieves for this course of treatment on: 07/19/2024    Clinical Narrative:  Patient tolerated treatment. No changes reported.    Indications for TMS:   MDD, recurrent, severe; 4+ medication trials (from 2+ classes) ineffective; Psychotherapy ineffective    Procedure Diagnosis:  MDD, recurrent, severe; F33.2    Treatment Hx:  Treatment number this series: 15  Total lifetime treatment number: 15    Allergies   Allergen Reactions    Lisinopril Cough     cough      There were no vitals taken for this visit.    Pause for the Cause  Right patient: Yes  Right procedure/correct coil:  Yes; rTMS; [id number]; F8 coil.   Earplugs in place:  Yes    Procedure  Patient was seated in procedure chair. Identity and procedure was verified. Ear plugs were placed in ears and patient-specific cap was placed on head and tightened appropriately. Ruler locations were verified. Bone conducting headphones were not used. Coil was placed at F3 and stimulator was set to 44% (120% of MT). Initial train was well tolerated so 60 trains were delivered. Patient tolerated procedure well with minimal right eyebrow movement.    Motor Threshold Determination  Distance from nasion to inion: 37cm  Tragus to tragus distance: 40cm  Head circumference: 58.5cm  Distance along the vertex: 10.1cm  Distance along circumference from midline: 6.7cm  MT 1: F3 @ 37% on 07/19/2024    Theta LDLFPC   Frequency: 50 Hz  Burst Frequency: 5 Hz  Train Duration: 10 sec   Number of Bursts: 10   Total pulses delivered: 1800  Tx Loc: F3  Energy: 44% (120%MT)  Trains: 60            8/16/2024    11:40 AM 8/16/2024     1:45 PM 8/19/2024    12:25 PM   PHQ-9 SCORE   PHQ-9 Total Score MyChart 15 (Moderately severe depression)  11 (Moderate depression)   PHQ-9 Total Score 15 15 11    11    11       Date MADRS QIDS PHQ-9   07/19/24 37 20 23   8/2/24 -- 24 25   8/9/24  19 21   8/16/24  19 15     Plan   - Continue TMS treatments    This service provided today was under the supervising provider of the day, Raffi Palmer MD, who was available if needed.    Kristie Stahl, TMS Technician  [facility] Neuromodulation

## 2024-08-21 ENCOUNTER — OFFICE VISIT (OUTPATIENT)
Dept: PSYCHIATRY | Facility: CLINIC | Age: 54
End: 2024-08-21
Payer: COMMERCIAL

## 2024-08-21 ENCOUNTER — ALLIED HEALTH/NURSE VISIT (OUTPATIENT)
Dept: PSYCHIATRY | Facility: CLINIC | Age: 54
End: 2024-08-21
Payer: COMMERCIAL

## 2024-08-21 DIAGNOSIS — F33.2 SEVERE EPISODE OF RECURRENT MAJOR DEPRESSIVE DISORDER, WITHOUT PSYCHOTIC FEATURES (H): Primary | ICD-10-CM

## 2024-08-21 ASSESSMENT — PATIENT HEALTH QUESTIONNAIRE - PHQ9
SUM OF ALL RESPONSES TO PHQ QUESTIONS 1-9: 15
SUM OF ALL RESPONSES TO PHQ QUESTIONS 1-9: 15
10. IF YOU CHECKED OFF ANY PROBLEMS, HOW DIFFICULT HAVE THESE PROBLEMS MADE IT FOR YOU TO DO YOUR WORK, TAKE CARE OF THINGS AT HOME, OR GET ALONG WITH OTHER PEOPLE: VERY DIFFICULT
SUM OF ALL RESPONSES TO PHQ QUESTIONS 1-9: 15
SUM OF ALL RESPONSES TO PHQ QUESTIONS 1-9: 15
10. IF YOU CHECKED OFF ANY PROBLEMS, HOW DIFFICULT HAVE THESE PROBLEMS MADE IT FOR YOU TO DO YOUR WORK, TAKE CARE OF THINGS AT HOME, OR GET ALONG WITH OTHER PEOPLE: VERY DIFFICULT
SUM OF ALL RESPONSES TO PHQ QUESTIONS 1-9: 15
SUM OF ALL RESPONSES TO PHQ QUESTIONS 1-9: 15
10. IF YOU CHECKED OFF ANY PROBLEMS, HOW DIFFICULT HAVE THESE PROBLEMS MADE IT FOR YOU TO DO YOUR WORK, TAKE CARE OF THINGS AT HOME, OR GET ALONG WITH OTHER PEOPLE: VERY DIFFICULT

## 2024-08-21 NOTE — PROGRESS NOTES
Interventional Psychiatry Program  [address], [address]  [place] [address]  TMS Procedure Note   Sharmin Nieves MRN# [id number]  Age: 54 year old   YOB: 1970    Sharmin Nieves comes into clinic today at the request ofFESTUS MD, ordering provider for TMS treatments.    Pre-Procedure:  History and Physical: Reviewed in medical record  Consent signed by: Sharmin Nieves for this course of treatment on: 07/19/2024    Clinical Narrative:  Patient tolerated treatment. Was not feeling well yesterday. Nurse check in today.    Indications for TMS:   MDD, recurrent, severe; 4+ medication trials (from 2+ classes) ineffective; Psychotherapy ineffective    Procedure Diagnosis:  MDD, recurrent, severe; F33.2    Treatment Hx:  Treatment number this series: 16  Total lifetime treatment number: 16    Allergies   Allergen Reactions    Lisinopril Cough     cough      There were no vitals taken for this visit.    Pause for the Cause  Right patient: Yes  Right procedure/correct coil:  Yes; rTMS; [id number]; F8 coil.   Earplugs in place:  Yes    Procedure  Patient was seated in procedure chair. Identity and procedure was verified. Ear plugs were placed in ears and patient-specific cap was placed on head and tightened appropriately. Ruler locations were verified. Bone conducting headphones were not used. Coil was placed at F3 and stimulator was set to 44% (120% of MT). Initial train was well tolerated so 60 trains were delivered. Patient tolerated procedure well with minimal right eyebrow movement.    Motor Threshold Determination  Distance from nasion to inion: 37cm  Tragus to tragus distance: 40cm  Head circumference: 58.5cm  Distance along the vertex: 10.1cm  Distance along circumference from midline: 6.7cm  MT 1: F3 @ 37% on 07/19/2024    Theta LDLFPC   Frequency: 50 Hz  Burst Frequency: 5 Hz  Train Duration: 10 sec   Number of Bursts: 10   Total pulses delivered: 1800  Tx Loc:  F3  Energy: 44% (120%MT)  Trains: 60           8/16/2024     1:45 PM 8/19/2024    12:25 PM 8/21/2024    12:51 PM   PHQ-9 SCORE   PHQ-9 Total Score MyChart  11 (Moderate depression) 15 (Moderately severe depression)   PHQ-9 Total Score 15 11    11    11 15    15    15       Date MADRS QIDS PHQ-9   07/19/24 37 20 23   8/2/24 -- 24 25   8/9/24  19 21   8/16/24  19 15     Plan   - Continue TMS treatments    This service provided today was under the supervising provider of the day,Sterling Stratton MD, who was available if needed.    Kristie Stahl, TMS Technician  [facility] Neuromodulation

## 2024-08-21 NOTE — PROGRESS NOTES
Physicians:  Care Coordination Note     SITUATION   Sharmin Nieves is a 54 year old female who has been receiving Transcranial Magnetic Stimulation (TMS) at the request of FESTUS Millard MD.    BACKGROUND     During course of TMS    ASSESSMENT        Met with patient today to check on her during TMS course.    During today's discussion writer and patient discussed:    Patient reports that she has made it to 120% and is tolerating TMS well.  She does note that towards the beginning of her course she did have some headaches, but they have since tapered off.  She notes that she feels like there has been some benefit thus far.  States that she has had some good days and some bad days.  She does report that her brother  about the same times she started TMS so feels like the timing of TMS has beneficial.  She denies any concerns and states that she will continue to attend TMS.      PHQ 9: 15  # of completed TMS Sessions: 16              PLAN       Nursing Interventions: TMS edu    Follow-up plan:  RN to continue to follow     Shannon Corona RN

## 2024-08-22 ENCOUNTER — MYC REFILL (OUTPATIENT)
Dept: FAMILY MEDICINE | Facility: CLINIC | Age: 54
End: 2024-08-22
Payer: COMMERCIAL

## 2024-08-22 ENCOUNTER — OFFICE VISIT (OUTPATIENT)
Dept: PSYCHIATRY | Facility: CLINIC | Age: 54
End: 2024-08-22
Payer: COMMERCIAL

## 2024-08-22 DIAGNOSIS — F33.2 SEVERE EPISODE OF RECURRENT MAJOR DEPRESSIVE DISORDER, WITHOUT PSYCHOTIC FEATURES (H): Primary | ICD-10-CM

## 2024-08-22 DIAGNOSIS — K22.4 ESOPHAGEAL SPASM: ICD-10-CM

## 2024-08-22 DIAGNOSIS — I10 BENIGN ESSENTIAL HYPERTENSION: ICD-10-CM

## 2024-08-22 RX ORDER — OMEPRAZOLE 40 MG/1
40 CAPSULE, DELAYED RELEASE ORAL DAILY
Qty: 90 CAPSULE | Refills: 0 | Status: SHIPPED | OUTPATIENT
Start: 2024-08-22 | End: 2024-09-16

## 2024-08-22 RX ORDER — LOSARTAN POTASSIUM 100 MG/1
100 TABLET ORAL DAILY
Qty: 90 TABLET | Refills: 1 | Status: SHIPPED | OUTPATIENT
Start: 2024-08-22

## 2024-08-22 NOTE — PROGRESS NOTES
Interventional Psychiatry Program  [address], [address]  [place] [address]  TMS Procedure Note   Sharmin Nieves MRN# [id number]  Age: 54 year old   YOB: 1970    Sharmin Nieves comes into clinic today at the request ofFESTUS MD, ordering provider for TMS treatments.    Pre-Procedure:  History and Physical: Reviewed in medical record  Consent signed by: Sharmin Nieves for this course of treatment on: 07/19/2024    Clinical Narrative:  Patient tolerated treatment. Went shopping with an old co-worker last night.     Indications for TMS:   MDD, recurrent, severe; 4+ medication trials (from 2+ classes) ineffective; Psychotherapy ineffective    Procedure Diagnosis:  MDD, recurrent, severe; F33.2    Treatment Hx:  Treatment number this series: 17  Total lifetime treatment number: 17    Allergies   Allergen Reactions    Lisinopril Cough     cough      There were no vitals taken for this visit.    Pause for the Cause  Right patient: Yes  Right procedure/correct coil:  Yes; rTMS; [id number]; F8 coil.   Earplugs in place:  Yes    Procedure  Patient was seated in procedure chair. Identity and procedure was verified. Ear plugs were placed in ears and patient-specific cap was placed on head and tightened appropriately. Ruler locations were verified. Bone conducting headphones were not used. Coil was placed at F3 and stimulator was set to 44% (120% of MT). Initial train was well tolerated so 60 trains were delivered. Patient tolerated procedure well with minimal right eyebrow movement.    Motor Threshold Determination  Distance from nasion to inion: 37cm  Tragus to tragus distance: 40cm  Head circumference: 58.5cm  Distance along the vertex: 10.1cm  Distance along circumference from midline: 6.7cm  MT 1: F3 @ 37% on 07/19/2024    Theta LDLFPC   Frequency: 50 Hz  Burst Frequency: 5 Hz  Train Duration: 10 sec   Number of Bursts: 10   Total pulses delivered: 1800  Tx Loc: F3  Energy:  44% (120%MT)  Trains: 60           8/19/2024    12:25 PM 8/21/2024    12:51 PM 8/22/2024     1:30 PM   PHQ-9 SCORE   PHQ-9 Total Score MyChart 11 (Moderate depression) 15 (Moderately severe depression)    PHQ-9 Total Score 11    11    11 15    15    15 13       Date MADRS QIDS PHQ-9   07/19/24 37 20 23   8/2/24 -- 24 25   8/9/24  19 21   8/16/24  19 15     Plan   - Continue TMS treatments    This service provided today was under the supervising provider of the day, Sonam Hopkins MD, who was available if needed.    Kristie Stahl, TMS Technician  [facility] Neuromodulation

## 2024-08-23 ENCOUNTER — OFFICE VISIT (OUTPATIENT)
Dept: PSYCHIATRY | Facility: CLINIC | Age: 54
End: 2024-08-23
Payer: COMMERCIAL

## 2024-08-23 DIAGNOSIS — F33.2 SEVERE EPISODE OF RECURRENT MAJOR DEPRESSIVE DISORDER, WITHOUT PSYCHOTIC FEATURES (H): Primary | ICD-10-CM

## 2024-08-23 ASSESSMENT — PATIENT HEALTH QUESTIONNAIRE - PHQ9
SUM OF ALL RESPONSES TO PHQ QUESTIONS 1-9: 7
10. IF YOU CHECKED OFF ANY PROBLEMS, HOW DIFFICULT HAVE THESE PROBLEMS MADE IT FOR YOU TO DO YOUR WORK, TAKE CARE OF THINGS AT HOME, OR GET ALONG WITH OTHER PEOPLE: SOMEWHAT DIFFICULT
SUM OF ALL RESPONSES TO PHQ QUESTIONS 1-9: 7

## 2024-08-23 NOTE — PROGRESS NOTES
Interventional Psychiatry Program  [address], [address]  [place] [address]  TMS Procedure Note   Sharmin Nieves MRN# [id number]  Age: 54 year old   YOB: 1970    Sharmin Nieves comes into clinic today at the request ofFESTUS MD, ordering provider for TMS treatments.    Pre-Procedure:  History and Physical: Reviewed in medical record  Consent signed by: Sharmin Nieves for this course of treatment on: 07/19/2024    Clinical Narrative:  Patient tolerated treatment. Friend and their daughter coming over this weekend.    Indications for TMS:   MDD, recurrent, severe; 4+ medication trials (from 2+ classes) ineffective; Psychotherapy ineffective    Procedure Diagnosis:  MDD, recurrent, severe; F33.2    Treatment Hx:  Treatment number this series: 18  Total lifetime treatment number: 18    Allergies   Allergen Reactions    Lisinopril Cough     cough      There were no vitals taken for this visit.    Pause for the Cause  Right patient: Yes  Right procedure/correct coil:  Yes; rTMS; [id number]; F8 coil.   Earplugs in place:  Yes    Procedure  Patient was seated in procedure chair. Identity and procedure was verified. Ear plugs were placed in ears and patient-specific cap was placed on head and tightened appropriately. Ruler locations were verified. Bone conducting headphones were not used. Coil was placed at F3 and stimulator was set to 44% (120% of MT). Initial train was well tolerated so 60 trains were delivered. Patient tolerated procedure well with minimal right eyebrow movement.    Motor Threshold Determination  Distance from nasion to inion: 37cm  Tragus to tragus distance: 40cm  Head circumference: 58.5cm  Distance along the vertex: 10.1cm  Distance along circumference from midline: 6.7cm  MT 1: F3 @ 37% on 07/19/2024    Theta LDLFPC   Frequency: 50 Hz  Burst Frequency: 5 Hz  Train Duration: 10 sec   Number of Bursts: 10   Total pulses delivered: 1800  Tx Loc: F3  Energy:  44% (120%MT)  Trains: 60           8/21/2024    12:51 PM 8/22/2024     1:30 PM 8/23/2024    11:23 AM   PHQ-9 SCORE   PHQ-9 Total Score MyChart 15 (Moderately severe depression)  7 (Mild depression)   PHQ-9 Total Score 15    15    15 13 7       Date MADRS QIDS PHQ-9   07/19/24 37 20 23   8/2/24 -- 24 25   8/9/24  19 21   8/16/24  19 15   8/23/24  18 7     Plan   - Continue TMS treatments    This service provided today was under the supervising provider of the day, Roland Hall MD, who was available if needed.    Payal Austin TMS Technician  [facility]  Mental [facility] Neuromodulation

## 2024-08-25 ENCOUNTER — HEALTH MAINTENANCE LETTER (OUTPATIENT)
Age: 54
End: 2024-08-25

## 2024-08-26 ENCOUNTER — OFFICE VISIT (OUTPATIENT)
Dept: PSYCHIATRY | Facility: CLINIC | Age: 54
End: 2024-08-26
Payer: COMMERCIAL

## 2024-08-26 DIAGNOSIS — F33.2 SEVERE EPISODE OF RECURRENT MAJOR DEPRESSIVE DISORDER, WITHOUT PSYCHOTIC FEATURES (H): Primary | ICD-10-CM

## 2024-08-26 ASSESSMENT — PATIENT HEALTH QUESTIONNAIRE - PHQ9
SUM OF ALL RESPONSES TO PHQ QUESTIONS 1-9: 9
10. IF YOU CHECKED OFF ANY PROBLEMS, HOW DIFFICULT HAVE THESE PROBLEMS MADE IT FOR YOU TO DO YOUR WORK, TAKE CARE OF THINGS AT HOME, OR GET ALONG WITH OTHER PEOPLE: VERY DIFFICULT
10. IF YOU CHECKED OFF ANY PROBLEMS, HOW DIFFICULT HAVE THESE PROBLEMS MADE IT FOR YOU TO DO YOUR WORK, TAKE CARE OF THINGS AT HOME, OR GET ALONG WITH OTHER PEOPLE: VERY DIFFICULT

## 2024-08-26 NOTE — PROGRESS NOTES
Interventional Psychiatry Program  [address], [address]  [place] [address]  TMS Procedure Note   Sharmin Nieves MRN# [id number]  Age: 54 year old   YOB: 1970    Sharmin Nieves comes into clinic today at the request ofFESTUS MD, ordering provider for TMS treatments.    Pre-Procedure:  History and Physical: Reviewed in medical record  Consent signed by: Sharmin Nieves for this course of treatment on: 07/19/2024    Clinical Narrative:  Patient tolerated treatment. Had fun spending time with friends over the weekend.    Indications for TMS:   MDD, recurrent, severe; 4+ medication trials (from 2+ classes) ineffective; Psychotherapy ineffective    Procedure Diagnosis:  MDD, recurrent, severe; F33.2    Treatment Hx:  Treatment number this series: 19  Total lifetime treatment number: 19    Allergies   Allergen Reactions    Lisinopril Cough     cough      There were no vitals taken for this visit.    Pause for the Cause  Right patient: Yes  Right procedure/correct coil:  Yes; rTMS; [id number]; F8 coil.   Earplugs in place:  Yes    Procedure  Patient was seated in procedure chair. Identity and procedure was verified. Ear plugs were placed in ears and patient-specific cap was placed on head and tightened appropriately. Ruler locations were verified. Bone conducting headphones were not used. Coil was placed at F3 and stimulator was set to 44% (120% of MT). Initial train was well tolerated so 60 trains were delivered. Patient tolerated procedure well with minimal right eyebrow movement.    Motor Threshold Determination  Distance from nasion to inion: 37cm  Tragus to tragus distance: 40cm  Head circumference: 58.5cm  Distance along the vertex: 10.1cm  Distance along circumference from midline: 6.7cm  MT 1: F3 @ 37% on 07/19/2024    Theta LDLFPC   Frequency: 50 Hz  Burst Frequency: 5 Hz  Train Duration: 10 sec   Number of Bursts: 10   Total pulses delivered: 1800  Tx Loc:  F3  Energy: 44% (120%MT)  Trains: 60           8/22/2024     1:30 PM 8/23/2024    11:23 AM 8/26/2024    12:54 PM   PHQ-9 SCORE   PHQ-9 Total Score MyChart  7 (Mild depression) 9 (Mild depression)   PHQ-9 Total Score 13 7 9    9       Date MADRS QIDS PHQ-9   07/19/24 37 20 23   8/2/24 -- 24 25   8/9/24  19 21   8/16/24  19 15   8/23/24  18 7     Plan   - Continue TMS treatments    This service provided today was under the supervising provider of the day, Raffi Palmer MD, who was available if needed.    Payal Austin TMS Technician  [facility]  Mental [facility] Neuromodulation

## 2024-08-27 ENCOUNTER — OFFICE VISIT (OUTPATIENT)
Dept: PSYCHIATRY | Facility: CLINIC | Age: 54
End: 2024-08-27
Payer: COMMERCIAL

## 2024-08-27 DIAGNOSIS — F33.2 SEVERE EPISODE OF RECURRENT MAJOR DEPRESSIVE DISORDER, WITHOUT PSYCHOTIC FEATURES (H): Primary | ICD-10-CM

## 2024-08-27 NOTE — PROGRESS NOTES
Interventional Psychiatry Program  [address], [address]  [place] [address]  TMS Procedure Note   Sharmin Nieves MRN# [id number]  Age: 54 year old   YOB: 1970    Sharmin Nieves comes into clinic today at the request ofFESTUS MD, ordering provider for TMS treatments.    Pre-Procedure:  History and Physical: Reviewed in medical record  Consent signed by: Sharmin Nieves for this course of treatment on: 07/19/2024    Clinical Narrative:  Patient tolerated treatment. Had fun spending time with friends over the weekend.    Indications for TMS:   MDD, recurrent, severe; 4+ medication trials (from 2+ classes) ineffective; Psychotherapy ineffective    Procedure Diagnosis:  MDD, recurrent, severe; F33.2    Treatment Hx:  Treatment number this series: 20  Total lifetime treatment number: 20    Allergies   Allergen Reactions    Lisinopril Cough     cough      There were no vitals taken for this visit.    Pause for the Cause  Right patient: Yes  Right procedure/correct coil:  Yes; rTMS; [id number]; F8 coil.   Earplugs in place:  Yes    Procedure  Patient was seated in procedure chair. Identity and procedure was verified. Ear plugs were placed in ears and patient-specific cap was placed on head and tightened appropriately. Ruler locations were verified. Bone conducting headphones were not used. Coil was placed at F3 and stimulator was set to 44% (120% of MT). Initial train was well tolerated so 60 trains were delivered. Patient tolerated procedure well with minimal right eyebrow movement.    Motor Threshold Determination  Distance from nasion to inion: 37cm  Tragus to tragus distance: 40cm  Head circumference: 58.5cm  Distance along the vertex: 10.1cm  Distance along circumference from midline: 6.7cm  MT 1: F3 @ 37% on 07/19/2024    Theta LDLFPC   Frequency: 50 Hz  Burst Frequency: 5 Hz  Train Duration: 10 sec   Number of Bursts: 10   Total pulses delivered: 1800  Tx Loc:  F3  Energy: 44% (120%MT)  Trains: 60           8/23/2024    11:23 AM 8/26/2024    12:54 PM 8/27/2024     1:34 PM   PHQ-9 SCORE   PHQ-9 Total Score MyChart 7 (Mild depression) 9 (Mild depression)    PHQ-9 Total Score 7 9    9 8       Date MADRS QIDS PHQ-9   07/19/24 37 20 23   8/2/24 -- 24 25   8/9/24  19 21   8/16/24  19 15   8/23/24  18 7     Plan   - Continue TMS treatments    This service provided today was under the supervising provider of the day, Raffi Palmer MD, who was available if needed.    Kristie Stahl, TMS Technician  [facility] Neuromodulation

## 2024-08-28 ENCOUNTER — OFFICE VISIT (OUTPATIENT)
Dept: PSYCHIATRY | Facility: CLINIC | Age: 54
End: 2024-08-28
Payer: COMMERCIAL

## 2024-08-28 DIAGNOSIS — F33.2 SEVERE EPISODE OF RECURRENT MAJOR DEPRESSIVE DISORDER, WITHOUT PSYCHOTIC FEATURES (H): Primary | ICD-10-CM

## 2024-08-28 ASSESSMENT — PATIENT HEALTH QUESTIONNAIRE - PHQ9
SUM OF ALL RESPONSES TO PHQ QUESTIONS 1-9: 6
SUM OF ALL RESPONSES TO PHQ QUESTIONS 1-9: 6
10. IF YOU CHECKED OFF ANY PROBLEMS, HOW DIFFICULT HAVE THESE PROBLEMS MADE IT FOR YOU TO DO YOUR WORK, TAKE CARE OF THINGS AT HOME, OR GET ALONG WITH OTHER PEOPLE: VERY DIFFICULT
SUM OF ALL RESPONSES TO PHQ QUESTIONS 1-9: 6
10. IF YOU CHECKED OFF ANY PROBLEMS, HOW DIFFICULT HAVE THESE PROBLEMS MADE IT FOR YOU TO DO YOUR WORK, TAKE CARE OF THINGS AT HOME, OR GET ALONG WITH OTHER PEOPLE: VERY DIFFICULT
SUM OF ALL RESPONSES TO PHQ QUESTIONS 1-9: 6

## 2024-08-28 NOTE — PROGRESS NOTES
Interventional Psychiatry Program  [address], [address]  [place] [address]  TMS Procedure Note   Sharmin Nieves MRN# [id number]  Age: 54 year old   YOB: 1970    Sharmin Nieves comes into clinic today at the request ofFESTUS MD, ordering provider for TMS treatments.    Pre-Procedure:  History and Physical: Reviewed in medical record  Consent signed by: Sharmin Nieves for this course of treatment on: 07/19/2024    Clinical Narrative:  Patient tolerated treatment. Feeling very fatigued.    Indications for TMS:   MDD, recurrent, severe; 4+ medication trials (from 2+ classes) ineffective; Psychotherapy ineffective    Procedure Diagnosis:  MDD, recurrent, severe; F33.2    Treatment Hx:  Treatment number this series: 21  Total lifetime treatment number: 21    Allergies   Allergen Reactions    Lisinopril Cough     cough      There were no vitals taken for this visit.    Pause for the Cause  Right patient: Yes  Right procedure/correct coil:  Yes; rTMS; [id number]; F8 coil.   Earplugs in place:  Yes    Procedure  Patient was seated in procedure chair. Identity and procedure was verified. Ear plugs were placed in ears and patient-specific cap was placed on head and tightened appropriately. Ruler locations were verified. Bone conducting headphones were not used. Coil was placed at F3 and stimulator was set to 44% (120% of MT). Initial train was well tolerated so 60 trains were delivered. Patient tolerated procedure well with minimal right eyebrow movement.    Motor Threshold Determination  Distance from nasion to inion: 37cm  Tragus to tragus distance: 40cm  Head circumference: 58.5cm  Distance along the vertex: 10.1cm  Distance along circumference from midline: 6.7cm  MT 1: F3 @ 37% on 07/19/2024    Theta LDLFPC   Frequency: 50 Hz  Burst Frequency: 5 Hz  Train Duration: 10 sec   Number of Bursts: 10   Total pulses delivered: 1800  Tx Loc: F3  Energy: 44% (120%MT)  Trains: 60            8/26/2024    12:54 PM 8/27/2024     1:34 PM 8/28/2024     1:08 PM   PHQ-9 SCORE   PHQ-9 Total Score MyChart 9 (Mild depression)  6 (Mild depression)   PHQ-9 Total Score 9    9 8 6    6       Date MADRS QIDS PHQ-9   07/19/24 37 20 23   8/2/24 -- 24 25   8/9/24  19 21   8/16/24  19 15   8/23/24  18 7     Plan   - Continue TMS treatments    This service provided today was under the supervising provider of the day, Sterling Stratton MD, who was available if needed.    Kristie Stahl, TMS Technician  [facility] Neuromodulation

## 2024-08-29 ENCOUNTER — OFFICE VISIT (OUTPATIENT)
Dept: PSYCHIATRY | Facility: CLINIC | Age: 54
End: 2024-08-29
Payer: COMMERCIAL

## 2024-08-29 ENCOUNTER — TELEPHONE (OUTPATIENT)
Dept: FAMILY MEDICINE | Facility: CLINIC | Age: 54
End: 2024-08-29
Payer: COMMERCIAL

## 2024-08-29 DIAGNOSIS — F33.2 SEVERE EPISODE OF RECURRENT MAJOR DEPRESSIVE DISORDER, WITHOUT PSYCHOTIC FEATURES (H): Primary | ICD-10-CM

## 2024-08-29 NOTE — PROGRESS NOTES
Interventional Psychiatry Program  [address], [address]  [place] [address]  TMS Procedure Note   Sharmin Nieves MRN# [id number]  Age: 54 year old   YOB: 1970    Sharmin Nieves comes into clinic today at the request ofFESTUS MD, ordering provider for TMS treatments.    Pre-Procedure:  History and Physical: Reviewed in medical record  Consent signed by: Sharmin Nieves for this course of treatment on: 07/19/2024    Clinical Narrative:  Patient tolerated treatment. No changes reported.    Indications for TMS:   MDD, recurrent, severe; 4+ medication trials (from 2+ classes) ineffective; Psychotherapy ineffective    Procedure Diagnosis:  MDD, recurrent, severe; F33.2    Treatment Hx:  Treatment number this series: 22  Total lifetime treatment number: 22    Allergies   Allergen Reactions    Lisinopril Cough     cough      There were no vitals taken for this visit.    Pause for the Cause  Right patient: Yes  Right procedure/correct coil:  Yes; rTMS; [id number]; F8 coil.   Earplugs in place:  Yes    Procedure  Patient was seated in procedure chair. Identity and procedure was verified. Ear plugs were placed in ears and patient-specific cap was placed on head and tightened appropriately. Ruler locations were verified. Bone conducting headphones were not used. Coil was placed at F3 and stimulator was set to 44% (120% of MT). Initial train was well tolerated so 60 trains were delivered. Patient tolerated procedure well with minimal right eyebrow movement.    Motor Threshold Determination  Distance from nasion to inion: 37cm  Tragus to tragus distance: 40cm  Head circumference: 58.5cm  Distance along the vertex: 10.1cm  Distance along circumference from midline: 6.7cm  MT 1: F3 @ 37% on 07/19/2024    Theta LDLFPC   Frequency: 50 Hz  Burst Frequency: 5 Hz  Train Duration: 10 sec   Number of Bursts: 10   Total pulses delivered: 1800  Tx Loc: F3  Energy: 44% (120%MT)  Trains: 60            8/26/2024    12:54 PM 8/27/2024     1:34 PM 8/28/2024     1:08 PM   PHQ-9 SCORE   PHQ-9 Total Score MyChart 9 (Mild depression)  6 (Mild depression)   PHQ-9 Total Score 9    9 8 6    6       Date MADRS QIDS PHQ-9   07/19/24 37 20 23   8/2/24 -- 24 25   8/9/24  19 21   8/16/24  19 15   8/23/24  18 7     Plan   - Continue TMS treatments    This service provided today was under the supervising provider of the day, Sonam Hopkins MD, who was available if needed.    Anna Seo, TMS Technician  [facility] Neuromodulation

## 2024-08-29 NOTE — TELEPHONE ENCOUNTER
Reason for Call:  Form, our goal is to have forms completed with 72 hours, however, some forms may require a visit or additional information.    Type of letter, form or note:  FMLA    Who is the form from?: Insurance comp    Where did the form come from: form was faxed in    What clinic location was the form placed at?: [facility]    Where the form was placed:  [employer] [facility] Box/Folder    What number is listed as a contact on the form?:  F [phone]       Additional comments:     Call taken on 8/29/2024 at 2:17 PM by Anabel Delcid

## 2024-09-03 ENCOUNTER — OFFICE VISIT (OUTPATIENT)
Dept: PSYCHIATRY | Facility: CLINIC | Age: 54
End: 2024-09-03
Payer: COMMERCIAL

## 2024-09-03 DIAGNOSIS — F33.2 SEVERE EPISODE OF RECURRENT MAJOR DEPRESSIVE DISORDER, WITHOUT PSYCHOTIC FEATURES (H): Primary | ICD-10-CM

## 2024-09-03 ASSESSMENT — PATIENT HEALTH QUESTIONNAIRE - PHQ9
SUM OF ALL RESPONSES TO PHQ QUESTIONS 1-9: 6
10. IF YOU CHECKED OFF ANY PROBLEMS, HOW DIFFICULT HAVE THESE PROBLEMS MADE IT FOR YOU TO DO YOUR WORK, TAKE CARE OF THINGS AT HOME, OR GET ALONG WITH OTHER PEOPLE: SOMEWHAT DIFFICULT

## 2024-09-03 NOTE — PROGRESS NOTES
Interventional Psychiatry Program  [address], [address]  [place] [address]  TMS Procedure Note   Sharmin Nieves MRN# [id number]  Age: 54 year old   YOB: 1970    Sharmin Nieves comes into clinic today at the request ofFESTUS MD, ordering provider for TMS treatments.    Pre-Procedure:  History and Physical: Reviewed in medical record  Consent signed by: Sharmin Nieves for this course of treatment on: 07/19/2024    Clinical Narrative:  Patient tolerated treatment. Had a great weekend with family.    Indications for TMS:   MDD, recurrent, severe; 4+ medication trials (from 2+ classes) ineffective; Psychotherapy ineffective    Procedure Diagnosis:  MDD, recurrent, severe; F33.2    Treatment Hx:  Treatment number this series: 23  Total lifetime treatment number: 23    Allergies   Allergen Reactions    Lisinopril Cough     cough      There were no vitals taken for this visit.    Pause for the Cause  Right patient: Yes  Right procedure/correct coil:  Yes; rTMS; [id number]; F8 coil.   Earplugs in place:  Yes    Procedure  Patient was seated in procedure chair. Identity and procedure was verified. Ear plugs were placed in ears and patient-specific cap was placed on head and tightened appropriately. Ruler locations were verified. Bone conducting headphones were not used. Coil was placed at F3 and stimulator was set to 44% (120% of MT). Initial train was well tolerated so 60 trains were delivered. Patient tolerated procedure well with minimal right eyebrow movement.    Motor Threshold Determination  Distance from nasion to inion: 37cm  Tragus to tragus distance: 40cm  Head circumference: 58.5cm  Distance along the vertex: 10.1cm  Distance along circumference from midline: 6.7cm  MT 1: F3 @ 37% on 07/19/2024    Theta LDLFPC   Frequency: 50 Hz  Burst Frequency: 5 Hz  Train Duration: 10 sec   Number of Bursts: 10   Total pulses delivered: 1800  Tx Loc: F3  Energy: 44%  (120%MT)  Trains: 60           8/28/2024     1:08 PM 8/29/2024     1:37 PM 9/3/2024    12:28 PM   PHQ-9 SCORE   PHQ-9 Total Score MyChart 6 (Mild depression)  6 (Mild depression)   PHQ-9 Total Score 6    6 10 6    6       Date MADRS QIDS PHQ-9   07/19/24 37 20 23   8/2/24 -- 24 25   8/9/24  19 21   8/16/24  19 15   8/23/24  18 7     Plan   - Continue TMS treatments    This service provided today was under the supervising provider of the day, FESTUS Millard MD, who was available if needed.    Anna Seo, TMS Technician  [facility] Neuromodulation

## 2024-09-04 ENCOUNTER — OFFICE VISIT (OUTPATIENT)
Dept: PSYCHIATRY | Facility: CLINIC | Age: 54
End: 2024-09-04
Payer: COMMERCIAL

## 2024-09-04 DIAGNOSIS — F33.2 SEVERE EPISODE OF RECURRENT MAJOR DEPRESSIVE DISORDER, WITHOUT PSYCHOTIC FEATURES (H): Primary | ICD-10-CM

## 2024-09-04 ASSESSMENT — PATIENT HEALTH QUESTIONNAIRE - PHQ9
SUM OF ALL RESPONSES TO PHQ QUESTIONS 1-9: 6
10. IF YOU CHECKED OFF ANY PROBLEMS, HOW DIFFICULT HAVE THESE PROBLEMS MADE IT FOR YOU TO DO YOUR WORK, TAKE CARE OF THINGS AT HOME, OR GET ALONG WITH OTHER PEOPLE: SOMEWHAT DIFFICULT
10. IF YOU CHECKED OFF ANY PROBLEMS, HOW DIFFICULT HAVE THESE PROBLEMS MADE IT FOR YOU TO DO YOUR WORK, TAKE CARE OF THINGS AT HOME, OR GET ALONG WITH OTHER PEOPLE: SOMEWHAT DIFFICULT
SUM OF ALL RESPONSES TO PHQ QUESTIONS 1-9: 6

## 2024-09-04 NOTE — PROGRESS NOTES
Interventional Psychiatry Program  [address], [address]  [place] [address]  TMS Procedure Note   Sharmin Nieves MRN# [id number]  Age: 54 year old   YOB: 1970    Sharmin Nieves comes into clinic today at the request ofFESTUS MD, ordering provider for TMS treatments.    Pre-Procedure:  History and Physical: Reviewed in medical record  Consent signed by: Sharmin Nieves for this course of treatment on: 07/19/2024    Clinical Narrative:  Patient tolerated treatment. Talked about her [facility].     Indications for TMS:   MDD, recurrent, severe; 4+ medication trials (from 2+ classes) ineffective; Psychotherapy ineffective    Procedure Diagnosis:  MDD, recurrent, severe; F33.2    Treatment Hx:  Treatment number this series: 24  Total lifetime treatment number: 24    Allergies   Allergen Reactions    Lisinopril Cough     cough      There were no vitals taken for this visit.    Pause for the Cause  Right patient: Yes  Right procedure/correct coil:  Yes; rTMS; [id number]; F8 coil.   Earplugs in place:  Yes    Procedure  Patient was seated in procedure chair. Identity and procedure was verified. Ear plugs were placed in ears and patient-specific cap was placed on head and tightened appropriately. Ruler locations were verified. Bone conducting headphones were not used. Coil was placed at F3 and stimulator was set to 44% (120% of MT). Initial train was well tolerated so 60 trains were delivered. Patient tolerated procedure well with minimal right eyebrow movement.    Motor Threshold Determination  Distance from nasion to inion: 37cm  Tragus to tragus distance: 40cm  Head circumference: 58.5cm  Distance along the vertex: 10.1cm  Distance along circumference from midline: 6.7cm  MT 1: F3 @ 37% on 07/19/2024    Theta LDLFPC   Frequency: 50 Hz  Burst Frequency: 5 Hz  Train Duration: 10 sec   Number of Bursts: 10   Total pulses delivered: 1800  Tx Loc: F3  Energy: 44% (120%MT)  Trains:  60           8/29/2024     1:37 PM 9/3/2024    12:28 PM 9/4/2024    11:25 AM   PHQ-9 SCORE   PHQ-9 Total Score MyChart  6 (Mild depression) 6 (Mild depression)   PHQ-9 Total Score 10 6    6    6 6    6       Date MADRS QIDS PHQ-9   07/19/24 37 20 23   8/2/24 -- 24 25   8/9/24  19 21   8/16/24  19 15   8/23/24  18 7     Plan   - Continue TMS treatments    This service provided today was under the supervising provider of the day, Raffi Palmer MD, who was available if needed.    Kristie Stahl, TMS Technician  [facility] Neuromodulation

## 2024-09-05 ENCOUNTER — OFFICE VISIT (OUTPATIENT)
Dept: PSYCHIATRY | Facility: CLINIC | Age: 54
End: 2024-09-05
Payer: COMMERCIAL

## 2024-09-05 ENCOUNTER — VIRTUAL VISIT (OUTPATIENT)
Dept: FAMILY MEDICINE | Facility: CLINIC | Age: 54
End: 2024-09-05
Payer: COMMERCIAL

## 2024-09-05 DIAGNOSIS — F41.1 GENERALIZED ANXIETY DISORDER: ICD-10-CM

## 2024-09-05 DIAGNOSIS — F33.2 SEVERE EPISODE OF RECURRENT MAJOR DEPRESSIVE DISORDER, WITHOUT PSYCHOTIC FEATURES (H): Primary | ICD-10-CM

## 2024-09-05 DIAGNOSIS — F33.9 RECURRENT MAJOR DEPRESSIVE DISORDER, REMISSION STATUS UNSPECIFIED (H): Primary | ICD-10-CM

## 2024-09-05 DIAGNOSIS — M79.671 CHRONIC FOOT PAIN, RIGHT: ICD-10-CM

## 2024-09-05 DIAGNOSIS — F43.21 GRIEF: ICD-10-CM

## 2024-09-05 DIAGNOSIS — G89.29 CHRONIC FOOT PAIN, RIGHT: ICD-10-CM

## 2024-09-05 PROCEDURE — 99213 OFFICE O/P EST LOW 20 MIN: CPT | Mod: 95 | Performed by: NURSE PRACTITIONER

## 2024-09-05 PROCEDURE — G2211 COMPLEX E/M VISIT ADD ON: HCPCS | Mod: 95 | Performed by: NURSE PRACTITIONER

## 2024-09-05 ASSESSMENT — ANXIETY QUESTIONNAIRES
GAD7 TOTAL SCORE: 6
8. IF YOU CHECKED OFF ANY PROBLEMS, HOW DIFFICULT HAVE THESE MADE IT FOR YOU TO DO YOUR WORK, TAKE CARE OF THINGS AT HOME, OR GET ALONG WITH OTHER PEOPLE?: SOMEWHAT DIFFICULT
GAD7 TOTAL SCORE: 6
7. FEELING AFRAID AS IF SOMETHING AWFUL MIGHT HAPPEN: SEVERAL DAYS
GAD7 TOTAL SCORE: 6
GAD7 TOTAL SCORE: 6

## 2024-09-05 NOTE — PROGRESS NOTES
Interventional Psychiatry Program  [address], [address]  [place] [address]  TMS Procedure Note   Sharmin Nieves MRN# [id number]  Age: 54 year old   YOB: 1970    Sharmin Nieves comes into clinic today at the request ofFESTUS MD, ordering provider for TMS treatments.    Pre-Procedure:  History and Physical: Reviewed in medical record  Consent signed by: Sharmin Nieves for this course of treatment on: 07/19/2024    Clinical Narrative:  Patient tolerated treatment.  Doing good today. Started new Trauma Therapy Provider, went really well.    Indications for TMS:   MDD, recurrent, severe; 4+ medication trials (from 2+ classes) ineffective; Psychotherapy ineffective    Procedure Diagnosis:  MDD, recurrent, severe; F33.2    Treatment Hx:  Treatment number this series: 25  Total lifetime treatment number: 25    Allergies   Allergen Reactions    Lisinopril Cough     cough      There were no vitals taken for this visit.    Pause for the Cause  Right patient: Yes  Right procedure/correct coil:  Yes; rTMS; [id number]; F8 coil.   Earplugs in place:  Yes    Procedure  Patient was seated in procedure chair. Identity and procedure was verified. Ear plugs were placed in ears and patient-specific cap was placed on head and tightened appropriately. Ruler locations were verified. Bone conducting headphones were not used. Coil was placed at F3 and stimulator was set to 44% (120% of MT). Initial train was well tolerated so 60 trains were delivered. Patient tolerated procedure well with minimal right eyebrow movement.    Motor Threshold Determination  Distance from nasion to inion: 37cm  Tragus to tragus distance: 40cm  Head circumference: 58.5cm  Distance along the vertex: 10.1cm  Distance along circumference from midline: 6.7cm  MT 1: F3 @ 37% on 07/19/2024    Theta LDLFPC   Frequency: 50 Hz  Burst Frequency: 5 Hz  Train Duration: 10 sec   Number of Bursts: 10   Total pulses delivered:  1800  Tx Loc: F3  Energy: 44% (120%MT)  Trains: 60           8/29/2024     1:37 PM 9/3/2024    12:28 PM 9/4/2024    11:25 AM   PHQ-9 SCORE   PHQ-9 Total Score MyChart  6 (Mild depression) 6 (Mild depression)   PHQ-9 Total Score 10 6    6    6 6    6       Date MADRS QIDS PHQ-9   07/19/24 37 20 23   8/2/24 -- 24 25   8/9/24  19 21   8/16/24  19 15   8/23/24  18 7     Plan   - Continue TMS treatments    This service provided today was under the supervising provider of the day, FESTUS Millard MD, who was available if needed.    Payal Austin TMS Technician  [facility] Neuromodulation

## 2024-09-05 NOTE — PROGRESS NOTES
Kendy is a 54 year old who is being evaluated via a billable video visit.    How would you like to obtain your AVS? MyChart  If the video visit is dropped, the invitation should be resent by: Text to cell phone: [phone]  Will anyone else be joining your video visit? No      Assessment & Plan     Recurrent major depressive disorder, remission status unspecified (H24)  Seeing improvement and benefits from TMS treatments. Set to send 9/19 so needs extension on STD paperwork from [facility] which is completed today. Will need allowance for appointments after return from continuous leave, as well as possible flares of depression which are both indicated in the paperwork. Has physical scheduled in two weeks    Generalized anxiety disorder   As above    Grief  Improving    Chronic foot pain, right  Stable - no upcoming plans. Bup was cost prohibitive. Continue opioid treatment      The longitudinal plan of care for the diagnosis(es)/condition(s) as documented were addressed during this visit. Due to the added complexity in care, I will continue to support Kendy in the subsequent management and with ongoing continuity of care.Review of prior external note(s) from - psychiatry, MTM  I spent a total of 35 minutes on the day of the visit.   Time spent by me doing chart review, history and exam, documentation and further activities per the note      Subjective   Kendy is a 54 year old, presenting for the following health issues:   Follow Up    History of Present Illness       Mental Health Follow-up:  Patient presents to follow-up on Depression & Anxiety.Patient's depression since last visit has been:  Medium  The patient is not having other symptoms associated with depression.  Patient's anxiety since last visit has been:  Better  The patient is not having other symptoms associated with anxiety.  Any significant life events: other  Patient is not feeling anxious or having panic attacks.  Patient has no concerns about alcohol or  drug use.    She eats 2-3 servings of fruits and vegetables daily.She consumes 1 sweetened beverage(s) daily.She exercises with enough effort to increase her heart rate 9 or less minutes per day.  She exercises with enough effort to increase her heart rate 3 or less days per week.   She is taking medications regularly.       Mental health  Has been doing TMS 5 days per week and will be done with treatment 8/19. Can feel difference in mood. Feeling more motivation to do things. Noticed she wanted to do her hair and make up which she hasn't felt in awhile.     Had first visit with new therapist yesterday with Yadira Lopez at [facility]. [place] she clicked with her and has experience with trauma therapy.     No changes to venlafaxine.    Sleep  Has been a lot better since we last spoke. Has reduced to use hydroxyzine only 1-2 times in past week.     Pain  No change in foot pain. Buprenorphine was cost prohibitive even at covered rate.     [employer] paperwork  TMS treatments continue through Sept 19th  Plans to RTW Sept 23rd  Follow-up appt with psychiatry        Objective           Vitals:  No vitals were obtained today due to virtual visit.    Physical Exam   GENERAL: alert and no distress  EYES: Eyes grossly normal to inspection.  No discharge or erythema, or obvious scleral/conjunctival abnormalities.  RESP: No audible wheeze, cough, or visible cyanosis.    SKIN: Visible skin clear. No significant rash, abnormal pigmentation or lesions.  NEURO: Cranial nerves grossly intact.  Mentation and speech appropriate for age.  PSYCH: Appropriate affect, tone, and pace of words          Video-Visit Details    Type of service:  Video Visit   Originating Location (pt. Location): Home    Distant Location (provider location):  On-site  Platform used for Video Visit: Charlie  Signed Electronically by: LEDY Seaman CNP

## 2024-09-09 ENCOUNTER — OFFICE VISIT (OUTPATIENT)
Dept: PSYCHIATRY | Facility: CLINIC | Age: 54
End: 2024-09-09
Payer: COMMERCIAL

## 2024-09-09 DIAGNOSIS — F33.2 SEVERE EPISODE OF RECURRENT MAJOR DEPRESSIVE DISORDER, WITHOUT PSYCHOTIC FEATURES (H): Primary | ICD-10-CM

## 2024-09-09 ASSESSMENT — PATIENT HEALTH QUESTIONNAIRE - PHQ9
SUM OF ALL RESPONSES TO PHQ QUESTIONS 1-9: 8
10. IF YOU CHECKED OFF ANY PROBLEMS, HOW DIFFICULT HAVE THESE PROBLEMS MADE IT FOR YOU TO DO YOUR WORK, TAKE CARE OF THINGS AT HOME, OR GET ALONG WITH OTHER PEOPLE: SOMEWHAT DIFFICULT
SUM OF ALL RESPONSES TO PHQ QUESTIONS 1-9: 8
SUM OF ALL RESPONSES TO PHQ QUESTIONS 1-9: 8
10. IF YOU CHECKED OFF ANY PROBLEMS, HOW DIFFICULT HAVE THESE PROBLEMS MADE IT FOR YOU TO DO YOUR WORK, TAKE CARE OF THINGS AT HOME, OR GET ALONG WITH OTHER PEOPLE: SOMEWHAT DIFFICULT
SUM OF ALL RESPONSES TO PHQ QUESTIONS 1-9: 8
10. IF YOU CHECKED OFF ANY PROBLEMS, HOW DIFFICULT HAVE THESE PROBLEMS MADE IT FOR YOU TO DO YOUR WORK, TAKE CARE OF THINGS AT HOME, OR GET ALONG WITH OTHER PEOPLE: SOMEWHAT DIFFICULT

## 2024-09-09 ASSESSMENT — ANXIETY QUESTIONNAIRES
GAD7 TOTAL SCORE: 6
7. FEELING AFRAID AS IF SOMETHING AWFUL MIGHT HAPPEN: SEVERAL DAYS
8. IF YOU CHECKED OFF ANY PROBLEMS, HOW DIFFICULT HAVE THESE MADE IT FOR YOU TO DO YOUR WORK, TAKE CARE OF THINGS AT HOME, OR GET ALONG WITH OTHER PEOPLE?: SOMEWHAT DIFFICULT
GAD7 TOTAL SCORE: 6

## 2024-09-09 NOTE — PROGRESS NOTES
Interventional Psychiatry Program  [address], [address]  [place] [address]  TMS Procedure Note   Sharmin Nieves MRN# [id number]  Age: 54 year old   YOB: 1970    Sharmin Nieves comes into clinic today at the request ofFESTUS MD, ordering provider for TMS treatments.    Pre-Procedure:  History and Physical: Reviewed in medical record  Consent signed by: Sharmin Nieves for this course of treatment on: 07/19/2024    Clinical Narrative:  Patient tolerated treatment. Had a rough weekend. Found out her aunt has 6 months to live. Also found out her son has been very depressed lately which has been hard on her.     Indications for TMS:   MDD, recurrent, severe; 4+ medication trials (from 2+ classes) ineffective; Psychotherapy ineffective    Procedure Diagnosis:  MDD, recurrent, severe; F33.2    Treatment Hx:  Treatment number this series: 26  Total lifetime treatment number: 26    Allergies   Allergen Reactions    Lisinopril Cough     cough      There were no vitals taken for this visit.    Pause for the Cause  Right patient: Yes  Right procedure/correct coil:  Yes; rTMS; [id number]; F8 coil.   Earplugs in place:  Yes    Procedure  Patient was seated in procedure chair. Identity and procedure was verified. Ear plugs were placed in ears and patient-specific cap was placed on head and tightened appropriately. Ruler locations were verified. Bone conducting headphones were not used. Coil was placed at F3 and stimulator was set to 44% (120% of MT). Initial train was well tolerated so 60 trains were delivered. Patient tolerated procedure well with minimal right eyebrow movement.    Motor Threshold Determination  Distance from nasion to inion: 37cm  Tragus to tragus distance: 40cm  Head circumference: 58.5cm  Distance along the vertex: 10.1cm  Distance along circumference from midline: 6.7cm  MT 1: F3 @ 37% on 07/19/2024    Theta LDLFPC   Frequency: 50 Hz  Burst Frequency: 5  Hz  Train Duration: 10 sec   Number of Bursts: 10   Total pulses delivered: 1800  Tx Loc: F3  Energy: 44% (120%MT)  Trains: 60           9/4/2024    11:25 AM 9/5/2024     1:31 PM 9/9/2024     1:10 PM   PHQ-9 SCORE   PHQ-9 Total Score MyChart 6 (Mild depression)  8 (Mild depression)   PHQ-9 Total Score 6    6 8 8    8    8       Date MADRS QIDS PHQ-9   07/19/24 37 20 23   8/2/24 -- 24 25   8/9/24  19 21   8/16/24  19 15   8/23/24  18 7     Plan   - Continue TMS treatments    This service provided today was under the supervising provider of the day, Raffi Palmer MD, who was available if needed.    Kristie Stahl, TMS Technician  [facility]  Mental [facility] Neuromodulation

## 2024-09-10 ENCOUNTER — PATIENT OUTREACH (OUTPATIENT)
Dept: CARE COORDINATION | Facility: CLINIC | Age: 54
End: 2024-09-10
Payer: COMMERCIAL

## 2024-09-10 ENCOUNTER — OFFICE VISIT (OUTPATIENT)
Dept: PSYCHIATRY | Facility: CLINIC | Age: 54
End: 2024-09-10
Payer: COMMERCIAL

## 2024-09-10 ENCOUNTER — ALLIED HEALTH/NURSE VISIT (OUTPATIENT)
Dept: PSYCHIATRY | Facility: CLINIC | Age: 54
End: 2024-09-10
Payer: COMMERCIAL

## 2024-09-10 ENCOUNTER — MYC REFILL (OUTPATIENT)
Dept: FAMILY MEDICINE | Facility: CLINIC | Age: 54
End: 2024-09-10
Payer: COMMERCIAL

## 2024-09-10 DIAGNOSIS — F51.01 PRIMARY INSOMNIA: ICD-10-CM

## 2024-09-10 DIAGNOSIS — F33.2 SEVERE EPISODE OF RECURRENT MAJOR DEPRESSIVE DISORDER, WITHOUT PSYCHOTIC FEATURES (H): Primary | ICD-10-CM

## 2024-09-10 RX ORDER — TRAZODONE HYDROCHLORIDE 100 MG/1
200 TABLET ORAL AT BEDTIME
Qty: 180 TABLET | Refills: 1 | Status: SHIPPED | OUTPATIENT
Start: 2024-09-10

## 2024-09-10 NOTE — PROGRESS NOTES
Interventional Psychiatry Program  [address], [address]  [place] [address]  TMS Procedure Note   Sharmin Nieves MRN# [id number]  Age: 54 year old   YOB: 1970    Sharmin Nieves comes into clinic today at the request ofFESTUS MD, ordering provider for TMS treatments.    Pre-Procedure:  History and Physical: Reviewed in medical record  Consent signed by: Sharmin Nieves for this course of treatment on: 07/19/2024    Clinical Narrative:  Patient tolerated treatment. Had a very productive morning.      Indications for TMS:   MDD, recurrent, severe; 4+ medication trials (from 2+ classes) ineffective; Psychotherapy ineffective    Procedure Diagnosis:  MDD, recurrent, severe; F33.2    Treatment Hx:  Treatment number this series: 27  Total lifetime treatment number: 27    Allergies   Allergen Reactions    Lisinopril Cough     cough      There were no vitals taken for this visit.    Pause for the Cause  Right patient: Yes  Right procedure/correct coil:  Yes; rTMS; [id number]; F8 coil.   Earplugs in place:  Yes    Procedure  Patient was seated in procedure chair. Identity and procedure was verified. Ear plugs were placed in ears and patient-specific cap was placed on head and tightened appropriately. Ruler locations were verified. Bone conducting headphones were not used. Coil was placed at F3 and stimulator was set to 44% (120% of MT). Initial train was well tolerated so 60 trains were delivered. Patient tolerated procedure well with minimal right eyebrow movement.    Motor Threshold Determination  Distance from nasion to inion: 37cm  Tragus to tragus distance: 40cm  Head circumference: 58.5cm  Distance along the vertex: 10.1cm  Distance along circumference from midline: 6.7cm  MT 1: F3 @ 37% on 07/19/2024    Theta LDLFPC   Frequency: 50 Hz  Burst Frequency: 5 Hz  Train Duration: 10 sec   Number of Bursts: 10   Total pulses delivered: 1800  Tx Loc: F3  Energy: 44%  (120%MT)  Trains: 60           9/4/2024    11:25 AM 9/5/2024     1:31 PM 9/9/2024     1:10 PM   PHQ-9 SCORE   PHQ-9 Total Score MyChart 6 (Mild depression)  8 (Mild depression)   PHQ-9 Total Score 6    6 8 8    8    8       Date MADRS QIDS PHQ-9   07/19/24 37 20 23   8/2/24 -- 24 25   8/9/24  19 21   8/16/24  19 15   8/23/24  18 7     Plan   - Continue TMS treatments    This service provided today was under the supervising provider of the day, FESTUS Millard MD, who was available if needed.    Payal Austin TMS Technician  [facility]  Mental [facility] Neuromodulation

## 2024-09-10 NOTE — PROGRESS NOTES
Physicians:  Care Coordination Note     SITUATION   Sharmin Nieves is a 54 year old female who has been receiving Transcranial Magnetic Stimulation (TMS) at the request of FESTUS Millard mD.    BACKGROUND     During course of TMS    ASSESSMENT        Met with patient today to check on her during TMS course.    During today's discussion writer and patient discussed:    Patient reports that overall she feels like TMS has been helpful.  However she reports that she continues to experience difficulties with life stressors.  She informed writer and TMS tech that her aunt is dealing with terminal cancer and is looking at physicians assisted suicide.  This has been very distressing to patient.  We did review next steps with TMS treatment and what to in the event that she would need treatment again.         PHQ 9: 10  # of completed TMS Sessions: 27              PLAN       Nursing Interventions: TMS edu    Follow-up plan:  RN to continue to follow    Shannon Corona RN

## 2024-09-11 ENCOUNTER — OFFICE VISIT (OUTPATIENT)
Dept: PSYCHIATRY | Facility: CLINIC | Age: 54
End: 2024-09-11
Payer: COMMERCIAL

## 2024-09-11 DIAGNOSIS — F33.2 SEVERE EPISODE OF RECURRENT MAJOR DEPRESSIVE DISORDER, WITHOUT PSYCHOTIC FEATURES (H): Primary | ICD-10-CM

## 2024-09-11 ASSESSMENT — PATIENT HEALTH QUESTIONNAIRE - PHQ9
SUM OF ALL RESPONSES TO PHQ QUESTIONS 1-9: 6
SUM OF ALL RESPONSES TO PHQ QUESTIONS 1-9: 6
10. IF YOU CHECKED OFF ANY PROBLEMS, HOW DIFFICULT HAVE THESE PROBLEMS MADE IT FOR YOU TO DO YOUR WORK, TAKE CARE OF THINGS AT HOME, OR GET ALONG WITH OTHER PEOPLE: SOMEWHAT DIFFICULT
SUM OF ALL RESPONSES TO PHQ QUESTIONS 1-9: 6
SUM OF ALL RESPONSES TO PHQ QUESTIONS 1-9: 6
10. IF YOU CHECKED OFF ANY PROBLEMS, HOW DIFFICULT HAVE THESE PROBLEMS MADE IT FOR YOU TO DO YOUR WORK, TAKE CARE OF THINGS AT HOME, OR GET ALONG WITH OTHER PEOPLE: SOMEWHAT DIFFICULT

## 2024-09-11 NOTE — PROGRESS NOTES
Interventional Psychiatry Program  [address], [address]  [place] [address]  TMS Procedure Note   Sharmin Nieves MRN# [id number]  Age: 54 year old   YOB: 1970    Sharmin Nieves comes into clinic today at the request ofFESTUS MD, ordering provider for TMS treatments.    Pre-Procedure:  History and Physical: Reviewed in medical record  Consent signed by: Sharmin Nieves for this course of treatment on: 07/19/2024    Clinical Narrative:  Patient tolerated treatment. No changes reported.      Indications for TMS:   MDD, recurrent, severe; 4+ medication trials (from 2+ classes) ineffective; Psychotherapy ineffective    Procedure Diagnosis:  MDD, recurrent, severe; F33.2    Treatment Hx:  Treatment number this series: 28  Total lifetime treatment number: 28    Allergies   Allergen Reactions    Lisinopril Cough     cough      There were no vitals taken for this visit.    Pause for the Cause  Right patient: Yes  Right procedure/correct coil:  Yes; rTMS; [id number]; F8 coil.   Earplugs in place:  Yes    Procedure  Patient was seated in procedure chair. Identity and procedure was verified. Ear plugs were placed in ears and patient-specific cap was placed on head and tightened appropriately. Ruler locations were verified. Bone conducting headphones were not used. Coil was placed at F3 and stimulator was set to 44% (120% of MT). Initial train was well tolerated so 60 trains were delivered. Patient tolerated procedure well with minimal right eyebrow movement.    Motor Threshold Determination  Distance from nasion to inion: 37cm  Tragus to tragus distance: 40cm  Head circumference: 58.5cm  Distance along the vertex: 10.1cm  Distance along circumference from midline: 6.7cm  MT 1: F3 @ 37% on 07/19/2024    Theta LDLFPC   Frequency: 50 Hz  Burst Frequency: 5 Hz  Train Duration: 10 sec   Number of Bursts: 10   Total pulses delivered: 1800  Tx Loc: F3  Energy: 44% (120%MT)  Trains: 60            9/9/2024     1:10 PM 9/10/2024     1:35 PM 9/11/2024     1:18 PM   PHQ-9 SCORE   PHQ-9 Total Score MyChart 8 (Mild depression)  6 (Mild depression)   PHQ-9 Total Score 8    8    8 10 6    6       Date MADRS QIDS PHQ-9   07/19/24 37 20 23   8/2/24 -- 24 25   8/9/24  19 21   8/16/24  19 15   8/23/24  18 7     Plan   - Continue TMS treatments    This service provided today was under the supervising provider of the day, Sterling Stratton MD, who was available if needed.    Anna Seo, TMS Technician  [facility] Neuromodulation

## 2024-09-12 ENCOUNTER — OFFICE VISIT (OUTPATIENT)
Dept: PSYCHIATRY | Facility: CLINIC | Age: 54
End: 2024-09-12
Payer: COMMERCIAL

## 2024-09-12 DIAGNOSIS — F33.2 SEVERE EPISODE OF RECURRENT MAJOR DEPRESSIVE DISORDER, WITHOUT PSYCHOTIC FEATURES (H): Primary | ICD-10-CM

## 2024-09-12 NOTE — PROGRESS NOTES
Interventional Psychiatry Program  [address], [address]  [place] [address]  TMS Procedure Note   Sharmin Nieves MRN# [id number]  Age: 54 year old   YOB: 1970    Sharmin Nieves comes into clinic today at the request ofFESTUS MD, ordering provider for TMS treatments.    Pre-Procedure:  History and Physical: Reviewed in medical record  Consent signed by: Sharmin Nieves for this course of treatment on: 07/19/2024    Clinical Narrative:  Patient tolerated treatment. Excited for the extension on TMS treatment.       Indications for TMS:   MDD, recurrent, severe; 4+ medication trials (from 2+ classes) ineffective; Psychotherapy ineffective    Procedure Diagnosis:  MDD, recurrent, severe; F33.2    Treatment Hx:  Treatment number this series: 29  Total lifetime treatment number: 29    Allergies   Allergen Reactions    Lisinopril Cough     cough      There were no vitals taken for this visit.    Pause for the Cause  Right patient: Yes  Right procedure/correct coil:  Yes; rTMS; [id number]; F8 coil.   Earplugs in place:  Yes    Procedure  Patient was seated in procedure chair. Identity and procedure was verified. Ear plugs were placed in ears and patient-specific cap was placed on head and tightened appropriately. Ruler locations were verified. Bone conducting headphones were not used. Coil was placed at F3 and stimulator was set to 44% (120% of MT). Initial train was well tolerated so 60 trains were delivered. Patient tolerated procedure well with minimal right eyebrow movement.    Motor Threshold Determination  Distance from nasion to inion: 37cm  Tragus to tragus distance: 40cm  Head circumference: 58.5cm  Distance along the vertex: 10.1cm  Distance along circumference from midline: 6.7cm  MT 1: F3 @ 37% on 07/19/2024    Theta LDLFPC   Frequency: 50 Hz  Burst Frequency: 5 Hz  Train Duration: 10 sec   Number of Bursts: 10   Total pulses delivered: 1800  Tx Loc: F3  Energy: 44%  (120%MT)  Trains: 60           9/10/2024     1:35 PM 9/11/2024     1:18 PM 9/12/2024     1:34 PM   PHQ-9 SCORE   PHQ-9 Total Score MyChart  6 (Mild depression)    PHQ-9 Total Score 10 6    6 11       Date MADRS QIDS PHQ-9   07/19/24 37 20 23   8/2/24 -- 24 25   8/9/24  19 21   8/16/24  19 15   8/23/24  18 7     Plan   - Continue TMS treatments    This service provided today was under the supervising provider of the day, Raffi Palmer MD, who was available if needed.    Kristie Stahl, TMS Technician  [facility] Neuromodulation

## 2024-09-13 ENCOUNTER — OFFICE VISIT (OUTPATIENT)
Dept: PSYCHIATRY | Facility: CLINIC | Age: 54
End: 2024-09-13
Payer: COMMERCIAL

## 2024-09-13 ENCOUNTER — TELEPHONE (OUTPATIENT)
Dept: FAMILY MEDICINE | Facility: CLINIC | Age: 54
End: 2024-09-13
Payer: COMMERCIAL

## 2024-09-13 DIAGNOSIS — F33.2 SEVERE EPISODE OF RECURRENT MAJOR DEPRESSIVE DISORDER, WITHOUT PSYCHOTIC FEATURES (H): Primary | ICD-10-CM

## 2024-09-13 ASSESSMENT — PATIENT HEALTH QUESTIONNAIRE - PHQ9
SUM OF ALL RESPONSES TO PHQ QUESTIONS 1-9: 6
10. IF YOU CHECKED OFF ANY PROBLEMS, HOW DIFFICULT HAVE THESE PROBLEMS MADE IT FOR YOU TO DO YOUR WORK, TAKE CARE OF THINGS AT HOME, OR GET ALONG WITH OTHER PEOPLE: SOMEWHAT DIFFICULT
SUM OF ALL RESPONSES TO PHQ QUESTIONS 1-9: 6

## 2024-09-13 NOTE — PROGRESS NOTES
Interventional Psychiatry Program  [address], [address]  [place] [address]  TMS Procedure Note   Sharmin Nieves MRN# [id number]  Age: 54 year old   YOB: 1970    Sharmin Nieves comes into clinic today at the request ofFESTUS MD, ordering provider for TMS treatments.    Pre-Procedure:  History and Physical: Reviewed in medical record  Consent signed by: Sharmin Nieves for this course of treatment on: 07/19/2024    Clinical Narrative:  Patient tolerated treatment. Talked TMS extension and maintenance. Patient is really grateful to do both.    Indications for TMS:   MDD, recurrent, severe; 4+ medication trials (from 2+ classes) ineffective; Psychotherapy ineffective    Procedure Diagnosis:  MDD, recurrent, severe; F33.2    Treatment Hx:  Treatment number this series: 30  Total lifetime treatment number: 30    Allergies   Allergen Reactions    Lisinopril Cough     cough      There were no vitals taken for this visit.    Pause for the Cause  Right patient: Yes  Right procedure/correct coil:  Yes; rTMS; [id number]; F8 coil.   Earplugs in place:  Yes    Procedure  Patient was seated in procedure chair. Identity and procedure was verified. Ear plugs were placed in ears and patient-specific cap was placed on head and tightened appropriately. Ruler locations were verified. Bone conducting headphones were not used. Coil was placed at F3 and stimulator was set to 44% (120% of MT). Initial train was well tolerated so 60 trains were delivered. Patient tolerated procedure well with minimal right eyebrow movement.    Motor Threshold Determination  Distance from nasion to inion: 37cm  Tragus to tragus distance: 40cm  Head circumference: 58.5cm  Distance along the vertex: 10.1cm  Distance along circumference from midline: 6.7cm  MT 1: F3 @ 37% on 07/19/2024    Theta LDLFPC   Frequency: 50 Hz  Burst Frequency: 5 Hz  Train Duration: 10 sec   Number of Bursts: 10   Total pulses delivered:  1800  Tx Loc: F3  Energy: 44% (120%MT)  Trains: 60           9/11/2024     1:18 PM 9/12/2024     1:34 PM 9/13/2024     1:19 PM   PHQ-9 SCORE   PHQ-9 Total Score MyChart 6 (Mild depression)  6 (Mild depression)   PHQ-9 Total Score 6    6 11 6       Date MADRS QIDS PHQ-9   07/19/24 37 20 23   8/2/24 -- 24 25   8/9/24  19 21   8/16/24  19 15   8/23/24  18 7   9/13/24  12 6     Plan   - Continue TMS treatments    This service provided today was under the supervising provider of the day, Roland Hall MD, who was available if needed.    Payal Austin TMS Technician  [facility]  Mental [facility] Neuromodulation

## 2024-09-13 NOTE — TELEPHONE ENCOUNTER
Reason for Call:  Form, our goal is to have forms completed with 72 hours, however, some forms may require a visit or additional information.    Type of letter, form or note:  FMLA    Who is the form from?: Insurance comp    Where did the form come from: form was faxed in    What clinic location was the form placed at?: [facility]    Where the form was placed:  [employer] Box/Folder    What number is listed as a contact on the form?: [phone]       Additional comments:     Call taken on 9/13/2024 at 11:57 AM by Anabel Delcid

## 2024-09-16 ENCOUNTER — OFFICE VISIT (OUTPATIENT)
Dept: FAMILY MEDICINE | Facility: CLINIC | Age: 54
End: 2024-09-16
Payer: COMMERCIAL

## 2024-09-16 ENCOUNTER — OFFICE VISIT (OUTPATIENT)
Dept: PSYCHIATRY | Facility: CLINIC | Age: 54
End: 2024-09-16
Payer: COMMERCIAL

## 2024-09-16 VITALS
HEART RATE: 103 BPM | RESPIRATION RATE: 16 BRPM | OXYGEN SATURATION: 98 % | BODY MASS INDEX: 26.84 KG/M2 | DIASTOLIC BLOOD PRESSURE: 88 MMHG | TEMPERATURE: 97.9 F | HEIGHT: 70 IN | WEIGHT: 187.5 LBS | SYSTOLIC BLOOD PRESSURE: 134 MMHG

## 2024-09-16 DIAGNOSIS — F33.2 SEVERE EPISODE OF RECURRENT MAJOR DEPRESSIVE DISORDER, WITHOUT PSYCHOTIC FEATURES (H): Primary | ICD-10-CM

## 2024-09-16 DIAGNOSIS — M25.871 SESAMOIDITIS OF RIGHT FOOT: ICD-10-CM

## 2024-09-16 DIAGNOSIS — Z87.891 PERSONAL HISTORY OF TOBACCO USE: ICD-10-CM

## 2024-09-16 DIAGNOSIS — Z00.00 ROUTINE GENERAL MEDICAL EXAMINATION AT A HEALTH CARE FACILITY: Primary | ICD-10-CM

## 2024-09-16 DIAGNOSIS — M79.2 NEUROPATHIC PAIN: ICD-10-CM

## 2024-09-16 DIAGNOSIS — E66.09 CLASS 2 OBESITY DUE TO EXCESS CALORIES WITHOUT SERIOUS COMORBIDITY WITH BODY MASS INDEX (BMI) OF 35.0 TO 35.9 IN ADULT: ICD-10-CM

## 2024-09-16 DIAGNOSIS — Z12.31 ENCOUNTER FOR SCREENING MAMMOGRAM FOR BREAST CANCER: ICD-10-CM

## 2024-09-16 DIAGNOSIS — I10 BENIGN ESSENTIAL HYPERTENSION: ICD-10-CM

## 2024-09-16 DIAGNOSIS — G89.29 CHRONIC FOOT PAIN, RIGHT: ICD-10-CM

## 2024-09-16 DIAGNOSIS — M96.0 NONUNION AFTER ARTHRODESIS: ICD-10-CM

## 2024-09-16 DIAGNOSIS — F41.1 GENERALIZED ANXIETY DISORDER: ICD-10-CM

## 2024-09-16 DIAGNOSIS — M79.671 CHRONIC FOOT PAIN, RIGHT: ICD-10-CM

## 2024-09-16 DIAGNOSIS — M79.674 PAIN OF RIGHT GREAT TOE: ICD-10-CM

## 2024-09-16 DIAGNOSIS — Z12.31 VISIT FOR SCREENING MAMMOGRAM: ICD-10-CM

## 2024-09-16 DIAGNOSIS — E66.812 CLASS 2 OBESITY DUE TO EXCESS CALORIES WITHOUT SERIOUS COMORBIDITY WITH BODY MASS INDEX (BMI) OF 35.0 TO 35.9 IN ADULT: ICD-10-CM

## 2024-09-16 DIAGNOSIS — K22.4 ESOPHAGEAL SPASM: ICD-10-CM

## 2024-09-16 PROCEDURE — 99214 OFFICE O/P EST MOD 30 MIN: CPT | Mod: 25 | Performed by: NURSE PRACTITIONER

## 2024-09-16 PROCEDURE — 90471 IMMUNIZATION ADMIN: CPT | Performed by: NURSE PRACTITIONER

## 2024-09-16 PROCEDURE — 99396 PREV VISIT EST AGE 40-64: CPT | Mod: 25 | Performed by: NURSE PRACTITIONER

## 2024-09-16 PROCEDURE — G0296 VISIT TO DETERM LDCT ELIG: HCPCS | Mod: 4MD | Performed by: NURSE PRACTITIONER

## 2024-09-16 PROCEDURE — 80307 DRUG TEST PRSMV CHEM ANLYZR: CPT | Performed by: NURSE PRACTITIONER

## 2024-09-16 PROCEDURE — 90750 HZV VACC RECOMBINANT IM: CPT | Performed by: NURSE PRACTITIONER

## 2024-09-16 RX ORDER — OMEPRAZOLE 40 MG/1
40 CAPSULE, DELAYED RELEASE ORAL DAILY
Qty: 90 CAPSULE | Refills: 3 | Status: SHIPPED | OUTPATIENT
Start: 2024-09-16

## 2024-09-16 RX ORDER — TOPIRAMATE 25 MG/1
75 TABLET, FILM COATED ORAL DAILY
Qty: 270 TABLET | Refills: 1 | Status: SHIPPED | OUTPATIENT
Start: 2024-09-16

## 2024-09-16 RX ORDER — PREGABALIN 50 MG/1
50 CAPSULE ORAL 2 TIMES DAILY
Qty: 60 CAPSULE | Refills: 1 | Status: SHIPPED | OUTPATIENT
Start: 2024-09-16

## 2024-09-16 RX ORDER — HYDROCODONE BITARTRATE AND ACETAMINOPHEN 5; 325 MG/1; MG/1
1 TABLET ORAL 3 TIMES DAILY PRN
Qty: 90 TABLET | Refills: 0 | Status: SHIPPED | OUTPATIENT
Start: 2024-09-16

## 2024-09-16 ASSESSMENT — PATIENT HEALTH QUESTIONNAIRE - PHQ9
10. IF YOU CHECKED OFF ANY PROBLEMS, HOW DIFFICULT HAVE THESE PROBLEMS MADE IT FOR YOU TO DO YOUR WORK, TAKE CARE OF THINGS AT HOME, OR GET ALONG WITH OTHER PEOPLE: SOMEWHAT DIFFICULT
SUM OF ALL RESPONSES TO PHQ QUESTIONS 1-9: 6
10. IF YOU CHECKED OFF ANY PROBLEMS, HOW DIFFICULT HAVE THESE PROBLEMS MADE IT FOR YOU TO DO YOUR WORK, TAKE CARE OF THINGS AT HOME, OR GET ALONG WITH OTHER PEOPLE: SOMEWHAT DIFFICULT
SUM OF ALL RESPONSES TO PHQ QUESTIONS 1-9: 6

## 2024-09-16 ASSESSMENT — PAIN SCALES - GENERAL: PAINLEVEL: MODERATE PAIN (4)

## 2024-09-16 NOTE — LETTER

## 2024-09-16 NOTE — PROGRESS NOTES
Preventive Care Visit  [facility]  LEDY Seaman CNP, Nurse Practitioner - Family  Sep 16, 2024      Assessment & Plan     Routine general medical examination at a health care facility  Reviewed and updated health maintenance and recommendations   Flu and covid in October - flu likely with employer  Hep B surface antibody previously ordered    Pain of right great toe  CSA completed and UDS ordered  - Urine Drug Screen; Future  - HYDROcodone-acetaminophen (NORCO) 5-325 MG tablet; Take 1 tablet by mouth 3 times daily as needed for severe pain.  - Urine Drug Screen    Visit for screening mammogram  Discussed due and she will try to schedule at [place] or get when she returns to work and will be near services    Personal history of tobacco use  Discussed eligibility for lung cancer screening, testing and possible follow-ups. Kendy would like to pursue the LDCT screenings. Has chantix at home to start for smoking cessation  - Prof fee: Shared Decision Making for Lung Cancer Screening  - CT Chest Lung Cancer Scrn Low Dose wo; Future    Encounter for screening mammogram for breast cancer  As abve  - MA Screening Bilateral w/ Yung; Future    Benign essential hypertension  Higher today than typical. Taking losartan 100 mg daily. Smoking, high stress and phentermine could all be contributing. Will stop phentermine and monitor at home and send me BPs in two weeks. If still elevated, will add BP medication and we can consider re-adding phentermine. If lower, stay off phentermine.  - Home Blood Pressure Monitor Order for DME - ONLY FOR DME    Chronic foot pain, right  Ok with refilling - Pain Med initially ordered  - pregabalin (LYRICA) 50 MG capsule; Take 1 capsule (50 mg) by mouth 2 times daily.    Neuropathic pain  As above  - pregabalin (LYRICA) 50 MG capsule; Take 1 capsule (50 mg) by mouth 2 times daily.    Class 2 obesity due to excess calories without serious  "comorbidity with body mass index (BMI) of 35.0 to 35.9 in adult  Has had success with phentermine and topiramate combination. Unfortunately, we need to stop the phentermine today due to elevated BP.  Will re-eval BP off phentermine and we can consider restarting if phentermine doesn't seem implicated. If able to stop smoking, this will also help lower BP  - topiramate (TOPAMAX) 25 MG tablet; Take 3 tablets (75 mg) by mouth daily.    Generalized anxiety disorder  Stable    Nonunion after arthrodesis  As above  - HYDROcodone-acetaminophen (NORCO) 5-325 MG tablet; Take 1 tablet by mouth 3 times daily as needed for severe pain.    Sesamoiditis of right foot  As above  - HYDROcodone-acetaminophen (NORCO) 5-325 MG tablet; Take 1 tablet by mouth 3 times daily as needed for severe pain.    Esophageal spasm  Continue omeprazole  - omeprazole (PRILOSEC) 40 MG DR capsule; Take 1 capsule (40 mg) by mouth daily.    Patient has been advised of split billing requirements and indicates understanding: Yes        BMI  Estimated body mass index is 27.1 kg/m  as calculated from the following:    Height as of this encounter: 1.772 m (5' 9.75\").    Weight as of this encounter: 85 kg (187 lb 8 oz).   Weight management plan: phentermine and topiramate    Counseling  Appropriate preventive services were addressed with this patient via screening, questionnaire, or discussion as appropriate for fall prevention, nutrition, physical activity, Tobacco-use cessation, social engagement, weight loss and cognition.  Checklist reviewing preventive services available has been given to the patient.  Reviewed patient's diet, addressing concerns and/or questions.   The patient was instructed to see the dentist every 6 months.       Patient Instructions   Advanced Care Directive resources  Consider making an advanced care directive - this is a good site for assistance and resources   [email or web address]    Schedule mammogram    Order the " regina before Feb 2025    Stop phentermine   Monitor BP at home   Check blood pressures at home   Sit for 5 min  Not within 30 minutes of caffeine  Legs and feet uncrossed  Silent while measuring  Arm resting on a table at around heart level  If elevated, stay seated and recheck again in 5 min      Lung Cancer Screening   Frequently Asked Questions  If you are at high-risk for lung cancer, getting screened with low-dose computed tomography (LDCT) every year can help save your life. This handout offers answers to some of the most common questions about lung cancer screening. If you have other questions, please call [phone] ([phone]).     What is it?  Lung cancer screening uses special X-ray technology to create an image of your lung tissue. The exam is quick and easy and takes less than 10 seconds. We don t give you any medicine or use any needles. You can eat before and after the exam. You don t need to change your clothes as long as the clothing on your chest doesn t contain metal. But, you do need to be able to hold your breath for at least 6 seconds during the exam.    What is the goal of lung cancer screening?  The goal of lung cancer screening is to save lives. Many times, lung cancer is not found until a person starts having physical symptoms. Lung cancer screening can help detect lung cancer in the earliest stages when it may be easier to treat.    Who should be screened for lung cancer?  We suggest lung cancer screening for anyone who is at high-risk for lung cancer. You are in the high-risk group if you:     are between the ages of 55 and 79, and   have smoked at least 1 pack of cigarettes a day for 20 or more years, and   still smoke or have quit within the past 15 years.    However, if you have a new cough or shortness of breath, you should talk to your doctor before being screened.    Why does it matter if I have symptoms?  Certain symptoms can be a sign that you have a condition in  your lungs that should be checked and treated by your doctor. These symptoms include fever, chest pain, a new or changing cough, shortness of breath that you have never felt before, coughing up blood or unexplained weight loss. Having any of these symptoms can greatly affect the results of lung cancer screening.       Should all smokers get an LDCT lung cancer screening exam?  It depends. Lung cancer screening is for a very specific group of men and women who have a history of heavy smoking over a long period of time (see  Who should be screened for lung cancer  above).  I am in the high-risk group, but have been diagnosed with cancer in the past. Is LDCT lung cancer screening right for me?  In some cases, you should not have LDCT lung screening, such as when your doctor is already following your cancer with CT scan studies. Your doctor will help you decide if LDCT lung screening is right for you.  Do I need to have a screening exam every year?  Yes. If you are in the high-risk group described earlier, you should get an LDCT lung cancer screening exam every year until you are 79, or are no longer willing or able to undergo screening and possible procedures to diagnose and treat lung cancer.  How effective is LDCT at preventing death from lung cancer?  Studies have shown that LDCT lung cancer screening can lower the risk of death from lung cancer by 20 percent in people who are at high-risk.  What are the risks?  There are some risks and limitations of LDCT lung cancer screening. We want to make sure you understand the risks and benefits, so please let us know if you have any questions. Your doctor may want to talk with you more about these risks.   Radiation exposure: As with any exam that uses radiation, there is a very small increased risk of cancer. The amount of radiation in LDCT is small--about the same amount a person would get from a mammogram. Your doctor orders the exam when he or she feels the potential  benefits outweigh the risks.   False negatives: No test is perfect, including LDCT. It is possible that you may have a medical condition, including lung cancer, that is not found during your exam. This is called a false negative result.   False positives and more testing: LDCT very often finds something in the lung that could be cancer, but in fact is not. This is called a false positive result. False positive tests often cause anxiety. To make sure these findings are not cancer, you may need to have more tests. These tests will be done only if you give us permission. Sometimes patients need a treatment that can have side effects, such as a biopsy. For more information on false positives, see  What can I expect from the results?    Findings not related to lung cancer: Your LDCT exam also takes pictures of areas of your body next to your lungs. In a very small number of cases, the CT scan will show an abnormal finding in one of these areas, such as your kidneys, adrenal glands, liver or thyroid. This finding may not be serious, but you may need more tests. Your doctor can help you decide what other tests you may need, if any.  What can I expect from the results?  About 1 out of 4 LDCT exams will find something that may need more tests. Most of the time, these findings are lung nodules. Lung nodules are very small collections of tissue in the lung. These nodules are very common, and the vast majority--more than 97 percent--are not cancer (benign). Most are normal lymph nodes or small areas of scarring from past infections.  But, if a small lung nodule is found to be cancer, the cancer can be cured more than 90 percent of the time. To know if the nodule is cancer, we may need to get more images before your next yearly screening exam. If the nodule has suspicious features (for example, it is large, has an odd shape or grows over time), we will refer you to a specialist for further testing.  Will my doctor also get the  results?  Yes. Your doctor will get a copy of your results.  Is it okay to keep smoking now that there s a cancer screening exam?  No. Tobacco is one of the strongest cancer-causing agents. It causes not only lung cancer, but other cancers and cardiovascular (heart) diseases as well. The damage caused by smoking builds over time. This means that the longer you smoke, the higher your risk of disease. While it is never too late to quit, the sooner you quit, the better.  Where can I find help to quit smoking?  The best way to prevent lung cancer is to stop smoking. If you have already quit smoking, congratulations and keep it up! For help on quitting smoking, please call [employer] at 8-323-QUITNOW (1-985.868.3021) or the American Cancer Society at 1-968.893.6580 to find local resources near you.  One-on-one health coaching:  If you d prefer to work individually with a health care provider on tobacco cessation, we offer:     Medication Therapy Management:  Our specially trained pharmacists work closely with you and your doctor to help you quit smoking.  Call [phone] or [phone] (toll free).  Ordering of each unique test  Prescription drug management      Juana Larry is a 54 year old, presenting for the following:  Physical        9/16/2024     2:29 PM   Additional Questions   Roomed by Tatiana   Accompanied by Self        Health Care Directive  Patient does not have a Health Care Directive or Living Will: Discussed advance care planning with patient; information given to patient to review.    HPI    HM -    Cancer screenings - pap UTD, due for mammogram, cologuard due 2/2025, lung cancer screening   Chronic conditions screenings - up to date other than UDS   Immunizations - shingrix today  Habits  -      Mental health/mindfulness - has met with new therapist twice and likes her, TMS treatment extended three weeks (new paperwork) followed by maintenance treatments (starting with 1-2 times per week)      Alcohol/cigarettes - maintaining sobriety despite significant life challenges   Sleep - better from before and not needing hydroxyzine, but still taking trazodone  Sexual health - not sexually active, no STI screening indication  Goals - look into new type of career, finding purpose          9/16/2024   General Health   How would you rate your overall physical health? Good   Feel stress (tense, anxious, or unable to sleep) To some extent      (!) STRESS CONCERN      9/16/2024   Nutrition   Three or more servings of calcium each day? Yes   Diet: Regular (no restrictions)   How many servings of fruit and vegetables per day? (!) 2-3   How many sweetened beverages each day? 0-1            9/16/2024   Exercise   Days per week of moderate/strenous exercise 0 days   Average minutes spent exercising at this level 0 min      (!) EXERCISE CONCERN      9/16/2024   Social Factors   Frequency of gathering with friends or relatives Once a week   Worry food won't last until get money to buy more No   Food not last or not have enough money for food? No   Do you have housing? (Housing is defined as stable permanent housing and does not include staying ouside in a car, in a tent, in an abandoned building, in an overnight shelter, or couch-surfing.) No   Are you worried about losing your housing? No   Lack of transportation? No   Unable to get utilities (heat,electricity)? No   Want help with housing or utility concern? No      (!) HOUSING CONCERN PRESENT      9/16/2024   Fall Risk   Fallen 2 or more times in the past year? No   Trouble with walking or balance? No             9/16/2024   Dental   Dentist two times every year? (!) NO            9/16/2024   TB Screening   Were you born outside of the [place]? No          Today's PHQ-9 Score:       9/16/2024    12:17 PM   PHQ-9 SCORE   PHQ-9 Total Score MyChart 6 (Mild depression)   PHQ-9 Total Score 6    6           9/16/2024   Substance Use   Alcohol more than 3/day or more than 7/wk Not  Applicable   Do you use any other substances recreationally? (!) CANNABIS PRODUCTS        Social History     Tobacco Use    Smoking status: Every Day     Current packs/day: 0.50     Average packs/day: 0.5 packs/day for 15.0 years (7.5 ttl pk-yrs)     Types: Cigarettes     Passive exposure: Current    Smokeless tobacco: Never   Vaping Use    Vaping status: Every Day    Substances: THC    Devices: Disposable   Substance Use Topics    Alcohol use: Not Currently    Drug use: No     Comment: addict in recovery           9/12/2022   LAST FHS-7 RESULTS   1st degree relative breast or ovarian cancer No   Any relative bilateral breast cancer No   Any male have breast cancer No   Any ONE woman have BOTH breast AND ovarian cancer No   Any woman with breast cancer before 50yrs No   2 or more relatives with breast AND/OR ovarian cancer No   2 or more relatives with breast AND/OR bowel cancer No        Mammogram Screening - Mammogram every 1-2 years updated in Health Maintenance based on mutual decision making        9/16/2024   STI Screening   New sexual partner(s) since last STI/HIV test? No        History of abnormal Pap smear: No - age 30- 64 PAP with HPV every 5 years recommended        Latest Ref Rng & Units 6/23/2023     4:41 PM 10/4/2017    11:27 AM 10/4/2017     9:38 AM   PAP / HPV   PAP  Negative for Intraepithelial Lesion or Malignancy (NILM)      PAP (Historical)    NIL    HPV 16 DNA Negative Negative  Negative     HPV 18 DNA Negative Negative  Negative     Other HR HPV Negative Negative  Negative       ASCVD Risk   The 10-year ASCVD risk score (Dylan HUYNH, et al., 2019) is: 4.6%    Values used to calculate the score:      Age: 54 years      Sex: Female      Is Non- : No      Diabetic: No      Tobacco smoker: Yes      Systolic Blood Pressure: 134 mmHg      Is BP treated: Yes      HDL Cholesterol: 79 mg/dL      Total Cholesterol: 227 mg/dL    Fracture Risk Assessment Tool  Link to Frax  Calculator  Use the information below to complete the Frax calculator  : 1970  Sex: female  Weight (kg): 85 kg (actual weight)  Height (cm): 177.2 cm  Previous Fragility Fracture:  Yes  History of parent with fractured hip:  No  Current Smoking:  Yes  Patient has been on glucocorticoids for more than 3 months (5mg/day or more): No  Rheumatoid Arthritis on Problem List:  No  Secondary Osteoporosis on Problem List:  No  Consumes 3 or more units of alcohol per day: No  Femoral Neck BMD (g/cm2)           Reviewed and updated as needed this visit by Provider   Tobacco   Meds  Problems  Med Hx  Surg Hx  Fam Hx  Soc Hx Sexual   Activity          Past Medical History:   Diagnosis Date    Acne     Anxiety     Anxiety state, unspecified     Anxiety    Benign essential hypertension 2018    Chronic low back pain 2010    Degenerative joint disease of foot 5/3/2021    Depression     Dr. Gaytan    Diaphragmatic hernia without mention of obstruction or gangrene     Esophageal reflux     with associated esophageal spasm    ETOH abuse     Foot pain     Herpes simplex without mention of complication     History of foot surgery 2021    Moderate major depression (H) 4/15/2011    Rectal pain 2011    Right hip pain 2010    Seasonal affective disorder (H24)     STD (sexually transmitted disease)     Surveillance of previously prescribed intrauterine contraceptive device 10/03/05    mirena IUD     Past Surgical History:   Procedure Laterality Date    ARTHRODESIS FOOT Right 2021    Procedure: Right 1st metatarsophalangeal joint fuison;  Surgeon: Conor Guillen MD;  Location: [facility] OR    CHEILECTOMY Right 2017    Procedure: CHEILECTOMY;  RIGHT FOOT CHEILECTOMY;  Surgeon: Mo Edgar DPM;  Location:  OR    ESOPHAGOSCOPY, GASTROSCOPY, DUODENOSCOPY (EGD), COMBINED N/A 2022    Procedure: ESOPHAGOGASTRODUODENOSCOPY, WITH BIOPSY;  Surgeon: Neal Loja MD;   "Location: [facility] OR    SURGICAL HISTORY OF -   4/04    Lapr Nissen fundoplication    TONSILLECTOMY & ADENOIDECTOMY  1985    [facility] NONSPECIFIC PROCEDURE  1992    CRYO SURGERY for abnl paps    [facility] STOMACH SURGERY PROCEDURE UNLISTED      Nissen         Review of Systems  Constitutional, neuro, ENT, endocrine, pulmonary, cardiac, gastrointestinal, genitourinary, musculoskeletal, integument and psychiatric systems are negative, except as otherwise noted.     Objective    Exam  /88   Pulse 103   Temp 97.9  F (36.6  C) (Temporal)   Resp 16   Ht 1.772 m (5' 9.75\")   Wt 85 kg (187 lb 8 oz)   SpO2 98%   BMI 27.10 kg/m     Estimated body mass index is 27.1 kg/m  as calculated from the following:    Height as of this encounter: 1.772 m (5' 9.75\").    Weight as of this encounter: 85 kg (187 lb 8 oz).    Physical Exam  GENERAL: alert and no distress  EYES: Eyes grossly normal to inspection, PERRL and conjunctivae and sclerae normal  HENT: ear canals and TM's normal, nose and mouth without ulcers or lesions  NECK: no adenopathy, no asymmetry, masses, or scars  RESP: lungs clear to auscultation - no rales, rhonchi or wheezes  CV: regular rate and rhythm, normal S1 S2, no S3 or S4, no murmur, click or rub, no peripheral edema  ABDOMEN: soft, nontender, no hepatosplenomegaly, no masses and bowel sounds normal  MS: no gross musculoskeletal defects noted, no edema  SKIN: no suspicious lesions or rashes  NEURO: Normal strength and tone, mentation intact and speech normal  PSYCH: mentation appears normal, affect normal/bright        Signed Electronically by: LEDY Seaman CNP  Lung Cancer Screening Shared Decision Making Visit     Sharmin Nieves, a 54 year old female, is eligible for lung cancer screening    History   Smoking Status    Every Day    Types: Cigarettes   Smokeless Tobacco    Never       I have discussed with patient the risks and benefits of screening for lung cancer with low-dose CT.     The risks " include:    radiation exposure: one low dose chest CT has as much ionizing radiation as about 15 chest x-rays, or 6 months of background radiation living in [place]      false positives: most findings/nodules are NOT cancer, but some might still require additional diagnostic evaluation, including biopsy    over-diagnosis: some slow growing cancers that might never have been clinically significant will be detected and treated unnecessarily     The benefit of early detection of lung cancer is contingent upon adherence to annual screening or more frequent follow up if indicated.     Furthermore, to benefit from screening, Sharmin must be willing and able to undergo diagnostic procedures, if indicated. Although no specific guide is available for determining severity of comorbidities, it is reasonable to withhold screening in patients who have greater mortality risk from other diseases.     We did discuss that the best way to prevent lung cancer is to not smoke.    Some patients may value a numeric estimation of lung cancer risk when evaluating if lung cancer screening is right for them, here is one calculator:    ShouldIScreen

## 2024-09-16 NOTE — PROGRESS NOTES
Interventional Psychiatry Program  [address], [address]  [place] [address]  TMS Procedure Note   Sharmin Nieves MRN# [id number]  Age: 54 year old   YOB: 1970    Sharmin Nieves comes into clinic today at the request ofFESTUS MD, ordering provider for TMS treatments.    Pre-Procedure:  History and Physical: Reviewed in medical record  Consent signed by: Sharmin Nieves for this course of treatment on: 07/19/2024    Clinical Narrative:  Patient tolerated treatment. Spent time at her friends house this weekend.    Indications for TMS:   MDD, recurrent, severe; 4+ medication trials (from 2+ classes) ineffective; Psychotherapy ineffective    Procedure Diagnosis:  MDD, recurrent, severe; F33.2    Treatment Hx:  Treatment number this series: 31  Total lifetime treatment number: 31    Allergies   Allergen Reactions    Lisinopril Cough     cough      There were no vitals taken for this visit.    Pause for the Cause  Right patient: Yes  Right procedure/correct coil:  Yes; rTMS; [id number]; F8 coil.   Earplugs in place:  Yes    Procedure  Patient was seated in procedure chair. Identity and procedure was verified. Ear plugs were placed in ears and patient-specific cap was placed on head and tightened appropriately. Ruler locations were verified. Bone conducting headphones were not used. Coil was placed at F3 and stimulator was set to 44% (120% of MT). Initial train was well tolerated so 60 trains were delivered. Patient tolerated procedure well with minimal right eyebrow movement.    Motor Threshold Determination  Distance from nasion to inion: 37cm  Tragus to tragus distance: 40cm  Head circumference: 58.5cm  Distance along the vertex: 10.1cm  Distance along circumference from midline: 6.7cm  MT 1: F3 @ 37% on 07/19/2024    Theta LDLFPC   Frequency: 50 Hz  Burst Frequency: 5 Hz  Train Duration: 10 sec   Number of Bursts: 10   Total pulses delivered: 1800  Tx Loc: F3  Energy: 44%  (120%MT)  Trains: 60           9/12/2024     1:34 PM 9/13/2024     1:19 PM 9/16/2024    12:17 PM   PHQ-9 SCORE   PHQ-9 Total Score MyChart  6 (Mild depression) 6 (Mild depression)   PHQ-9 Total Score 11 6 6    6       Date MADRS QIDS PHQ-9   07/19/24 37 20 23   8/2/24 -- 24 25   8/9/24  19 21   8/16/24  19 15   8/23/24  18 7   9/13/24  12 6     Plan   - Continue TMS treatments    This service provided today was under the supervising provider of the day, Raffi Palmer MD, who was available if needed.    Kristie Stahl, TMS Technician  [facility]

## 2024-09-16 NOTE — LETTER
[facility] INTEGRATED PRIMARY CARE  09/16/24  Patient: Sharmin Nieves  YOB: 1970  Medical Record Number: [id number]                                                                                  Non-Opioid Controlled Substance Agreement    This is an agreement between you and your provider regarding safe and appropriate use of controlled substances prescribed by your care team. Controlled substances are?medicines that can cause physical and mental dependence (abuse).     There are strict laws about having and using these medicines. We here at [facility] are  committed to working with you in your efforts to get better. To support you in this work, we'll help you schedule regular office appointments for medicine refills. If we must cancel or change your appointment for any reason, we'll make sure you have enough medicine to last until your next appointment.     As a Provider, I will:   Listen carefully to your concerns while treating you with respect.   Recommend a treatment plan that I believe is in your best interest and may involve therapies other than medicine.    Talk with you often about the possible benefits and the risk of harm of any medicine that we prescribe for you.  Assess the safety of this medicine and check how well it works.    Provide a plan on how to taper (discontinue or go off) using this medicine if the decision is made to stop its use.      ::  As a Patient, I understand controlled substances:     Are prescribed by my care provider to help me function or work and manage my condition(s).?  Are strong medicines and can cause serious side effects.     Need to be taken exactly as prescribed.?Combining controlled substances with certain medicines or chemicals (such as illegal drugs, alcohol, sedatives, sleeping pills, and benzodiazepines) can be dangerous or even fatal.? If I stop taking my medicines suddenly, I may have severe withdrawal symptoms.     The risks,  benefits, and side effects of these medicine(s) were explained to me. I agree that:    I will take part in other treatments as advised by my care team. This may be psychiatry or counseling, physical therapy, behavioral therapy, group treatment or a referral to specialist.    I will keep all my appointments and understand this is part of the monitoring of controlled substances.?My care team may require an office visit for EVERY controlled substance refill. If I miss appointments or don t follow instructions, my care team may stop my medicine    I will take my medicines as prescribed. I will not change the dose or schedule unless my care team tells me to. There will be no refills if I run out early.      I may be asked to come to the clinic and complete a urine drug test or complete a pill count. If I don t give a urine sample or participate in a pill count, the care team may stop my medicine.    I will only receive controlled substance prescriptions from this clinic. If I am treated by another provider, I will tell them that I am taking controlled substances and that I have a treatment agreement with this provider. I will inform my [facility] care team within one business day if I am given a prescription for any controlled substance by another healthcare provider. My [facility] care team can contact other providers and pharmacists about my use of any medicines.    It is up to me to make sure that I don't run out of my medicines on weekends or holidays.?If my care team is willing to refill my prescription without a visit, I must request refills only during office hours. Refills may take up to 3 business days to process. I will use one pharmacy to fill all my controlled substance prescriptions. I will notify the clinic about any changes to my insurance or medicine availability.    I am responsible for my prescriptions. If the medicine/prescription is lost, stolen or destroyed, it will not be replaced.?I  also agree not to share controlled substance medicines with anyone.     I am aware I should not use any illegal or recreational drugs. I agree not to drink alcohol unless my care team says I can.     If I enroll in the [place] Medical Cannabis program, I will tell my care team before my next refill.    I will tell my care team right away if I become pregnant, have a new medical problem treated outside of my regular clinic, or have a change in my medicines.     I understand that this medicine can affect my thinking, judgment and reaction time.? Alcohol and drugs affect the brain and body, which can affect the safety of my driving. Being under the influence of alcohol or drugs can affect my decision-making, behaviors, personal safety and the safety of others. Driving while impaired (DWI) can occur if a person is driving, operating or in physical control of a car, motorcycle, boat, snowmobile, ATV, motorbike, off-road vehicle or any other motor vehicle ([place] Statute 169A.20). I understand the risk if I choose to drive or operate any vehicle or machinery.    I understand that if I do not follow any of the conditions above, my prescriptions or treatment may be stopped or changed.   I agree that my provider, clinic care team and pharmacy may work with any city, state or federal law enforcement agency that investigates the misuse, sale or other diversion of my controlled medicine. I will allow my provider to discuss my care with, or share a copy of, this agreement with any other treating provider, pharmacy or emergency room where I receive care.     I have read this agreement and have asked questions about anything I did not understand.    ________________________________________________________  Patient Signature - Sharmin Nieves     ___________________                   Date     ________________________________________________________  Provider Signature - LEDY Seaman CNP       ___________________                    Date     ________________________________________________________  Witness Signature (required if provider not present while patient signing)          ___________________                   Date

## 2024-09-16 NOTE — PATIENT INSTRUCTIONS
Advanced Care Directive resources  Consider making an advanced care directive - this is a good site for assistance and resources   [email or web address]    Schedule mammogram    Order the cologuard before Feb 2025    Stop phentermine   Monitor BP at home   Check blood pressures at home   Sit for 5 min  Not within 30 minutes of caffeine  Legs and feet uncrossed  Silent while measuring  Arm resting on a table at around heart level  If elevated, stay seated and recheck again in 5 min      Lung Cancer Screening   Frequently Asked Questions  If you are at high-risk for lung cancer, getting screened with low-dose computed tomography (LDCT) every year can help save your life. This handout offers answers to some of the most common questions about lung cancer screening. If you have other questions, please call [phone] ([phone]).     What is it?  Lung cancer screening uses special X-ray technology to create an image of your lung tissue. The exam is quick and easy and takes less than 10 seconds. We don t give you any medicine or use any needles. You can eat before and after the exam. You don t need to change your clothes as long as the clothing on your chest doesn t contain metal. But, you do need to be able to hold your breath for at least 6 seconds during the exam.    What is the goal of lung cancer screening?  The goal of lung cancer screening is to save lives. Many times, lung cancer is not found until a person starts having physical symptoms. Lung cancer screening can help detect lung cancer in the earliest stages when it may be easier to treat.    Who should be screened for lung cancer?  We suggest lung cancer screening for anyone who is at high-risk for lung cancer. You are in the high-risk group if you:     are between the ages of 55 and 79, and   have smoked at least 1 pack of cigarettes a day for 20 or more years, and   still smoke or have quit within the past 15 years.    However, if you  have a new cough or shortness of breath, you should talk to your doctor before being screened.    Why does it matter if I have symptoms?  Certain symptoms can be a sign that you have a condition in your lungs that should be checked and treated by your doctor. These symptoms include fever, chest pain, a new or changing cough, shortness of breath that you have never felt before, coughing up blood or unexplained weight loss. Having any of these symptoms can greatly affect the results of lung cancer screening.       Should all smokers get an LDCT lung cancer screening exam?  It depends. Lung cancer screening is for a very specific group of men and women who have a history of heavy smoking over a long period of time (see  Who should be screened for lung cancer  above).  I am in the high-risk group, but have been diagnosed with cancer in the past. Is LDCT lung cancer screening right for me?  In some cases, you should not have LDCT lung screening, such as when your doctor is already following your cancer with CT scan studies. Your doctor will help you decide if LDCT lung screening is right for you.  Do I need to have a screening exam every year?  Yes. If you are in the high-risk group described earlier, you should get an LDCT lung cancer screening exam every year until you are 79, or are no longer willing or able to undergo screening and possible procedures to diagnose and treat lung cancer.  How effective is LDCT at preventing death from lung cancer?  Studies have shown that LDCT lung cancer screening can lower the risk of death from lung cancer by 20 percent in people who are at high-risk.  What are the risks?  There are some risks and limitations of LDCT lung cancer screening. We want to make sure you understand the risks and benefits, so please let us know if you have any questions. Your doctor may want to talk with you more about these risks.   Radiation exposure: As with any exam that uses radiation, there is a very  small increased risk of cancer. The amount of radiation in LDCT is small--about the same amount a person would get from a mammogram. Your doctor orders the exam when he or she feels the potential benefits outweigh the risks.   False negatives: No test is perfect, including LDCT. It is possible that you may have a medical condition, including lung cancer, that is not found during your exam. This is called a false negative result.   False positives and more testing: LDCT very often finds something in the lung that could be cancer, but in fact is not. This is called a false positive result. False positive tests often cause anxiety. To make sure these findings are not cancer, you may need to have more tests. These tests will be done only if you give us permission. Sometimes patients need a treatment that can have side effects, such as a biopsy. For more information on false positives, see  What can I expect from the results?    Findings not related to lung cancer: Your LDCT exam also takes pictures of areas of your body next to your lungs. In a very small number of cases, the CT scan will show an abnormal finding in one of these areas, such as your kidneys, adrenal glands, liver or thyroid. This finding may not be serious, but you may need more tests. Your doctor can help you decide what other tests you may need, if any.  What can I expect from the results?  About 1 out of 4 LDCT exams will find something that may need more tests. Most of the time, these findings are lung nodules. Lung nodules are very small collections of tissue in the lung. These nodules are very common, and the vast majority--more than 97 percent--are not cancer (benign). Most are normal lymph nodes or small areas of scarring from past infections.  But, if a small lung nodule is found to be cancer, the cancer can be cured more than 90 percent of the time. To know if the nodule is cancer, we may need to get more images before your next yearly  screening exam. If the nodule has suspicious features (for example, it is large, has an odd shape or grows over time), we will refer you to a specialist for further testing.  Will my doctor also get the results?  Yes. Your doctor will get a copy of your results.  Is it okay to keep smoking now that there s a cancer screening exam?  No. Tobacco is one of the strongest cancer-causing agents. It causes not only lung cancer, but other cancers and cardiovascular (heart) diseases as well. The damage caused by smoking builds over time. This means that the longer you smoke, the higher your risk of disease. While it is never too late to quit, the sooner you quit, the better.  Where can I find help to quit smoking?  The best way to prevent lung cancer is to stop smoking. If you have already quit smoking, congratulations and keep it up! For help on quitting smoking, please call [employer] at 1-317-QUITNOW (1-225.923.8968) or the American [employer] Society at 1-450.917.3815 to find local resources near you.  One-on-one health coaching:  If you d prefer to work individually with a health care provider on tobacco cessation, we offer:     Medication Therapy Management:  Our specially trained pharmacists work closely with you and your doctor to help you quit smoking.  Call [phone] or [phone] (toll free).

## 2024-09-17 ENCOUNTER — OFFICE VISIT (OUTPATIENT)
Dept: PSYCHIATRY | Facility: CLINIC | Age: 54
End: 2024-09-17
Payer: COMMERCIAL

## 2024-09-17 DIAGNOSIS — F33.2 SEVERE EPISODE OF RECURRENT MAJOR DEPRESSIVE DISORDER, WITHOUT PSYCHOTIC FEATURES (H): Primary | ICD-10-CM

## 2024-09-17 LAB
AMPHETAMINES UR QL SCN: ABNORMAL
BARBITURATES UR QL SCN: ABNORMAL
BENZODIAZ UR QL SCN: ABNORMAL
BZE UR QL SCN: ABNORMAL
CANNABINOIDS UR QL SCN: ABNORMAL
FENTANYL UR QL: ABNORMAL
OPIATES UR QL SCN: ABNORMAL
PCP QUAL URINE (ROCHE): ABNORMAL

## 2024-09-17 NOTE — PROGRESS NOTES
Interventional Psychiatry Program  [address], [address]  [place] [address]  TMS Procedure Note   Sharmin Nieves MRN# [id number]  Age: 54 year old   YOB: 1970    Sharmin Nieves comes into clinic today at the request ofFESTUS MD, ordering provider for TMS treatments.    Pre-Procedure:  History and Physical: Reviewed in medical record  Consent signed by: Sharmin Nieves for this course of treatment on: 07/19/2024    Clinical Narrative:  Patient tolerated treatment. Might be getting a new job.    Indications for TMS:   MDD, recurrent, severe; 4+ medication trials (from 2+ classes) ineffective; Psychotherapy ineffective    Procedure Diagnosis:  MDD, recurrent, severe; F33.2    Treatment Hx:  Treatment number this series: 32  Total lifetime treatment number: 32    Allergies   Allergen Reactions    Lisinopril Cough     cough      There were no vitals taken for this visit.    Pause for the Cause  Right patient: Yes  Right procedure/correct coil:  Yes; rTMS; [id number]; F8 coil.   Earplugs in place:  Yes    Procedure  Patient was seated in procedure chair. Identity and procedure was verified. Ear plugs were placed in ears and patient-specific cap was placed on head and tightened appropriately. Ruler locations were verified. Bone conducting headphones were not used. Coil was placed at F3 and stimulator was set to 44% (120% of MT). Initial train was well tolerated so 60 trains were delivered. Patient tolerated procedure well with minimal right eyebrow movement.    Motor Threshold Determination  Distance from nasion to inion: 37cm  Tragus to tragus distance: 40cm  Head circumference: 58.5cm  Distance along the vertex: 10.1cm  Distance along circumference from midline: 6.7cm  MT 1: F3 @ 37% on 07/19/2024    Theta LDLFPC   Frequency: 50 Hz  Burst Frequency: 5 Hz  Train Duration: 10 sec   Number of Bursts: 10   Total pulses delivered: 1800  Tx Loc: F3  Energy: 44% (120%MT)  Trains:  60           9/13/2024     1:19 PM 9/16/2024    12:17 PM 9/17/2024     1:33 PM   PHQ-9 SCORE   PHQ-9 Total Score MyChart 6 (Mild depression) 6 (Mild depression)    PHQ-9 Total Score 6 6    6 10       Date MADRS QIDS PHQ-9   07/19/24 37 20 23   8/2/24 -- 24 25   8/9/24  19 21   8/16/24  19 15   8/23/24  18 7   9/13/24  12 6     Plan   - Continue TMS treatments    This service provided today was under the supervising provider of the day, FESTUS Millard MD, who was available if needed.    Kristie Stahl, TMS Technician  [facility] Neuromodulation

## 2024-09-18 ENCOUNTER — OFFICE VISIT (OUTPATIENT)
Dept: PSYCHIATRY | Facility: CLINIC | Age: 54
End: 2024-09-18
Payer: COMMERCIAL

## 2024-09-18 DIAGNOSIS — F33.2 SEVERE EPISODE OF RECURRENT MAJOR DEPRESSIVE DISORDER, WITHOUT PSYCHOTIC FEATURES (H): Primary | ICD-10-CM

## 2024-09-18 ASSESSMENT — PATIENT HEALTH QUESTIONNAIRE - PHQ9
10. IF YOU CHECKED OFF ANY PROBLEMS, HOW DIFFICULT HAVE THESE PROBLEMS MADE IT FOR YOU TO DO YOUR WORK, TAKE CARE OF THINGS AT HOME, OR GET ALONG WITH OTHER PEOPLE: SOMEWHAT DIFFICULT
SUM OF ALL RESPONSES TO PHQ QUESTIONS 1-9: 7
10. IF YOU CHECKED OFF ANY PROBLEMS, HOW DIFFICULT HAVE THESE PROBLEMS MADE IT FOR YOU TO DO YOUR WORK, TAKE CARE OF THINGS AT HOME, OR GET ALONG WITH OTHER PEOPLE: SOMEWHAT DIFFICULT

## 2024-09-18 NOTE — PROGRESS NOTES
Interventional Psychiatry Program  [address], [address]  [place] [address]  TMS Procedure Note   Sharmin Nieves MRN# [id number]  Age: 54 year old   YOB: 1970    Sharmin Nieves comes into clinic today at the request ofFESTUS MD, ordering provider for TMS treatments.    Pre-Procedure:  History and Physical: Reviewed in medical record  Consent signed by: Sharmin Nieves for this course of treatment on: 07/19/2024    Clinical Narrative:  Patient tolerated treatment. Has therapy today.    Indications for TMS:   MDD, recurrent, severe; 4+ medication trials (from 2+ classes) ineffective; Psychotherapy ineffective    Procedure Diagnosis:  MDD, recurrent, severe; F33.2    Treatment Hx:  Treatment number this series: 33  Total lifetime treatment number: 33    Allergies   Allergen Reactions    Lisinopril Cough     cough      There were no vitals taken for this visit.    Pause for the Cause  Right patient: Yes  Right procedure/correct coil:  Yes; rTMS; [id number]; F8 coil.   Earplugs in place:  Yes    Procedure  Patient was seated in procedure chair. Identity and procedure was verified. Ear plugs were placed in ears and patient-specific cap was placed on head and tightened appropriately. Ruler locations were verified. Bone conducting headphones were not used. Coil was placed at F3 and stimulator was set to 44% (120% of MT). Initial train was well tolerated so 60 trains were delivered. Patient tolerated procedure well with minimal right eyebrow movement.    Motor Threshold Determination  Distance from nasion to inion: 37cm  Tragus to tragus distance: 40cm  Head circumference: 58.5cm  Distance along the vertex: 10.1cm  Distance along circumference from midline: 6.7cm  MT 1: F3 @ 37% on 07/19/2024    Theta LDLFPC   Frequency: 50 Hz  Burst Frequency: 5 Hz  Train Duration: 10 sec   Number of Bursts: 10   Total pulses delivered: 1800  Tx Loc: F3  Energy: 44% (120%MT)  Trains: 60            9/16/2024    12:17 PM 9/17/2024     1:33 PM 9/18/2024    11:15 AM   PHQ-9 SCORE   PHQ-9 Total Score MyChart 6 (Mild depression)  7 (Mild depression)   PHQ-9 Total Score 6    6 10 7    7       Date MADRS QIDS PHQ-9   07/19/24 37 20 23   8/2/24 -- 24 25   8/9/24  19 21   8/16/24  19 15   8/23/24  18 7   9/13/24  12 6     Plan   - Continue TMS treatments    This service provided today was under the supervising provider of the day, Raffi Palmer MD, who was available if needed.    Kristie Stahl, TMS Technician  [facility] Neuromodulation

## 2024-09-19 ENCOUNTER — OFFICE VISIT (OUTPATIENT)
Dept: PSYCHIATRY | Facility: CLINIC | Age: 54
End: 2024-09-19
Payer: COMMERCIAL

## 2024-09-19 DIAGNOSIS — F33.2 SEVERE EPISODE OF RECURRENT MAJOR DEPRESSIVE DISORDER, WITHOUT PSYCHOTIC FEATURES (H): Primary | ICD-10-CM

## 2024-09-19 NOTE — PROGRESS NOTES
Interventional Psychiatry Program  [address], [address]  [place] [address]  TMS Procedure Note   Sharmin Nieves MRN# [id number]  Age: 54 year old   YOB: 1970    Sharmin Nieves comes into clinic today at the request ofFESTUS MD, ordering provider for TMS treatments.    Pre-Procedure:  History and Physical: Reviewed in medical record  Consent signed by: Sharmin Nieves for this course of treatment on: 07/19/2024    Clinical Narrative:  Patient tolerated treatment. Trying decide what to do about work.    Indications for TMS:   MDD, recurrent, severe; 4+ medication trials (from 2+ classes) ineffective; Psychotherapy ineffective    Procedure Diagnosis:  MDD, recurrent, severe; F33.2    Treatment Hx:  Treatment number this series: 34  Total lifetime treatment number: 34    Allergies   Allergen Reactions    Lisinopril Cough     cough      There were no vitals taken for this visit.    Pause for the Cause  Right patient: Yes  Right procedure/correct coil:  Yes; rTMS; [id number]; F8 coil.   Earplugs in place:  Yes    Procedure  Patient was seated in procedure chair. Identity and procedure was verified. Ear plugs were placed in ears and patient-specific cap was placed on head and tightened appropriately. Ruler locations were verified. Bone conducting headphones were not used. Coil was placed at F3 and stimulator was set to 44% (120% of MT). Initial train was well tolerated so 60 trains were delivered. Patient tolerated procedure well with minimal right eyebrow movement.    Motor Threshold Determination  Distance from nasion to inion: 37cm  Tragus to tragus distance: 40cm  Head circumference: 58.5cm  Distance along the vertex: 10.1cm  Distance along circumference from midline: 6.7cm  MT 1: F3 @ 37% on 07/19/2024    Theta LDLFPC   Frequency: 50 Hz  Burst Frequency: 5 Hz  Train Duration: 10 sec   Number of Bursts: 10   Total pulses delivered: 1800  Tx Loc: F3  Energy: 44%  (120%MT)  Trains: 60           9/16/2024    12:17 PM 9/17/2024     1:33 PM 9/18/2024    11:15 AM   PHQ-9 SCORE   PHQ-9 Total Score MyChart 6 (Mild depression)  7 (Mild depression)   PHQ-9 Total Score 6    6 10 7    7       Date MADRS QIDS PHQ-9   07/19/24 37 20 23   8/2/24 -- 24 25   8/9/24  19 21   8/16/24  19 15   8/23/24  18 7   9/13/24  12 6     Plan   - Continue TMS treatments    This service provided today was under the supervising provider of the day, FESTUS Millard MD, who was available if needed.    Payal Austin TMS Technician  [facility] Neuromodulation

## 2024-09-20 ENCOUNTER — OFFICE VISIT (OUTPATIENT)
Dept: PSYCHIATRY | Facility: CLINIC | Age: 54
End: 2024-09-20
Payer: COMMERCIAL

## 2024-09-20 DIAGNOSIS — F33.2 SEVERE EPISODE OF RECURRENT MAJOR DEPRESSIVE DISORDER, WITHOUT PSYCHOTIC FEATURES (H): Primary | ICD-10-CM

## 2024-09-20 NOTE — PROGRESS NOTES
Interventional Psychiatry Program  [address], [address]  [place] [address]  TMS Procedure Note   Sharmin Nieves MRN# [id number]  Age: 54 year old   YOB: 1970    Sharmin Nieves comes into clinic today at the request ofFESTUS MD, ordering provider for TMS treatments.    Pre-Procedure:  History and Physical: Reviewed in medical record  Consent signed by: Sharmin Nieves for this course of treatment on: 07/19/2024    Clinical Narrative:  Patient tolerated treatment. Going to spend time with her sister this weekend.    Indications for TMS:   MDD, recurrent, severe; 4+ medication trials (from 2+ classes) ineffective; Psychotherapy ineffective    Procedure Diagnosis:  MDD, recurrent, severe; F33.2    Treatment Hx:  Treatment number this series: 35  Total lifetime treatment number: 35    Allergies   Allergen Reactions    Lisinopril Cough     cough      There were no vitals taken for this visit.    Pause for the Cause  Right patient: Yes  Right procedure/correct coil:  Yes; rTMS; [id number]; F8 coil.   Earplugs in place:  Yes    Procedure  Patient was seated in procedure chair. Identity and procedure was verified. Ear plugs were placed in ears and patient-specific cap was placed on head and tightened appropriately. Ruler locations were verified. Bone conducting headphones were not used. Coil was placed at F3 and stimulator was set to 44% (120% of MT). Initial train was well tolerated so 60 trains were delivered. Patient tolerated procedure well with minimal right eyebrow movement.    Motor Threshold Determination  Distance from nasion to inion: 37cm  Tragus to tragus distance: 40cm  Head circumference: 58.5cm  Distance along the vertex: 10.1cm  Distance along circumference from midline: 6.7cm  MT 1: F3 @ 37% on 07/19/2024    Theta LDLFPC   Frequency: 50 Hz  Burst Frequency: 5 Hz  Train Duration: 10 sec   Number of Bursts: 10   Total pulses delivered: 1800  Tx Loc: F3  Energy:  44% (120%MT)  Trains: 60           9/18/2024    11:15 AM 9/19/2024     1:49 PM 9/20/2024     1:22 PM   PHQ-9 SCORE   PHQ-9 Total Score MyChart 7 (Mild depression)  6 (Mild depression)   PHQ-9 Total Score 7    7 10 6       Date MADRS QIDS PHQ-9   07/19/24 37 20 23   8/2/24 -- 24 25   8/9/24  19 21   8/16/24  19 15   8/23/24  18 7   9/13/24  12 6   9/20/24  12 6     Plan   - Continue TMS treatments    This service provided today was under the supervising provider of the day, Roland Hall MD, who was available if needed.    Payal Austin TMS Technician  [facility]  [facility] Neuromodulation

## 2024-09-23 ENCOUNTER — OFFICE VISIT (OUTPATIENT)
Dept: PSYCHIATRY | Facility: CLINIC | Age: 54
End: 2024-09-23
Payer: COMMERCIAL

## 2024-09-23 DIAGNOSIS — F33.2 SEVERE EPISODE OF RECURRENT MAJOR DEPRESSIVE DISORDER, WITHOUT PSYCHOTIC FEATURES (H): Primary | ICD-10-CM

## 2024-09-23 ASSESSMENT — PATIENT HEALTH QUESTIONNAIRE - PHQ9
10. IF YOU CHECKED OFF ANY PROBLEMS, HOW DIFFICULT HAVE THESE PROBLEMS MADE IT FOR YOU TO DO YOUR WORK, TAKE CARE OF THINGS AT HOME, OR GET ALONG WITH OTHER PEOPLE: VERY DIFFICULT
SUM OF ALL RESPONSES TO PHQ QUESTIONS 1-9: 8
SUM OF ALL RESPONSES TO PHQ QUESTIONS 1-9: 8

## 2024-09-23 NOTE — PROGRESS NOTES
Interventional Psychiatry Program  [address] [facility], [address]  [place] [address]  TMS Procedure Note   Sharmin Nieves MRN# [id number]  Age: 54 year old   YOB: 1970    Sharmin Nieves comes into clinic today at the request ofFESTUS MD, ordering provider for TMS treatments.    Pre-Procedure:  History and Physical: Reviewed in medical record  Consent signed by: Sharmin Nieves for this course of treatment on: 07/19/2024    Clinical Narrative:  Patient tolerated treatment.     Indications for TMS:   MDD, recurrent, severe; 4+ medication trials (from 2+ classes) ineffective; Psychotherapy ineffective    Procedure Diagnosis:  MDD, recurrent, severe; F33.2    Treatment Hx:  Treatment number this series: 36  Total lifetime treatment number: 36    Allergies   Allergen Reactions    Lisinopril Cough     cough      There were no vitals taken for this visit.    Pause for the Cause  Right patient: Yes  Right procedure/correct coil:  Yes; rTMS; [id number]; F8 coil.   Earplugs in place:  Yes    Procedure  Patient was seated in procedure chair. Identity and procedure was verified. Ear plugs were placed in ears and patient-specific cap was placed on head and tightened appropriately. Ruler locations were verified. Bone conducting headphones were not used. Coil was placed at F3 and stimulator was set to 44% (120% of MT). Initial train was well tolerated so 60 trains were delivered. Patient tolerated procedure well with minimal right eyebrow movement.    Motor Threshold Determination  Distance from nasion to inion: 37cm  Tragus to tragus distance: 40cm  Head circumference: 58.5cm  Distance along the vertex: 10.1cm  Distance along circumference from midline: 6.7cm  MT 1: F3 @ 37% on 07/19/2024    Theta LDLFPC   Frequency: 50 Hz  Burst Frequency: 5 Hz  Train Duration: 10 sec   Number of Bursts: 10   Total pulses delivered: 1800  Tx Loc: F3  Energy: 44% (120%MT)  Trains: 60           9/19/2024      1:49 PM 9/20/2024     1:22 PM 9/23/2024    12:40 PM   PHQ-9 SCORE   PHQ-9 Total Score MyChart  6 (Mild depression) 8 (Mild depression)   PHQ-9 Total Score 10 6 8    8       Date MADRS QIDS PHQ-9   07/19/24 37 20 23   8/2/24 -- 24 25   8/9/24  19 21   8/16/24  19 15   8/23/24  18 7   9/13/24  12 6   9/20/24  12 6     Plan   - Continue TMS treatments    This service provided today was under the supervising provider of the day, Raffi Palmer MD, who was available if needed.    Kristie Stahl, TMS Technician  [facility] Neuromodulation

## 2024-09-25 ENCOUNTER — OFFICE VISIT (OUTPATIENT)
Dept: PSYCHIATRY | Facility: CLINIC | Age: 54
End: 2024-09-25
Payer: COMMERCIAL

## 2024-09-25 DIAGNOSIS — F33.2 SEVERE EPISODE OF RECURRENT MAJOR DEPRESSIVE DISORDER, WITHOUT PSYCHOTIC FEATURES (H): Primary | ICD-10-CM

## 2024-09-25 ASSESSMENT — PATIENT HEALTH QUESTIONNAIRE - PHQ9
SUM OF ALL RESPONSES TO PHQ QUESTIONS 1-9: 8
SUM OF ALL RESPONSES TO PHQ QUESTIONS 1-9: 8
10. IF YOU CHECKED OFF ANY PROBLEMS, HOW DIFFICULT HAVE THESE PROBLEMS MADE IT FOR YOU TO DO YOUR WORK, TAKE CARE OF THINGS AT HOME, OR GET ALONG WITH OTHER PEOPLE: SOMEWHAT DIFFICULT
SUM OF ALL RESPONSES TO PHQ QUESTIONS 1-9: 8
SUM OF ALL RESPONSES TO PHQ QUESTIONS 1-9: 8
10. IF YOU CHECKED OFF ANY PROBLEMS, HOW DIFFICULT HAVE THESE PROBLEMS MADE IT FOR YOU TO DO YOUR WORK, TAKE CARE OF THINGS AT HOME, OR GET ALONG WITH OTHER PEOPLE: SOMEWHAT DIFFICULT

## 2024-09-25 NOTE — PROGRESS NOTES
Interventional Psychiatry Program  [address] [facility], [address]  [place] [address]  TMS Procedure Note   Sharmin Nieves MRN# [id number]  Age: 54 year old   YOB: 1970    Sharmin Nieves comes into clinic today at the request ofFESTUS MD, ordering provider for TMS treatments.    Pre-Procedure:  History and Physical: Reviewed in medical record  Consent signed by: Sharmin Nieves for this course of treatment on: 07/19/2024    Clinical Narrative:  Patient tolerated treatment. No changes reported.    Indications for TMS:   MDD, recurrent, severe; 4+ medication trials (from 2+ classes) ineffective; Psychotherapy ineffective    Procedure Diagnosis:  MDD, recurrent, severe; F33.2    Treatment Hx:  Treatment number this series: 37  Total lifetime treatment number: 37    Allergies   Allergen Reactions    Lisinopril Cough     cough      There were no vitals taken for this visit.    Pause for the Cause  Right patient: Yes  Right procedure/correct coil:  Yes; rTMS; [id number]; F8 coil.   Earplugs in place:  Yes    Procedure  Patient was seated in procedure chair. Identity and procedure was verified. Ear plugs were placed in ears and patient-specific cap was placed on head and tightened appropriately. Ruler locations were verified. Bone conducting headphones were not used. Coil was placed at F3 and stimulator was set to 44% (120% of MT). Initial train was well tolerated so 60 trains were delivered. Patient tolerated procedure well with minimal right eyebrow movement.    Motor Threshold Determination  Distance from nasion to inion: 37cm  Tragus to tragus distance: 40cm  Head circumference: 58.5cm  Distance along the vertex: 10.1cm  Distance along circumference from midline: 6.7cm  MT 1: F3 @ 37% on 07/19/2024    Theta LDLFPC   Frequency: 50 Hz  Burst Frequency: 5 Hz  Train Duration: 10 sec   Number of Bursts: 10   Total pulses delivered: 1800  Tx Loc: F3  Energy: 44% (120%MT)  Trains: 60            9/20/2024     1:22 PM 9/23/2024    12:40 PM 9/25/2024     1:15 PM   PHQ-9 SCORE   PHQ-9 Total Score MyChart 6 (Mild depression) 8 (Mild depression) 8 (Mild depression)   PHQ-9 Total Score 6 8    8 8    8       Date MADRS QIDS PHQ-9   07/19/24 37 20 23   8/2/24 -- 24 25   8/9/24  19 21   8/16/24  19 15   8/23/24  18 7   9/13/24  12 6   9/20/24  12 6     Plan   - Continue TMS treatments    This service provided today was under the supervising provider of the day, Sterling Stratton MD, who was available if needed.    Anna Seo, TMS Technician  [facility] Neuromodulation

## 2024-09-26 ENCOUNTER — OFFICE VISIT (OUTPATIENT)
Dept: PSYCHIATRY | Facility: CLINIC | Age: 54
End: 2024-09-26
Payer: COMMERCIAL

## 2024-09-26 DIAGNOSIS — F33.2 SEVERE EPISODE OF RECURRENT MAJOR DEPRESSIVE DISORDER, WITHOUT PSYCHOTIC FEATURES (H): Primary | ICD-10-CM

## 2024-09-26 NOTE — PROGRESS NOTES
Interventional Psychiatry Program  [address] [facility], [address]  [place] [address]  TMS Procedure Note   Sharmin Nieves MRN# [id number]  Age: 54 year old   YOB: 1970    Sharmin Nieves comes into clinic today at the request ofFESTUS MD, ordering provider for TMS treatments.    Pre-Procedure:  History and Physical: Reviewed in medical record  Consent signed by: Sharmin Nieves for this course of treatment on: 07/19/2024    Clinical Narrative:  Patient tolerated treatment. No changes reported.    Indications for TMS:   MDD, recurrent, severe; 4+ medication trials (from 2+ classes) ineffective; Psychotherapy ineffective    Procedure Diagnosis:  MDD, recurrent, severe; F33.2    Treatment Hx:  Treatment number this series: 38  Total lifetime treatment number: 38    Allergies   Allergen Reactions    Lisinopril Cough     cough      There were no vitals taken for this visit.    Pause for the Cause  Right patient: Yes  Right procedure/correct coil:  Yes; rTMS; [id number]; F8 coil.   Earplugs in place:  Yes    Procedure  Patient was seated in procedure chair. Identity and procedure was verified. Ear plugs were placed in ears and patient-specific cap was placed on head and tightened appropriately. Ruler locations were verified. Bone conducting headphones were not used. Coil was placed at F3 and stimulator was set to 44% (120% of MT). Initial train was well tolerated so 60 trains were delivered. Patient tolerated procedure well with minimal right eyebrow movement.    Motor Threshold Determination  Distance from nasion to inion: 37cm  Tragus to tragus distance: 40cm  Head circumference: 58.5cm  Distance along the vertex: 10.1cm  Distance along circumference from midline: 6.7cm  MT 1: F3 @ 37% on 07/19/2024    Theta LDLFPC   Frequency: 50 Hz  Burst Frequency: 5 Hz  Train Duration: 10 sec   Number of Bursts: 10   Total pulses delivered: 1800  Tx Loc: F3  Energy: 44% (120%MT)  Trains: 60            9/20/2024     1:22 PM 9/23/2024    12:40 PM 9/25/2024     1:15 PM   PHQ-9 SCORE   PHQ-9 Total Score MyChart 6 (Mild depression) 8 (Mild depression) 8 (Mild depression)   PHQ-9 Total Score 6 8    8 8    8       Date MADRS QIDS PHQ-9   07/19/24 37 20 23   8/2/24 -- 24 25   8/9/24  19 21   8/16/24  19 15   8/23/24  18 7   9/13/24  12 6   9/20/24  12 6     Plan   - Continue TMS treatments    This service provided today was under the supervising provider of the day, FESTUS Millard MD, who was available if needed.    Anna Seo, TMS Technician  [facility] Neuromodulation

## 2024-09-27 ENCOUNTER — OFFICE VISIT (OUTPATIENT)
Dept: PSYCHIATRY | Facility: CLINIC | Age: 54
End: 2024-09-27
Payer: COMMERCIAL

## 2024-09-27 DIAGNOSIS — F33.2 SEVERE EPISODE OF RECURRENT MAJOR DEPRESSIVE DISORDER, WITHOUT PSYCHOTIC FEATURES (H): Primary | ICD-10-CM

## 2024-09-27 ASSESSMENT — PATIENT HEALTH QUESTIONNAIRE - PHQ9
SUM OF ALL RESPONSES TO PHQ QUESTIONS 1-9: 10
10. IF YOU CHECKED OFF ANY PROBLEMS, HOW DIFFICULT HAVE THESE PROBLEMS MADE IT FOR YOU TO DO YOUR WORK, TAKE CARE OF THINGS AT HOME, OR GET ALONG WITH OTHER PEOPLE: VERY DIFFICULT
SUM OF ALL RESPONSES TO PHQ QUESTIONS 1-9: 10

## 2024-09-27 NOTE — PROGRESS NOTES
Interventional Psychiatry Program  [address], [address]  [place] [address]  TMS Procedure Note   Sharmin Nieves MRN# [id number]  Age: 54 year old   YOB: 1970    Sharmin Nieves comes into clinic today at the request ofFESTUS MD, ordering provider for TMS treatments.    Pre-Procedure:  History and Physical: Reviewed in medical record  Consent signed by: Sharmin Nieves for this course of treatment on: 07/19/2024    Clinical Narrative:  Patient tolerated treatment. Talked about struggles with current therapist.    Indications for TMS:   MDD, recurrent, severe; 4+ medication trials (from 2+ classes) ineffective; Psychotherapy ineffective    Procedure Diagnosis:  MDD, recurrent, severe; F33.2    Treatment Hx:  Treatment number this series: 39  Total lifetime treatment number: 39    Allergies   Allergen Reactions    Lisinopril Cough     cough      There were no vitals taken for this visit.    Pause for the Cause  Right patient: Yes  Right procedure/correct coil:  Yes; rTMS; [id number]; F8 coil.   Earplugs in place:  Yes    Procedure  Patient was seated in procedure chair. Identity and procedure was verified. Ear plugs were placed in ears and patient-specific cap was placed on head and tightened appropriately. Ruler locations were verified. Bone conducting headphones were not used. Coil was placed at F3 and stimulator was set to 44% (120% of MT). Initial train was well tolerated so 60 trains were delivered. Patient tolerated procedure well with minimal right eyebrow movement.    Motor Threshold Determination  Distance from nasion to inion: 37cm  Tragus to tragus distance: 40cm  Head circumference: 58.5cm  Distance along the vertex: 10.1cm  Distance along circumference from midline: 6.7cm  MT 1: F3 @ 37% on 07/19/2024    Theta LDLFPC   Frequency: 50 Hz  Burst Frequency: 5 Hz  Train Duration: 10 sec   Number of Bursts: 10   Total pulses delivered: 1800  Tx Loc: F3  Energy: 44%  (120%MT)  Trains: 60           9/25/2024     1:15 PM 9/26/2024     1:31 PM 9/27/2024     1:20 PM   PHQ-9 SCORE   PHQ-9 Total Score MyChart 8 (Mild depression)  10 (Moderate depression)   PHQ-9 Total Score 8    8 10 10       Date MADRS QIDS PHQ-9   07/19/24 37 20 23   8/2/24 -- 24 25   8/9/24  19 21   8/16/24  19 15   8/23/24  18 7   9/13/24  12 6   9/20/24  12 6   9/27/24  15 10     Plan   - Continue TMS treatments    This service provided today was under the supervising provider of the day, Raffi Palmer MD, who was available if needed.    Kristie Stahl, TMS Technician  [facility]  Mental [facility] Neuromodulation

## 2024-09-30 ENCOUNTER — OFFICE VISIT (OUTPATIENT)
Dept: PSYCHIATRY | Facility: CLINIC | Age: 54
End: 2024-09-30
Payer: COMMERCIAL

## 2024-09-30 DIAGNOSIS — F33.2 SEVERE EPISODE OF RECURRENT MAJOR DEPRESSIVE DISORDER, WITHOUT PSYCHOTIC FEATURES (H): Primary | ICD-10-CM

## 2024-09-30 ASSESSMENT — PATIENT HEALTH QUESTIONNAIRE - PHQ9
10. IF YOU CHECKED OFF ANY PROBLEMS, HOW DIFFICULT HAVE THESE PROBLEMS MADE IT FOR YOU TO DO YOUR WORK, TAKE CARE OF THINGS AT HOME, OR GET ALONG WITH OTHER PEOPLE: VERY DIFFICULT
SUM OF ALL RESPONSES TO PHQ QUESTIONS 1-9: 11
SUM OF ALL RESPONSES TO PHQ QUESTIONS 1-9: 11

## 2024-09-30 NOTE — PROGRESS NOTES
Interventional Psychiatry Program  [address], [address]  [place] [address]  TMS Procedure Note   Sharmin Nieves MRN# [id number]  Age: 54 year old   YOB: 1970    Sharmin Nieves comes into clinic today at the request ofFESTUS MD, ordering provider for TMS treatments.    Pre-Procedure:  History and Physical: Reviewed in medical record  Consent signed by: Sharmin Nieves for this course of treatment on: 07/19/2024    Clinical Narrative:  Patient tolerated treatment. Having issues with her disability pay.    Indications for TMS:   MDD, recurrent, severe; 4+ medication trials (from 2+ classes) ineffective; Psychotherapy ineffective    Procedure Diagnosis:  MDD, recurrent, severe; F33.2    Treatment Hx:  Treatment number this series: 40  Total lifetime treatment number: 40    Allergies   Allergen Reactions    Lisinopril Cough     cough      There were no vitals taken for this visit.    Pause for the Cause  Right patient: Yes  Right procedure/correct coil:  Yes; rTMS; [id number]; F8 coil.   Earplugs in place:  Yes    Procedure  Patient was seated in procedure chair. Identity and procedure was verified. Ear plugs were placed in ears and patient-specific cap was placed on head and tightened appropriately. Ruler locations were verified. Bone conducting headphones were not used. Coil was placed at F3 and stimulator was set to 44% (120% of MT). Initial train was well tolerated so 60 trains were delivered. Patient tolerated procedure well with minimal right eyebrow movement.    Motor Threshold Determination  Distance from nasion to inion: 37cm  Tragus to tragus distance: 40cm  Head circumference: 58.5cm  Distance along the vertex: 10.1cm  Distance along circumference from midline: 6.7cm  MT 1: F3 @ 37% on 07/19/2024    Theta LDLFPC   Frequency: 50 Hz  Burst Frequency: 5 Hz  Train Duration: 10 sec   Number of Bursts: 10   Total pulses delivered: 1800  Tx Loc: F3  Energy: 44%  (120%MT)  Trains: 60           9/26/2024     1:31 PM 9/27/2024     1:20 PM 9/30/2024    12:41 PM   PHQ-9 SCORE   PHQ-9 Total Score MyChart  10 (Moderate depression) 11 (Moderate depression)   PHQ-9 Total Score 10 10 11    11       Date MADRS QIDS PHQ-9   07/19/24 37 20 23   8/2/24 -- 24 25   8/9/24  19 21   8/16/24  19 15   8/23/24  18 7   9/13/24  12 6   9/20/24  12 6   9/27/24  15 10     Plan   - Continue TMS treatments    This service provided today was under the supervising provider of the day, Raffi Palmer MD, who was available if needed.    Payal Austin, TMS Technician  [facility]  Mental [facility] Neuromodulation

## 2024-10-02 ENCOUNTER — OFFICE VISIT (OUTPATIENT)
Dept: PSYCHIATRY | Facility: CLINIC | Age: 54
End: 2024-10-02
Payer: COMMERCIAL

## 2024-10-02 DIAGNOSIS — F33.2 SEVERE EPISODE OF RECURRENT MAJOR DEPRESSIVE DISORDER, WITHOUT PSYCHOTIC FEATURES (H): Primary | ICD-10-CM

## 2024-10-02 ASSESSMENT — PATIENT HEALTH QUESTIONNAIRE - PHQ9
10. IF YOU CHECKED OFF ANY PROBLEMS, HOW DIFFICULT HAVE THESE PROBLEMS MADE IT FOR YOU TO DO YOUR WORK, TAKE CARE OF THINGS AT HOME, OR GET ALONG WITH OTHER PEOPLE: SOMEWHAT DIFFICULT
SUM OF ALL RESPONSES TO PHQ QUESTIONS 1-9: 9
10. IF YOU CHECKED OFF ANY PROBLEMS, HOW DIFFICULT HAVE THESE PROBLEMS MADE IT FOR YOU TO DO YOUR WORK, TAKE CARE OF THINGS AT HOME, OR GET ALONG WITH OTHER PEOPLE: SOMEWHAT DIFFICULT

## 2024-10-02 NOTE — PROGRESS NOTES
Interventional Psychiatry Program  [address] [facility], [address]  [place] [address]  TMS Procedure Note   Sharmin Nieves MRN# [id number]  Age: 54 year old   YOB: 1970    Sharmin Nieves comes into clinic today at the request ofFESTUS MD, ordering provider for TMS treatments.    Pre-Procedure:  History and Physical: Reviewed in medical record  Consent signed by: Sharmin Nieves for this course of treatment on: 07/19/2024    Clinical Narrative:  Patient tolerated treatment. Yesterday was brother's first heaven birthday.    Indications for TMS:   MDD, recurrent, severe; 4+ medication trials (from 2+ classes) ineffective; Psychotherapy ineffective    Procedure Diagnosis:  MDD, recurrent, severe; F33.2    Treatment Hx:  Treatment number this series: 41  Total lifetime treatment number: 41    Allergies   Allergen Reactions    Lisinopril Cough     cough      There were no vitals taken for this visit.    Pause for the Cause  Right patient: Yes  Right procedure/correct coil:  Yes; rTMS; [id number]; F8 coil.   Earplugs in place:  Yes    Procedure  Patient was seated in procedure chair. Identity and procedure was verified. Ear plugs were placed in ears and patient-specific cap was placed on head and tightened appropriately. Ruler locations were verified. Bone conducting headphones were not used. Coil was placed at F3 and stimulator was set to 44% (120% of MT). Initial train was well tolerated so 60 trains were delivered. Patient tolerated procedure well with minimal right eyebrow movement.    Motor Threshold Determination  Distance from nasion to inion: 37cm  Tragus to tragus distance: 40cm  Head circumference: 58.5cm  Distance along the vertex: 10.1cm  Distance along circumference from midline: 6.7cm  MT 1: F3 @ 37% on 07/19/2024    Theta LDLFPC   Frequency: 50 Hz  Burst Frequency: 5 Hz  Train Duration: 10 sec   Number of Bursts: 10   Total pulses delivered: 1800  Tx Loc: F3  Energy: 44%  (120%MT)  Trains: 60           9/27/2024     1:20 PM 9/30/2024    12:41 PM 10/2/2024     1:15 PM   PHQ-9 SCORE   PHQ-9 Total Score MyChart 10 (Moderate depression) 11 (Moderate depression) 9 (Mild depression)   PHQ-9 Total Score 10 11    11 9    9       Date MADRS QIDS PHQ-9   07/19/24 37 20 23   8/2/24 -- 24 25   8/9/24  19 21   8/16/24  19 15   8/23/24  18 7   9/13/24  12 6   9/20/24  12 6   9/27/24  15 10     Plan   - Continue TMS treatments    This service provided today was under the supervising provider of the day, Sterling Stratton MD, who was available if needed.    Kristie Stahl, TMS Technician  [facility]  Mental [facility] Neuromodulation

## 2024-10-03 ENCOUNTER — OFFICE VISIT (OUTPATIENT)
Dept: PSYCHIATRY | Facility: CLINIC | Age: 54
End: 2024-10-03
Payer: COMMERCIAL

## 2024-10-03 DIAGNOSIS — F33.2 SEVERE EPISODE OF RECURRENT MAJOR DEPRESSIVE DISORDER, WITHOUT PSYCHOTIC FEATURES (H): Primary | ICD-10-CM

## 2024-10-03 NOTE — PROGRESS NOTES
Interventional Psychiatry Program  [address], [address]  [place] [address]  TMS Procedure Note   Sharmin Nieves MRN# [id number]  Age: 54 year old   YOB: 1970    Sharmin Nieves comes into clinic today at the request ofFESTUS MD, ordering provider for TMS treatments.    Pre-Procedure:  History and Physical: Reviewed in medical record  Consent signed by: Sharmin Nieves for this course of treatment on: 07/19/2024    Clinical Narrative:  Patient tolerated treatment. Treated herself to some new cloths.    Indications for TMS:   MDD, recurrent, severe; 4+ medication trials (from 2+ classes) ineffective; Psychotherapy ineffective    Procedure Diagnosis:  MDD, recurrent, severe; F33.2    Treatment Hx:  Treatment number this series: 42  Total lifetime treatment number: 42    Allergies   Allergen Reactions    Lisinopril Cough     cough      There were no vitals taken for this visit.    Pause for the Cause  Right patient: Yes  Right procedure/correct coil:  Yes; rTMS; [id number]; F8 coil.   Earplugs in place:  Yes    Procedure  Patient was seated in procedure chair. Identity and procedure was verified. Ear plugs were placed in ears and patient-specific cap was placed on head and tightened appropriately. Ruler locations were verified. Bone conducting headphones were not used. Coil was placed at F3 and stimulator was set to 44% (120% of MT). Initial train was well tolerated so 60 trains were delivered. Patient tolerated procedure well with minimal right eyebrow movement.    Motor Threshold Determination  Distance from nasion to inion: 37cm  Tragus to tragus distance: 40cm  Head circumference: 58.5cm  Distance along the vertex: 10.1cm  Distance along circumference from midline: 6.7cm  MT 1: F3 @ 37% on 07/19/2024    Theta LDLFPC   Frequency: 50 Hz  Burst Frequency: 5 Hz  Train Duration: 10 sec   Number of Bursts: 10   Total pulses delivered: 1800  Tx Loc: F3  Energy: 44%  (120%MT)  Trains: 60           9/27/2024     1:20 PM 9/30/2024    12:41 PM 10/2/2024     1:15 PM   PHQ-9 SCORE   PHQ-9 Total Score MyChart 10 (Moderate depression) 11 (Moderate depression) 9 (Mild depression)   PHQ-9 Total Score 10 11    11 9    9       Date MADRS QIDS PHQ-9   07/19/24 37 20 23   8/2/24 -- 24 25   8/9/24  19 21   8/16/24  19 15   8/23/24  18 7   9/13/24  12 6   9/20/24  12 6   9/27/24  15 10     Plan   - Continue TMS treatments    This service provided today was under the supervising provider of the day, FESTUS Millard MD, who was available if needed.    Payal Austin TMS Technician  [facility]  Mental Health Neuromodulation

## 2024-10-04 ENCOUNTER — OFFICE VISIT (OUTPATIENT)
Dept: PSYCHIATRY | Facility: CLINIC | Age: 54
End: 2024-10-04
Payer: COMMERCIAL

## 2024-10-04 DIAGNOSIS — F33.2 SEVERE EPISODE OF RECURRENT MAJOR DEPRESSIVE DISORDER, WITHOUT PSYCHOTIC FEATURES (H): Primary | ICD-10-CM

## 2024-10-04 ASSESSMENT — PATIENT HEALTH QUESTIONNAIRE - PHQ9
SUM OF ALL RESPONSES TO PHQ QUESTIONS 1-9: 11
SUM OF ALL RESPONSES TO PHQ QUESTIONS 1-9: 11
10. IF YOU CHECKED OFF ANY PROBLEMS, HOW DIFFICULT HAVE THESE PROBLEMS MADE IT FOR YOU TO DO YOUR WORK, TAKE CARE OF THINGS AT HOME, OR GET ALONG WITH OTHER PEOPLE: VERY DIFFICULT

## 2024-10-04 NOTE — PROGRESS NOTES
Interventional Psychiatry Program  [address], [address]  [place] [address]  TMS Procedure Note   Sharmin Nieves MRN# [id number]  Age: 54 year old   YOB: 1970    Sharmin Nieves comes into clinic today at the request ofFESTUS MD, ordering provider for TMS treatments.    Pre-Procedure:  History and Physical: Reviewed in medical record  Consent signed by: Sharmin Nieves for this course of treatment on: 07/19/2024    Clinical Narrative:  Patient tolerated treatment[facility]    Indications for TMS:   MDD, recurrent, severe; 4+ medication trials (from 2+ classes) ineffective; Psychotherapy ineffective    Procedure Diagnosis:  MDD, recurrent, severe; F33.2    Treatment Hx:  Treatment number this series: 43  Total lifetime treatment number: 43    Allergies   Allergen Reactions    Lisinopril Cough     cough      There were no vitals taken for this visit.    Pause for the Cause  Right patient: Yes  Right procedure/correct coil:  Yes; rTMS; [id number]; F8 coil.   Earplugs in place:  Yes    Procedure  Patient was seated in procedure chair. Identity and procedure was verified. Ear plugs were placed in ears and patient-specific cap was placed on head and tightened appropriately. Ruler locations were verified. Bone conducting headphones were not used. Coil was placed at F3 and stimulator was set to 44% (120% of MT). Initial train was well tolerated so 60 trains were delivered. Patient tolerated procedure well with minimal right eyebrow movement.    Motor Threshold Determination  Distance from nasion to inion: 37cm  Tragus to tragus distance: 40cm  Head circumference: 58.5cm  Distance along the vertex: 10.1cm  Distance along circumference from midline: 6.7cm  MT 1: F3 @ 37% on 07/19/2024    Theta LDLFPC   Frequency: 50 Hz  Burst Frequency: 5 Hz  Train Duration: 10 sec   Number of Bursts: 10   Total pulses delivered: 1800  Tx Loc: F3  Energy: 44%  (120%MT)  Trains: 60           10/2/2024     1:15 PM 10/3/2024     1:41 PM 10/4/2024    12:56 PM   PHQ-9 SCORE   PHQ-9 Total Score MyChart 9 (Mild depression)  11 (Moderate depression)   PHQ-9 Total Score 9    9 10 11       Date MADRS QIDS PHQ-9   07/19/24 37 20 23   8/2/24 -- 24 25   8/9/24  19 21   8/16/24  19 15   8/23/24  18 7   9/13/24  12 6   9/20/24  12 6   9/27/24  15 10   10/4/24  14 11     Plan   - Continue TMS treatments    This service provided today was under the supervising provider of the day, Sterling Stratton MD, who was available if needed.    Anna Seo, TMS Technician  [facility]  Mental [facility] Neuromodulation

## 2024-10-11 ASSESSMENT — PATIENT HEALTH QUESTIONNAIRE - PHQ9: SUM OF ALL RESPONSES TO PHQ QUESTIONS 1-9: 21

## 2024-10-12 ASSESSMENT — PATIENT HEALTH QUESTIONNAIRE - PHQ9
SUM OF ALL RESPONSES TO PHQ QUESTIONS 1-9: 10
SUM OF ALL RESPONSES TO PHQ QUESTIONS 1-9: 13
SUM OF ALL RESPONSES TO PHQ QUESTIONS 1-9: 8
SUM OF ALL RESPONSES TO PHQ QUESTIONS 1-9: 9
SUM OF ALL RESPONSES TO PHQ QUESTIONS 1-9: 6
SUM OF ALL RESPONSES TO PHQ QUESTIONS 1-9: 23
SUM OF ALL RESPONSES TO PHQ QUESTIONS 1-9: 10
SUM OF ALL RESPONSES TO PHQ QUESTIONS 1-9: 6
SUM OF ALL RESPONSES TO PHQ QUESTIONS 1-9: 23
SUM OF ALL RESPONSES TO PHQ QUESTIONS 1-9: 20

## 2024-10-13 ENCOUNTER — MYC REFILL (OUTPATIENT)
Dept: FAMILY MEDICINE | Facility: CLINIC | Age: 54
End: 2024-10-13
Payer: COMMERCIAL

## 2024-10-13 DIAGNOSIS — M25.871 SESAMOIDITIS OF RIGHT FOOT: ICD-10-CM

## 2024-10-13 DIAGNOSIS — M79.674 PAIN OF RIGHT GREAT TOE: ICD-10-CM

## 2024-10-13 DIAGNOSIS — M96.0 NONUNION AFTER ARTHRODESIS: ICD-10-CM

## 2024-10-15 ENCOUNTER — TELEPHONE (OUTPATIENT)
Dept: FAMILY MEDICINE | Facility: CLINIC | Age: 54
End: 2024-10-15
Payer: COMMERCIAL

## 2024-10-15 RX ORDER — HYDROCODONE BITARTRATE AND ACETAMINOPHEN 5; 325 MG/1; MG/1
1 TABLET ORAL 3 TIMES DAILY PRN
Qty: 90 TABLET | Refills: 0 | Status: SHIPPED | OUTPATIENT
Start: 2024-10-15 | End: 2024-11-13

## 2024-10-15 NOTE — TELEPHONE ENCOUNTER
Called pharmacy and ok'd refill, they said unable to fill today d/t insurance but pt could pay out of pocket     Thanks!  Ridge GARRETT RN   [facility]

## 2024-10-15 NOTE — TELEPHONE ENCOUNTER
Patient calling to report that pharmacy won't fill the NORCO today unless someone calls from the clinic saying it's OK to fill today, even though start date is today.     Betty Bartlett RN

## 2024-10-15 NOTE — TELEPHONE ENCOUNTER
Ok to fill today.  I do want patient to schedule a video appt. But I already filled and am ok with pharmacy filling. TOMMY Clinton

## 2024-10-17 ENCOUNTER — VIRTUAL VISIT (OUTPATIENT)
Dept: FAMILY MEDICINE | Facility: CLINIC | Age: 54
End: 2024-10-17
Payer: COMMERCIAL

## 2024-10-17 DIAGNOSIS — M79.671 CHRONIC FOOT PAIN, RIGHT: ICD-10-CM

## 2024-10-17 DIAGNOSIS — F41.1 GENERALIZED ANXIETY DISORDER: ICD-10-CM

## 2024-10-17 DIAGNOSIS — G89.29 CHRONIC FOOT PAIN, RIGHT: ICD-10-CM

## 2024-10-17 DIAGNOSIS — F32.1 MODERATE MAJOR DEPRESSION (H): ICD-10-CM

## 2024-10-17 DIAGNOSIS — F10.20 ALCOHOL USE DISORDER, SEVERE, DEPENDENCE (H): ICD-10-CM

## 2024-10-17 DIAGNOSIS — F10.10 ALCOHOL ABUSE: Primary | ICD-10-CM

## 2024-10-17 PROCEDURE — G2211 COMPLEX E/M VISIT ADD ON: HCPCS | Mod: 95 | Performed by: NURSE PRACTITIONER

## 2024-10-17 PROCEDURE — 99214 OFFICE O/P EST MOD 30 MIN: CPT | Mod: 95 | Performed by: NURSE PRACTITIONER

## 2024-10-17 NOTE — PROGRESS NOTES
Kendy is a 54 year old who is being evaluated via a billable video visit.    How would you like to obtain your AVS? MyChart  If the video visit is dropped, the invitation should be resent by: Text to cell phone: [phone]  Will anyone else be joining your video visit? No      Assessment & Plan     Alcohol abuse  Relapse since July connected to brother's death. Drinking about 3/4 pint daily rum. Turned away from ED detox. Would potentially be candidate for OP detox. Message send to previous addiction med provider. Patient also has recommendation for [facility]. Detox and sobriety complicated currently by chronic opioid use for foot pain s/p multiple surgeries. Has seen pain med and is at relatively low MME=15mg/day. Taking pregabalin 50 mg BID. Did ok on trial buprenorphine, but it was not covered and too expensive to continue. May need extension of STD to cover detox.  - Adult Mental Health  Referral; Future    Alcohol use disorder, severe, dependence (H)  As above  - Adult Mental Health  Referral; Future  - Adult Mental Health  Referral; Future    Moderate major depression (H)  New therapist at [facility] declined to see patient while she is actively drinking (though did not attend therapy while drinking). Not clear that patient wants to return there after detox. Does need EMDR or similar. Referred to [facility] for stop gap and assistance getting set up with trauma therapy going forward  - Adult Mental Health  Referral; Future    Generalized anxiety disorder  As above  - Adult Mental Health  Referral; Future    Chronic foot pain, right  Continue hydrocodone-acetaminophen and pregabalin      The longitudinal plan of care for the diagnosis(es)/condition(s) as documented were addressed during this visit. Due to the added complexity in care, I will continue to support Kendy in the subsequent management and with ongoing continuity of care.Review of prior external note(s) from -  ED  Prescription drug management  I spent a total of 32 minutes on the day of the visit.   Time spent by me doing chart review, history and exam, documentation and further activities per the note    Subjective   Kendy is a 54 year old, presenting for the following health issues:  ER F/U    HPI     Alcohol relapse and mental health  Went into ED on 9/28 for detox from alcohol. Had been drinking since July. Wasn't admitted because not actively withdrawing. RN recommended [facility] for detox and Kendy submitted info for this place yesterday. Alcohol intake 3/4 pint of rum a day since July. Starts drinking around 4-5 pm. Does not wake up craving a drink or in withdraw in AM. When she has gone past 4-5 pm in the evening, starts to feels anxious. Not physically shaky, no nausea, no tremors. No loss of consciousness.     Seeing therapy who emphasized going to ED to be able to continue therapeutic therapy work. Felt really angry about the situation and she hasn't returned to that therapist. Ended up stopping TMS because couldn't afford the [employer] any longer. Follow-up with psychiatrist on 10/24.    Saw parents this weekend and they are very supportive.     Supposed to go back to work on Monday.     HTN  BP Readings from Last 6 Encounters:   09/16/24 134/88   05/30/24 118/81   05/13/24 107/68   06/23/23 128/78   10/10/22 116/75   09/01/22 138/88     Recent LFTs normal  Component      Latest Ref Rng 1/18/2024  2:47 PM   Alkaline Phosphatase      40 - 150 U/L 77    AST      0 - 45 U/L 18    ALT      0 - 50 U/L 14      Chronic foot pain  Has been taking hydrocodone-acetaminophen 5-325 mg TID for past 8 months. Prior to this took BID. Has been on opioids for foot pain since approximately 2020. Foot surgery 2021. Saw pain management . Trial of the buprenorphine was minimal due to high costs. Started pregabalin 50 mg BID after buprenorphine trial in around 5/2024.     Current Outpatient Medications   Medication Sig Dispense Refill     estradiol (ESTRACE) 0.1 MG/GM vaginal cream Place 2 g vaginally twice a week 42.5 g 1    HYDROcodone-acetaminophen (NORCO) 5-325 MG tablet Take 1 tablet by mouth 3 times daily as needed for severe pain. 90 tablet 0    levonorgestrel (MIRENA) 20 MCG/24HR IUD 1 each (20 mcg) by Intrauterine route once for 1 dose 1 each 0    LORazepam (ATIVAN) 1 MG tablet Take 1 tablet (1 mg) by mouth every 6 hours as needed for anxiety 5 tablet 0    losartan (COZAAR) 100 MG tablet Take 1 tablet (100 mg) by mouth daily. 90 tablet 1    omeprazole (PRILOSEC) 40 MG DR capsule Take 1 capsule (40 mg) by mouth daily. 90 capsule 3    ondansetron (ZOFRAN) 4 MG tablet Take 1 tablet (4 mg) by mouth every 8 hours as needed for nausea 30 tablet 0    pregabalin (LYRICA) 50 MG capsule Take 1 capsule (50 mg) by mouth 2 times daily. 60 capsule 1    topiramate (TOPAMAX) 25 MG tablet Take 3 tablets (75 mg) by mouth daily. 270 tablet 1    traZODone (DESYREL) 100 MG tablet Take 2 tablets (200 mg) by mouth at bedtime. 180 tablet 1    venlafaxine (EFFEXOR XR) 150 MG 24 hr capsule Take 2 capsules (300 mg) by mouth daily 180 capsule 0     No current facility-administered medications for this visit.     Diarrhea off and on. No blood in the diarrhea  No vomiting      Review of Systems  Constitutional, HEENT, cardiovascular, pulmonary, gi and gu systems are negative, except as otherwise noted.      Objective           Vitals:  No vitals were obtained today due to virtual visit.    Physical Exam   GENERAL: alert and no distress  EYES: Eyes grossly normal to inspection.  No discharge or erythema, or obvious scleral/conjunctival abnormalities.  RESP: No audible wheeze, cough, or visible cyanosis.    SKIN: Visible skin clear. No significant rash, abnormal pigmentation or lesions.  NEURO: Cranial nerves grossly intact.  Mentation and speech appropriate for age.  PSYCH: Appropriate affect, tone, and pace of words          Video-Visit Details    Type of service:  Video  Visit   Originating Location (pt. Location): Home    Distant Location (provider location):  On-site  Platform used for Video Visit: Charlie  Signed Electronically by: LEDY Seaman CNP

## 2024-10-21 ENCOUNTER — VIRTUAL VISIT (OUTPATIENT)
Dept: BEHAVIORAL HEALTH | Facility: CLINIC | Age: 54
End: 2024-10-21
Payer: COMMERCIAL

## 2024-10-21 ENCOUNTER — TELEPHONE (OUTPATIENT)
Dept: FAMILY MEDICINE | Facility: CLINIC | Age: 54
End: 2024-10-21

## 2024-10-21 ENCOUNTER — MYC MEDICAL ADVICE (OUTPATIENT)
Dept: FAMILY MEDICINE | Facility: CLINIC | Age: 54
End: 2024-10-21

## 2024-10-21 ENCOUNTER — VIRTUAL VISIT (OUTPATIENT)
Dept: FAMILY MEDICINE | Facility: CLINIC | Age: 54
End: 2024-10-21
Payer: COMMERCIAL

## 2024-10-21 DIAGNOSIS — F32.1 MAJOR DEPRESSIVE DISORDER, SINGLE EPISODE, MODERATE (H): Primary | ICD-10-CM

## 2024-10-21 DIAGNOSIS — M79.671 CHRONIC FOOT PAIN, RIGHT: ICD-10-CM

## 2024-10-21 DIAGNOSIS — F10.20 ALCOHOL USE DISORDER, SEVERE, DEPENDENCE (H): Primary | ICD-10-CM

## 2024-10-21 DIAGNOSIS — F41.1 GENERALIZED ANXIETY DISORDER: ICD-10-CM

## 2024-10-21 DIAGNOSIS — G89.29 CHRONIC FOOT PAIN, RIGHT: ICD-10-CM

## 2024-10-21 DIAGNOSIS — F32.1 MODERATE MAJOR DEPRESSION (H): ICD-10-CM

## 2024-10-21 PROCEDURE — G2211 COMPLEX E/M VISIT ADD ON: HCPCS | Mod: 95 | Performed by: NURSE PRACTITIONER

## 2024-10-21 PROCEDURE — 90832 PSYTX W PT 30 MINUTES: CPT | Mod: 95 | Performed by: COUNSELOR

## 2024-10-21 PROCEDURE — 99214 OFFICE O/P EST MOD 30 MIN: CPT | Mod: 95 | Performed by: NURSE PRACTITIONER

## 2024-10-21 ASSESSMENT — COLUMBIA-SUICIDE SEVERITY RATING SCALE - C-SSRS
LETHALITY/MEDICAL DAMAGE CODE MOST LETHAL POTENTIAL ATTEMPT: BEHAVIOR NOT LIKELY TO RESULT IN INJURY
TOTAL  NUMBER OF INTERRUPTED ATTEMPTS SINCE LAST CONTACT: NO
ATTEMPT SINCE LAST CONTACT: NO
1. SINCE LAST CONTACT, HAVE YOU WISHED YOU WERE DEAD OR WISHED YOU COULD GO TO SLEEP AND NOT WAKE UP?: NO
2. HAVE YOU ACTUALLY HAD ANY THOUGHTS OF KILLING YOURSELF?: NO
LETHALITY/MEDICAL DAMAGE CODE MOST LETHAL ACTUAL ATTEMPT: NO PHYSICAL DAMAGE OR VERY MINOR PHYSICAL DAMAGE
SUICIDE, SINCE LAST CONTACT: NO
6. HAVE YOU EVER DONE ANYTHING, STARTED TO DO ANYTHING, OR PREPARED TO DO ANYTHING TO END YOUR LIFE?: NO
TOTAL  NUMBER OF ABORTED OR SELF INTERRUPTED ATTEMPTS SINCE LAST CONTACT: NO

## 2024-10-21 ASSESSMENT — PATIENT HEALTH QUESTIONNAIRE - PHQ9
10. IF YOU CHECKED OFF ANY PROBLEMS, HOW DIFFICULT HAVE THESE PROBLEMS MADE IT FOR YOU TO DO YOUR WORK, TAKE CARE OF THINGS AT HOME, OR GET ALONG WITH OTHER PEOPLE: VERY DIFFICULT
SUM OF ALL RESPONSES TO PHQ QUESTIONS 1-9: 13
SUM OF ALL RESPONSES TO PHQ QUESTIONS 1-9: 13

## 2024-10-21 NOTE — TELEPHONE ENCOUNTER
Forms/Letter Request    Type of form/letter: OTHER: STD       Do we have the form/letter: Yes: [employer]    Who is the form from? Insurance comp    Where did/will the form come from? form was faxed in    When is form/letter needed by: ASAP    How would you like the form/letter returned: Educabiliat and Fax : [phone]    Patient Notified form requests are processed in 5-7 business days:Yes    Could we send this information to you in [employer] or would you prefer to receive a phone call?:   Patient would like to be contacted via [employer]

## 2024-10-21 NOTE — PROGRESS NOTES
Kendy is a 54 year old who is being evaluated via a billable video visit.    How would you like to obtain your AVS? MyChart  If the video visit is dropped, the invitation should be resent by: Text to cell phone: [phone]  Will anyone else be joining your video visit? No      Assessment & Plan     Alcohol use disorder, severe, dependence (H)  Got appt with Dr. Simons for this Thursday and will be calling [facility] to get a bed for Friday. I spoke with Dr. Simons - no plans for stopping hydrocodone-acetaminophen at this time. Would be interested in buprenorphine again if affordable option. Note - lorazepam is solely for flying and patient does not take hydrocodone on flight days when she takes the lorazepam.    Moderate major depression (H)  Stable - talking to [facility] today. Needs to find trauma therapy ongoing. [facility] therapist would not continue therapy without detox. No medication changes today.    Generalized anxiety disorder  As above    Chronic foot pain, right  Continue hydrocodone-acetaminophen and pregabalin.     HTN  Stable on losartan 100 mg - was noted to be a little higher at preventative in September - not above goal, but in 130's/80's. Likely related to alcohol intakes. Monitor for decrease in BP and consider concurrent decrease in losartan if needed.    The longitudinal plan of care for the diagnosis(es)/condition(s) as documented were addressed during this visit. Due to the added complexity in care, I will continue to support Kendy in the subsequent management and with ongoing continuity of care.Prescription drug management      Subjective   Kendy is a 54 year old, presenting for the following health issues:  Recheck Medication and  Follow Up    HPI     Hasn't heard from Addiction Med clinic. Did hear back from [facility] and there would be beds available for detox this week. Tried to reduce alcohol intake over the weekend to make detox go easier. Drank about 1/2 pint over the weekend compared to 3/4 pint  prior to this since July. Stretched out amount and tried to distract herself. Got some appealing Red Bull drink substitute.     Has an appoint with Krysta [facility] this afternoon at 2:30 pm. Emotionally, feeling scared because she was starting to enjoy the drinking like in her past. Emotional thinking about brother who  this summer due to suicide by overdose and another brother who has started drinking heavily. Has been able to learn on third brother who is stable.     Sleep problem has resolved and hasn't needed hydroxyzine since this summer. Taking long-term trazodone 200 mg at bedtime.     Daughter who lives with her has covid, but is asymptomatic since yesterday.     Talked to [facility] to extend leave and they only need medical records from  to today.    Review of Systems  Constitutional, HEENT, cardiovascular, pulmonary, gi and gu systems are negative, except as otherwise noted.    15 MME/Day        Objective           Vitals:  No vitals were obtained today due to virtual visit.    Physical Exam   GENERAL: alert and no distress  EYES: Eyes grossly normal to inspection.  No discharge or erythema, or obvious scleral/conjunctival abnormalities.  RESP: No audible wheeze, cough, or visible cyanosis.    SKIN: Visible skin clear. No significant rash, abnormal pigmentation or lesions.  NEURO: Cranial nerves grossly intact.  Mentation and speech appropriate for age.  PSYCH: Appropriate affect, tone, and pace of words        Video-Visit Details    Type of service:  Video Visit   Originating Location (pt. Location): Home  Distant Location (provider location):  On-site  Platform used for Video Visit: Charlie  Signed Electronically by: LEDY Seaman CNP

## 2024-10-21 NOTE — PROGRESS NOTES
[facility] Primary Care: Integrated Behavioral Health  October 21, 2024      Behavioral Health Clinician Progress Note    Patient Name: Sharmin Nieves           Service Type:  Consult Note      Service Location:   MyChart / Email (patient reached)     Session Start Time: 2:30pm  Session End Time: 3:05pm      Session Length: 16 - 37      Attendees: Patient     Service Modality:  Video Visit:      Provider verified identity through the following two step process.  Patient provided:  Patient address    Telemedicine Visit: The patient's condition can be safely assessed and treated via synchronous audio and visual telemedicine encounter.      Reason for Telemedicine Visit: Patient has requested telehealth visit    Originating Site (Patient Location): Patient's home    Distant Site (Provider Location): [facility]    Consent:  The patient/guardian has verbally consented to: the potential risks and benefits of telemedicine (video visit) versus in person care; bill my insurance or make self-payment for services provided; and responsibility for payment of non-covered services.     Patient would like the video invitation sent by:  My Chart    Mode of Communication:  Video Conference via [facility]    Distant Location (Provider):  On-site    As the provider I attest to compliance with applicable laws and regulations related to telemedicine.    Visit Activities (Refresh list every visit): NEW    Diagnostic Assessment Date: Pending - 11/4/2024   Treatment Plan Review Date: NA  See Flowsheets for today's PHQ-9 and DMITRIY-7 results  Previous PHQ-9:       10/3/2024     1:41 PM 10/4/2024    12:56 PM 10/21/2024     2:13 PM   PHQ-9 SCORE   PHQ-9 Total Score MyChart  11 (Moderate depression) 13 (Moderate depression)   PHQ-9 Total Score 10 11 13     Previous DMITRIY-7:       7/18/2024     9:51 PM 8/5/2024    10:27 PM 9/5/2024     9:34 AM   DMITRIY-7 SCORE   Total Score 19 (severe anxiety) 21 (severe  anxiety) 6 (mild anxiety)   Total Score 19 21 6    6    6           DATA  Extended Session (60+ minutes): No  Interactive Complexity: No  Crisis: No  [facility] Patient: No    Treatment Objective(s) Addressed in This Session:  Target Behavior(s): alcohol use, learning new coping skills, get a referral for long term therapy     Depressed Mood: Decrease frequency and intensity of feeling down, depressed, hopeless  Discuss motivation / ambivalence about a referral to specialty mental health services (Therapy, Day Tx, PHP)  Adjustment Difficulties: will develop coping/problem-solving skills to facilitate more adaptive adjustment    Current Stressors / Issues:  Patient is here for a [facility] initial consult to discuss role, sessions structure, process, etc.   Patient disclosed about past experience with mental health providers.   Patient would like help getting long term therapy for trauma and substance use, but like the [facility] to bridge between services.   Patient discussed about needing to process her recent grief.   Patient would like to learn some new coping skills.      Progress on Treatment Objective(s) / Homework:  New Objective established this session - ACTION (Actively working towards change); Intervened by reinforcing change plan / affirming steps taken    Also provided psychoeducation about behavioral health condition, symptoms, and treatment options    Assessments completed prior to visit:  The following assessments were completed by patient for this visit:  PHQ9:       9/26/2024     1:31 PM 9/27/2024     1:20 PM 9/30/2024    12:41 PM 10/2/2024     1:15 PM 10/3/2024     1:41 PM 10/4/2024    12:56 PM 10/21/2024     2:13 PM   PHQ-9 SCORE   PHQ-9 Total Score MyChart  10 (Moderate depression) 11 (Moderate depression) 9 (Mild depression)  11 (Moderate depression) 13 (Moderate depression)   PHQ-9 Total Score 10 10 11    11 9    9 10 11 13     GAD7:       1/18/2024    11:46 AM 3/26/2024     9:54 PM 5/14/2024     9:18 PM 5/29/2024      5:26 PM 7/18/2024     9:51 PM 8/5/2024    10:27 PM 9/5/2024     9:34 AM   DMITRIY-7 SCORE   Total Score 9 (mild anxiety) 20 (severe anxiety) 19 (severe anxiety) 11 (moderate anxiety) 19 (severe anxiety) 21 (severe anxiety) 6 (mild anxiety)   Total Score 9 20    20 19 11 19 21 6    6    6     CAGE-AID:       10/21/2024     2:26 PM   CAGE-AID Total Score   Total Score 3   Total Score MyChart 3 (A total score of 2 or greater is considered clinically significant)     PROMIS 10-Global Health (only subscores and total score):       10/21/2024     2:26 PM   PROMIS-10 Scores Only   Global Mental Health Score     8   Global Physical Health Score     12   PROMIS TOTAL - SUBSCORES     20       Information is confidential and restricted. Go to Review Flowsheets to unlock data.    Multiple values from one day are sorted in reverse-chronological order     [facility] Suicide Severity Rating Scale (Short Version)      6/9/2021     7:36 AM 6/1/2022    12:02 PM 10/21/2024     3:22 PM   [facility] Suicide Severity Rating (Short Version)   Over the past 2 weeks have you felt down, depressed, or hopeless? no no    Over the past 2 weeks have you had thoughts of killing yourself? no no    Have you ever attempted to kill yourself? no no    1. Wish to be Dead (Since Last Contact)   N   2. Non-Specific Active Suicidal Thoughts (Since Last Contact)   N   Actual Attempt (Since Last Contact)   N   Has subject engaged in non-suicidal self-injurious behavior? (Since Last Contact)   N   Interrupted Attempts (Since Last Contact)   N   Aborted or Self-Interrupted Attempt (Since Last Contact)   N   Preparatory Acts or Behavior (Since Last Contact)   N   Suicide (Since Last Contact)   N   Actual Lethality/Medical Damage Code (Most Lethal Attempt)   0   Potential Lethality Code (Most Lethal Attempt)   0   Calculated C-SSRS Risk Score (Since Last Contact)   No Risk Indicated       Care Plan review completed: No    Medication Review:  No changes to current  psychiatric medication(s)    Medication Compliance:  Yes    Changes in Health Issues:   None reported    Chemical Use Review:   Substance Use: Chemical use reviewed. Patient would like support on getting resources for alcohol use - more details pending appt for 11/04/202 for DA.      Tobacco Use: No change in amount of tobacco use since last session.  Patient declined discussion at this time    Assessment: Current Emotional / Mental Status (status of significant symptoms):  Risk status (Self / Other harm or suicidal ideation)  Patient denies a history of suicidal ideation, suicide attempts, self-injurious behavior, homicidal ideation, homicidal behavior, and and other safety concerns  Patient denies current fears or concerns for personal safety.  Patient denies current or recent suicidal ideation or behaviors.  Patient denies current or recent homicidal ideation or behaviors.  Patient denies current or recent self injurious behavior or ideation.  Patient denies other safety concerns.  A safety and risk management plan has not been developed at this time, however patient was encouraged to call [place] [phone] should there be a change in any of these risk factors.    Appearance:   Appropriate   Eye Contact:   Good   Psychomotor Behavior: Normal   Attitude:   Cooperative  Friendly  Orientation:   All  Speech   Rate / Production: Normal    Volume:  Normal   Mood:    Anxious  Depressed  Sad   Affect:    Flat   Thought Content:  Clear   Thought Form:  Coherent  Logical   Insight:    Fair     Diagnoses:  Moderate Major Depression    Collateral Reports Completed:  Not Applicable    Plan: (Homework, other):  Patient was given information about behavioral services and encouraged to schedule a follow up appointment with the clinic [facility] in 2 weeks to complete an DA to start [facility] services.  She was also given information about mental health symptoms and treatment options  and deep Breathing Strategies to practice when  experiencing depression.  CD Recommendations: Practice Harm Reduction: and follow up with appointment for detox appointment.  , go to the PCP as necessary .           KRISTAN Moore MS, LPCC, NCC, CCTP  Behavioral Health Clinician

## 2024-10-23 ASSESSMENT — ANXIETY QUESTIONNAIRES
6. BECOMING EASILY ANNOYED OR IRRITABLE: NEARLY EVERY DAY
GAD7 TOTAL SCORE: 18
2. NOT BEING ABLE TO STOP OR CONTROL WORRYING: NEARLY EVERY DAY
5. BEING SO RESTLESS THAT IT IS HARD TO SIT STILL: MORE THAN HALF THE DAYS
3. WORRYING TOO MUCH ABOUT DIFFERENT THINGS: NEARLY EVERY DAY
7. FEELING AFRAID AS IF SOMETHING AWFUL MIGHT HAPPEN: MORE THAN HALF THE DAYS
IF YOU CHECKED OFF ANY PROBLEMS ON THIS QUESTIONNAIRE, HOW DIFFICULT HAVE THESE PROBLEMS MADE IT FOR YOU TO DO YOUR WORK, TAKE CARE OF THINGS AT HOME, OR GET ALONG WITH OTHER PEOPLE: EXTREMELY DIFFICULT
1. FEELING NERVOUS, ANXIOUS, OR ON EDGE: NEARLY EVERY DAY
4. TROUBLE RELAXING: MORE THAN HALF THE DAYS

## 2024-10-23 ASSESSMENT — PATIENT HEALTH QUESTIONNAIRE - PHQ9
SUM OF ALL RESPONSES TO PHQ QUESTIONS 1-9: 14
10. IF YOU CHECKED OFF ANY PROBLEMS, HOW DIFFICULT HAVE THESE PROBLEMS MADE IT FOR YOU TO DO YOUR WORK, TAKE CARE OF THINGS AT HOME, OR GET ALONG WITH OTHER PEOPLE: EXTREMELY DIFFICULT
SUM OF ALL RESPONSES TO PHQ QUESTIONS 1-9: 8
SUM OF ALL RESPONSES TO PHQ QUESTIONS 1-9: 14
SUM OF ALL RESPONSES TO PHQ QUESTIONS 1-9: 14

## 2024-10-24 ENCOUNTER — VIRTUAL VISIT (OUTPATIENT)
Dept: ADDICTION MEDICINE | Facility: CLINIC | Age: 54
End: 2024-10-24
Attending: NURSE PRACTITIONER
Payer: COMMERCIAL

## 2024-10-24 ENCOUNTER — VIRTUAL VISIT (OUTPATIENT)
Dept: PSYCHIATRY | Facility: CLINIC | Age: 54
End: 2024-10-24
Payer: COMMERCIAL

## 2024-10-24 DIAGNOSIS — F10.20 ALCOHOL USE DISORDER, SEVERE, DEPENDENCE (H): Primary | ICD-10-CM

## 2024-10-24 DIAGNOSIS — N95.1 PERIMENOPAUSE: ICD-10-CM

## 2024-10-24 DIAGNOSIS — F33.2 SEVERE EPISODE OF RECURRENT MAJOR DEPRESSIVE DISORDER, WITHOUT PSYCHOTIC FEATURES (H): Primary | ICD-10-CM

## 2024-10-24 DIAGNOSIS — F10.91 ALCOHOL USE DISORDER IN REMISSION: ICD-10-CM

## 2024-10-24 DIAGNOSIS — F41.1 GENERALIZED ANXIETY DISORDER: ICD-10-CM

## 2024-10-24 DIAGNOSIS — F10.10 ALCOHOL ABUSE: ICD-10-CM

## 2024-10-24 PROCEDURE — 99214 OFFICE O/P EST MOD 30 MIN: CPT | Mod: 95 | Performed by: FAMILY MEDICINE

## 2024-10-24 PROCEDURE — G2211 COMPLEX E/M VISIT ADD ON: HCPCS | Mod: 95 | Performed by: FAMILY MEDICINE

## 2024-10-24 RX ORDER — DISULFIRAM 250 MG/1
250 TABLET ORAL DAILY
Qty: 30 TABLET | Refills: 1 | Status: SHIPPED | OUTPATIENT
Start: 2024-10-24

## 2024-10-24 ASSESSMENT — ANXIETY QUESTIONNAIRES
3. WORRYING TOO MUCH ABOUT DIFFERENT THINGS: NEARLY EVERY DAY
2. NOT BEING ABLE TO STOP OR CONTROL WORRYING: NEARLY EVERY DAY
1. FEELING NERVOUS, ANXIOUS, OR ON EDGE: NEARLY EVERY DAY
5. BEING SO RESTLESS THAT IT IS HARD TO SIT STILL: MORE THAN HALF THE DAYS
7. FEELING AFRAID AS IF SOMETHING AWFUL MIGHT HAPPEN: MORE THAN HALF THE DAYS
6. BECOMING EASILY ANNOYED OR IRRITABLE: NEARLY EVERY DAY
GAD7 TOTAL SCORE: 18
IF YOU CHECKED OFF ANY PROBLEMS ON THIS QUESTIONNAIRE, HOW DIFFICULT HAVE THESE PROBLEMS MADE IT FOR YOU TO DO YOUR WORK, TAKE CARE OF THINGS AT HOME, OR GET ALONG WITH OTHER PEOPLE: EXTREMELY DIFFICULT
7. FEELING AFRAID AS IF SOMETHING AWFUL MIGHT HAPPEN: MORE THAN HALF THE DAYS
4. TROUBLE RELAXING: MORE THAN HALF THE DAYS
8. IF YOU CHECKED OFF ANY PROBLEMS, HOW DIFFICULT HAVE THESE MADE IT FOR YOU TO DO YOUR WORK, TAKE CARE OF THINGS AT HOME, OR GET ALONG WITH OTHER PEOPLE?: EXTREMELY DIFFICULT

## 2024-10-24 ASSESSMENT — PATIENT HEALTH QUESTIONNAIRE - PHQ9
SUM OF ALL RESPONSES TO PHQ QUESTIONS 1-9: 13
SUM OF ALL RESPONSES TO PHQ QUESTIONS 1-9: 13
10. IF YOU CHECKED OFF ANY PROBLEMS, HOW DIFFICULT HAVE THESE PROBLEMS MADE IT FOR YOU TO DO YOUR WORK, TAKE CARE OF THINGS AT HOME, OR GET ALONG WITH OTHER PEOPLE: VERY DIFFICULT

## 2024-10-24 ASSESSMENT — PAIN SCALES - GENERAL: PAINLEVEL_OUTOF10: MODERATE PAIN (4)

## 2024-10-24 NOTE — PROGRESS NOTES
"Virtual Visit Details    Type of service:  Video Visit     Originating Location (pt. Location): {video visit patient location:282719::\"Home\"}  {PROVIDER LOCATION On-site should be selected for visits conducted from your clinic location or adjoining [facility] hospital, academic office, or other nearby [facility] building. Off-site should be selected for all other provider locations, including home:265579}  Distant Location (provider location):  {virtual location provider:792618}  Platform used for Video Visit: {Virtual Visit Platforms:132157::\"[employer]\"}          Answers submitted by the patient for this visit:  Patient Health Questionnaire (Submitted on 10/23/2024)  If you checked off any problems, how difficult have these problems made it for you to do your work, take care of things at home, or get along with other people?: Extremely difficult  PHQ9 TOTAL SCORE: 14    "

## 2024-10-24 NOTE — NURSING NOTE
Is the patient currently in the state of [place]? YES    Current patient location: [facility] [address]  [facility] [address]    Visit mode:VIDEO    If the visit is dropped, the patient can be reconnected by: VIDEO VISIT: Text to cell phone:   Telephone Information:   Mobile [phone]       Will anyone else be joining the visit? No  (If patient encounters technical issues they should call [phone])    Are changes needed to the allergy or medication list? Yes Medications flagged for removal; and Pt stated no changes to allergies    Are refills needed on medications prescribed by this physician? No    Rooming Documentation: Attendance Guidelines - Care team has reviewed attendance agreement with patient. Patient advised that two failed appointments within 6 months may lead to termination of current episode of care.       Reason for visit: Consult     CRISTIAN Garza
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
## 2024-10-24 NOTE — PROGRESS NOTES
Kendy is a 54 year old who is being evaluated via a billable video visit.    How would you like to obtain your AVS? MyChart  If the video visit is dropped, the invitation should be resent by: Text to cell phone: [phone]  Will anyone else be joining your video visit? No      Video-Visit Details    Type of service:  Video Visit   Originating Location (pt. Location): Home    Distant Location (provider location):  On-site  Platform used for Video Visit: [facility]  Signed Electronically by: Rosaura Millard MD     Answers submitted by the patient for this visit:  Patient Health Questionnaire (G7) (Submitted on 10/24/2024)  DMITRIY 7 TOTAL SCORE: 18   Pt seen by dr Srikanth Cabrera today for back pain and side pain for several weeks sp compression fx .  Pain continues

## 2024-10-24 NOTE — PATIENT INSTRUCTIONS
Patient Education   Addiction Medicine  What to Expect  Here's what to expect from our Addiction Medicine program.  About Addiction Medicine  Addiction Medicine clinics help you with substance use problems. You set your own goals. We try to help you reach your goals. Your care plan can include:  Medicine  Creating a recovery plan  Helping you find local resources  Helping with treatment options  Clinic phone number and addresses  Clinic Phone: [phone]  Mental Health and Addiction Clinic  [facility]  [address], [address]  [place] [address]  [facility]  [address], [address]  [place] [address]  Walk-in services  We offer walk-in care for patients at the Recovery Clinic. This is only for patients with Opioid Use Disorder (OUD). Anyone with OUD is welcome. Our providers will refer you to the Recovery Clinic if you're struggling to keep up with your medicines or appointments.  Recovery Clinic ([facility])  [address], [address]  [place] [address]  Phone: [phone]  The Recovery Clinic is open for walk-ins Monday to Friday 9 a.m. to 11:30 a.m. and 12:30 p.m. to 3 p.m.  How it works  Come to your visits every time. The treatment works better when you do.   You can have as many visits as you need. When you're better, we'll refer you back to being cared for by your family doctor.   If you need it, we'll send you to doctors, psychiatrists, therapists, and other providers. We focus on treating addiction. We don't treat other problems, like managing other medicines or non-addiction issues.  About visits  Urine drug testing  We'll often test your pee (urine) for drugs. This is the only way we can know for sure whether or not you're using drugs. It helps us treat you without judgement.   Suboxone (buprenorphine)  If you're taking buprenorphine, you'll have a lot of visits at first. If your problem is getting worse, or you're  "using substances, we may schedule you for extra visits.   Cancelling visits  If you can't come to your visit, please call us right away at [phone]. If you don't cancel at least 24 hours (1 full day) before your visit, that's \"late cancellation.\"  Being late to visits  If you come late, you may not be seen. This will count as a \"no-show.\"  Please call the clinic if you're running late. This will help us plan, but it doesn't mean you'll be seen.   Being late is:  More than 15 minutes late for a return visit.  More than 30 minutes late for your first visit.  If you cancel late or don't show up 2 times within 6 months, we may transfer you to another clinic.   Getting help between visits  If you need help between visits, you can call us Monday to Friday from 8 a.m. to 4:30 p.m. at [phone]. You can also send us a message on [employer].  Medicine refills  If you miss or cancel a visit, you can still ask for a refill. But we can only refill your medicines if you've made a new appointment.  Please call your pharmacy for medicine refills. If you have a question about your refill, call us at [phone].  It takes up to 2 business days to refill your drugs. Let us know 2 to 3 days before you run out. Don't call more than 1 week before you run out. That's too early.   Please make sure we have your right phone number.  If we have a problem with your refill, we'll call you. If we call you, please call us back right away. If you don't, you may not get your medicines quickly.   Call your pharmacy to find out if your medicines are ready.   Keep your medicines in a safe place. Keep them away from pets and children. If your medicines are lost or stolen, we usually don't replace them. We recommend you file a police report if your medicines are stolen. Your insurance may not pay for early refills, even if you have a prescription.  Forms  Please give us at least 3 business days to fill out any forms. Bring the forms to your " visits if you can. We may refer you to other members of your care team to complete the forms.   Emergency care   Call 911 or go to the nearest emergency room if your life or someone else's life is in danger.  Call 988 anytime for the Suicide and Crisis Lifeline.  If you need care when we're closed, call your family doctor to see if they can help. You can also go to urgent care or an emergency room. [facility] emergency rooms may be able to give you buprenorphine or other medicine refills.  Thank you for choosing us for your care.  For informational purposes only. Not to replace the advice of your health care provider. Copyright   2023 [facility]. All rights reserved. [employer] [id number] - REV 05/23.

## 2024-10-24 NOTE — PROGRESS NOTES
"    SUBJECTIVE                                                      Sharmin Nieves is a 54 year old female who presents for  initial visit for addiction consultation and management referred by PCP due to concerns for Alcohol Use Disorder (AUD)    Visit performed Virtual, via video    Video-Visit Details    Type of service:  Video Visit    Video Start Time: 2:59 PM  Video End Time:  3:25 PM    Originating Location (pt. Location): Home    Distant Location (provider location):  [facility] office     Platform used for Video Visit: Workface      HPI:   Sharmin Nieves is a 54 year old female with history of alcohol use who presents for further evaluation of possible substance use disorder and management options.    - Last visit November 2021, had been taking antabuse to help with alcohol use and stopped this early 2022  - Earlier this year both her cousin and brother committed suicide; on short-term disability since [employer] as a result  - Started seeing psychiatry right away, has continued to work with them and attending TMS treatments recently  - Started seeing a therapist/LADC, eventually provided an ultimatum that she needed to stop using alcohol in order to continue pursuing thearpy  - Daily alcohol use, no more than a pint every night  - History of alcohol-induced seizures  - Worried about her health and safety with alcohol cessation and exploring options for withdrawal treatment  - Presented to [facility] ED and was told she was not actively in withdrawal and could therefore not be admitted      Substance Use History:   \"Have you ever had any history with [...] use?\" And \"When was your last use?  ALCOHOL - per HPI, daily use between 1/2-3/4 pint at night  CANNABIS - vaping before bed, no other use, no concerns/consequences  PRESCRIPTION STIMULANTS (includes Ritalin, Adderall, Vyvanse) - none  COCAINE/CRACK - none  METH/AMPHETAMINES (includes ecstacy, MDMA/prasad) - prescribed phentermine in the " past, no misuse  OPIATES - prescribed hydrocodone, no overuse/misuse or euphoria  BENZODIAZEPINES (includes Ativan, Klonopin, Xanax) - only PRN for flying  KRATOM (mild opioid and stimulant effects) - none  KETAMINE - none  HALLUCINOGENS (includes DXM) - none  BEHAVIORAL (Gambling, Eating d/o, Compulsivity) - none  History of treatment - n/a  NICOTINE  Cigarettes: 1/2ppd  Chew/snus: none  Vaping: none  Past NRT/medication use: took chantix in the past, worked well but caused nausea      Previous withdrawal treatment episodes (e.g. detox): Attended detox last week, required valium d/t hallucinations. Has attended detox 4-5 times in the past. Has attended treatment 3x - two of them just over 10 years ago, and another after a DWI 5 years ago. Initial treatment at  was helpful, stayed abstinent for nearly a year   Medical complications from substance use: none  IV Drug use?: none  Previous Medication for Addiction Tx: disulfiram was helpful, naltrexone no response, has not tried acamprosate. Gabapentin caused her to be tired, may have helped with alcohol cravings  Longest period of full abstinence: 3 years before starting again in July 2024  Activities that have previously supported abstinence: uninterested in alcohol, family support - didn't offer to her, cooking, AA meetings  Current Recovery Activities: none      Infectious disease screening  Hep C:    Hepatitis C Antibody   Date Value Ref Range Status   07/20/2010 Negative NEG Final     HIV:  negative 2009          PHQ-9 scores:      10/21/2024     2:13 PM 10/23/2024    10:23 PM 10/24/2024    10:38 AM   PHQ   PHQ-9 Total Score 13 14  13    Q9: Thoughts of better off dead/self-harm past 2 weeks Not at all  Several days  Not at all    F/U: Thoughts of suicide or self-harm  Yes     F/U: Self harm-plan  No     F/U: Self-harm action  No     F/U: Safety concerns  No         Patient-reported     DMITRIY-7 scores:      9/5/2024     9:34 AM 10/23/2024    10:24 PM 10/24/2024     10:38 AM   DMITRIY-7 SCORE   Total Score 6 (mild anxiety)  18 (severe anxiety)   Total Score 6    6    6 18  18        Patient-reported    Multiple values from one day are sorted in reverse-chronological order         SOCIAL HISTORY:  Housing status: with 2 kids (21yo son, 18yo daughter  Employment status: short-term disability  Relationship status: Single  Children: 3 kids  Legal concerns related to use: none  Contact information up to date? yes    3rd Party Involvement - none (please obtain RICK if pt would like to include)      Medical History:    Patient Active Problem List    Diagnosis Date Noted    Overactive bladder 04/04/2024     Priority: Medium    Urgency-frequency syndrome 01/24/2024     Priority: Medium    Urge incontinence of urine 01/24/2024     Priority: Medium    Nonunion after arthrodesis 04/17/2023     Priority: Medium    History of 2019 novel coronavirus disease (COVID-19) 01/31/2023     Priority: Medium    Recurrent major depressive disorder, remission status unspecified (H) 01/31/2023     Priority: Medium    Morbid obesity (H) 01/31/2023     Priority: Medium    Esophageal spasm 04/26/2022     Priority: Medium    History of Nissen fundoplication 04/25/2022     Priority: Medium    Class 2 obesity due to excess calories without serious comorbidity with body mass index (BMI) of 35.0 to 35.9 in adult 03/22/2022     Priority: Medium    BMI 35.0-35.9,adult 03/22/2022     Priority: Medium    Alcohol use disorder, severe, dependence (H) 01/10/2022     Priority: Medium    Pain of right great toe 10/15/2021     Priority: Medium    History of foot surgery 07/14/2021     Priority: Medium    Degenerative joint disease of foot 05/03/2021     Priority: Medium     Added automatically from request for surgery [id number]      Alcohol withdrawal syndrome without complication (H) 02/17/2021     Priority: Medium    Alcohol abuse 04/19/2019     Priority: Medium    Alcohol dependence with acute alcoholic intoxication (H)  04/12/2019     Priority: Medium    Encounter for therapeutic drug monitoring 02/22/2019     Priority: Medium    Benign essential hypertension 11/30/2018     Priority: Medium    Situational anxiety 11/30/2018     Priority: Medium    Irregular heart beat 11/30/2018     Priority: Medium    Restless leg syndrome 09/16/2016     Priority: Medium    Dry skin 04/15/2013     Priority: Medium    Generalized anxiety disorder 03/18/2013     Priority: Medium     Diagnosis updated by automated process. Provider to review and confirm.      Home Health Care 02/08/2012     Priority: Medium     EMERGENCY CARE PLAN  April 15, 2013: No current Care Coordination follow up planned. Please refer if Care Coordination services are needed.    Presenting Problem Signs and Symptoms Treatment Plan   Questions or concerns   during clinic hours   I will call my clinic directly:  [address] [address] [phone].   Questions or concerns outside clinic hours   I will call the 24 hour nurse line at   [phone] or [facility].   Need to schedule an appointment   I will call the 24 hour scheduling team at [phone] or my clinic directly at [phone].    Same day treatment     I will call my clinic first, nurse line if after hours, urgent care and express care if needed.     Clinic care coordination services (regular clinic hours)     I will call a clinic care coordinator directly:     Azar Howard RN  Mon, Tues, Fri - [phone]  Wed, Thurs - [phone]    Jeny Sellers, :    [phone]    Or call my clinic at [phone] and ask to speak with care coordination.     Crisis Services: Behavioral or Mental Health  [facility] 24 Hour Phone Line  [phone]    [facility] 24 Hour Crisis Services  [phone]    [facility] [phone]    [facility]   [phone]         Emergency treatment -- Immediately    [facility] [address]            Insomnia 09/20/2011     Priority: Medium    Health Care Home 06/07/2011     Priority: Medium     X  DX V65.8 REPLACED WITH [id number] HEALTH CARE HOME (04/08/2013)      Rectal pain 05/16/2011     Priority: Medium    Moderate major depression (H) 04/15/2011     Priority: Medium     Previous med trials  Prozac (fluoxetine) YES  Zoloft (sertraline) YES  Celexa (citalopram) YES  Lexapro (escitalopram) YES  Paxil (paroxetine) no  Wellbutrin (bupropion) YES  Effexor (venlafaxine) YES    Others:    Duloxetine - YES  Aripiprazole - YES  Lamotrigine - YES  Risperidone - YES  Quetiapine - YES      CARDIOVASCULAR SCREENING; LDL GOAL LESS THAN 160 10/31/2010     Priority: Medium    Chronic low back pain 05/11/2010     Priority: Medium    Right hip pain 05/11/2010     Priority: Medium    Ovarian cyst 12/05/2006     Priority: Medium     Problem list name updated by automated process. Provider to review      Irregular menstrual cycle 12/05/2006     Priority: Medium    Esophageal reflux 12/14/2005     Priority: Medium     with associated esophageal spasm      IUD (intrauterine device) in place 10/03/2005     Priority: Medium     7/10/15 Mirena IUD replaced after 6 1/2 years.  Placed today without difficulty.      Tobacco use disorder 06/08/2005     Priority: Medium    Herpes simplex virus (HSV) infection 06/17/2004     Priority: Medium     Problem list name updated by automated process. Provider to review         Past Medical History:   Diagnosis Date    Acne     Anxiety     Anxiety state, unspecified     Anxiety    Benign essential hypertension 11/30/2018    Chronic low back pain 5/11/2010    Degenerative joint disease of foot 5/3/2021    Depression     Dr. Gaytan    Diaphragmatic hernia without mention of obstruction or gangrene     Esophageal reflux     with associated esophageal spasm    ETOH abuse     Foot pain     Herpes simplex without mention of complication     History of foot surgery 7/14/2021    Moderate major  depression (H) 4/15/2011    Rectal pain 5/16/2011    Right hip pain 5/11/2010    Seasonal affective disorder (H)     STD (sexually transmitted disease)     Surveillance of previously prescribed intrauterine contraceptive device 10/03/05    mirena IUD       Past Surgical History:   Procedure Laterality Date    ARTHRODESIS FOOT Right 6/9/2021    Procedure: Right 1st metatarsophalangeal joint fuison;  Surgeon: Conor Guillen MD;  Location: [facility] OR    CHEILECTOMY Right 12/21/2017    Procedure: CHEILECTOMY;  RIGHT FOOT CHEILECTOMY;  Surgeon: Mo Edgar DPM;  Location:  OR    ESOPHAGOSCOPY, GASTROSCOPY, DUODENOSCOPY (EGD), COMBINED N/A 6/1/2022    Procedure: ESOPHAGOGASTRODUODENOSCOPY, WITH BIOPSY;  Surgeon: Neal Loja MD;  Location: [facility] OR    SURGICAL HISTORY OF -   4/04    Lapr Nissen fundoplication    TONSILLECTOMY & ADENOIDECTOMY  1985    ZZC NONSPECIFIC PROCEDURE  1992    CRYO SURGERY for abnl paps    ZZC STOMACH SURGERY PROCEDURE UNLISTED      Nissen         Family History   Problem Relation Age of Onset    Depression Mother     Obesity Mother     Anxiety Disorder Mother     Diabetes Mother     Alcohol/Drug Father     Depression Father     Gastrointestinal Disease Father         GERD    Depression Sister     Colon Polyps Sister     Alcoholism Brother     Depression Brother     Suicide Brother     Alcoholism Brother     Depression Brother     Alcoholism Brother     Depression Brother     Gastrointestinal Disease Brother         severe gerd    C.A.D. Maternal Grandmother     Hypertension Maternal Grandmother     Alcohol/Drug Maternal Grandmother     Depression Maternal Grandmother     Alcohol/Drug Maternal Grandfather     Depression Maternal Grandfather     Breast Cancer Paternal Grandmother     Depression Paternal Grandmother     Cerebrovascular Disease Paternal Grandfather     Alcohol/Drug Paternal Grandfather     Autism Spectrum Disorder Son     Substance Abuse Son     Schizophrenia  Son     Substance Abuse Son     Breast Cancer Maternal Aunt     Cancer - colorectal No family hx of     Prostate Cancer No family hx of          Current Outpatient Medications   Medication Sig Dispense Refill    disulfiram (ANTABUSE) 250 MG tablet Take 1 tablet (250 mg) by mouth daily. 30 tablet 1    estradiol (ESTRACE) 0.1 MG/GM vaginal cream Place 2 g vaginally twice a week 42.5 g 1    HYDROcodone-acetaminophen (NORCO) 5-325 MG tablet Take 1 tablet by mouth 3 times daily as needed for severe pain. 90 tablet 0    levonorgestrel (MIRENA) 20 MCG/24HR IUD 1 each (20 mcg) by Intrauterine route once for 1 dose 1 each 0    LORazepam (ATIVAN) 1 MG tablet Take 1 tablet (1 mg) by mouth every 6 hours as needed for anxiety 5 tablet 0    losartan (COZAAR) 100 MG tablet Take 1 tablet (100 mg) by mouth daily. 90 tablet 1    omeprazole (PRILOSEC) 40 MG DR capsule Take 1 capsule (40 mg) by mouth daily. 90 capsule 3    ondansetron (ZOFRAN) 4 MG tablet Take 1 tablet (4 mg) by mouth every 8 hours as needed for nausea 30 tablet 0    pregabalin (LYRICA) 50 MG capsule Take 1 capsule (50 mg) by mouth 2 times daily. 60 capsule 1    topiramate (TOPAMAX) 25 MG tablet Take 3 tablets (75 mg) by mouth daily. 270 tablet 1    traZODone (DESYREL) 100 MG tablet Take 2 tablets (200 mg) by mouth at bedtime. 180 tablet 1    venlafaxine (EFFEXOR XR) 150 MG 24 hr capsule Take 2 capsules (300 mg) by mouth daily 180 capsule 0         Allergies   Allergen Reactions    Lisinopril Cough     cough           OBJECTIVE                                                      EXAM    There were no vitals taken for this visit.    GENERAL: healthy, alert and no distress  EYES: Eyes grossly normal to inspection, PERRL and conjunctivae and sclerae normal  RESP: No respiratory distress  MENTAL STATUS EXAM  Appearance/Behavior: No appearant distress  Speech: Normal  Mood/Affect: depressed affect  Insight: Adequate      LAB  Recent UDS Labs (may not contain today's lab  data)  none      Hepatic Function  AST   Date Value Ref Range Status   01/18/2024 18 0 - 45 U/L Final     Comment:     Reference intervals for this test were updated on 6/12/2023 to more accurately reflect our healthy population. There may be differences in the flagging of prior results with similar values performed with this method. Interpretation of those prior results can be made in the context of the updated reference intervals.   04/19/2019 19 0 - 45 U/L Final     ALT   Date Value Ref Range Status   01/18/2024 14 0 - 50 U/L Final     Comment:     Reference intervals for this test were updated on 6/12/2023 to more accurately reflect our healthy population. There may be differences in the flagging of prior results with similar values performed with this method. Interpretation of those prior results can be made in the context of the updated reference intervals.     04/19/2019 25 0 - 50 U/L Final     Bilirubin Total   Date Value Ref Range Status   01/18/2024 0.5 <=1.2 mg/dL Final   04/19/2019 0.2 0.2 - 1.3 mg/dL Final     Albumin   Date Value Ref Range Status   01/18/2024 4.6 3.5 - 5.2 g/dL Final   02/02/2022 3.6 3.4 - 5.0 g/dL Final   04/19/2019 4.1 3.4 - 5.0 g/dL Final     INR   Date Value Ref Range Status   06/28/2008 1.02 0.86 - 1.14 Final       CBC  WBC   Date Value Ref Range Status   05/28/2021 6.9 4.0 - 11.0 10e9/L Final     RBC Count   Date Value Ref Range Status   05/28/2021 3.97 3.8 - 5.2 10e12/L Final     Hemoglobin   Date Value Ref Range Status   09/01/2022 12.8 11.7 - 15.7 g/dL Final   05/28/2021 13.2 11.7 - 15.7 g/dL Final     Hematocrit   Date Value Ref Range Status   05/28/2021 38.0 35.0 - 47.0 % Final     MCV   Date Value Ref Range Status   05/28/2021 96 78 - 100 fl Final     MCH   Date Value Ref Range Status   05/28/2021 33.2 (H) 26.5 - 33.0 pg Final     MCHC   Date Value Ref Range Status   05/28/2021 34.7 31.5 - 36.5 g/dL Final     Platelet Count   Date Value Ref Range Status   05/28/2021 859 154  - 450 10e9/L Final     RDW   Date Value Ref Range Status   05/28/2021 11.9 10.0 - 15.0 % Final       Today's lab data  No results found for any visits on 10/24/24.        [facility] Board of Pharmacy Data Base Reviewed;    Consistent with patient reports and Epic records.           A/P                                                      ASSESSMENT/PLAN:  Kendy was seen today for consult.    Diagnoses and all orders for this visit:    Alcohol use disorder, severe, dependence (H)  -     Adult Mental Health  Referral  -     disulfiram (ANTABUSE) 250 MG tablet; Take 1 tablet (250 mg) by mouth daily.    Alcohol abuse  -     [facility]        - Encouraged her to pursue her plan of entering [facility] detox facility to safely stop alcohol use and avoid recurrent seizure - history of alcohol withdrawal-induced seizures years ago  - Struggling with grieving the suicide deaths of her cousin and brother, both this year  - Attending therapy but they are not able to continue working with her while she continues alcohol use  - Was successful in staying abstinent in the past with support from antabuse. Will restart this  - Cannot take naltrexone d/t ongoing hydrocodone mgmt for chronic foot pain. No concerns with misusing her opioids  - Denies any current safety concerns  - F/up 2 weeks      ENCOUNTER FOR LONG TERM USE OF HIGH RISK MEDICATION   High Risk Drug Monitoring?  YES   Drug being monitored: antabuse   Reason for drug: Alcohol Use Disorder   What is being monitored?: Dosage, Cravings, Triggers and side effects        Continued Complex Management  The longitudinal plan of care for Alcohol Use Disorder (AUD) was addressed during this visit. Due to the added complexity in care, I will continue to support Kendy in the subsequent management and with ongoing continuity of care.      Counseled the patient on the importance of having a recovery program in addition to medication to manage  recovery.  Components include avoiding isolating, having willingness to change, avoiding triggers and managing cravings. Encouraged having some type of sober network and practicing honesty with trusted support person(s). Encouraged other services such as counseling, 12 step or other self-help organizations.          RTC   2 weeks        Billing note  Patient note setup as new patient note, as patient has not been seen in our clinic for years, but she is just shy of the 3yr hiro for new patient billing and is therefore billed on complexity and time as a level 4 f/up visit      Sterling Simons MD  [facility] Addiction Medicine  [phone]

## 2024-10-25 ASSESSMENT — PATIENT HEALTH QUESTIONNAIRE - PHQ9: SUM OF ALL RESPONSES TO PHQ QUESTIONS 1-9: 13

## 2024-11-04 ENCOUNTER — VIRTUAL VISIT (OUTPATIENT)
Dept: BEHAVIORAL HEALTH | Facility: CLINIC | Age: 54
End: 2024-11-04
Payer: COMMERCIAL

## 2024-11-04 DIAGNOSIS — F33.1 MODERATE EPISODE OF RECURRENT MAJOR DEPRESSIVE DISORDER (H): Primary | ICD-10-CM

## 2024-11-04 PROCEDURE — 90791 PSYCH DIAGNOSTIC EVALUATION: CPT | Mod: 52 | Performed by: COUNSELOR

## 2024-11-04 ASSESSMENT — PATIENT HEALTH QUESTIONNAIRE - PHQ9
SUM OF ALL RESPONSES TO PHQ QUESTIONS 1-9: 16
SUM OF ALL RESPONSES TO PHQ QUESTIONS 1-9: 16
10. IF YOU CHECKED OFF ANY PROBLEMS, HOW DIFFICULT HAVE THESE PROBLEMS MADE IT FOR YOU TO DO YOUR WORK, TAKE CARE OF THINGS AT HOME, OR GET ALONG WITH OTHER PEOPLE: VERY DIFFICULT

## 2024-11-04 NOTE — PROGRESS NOTES
[facility] Primary Care: Integrated Behavioral Health  Integrated Behavioral Health Services   Brief Diagnostic Assessment    PATIENT'S NAME: Sharmin Nieves  MRN:   [id number]  :   1970  DATE OF SERVICE: 2024  SERVICE LOCATION: MyChart / Email (patient reached)  Visit Activities: NEW    Identifying Information:  Patient is a 54 year old year old, ,  female.  Patient attended the session with her daughter .        Referral:  Patient was referred for an assessment by  [facility].   Reason for referral: determine behavioral health treatment options and assess client readiness and motivation to change.       Patient's Statement of Presenting Concern:  Patient reports the following reason(s) for seeking an assessment at this time: because she was told to attend [facility] for her drinking.  Patient stated that her symptoms have resulted in the following functional impairments: health maintenance, money management, self-care, and work / vocational responsibilities      History of Presenting Concern:  Patient reports that these problem(s) began 2024. Patient has attempted to resolve these concerns in the past through: counseling and medication(s) from physician / PCP. Patient reports that other professional(s) are involved in providing support / services for her medical issues, but her previous mental health support terminated services due to the continuation of drinking - per patient.       Social History:  Current living situation: with her daughter at her home    Socioeconomic status and needs: status is low and currently having financial struggles.   Patient's current significant relationships include: her daughter who encourages to get sober.   Patient reported having 3 children.   Patient identified some stable and meaningful social connections.   Highest education level was GED.   Patient is currently  short term diability .        Mental Health History:  Patient is not currently receiving any mental health services.      10/23/2024    10:23 PM 10/24/2024    10:38 AM 11/4/2024    11:53 AM   PHQ-9 SCORE   PHQ-9 Total Score MyChart 14 (Moderate depression) 13 (Moderate depression) 16 (Moderately severe depression)   PHQ-9 Total Score 14  13  16        Patient-reported         9/5/2024     9:34 AM 10/23/2024    10:24 PM 10/24/2024    10:38 AM   DMITRIY-7 SCORE   Total Score 6 (mild anxiety)  18 (severe anxiety)   Total Score 6    6    6 18  18        Patient-reported    Multiple values from one day are sorted in reverse-chronological order       Assessments completed prior to visit:  The following assessments were completed by patient for this visit:  PHQ2:       1/31/2023     9:48 AM 12/16/2022     9:36 AM 7/12/2022     1:20 PM 3/9/2022    10:44 AM 12/13/2021     4:53 PM 10/13/2021     4:33 PM 9/28/2021     2:12 PM   PHQ-2 ( 1999 [employer])   Q1: Little interest or pleasure in doing things  0 2  3  1  0 1   Q2: Feeling down, depressed or hopeless  0 2  3  2  0 1   PHQ-2 Score  0 4 6 3 0 2   PHQ-2 Total Score (12-17 Years)- Positive if 3 or more points; Administer PHQ-A if positive      0 2   Q1: Little interest or pleasure in doing things   More than half the days Nearly every day Several days     Q2: Feeling down, depressed or hopeless   More than half the days Nearly every day More than half the days     PHQ-2 Score Incomplete  4 6 3         Patient-reported     PHQ9:       10/2/2024     1:15 PM 10/3/2024     1:41 PM 10/4/2024    12:56 PM 10/21/2024     2:13 PM 10/23/2024    10:23 PM 10/24/2024    10:38 AM 11/4/2024    11:53 AM   PHQ-9 SCORE   PHQ-9 Total Score MyChart 9 (Mild depression)  11 (Moderate depression) 13 (Moderate depression) 14 (Moderate depression) 13 (Moderate depression) 16 (Moderately severe depression)   PHQ-9 Total Score 9    9 10 11 13 14  13  16        Patient-reported    Multiple values from one day are sorted in  reverse-chronological order     GAD2:       10/21/2024     2:25 PM 11/4/2024    11:53 AM   DMITRIY-2   Feeling nervous, anxious, or on edge 1  3    Not being able to stop or control worrying 1  2    DMITRIY-2 Total Score 2 5        Patient-reported     GAD7:       5/14/2024     9:18 PM 5/29/2024     5:26 PM 7/18/2024     9:51 PM 8/5/2024    10:27 PM 9/5/2024     9:34 AM 10/23/2024    10:24 PM 10/24/2024    10:38 AM   DMITRIY-7 SCORE   Total Score 19 (severe anxiety) 11 (moderate anxiety) 19 (severe anxiety) 21 (severe anxiety) 6 (mild anxiety)  18 (severe anxiety)   Total Score 19 11 19 21 6    6    6 18  18        Patient-reported    Multiple values from one day are sorted in reverse-chronological order     CAGE-AID:       10/21/2024     2:26 PM   CAGE-AID Total Score   Total Score 3   Total Score MyChart 3 (A total score of 2 or greater is considered clinically significant)     PROMIS 10-Global Health (only subscores and total score):       10/21/2024     2:26 PM 10/23/2024    10:22 PM 11/4/2024    11:54 AM   PROMIS-10 Scores Only   Global Mental Health Score     8  6    Global Physical Health Score     12  13    PROMIS TOTAL - SUBSCORES     20  19        Information is confidential and restricted. Go to Review Flowsheets to unlock data.    Patient-reported    Multiple values from one day are sorted in reverse-chronological order     Floyd Suicide Severity Rating Scale (Short Version)      6/9/2021     7:36 AM 6/1/2022    12:02 PM 10/21/2024     3:22 PM 11/5/2024    11:33 AM   Floyd Suicide Severity Rating (Short Version)   Over the past 2 weeks have you felt down, depressed, or hopeless? no no     Over the past 2 weeks have you had thoughts of killing yourself? no no     Have you ever attempted to kill yourself? no no     1. Wish to be Dead (Since Last Contact)   N N   2. Non-Specific Active Suicidal Thoughts (Since Last Contact)   N N   Actual Attempt (Since Last Contact)   N N   Has subject engaged in non-suicidal  self-injurious behavior? (Since Last Contact)   N N   Interrupted Attempts (Since Last Contact)   N N   Aborted or Self-Interrupted Attempt (Since Last Contact)   N N   Preparatory Acts or Behavior (Since Last Contact)   N N   Suicide (Since Last Contact)   N N   Actual Lethality/Medical Damage Code (Most Lethal Attempt)   0 0   Potential Lethality Code (Most Lethal Attempt)   0 0   Calculated C-SSRS Risk Score (Since Last Contact)   No Risk Indicated No Risk Indicated     LOCUS Worksheet:   LOCUS Worksheet     Name: Sharmin Nieves MRN: [id number]    : 1970      Gender:  female    PMI:     Provider Name: Krysta Moore   Provider NPI:      Actual level of Care Provided:  Short Term Behavioral Health    Service(s) receiving or referred to:  Behavioral Health Home, Financial     Reason for Variance: Patient doesn't want to focus on mental health due to having others stressors she wants to focus on.       Rating completed by: Krysta Moore      I. Risk of Harm:   1      Minimal Risk of Harm    II. Functional Status:   3      Moderate Impairment    III. Co-Morbidity:   3      Significant Co-Morbidity    IV - A. Recovery Environment - Level of Stress:   4      Highly Stress Environment    IV - B. Recovery Environment - Level of Support:   4      Minimal Support in Environment    V. Treatment and Recovery History:   3      Moderate to Equivocal Response to Treatment and Recovery Management    VI. Engagement and Recovery Project:   3      Limited Engagement and Recovery       21 Composite Score    Level of Care Recommendation:   20 to 22       Medically Monitored Non-Residential Services      Chemical Health History:  Patient is not currently receiving any chemical dependency treatment. Patient reported the following problems as a result of drinking: financial problems, occupational / vocational problems, and relationship problems.      Cage-AID score is: 3 Based on Cage-Aid score and clinical  interview there  are indications of drug or alcohol abuse. Recommendation for substance abuse disorder evaluation with a substance use professional was given. Therapist did recommend client to reduce use or abstain from alcohol or substance use. Therapist did recommend structured treatment and or community support (AA, 12 step group, etc.). Patient declined services for AODA .      Discussed the general effects of drugs and alcohol on health and well-being.      Significant Losses / Trauma / Abuse / Neglect Issues:  There are indications or report of significant loss, trauma, abuse or neglect issues related to: are indications of trauma or loss but client denies these concerns and death of family members .    Issues of possible neglect are not present.      Medical History:   Patient Active Problem List   Diagnosis    Herpes simplex virus (HSV) infection    Tobacco use disorder    IUD (intrauterine device) in place    Esophageal reflux    Ovarian cyst    Irregular menstrual cycle    Chronic low back pain    Right hip pain    CARDIOVASCULAR SCREENING; LDL GOAL LESS THAN 160    Moderate major depression (H)    Rectal pain    Health Care Home    Insomnia    Home Health Care    Generalized anxiety disorder    Dry skin    Restless leg syndrome    Benign essential hypertension    Situational anxiety    Irregular heart beat    Encounter for therapeutic drug monitoring    Alcohol abuse    Degenerative joint disease of foot    History of foot surgery    Pain of right great toe    Alcohol use disorder, severe, dependence (H)    Class 2 obesity due to excess calories without serious comorbidity with body mass index (BMI) of 35.0 to 35.9 in adult    BMI 35.0-35.9,adult    History of Nissen fundoplication    Esophageal spasm    Alcohol dependence with acute alcoholic intoxication (H)    Alcohol withdrawal syndrome without complication (H)    History of 2019 novel coronavirus disease (COVID-19)    Recurrent major depressive  disorder, remission status unspecified (H)    Morbid obesity (H)    Nonunion after arthrodesis    Urgency-frequency syndrome    Urge incontinence of urine    Overactive bladder       Medication Review:  Current Outpatient Medications   Medication Sig Dispense Refill    disulfiram (ANTABUSE) 250 MG tablet Take 1 tablet (250 mg) by mouth daily. 30 tablet 1    estradiol (ESTRACE) 0.1 MG/GM vaginal cream Place 2 g vaginally twice a week 42.5 g 1    HYDROcodone-acetaminophen (NORCO) 5-325 MG tablet Take 1 tablet by mouth 3 times daily as needed for severe pain. 90 tablet 0    levonorgestrel (MIRENA) 20 MCG/24HR IUD 1 each (20 mcg) by Intrauterine route once for 1 dose 1 each 0    LORazepam (ATIVAN) 1 MG tablet Take 1 tablet (1 mg) by mouth every 6 hours as needed for anxiety 5 tablet 0    losartan (COZAAR) 100 MG tablet Take 1 tablet (100 mg) by mouth daily. 90 tablet 1    omeprazole (PRILOSEC) 40 MG DR capsule Take 1 capsule (40 mg) by mouth daily. 90 capsule 3    ondansetron (ZOFRAN) 4 MG tablet Take 1 tablet (4 mg) by mouth every 8 hours as needed for nausea 30 tablet 0    pregabalin (LYRICA) 50 MG capsule Take 1 capsule (50 mg) by mouth 2 times daily. 60 capsule 1    topiramate (TOPAMAX) 25 MG tablet Take 3 tablets (75 mg) by mouth daily. 270 tablet 1    traZODone (DESYREL) 100 MG tablet Take 2 tablets (200 mg) by mouth at bedtime. 180 tablet 1    venlafaxine (EFFEXOR XR) 150 MG 24 hr capsule Take 2 capsules (300 mg) by mouth daily 180 capsule 0     No current facility-administered medications for this visit.       Patient was provided recommendation to follow-up with physician.    Mental Status Assessment:  Appearance:   Appropriate   Eye Contact:   Fair   Psychomotor Behavior: Normal   Attitude:   Cooperative  Guarded   Orientation:   All  Speech   Rate / Production: Normal    Volume:  Normal   Mood:    Depressed  Sad   Affect:    Appropriate   Thought Content:  Clear   Thought Form:  Coherent  Logical   Circumstantial  Insight:    Fair       Safety Assessment:    Patient denies a history of suicidal ideation, suicide attempts, self-injurious behavior, homicidal ideation, homicidal behavior, and and other safety concerns  Patient denies current or recent suicidal ideation or behaviors.  Patient denies current or recent homicidal ideation or behaviors.  Patient denies current or recent self injurious behavior or ideation.  Patient denies other safety concerns.  Patient reports there are no firearms in the house  Protective Factors Children in the home  and Sense of responsibility to family   Risk Factors Current high stress, Family history of suicide, and Sense of hopelessness and/or helplessness      Plan for Safety and Risk Management:  A safety and risk management plan has not been developed at this time, however patient was encouraged to call [place] [phone] should there be a change in any of these risk factors.      Patient's Strengths and Limitations:  Patient identified the following strengths or resources that will help her succeed in counseling: , commitment to health and well being, friends / good social support, family support, and insight. Patient identified the following supports: family and friends. Things that may interfere with the patient's success in counseling include:financial hardship and housing instability.    Diagnostic Criteria:  Major Depressive Disorder  CRITERIA (A-C) REPRESENT A MAJOR DEPRESSIVE EPISODE - SELECT THESE CRITERIA  A) Recurrent episode(s) - symptoms have been present during the same 2-week period and represent a change from previous functioning 5 or more symptoms (required for diagnosis)   - Diminished interest or pleasure in all, or almost all, activities.    - Increased sleep.    - Fatigue or loss of energy.    - Feelings of worthlessness or excessive guilt.    - Diminished ability to think or concentrate, or indecisiveness.   B) The symptoms cause  clinically significant distress or impairment in social, occupational, or other important areas of functioning  C) The episode is not attributable to the physiological effects of a substance or to another medical condition  D) The occurence of major depressive episode is not better explained by other thought / psychotic disorders  E) There has never been a manic episode or hypomanic episode      Functional Status:  Patient's symptoms have caused and are causing reduced functional status in the following areas: Activities of Daily Living - taking care of herself and her home.  Financial management Difficulty paying rent  Follow through with Medical recommendations - due to financial stressors doesn't follow through with recommendations  Occupational / Vocational - is currently on short term disability.      DSM5 Diagnoses: (Sustained by DSM5 Criteria Listed Above)  Diagnoses: 296.32 (F33.1) Major Depressive Disorder, Recurrent Episode, Moderate _  Psychosocial & Contextual Factors: Family issues, living arrangement, work, financial stressors, drinking   WHODAS Score: Patient refused to fill out at this time.   See Media section of EPIC medical record for completed WHODAS    Preliminary Treatment Plan:    It is expected that patient will need less than 10 psychotherapy sessions in the next 12 months, as they have received less than 10 in the previous year.    Chemical dependency recommendations: Practice Harm Reduction: Drinking 7.75  drinks instead of  8 drinks    As a preliminary treatment goal, patient will experience a reduction in depressed mood, will develop more effective coping skills to manage depressive symptoms, and will develop healthy cognitive patterns and beliefs.    The patient is receiving treatment / structured support from the following professional(s) / service and treatment. Collaboration will be initiated with: primary care physician.    The following referral(s) will be initiated: Behavioral  Health Home, care coordination, and financial . .    A Release of Information is not needed at this time.    Report to child or adult protection services was NA.    Krysta Moore, [facility], Behavioral Health Clinician

## 2024-11-05 ASSESSMENT — COLUMBIA-SUICIDE SEVERITY RATING SCALE - C-SSRS
TOTAL  NUMBER OF ABORTED OR SELF INTERRUPTED ATTEMPTS SINCE LAST CONTACT: NO
SUICIDE, SINCE LAST CONTACT: NO
LETHALITY/MEDICAL DAMAGE CODE MOST LETHAL POTENTIAL ATTEMPT: BEHAVIOR NOT LIKELY TO RESULT IN INJURY
2. HAVE YOU ACTUALLY HAD ANY THOUGHTS OF KILLING YOURSELF?: NO
TOTAL  NUMBER OF INTERRUPTED ATTEMPTS SINCE LAST CONTACT: NO
LETHALITY/MEDICAL DAMAGE CODE MOST LETHAL ACTUAL ATTEMPT: NO PHYSICAL DAMAGE OR VERY MINOR PHYSICAL DAMAGE
ATTEMPT SINCE LAST CONTACT: NO
6. HAVE YOU EVER DONE ANYTHING, STARTED TO DO ANYTHING, OR PREPARED TO DO ANYTHING TO END YOUR LIFE?: NO
1. SINCE LAST CONTACT, HAVE YOU WISHED YOU WERE DEAD OR WISHED YOU COULD GO TO SLEEP AND NOT WAKE UP?: NO

## 2024-11-06 ENCOUNTER — MYC REFILL (OUTPATIENT)
Dept: FAMILY MEDICINE | Facility: CLINIC | Age: 54
End: 2024-11-06
Payer: COMMERCIAL

## 2024-11-06 DIAGNOSIS — F17.200 TOBACCO USE DISORDER: ICD-10-CM

## 2024-11-06 DIAGNOSIS — N95.1 MENOPAUSAL VAGINAL DRYNESS: ICD-10-CM

## 2024-11-06 DIAGNOSIS — F41.1 GENERALIZED ANXIETY DISORDER: ICD-10-CM

## 2024-11-06 RX ORDER — ONDANSETRON 4 MG/1
4 TABLET, FILM COATED ORAL EVERY 8 HOURS PRN
Qty: 30 TABLET | Refills: 0 | Status: SHIPPED | OUTPATIENT
Start: 2024-11-06

## 2024-11-07 RX ORDER — ESTRADIOL 0.1 MG/G
2 CREAM VAGINAL
Qty: 42.5 G | Refills: 1 | Status: SHIPPED | OUTPATIENT
Start: 2024-11-07

## 2024-11-07 RX ORDER — VENLAFAXINE HYDROCHLORIDE 150 MG/1
300 CAPSULE, EXTENDED RELEASE ORAL DAILY
Qty: 180 CAPSULE | Refills: 2 | Status: SHIPPED | OUTPATIENT
Start: 2024-11-07

## 2024-11-11 ENCOUNTER — PATIENT OUTREACH (OUTPATIENT)
Dept: CARE COORDINATION | Facility: CLINIC | Age: 54
End: 2024-11-11
Payer: COMMERCIAL

## 2024-11-11 NOTE — PROGRESS NOTES
Clinic Care Coordination Contact  [facility]/Voicemail    Clinical Data: Care Coordinator Outreach    Outreach Documentation Number of Outreach Attempt   11/11/2024   3:20 PM 1       Left message on patient's voicemail with call back information and requested return call.      Plan: Care Coordinator will send care coordination introduction letter with care coordinator contact information and explanation of care coordination services via [employer]. Care Coordinator will try to reach patient again in 1-2 business days.    KENYATTA Wasserman obKENYATTA Knight  Social Work Care Coordinator  [facility]  [phone]  [email or web address]  Pronouns: They/He

## 2024-11-13 ENCOUNTER — MYC REFILL (OUTPATIENT)
Dept: FAMILY MEDICINE | Facility: CLINIC | Age: 54
End: 2024-11-13
Payer: COMMERCIAL

## 2024-11-13 ENCOUNTER — MYC MEDICAL ADVICE (OUTPATIENT)
Dept: ADDICTION MEDICINE | Facility: CLINIC | Age: 54
End: 2024-11-13
Payer: COMMERCIAL

## 2024-11-13 DIAGNOSIS — M79.674 PAIN OF RIGHT GREAT TOE: ICD-10-CM

## 2024-11-13 DIAGNOSIS — M25.871 SESAMOIDITIS OF RIGHT FOOT: ICD-10-CM

## 2024-11-13 DIAGNOSIS — M96.0 NONUNION AFTER ARTHRODESIS: ICD-10-CM

## 2024-11-13 RX ORDER — HYDROCODONE BITARTRATE AND ACETAMINOPHEN 5; 325 MG/1; MG/1
1 TABLET ORAL 3 TIMES DAILY PRN
Qty: 90 TABLET | Refills: 0 | Status: SHIPPED | OUTPATIENT
Start: 2024-11-13

## 2024-11-13 NOTE — PROGRESS NOTES
Clinic Care Coordination Contact  [facility]/Voicemail    Clinical Data: Care Coordinator Outreach    Outreach Documentation Number of Outreach Attempt   11/11/2024   3:20 PM 1   11/13/2024   2:18 PM 2       Left message on patient's voicemail with call back information and requested return call.      Plan: Care Coordinator sent care coordination introduction letter on 11/11 via [employer]. Care Coordinator will do no further outreaches at this time.    KENYATTA Skelton? Social Work Care Coordinator   [facility]  [email or web address]? [email or web address]    Phone: [phone]  she/her

## 2024-11-13 NOTE — TELEPHONE ENCOUNTER
Received the following fax from [facility]:        Called [facility] to see if they have this medication in stock. Pharmacy staff indicates that they do have this medication in stock.    Called patient to let her know about the fax from [facility] and to see if she would like to have this prescription sent to [facility] that has this medication in stock or if she would like to find a pharmacy near her home that has this medication in stock.    No answer at number on file. M requesting call back to Dr. Simons's RN at [phone] regarding a fax received from [facility].    [employer] message sent as well providing this information.    Awaiting response from patient.

## 2024-11-22 PROBLEM — E66.812 CLASS 2 OBESITY DUE TO EXCESS CALORIES WITHOUT SERIOUS COMORBIDITY WITH BODY MASS INDEX (BMI) OF 35.0 TO 35.9 IN ADULT: Status: RESOLVED | Noted: 2022-03-22 | Resolved: 2024-11-22

## 2024-11-22 PROBLEM — E66.09 CLASS 2 OBESITY DUE TO EXCESS CALORIES WITHOUT SERIOUS COMORBIDITY WITH BODY MASS INDEX (BMI) OF 35.0 TO 35.9 IN ADULT: Status: RESOLVED | Noted: 2022-03-22 | Resolved: 2024-11-22

## 2024-11-27 ASSESSMENT — PATIENT HEALTH QUESTIONNAIRE - PHQ9: SUM OF ALL RESPONSES TO PHQ QUESTIONS 1-9: 10

## 2024-12-29 ENCOUNTER — HEALTH MAINTENANCE LETTER (OUTPATIENT)
Age: 54
End: 2024-12-29

## 2025-01-06 ENCOUNTER — VIRTUAL VISIT (OUTPATIENT)
Dept: FAMILY MEDICINE | Facility: CLINIC | Age: 55
End: 2025-01-06
Payer: COMMERCIAL

## 2025-01-06 DIAGNOSIS — M25.871 SESAMOIDITIS OF RIGHT FOOT: ICD-10-CM

## 2025-01-06 DIAGNOSIS — I10 BENIGN ESSENTIAL HYPERTENSION: Primary | ICD-10-CM

## 2025-01-06 DIAGNOSIS — F10.288 ALCOHOL DEPENDENCE WITH OTHER ALCOHOL-INDUCED DISORDER (H): ICD-10-CM

## 2025-01-06 DIAGNOSIS — M79.674 PAIN OF RIGHT GREAT TOE: ICD-10-CM

## 2025-01-06 DIAGNOSIS — E66.01 MORBID OBESITY (H): ICD-10-CM

## 2025-01-06 DIAGNOSIS — F33.2 SEVERE EPISODE OF RECURRENT MAJOR DEPRESSIVE DISORDER, WITHOUT PSYCHOTIC FEATURES (H): ICD-10-CM

## 2025-01-06 DIAGNOSIS — M96.0 NONUNION AFTER ARTHRODESIS: ICD-10-CM

## 2025-01-06 PROCEDURE — 98006 SYNCH AUDIO-VIDEO EST MOD 30: CPT | Performed by: NURSE PRACTITIONER

## 2025-01-06 RX ORDER — HYDROCODONE BITARTRATE AND ACETAMINOPHEN 5; 325 MG/1; MG/1
1 TABLET ORAL 3 TIMES DAILY PRN
Qty: 90 TABLET | Refills: 0 | Status: SHIPPED | OUTPATIENT
Start: 2025-01-06

## 2025-01-06 RX ORDER — HYDROCODONE BITARTRATE AND ACETAMINOPHEN 5; 325 MG/1; MG/1
1 TABLET ORAL 3 TIMES DAILY PRN
Qty: 90 TABLET | Refills: 0 | OUTPATIENT
Start: 2025-01-06

## 2025-01-06 NOTE — PATIENT INSTRUCTIONS
Semaglutide as Wegovy is tapered up as such:  0.25 mg injected subcutaneous weekly for four weeks followed by  0.5 mg injected subcutaneous weekly for four weeks followed by  FOLLOW-UP on this dose as we might decide to keep you at the lower end  1 mg injected subcutaneous weekly for four weeks followed by  1.7 mg injected subcutaneous weekly for four weeks followed by  2.4 injected subcutaneous weekly indefinitely    Weeks 1-4: 0.25 mg weekly  Weeks 5-8: 0.5 mg weekly  Schedule follow-up appt between weeks 5-8  Weeks 9-12: 1 mg weekly

## 2025-01-06 NOTE — PROGRESS NOTES
Kendy is a 54 year old who is being evaluated via a billable video visit.    How would you like to obtain your AVS? MyChart  If the video visit is dropped, the invitation should be resent by: Text to cell phone: [phone]  Will anyone else be joining your video visit? No      Assessment & Plan     Benign essential hypertension  Well controlled on losartan 100 mg. No changes.    Morbid obesity (H)  See note below - has had trial of multiple medications with best effect from GLP1a. Will trial semaglutide instead of liraglutide. Open to returning on phentermine  - semaglutide-weight management (WEGOVY) 0.25 MG/0.5ML pen; Inject 0.5 mLs (0.25 mg) subcutaneously once a week.  - Semaglutide-Weight Management (WEGOVY) 0.5 MG/0.5ML pen; Inject 0.5 mg subcutaneously once a week.      Pain of right great toe  Stable without escalation of dose. Refilled while on visit today. PDMP checked.  HYDROcodone-acetaminophen (NORCO) 5-325 MG         tablet    Nonunion after arthrodesis  HYDROcodone-acetaminophen (NORCO) 5-325 MG         Tablet      (F33.2) Severe episode of recurrent major depressive disorder, without psychotic features (H)  Comment:   Plan: Doing much better with change of jobs. Still has challenges with grieving and family conflict. No changes to medication    (F10.288) Alcohol dependence with other alcohol-induced disorder (H)  Comment:   Plan: Continues to be sober        Nicotine/Tobacco Cessation  She reports that she has been smoking cigarettes. She has a 7.5 pack-year smoking history. She has been exposed to tobacco smoke. She has never used smokeless tobacco.  Nicotine/Tobacco Cessation Plan  Self help information given to patient      There are no Patient Instructions on file for this visit.    The longitudinal plan of care for the diagnosis(es)/condition(s) as documented were addressed during this visit. Due to the added complexity in care, I will continue to support Kendy in the subsequent management and with  "ongoing continuity of care.Review of the result(s) of each unique test - labs from ED 9/2024  Prescription drug management  No LOS data to display   Time spent by me today doing chart review, history and exam, documentation and further activities per the note    Juana Larry is a 54 year old, presenting for the following health issues:  Weight Problem    History of Present Illness       Reason for visit:  Discuss weight management options    She eats 2-3 servings of fruits and vegetables daily.She consumes 1 sweetened beverage(s) daily.She exercises with enough effort to increase her heart rate 9 or less minutes per day.  She exercises with enough effort to increase her heart rate 3 or less days per week.   She is taking medications regularly.     Has started new job and so much happier and without as much stress.     BP at home 120/70    Obesity medication management consult    Current weight: 203 lb and height around 5'9\"  Current BMI: 30  Wt Readings from Last 5 Encounters:   11/22/24 90.5 kg (199 lb 8 oz)   09/16/24 85 kg (187 lb 8 oz)   05/30/24 83.4 kg (183 lb 12.8 oz)   05/13/24 92.5 kg (204 lb)   04/05/24 86.2 kg (190 lb)     Estimated body mass index is 29.21 kg/m  as calculated from the following:    Height as of 11/22/24: 1.76 m (5' 9.29\").    Weight as of 11/22/24: 90.5 kg (199 lb 8 oz).    Highest adult weight: 236 lbs  Goal weight: 170-180 lb    Previous non-medication weight loss attempts:   Nutrition - for past >1 year doing calorie monitoring, has done Weight Watchers  Exercise - limited by chronic foot pain s/p surgery  Previous medication weight loss attempts: as below    Topiramate - currently tapering down 75 mg which she took for 2.5 years, did not have any effect on weight or appetite. No side effects.     Phentermine - did have weight loss with phentermine which she took for 1.5 years, discontinued 37.5 mg 10/2024 due to blood pressure elevated, though this was complicated by concurrent " alcohol use.     Liraglutide - took 4/2022 with a taper up over 5 weeks to 3 mg. Lost 30 lbs very quickly. Side effects of nausea and lack of appetite.    Naltrexone - not an option due to chronic pain treated with opioids    Bupropion - has taken previously for mood, did not have weight loss.    Patient does not have personal history of gallbladder or pancreas conditions or gastroparesis. No family history of medullary thyroid cancer or MNES.    Weight-related co-morbidities:   Patient Active Problem List   Diagnosis    Esophageal reflux    Chronic low back pain    Right hip pain    Benign essential hypertension    Degenerative joint disease of foot    Alcohol use disorder, severe, dependence (H)    History of Nissen fundoplication    Esophageal spasm    Morbid obesity (H)     Current medications:   Current Outpatient Medications   Medication Sig Dispense Refill    disulfiram (ANTABUSE) 250 MG tablet Take 1 tablet (250 mg) by mouth daily. 30 tablet 1    estradiol (ESTRACE) 0.1 MG/GM vaginal cream Place 2 g vaginally twice a week. 42.5 g 1    HYDROcodone-acetaminophen (NORCO) 5-325 MG tablet Take 1 tablet by mouth 3 times daily as needed for severe pain. 90 tablet 0    levonorgestrel (MIRENA) 20 MCG/24HR IUD 1 each (20 mcg) by Intrauterine route once for 1 dose 1 each 0    LORazepam (ATIVAN) 1 MG tablet Take 1 tablet (1 mg) by mouth every 6 hours as needed for anxiety 5 tablet 0    losartan (COZAAR) 100 MG tablet Take 1 tablet (100 mg) by mouth daily. 90 tablet 1    omeprazole (PRILOSEC) 40 MG DR capsule Take 1 capsule (40 mg) by mouth daily. 90 capsule 3    ondansetron (ZOFRAN) 4 MG tablet Take 1 tablet (4 mg) by mouth every 8 hours as needed for nausea. 30 tablet 0    pregabalin (LYRICA) 50 MG capsule Take 1 capsule (50 mg) by mouth 2 times daily. 60 capsule 3    traZODone (DESYREL) 100 MG tablet Take 2 tablets (200 mg) by mouth at bedtime. 180 tablet 1    venlafaxine (EFFEXOR XR) 150 MG 24 hr capsule Take 2  capsules (300 mg) by mouth daily. 180 capsule 2         Review of Systems  Constitutional, HEENT, cardiovascular, pulmonary, gi and gu systems are negative, except as otherwise noted.      Objective           Vitals:  No vitals were obtained today due to virtual visit.    Physical Exam   GENERAL: alert and no distress  EYES: Eyes grossly normal to inspection.  No discharge or erythema, or obvious scleral/conjunctival abnormalities.  RESP: No audible wheeze, cough, or visible cyanosis.    SKIN: Visible skin clear. No significant rash, abnormal pigmentation or lesions.  NEURO: Cranial nerves grossly intact.  Mentation and speech appropriate for age.  PSYCH: Appropriate affect, tone, and pace of words          Video-Visit Details    Type of service:  Video Visit   Originating Location (pt. Location): Home    Distant Location (provider location):  On-site  Platform used for Video Visit: Charlie  Signed Electronically by: LEDY Seaman CNP

## 2025-01-07 ENCOUNTER — MYC MEDICAL ADVICE (OUTPATIENT)
Dept: FAMILY MEDICINE | Facility: CLINIC | Age: 55
End: 2025-01-07

## 2025-01-07 DIAGNOSIS — E66.01 MORBID OBESITY (H): Primary | ICD-10-CM

## 2025-01-08 RX ORDER — PHENTERMINE HYDROCHLORIDE 37.5 MG/1
37.5 CAPSULE ORAL EVERY MORNING
Qty: 90 CAPSULE | Refills: 0 | Status: SHIPPED | OUTPATIENT
Start: 2025-02-08

## 2025-01-08 RX ORDER — PHENTERMINE HYDROCHLORIDE 15 MG/1
15 CAPSULE ORAL EVERY MORNING
Qty: 30 CAPSULE | Refills: 0 | Status: SHIPPED | OUTPATIENT
Start: 2025-01-08

## 2025-01-09 ENCOUNTER — VIRTUAL VISIT (OUTPATIENT)
Dept: PSYCHIATRY | Facility: CLINIC | Age: 55
End: 2025-01-09
Payer: COMMERCIAL

## 2025-01-09 DIAGNOSIS — F10.91 ALCOHOL USE DISORDER IN REMISSION: ICD-10-CM

## 2025-01-09 DIAGNOSIS — F41.1 GENERALIZED ANXIETY DISORDER: ICD-10-CM

## 2025-01-09 DIAGNOSIS — N95.1 PERIMENOPAUSE: ICD-10-CM

## 2025-01-09 DIAGNOSIS — F40.10 SOCIAL ANXIETY DISORDER: ICD-10-CM

## 2025-01-09 DIAGNOSIS — F33.2 SEVERE EPISODE OF RECURRENT MAJOR DEPRESSIVE DISORDER, WITHOUT PSYCHOTIC FEATURES (H): Primary | ICD-10-CM

## 2025-01-09 ASSESSMENT — ANXIETY QUESTIONNAIRES
8. IF YOU CHECKED OFF ANY PROBLEMS, HOW DIFFICULT HAVE THESE MADE IT FOR YOU TO DO YOUR WORK, TAKE CARE OF THINGS AT HOME, OR GET ALONG WITH OTHER PEOPLE?: SOMEWHAT DIFFICULT
4. TROUBLE RELAXING: SEVERAL DAYS
GAD7 TOTAL SCORE: 7
3. WORRYING TOO MUCH ABOUT DIFFERENT THINGS: SEVERAL DAYS
IF YOU CHECKED OFF ANY PROBLEMS ON THIS QUESTIONNAIRE, HOW DIFFICULT HAVE THESE PROBLEMS MADE IT FOR YOU TO DO YOUR WORK, TAKE CARE OF THINGS AT HOME, OR GET ALONG WITH OTHER PEOPLE: SOMEWHAT DIFFICULT
GAD7 TOTAL SCORE: 7
1. FEELING NERVOUS, ANXIOUS, OR ON EDGE: SEVERAL DAYS
2. NOT BEING ABLE TO STOP OR CONTROL WORRYING: SEVERAL DAYS
7. FEELING AFRAID AS IF SOMETHING AWFUL MIGHT HAPPEN: SEVERAL DAYS
7. FEELING AFRAID AS IF SOMETHING AWFUL MIGHT HAPPEN: SEVERAL DAYS
6. BECOMING EASILY ANNOYED OR IRRITABLE: MORE THAN HALF THE DAYS
GAD7 TOTAL SCORE: 7
5. BEING SO RESTLESS THAT IT IS HARD TO SIT STILL: NOT AT ALL

## 2025-01-09 ASSESSMENT — PATIENT HEALTH QUESTIONNAIRE - PHQ9
10. IF YOU CHECKED OFF ANY PROBLEMS, HOW DIFFICULT HAVE THESE PROBLEMS MADE IT FOR YOU TO DO YOUR WORK, TAKE CARE OF THINGS AT HOME, OR GET ALONG WITH OTHER PEOPLE: SOMEWHAT DIFFICULT
SUM OF ALL RESPONSES TO PHQ QUESTIONS 1-9: 14
SUM OF ALL RESPONSES TO PHQ QUESTIONS 1-9: 14

## 2025-01-09 NOTE — PATIENT INSTRUCTIONS
Today we discussed restarting maintenance TMS. Will inquire about any insurance questions with our team and inform them to get you on the schedule.    We also discussed getting evaluated for ongoing perimenopausal symptoms. You can consult the following to find a specialist: [email or web address]    Follow-up in 4 months
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
## 2025-01-11 ENCOUNTER — NURSE TRIAGE (OUTPATIENT)
Dept: NURSING | Facility: CLINIC | Age: 55
End: 2025-01-11
Payer: COMMERCIAL

## 2025-01-11 NOTE — TELEPHONE ENCOUNTER
Nurse Triage SBAR    Is this a 2nd Level Triage? NO    Situation: Patient requesting to have her prescription for [place] (ordered by PCP) sent to a different pharmacy. Pt states her pharmacy ([facility]) is out of stock and won't have any until Monday or Tuesday. Pt is requesting to have this Rx sent to [facility] in [place] today. Pt asking to have the on-call provider paged.     RN explained that we are unable to page on-call providers for after-hour refills on controlled substances and that I can send a message to her PCP care team for Monday (however, by Monday her normal pharmacy may have the med in stock).     Pt states her upset with this and tells me she will have to figure it out.     No message will be sent to PCP since pt unsure what she wants to do.     Tessa Coulter RN  [place] Nurse Advisor  1/11/2025 10:39 AM         Protocol Recommended Disposition:     Reason for Disposition   Caller requesting a CONTROLLED substance prescription refill (e.g., narcotics, ADHD medicines)     Pharmacy is out of medication, won't get anymore in until Monday/Tuesday.    Additional Information   Negative: [1] Prescription refill request for ESSENTIAL medicine (i.e., likelihood of harm to patient if not taken) AND [2] triager unable to refill per department policy   Negative: [1] Prescription not at pharmacy AND [2] was prescribed by PCP recently  (Exception: Triager has access to EMR and prescription is recorded there. Go to Home Care and confirm for pharmacy.)   Negative: [1] Pharmacy calling with prescription questions AND [2] triager unable to answer question   Negative: Prescription request for new medicine (not a refill)    Protocols used: Medication Refill and Renewal Call-A-

## 2025-01-13 DIAGNOSIS — M25.871 SESAMOIDITIS OF RIGHT FOOT: ICD-10-CM

## 2025-01-13 DIAGNOSIS — M79.674 PAIN OF RIGHT GREAT TOE: ICD-10-CM

## 2025-01-13 DIAGNOSIS — M96.0 NONUNION AFTER ARTHRODESIS: ICD-10-CM

## 2025-01-13 RX ORDER — HYDROCODONE BITARTRATE AND ACETAMINOPHEN 5; 325 MG/1; MG/1
1 TABLET ORAL 3 TIMES DAILY PRN
Qty: 90 TABLET | Refills: 0 | Status: CANCELLED | OUTPATIENT
Start: 2025-01-13

## 2025-01-13 NOTE — TELEPHONE ENCOUNTER
Please re-send oxy order. Pts pharmacy will not be able to fill request.   Thanks,   Shubham Nguyen RN  [facility]

## 2025-01-14 ENCOUNTER — TELEPHONE (OUTPATIENT)
Dept: PSYCHIATRY | Facility: CLINIC | Age: 55
End: 2025-01-14

## 2025-02-03 ENCOUNTER — MYC MEDICAL ADVICE (OUTPATIENT)
Dept: FAMILY MEDICINE | Facility: CLINIC | Age: 55
End: 2025-02-03

## 2025-02-03 ENCOUNTER — VIRTUAL VISIT (OUTPATIENT)
Dept: FAMILY MEDICINE | Facility: CLINIC | Age: 55
End: 2025-02-03
Payer: COMMERCIAL

## 2025-02-03 DIAGNOSIS — K12.2 ORAL INFECTION: Primary | ICD-10-CM

## 2025-02-03 PROCEDURE — 98006 SYNCH AUDIO-VIDEO EST MOD 30: CPT | Performed by: NURSE PRACTITIONER

## 2025-02-03 RX ORDER — OXYCODONE HYDROCHLORIDE 5 MG/1
5 TABLET ORAL EVERY 6 HOURS PRN
Qty: 6 TABLET | Refills: 0 | Status: SHIPPED | OUTPATIENT
Start: 2025-02-03 | End: 2025-02-04

## 2025-02-03 NOTE — PATIENT INSTRUCTIONS
Change to oxycodone 5 mg twice a day INSTEAD of hydrocodone TID  Start augmentin  Dental appt tomorrow - please send me update  We have appt on Wednesday if needed

## 2025-02-03 NOTE — PROGRESS NOTES
Kendy is a 54 year old who is being evaluated via a billable video visit.    How would you like to obtain your AVS? MyChart  If the video visit is dropped, the invitation should be resent by: Text to cell phone: [phone]  Will anyone else be joining your video visit? No      Assessment & Plan     Oral infection  Start augmentin and switch pain med to oxycodone 5 mg BID instead of hydrocodone 5 mg TID. Has dental appt tomorrow.   - amoxicillin-clavulanate (AUGMENTIN) 875-125 MG tablet; Take 1 tablet by mouth 2 times daily for 14 days.  - oxyCODONE (ROXICODONE) 5 MG tablet; Take 1 tablet (5 mg) by mouth every 6 hours as needed for severe pain.      Patient Instructions   Change to oxycodone 5 mg twice a day INSTEAD of hydrocodone TID  Start augmentin  Dental appt tomorrow - please send me update  We have appt on Wednesday if needed    The longitudinal plan of care for the diagnosis(es)/condition(s) as documented were addressed during this visit. Due to the added complexity in care, I will continue to support Kendy in the subsequent management and with ongoing continuity of care.Prescription drug management  I spent a total of 30 minutes on the day of the visit.   Time spent by me today doing chart review, history and exam, documentation and further activities per the note        Subjective   Kendy is a 54 year old, presenting for the following health issues:  Dental Pain    Dental Pain    History of Present Illness       Reason for visit:  Tooth pain  Symptom onset:  3-7 days ago  Symptoms include:  Throbbing pain  Symptom intensity:  Severe  Symptom progression:  Worsening  Had these symptoms before:  Yes  Has tried/received treatment for these symptoms:  Yes  Previous treatment was successful:  Yes  Prior treatment description:  Clindamicyn  What makes it worse:  Cold or hot food/beverages  What makes it better:  No   She is taking medications regularly.       Having significant tooth pain on left lower side. Had  similar tooth problem last summer and had taken a prescription for clindamycin and had some left so took 600 mg this morning. Left side of face hurts up to eye. Feels bump in left lower jaw line. No fever. Does feel systemically unwell. However, nothing is helping the pain. Tried to increase hydrocodone to two tablets and has also tried topical cinnamon and clove oil and saline rinses.     Has an appt at dental clinic tomorrow. Anticipating that she will loose three teeth ultimately. Will get xray tomorrow and plan.     Mental health - will be doing weekly maintenance TMS         Objective           Vitals:  No vitals were obtained today due to virtual visit.    Physical Exam   GENERAL: alert and no distress  EYES: Eyes grossly normal to inspection.  No discharge or erythema, or obvious scleral/conjunctival abnormalities.  HEENT: left lower face appears swollen  RESP: No audible wheeze, cough, or visible cyanosis.    SKIN: Visible skin clear. No significant rash, abnormal pigmentation or lesions.  NEURO: Cranial nerves grossly intact.  Mentation and speech appropriate for age.  PSYCH: Appropriate affect, tone, and pace of words          Video-Visit Details    Type of service:  Video Visit   Originating Location (pt. Location): Home    Distant Location (provider location):  On-site  Platform used for Video Visit: Charlie  Signed Electronically by: LEDY Seaman CNP

## 2025-02-03 NOTE — Clinical Note
Hi Dr. Millard, I saw Kendy today for oral infection and saw that there was a possibility of individual psychotherapy through your clinic or assistance in finding a therapist. Kendy hasn't heard from anyone about that aspect of care. She is looking into getting coverage to be able to do TMS weekly. If you are able to assist in setting up therapy, that would be fantastic.  Thanks so much!  TOMMY Clinton

## 2025-02-04 ENCOUNTER — MYC MEDICAL ADVICE (OUTPATIENT)
Dept: FAMILY MEDICINE | Facility: CLINIC | Age: 55
End: 2025-02-04
Payer: COMMERCIAL

## 2025-02-04 DIAGNOSIS — K12.2 ORAL INFECTION: ICD-10-CM

## 2025-02-04 RX ORDER — OXYCODONE HYDROCHLORIDE 5 MG/1
5 TABLET ORAL EVERY 6 HOURS PRN
Qty: 6 TABLET | Refills: 0 | Status: SHIPPED | OUTPATIENT
Start: 2025-02-04

## 2025-02-06 ENCOUNTER — MYC REFILL (OUTPATIENT)
Dept: FAMILY MEDICINE | Facility: CLINIC | Age: 55
End: 2025-02-06
Payer: COMMERCIAL

## 2025-02-06 DIAGNOSIS — M79.674 PAIN OF RIGHT GREAT TOE: ICD-10-CM

## 2025-02-06 DIAGNOSIS — M96.0 NONUNION AFTER ARTHRODESIS: ICD-10-CM

## 2025-02-06 DIAGNOSIS — M25.871 SESAMOIDITIS OF RIGHT FOOT: ICD-10-CM

## 2025-02-06 RX ORDER — HYDROCODONE BITARTRATE AND ACETAMINOPHEN 5; 325 MG/1; MG/1
1 TABLET ORAL 3 TIMES DAILY PRN
Qty: 90 TABLET | Refills: 0 | Status: SHIPPED | OUTPATIENT
Start: 2025-02-06

## 2025-02-19 ENCOUNTER — VIRTUAL VISIT (OUTPATIENT)
Dept: PHARMACY | Facility: CLINIC | Age: 55
End: 2025-02-19
Payer: COMMERCIAL

## 2025-02-19 DIAGNOSIS — F41.1 GENERALIZED ANXIETY DISORDER: Primary | ICD-10-CM

## 2025-02-19 DIAGNOSIS — F33.9 RECURRENT MAJOR DEPRESSIVE DISORDER, REMISSION STATUS UNSPECIFIED: ICD-10-CM

## 2025-02-19 DIAGNOSIS — I10 BENIGN ESSENTIAL HYPERTENSION: ICD-10-CM

## 2025-02-19 DIAGNOSIS — F41.1 GENERALIZED ANXIETY DISORDER: ICD-10-CM

## 2025-02-19 DIAGNOSIS — E66.01 MORBID OBESITY (H): ICD-10-CM

## 2025-02-19 DIAGNOSIS — K21.9 ESOPHAGEAL REFLUX: ICD-10-CM

## 2025-02-19 PROCEDURE — 99605 MTMS BY PHARM NP 15 MIN: CPT | Mod: 93 | Performed by: PHARMACIST

## 2025-02-19 RX ORDER — VENLAFAXINE HYDROCHLORIDE 225 MG/1
TABLET, EXTENDED RELEASE ORAL
Qty: 90 TABLET | Refills: 3 | Status: SHIPPED | OUTPATIENT
Start: 2025-02-19

## 2025-02-19 NOTE — PROGRESS NOTES
Medication Therapy Management (MTM) Encounter    ASSESSMENT:                            Hypertension   Stable.    GERD  Stable.    Anxiety and Depression  Will try sending venlafaxine Rx as 225mg tablet and 75mg tablet to see if this is covered by insurance.     Weight Management:   Plan in place    PLAN:                            Rx sent for venlafaxine 225mg tablet. Take in addition to 75mg tablet.     Follow-up:   Appointments in Next Year      Feb 25, 2025 1:00 PM  (Arrive by 12:55 PM)  Provider Visit with LEDY Wu CNP  [facility] ([facility]  ) [phone]            SUBJECTIVE/OBJECTIVE:                          Kendy Nieves is a 54 year old female seen for an initial visit. She was referred to me from PCP/psychiatry.      Reason for visit: insurance issue with effexor.    Allergies/ADRs: Reviewed in chart  Past Medical History: Reviewed in chart  Tobacco: She reports that she has been smoking cigarettes. She has a 7.5 pack-year smoking history. She has been exposed to tobacco smoke. She has never used smokeless tobacco.  Alcohol: not currently using    Hypertension   Losartan 100mg daily  Patient reports no current medication side effects       GERD    Omeprazole 40 mg once daily   Patient feels that current regimen is effective.       Mental Health   Depression/Anxiety:   venlafaxine XR 300mg daily - was taking as #2 150mg capsules  trazodone 200mg at bedtime  Ativan as needed flying Finney is seen at [facility] by PCP - LEDY Medrano. Seen at interventional psych for TMS.     Today, Kendy reports:   - insurance issue with venlafaxine 300mg insurance coverage; appears to be quantity limit issue  - really doesn't want to reduce dose to 225mg daily; recent rx sent as 75mg caps and 150mg caps due to #2 150mg caps not being covered    Past medication trials:   Zoloft 50mg (2004) - not effective  Wellbutrin SR 200mg BID (0556-8350  "and 8393-0877) - not effective  Celexa 50mg (2005 and 4969-3851) - not effective  Cymbalta 120mg daily (9346-5538 ) - not effective  Effexor 225mg daily (0513-1679 and 9724-1609) - somewhat helpful  Risperidone 3mg HS (6826-5375) - not helpful  Seroquel 100mg HS (2008) - no effect  Propanol PRN anxiety - a little helpful  Abilify 5 (2-4/2023) - not helpful, caused blunting       Weight Management   Phentermine 37.5 mg once daily. Will start wegovy once able to afford. Phentermine for now.            Wt Readings from Last 4 Encounters:   11/22/24 199 lb 8 oz (90.5 kg)   09/16/24 187 lb 8 oz (85 kg)   05/30/24 183 lb 12.8 oz (83.4 kg)   05/13/24 204 lb (92.5 kg)     Estimated body mass index is 29.21 kg/m  as calculated from the following:    Height as of 11/22/24: 5' 9.29\" (1.76 m).    Weight as of 11/22/24: 199 lb 8 oz (90.5 kg).      ----------------      I spent 15 minutes with this patient today. All changes were made via collaborative practice agreement with LEDY Seaman CNP.     A summary of these recommendations was sent via Gauzy.    Francia Paris, PharmD, BCPP  Medication Therapy Management Pharmacist  [facility]      Telemedicine Visit Details  The patient's medications can be safely assessed via a telemedicine encounter.  Type of service:  Telephone visit  Originating Location (pt. Location): Home    Distant Location (provider location):  Off-site  Start Time:  220p  End Time:  235p     Medication Therapy Recommendations  Recurrent major depressive disorder, remission status unspecified   1 Current Medication: venlafaxine (EFFEXOR XR) 150 MG 24 hr capsule (Discontinued)   Current Medication Sig: Take 1 capsule (150 mg) by mouth daily. In addition to one 75 mg for total of 225 mg   Rationale: Cannot afford medication product - Cost - Adherence   Recommendation: Change Medication - venlafaxine 225 MG 24 hr tablet   Status: Contact Provider - Awaiting Response "   Identified Date: 2/19/2025

## 2025-02-19 NOTE — PATIENT INSTRUCTIONS
"Recommendations from today's MTM visit:                                                      Rx sent for venlafaxine 225mg tablet. Take in addition to 75mg tablet.     It was great speaking with you today.  I value your experience and would be very thankful for your time in providing feedback in our clinic survey. In the next few days, you may receive an email or text message from [facility] emids with a link to a survey related to your  clinical pharmacist.\"     To schedule another MTM appointment, please call the clinic directly or you may call the MTM scheduling line at [phone] or toll-free at [phone].     My Clinical Pharmacist's contact information:                                                      Please feel free to contact me with any questions or concerns you have.      Francia Paris, PharmD, BCPP  Medication Therapy Management Pharmacist  [facility]    "

## 2025-02-19 NOTE — Clinical Note
Hello! Follow-up to the venlafaxine insurance issue - I think it might just be a quantity limit issue and they don't want to cover #2 150mg tabs per day. I sent Rx in as 225mg and 75mg. We will see if that will be covered.   Thank you!  Francia Paris, PharmD, BCPP Medication Therapy Management Pharmacist [facility]

## 2025-03-04 ENCOUNTER — MYC REFILL (OUTPATIENT)
Dept: FAMILY MEDICINE | Facility: CLINIC | Age: 55
End: 2025-03-04
Payer: COMMERCIAL

## 2025-03-04 ENCOUNTER — MYC MEDICAL ADVICE (OUTPATIENT)
Dept: FAMILY MEDICINE | Facility: CLINIC | Age: 55
End: 2025-03-04
Payer: COMMERCIAL

## 2025-03-04 DIAGNOSIS — F41.8 SITUATIONAL ANXIETY: ICD-10-CM

## 2025-03-04 DIAGNOSIS — F51.01 PRIMARY INSOMNIA: ICD-10-CM

## 2025-03-04 DIAGNOSIS — I10 BENIGN ESSENTIAL HYPERTENSION: ICD-10-CM

## 2025-03-04 DIAGNOSIS — M96.0 NONUNION AFTER ARTHRODESIS: ICD-10-CM

## 2025-03-04 DIAGNOSIS — M79.674 PAIN OF RIGHT GREAT TOE: ICD-10-CM

## 2025-03-04 DIAGNOSIS — M25.871 SESAMOIDITIS OF RIGHT FOOT: ICD-10-CM

## 2025-03-04 RX ORDER — LORAZEPAM 1 MG/1
1 TABLET ORAL EVERY 6 HOURS PRN
Qty: 5 TABLET | Refills: 0 | Status: CANCELLED | OUTPATIENT
Start: 2025-03-04

## 2025-03-05 RX ORDER — TRAZODONE HYDROCHLORIDE 100 MG/1
200 TABLET ORAL AT BEDTIME
Qty: 180 TABLET | Refills: 1 | Status: SHIPPED | OUTPATIENT
Start: 2025-03-05

## 2025-03-05 RX ORDER — LOSARTAN POTASSIUM 100 MG/1
100 TABLET ORAL DAILY
Qty: 90 TABLET | Refills: 1 | Status: SHIPPED | OUTPATIENT
Start: 2025-03-05

## 2025-03-05 RX ORDER — HYDROCODONE BITARTRATE AND ACETAMINOPHEN 5; 325 MG/1; MG/1
1 TABLET ORAL 3 TIMES DAILY PRN
Qty: 90 TABLET | Refills: 0 | Status: SHIPPED | OUTPATIENT
Start: 2025-03-05

## 2025-03-05 RX ORDER — LORAZEPAM 1 MG/1
1 TABLET ORAL EVERY 6 HOURS PRN
Qty: 5 TABLET | Refills: 0 | Status: SHIPPED | OUTPATIENT
Start: 2025-03-05

## 2025-03-05 NOTE — TELEPHONE ENCOUNTER
Medication has been pended in a previous encounter. Thanks    Carla Abdul RN  [facility]

## 2025-03-26 ENCOUNTER — THERAPY VISIT (OUTPATIENT)
Dept: PHYSICAL THERAPY | Facility: CLINIC | Age: 55
End: 2025-03-26
Attending: NURSE PRACTITIONER
Payer: COMMERCIAL

## 2025-03-26 DIAGNOSIS — M25.551 HIP PAIN, RIGHT: ICD-10-CM

## 2025-03-26 DIAGNOSIS — M54.50 ACUTE RIGHT-SIDED LOW BACK PAIN WITHOUT SCIATICA: ICD-10-CM

## 2025-03-26 PROCEDURE — 97161 PT EVAL LOW COMPLEX 20 MIN: CPT | Mod: GP

## 2025-03-26 PROCEDURE — 97112 NEUROMUSCULAR REEDUCATION: CPT | Mod: GP

## 2025-03-26 PROCEDURE — 97110 THERAPEUTIC EXERCISES: CPT | Mod: GP

## 2025-03-26 NOTE — PROGRESS NOTES
PHYSICAL THERAPY EVALUATION  Type of Visit: Evaluation       Fall Risk Screen:  Fall screen completed by: PT  Have you fallen 2 or more times in the past year?: No  Have you fallen and had an injury in the past year?: No  Is patient a fall risk?: No    Subjective         Presenting condition or subjective complaint: Right lower back pain/Right hip pain.  Hip pain has been present for 3 years but is getting worse.  This has progressed to the low back in the past month.  She has pain after sitting for about 10 minutes.  After prolonged sitting, her hip feels like it may give out upon standing.  Walking for more than 30 minutes causes more pain, also.      Date of onset: 03/26/25 (acute on chronic)    Relevant medical history: Arthritis; Depression; High blood pressure; History of fractures; Implanted device; Incontinence; Overweight; Pain at night or rest; Smoking   Dates & types of surgery:   great toe fusion on right foot 4-5 years ago, but hardware has failed    Prior diagnostic imaging/testing results: MRI     Prior therapy history for the same diagnosis, illness or injury: No      Prior Level of Function  Transfers: Independent  Ambulation: Independent  ADL: Independent  IADL:  IND    Living Environment  Social support: With family members   Type of home: [facility]; 2-story   Stairs to enter the home: Yes 1 Is there a railing: No     Ramp: No   Stairs inside the home: Yes 14 Is there a railing: Yes     Help at home: None  Equipment owned:       Employment: Yes Registered Medical Assistant  Hobbies/Interests: Gardening    Patient goals for therapy: Sit for longer periods of time without pain    Pain assessment: 5/10 at worst, most in the back currently     Objective      Cognitive Status Examination  Orientation: Oriented to person, place and time   Level of Consciousness: Alert  Follows Commands and Answers Questions: 100% of the time  Personal Safety and Judgement: Intact  Memory: Intact    OBSERVATION: Pleasant  female in NAD.  INTEGUMENTARY: Intact  POSTURE: Sitting Posture: Rounded shoulders, Forward head  PALPATION: no ttp    RANGE OF MOTION:   (Degrees) Left AROM Left PROM  Right AROM Right PROM   Hip Flexion  125  125   Hip Internal Rotation  15*  15*   Hip External Rotation  65  65   Knee Flexion       Knee Extension       Lumbar Side glide 75%* 75%   Lumbar Flexion 100%   Lumbar Extension 75%*   Pain: end range lumbar  End feel: tissue stretch    STRENGTH:   Pain: - none + mild ++ moderate +++ severe  Strength Scale: 0-5/5 Left Right   Hip Flexion 5 4+   Hip Abduction 4+ 4+   Hip Adduction 5 5   Knee Flexion 5 5   Knee Extension 5 5     GAIT:   Level of [place]: Independent  Assistive Device(s): None  Gait Deviations: WFL  Gait Distance: 150 ft  Stairs: nt      Assessment & Plan   CLINICAL IMPRESSIONS  Medical Diagnosis: Acute right-sided low back pain without sciatica (M54.50), Hip pain, right (M25.551)    Treatment Diagnosis: Right hip and back pain with impaired muscle performance   Impression/Assessment: Patient is a 54 year old female with right hip and back complaints.  The following significant findings have been identified: Pain, Decreased ROM/flexibility, Decreased strength, Impaired gait, Impaired muscle performance, Decreased activity tolerance, and Impaired posture. These impairments interfere with their ability to perform self care tasks, work tasks, recreational activities, household chores, household mobility, and community mobility as compared to previous level of function.     Clinical Decision Making (Complexity):  Clinical Presentation: Stable/Uncomplicated  Clinical Presentation Rationale: based on medical and personal factors listed in PT evaluation  Clinical Decision Making (Complexity): Low complexity    PLAN OF CARE  Treatment Interventions:  Interventions: Manual Therapy, Neuromuscular Re-education, Therapeutic Activity, Therapeutic Exercise    Long Term Goals     PT Goal 1  Goal  Identifier: Activity tolerance  Goal Description: Pt will be able to sit and walk for 30 minutes without exacerbation of back or hip pain for improved QOL.  Goal Progress: ongoing  Target Date: 06/26/25      Frequency of Treatment: 1x/week, taper to 2x/month  Duration of Treatment: 3 months    Recommended Referrals to Other Professionals:     Education Assessment:   Learner/Method: Patient  Education Comments: HEP, POC    Risks and benefits of evaluation/treatment have been explained.   Patient/Family/caregiver agrees with Plan of Care.     Evaluation Time:     PT Eval, Low Complexity Minutes (95138): 15       Signing Clinician: Esteban Green PT

## 2025-04-01 ENCOUNTER — THERAPY VISIT (OUTPATIENT)
Dept: PHYSICAL THERAPY | Facility: CLINIC | Age: 55
End: 2025-04-01
Payer: COMMERCIAL

## 2025-04-01 DIAGNOSIS — M54.50 ACUTE RIGHT-SIDED LOW BACK PAIN WITHOUT SCIATICA: Primary | ICD-10-CM

## 2025-04-01 DIAGNOSIS — M25.551 HIP PAIN, RIGHT: ICD-10-CM

## 2025-04-01 PROCEDURE — 97112 NEUROMUSCULAR REEDUCATION: CPT | Mod: GP

## 2025-04-01 PROCEDURE — 97110 THERAPEUTIC EXERCISES: CPT | Mod: GP

## 2025-04-02 ENCOUNTER — MYC REFILL (OUTPATIENT)
Dept: FAMILY MEDICINE | Facility: CLINIC | Age: 55
End: 2025-04-02
Payer: COMMERCIAL

## 2025-04-02 DIAGNOSIS — M96.0 NONUNION AFTER ARTHRODESIS: ICD-10-CM

## 2025-04-02 DIAGNOSIS — M25.871 SESAMOIDITIS OF RIGHT FOOT: ICD-10-CM

## 2025-04-02 DIAGNOSIS — M79.674 PAIN OF RIGHT GREAT TOE: ICD-10-CM

## 2025-04-02 RX ORDER — HYDROCODONE BITARTRATE AND ACETAMINOPHEN 5; 325 MG/1; MG/1
1 TABLET ORAL 3 TIMES DAILY PRN
Qty: 90 TABLET | Refills: 0 | Status: SHIPPED | OUTPATIENT
Start: 2025-04-02

## 2025-04-14 ENCOUNTER — TELEPHONE (OUTPATIENT)
Dept: FAMILY MEDICINE | Facility: CLINIC | Age: 55
End: 2025-04-14

## 2025-04-14 ENCOUNTER — OFFICE VISIT (OUTPATIENT)
Dept: FAMILY MEDICINE | Facility: CLINIC | Age: 55
End: 2025-04-14
Payer: COMMERCIAL

## 2025-04-14 VITALS
WEIGHT: 217.5 LBS | SYSTOLIC BLOOD PRESSURE: 128 MMHG | OXYGEN SATURATION: 98 % | HEART RATE: 93 BPM | HEIGHT: 69 IN | BODY MASS INDEX: 32.22 KG/M2 | TEMPERATURE: 96.7 F | RESPIRATION RATE: 18 BRPM | DIASTOLIC BLOOD PRESSURE: 84 MMHG

## 2025-04-14 DIAGNOSIS — Z12.11 SCREEN FOR COLON CANCER: ICD-10-CM

## 2025-04-14 DIAGNOSIS — I10 BENIGN ESSENTIAL HYPERTENSION: Primary | ICD-10-CM

## 2025-04-14 DIAGNOSIS — N95.1 MENOPAUSAL VAGINAL DRYNESS: ICD-10-CM

## 2025-04-14 DIAGNOSIS — K13.0 LIP LESION: ICD-10-CM

## 2025-04-14 DIAGNOSIS — Z12.11 COLON CANCER SCREENING: ICD-10-CM

## 2025-04-14 DIAGNOSIS — E66.01 MORBID OBESITY (H): ICD-10-CM

## 2025-04-14 PROCEDURE — 3079F DIAST BP 80-89 MM HG: CPT | Performed by: NURSE PRACTITIONER

## 2025-04-14 PROCEDURE — 99214 OFFICE O/P EST MOD 30 MIN: CPT | Performed by: NURSE PRACTITIONER

## 2025-04-14 PROCEDURE — 3074F SYST BP LT 130 MM HG: CPT | Performed by: NURSE PRACTITIONER

## 2025-04-14 PROCEDURE — G2211 COMPLEX E/M VISIT ADD ON: HCPCS | Performed by: NURSE PRACTITIONER

## 2025-04-14 RX ORDER — ESTRADIOL 0.1 MG/G
2 CREAM VAGINAL
Qty: 42.5 G | Refills: 1 | Status: SHIPPED | OUTPATIENT
Start: 2025-04-14

## 2025-04-14 RX ORDER — PHENTERMINE HYDROCHLORIDE 37.5 MG/1
37.5 CAPSULE ORAL EVERY MORNING
Qty: 90 CAPSULE | Refills: 0 | Status: SHIPPED | OUTPATIENT
Start: 2025-04-14

## 2025-04-14 NOTE — PATIENT INSTRUCTIONS
(Re)Schedule mammogram  Schedule colonoscopy (may need pre-op)    For lip - likely need to see ENT - can ask dentist    Check on wegovy price

## 2025-04-14 NOTE — PROGRESS NOTES
"  Assessment & Plan     Benign essential hypertension  Stable - labs due. No refills needed  - Comprehensive metabolic panel (BMP + Alb, Alk Phos, ALT, AST, Total. Bili, TP); Future  - Albumin level; Future    Screen for colon cancer  Cancelled cologuard - family history polyps - advise colonoscopy instead  - COLOGUARD([employer]); Future    Lip lesion  Not c/w HSV, cold sore, venous lake, injury. Smoker. 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    Morbid obesity (H)  Unclear benefit at this point, but may be preventing greater increase since stopping topiramate. Tolerating medication and no increase in BP. Continue until able to access GLP1  - phentermine (ADIPEX-P) 37.5 MG capsule; Take 1 capsule (37.5 mg) by mouth every morning.    Colon cancer screening  As abpve  - Colonoscopy Screening  Referral; Future          BMI  Estimated body mass index is 31.85 kg/m  as calculated from the following:    Height as of this encounter: 1.76 m (5' 9.29\").    Weight as of this encounter: 98.7 kg (217 lb 8 oz).   Weight management plan: medication managemtent      Patient Instructions   (Re)Schedule mammogram  Schedule colonoscopy (may need pre-op)    For lip - likely need to see ENT - can ask dentist    Check on wegovy price    The longitudinal plan of care for the diagnosis(es)/condition(s) as documented were addressed during this visit. Due to the added complexity in care, I will continue to support Kendy in the subsequent management and with ongoing continuity of care.Ordering of each unique test  Prescription drug management      Subjective   Kendy is a 54 year old, presenting for the following health issues:  Ulcer (Lower R lip ongoing x1 months)        4/14/2025     2:04 PM   Additional Questions   Roomed by " "janell     Ulcer    History of Present Illness       Reason for visit:  Lip sore not going away  Symptom onset:  More than a month  Symptom intensity:  Mild  Symptom progression:  Staying the same  Had these symptoms before:  No   She is taking medications regularly.        Sore on lower right side of lip for the past 6 months. It has always been white. No painful or burning. Hasn't grown.    History of smoking and currently smoking 1/2 ppd.    No recent illness. No swelling in the neck.    Taking phentermine 37.5 mg - not losing weight. Started regaining weight when she stopped topiramate.     Needs refill vaginal estrogen.       Review of Systems  Constitutional, HEENT, cardiovascular, pulmonary, gi and gu systems are negative, except as otherwise noted.      Objective    /84 (BP Location: Right arm, Patient Position: Sitting, Cuff Size: Adult Regular)   Pulse 93   Temp (!) 96.7  F (35.9  C) (Temporal)   Resp 18   Ht 1.76 m (5' 9.29\")   Wt 98.7 kg (217 lb 8 oz)   SpO2 98%   BMI 31.85 kg/m    Body mass index is 31.85 kg/m .  Physical Exam   Lower lip, left side - white lesion without ulceration or redness. No nodular feel.   No lymphadenopathy  No other oral sores  HRRR       Signed Electronically by: LEDY Seaman CNP    "

## 2025-04-15 ENCOUNTER — MYC MEDICAL ADVICE (OUTPATIENT)
Dept: FAMILY MEDICINE | Facility: CLINIC | Age: 55
End: 2025-04-15
Payer: COMMERCIAL

## 2025-04-16 NOTE — TELEPHONE ENCOUNTER
PRIOR AUTHORIZATION DENIED    Medication: PHENTERMINE HCL 37.5 MG PO TABS  Insurance Company: [employer] - Phone [phone] Fax [phone]  Denial Date: 4/14/2025  Denial Reason(s):       Appeal Information:       Patient Notified: NO

## 2025-04-16 NOTE — TELEPHONE ENCOUNTER
Sent [employer] message relaying message.    Thanks!  Ridge GARRETT RN   [facility]

## 2025-04-16 NOTE — TELEPHONE ENCOUNTER
Please let the patient know about denial and rationale for denial. With the timeframe of when we started phentermine and other medications, the % weight loss is difficult to gauge. I'm hoping we can just move to [facility] regardless so if she has an update on that and she can get this, the phentermine might be moot point.  VANESSA Bill, LATISHAP

## 2025-04-19 NOTE — PROGRESS NOTES
Chief Complaint   Patient presents with     Consult     Pain, right foot. Patient stated that she has seen Krista in the past. Patient stated that she has a history of surgery, Right Foot Cheilectomy - Right with Dr. Mo Edgar             Allergies   Allergen Reactions     Darvocet [Propoxyphene N-Apap] Nausea and Vomiting     Lisinopril Cough     cough     Nsaids      NSAIDS - Ibuprofen & Naproxen - symptoms of swelling in hands/feet, hives          Subjective: Sharmin is a 49 year old female who presents to the clinic today for a follow up of right hallux limitus. Saw Krista Rodgers for 2nd opinion and was molded for orthotics and had a steroid injection. This lasted for a couple of months. Relates that she does have a pair of orthotics that she uses. These are not that helpful. Relates that she wears Nike shoes in the am and switches to clogs in the afternoon for pain control. She does take Celebrex.      Objective  Data Unavailable Data Unavailable Data Unavailable Data Unavailable Data Unavailable 0 lbs 0 oz  Decreased ROM to the right 1st MTPJ with pain in dorsiflexion. Pain noted with distraction of the joint. Edema noted about the right 1st MTPJ. Cicatrix to the right 1st MTPJ. MMT 5/5 without pain.     right foot xrays indicated in 3 weightbearing views.    degenerative changes noted tot he 1st MTPJ that have progressed since the last radiographs. Some bony exostoses noted to the dorsal and lateral 1st MTPJ. Decrease in the joint space of the joint, especially laterally.      Assessment: Right hallux limitus. She has tried multiple conservative treatments and continues to have pain.     Plan:   - Pt seen and evaluated  - XRs taken and discussed with her.   - Recommend that she speak to Dr. Guillen about arthrodesis.  - Rx for Medrol dose pack if needed.   - Pt to return to clinic PRN.    
98

## 2025-05-30 PROBLEM — F11.20 CONTINUOUS OPIOID DEPENDENCE (H): Status: ACTIVE | Noted: 2025-05-30

## 2025-06-02 ENCOUNTER — RESULTS FOLLOW-UP (OUTPATIENT)
Dept: FAMILY MEDICINE | Facility: CLINIC | Age: 55
End: 2025-06-02

## 2025-06-09 ENCOUNTER — MYC MEDICAL ADVICE (OUTPATIENT)
Dept: FAMILY MEDICINE | Facility: CLINIC | Age: 55
End: 2025-06-09
Payer: COMMERCIAL

## 2025-06-09 DIAGNOSIS — I1A.0 RESISTANT HYPERTENSION: ICD-10-CM

## 2025-06-09 DIAGNOSIS — I10 BENIGN ESSENTIAL HYPERTENSION: Primary | ICD-10-CM

## 2025-06-10 ENCOUNTER — TELEPHONE (OUTPATIENT)
Dept: FAMILY MEDICINE | Facility: CLINIC | Age: 55
End: 2025-06-10
Payer: COMMERCIAL

## 2025-06-10 DIAGNOSIS — I10 BENIGN ESSENTIAL HYPERTENSION: Primary | ICD-10-CM

## 2025-06-10 RX ORDER — AMLODIPINE BESYLATE 10 MG/1
10 TABLET ORAL DAILY
Qty: 90 TABLET | Refills: 3 | Status: SHIPPED | OUTPATIENT
Start: 2025-06-10

## 2025-06-12 ENCOUNTER — TELEPHONE (OUTPATIENT)
Dept: PSYCHIATRY | Facility: CLINIC | Age: 55
End: 2025-06-12

## 2025-06-12 ENCOUNTER — PATIENT OUTREACH (OUTPATIENT)
Dept: CARE COORDINATION | Facility: CLINIC | Age: 55
End: 2025-06-12
Payer: COMMERCIAL

## 2025-06-16 ENCOUNTER — LAB (OUTPATIENT)
Dept: LAB | Facility: CLINIC | Age: 55
End: 2025-06-16
Payer: COMMERCIAL

## 2025-06-16 ENCOUNTER — ANCILLARY PROCEDURE (OUTPATIENT)
Dept: ULTRASOUND IMAGING | Facility: CLINIC | Age: 55
End: 2025-06-16
Attending: NURSE PRACTITIONER
Payer: COMMERCIAL

## 2025-06-16 DIAGNOSIS — I10 BENIGN ESSENTIAL HYPERTENSION: ICD-10-CM

## 2025-06-16 DIAGNOSIS — I1A.0 RESISTANT HYPERTENSION: ICD-10-CM

## 2025-06-16 PROCEDURE — 93975 VASCULAR STUDY: CPT | Mod: GC | Performed by: RADIOLOGY

## 2025-06-16 PROCEDURE — 99000 SPECIMEN HANDLING OFFICE-LAB: CPT | Performed by: PATHOLOGY

## 2025-06-16 PROCEDURE — 82088 ASSAY OF ALDOSTERONE: CPT | Performed by: NURSE PRACTITIONER

## 2025-06-16 PROCEDURE — 84244 ASSAY OF RENIN: CPT | Mod: 90 | Performed by: PATHOLOGY

## 2025-06-16 PROCEDURE — 76770 US EXAM ABDO BACK WALL COMP: CPT | Mod: GC | Performed by: RADIOLOGY

## 2025-06-16 PROCEDURE — 36415 COLL VENOUS BLD VENIPUNCTURE: CPT | Performed by: PATHOLOGY

## 2025-06-17 ENCOUNTER — RESULTS FOLLOW-UP (OUTPATIENT)
Dept: FAMILY MEDICINE | Facility: CLINIC | Age: 55
End: 2025-06-17

## 2025-06-17 LAB — ALDOST SERPL-MCNC: 11.4 NG/DL (ref 0–31)

## 2025-06-18 ENCOUNTER — RESULTS FOLLOW-UP (OUTPATIENT)
Dept: FAMILY MEDICINE | Facility: CLINIC | Age: 55
End: 2025-06-18

## 2025-06-18 LAB
ALDOST/RENIN PLAS-RTO: 0.5 {RATIO} (ref 0–25)
RENIN PLAS-CCNC: 23.9 NG/ML/HR

## 2025-06-23 ENCOUNTER — VIRTUAL VISIT (OUTPATIENT)
Dept: FAMILY MEDICINE | Facility: CLINIC | Age: 55
End: 2025-06-23
Payer: COMMERCIAL

## 2025-06-23 DIAGNOSIS — F41.8 SITUATIONAL ANXIETY: ICD-10-CM

## 2025-06-23 DIAGNOSIS — Z13.220 LIPID SCREENING: ICD-10-CM

## 2025-06-23 DIAGNOSIS — I1A.0 RESISTANT HYPERTENSION: ICD-10-CM

## 2025-06-23 DIAGNOSIS — I10 BENIGN ESSENTIAL HYPERTENSION: Primary | ICD-10-CM

## 2025-06-23 DIAGNOSIS — M96.0 NONUNION AFTER ARTHRODESIS: ICD-10-CM

## 2025-06-23 DIAGNOSIS — M25.871 SESAMOIDITIS OF RIGHT FOOT: ICD-10-CM

## 2025-06-23 DIAGNOSIS — F11.20 CONTINUOUS OPIOID DEPENDENCE (H): ICD-10-CM

## 2025-06-23 DIAGNOSIS — M79.674 PAIN OF RIGHT GREAT TOE: ICD-10-CM

## 2025-06-23 PROCEDURE — 98006 SYNCH AUDIO-VIDEO EST MOD 30: CPT | Performed by: NURSE PRACTITIONER

## 2025-06-23 RX ORDER — HYDROCODONE BITARTRATE AND ACETAMINOPHEN 5; 325 MG/1; MG/1
1 TABLET ORAL 3 TIMES DAILY PRN
Qty: 90 TABLET | Refills: 0 | Status: SHIPPED | OUTPATIENT
Start: 2025-06-24

## 2025-06-23 RX ORDER — LORAZEPAM 1 MG/1
1 TABLET ORAL EVERY 6 HOURS PRN
Qty: 5 TABLET | Refills: 0 | Status: SHIPPED | OUTPATIENT
Start: 2025-06-23

## 2025-06-23 RX ORDER — HYDROCHLOROTHIAZIDE 50 MG/1
50 TABLET ORAL DAILY
Qty: 90 TABLET | Refills: 1 | Status: SHIPPED | OUTPATIENT
Start: 2025-06-23

## 2025-06-23 NOTE — PATIENT INSTRUCTIONS
Schedule labs    Questions for nephrology    1) Is there any other work-up or thoughts for why blood pressure has gone up and needing three agents and max doses?   Normal tests include:    - renin-aldosterone     - GFR and creatinine (specifically GFR >90)    - renal US 6/16 with subcentimeter renal cortical thicknesses     - EKG 5/30   Pending tests include:    - CBC    - microalbumin    - random urine protein    - TSH   Other considerations:    - sober from alcohol 7 months and prior to that three years    - smoking 1/2 ppd cigarettes    - venlafaxine 300 mg for > 5 years  2) Would there be an agent in any of the classes that might work better for without being at max dose?  3) If no changes to the plan currently, what would next step if another agent is needed?

## 2025-06-23 NOTE — PROGRESS NOTES
"Kendy is a 54 year old who is being evaluated via a billable video visit.    How would you like to obtain your AVS? MyChart  If the video visit is dropped, the invitation should be resent by: Text to cell phone: [phone]  Will anyone else be joining your video visit? No      Assessment & Plan     Benign essential hypertension  Max doses of ARB, CCB and diuretic. Screening for primary aldosteronism negative. EKG and kidney US negative other than \"subcentimeter thickness\" on kidney US. Checking CBC, thyroid and for proteinuria. Otherwise would appreciate nephrology review and plan for next step if needed down the road. Symptomatically normal with exception of ongoing SOB.  - Adult Nephrology  Referral; Future  - hydrochlorothiazide (HYDRODIURIL) 50 MG tablet; Take 1 tablet (50 mg) by mouth daily.    Resistant hypertension  - Adult Nephrology  Referral; Future  - hydrochlorothiazide (HYDRODIURIL) 50 MG tablet; Take 1 tablet (50 mg) by mouth daily.  - TSH with free T4 reflex; Future  - CBC with platelets and differential; Future  - Albumin Random Urine Quantitative with Creat Ratio; Future  - Protein  random urine; Future    Lipid screening  - Lipid panel reflex to direct LDL Fasting; Future    Situational anxiety  Do not take hydrocodone within 12 hours of ativan  - LORazepam (ATIVAN) 1 MG tablet; Take 1 tablet (1 mg) by mouth every 6 hours as needed for anxiety.    Pain of right great toe  Refilled early due to travel. Has narcan that she carries  - HYDROcodone-acetaminophen (NORCO) 5-325 MG tablet; Take 1 tablet by mouth 3 times daily as needed for severe pain.    Nonunion after arthrodesis  - HYDROcodone-acetaminophen (NORCO) 5-325 MG tablet; Take 1 tablet by mouth 3 times daily as needed for severe pain.    Sesamoiditis of right foot  - HYDROcodone-acetaminophen (NORCO) 5-325 MG tablet; Take 1 tablet by mouth 3 times daily as needed for severe pain.    Continuous opioid dependence (H)  - " HYDROcodone-acetaminophen (NORCO) 5-325 MG tablet; Take 1 tablet by mouth 3 times daily as needed for severe pain.        Follow-up   Return in about 5 weeks (around 7/28/2025) for Physical Exam.        The longitudinal plan of care for the diagnosis(es)/condition(s) as documented were addressed during this visit. Due to the added complexity in care, I will continue to support Kendy in the subsequent management and with ongoing continuity of care.  Ordering of each unique test  Prescription drug management  I spent a total of 34 minutes on the day of the visit.   Time spent by me today doing chart review, history and exam, documentation and further activities per the note    Juana Larry is a 54 year old, presenting for the following health issues:  No chief complaint on file.        5/30/2025    10:47 AM   Additional Questions   Roomed by Shanda LOAIZA     History of Present Illness       Hypertension: She presents for follow up of hypertension.  She does check blood pressure  regularly outside of the clinic. Outside blood pressures have been over 140/90. She does not follow a low salt diet.     She eats 2-3 servings of fruits and vegetables daily.She consumes 1 sweetened beverage(s) daily.She exercises with enough effort to increase her heart rate 9 or less minutes per day.  She exercises with enough effort to increase her heart rate 3 or less days per week.   She is taking medications regularly.      Today's blood pressure was 120s/80s. Has stopped having blood pressure sensation in the head and uneasy feeling. Continues to have SOB with minimal exertion for the past 6 weeks.    Hydrochlorothiazide 50 mg (most recently increased from 25 mg on 6/9)  Amlodipine 10 mg (increased from 5 mg in early June)  Losartan 100 mg    Traveling by airplane to [place], [place] for nephew's wedding  Hydrocodone-acetaminophen will be due 6/30, but leaving for the wedding on 6/25      Review of Systems  Constitutional, HEENT, cardiovascular,  pulmonary, gi and gu systems are negative, except as otherwise noted.      Objective           Vitals:  No vitals were obtained today due to virtual visit.    Physical Exam   GENERAL: alert and no distress  EYES: Eyes grossly normal to inspection.  No discharge or erythema, or obvious scleral/conjunctival abnormalities.  RESP: No audible wheeze, cough, or visible cyanosis.    SKIN: Visible skin clear. No significant rash, abnormal pigmentation or lesions.  NEURO: Cranial nerves grossly intact.  Mentation and speech appropriate for age.  PSYCH: Appropriate affect, tone, and pace of words        Video-Visit Details    Type of service:  Video Visit   Originating Location (pt. Location): Home    Distant Location (provider location):  On-site  Platform used for Video Visit: Charlie  Signed Electronically by: LEDY Seaman CNP

## 2025-06-24 ENCOUNTER — PATIENT OUTREACH (OUTPATIENT)
Dept: CARE COORDINATION | Facility: CLINIC | Age: 55
End: 2025-06-24
Payer: COMMERCIAL

## 2025-06-26 ENCOUNTER — PATIENT OUTREACH (OUTPATIENT)
Dept: CARE COORDINATION | Facility: CLINIC | Age: 55
End: 2025-06-26
Payer: COMMERCIAL

## 2025-07-22 ENCOUNTER — MYC REFILL (OUTPATIENT)
Dept: FAMILY MEDICINE | Facility: CLINIC | Age: 55
End: 2025-07-22
Payer: COMMERCIAL

## 2025-07-22 DIAGNOSIS — M79.674 PAIN OF RIGHT GREAT TOE: ICD-10-CM

## 2025-07-22 DIAGNOSIS — F11.20 CONTINUOUS OPIOID DEPENDENCE (H): ICD-10-CM

## 2025-07-22 DIAGNOSIS — M96.0 NONUNION AFTER ARTHRODESIS: ICD-10-CM

## 2025-07-22 DIAGNOSIS — M25.871 SESAMOIDITIS OF RIGHT FOOT: ICD-10-CM

## 2025-07-22 RX ORDER — HYDROCODONE BITARTRATE AND ACETAMINOPHEN 5; 325 MG/1; MG/1
1 TABLET ORAL 3 TIMES DAILY PRN
Qty: 90 TABLET | Refills: 0 | Status: SHIPPED | OUTPATIENT
Start: 2025-07-22

## 2025-08-11 DIAGNOSIS — I10 BENIGN ESSENTIAL HYPERTENSION: ICD-10-CM

## 2025-08-11 RX ORDER — LOSARTAN POTASSIUM 100 MG/1
100 TABLET ORAL DAILY
Qty: 90 TABLET | Refills: 1 | Status: SHIPPED | OUTPATIENT
Start: 2025-08-11

## 2025-08-13 ENCOUNTER — MYC MEDICAL ADVICE (OUTPATIENT)
Dept: FAMILY MEDICINE | Facility: CLINIC | Age: 55
End: 2025-08-13
Payer: COMMERCIAL

## 2025-08-13 DIAGNOSIS — M79.641 RIGHT HAND PAIN: Primary | ICD-10-CM

## 2025-08-14 DIAGNOSIS — N95.1 MENOPAUSAL VAGINAL DRYNESS: ICD-10-CM

## 2025-08-14 RX ORDER — ESTRADIOL 0.1 MG/G
2 CREAM VAGINAL
Qty: 42.5 G | Refills: 1 | Status: SHIPPED | OUTPATIENT
Start: 2025-08-14

## 2025-08-15 ENCOUNTER — PRE VISIT (OUTPATIENT)
Dept: ORTHOPEDICS | Facility: CLINIC | Age: 55
End: 2025-08-15

## 2025-08-15 ENCOUNTER — ANCILLARY PROCEDURE (OUTPATIENT)
Dept: GENERAL RADIOLOGY | Facility: CLINIC | Age: 55
End: 2025-08-15
Attending: FAMILY MEDICINE
Payer: COMMERCIAL

## 2025-08-15 DIAGNOSIS — M79.641 RIGHT HAND PAIN: ICD-10-CM

## 2025-08-15 PROCEDURE — 73130 X-RAY EXAM OF HAND: CPT | Mod: RT | Performed by: RADIOLOGY

## 2025-08-18 ENCOUNTER — PATIENT OUTREACH (OUTPATIENT)
Dept: CARE COORDINATION | Facility: CLINIC | Age: 55
End: 2025-08-18
Payer: COMMERCIAL

## 2025-08-26 ENCOUNTER — MYC REFILL (OUTPATIENT)
Dept: FAMILY MEDICINE | Facility: CLINIC | Age: 55
End: 2025-08-26
Payer: COMMERCIAL

## 2025-08-26 DIAGNOSIS — F51.01 PRIMARY INSOMNIA: ICD-10-CM

## 2025-08-26 RX ORDER — TRAZODONE HYDROCHLORIDE 100 MG/1
200 TABLET ORAL AT BEDTIME
Qty: 180 TABLET | Refills: 0 | Status: SHIPPED | OUTPATIENT
Start: 2025-08-26

## (undated) DEVICE — BLADE SAW OSCIL/SAG STRK MICRO 5.5X25X0.38MM 2296-003-524

## (undated) DEVICE — ENDO TUBING CO2 SMARTCAP STERILE DISP 100145CO2EXT

## (undated) DEVICE — GLOVE PROTEGRITY MICRO 7.5 LATEX

## (undated) DEVICE — PACK LOWER EXTREMITY CUSTOM ASC

## (undated) DEVICE — ESU GROUND PAD ADULT W/CORD E7507

## (undated) DEVICE — DRAPE MINI C-ARM 4003

## (undated) DEVICE — SOL WATER IRRIG 500ML BOTTLE 2F7113

## (undated) DEVICE — SU ETHILON 3-0 PS-1 18" 1663H

## (undated) DEVICE — SU VICRYL 2-0 CT-2 27" UND J269H

## (undated) DEVICE — IMP WIRE KIRSCHNER 1.6MM 0.062X4" SGL END TROCAR KM71-134
Type: IMPLANTABLE DEVICE | Site: ANKLE | Status: NON-FUNCTIONAL
Removed: 2021-06-09

## (undated) DEVICE — GOWN XLG DISP 9545

## (undated) DEVICE — Device

## (undated) DEVICE — SUCTION MANIFOLD NEPTUNE 2 SYS 1 PORT 702-025-000

## (undated) DEVICE — NDL 19GA 1.5"

## (undated) DEVICE — NDL 25GA 1.5" 305127

## (undated) DEVICE — DRSG KERLIX 4 1/2"X4YDS ROLL 6715

## (undated) DEVICE — IMM LIMB ELEVATOR DC40-0203

## (undated) DEVICE — GLOVE PROTEXIS BLUE W/NEU-THERA 7.0  2D73EB70

## (undated) DEVICE — KIT ENDO FIRST STEP DISINFECTANT 200ML W/POUCH EP-4

## (undated) DEVICE — LINEN ORTHO PACK 5446

## (undated) DEVICE — PAD CHUX UNDERPAD 30X30"

## (undated) DEVICE — BLADE KNIFE SURG 10 371110

## (undated) DEVICE — ESU GROUND PAD UNIVERSAL W/O CORD

## (undated) DEVICE — PACK EXTREMITY SOP15EXFSD

## (undated) DEVICE — LINEN TOWEL PACK X5 5464

## (undated) DEVICE — SPECIMEN CONTAINER 60MLW/10% FORMALIN 59601

## (undated) DEVICE — DRAPE STERI TOWEL LG 1010

## (undated) DEVICE — GLOVE PROTEXIS MICRO 6.5  2D73PM65

## (undated) DEVICE — DRSG GAUZE 4X4" 3033

## (undated) DEVICE — SU PROLENE 4-0 PS-2 18" 8682G

## (undated) DEVICE — BNDG ELASTIC 4"X5YDS UNSTERILE 6611-40

## (undated) DEVICE — DRSG XEROFORM 1X8"

## (undated) DEVICE — GLOVE PROTEXIS BLUE W/NEU-THERA 8.0  2D73EB80

## (undated) DEVICE — DRAPE STERI TOWEL SM 1000

## (undated) DEVICE — BLADE SAW SAGITTAL 25.5X9.5X.4MM FINE LINVATEC 5023-138

## (undated) DEVICE — DRSG KERLIX FLUFFS X5

## (undated) DEVICE — ENDO FORCEP BX CAPTURA PRO SPIKE G50696

## (undated) DEVICE — ENDO CAP AND TUBING STERILE FOR ENDOGATOR  100130

## (undated) DEVICE — JELLY LUBRICATING SURGILUBE 2OZ TUBE

## (undated) DEVICE — LINEN GOWN XLG 5407

## (undated) DEVICE — SYR 10ML FINGER CONTROL W/O NDL 309695

## (undated) DEVICE — DRSG ABDOMINAL 07 1/2X8" 7197D

## (undated) DEVICE — PREP CHLORAPREP 26ML TINTED ORANGE  260815

## (undated) DEVICE — IMM LEG ELEVATOR 79-90191

## (undated) DEVICE — BLADE SAW OSCILLATING STRYK MED 9.0X25X0.38MM 2296-003-111

## (undated) DEVICE — GLOVE PROTEXIS POWDER FREE SMT 7.5  2D72PT75X

## (undated) DEVICE — SYR BULB IRRIG DOVER 60 ML LATEX FREE 67000

## (undated) DEVICE — BNDG ROLLER GAUZE CONFORM 2"X4YD 41-52

## (undated) DEVICE — GLOVE PROTEXIS BLUE W/NEU-THERA 7.5  2D73EB75

## (undated) DEVICE — ENDO BITE BLOCK ADULT OMNI-BLOC

## (undated) DEVICE — KIT CONNECTOR FOR OLYMPUS ENDOSCOPES DEFENDO 100310

## (undated) DEVICE — SU VICRYL 3-0 PS-2 18" UND J497H

## (undated) DEVICE — SOL NACL 0.9% IRRIG 500ML BOTTLE 2F7123

## (undated) RX ORDER — LIDOCAINE HYDROCHLORIDE 20 MG/ML
INJECTION, SOLUTION EPIDURAL; INFILTRATION; INTRACAUDAL; PERINEURAL
Status: DISPENSED
Start: 2021-06-09

## (undated) RX ORDER — FENTANYL CITRATE 50 UG/ML
INJECTION, SOLUTION INTRAMUSCULAR; INTRAVENOUS
Status: DISPENSED
Start: 2017-12-21

## (undated) RX ORDER — DEXAMETHASONE SODIUM PHOSPHATE 4 MG/ML
INJECTION, SOLUTION INTRA-ARTICULAR; INTRALESIONAL; INTRAMUSCULAR; INTRAVENOUS; SOFT TISSUE
Status: DISPENSED
Start: 2021-06-09

## (undated) RX ORDER — GABAPENTIN 300 MG/1
CAPSULE ORAL
Status: DISPENSED
Start: 2021-06-09

## (undated) RX ORDER — ACETAMINOPHEN 325 MG/1
TABLET ORAL
Status: DISPENSED
Start: 2021-06-09

## (undated) RX ORDER — FENTANYL CITRATE 50 UG/ML
INJECTION, SOLUTION INTRAMUSCULAR; INTRAVENOUS
Status: DISPENSED
Start: 2021-06-09

## (undated) RX ORDER — CEFAZOLIN SODIUM 2 G/100ML
INJECTION, SOLUTION INTRAVENOUS
Status: DISPENSED
Start: 2017-12-21

## (undated) RX ORDER — PROPOFOL 10 MG/ML
INJECTION, EMULSION INTRAVENOUS
Status: DISPENSED
Start: 2021-06-09

## (undated) RX ORDER — PROPOFOL 10 MG/ML
INJECTION, EMULSION INTRAVENOUS
Status: DISPENSED
Start: 2017-12-21

## (undated) RX ORDER — ONDANSETRON 2 MG/ML
INJECTION INTRAMUSCULAR; INTRAVENOUS
Status: DISPENSED
Start: 2017-12-21

## (undated) RX ORDER — BUPIVACAINE HYDROCHLORIDE 5 MG/ML
INJECTION, SOLUTION EPIDURAL; INTRACAUDAL
Status: DISPENSED
Start: 2017-12-21

## (undated) RX ORDER — ONDANSETRON 2 MG/ML
INJECTION INTRAMUSCULAR; INTRAVENOUS
Status: DISPENSED
Start: 2021-06-09

## (undated) RX ORDER — HYDROMORPHONE HYDROCHLORIDE 1 MG/ML
INJECTION, SOLUTION INTRAMUSCULAR; INTRAVENOUS; SUBCUTANEOUS
Status: DISPENSED
Start: 2017-12-21

## (undated) RX ORDER — CEFAZOLIN SODIUM 2 G/50ML
SOLUTION INTRAVENOUS
Status: DISPENSED
Start: 2021-06-09

## (undated) RX ORDER — LIDOCAINE HYDROCHLORIDE 10 MG/ML
INJECTION, SOLUTION EPIDURAL; INFILTRATION; INTRACAUDAL; PERINEURAL
Status: DISPENSED
Start: 2017-12-21

## (undated) RX ORDER — BUPIVACAINE HYDROCHLORIDE 5 MG/ML
INJECTION, SOLUTION EPIDURAL; INTRACAUDAL
Status: DISPENSED
Start: 2021-06-09

## (undated) RX ORDER — TRIAMCINOLONE ACETONIDE 40 MG/ML
INJECTION, SUSPENSION INTRA-ARTICULAR; INTRAMUSCULAR
Status: DISPENSED
Start: 2018-12-11

## (undated) RX ORDER — BUPIVACAINE HYDROCHLORIDE 2.5 MG/ML
INJECTION, SOLUTION EPIDURAL; INFILTRATION; INTRACAUDAL
Status: DISPENSED
Start: 2018-12-11

## (undated) RX ORDER — HYDROMORPHONE HYDROCHLORIDE 1 MG/ML
INJECTION, SOLUTION INTRAMUSCULAR; INTRAVENOUS; SUBCUTANEOUS
Status: DISPENSED
Start: 2021-06-09

## (undated) RX ORDER — LIDOCAINE HYDROCHLORIDE 10 MG/ML
INJECTION, SOLUTION EPIDURAL; INFILTRATION; INTRACAUDAL; PERINEURAL
Status: DISPENSED
Start: 2018-12-11

## (undated) RX ORDER — OXYCODONE HYDROCHLORIDE 5 MG/1
TABLET ORAL
Status: DISPENSED
Start: 2021-06-09

## (undated) RX ORDER — HYDROCODONE BITARTRATE AND ACETAMINOPHEN 5; 325 MG/1; MG/1
TABLET ORAL
Status: DISPENSED
Start: 2017-12-21